# Patient Record
Sex: MALE | Race: WHITE | NOT HISPANIC OR LATINO | Employment: OTHER | URBAN - METROPOLITAN AREA
[De-identification: names, ages, dates, MRNs, and addresses within clinical notes are randomized per-mention and may not be internally consistent; named-entity substitution may affect disease eponyms.]

---

## 2017-06-02 DIAGNOSIS — M17.0 PRIMARY OSTEOARTHRITIS OF BOTH KNEES: ICD-10-CM

## 2017-06-02 RX ORDER — HYDROCODONE BITARTRATE AND ACETAMINOPHEN 5; 325 MG/1; MG/1
1 TABLET ORAL EVERY 6 HOURS PRN
Qty: 30 TABLET | Refills: 0 | Status: SHIPPED | OUTPATIENT
Start: 2017-06-02 | End: 2017-06-05 | Stop reason: SDUPTHER

## 2017-06-05 ENCOUNTER — TELEPHONE (OUTPATIENT)
Dept: INTERNAL MEDICINE | Facility: CLINIC | Age: 57
End: 2017-06-05

## 2017-06-05 ENCOUNTER — PATIENT MESSAGE (OUTPATIENT)
Dept: INTERNAL MEDICINE | Facility: CLINIC | Age: 57
End: 2017-06-05

## 2017-06-05 DIAGNOSIS — G89.29 CHRONIC LEFT SHOULDER PAIN: ICD-10-CM

## 2017-06-05 DIAGNOSIS — M17.0 PRIMARY OSTEOARTHRITIS OF BOTH KNEES: ICD-10-CM

## 2017-06-05 DIAGNOSIS — G89.29 CHRONIC MIDLINE LOW BACK PAIN WITHOUT SCIATICA: ICD-10-CM

## 2017-06-05 DIAGNOSIS — L71.9 ROSACEA: ICD-10-CM

## 2017-06-05 DIAGNOSIS — M25.512 CHRONIC LEFT SHOULDER PAIN: ICD-10-CM

## 2017-06-05 DIAGNOSIS — M54.50 CHRONIC MIDLINE LOW BACK PAIN WITHOUT SCIATICA: ICD-10-CM

## 2017-06-05 RX ORDER — HYDROCODONE BITARTRATE AND ACETAMINOPHEN 5; 325 MG/1; MG/1
1 TABLET ORAL EVERY 6 HOURS PRN
Qty: 30 TABLET | Refills: 0 | Status: SHIPPED | OUTPATIENT
Start: 2017-06-05 | End: 2017-06-26 | Stop reason: SDUPTHER

## 2017-06-05 NOTE — TELEPHONE ENCOUNTER
----- Message from Winter Fisher sent at 6/5/2017  8:37 AM CDT -----  Would like for all his med's to go to [facility] in [place]  at [address]. Can Hydrocodone script be sent to [facility] in [place].

## 2017-06-19 ENCOUNTER — OFFICE VISIT (OUTPATIENT)
Dept: DERMATOLOGY | Facility: CLINIC | Age: 57
End: 2017-06-19
Payer: OTHER GOVERNMENT

## 2017-06-19 VITALS — HEIGHT: 72 IN | WEIGHT: 259 LBS | BODY MASS INDEX: 35.08 KG/M2

## 2017-06-19 DIAGNOSIS — L40.9 PSORIASIS: Primary | ICD-10-CM

## 2017-06-19 PROCEDURE — 99999 PR PBB SHADOW E&M-EST. PATIENT-LVL II: CPT | Mod: PBBFAC,,, | Performed by: DERMATOLOGY

## 2017-06-19 PROCEDURE — 99212 OFFICE O/P EST SF 10 MIN: CPT | Mod: PBBFAC,PO | Performed by: DERMATOLOGY

## 2017-06-19 PROCEDURE — 99213 OFFICE O/P EST LOW 20 MIN: CPT | Mod: S$PBB,,, | Performed by: DERMATOLOGY

## 2017-06-19 NOTE — PROGRESS NOTES
"  Subjective:       Patient ID:  Baldo Grant is a 57 y.o. male who presents for   Chief Complaint   Patient presents with    Rash     R leg, behind L leg, R arm     Patient last seen 6/2016 with rosacea, improve with treatment  Returns with new complaint  No history of prior similar issue  No FH psoriasis  Using mometasone oint QOD  + job changes, family stressors    PEST questionnaire; 3+  Have you ever had a swollen joint or joints? yes  Has your doctor ever told you you had arthritis? no  Do your finger nails or toe nails have holes or pits? no  Have you had pain in your heel or angel's tendon? No (plantar fasciitis)  Have had a finger or toe that was completely swollen and painful for no apparent reason? no      Current Outpatient Prescriptions:     diazePAM (VALIUM) 5 MG tablet, Take 1 tablet (5 mg total) by mouth daily as needed., Disp: 30 tablet, Rfl: 1    diphenhydrAMINE (BENADRYL) 25 mg capsule, Take 25 mg by mouth every 6 (six) hours as needed for Itching., Disp: , Rfl:     fexofenadine-pseudoephedrine  mg (ALLEGRA-D)  mg per tablet, Take 1 tablet by mouth every 12 (twelve) hours., Disp: , Rfl:     hydrocodone-acetaminophen 5-325mg (NORCO) 5-325 mg per tablet, Take 1 tablet by mouth every 6 (six) hours as needed for Pain., Disp: 30 tablet, Rfl: 0 - "bad back and bad knees"    ibuprofen (ADVIL,MOTRIN) 400 MG tablet, Take 400 mg by mouth daily as needed for Other., Disp: , Rfl:     meloxicam (MOBIC) 15 MG tablet, Take 1 tablet (15 mg total) by mouth once daily., Disp: 90 tablet, Rfl: 3    mometasone (ELOCON) 0.1 % ointment, APPLY TO AFFECTED AREA ON LEGS TWICE A DAY, Disp: , Rfl: 2    triamcinolone acetonide 0.1% (KENALOG) 0.1 % cream, AAA arms bid, Disp: 60 g, Rfl: 3        Rash  - Initial  Affected locations: right lower leg, left lower leg and right arm  Duration: 6 months  Signs / symptoms: itching and redness  Severity: mild to moderate  Timing: constant  Aggravated by: " scratching  Treatments tried: Mometasone Ointment 0.1%, triamcinolone 0.1% cream.  Improvement on treatment: no relief        Review of Systems   Constitutional: Negative for fever, chills, weight loss, weight gain, fatigue, night sweats and malaise.   Skin: Positive for itching, rash, activity-related sunscreen use and wears hat.   Hematologic/Lymphatic: Does not bruise/bleed easily.        Objective:    Physical Exam   Constitutional: He appears well-developed and well-nourished. No distress.   Neurological: He is alert and oriented to person, place, and time. He is not disoriented.   Psychiatric: He has a normal mood and affect.   Skin:   Areas Examined (abnormalities noted in diagram):   Scalp / Hair Palpated and Inspected  Head / Face Inspection Performed  RUE Inspected  LUE Inspection Performed  RLE Inspected  LLE Inspection Performed  Nails and Digits Inspection Performed              Diagram Legend     Erythematous scaling macule/papule c/w actinic keratosis       Vascular papule c/w angioma      Pigmented verrucoid papule/plaque c/w seborrheic keratosis      Yellow umbilicated papule c/w sebaceous hyperplasia      Irregularly shaped tan macule c/w lentigo     1-2 mm smooth white papules consistent with Milia      Movable subcutaneous cyst with punctum c/w epidermal inclusion cyst      Subcutaneous movable cyst c/w pilar cyst      Firm pink to brown papule c/w dermatofibroma      Pedunculated fleshy papule(s) c/w skin tag(s)      Evenly pigmented macule c/w junctional nevus     Mildly variegated pigmented, slightly irregular-bordered macule c/w mildly atypical nevus      Flesh colored to evenly pigmented papule c/w intradermal nevus       Pink pearly papule/plaque c/w basal cell carcinoma      Erythematous hyperkeratotic cursted plaque c/w SCC      Surgical scar with no sign of skin cancer recurrence      Open and closed comedones      Inflammatory papules and pustules      Verrucoid papule consistent  consistent with wart     Erythematous eczematous patches and plaques     Dystrophic onycholytic nail with subungual debris c/w onychomycosis     Umbilicated papule    Erythematous-base heme-crusted tan verrucoid plaque consistent with inflamed seborrheic keratosis     Erythematous Silvery Scaling Plaque c/w Psoriasis     See annotation      Assessment / Plan:        Psoriasis, B LEs, approx 1-2% BSA, classic plaque on R shin, few guttate lesions on BUEs and BLEs  No genital palmo plantar or H&N involvement  Failed mometasone ointment   Discussed RF for CVD, psoriasis is additional independent RF  + hand back and knee arthralgia, attributes to trauma ([employer]), consider Rheum evaluation  Trial of topical combo medication      -     calcipotriene-betamethasone (ENSTILAR) 0.005-0.064 % Foam; Apply 1 application topically once daily.  Dispense: 60 g; Refill: 2             Return in about 3 months (around 9/19/2017).

## 2017-06-26 ENCOUNTER — PATIENT MESSAGE (OUTPATIENT)
Dept: INTERNAL MEDICINE | Facility: CLINIC | Age: 57
End: 2017-06-26

## 2017-06-26 DIAGNOSIS — M54.50 CHRONIC MIDLINE LOW BACK PAIN WITHOUT SCIATICA: ICD-10-CM

## 2017-06-26 DIAGNOSIS — M17.0 PRIMARY OSTEOARTHRITIS OF BOTH KNEES: ICD-10-CM

## 2017-06-26 DIAGNOSIS — G89.29 CHRONIC MIDLINE LOW BACK PAIN WITHOUT SCIATICA: ICD-10-CM

## 2017-06-26 RX ORDER — HYDROCODONE BITARTRATE AND ACETAMINOPHEN 5; 325 MG/1; MG/1
1 TABLET ORAL EVERY 6 HOURS PRN
Qty: 30 TABLET | Refills: 0 | Status: SHIPPED | OUTPATIENT
Start: 2017-06-26 | End: 2017-10-02 | Stop reason: SDUPTHER

## 2017-06-26 NOTE — TELEPHONE ENCOUNTER
----- Message from Winter Fisher sent at 6/26/2017  2:34 PM CDT -----  Pharmacy would not take Hydrocodone script electrically so can he get a hard script. Want to pick script up today if possible.

## 2017-07-04 ENCOUNTER — HOSPITAL ENCOUNTER (EMERGENCY)
Facility: HOSPITAL | Age: 57
Discharge: HOME | End: 2017-07-05
Attending: EMERGENCY MEDICINE
Payer: OTHER GOVERNMENT

## 2017-07-04 VITALS
BODY MASS INDEX: 33.86 KG/M2 | HEIGHT: 72 IN | OXYGEN SATURATION: 97 % | HEART RATE: 75 BPM | TEMPERATURE: 99 F | RESPIRATION RATE: 20 BRPM | DIASTOLIC BLOOD PRESSURE: 97 MMHG | SYSTOLIC BLOOD PRESSURE: 140 MMHG | WEIGHT: 250 LBS

## 2017-07-04 DIAGNOSIS — R07.9 CHEST PAIN: ICD-10-CM

## 2017-07-04 DIAGNOSIS — T78.40XA ALLERGIC REACTION, INITIAL ENCOUNTER: Primary | ICD-10-CM

## 2017-07-04 LAB — TROPONIN I SERPL DL<=0.01 NG/ML-MCNC: 0.01 NG/ML

## 2017-07-04 PROCEDURE — 63600175 PHARM REV CODE 636 W HCPCS: Performed by: EMERGENCY MEDICINE

## 2017-07-04 PROCEDURE — 96374 THER/PROPH/DIAG INJ IV PUSH: CPT

## 2017-07-04 PROCEDURE — 25000003 PHARM REV CODE 250: Performed by: EMERGENCY MEDICINE

## 2017-07-04 PROCEDURE — 84484 ASSAY OF TROPONIN QUANT: CPT

## 2017-07-04 PROCEDURE — 99284 EMERGENCY DEPT VISIT MOD MDM: CPT | Mod: 25

## 2017-07-04 PROCEDURE — 93005 ELECTROCARDIOGRAM TRACING: CPT

## 2017-07-04 RX ORDER — EPINEPHRINE 0.3 MG/.3ML
1 INJECTION SUBCUTANEOUS ONCE
Qty: 1 DEVICE | Refills: 1 | Status: SHIPPED | OUTPATIENT
Start: 2017-07-04 | End: 2019-12-10 | Stop reason: SDUPTHER

## 2017-07-04 RX ORDER — METHYLPREDNISOLONE 4 MG/1
TABLET ORAL
Qty: 1 PACKAGE | Refills: 0 | Status: SHIPPED | OUTPATIENT
Start: 2017-07-04 | End: 2017-07-25

## 2017-07-04 RX ORDER — METHYLPREDNISOLONE SOD SUCC 125 MG
125 VIAL (EA) INJECTION
Status: COMPLETED | OUTPATIENT
Start: 2017-07-04 | End: 2017-07-04

## 2017-07-04 RX ORDER — MAG HYDROX/ALUMINUM HYD/SIMETH 200-200-20
5 SUSPENSION, ORAL (FINAL DOSE FORM) ORAL
Status: COMPLETED | OUTPATIENT
Start: 2017-07-04 | End: 2017-07-04

## 2017-07-04 RX ORDER — FAMOTIDINE 20 MG/1
20 TABLET, FILM COATED ORAL
Status: COMPLETED | OUTPATIENT
Start: 2017-07-04 | End: 2017-07-04

## 2017-07-04 RX ADMIN — ALUMINUM HYDROXIDE, MAGNESIUM HYDROXIDE, AND SIMETHICONE 5 ML: 200; 200; 20 SUSPENSION ORAL at 08:07

## 2017-07-04 RX ADMIN — METHYLPREDNISOLONE SODIUM SUCCINATE 125 MG: 125 INJECTION, POWDER, FOR SOLUTION INTRAMUSCULAR; INTRAVENOUS at 07:07

## 2017-07-04 RX ADMIN — FAMOTIDINE 20 MG: 20 TABLET, FILM COATED ORAL at 08:07

## 2017-07-05 NOTE — ED PROVIDER NOTES
Encounter Date: 7/4/2017       History     Chief Complaint   Patient presents with    Allergic Reaction     States sitting in easy chair 30 minutes PTA and tongue started itching and started swelling with swelling to lower face.  Took benadryl capsule and liquid benadryl PTA.  Also reports non-radiating epigastric pain. States hx of GERD.      58 yo M presents to the ED with lip and tongue swelling that started 1 hour ago. Associated burning in his chest.  Patient reports he took ibuprofen, about 10 minutes prior to developing the symptoms.  He's been taking ibuprofen his entire life.  He denies taking Ace inhibitors, he denies any new food.  He denies nausea vomiting or diaphoresis.  He did have some accompanying chest burn, that he reports is very similar to his gastritis/GERD.  After he started developing symptoms, he took Benadryl, 25 mg, then again took another 25 mg, when the symptoms persisted he decided to come in.  He denies shortness of breath or difficulty swallowing.      The history is provided by the patient.     Review of patient's allergies indicates:  No Known Allergies  Past Medical History:   Diagnosis Date    Cancer     Chronic pain of right knee     SCC (squamous cell carcinoma) 2014    Forehead- Dr. Cabral     Skin cancer     Squamous cell carcinoma 2013    Right ear- Dr. Marianna long     Past Surgical History:   Procedure Laterality Date    APPENDECTOMY      COLONOSCOPY  1998    lipoma removal      skin cancer removal       Family History   Problem Relation Age of Onset    COPD Mother     Alcohol abuse Mother     Heart disease Father     Melanoma Father     No Known Problems Sister     No Known Problems Brother     Psoriasis Neg Hx     Lupus Neg Hx     Eczema Neg Hx      Social History   Substance Use Topics    Smoking status: Never Smoker    Smokeless tobacco: Never Used    Alcohol use Yes      Comment: social     Review of Systems   Eyes: Negative.    Respiratory:  Negative for shortness of breath.    Cardiovascular: Positive for chest pain. Negative for leg swelling.   Endocrine: Negative.    Genitourinary: Negative.    All other systems reviewed and are negative.      Physical Exam     Initial Vitals [07/04/17 1921]   BP Pulse Resp Temp SpO2   (!) 140/97 75 20 98.6 °F (37 °C) 97 %      MAP       111.33         Physical Exam    Nursing note and vitals reviewed.  Constitutional: He appears well-developed and well-nourished.   HENT:   Head: Normocephalic.   Mouth/Throat: Uvula is midline. No trismus in the jaw. No uvula swelling.   He has lower lip swelling, no swelling of the tongue notable. No hard palate swelling, no swelling under the tongue. No erythema or redness. Neck is soft and supple.    Eyes: Conjunctivae and EOM are normal. Pupils are equal, round, and reactive to light. Right eye exhibits no discharge. Left eye exhibits no discharge.   Neck: Neck supple.   Cardiovascular: Normal heart sounds.   Pulmonary/Chest: No respiratory distress. He has no wheezes. He has no rales.   Abdominal: Soft.   Neurological: He is alert and oriented to person, place, and time.   Skin: Skin is warm.   Psychiatric: He has a normal mood and affect.         ED Course   Procedures  Labs Reviewed   TROPONIN I             Medical Decision Making:   Initial Assessment:   56 yo M here with tongue and lip swelling/itching 1 hour prior to arrival, shortly after taking ibuprofen  Differential Diagnosis:   Allergic reaction    He has no signs of dyspnea, wheeze-bronchospasm, stridor, hypoxemia) or reduced BP or associated symptoms of end-organ dysfunction--eg, hypotonia, collapse, syncope or incontinence    Anaphylaxis is less likely, though possible if we include his crampy abdominal pain as a symptom, would send home with an epi-pen.                    ED Course     Clinical Impression:   The primary encounter diagnosis was Allergic reaction, initial encounter. A diagnosis of Chest pain was also  pertinent to this visit.                           Magda Olsen MD  07/06/17 0627

## 2017-07-05 NOTE — ED NOTES
Patient states his cheeks no longer have discomfort and his tongue only feels numb just on the sides. Bottom lip on the sides still look a bit swollen . WIll continue to monitor

## 2017-07-05 NOTE — DISCHARGE INSTRUCTIONS
PLEASE CALL DR MONAHAN TOMORROW  PLEASE FILL YOUR SCRIPTS- MEDROL DOSE PACK AND THE EPI-PEN  YOU NEED TO BE SEEN BY ALLERGIST FOR FORMAL TESTING TO WHAT YOU WERE ALLERGIC TO.     IN THE MEANTIME, AVOID NONSTEROIDALS (MOTRIN, MELOXICAM, IBUPROFEN ETC), AS THIS IS LIKELY THE CAUSE OF YOUR REACTION TODAY.

## 2017-07-25 ENCOUNTER — LAB VISIT (OUTPATIENT)
Dept: LAB | Facility: HOSPITAL | Age: 57
End: 2017-07-25
Payer: OTHER GOVERNMENT

## 2017-07-25 ENCOUNTER — OFFICE VISIT (OUTPATIENT)
Dept: ALLERGY | Facility: CLINIC | Age: 57
End: 2017-07-25
Payer: OTHER GOVERNMENT

## 2017-07-25 VITALS
SYSTOLIC BLOOD PRESSURE: 112 MMHG | BODY MASS INDEX: 35.41 KG/M2 | HEART RATE: 80 BPM | HEIGHT: 72 IN | DIASTOLIC BLOOD PRESSURE: 78 MMHG | WEIGHT: 261.44 LBS

## 2017-07-25 DIAGNOSIS — J31.0 OTHER CHRONIC RHINITIS: ICD-10-CM

## 2017-07-25 DIAGNOSIS — K21.9 GASTROESOPHAGEAL REFLUX DISEASE, ESOPHAGITIS PRESENCE NOT SPECIFIED: ICD-10-CM

## 2017-07-25 DIAGNOSIS — T78.3XXA ANGIOEDEMA, INITIAL ENCOUNTER: Primary | ICD-10-CM

## 2017-07-25 DIAGNOSIS — L40.9 PSORIASIS: ICD-10-CM

## 2017-07-25 DIAGNOSIS — M19.90 OSTEOARTHRITIS, UNSPECIFIED OSTEOARTHRITIS TYPE, UNSPECIFIED SITE: ICD-10-CM

## 2017-07-25 DIAGNOSIS — L71.9 ROSACEA: ICD-10-CM

## 2017-07-25 DIAGNOSIS — Z11.59 NEED FOR HEPATITIS C SCREENING TEST: ICD-10-CM

## 2017-07-25 PROCEDURE — 83520 IMMUNOASSAY QUANT NOS NONAB: CPT

## 2017-07-25 PROCEDURE — 86003 ALLG SPEC IGE CRUDE XTRC EA: CPT

## 2017-07-25 PROCEDURE — 99213 OFFICE O/P EST LOW 20 MIN: CPT | Mod: PBBFAC | Performed by: ALLERGY & IMMUNOLOGY

## 2017-07-25 PROCEDURE — 86003 ALLG SPEC IGE CRUDE XTRC EA: CPT | Mod: 59

## 2017-07-25 PROCEDURE — 99999 PR PBB SHADOW E&M-EST. PATIENT-LVL III: CPT | Mod: PBBFAC,,, | Performed by: ALLERGY & IMMUNOLOGY

## 2017-07-25 PROCEDURE — 86803 HEPATITIS C AB TEST: CPT

## 2017-07-25 PROCEDURE — 36415 COLL VENOUS BLD VENIPUNCTURE: CPT

## 2017-07-25 PROCEDURE — 82785 ASSAY OF IGE: CPT

## 2017-07-25 PROCEDURE — 99244 OFF/OP CNSLTJ NEW/EST MOD 40: CPT | Mod: S$PBB,,, | Performed by: ALLERGY & IMMUNOLOGY

## 2017-07-25 RX ORDER — MINERAL OIL
180 ENEMA (ML) RECTAL CONTINUOUS PRN
COMMUNITY
End: 2024-03-17

## 2017-07-25 NOTE — LETTER
July 25, 2017      Magda Olsen MD  [address] [place] [address]           [facility] - Allergy/ Immunology  [address]  [facility] [address]  Phone: [phone]  Fax: [phone]          Patient: Baldo Grant   MR Number: [id number]   YOB: 1960   Date of Visit: 7/25/2017       Dear Dr. Magda Olsen:    Thank you for referring Baldo Gratn to me for evaluation. Attached you will find relevant portions of my assessment and plan of care.    If you have questions, please do not hesitate to call me. I look forward to following Baldo Grant along with you.    Sincerely,    ARNIE Tidwell III, MD    Enclosure  CC:  No Recipients    If you would like to receive this communication electronically, please contact [email or web address] or [phone] to request more information on [employer] Link access.    For providers and/or their staff who would like to refer a patient to [facility], please contact us through our one-stop-shop provider referral line, [facility], at [phone].    If you feel you have received this communication in error or would no longer like to receive these types of communications, please e-mail [email or web address]

## 2017-07-25 NOTE — PROGRESS NOTES
Mario Grant is referred by Dr. Magda Olsen for a consult regarding angioedema and a possible drug reaction. He is here with his wife.    He has had chronic recurrent rhinitis for many years. He has sneezing, clear rhinorrhea, and nasal congestion. His symptoms are worse when he lies down at night. He takes Allegra with improvement. He does not take any topical nasal steroids.    He denies any cough, wheezing, or shortness of breath. He denies any history of asthma.    On 2017 his mother  at age 82. It was not unexpected.    On 2017 he woke up and ate breakfast consisting of coffee, ham, egg beaters, and toast.      Around 1 PM he had an [employer]oothie.    He went to [place] to visit his climate controlled storage unit.  He worked on a [employer] that afternoon and was around some dust.    Late that afternoon he took a Motrin while talking on the phone to a relative.  Within 10 minutes developed swelling of his lip, tongue, and face and difficulty talking. He took Benadryl and went to the emergency room at [place].      He was treated with epinephrine, oral prednisone, IM methylprednisolone, and famotidine. His symptoms resolved. He was discharged with an epinephrine prescription.    He was told to avoid Motrin and all nonsteroidal anti-inflammatory drugs.    He does have DJD of the back and chronic knee pain. He does have a significant amount of pain that is improved with ibuprofen.    He does take Tylenol but without adequate relief. He does get relief with codeine.    For ROS, FH, SH please see Allergy and Asthma Questionnaire dated today.    Some relevant pertinent positives:    Review of Systems:  He does have gastroesophageal reflux disease several times a week. He does not take any regular medications for this. He has rosacea. He has psoriasis and is followed by Dr. Monroe in [place].    Family History: His father is allergic to pollen, grass, and goldenrod.    Home environment: He  has lived in the same house for the past 2 years. There was no water damage. There is no evidence of mold. There is carpeting in the bedroom. There are no pets.    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 He is now retired. 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 He is a nonsmoker.    Physical Examination:  General: Well-developed, well-nourished, no acute distress.  Head: No sinus tenderness.  Eyes: Conjunctivae:  No bulbar or palpebral conjunctival injection.  Ears: EAC's clear.  TM's clear.  No pre-auricular nodes.  Nose: Nasal Mucosa:  Pink.  Septum: No apparent deviation.  Turbinates:  No significant edema.  Polyps/Mass:  None visible.  Teeth/Gums:  No bleeding noted.  Oropharynx: No exudates.  Neck: Supple without thyromegaly. No cervical lymphadenopathy.    Respiratory/Chest: Effort: Good.  Auscultation:  Clear bilaterally.  Cardiovascular:  No murmur, rubs, or gallop heard.   GI:  Non-tender.  No masses.  No organomegaly.  Extremities:  No swelling.  No cyanosis, clubbing, or edema.  Skin: Good turgor.  No urticaria or angioedema. Psoriatic lesions on right lower extremity.  Neuro/Psych: Oriented x 3.    Assessment:  1. Motrin allergy.  2. Chronic rhinitis, consider allergic.  3. Psoriasis.  4. Rosacea.  5. DJD.  6. GERD.    Recommendations:  1. Laboratory as ordered.  2. Avoid Motrin.  3. EpiPen use and technique was reviewed.  4. Consider challenge with other nonsteroidal anti-inflammatory agents.

## 2017-07-26 LAB
HCV AB SERPL QL IA: NEGATIVE
IGE SERPL-ACNC: <35 IU/ML

## 2017-07-27 LAB
A ALTERNATA IGE QN: <0.35 KU/L
A FUMIGATUS IGE QN: <0.35 KU/L
BERMUDA GRASS IGE QN: <0.35 KU/L
CAT DANDER IGE QN: <0.35 KU/L
CEDAR IGE QN: <0.35 KU/L
D FARINAE IGE QN: <0.35 KU/L
D PTERONYSS IGE QN: <0.35 KU/L
DEPRECATED A ALTERNATA IGE RAST QL: NORMAL
DEPRECATED A FUMIGATUS IGE RAST QL: NORMAL
DEPRECATED BERMUDA GRASS IGE RAST QL: NORMAL
DEPRECATED CAT DANDER IGE RAST QL: NORMAL
DEPRECATED CEDAR IGE RAST QL: NORMAL
DEPRECATED D FARINAE IGE RAST QL: NORMAL
DEPRECATED D PTERONYSS IGE RAST QL: NORMAL
DEPRECATED DOG DANDER IGE RAST QL: NORMAL
DEPRECATED ENGL PLANTAIN IGE RAST QL: NORMAL
DEPRECATED MARSH ELDER IGE RAST QL: NORMAL
DEPRECATED ROACH IGE RAST QL: NORMAL
DEPRECATED TIMOTHY IGE RAST QL: NORMAL
DEPRECATED WHITE OAK IGE RAST QL: NORMAL
DOG DANDER IGE QN: <0.35 KU/L
ENGL PLANTAIN IGE QN: <0.35 KU/L
MARSH ELDER IGE QN: <0.35 KU/L
RAGWEED, WESTERN IGE: <0.35 KU/L
RAGWEED, WESTERN, CLASS: NORMAL
ROACH IGE QN: <0.35 KU/L
TIMOTHY IGE QN: <0.35 KU/L
TRYPTASE LEVEL: 3.6 NG/ML
WHITE OAK IGE QN: <0.35 KU/L

## 2017-08-20 ENCOUNTER — PATIENT MESSAGE (OUTPATIENT)
Dept: INTERNAL MEDICINE | Facility: CLINIC | Age: 57
End: 2017-08-20

## 2017-08-22 ENCOUNTER — OFFICE VISIT (OUTPATIENT)
Dept: ALLERGY | Facility: CLINIC | Age: 57
End: 2017-08-22
Payer: OTHER GOVERNMENT

## 2017-08-22 VITALS
WEIGHT: 268.06 LBS | BODY MASS INDEX: 36.31 KG/M2 | DIASTOLIC BLOOD PRESSURE: 72 MMHG | SYSTOLIC BLOOD PRESSURE: 140 MMHG | HEIGHT: 72 IN | HEART RATE: 72 BPM

## 2017-08-22 DIAGNOSIS — Z88.6: Primary | ICD-10-CM

## 2017-08-22 PROCEDURE — 99999 PR PBB SHADOW E&M-EST. PATIENT-LVL III: CPT | Mod: PBBFAC,,, | Performed by: ALLERGY & IMMUNOLOGY

## 2017-08-22 PROCEDURE — 95076 INGEST CHALLENGE INI 120 MIN: CPT | Mod: PBBFAC | Performed by: ALLERGY & IMMUNOLOGY

## 2017-08-22 PROCEDURE — 95076 INGEST CHALLENGE INI 120 MIN: CPT | Mod: S$PBB,,, | Performed by: ALLERGY & IMMUNOLOGY

## 2017-08-22 PROCEDURE — 99214 OFFICE O/P EST MOD 30 MIN: CPT | Mod: 25,S$PBB,, | Performed by: ALLERGY & IMMUNOLOGY

## 2017-08-22 PROCEDURE — 3008F BODY MASS INDEX DOCD: CPT | Mod: ,,, | Performed by: ALLERGY & IMMUNOLOGY

## 2017-08-22 PROCEDURE — 99213 OFFICE O/P EST LOW 20 MIN: CPT | Mod: PBBFAC,25 | Performed by: ALLERGY & IMMUNOLOGY

## 2017-08-22 NOTE — PATIENT INSTRUCTIONS
You passed an aspirin challenge today. This means that you are unlikely to have hives/swelling with NSAIDs other than Ibprofen.     Please continue to strictly avoid ibprofen.     Please send me a note and photos via patient portal if you get hives/swelling later this week. Seek medical attention immediately if you develop concerning symptoms.    It is ok to take meloxicam (mobic) at home.     The allergy on-call pager number is [phone].     Follow up with Dr. Tidwell as previously scheduled.

## 2017-08-22 NOTE — PROGRESS NOTES
ALLERGY & IMMUNOLOGY CLINIC - FOLLOW UP     HISTORY OF PRESENT ILLNESS     Patient ID: Baldo Grant is a 57 y.o. male    CC: follow up NSAID allergy    HPI: 58 yo man with history of angioedema of the lips, tongue and face after taking ibprofen; he was last seen by Dr. Tidwell in July 2017. Differential diagnosis includes IgE-mediated reaction to ibprofen or pseudoallergy to all NSAIDs. He reports that he is feeling well. He is here anticipating a mobic challenge, but he rescheduled his appointment from a procedure clinic day to today, which is not a procedure day. He took one benadryl 48 hours ago.     I offered him an aspirin challenge to help try to distinguish between an IgE-mediated ibprofen allergy and a pseudoallergy to all NSAIDs. The risk of an IgE-mediated reaction to aspirin is exceptionally low. After reviewing risks, benefits, and alternatives, he agreed to an aspirin challenge and he provides [facility] brand henna-monchoer.     REVIEW OF SYSTEMS     CONST: no F/C/NS, no unintentional weight changes  NEURO: no H/A, no weakness  EYES:  no erythema  EARS: no hearing loss  NOSE: no congestion, no rhinorrhea, no discharge  PULM: no SOB, no wheezing, no cough  CV: no CP, no palpitations, no leg swelling  GI: no pain, no N/V/D  MSK: + joint pain  DERM: + rash right shin     MEDICAL HISTORY     MedHx: skin cancer  SurgHx: appendectomy, skin biopsies  SocHx: nonsmoker  FamHx: allergic rhinitis - dad  Allergies: NKDA  Medications: MAR reviewed     PHYSICAL EXAM     VS: BP (!) 140/72   Pulse 72   Ht 6' (1.829 m)   Wt 121.6 kg (268 lb 1.3 oz)   BMI 36.36 kg/m²   GENERAL: AAOx4, NAD, well-appearing, cooperative  EYES: PERRL, EOMI, no conjunctival injection, no discharge, no infraorbital shiners  EARS: external auditory canals normal B/L, TM normal B/L  ORAL: MMM, no ulcers, no thrush, no cobblestoning  NECK: supple, trachea midline, no thyromegaly, no LAD  LUNGS: CTAB, no w/r/c, no increased WOB  HEART: RRR, normal  S1/S2, no m/g/r  ABDOMEN: soft, non-tender, non-distended  EXTREMITIES:  no c/c/e  LYMPHATICS: no cervical/submandibular LAD  DERM: erythematous rash right shin  NEURO: normal gait, no facial asymmetry     LABORATORY STUDIES     Tryptase July 2017 normal     ALLERGEN TESTING     Immunocaps: Done July 2017, all negative     PROCEDURE     Informed consent was obtained verbally after risks, benefits and alternatives were reviewed. One alkaseltzer 325 mg tablet was dissolved in 60 mL of water. Six mL of the mixture was given and patient was observed for 20 minutes without incident. The remaining 56 mL were given and patient was observed for 60 minutes without incident. He was discharged home in stable condition. Total dose was 325mg of aspirin.      ASSESSMENT & PLAN     Baldo Grant is a 57 y.o. male with history of angioedema after ibprofen, now passed an henna-seltzer challenge.     Patient asked to call/send photos if he gets hives or angioedema in the next 48 hours, otherwise, we will assume that he does not have a pseudoallergy to all NSAIDs and is free to take mobic as prescribed. He should continue to avoid ibprofen, as we will assume he does have an IgE-mediated reaction to ibprofen.     Follow up with Dr. Tidwell as previously scheduled.     Sara Sarmiento MD  Allergy/Immunology Fellow

## 2017-10-02 DIAGNOSIS — M54.50 CHRONIC MIDLINE LOW BACK PAIN WITHOUT SCIATICA: ICD-10-CM

## 2017-10-02 DIAGNOSIS — G89.29 CHRONIC MIDLINE LOW BACK PAIN WITHOUT SCIATICA: ICD-10-CM

## 2017-10-02 DIAGNOSIS — M17.0 PRIMARY OSTEOARTHRITIS OF BOTH KNEES: ICD-10-CM

## 2017-10-02 RX ORDER — DIAZEPAM 5 MG/1
5 TABLET ORAL DAILY PRN
Qty: 30 TABLET | Refills: 1 | Status: SHIPPED | OUTPATIENT
Start: 2017-10-02 | End: 2018-03-26 | Stop reason: SDUPTHER

## 2017-10-02 RX ORDER — HYDROCODONE BITARTRATE AND ACETAMINOPHEN 5; 325 MG/1; MG/1
1 TABLET ORAL EVERY 6 HOURS PRN
Qty: 30 TABLET | Refills: 0 | Status: SHIPPED | OUTPATIENT
Start: 2017-10-02 | End: 2017-12-21 | Stop reason: SDUPTHER

## 2017-10-06 ENCOUNTER — TELEPHONE (OUTPATIENT)
Dept: DERMATOLOGY | Facility: CLINIC | Age: 57
End: 2017-10-06

## 2017-10-06 DIAGNOSIS — L40.9 PSORIASIS: ICD-10-CM

## 2017-10-06 NOTE — TELEPHONE ENCOUNTER
----- Message from Cherrie Clifford sent at 10/6/2017  3:31 PM CDT -----  Contact: patient  Patient called to with questions stated his wife couldn't  script (enstilar) at the office.would like to know if script can be mailed to his address? Please call back to advise at [phone]. Thanks,

## 2017-10-06 NOTE — TELEPHONE ENCOUNTER
----- Message from Cherrie Clifford sent at 10/6/2017  3:31 PM CDT -----  Contact: patient  Patient called to with questions stated his wife couldn't  script (enstilar) at the office.would like to know if script can be mailed to his address? Please call back to advise at [phone]. Thanks,

## 2017-10-06 NOTE — TELEPHONE ENCOUNTER
Left message on [facility] ambulatory [facility] pharmacy line for refill on enstilar as per request of patient.  Left number for pharmacy to call if any issues.

## 2017-10-10 ENCOUNTER — TELEPHONE (OUTPATIENT)
Dept: DERMATOLOGY | Facility: CLINIC | Age: 57
End: 2017-10-10

## 2017-10-10 NOTE — TELEPHONE ENCOUNTER
----- Message from Elizabeth Gallagher sent at 10/10/2017  9:25 AM CDT -----  Contact:   call //[phone]  Please call   //[phone]/ [facility] //[facility]// please call  For  details

## 2017-11-01 ENCOUNTER — TELEPHONE (OUTPATIENT)
Dept: INTERNAL MEDICINE | Facility: CLINIC | Age: 57
End: 2017-11-01

## 2017-11-01 NOTE — TELEPHONE ENCOUNTER
----- Message from Winter Fisher sent at 11/1/2017  2:11 PM CDT -----  Would like for you to give him a call concerning his knees. He is still having problems with knees hurting. Patient can be reach at [phone]

## 2017-11-02 ENCOUNTER — TELEPHONE (OUTPATIENT)
Dept: DERMATOLOGY | Facility: CLINIC | Age: 57
End: 2017-11-02

## 2017-11-02 NOTE — TELEPHONE ENCOUNTER
----- Message from Mercedez Call sent at 11/2/2017 12:16 PM CDT -----  Contact: rose   calcipotriene-betamethasone (ENSTILAR) 0.005-0.064 % Foam  Is that at the    Call back

## 2017-12-11 ENCOUNTER — OFFICE VISIT (OUTPATIENT)
Dept: SPORTS MEDICINE | Facility: CLINIC | Age: 57
End: 2017-12-11
Payer: OTHER GOVERNMENT

## 2017-12-11 ENCOUNTER — HOSPITAL ENCOUNTER (OUTPATIENT)
Dept: RADIOLOGY | Facility: HOSPITAL | Age: 57
Discharge: HOME | End: 2017-12-11
Attending: ORTHOPAEDIC SURGERY
Payer: OTHER GOVERNMENT

## 2017-12-11 VITALS
HEART RATE: 70 BPM | HEIGHT: 72 IN | DIASTOLIC BLOOD PRESSURE: 75 MMHG | BODY MASS INDEX: 36.3 KG/M2 | WEIGHT: 268 LBS | SYSTOLIC BLOOD PRESSURE: 130 MMHG

## 2017-12-11 DIAGNOSIS — M25.569 KNEE PAIN, UNSPECIFIED CHRONICITY, UNSPECIFIED LATERALITY: Primary | ICD-10-CM

## 2017-12-11 DIAGNOSIS — M17.12 PRIMARY OSTEOARTHRITIS OF LEFT KNEE: ICD-10-CM

## 2017-12-11 DIAGNOSIS — M25.569 KNEE PAIN, UNSPECIFIED CHRONICITY, UNSPECIFIED LATERALITY: ICD-10-CM

## 2017-12-11 PROCEDURE — 73564 X-RAY EXAM KNEE 4 OR MORE: CPT | Mod: 26,50,, | Performed by: RADIOLOGY

## 2017-12-11 PROCEDURE — 73564 X-RAY EXAM KNEE 4 OR MORE: CPT | Mod: TC,50,PO

## 2017-12-11 PROCEDURE — 99214 OFFICE O/P EST MOD 30 MIN: CPT | Mod: S$PBB,,, | Performed by: ORTHOPAEDIC SURGERY

## 2017-12-11 PROCEDURE — 99999 PR PBB SHADOW E&M-EST. PATIENT-LVL III: CPT | Mod: PBBFAC,,, | Performed by: ORTHOPAEDIC SURGERY

## 2017-12-11 PROCEDURE — 99213 OFFICE O/P EST LOW 20 MIN: CPT | Mod: PBBFAC,25,PO | Performed by: ORTHOPAEDIC SURGERY

## 2017-12-12 ENCOUNTER — PATIENT MESSAGE (OUTPATIENT)
Dept: SPORTS MEDICINE | Facility: CLINIC | Age: 57
End: 2017-12-12

## 2017-12-12 NOTE — PROGRESS NOTES
CC: LEFT knee pain    57 y.o. Male who presents as a new patient to me. I have previously seen his wife, Lilibeth, in clinic. Complaint today is left knee pain x 4mo with an atraumatic onset. Increased pain over the last 2 weeks with more physical activity due to the holidays. Denies swelling or effusion episodes. Pain localizes anteriorly and medially. Worse with impact activity, walking for long periods of time & stairs.  Better with rest. Occasional pain at night and stiffness. Denies injection or surgical history to the left knee. Here today to discuss diagnosis and treatment options.  His wife accompanies him today and states that her knee feels much better    Negative Mechanical symptoms  Negative Subjective instability.    Negative for smoking.   Negative for diabetes.    Pain Score:   5    REVIEW OF SYSTEMS:   Constitution: Negative. Negative for chills, fever and night sweats.    Hematologic/Lymphatic: Negative for bleeding problem. Does not bruise/bleed easily.   Skin: Negative for dry skin, itching and rash.   Musculoskeletal: Negative for falls. Positive for left knee pain and  muscle weakness.     PAST MEDICAL HISTORY:   Past Medical History:   Diagnosis Date    Cancer     Chronic pain of right knee     SCC (squamous cell carcinoma) 2014    Forehead- Dr. Cabral     Skin cancer     Squamous cell carcinoma 2013    Right ear- Dr. Marianna long       PAST SURGICAL HISTORY:   Past Surgical History:   Procedure Laterality Date    APPENDECTOMY      COLONOSCOPY  1998    lipoma removal      skin cancer removal         FAMILY HISTORY:   Family History   Problem Relation Age of Onset    COPD Mother     Alcohol abuse Mother     Heart disease Father     Melanoma Father     No Known Problems Sister     No Known Problems Brother     Psoriasis Neg Hx     Lupus Neg Hx     Eczema Neg Hx        SOCIAL HISTORY:   Social History     Social History    Marital status:      Spouse name: N/A     Number of children: N/A    Years of education: N/A     Occupational History    Not on file.     Social History Main Topics    Smoking status: Never Smoker    Smokeless tobacco: Never Used    Alcohol use Yes      Comment: social    Drug use: No    Sexual activity: Not on file     Other Topics Concern    Not on file     Social History Narrative    No narrative on file       MEDICATIONS:     Current Outpatient Prescriptions:     calcipotriene-betamethasone (ENSTILAR) 0.005-0.064 % Foam, Apply 1 application topically once daily., Disp: 60 g, Rfl: 2    diazePAM (VALIUM) 5 MG tablet, Take 1 tablet (5 mg total) by mouth daily as needed., Disp: 30 tablet, Rfl: 1    diphenhydrAMINE (BENADRYL) 25 mg capsule, Take 25 mg by mouth every 6 (six) hours as needed for Itching., Disp: , Rfl:     epinephrine (EPIPEN) 0.3 mg/0.3 mL AtIn, Inject 0.3 mLs (0.3 mg total) into the muscle once., Disp: 1 Device, Rfl: 1    fexofenadine (ALLEGRA) 180 MG tablet, Take 180 mg by mouth continuous prn., Disp: , Rfl:     hydrocodone-acetaminophen 5-325mg (NORCO) 5-325 mg per tablet, Take 1 tablet by mouth every 6 (six) hours as needed for Pain., Disp: 30 tablet, Rfl: 0    meloxicam (MOBIC) 15 MG tablet, Take 1 tablet (15 mg total) by mouth once daily., Disp: 90 tablet, Rfl: 3    mometasone (ELOCON) 0.1 % ointment, APPLY TO AFFECTED AREA ON LEGS TWICE A DAY, Disp: , Rfl: 2    triamcinolone acetonide 0.1% (KENALOG) 0.1 % cream, AAA arms bid, Disp: 60 g, Rfl: 3    ALLERGIES:   Review of patient's allergies indicates:  No Known Allergies     PHYSICAL EXAMINATION:  /75   Pulse 70   Ht 6' (1.829 m)   Wt 121.6 kg (268 lb)   BMI 36.35 kg/m²   General: Well-developed well-nourished 57 y.o. malein no acute distress   Cardiovascular: Regular rhythm by palpation of distal pulse, normal color and temperature, no concerning varicosities on symptomatic side   Lungs: No labored breathing or wheezing appreciated   Neuro: Alert and oriented  ×3   Psychiatric: well oriented to person, place and time, demonstrates normal mood and affect   Skin: No rashes, lesions or ulcers, normal temperature, turgor, and texture on involved extremity    Ortho/SPM Exam  Focal examination of the left knee demonstrates painful arc of motion.  Mild tenderness over the medial joint line and peripatellar facets.  Mildly positive patellar grind test.  Nonspecific discomfort with Shae's testing.  Ligamentously stable.  No swelling or effusion.  Good quadriceps bulk and tone.  Intact extensor mechanism.    IMAGING:    X-rays including standing, weight bearing AP and flexion bilateral knees, LEFT knee lateral and sunrise views ordered and images reviewed by me show:    Mild tricompartmental degenerative changes    ASSESSMENT:      ICD-10-CM ICD-9-CM   1. Knee pain, unspecified chronicity, unspecified laterality M25.569 719.46   2. Primary osteoarthritis of left knee M17.12 715.16         PLAN:       The patient has primarily arthritic complaints with no meniscal based symptoms at this time.  Treatment options were reviewed to include the usual nonoperative measures for knee arthritis.  The patient wishes to proceed with Visco supplementation injection which I think is reasonable.  We will go ahead and get him approved for Synvisc one and have him return next week for combined steroid-visco injection.  Also discussed low-impact cardio exercise, oral anti-inflammatory medication, and appropriate activity modifications.    Procedures

## 2017-12-13 ENCOUNTER — PATIENT MESSAGE (OUTPATIENT)
Dept: INTERNAL MEDICINE | Facility: CLINIC | Age: 57
End: 2017-12-13

## 2017-12-13 ENCOUNTER — OFFICE VISIT (OUTPATIENT)
Dept: INTERNAL MEDICINE | Facility: CLINIC | Age: 57
End: 2017-12-13
Payer: OTHER GOVERNMENT

## 2017-12-13 VITALS
RESPIRATION RATE: 16 BRPM | HEART RATE: 72 BPM | SYSTOLIC BLOOD PRESSURE: 114 MMHG | WEIGHT: 268.31 LBS | HEIGHT: 72 IN | TEMPERATURE: 97 F | DIASTOLIC BLOOD PRESSURE: 88 MMHG | BODY MASS INDEX: 36.34 KG/M2

## 2017-12-13 DIAGNOSIS — M17.0 PRIMARY OSTEOARTHRITIS OF BOTH KNEES: ICD-10-CM

## 2017-12-13 DIAGNOSIS — M25.512 ACUTE PAIN OF LEFT SHOULDER: Primary | ICD-10-CM

## 2017-12-13 DIAGNOSIS — Z23 NEED FOR PROPHYLACTIC VACCINATION AND INOCULATION AGAINST INFLUENZA: ICD-10-CM

## 2017-12-13 PROCEDURE — 90471 IMMUNIZATION ADMIN: CPT | Mod: PBBFAC,PN

## 2017-12-13 PROCEDURE — 99213 OFFICE O/P EST LOW 20 MIN: CPT | Mod: PBBFAC,PN,25 | Performed by: INTERNAL MEDICINE

## 2017-12-13 PROCEDURE — 99213 OFFICE O/P EST LOW 20 MIN: CPT | Mod: 25,S$PBB,, | Performed by: INTERNAL MEDICINE

## 2017-12-13 PROCEDURE — 20610 DRAIN/INJ JOINT/BURSA W/O US: CPT | Mod: PBBFAC,PN | Performed by: INTERNAL MEDICINE

## 2017-12-13 PROCEDURE — 99999 PR PBB SHADOW E&M-EST. PATIENT-LVL III: CPT | Mod: PBBFAC,,, | Performed by: INTERNAL MEDICINE

## 2017-12-13 PROCEDURE — 20610 DRAIN/INJ JOINT/BURSA W/O US: CPT | Mod: S$PBB,LT,, | Performed by: INTERNAL MEDICINE

## 2017-12-13 NOTE — PROGRESS NOTES
Subjective:       Patient ID: Baldo Grant is a 57 y.o. male.    Chief Complaint: Shoulder Pain (left)    HPI  Pt with severe L shoulder pain x 2 days s/p putting up Xmas decorations.  Pt has a known rotator cuff tear in that shoulder.  Review of Systems   Constitutional: Negative for activity change and unexpected weight change.   HENT: Negative for hearing loss, rhinorrhea and trouble swallowing.    Eyes: Negative for discharge and visual disturbance.   Respiratory: Negative for chest tightness and wheezing.    Cardiovascular: Negative for chest pain and palpitations.   Gastrointestinal: Negative for blood in stool, constipation, diarrhea and vomiting.   Endocrine: Negative for polydipsia and polyuria.   Genitourinary: Negative for difficulty urinating, hematuria and urgency.   Musculoskeletal: Positive for arthralgias. Negative for joint swelling and neck pain.   Neurological: Negative for headaches.   Psychiatric/Behavioral: Negative for confusion and dysphoric mood.   All other systems reviewed and are negative.      Objective:      Physical Exam   Constitutional: He appears well-developed. No distress.   obese   HENT:   Head: Normocephalic.   Eyes: EOM are normal. No scleral icterus.   Neck: Normal range of motion. No tracheal deviation present.   Cardiovascular: Normal rate, regular rhythm, normal heart sounds and intact distal pulses.    Pulmonary/Chest: Effort normal. No respiratory distress.   Abdominal: He exhibits no distension.   Musculoskeletal: He exhibits no edema.   Severe pain and limited ROM in all plains  L deltoid tender anteriorly   Neurological: He is alert.   Skin: Skin is warm and dry. No rash noted. He is not diaphoretic. No erythema.   Psychiatric: He has a normal mood and affect. His behavior is normal.   Vitals reviewed.      Assessment:       1. Acute pain of left shoulder    2. Need for prophylactic vaccination and inoculation against influenza    3. Primary osteoarthritis of both  knees        Plan:       Baldo was seen today for shoulder pain.    Diagnoses and all orders for this visit:    Acute pain of left shoulder   Injected 2 cc's Kenalog into L shoulder NDC# [id number]    Need for prophylactic vaccination and inoculation against influenza  -     Influenza - Quadrivalent (3 years & older) (PF)    Primary osteoarthritis of both knees   Cont rx    No Follow-up on file.

## 2017-12-18 NOTE — PROGRESS NOTES
Patient is here for follow up of his knee chondromalacia and arthritis. He is requesting Synvisc-One injection.  He understands potential risks and benefits of the procedure which include pseudoseptic reaction and pain. He has failed conservative management to this point for his knee pain including NSAIDS.    Exam findings remain unchanged from most recent visit. No erythema, warmth, or signs of infection. No contraindications for injection.    The patient tolerated the procedure well. We discussed post-injection care of the knee. Plan for continued conservative management as discussed before. All questions were answered.     Large Joint Aspiration/Injection  Date/Time: 12/19/2017 4:00 PM  Performed by: FREEMAN KAUFMAN  Authorized by: FREEMAN KAUFMAN     Consent Done?:  Yes (Verbal)  Indications:  Pain  Procedure site marked: Yes    Timeout: Prior to procedure the correct patient, procedure, and site was verified      Location:  Knee  Site:  R knee  Prep: Patient was prepped and draped in usual sterile fashion    Ultrasonic Guidance for needle placement: No  Needle size:  22 G  Approach:  Lateral  Medications:  48 mg hylan g-f 20 48 mg/6 mL; 40 mg triamcinolone acetonide 40 mg/mL  Patient tolerance:  Patient tolerated the procedure well with no immediate complications

## 2017-12-19 ENCOUNTER — OFFICE VISIT (OUTPATIENT)
Dept: SPORTS MEDICINE | Facility: CLINIC | Age: 57
End: 2017-12-19
Payer: OTHER GOVERNMENT

## 2017-12-19 DIAGNOSIS — M25.569 KNEE PAIN, UNSPECIFIED CHRONICITY, UNSPECIFIED LATERALITY: Primary | ICD-10-CM

## 2017-12-19 DIAGNOSIS — M17.12 PRIMARY OSTEOARTHRITIS OF LEFT KNEE: ICD-10-CM

## 2017-12-19 PROCEDURE — 99499 UNLISTED E&M SERVICE: CPT | Mod: S$PBB,,, | Performed by: ORTHOPAEDIC SURGERY

## 2017-12-19 PROCEDURE — 99211 OFF/OP EST MAY X REQ PHY/QHP: CPT | Mod: PBBFAC,PO,25 | Performed by: ORTHOPAEDIC SURGERY

## 2017-12-19 PROCEDURE — 99999 PR PBB SHADOW E&M-EST. PATIENT-LVL I: CPT | Mod: PBBFAC,,, | Performed by: ORTHOPAEDIC SURGERY

## 2017-12-19 PROCEDURE — 20610 DRAIN/INJ JOINT/BURSA W/O US: CPT | Mod: PBBFAC,PO | Performed by: ORTHOPAEDIC SURGERY

## 2017-12-19 RX ADMIN — Medication 48 MG: at 04:12

## 2017-12-19 RX ADMIN — TRIAMCINOLONE ACETONIDE 40 MG: 40 INJECTION, SUSPENSION INTRA-ARTICULAR; INTRAMUSCULAR at 04:12

## 2017-12-20 RX ORDER — TRIAMCINOLONE ACETONIDE 40 MG/ML
40 INJECTION, SUSPENSION INTRA-ARTICULAR; INTRAMUSCULAR
Status: DISCONTINUED | OUTPATIENT
Start: 2017-12-19 | End: 2017-12-21 | Stop reason: HOSPADM

## 2017-12-21 DIAGNOSIS — G89.29 CHRONIC MIDLINE LOW BACK PAIN WITHOUT SCIATICA: ICD-10-CM

## 2017-12-21 DIAGNOSIS — M17.0 PRIMARY OSTEOARTHRITIS OF BOTH KNEES: ICD-10-CM

## 2017-12-21 DIAGNOSIS — M54.50 CHRONIC MIDLINE LOW BACK PAIN WITHOUT SCIATICA: ICD-10-CM

## 2017-12-21 RX ORDER — HYDROCODONE BITARTRATE AND ACETAMINOPHEN 5; 325 MG/1; MG/1
1 TABLET ORAL EVERY 6 HOURS PRN
Qty: 30 TABLET | Refills: 0 | Status: SHIPPED | OUTPATIENT
Start: 2017-12-21 | End: 2018-03-26 | Stop reason: SDUPTHER

## 2018-01-02 ENCOUNTER — OFFICE VISIT (OUTPATIENT)
Dept: INTERNAL MEDICINE | Facility: CLINIC | Age: 58
End: 2018-01-02
Payer: OTHER GOVERNMENT

## 2018-01-02 VITALS
DIASTOLIC BLOOD PRESSURE: 84 MMHG | SYSTOLIC BLOOD PRESSURE: 120 MMHG | HEART RATE: 82 BPM | HEIGHT: 72 IN | WEIGHT: 256.94 LBS | RESPIRATION RATE: 16 BRPM | BODY MASS INDEX: 34.8 KG/M2

## 2018-01-02 DIAGNOSIS — J06.9 UPPER RESPIRATORY TRACT INFECTION, UNSPECIFIED TYPE: Primary | ICD-10-CM

## 2018-01-02 PROCEDURE — 99213 OFFICE O/P EST LOW 20 MIN: CPT | Mod: PBBFAC,PN | Performed by: INTERNAL MEDICINE

## 2018-01-02 PROCEDURE — 99999 PR PBB SHADOW E&M-EST. PATIENT-LVL III: CPT | Mod: PBBFAC,,, | Performed by: INTERNAL MEDICINE

## 2018-01-02 PROCEDURE — 99213 OFFICE O/P EST LOW 20 MIN: CPT | Mod: S$PBB,,, | Performed by: INTERNAL MEDICINE

## 2018-01-02 RX ORDER — METHYLPREDNISOLONE 4 MG/1
TABLET ORAL
Qty: 1 PACKAGE | Refills: 0 | Status: SHIPPED | OUTPATIENT
Start: 2018-01-02 | End: 2018-06-22

## 2018-01-02 RX ORDER — CODEINE PHOSPHATE AND GUAIFENESIN 10; 100 MG/5ML; MG/5ML
5 SOLUTION ORAL 3 TIMES DAILY PRN
Qty: 180 ML | Refills: 0 | Status: SHIPPED | OUTPATIENT
Start: 2018-01-02 | End: 2018-01-08

## 2018-01-08 ENCOUNTER — PATIENT MESSAGE (OUTPATIENT)
Dept: INTERNAL MEDICINE | Facility: CLINIC | Age: 58
End: 2018-01-08

## 2018-01-08 DIAGNOSIS — R05.9 COUGH: Primary | ICD-10-CM

## 2018-01-08 RX ORDER — PROMETHAZINE HYDROCHLORIDE AND CODEINE PHOSPHATE 6.25; 1 MG/5ML; MG/5ML
5 SOLUTION ORAL EVERY 4 HOURS PRN
Qty: 175 ML | Refills: 0 | Status: SHIPPED | OUTPATIENT
Start: 2018-01-08 | End: 2018-01-18

## 2018-02-07 NOTE — PROGRESS NOTES
CC: LEFT knee pain    57 y.o. Male who returns today as follow-up. Was previously seen for primarily arthritic complaints in the L knee. I gave him a combined steroid/synvisc on 12/19/17. Great relief, with a return of symptoms in early February. Had a very active weekend last weekend which included a lot of walking. Reports recent swelling episodes. New onset of some recurrent mechanical symptoms. Pain localizes anteriorly and medially, which he describes as a dull ache at rest with some sharp pain with changing direction while walking. Worse with impact activity, walking for long periods of time & stairs. Better with rest. Occasional pain at night and stiffness. Denies surgical history to the left knee. Here today to discuss additional treatment options.      Negative for smoking.   Negative for diabetes.    REVIEW OF SYSTEMS:   Constitution: Negative. Negative for chills, fever and night sweats.     Hematologic/Lymphatic: Negative for bleeding problem. Does not bruise/bleed easily.   Skin: Negative for dry skin, itching and rash.   Musculoskeletal: Negative for falls. Positive for left knee pain and  muscle weakness.     PAST MEDICAL HISTORY:   Past Medical History:   Diagnosis Date    Cancer     Chronic pain of right knee     SCC (squamous cell carcinoma) 2014    Forehead- Dr. Cabral     Skin cancer     Squamous cell carcinoma 2013    Right ear- Dr. Marianna long       PAST SURGICAL HISTORY:   Past Surgical History:   Procedure Laterality Date    APPENDECTOMY      COLONOSCOPY  1998    lipoma removal      skin cancer removal         FAMILY HISTORY:   Family History   Problem Relation Age of Onset    COPD Mother     Alcohol abuse Mother     Heart disease Father     Melanoma Father     No Known Problems Sister     No Known Problems Brother     Psoriasis Neg Hx     Lupus Neg Hx     Eczema Neg Hx        SOCIAL HISTORY:   Social History     Social History    Marital status:      Spouse  name: N/A    Number of children: N/A    Years of education: N/A     Occupational History    Not on file.     Social History Main Topics    Smoking status: Never Smoker    Smokeless tobacco: Never Used    Alcohol use Yes      Comment: social    Drug use: No    Sexual activity: Not on file     Other Topics Concern    Not on file     Social History Narrative    No narrative on file       MEDICATIONS:     Current Outpatient Prescriptions:     calcipotriene-betamethasone (ENSTILAR) 0.005-0.064 % Foam, Apply 1 application topically once daily., Disp: 60 g, Rfl: 2    diazePAM (VALIUM) 5 MG tablet, Take 1 tablet (5 mg total) by mouth daily as needed., Disp: 30 tablet, Rfl: 1    diphenhydrAMINE (BENADRYL) 25 mg capsule, Take 25 mg by mouth every 6 (six) hours as needed for Itching., Disp: , Rfl:     fexofenadine (ALLEGRA) 180 MG tablet, Take 180 mg by mouth continuous prn., Disp: , Rfl:     hydrocodone-acetaminophen 5-325mg (NORCO) 5-325 mg per tablet, Take 1 tablet by mouth every 6 (six) hours as needed for Pain., Disp: 30 tablet, Rfl: 0    meloxicam (MOBIC) 15 MG tablet, Take 1 tablet (15 mg total) by mouth once daily., Disp: 90 tablet, Rfl: 3    methylPREDNISolone (MEDROL DOSEPACK) 4 mg tablet, use as directed, Disp: 1 Package, Rfl: 0    epinephrine (EPIPEN) 0.3 mg/0.3 mL AtIn, Inject 0.3 mLs (0.3 mg total) into the muscle once., Disp: 1 Device, Rfl: 1    ALLERGIES:   Review of patient's allergies indicates:  No Known Allergies     PHYSICAL EXAMINATION:  /81   Pulse 71   Ht 6' (1.829 m)   Wt 116.1 kg (256 lb)   BMI 34.72 kg/m²   General: Well-developed well-nourished 57 y.o. malein no acute distress   Cardiovascular: Regular rhythm by palpation of distal pulse, normal color and temperature, no concerning varicosities on symptomatic side   Lungs: No labored breathing or wheezing appreciated   Neuro: Alert and oriented ×3   Psychiatric: well oriented to person, place and time, demonstrates normal  mood and affect   Skin: No rashes, lesions or ulcers, normal temperature, turgor, and texture on involved extremity    Ortho/SPM Exam  Focal examination of the left knee demonstrates painful arc of motion.  Exquisite tenderness over the medial joint line.  Significant pain with Shae's testing, medially.  Pain with forced flexion and extension medially.  A few degrees short of full extension with guarding, flexion to 120°.  Ligamentously stable.  Mildly positive patellar grind test.    IMAGING:    X-rays including standing, weight bearing AP and flexion bilateral knees, LEFT knee lateral and sunrise views ordered and images reviewed by me show:    Mild tricompartmental degenerative changes    ASSESSMENT:      ICD-10-CM ICD-9-CM   1. Primary osteoarthritis of left knee M17.12 715.16   2. Knee pain, unspecified chronicity, unspecified laterality M25.569 719.46       PLAN:     Exam and presentation consistent with symptomatic medial meniscus pathology.  The patient has failed conservative treatment.  Next up his MRI.  Return to clinic on Monday to discuss results and treatment options.    Procedures

## 2018-02-08 ENCOUNTER — OFFICE VISIT (OUTPATIENT)
Dept: SPORTS MEDICINE | Facility: CLINIC | Age: 58
End: 2018-02-08
Payer: OTHER GOVERNMENT

## 2018-02-08 VITALS
HEART RATE: 71 BPM | HEIGHT: 72 IN | SYSTOLIC BLOOD PRESSURE: 118 MMHG | WEIGHT: 256 LBS | DIASTOLIC BLOOD PRESSURE: 81 MMHG | BODY MASS INDEX: 34.67 KG/M2

## 2018-02-08 DIAGNOSIS — M17.12 PRIMARY OSTEOARTHRITIS OF LEFT KNEE: Primary | ICD-10-CM

## 2018-02-08 DIAGNOSIS — M25.569 KNEE PAIN, UNSPECIFIED CHRONICITY, UNSPECIFIED LATERALITY: ICD-10-CM

## 2018-02-08 PROCEDURE — 99213 OFFICE O/P EST LOW 20 MIN: CPT | Mod: S$PBB,,, | Performed by: ORTHOPAEDIC SURGERY

## 2018-02-08 PROCEDURE — 99999 PR PBB SHADOW E&M-EST. PATIENT-LVL III: CPT | Mod: PBBFAC,,, | Performed by: ORTHOPAEDIC SURGERY

## 2018-02-08 PROCEDURE — 3008F BODY MASS INDEX DOCD: CPT | Mod: ,,, | Performed by: ORTHOPAEDIC SURGERY

## 2018-02-08 PROCEDURE — 99213 OFFICE O/P EST LOW 20 MIN: CPT | Mod: PBBFAC,PO | Performed by: ORTHOPAEDIC SURGERY

## 2018-02-10 ENCOUNTER — HOSPITAL ENCOUNTER (OUTPATIENT)
Dept: RADIOLOGY | Facility: HOSPITAL | Age: 58
Discharge: HOME | End: 2018-02-10
Attending: ORTHOPAEDIC SURGERY
Payer: OTHER GOVERNMENT

## 2018-02-10 DIAGNOSIS — M25.569 KNEE PAIN, UNSPECIFIED CHRONICITY, UNSPECIFIED LATERALITY: ICD-10-CM

## 2018-02-10 DIAGNOSIS — M17.12 PRIMARY OSTEOARTHRITIS OF LEFT KNEE: ICD-10-CM

## 2018-02-10 PROCEDURE — 73721 MRI JNT OF LWR EXTRE W/O DYE: CPT | Mod: TC,LT

## 2018-02-10 PROCEDURE — 73721 MRI JNT OF LWR EXTRE W/O DYE: CPT | Mod: 26,LT,, | Performed by: RADIOLOGY

## 2018-02-12 ENCOUNTER — OFFICE VISIT (OUTPATIENT)
Dept: SPORTS MEDICINE | Facility: CLINIC | Age: 58
End: 2018-02-12
Payer: OTHER GOVERNMENT

## 2018-02-12 VITALS
DIASTOLIC BLOOD PRESSURE: 77 MMHG | HEIGHT: 72 IN | WEIGHT: 256 LBS | BODY MASS INDEX: 34.67 KG/M2 | SYSTOLIC BLOOD PRESSURE: 112 MMHG | HEART RATE: 68 BPM

## 2018-02-12 DIAGNOSIS — M17.12 PRIMARY OSTEOARTHRITIS OF LEFT KNEE: Primary | ICD-10-CM

## 2018-02-12 DIAGNOSIS — M22.42 PATELLA, CHONDROMALACIA, LEFT: ICD-10-CM

## 2018-02-12 DIAGNOSIS — M25.562 LEFT KNEE PAIN, UNSPECIFIED CHRONICITY: ICD-10-CM

## 2018-02-12 PROCEDURE — 99213 OFFICE O/P EST LOW 20 MIN: CPT | Mod: 25,S$PBB,, | Performed by: ORTHOPAEDIC SURGERY

## 2018-02-12 PROCEDURE — 99999 PR PBB SHADOW E&M-EST. PATIENT-LVL III: CPT | Mod: PBBFAC,,, | Performed by: ORTHOPAEDIC SURGERY

## 2018-02-12 PROCEDURE — 3008F BODY MASS INDEX DOCD: CPT | Mod: ,,, | Performed by: ORTHOPAEDIC SURGERY

## 2018-02-12 PROCEDURE — 99213 OFFICE O/P EST LOW 20 MIN: CPT | Mod: PBBFAC,PO,25 | Performed by: ORTHOPAEDIC SURGERY

## 2018-02-12 PROCEDURE — 20610 DRAIN/INJ JOINT/BURSA W/O US: CPT | Mod: PBBFAC,PO | Performed by: ORTHOPAEDIC SURGERY

## 2018-02-12 RX ADMIN — TRIAMCINOLONE ACETONIDE 40 MG: 40 INJECTION, SUSPENSION INTRA-ARTICULAR; INTRAMUSCULAR at 08:02

## 2018-02-12 NOTE — PROGRESS NOTES
CC: LEFT knee pain    57 y.o. Male who returns today as follow-up to review MRI.     More recent exam concerning for symptomatic medial meniscus tear. Treatment thus far has included activity modifications, anti-inflammatories and a combined steroid/synvisc on 12/19/17. This gave him great relief, with a return of symptoms in early February. Reports recent swelling episodes. Pain localizes anteriorly and medially, which he describes as a dull ache at rest with some sharp pain with changing direction while walking.     Of note, has been on hydrocodone for over 1 year, prescribed to him by his PCP.   Negative for smoking.   Negative for diabetes.    REVIEW OF SYSTEMS:   Constitution: Negative. Negative for chills, fever and night sweats.     Hematologic/Lymphatic: Negative for bleeding problem. Does not bruise/bleed easily.   Skin: Negative for dry skin, itching and rash.   Musculoskeletal: Negative for falls. Positive for left knee pain and  muscle weakness.     PAST MEDICAL HISTORY:   Past Medical History:   Diagnosis Date    Cancer     Chronic pain of right knee     SCC (squamous cell carcinoma) 2014    Forehead- Dr. Cabral     Skin cancer     Squamous cell carcinoma 2013    Right ear- Dr. Marianna long       PAST SURGICAL HISTORY:   Past Surgical History:   Procedure Laterality Date    APPENDECTOMY      COLONOSCOPY  1998    lipoma removal      skin cancer removal         FAMILY HISTORY:   Family History   Problem Relation Age of Onset    COPD Mother     Alcohol abuse Mother     Heart disease Father     Melanoma Father     No Known Problems Sister     No Known Problems Brother     Psoriasis Neg Hx     Lupus Neg Hx     Eczema Neg Hx        SOCIAL HISTORY:   Social History     Social History    Marital status:      Spouse name: N/A    Number of children: N/A    Years of education: N/A     Occupational History    Not on file.     Social History Main Topics    Smoking status: Never  Smoker    Smokeless tobacco: Never Used    Alcohol use Yes      Comment: social    Drug use: No    Sexual activity: Not on file     Other Topics Concern    Not on file     Social History Narrative    No narrative on file       MEDICATIONS:     Current Outpatient Prescriptions:     calcipotriene-betamethasone (ENSTILAR) 0.005-0.064 % Foam, Apply 1 application topically once daily., Disp: 60 g, Rfl: 2    diazePAM (VALIUM) 5 MG tablet, Take 1 tablet (5 mg total) by mouth daily as needed., Disp: 30 tablet, Rfl: 1    diphenhydrAMINE (BENADRYL) 25 mg capsule, Take 25 mg by mouth every 6 (six) hours as needed for Itching., Disp: , Rfl:     fexofenadine (ALLEGRA) 180 MG tablet, Take 180 mg by mouth continuous prn., Disp: , Rfl:     hydrocodone-acetaminophen 5-325mg (NORCO) 5-325 mg per tablet, Take 1 tablet by mouth every 6 (six) hours as needed for Pain., Disp: 30 tablet, Rfl: 0    meloxicam (MOBIC) 15 MG tablet, Take 1 tablet (15 mg total) by mouth once daily., Disp: 90 tablet, Rfl: 3    methylPREDNISolone (MEDROL DOSEPACK) 4 mg tablet, use as directed, Disp: 1 Package, Rfl: 0    epinephrine (EPIPEN) 0.3 mg/0.3 mL AtIn, Inject 0.3 mLs (0.3 mg total) into the muscle once., Disp: 1 Device, Rfl: 1    ALLERGIES:   Review of patient's allergies indicates:  No Known Allergies     PHYSICAL EXAMINATION:  /77   Pulse 68   Ht 6' (1.829 m)   Wt 116.1 kg (256 lb)   BMI 34.72 kg/m²   General: Well-developed well-nourished 57 y.o. malein no acute distress   Cardiovascular: Regular rhythm by palpation of distal pulse, normal color and temperature, no concerning varicosities on symptomatic side   Lungs: No labored breathing or wheezing appreciated   Neuro: Alert and oriented ×3   Psychiatric: well oriented to person, place and time, demonstrates normal mood and affect   Skin: No rashes, lesions or ulcers, normal temperature, turgor, and texture on involved extremity    Ortho/SPM Exam  Focal examination of the left  knee demonstrates painful arc of motion. Tenderness over the medial joint line.  Significant pain with Shae's testing, medially.  Pain with forced flexion and extension medially.  A few degrees short of full extension with guarding, flexion to 120°.  Ligamentously stable.  Mildly positive patellar grind test.    IMAGING:    X-rays including standing, weight bearing AP and flexion bilateral knees, LEFT knee lateral and sunrise views ordered and images reviewed by me show:    Mild tricompartmental degenerative changes    MRI was personally reviewed by me with the patient. Study shows:   Edema signal in the medial aspect of the lateral femoral condyle just above the intertrochanteric notch, may represent contusion, versus edema.  Bone infarct cannot be entirely excluded.   Full-thickness cartilage loss of the trochlea.   Fissuring of the patellofemoral cartilage.   Mild left MCL sprain.   Small joint effusion    ASSESSMENT:      ICD-10-CM ICD-9-CM   1. Primary osteoarthritis of left knee M17.12 715.16   2. Patella, chondromalacia, left M22.42 717.7   3. Left knee pain, unspecified chronicity M25.562 719.46       PLAN:     Findings were discussed with the patient.  Appears to primarily be an early arthritic process.  Mechanical symptoms may be symptomatic chondral flaps.  My recommendation is for continued nonoperative treatment in the form of a medial  brace, lateral heel wedge, low-impact cardio exercise, and injection therapy as needed. Patient has 4 weeks of travel upcoming. Repeat steroid injection given. DME items ordered. Future candidate to try Euflexxa series.  Mechanical symptoms become more prominent, could consider arthroscopy for limited goals intervention. 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    Large Joint Aspiration/Injection  Date/Time: 2/12/2018 8:37 PM  Performed by: FREEMAN KAUFMAN  Authorized by: FREEMAN KAUFMAN     Consent Done?:  Yes (Verbal)  Indications:  Pain  Procedure site  marked: Yes    Timeout: Prior to procedure the correct patient, procedure, and site was verified      Location:  Knee  Site:  L knee  Prep: Patient was prepped and draped in usual sterile fashion    Ultrasonic Guidance for needle placement: No  Needle size:  22 G  Approach:  Lateral  Medications:  40 mg triamcinolone acetonide 40 mg/mL  Patient tolerance:  Patient tolerated the procedure well with no immediate complications

## 2018-02-13 RX ORDER — TRIAMCINOLONE ACETONIDE 40 MG/ML
40 INJECTION, SUSPENSION INTRA-ARTICULAR; INTRAMUSCULAR
Status: DISCONTINUED | OUTPATIENT
Start: 2018-02-12 | End: 2018-02-13 | Stop reason: HOSPADM

## 2018-03-26 ENCOUNTER — PATIENT MESSAGE (OUTPATIENT)
Dept: INTERNAL MEDICINE | Facility: CLINIC | Age: 58
End: 2018-03-26

## 2018-03-26 DIAGNOSIS — G89.29 CHRONIC MIDLINE LOW BACK PAIN WITHOUT SCIATICA: ICD-10-CM

## 2018-03-26 DIAGNOSIS — M17.0 PRIMARY OSTEOARTHRITIS OF BOTH KNEES: ICD-10-CM

## 2018-03-26 DIAGNOSIS — M54.50 CHRONIC MIDLINE LOW BACK PAIN WITHOUT SCIATICA: ICD-10-CM

## 2018-03-26 RX ORDER — DIAZEPAM 5 MG/1
5 TABLET ORAL DAILY PRN
Qty: 30 TABLET | Refills: 1 | Status: SHIPPED | OUTPATIENT
Start: 2018-03-26 | End: 2018-03-26 | Stop reason: SDUPTHER

## 2018-03-26 RX ORDER — HYDROCODONE BITARTRATE AND ACETAMINOPHEN 5; 325 MG/1; MG/1
1 TABLET ORAL EVERY 6 HOURS PRN
Qty: 30 TABLET | Refills: 0 | Status: SHIPPED | OUTPATIENT
Start: 2018-03-26 | End: 2018-07-23 | Stop reason: SDUPTHER

## 2018-03-26 RX ORDER — DIAZEPAM 5 MG/1
5 TABLET ORAL DAILY PRN
Qty: 30 TABLET | Refills: 1 | Status: SHIPPED | OUTPATIENT
Start: 2018-03-26 | End: 2018-07-23 | Stop reason: SDUPTHER

## 2018-03-26 RX ORDER — HYDROCODONE BITARTRATE AND ACETAMINOPHEN 5; 325 MG/1; MG/1
1 TABLET ORAL EVERY 6 HOURS PRN
Qty: 30 TABLET | Refills: 0 | Status: SHIPPED | OUTPATIENT
Start: 2018-03-26 | End: 2018-03-26 | Stop reason: SDUPTHER

## 2018-03-26 NOTE — TELEPHONE ENCOUNTER
----- Message from Nydia Banks sent at 3/26/2018  8:55 AM CDT -----  Contact: Pt  Pt is calling for medication refill for diazePAM (VALIUM) 5 MG tablet and hydrocodone-acetaminophen 5-325mg (NORCO) 5-325 mg per tablet.    Rx can sent to [facility] at [phone].  Pt can be reached at [phone].    Thank you

## 2018-04-23 ENCOUNTER — TELEPHONE (OUTPATIENT)
Dept: DERMATOLOGY | Facility: CLINIC | Age: 58
End: 2018-04-23

## 2018-04-23 NOTE — TELEPHONE ENCOUNTER
Patient informed to contact medical records to sign a release to get medical records from [place] provider.

## 2018-04-23 NOTE — TELEPHONE ENCOUNTER
----- Message from Asia Peck sent at 4/23/2018 11:05 AM CDT -----  Contact: Patient  Patient is calling to get someone from the doctors office to request his records from his previous dermatologist.  Dr. Cabral, Phone#[phone]; Fax#[phone]  Call Back#[phone]  Thanks

## 2018-06-14 ENCOUNTER — TELEPHONE (OUTPATIENT)
Dept: DERMATOLOGY | Facility: CLINIC | Age: 58
End: 2018-06-14

## 2018-06-14 NOTE — TELEPHONE ENCOUNTER
Spoke with patient, requesting apt for skin rash near eye, not sure if it could be psoriasis. Patient is only available on Fridays due to work. Apt scheduled for June 29th, advised if something was to open the fridays before then we would reach out to him. Otherwise apt June 29

## 2018-06-14 NOTE — TELEPHONE ENCOUNTER
----- Message from Rosa Elena Sheehan sent at 6/14/2018  8:27 AM CDT -----  Contact: self  Patient requesting sooner appt because skin condition is moving toward his eye. Please call [phone]

## 2018-06-14 NOTE — TELEPHONE ENCOUNTER
----- Message from Garimaramesh Sam sent at 6/14/2018  4:18 PM CDT -----  Patient states that he is scheduled for 6-29 but is requesting to see the doctor tomorrow.  Please call patient at [phone].

## 2018-06-19 ENCOUNTER — PATIENT MESSAGE (OUTPATIENT)
Dept: INTERNAL MEDICINE | Facility: CLINIC | Age: 58
End: 2018-06-19

## 2018-06-22 ENCOUNTER — OFFICE VISIT (OUTPATIENT)
Dept: DERMATOLOGY | Facility: CLINIC | Age: 58
End: 2018-06-22
Payer: OTHER GOVERNMENT

## 2018-06-22 DIAGNOSIS — H01.133 EYELID DERMATITIS, ECZEMATOUS, RIGHT: ICD-10-CM

## 2018-06-22 DIAGNOSIS — H01.136 ECZEMATOUS DERMATITIS OF EYELID OF LEFT EYE: Primary | ICD-10-CM

## 2018-06-22 DIAGNOSIS — L82.1 SEBORRHEIC KERATOSES: ICD-10-CM

## 2018-06-22 DIAGNOSIS — L40.9 PSORIASIS: ICD-10-CM

## 2018-06-22 PROCEDURE — 99999 PR PBB SHADOW E&M-EST. PATIENT-LVL II: CPT | Mod: PBBFAC,,, | Performed by: DERMATOLOGY

## 2018-06-22 PROCEDURE — 99212 OFFICE O/P EST SF 10 MIN: CPT | Mod: PBBFAC,PO | Performed by: DERMATOLOGY

## 2018-06-22 PROCEDURE — 99213 OFFICE O/P EST LOW 20 MIN: CPT | Mod: S$PBB,,, | Performed by: DERMATOLOGY

## 2018-06-22 RX ORDER — DESONIDE 0.5 MG/G
CREAM TOPICAL
Qty: 60 G | Refills: 3 | Status: SHIPPED | OUTPATIENT
Start: 2018-06-22 | End: 2018-06-25 | Stop reason: SDUPTHER

## 2018-06-22 RX ORDER — DESONIDE 0.5 MG/G
CREAM TOPICAL
Qty: 60 G | Refills: 3 | Status: SHIPPED | OUTPATIENT
Start: 2018-06-22 | End: 2018-06-22 | Stop reason: SDUPTHER

## 2018-06-22 NOTE — PROGRESS NOTES
Subjective:       Patient ID:  Baldo Grant is a 58 y.o. male who presents for   Chief Complaint   Patient presents with    Dry Skin     R leg and near eyes, Forehead    Spot     R arm and L cheek    Medication Refill     refill enstilar     Patient last seen 6/2017  H/o rosacea, psoriasis, seb derm  Using enstilar, uses on average 2/3 times weekly, needs refill (travels between [place] and [place])  Has not been using keto shampoo, wasn't sure if should use  Has not been using face wash (sulfa), no recent flare    Previously under the care of Derm in [place]  H/o SCCs:   2014 Forehead- Dr. Cabral   2013 Right ear- Dr. Cabral [facility]        Dry Skin  - Initial  Affected locations: right lower leg, face, forehead and scalp  Duration: few months.  Signs / symptoms: dryness and scaling  Severity: mild  Timing: constant  Aggravated by: nothing  Relieving factors/Treatments tried: nothing    Spot  - Initial  Affected locations: left cheek and right arm  Duration: 6 months  Signs / symptoms: itching, growing and scaling  Severity: mild  Timing: constant  Aggravated by: nothing  Relieving factors/Treatments tried: nothing        Review of Systems   Constitutional: Negative for fever, chills, weight loss, weight gain, fatigue, night sweats and malaise.   Skin: Positive for dry skin, activity-related sunscreen use and wears hat.   Hematologic/Lymphatic: Does not bruise/bleed easily.        Objective:    Physical Exam   Skin:   Areas Examined (abnormalities noted in diagram):   Scalp / Hair Palpated and Inspected  Head / Face Inspection Performed  Neck Inspection Performed  Chest / Axilla Inspection Performed  Abdomen Inspection Performed  Genitals / Buttocks / Groin Inspection Performed  Back Inspection Performed  RUE Inspected  LUE Inspection Performed                   Diagram Legend     Erythematous scaling macule/papule c/w actinic keratosis       Vascular papule c/w angioma      Pigmented verrucoid papule/plaque c/w  seborrheic keratosis      Yellow umbilicated papule c/w sebaceous hyperplasia      Irregularly shaped tan macule c/w lentigo     1-2 mm smooth white papules consistent with Milia      Movable subcutaneous cyst with punctum c/w epidermal inclusion cyst      Subcutaneous movable cyst c/w pilar cyst      Firm pink to brown papule c/w dermatofibroma      Pedunculated fleshy papule(s) c/w skin tag(s)      Evenly pigmented macule c/w junctional nevus     Mildly variegated pigmented, slightly irregular-bordered macule c/w mildly atypical nevus      Flesh colored to evenly pigmented papule c/w intradermal nevus       Pink pearly papule/plaque c/w basal cell carcinoma      Erythematous hyperkeratotic cursted plaque c/w SCC      Surgical scar with no sign of skin cancer recurrence      Open and closed comedones      Inflammatory papules and pustules      Verrucoid papule consistent consistent with wart     Erythematous eczematous patches and plaques     Dystrophic onycholytic nail with subungual debris c/w onychomycosis     Umbilicated papule    Erythematous-base heme-crusted tan verrucoid plaque consistent with inflamed seborrheic keratosis     Erythematous Silvery Scaling Plaque c/w Psoriasis     See annotation      Assessment / Plan:        Eczematous dermatitis of eyelid of left eye  -     desonide (DESOWEN) 0.05 % cream; Thin film to AA eyelids bid PRN flare  Dispense: 60 g; Refill: 3    Eyelid dermatitis, eczematous, right  -     desonide (DESOWEN) 0.05 % cream; Thin film to AA eyelids bid PRN flare  Dispense: 60 g; Refill: 3  Unknown trigger, no new skin care product  Improve moisturizer use (cerave PM)    Psoriasis,chronic plaque, shin, forearms, mild scalp involvment  Controlled with enstilar, continue  -     calcipotriene-betamethasone (ENSTILAR) 0.005-0.064 % Foam; Apply 1 application topically once daily.  Dispense: 60 g; Refill: 2    Seborrheic keratoses  These are benign inherited growths without a malignant  potential. Reassurance given to patient. No treatment is necessary.     Patient instructed in importance in daily sun protection of at least spf 30. Sun avoidance and topical protection discussed.   Recommend Elta MD (physician office) COTZ sensitive (available online) for daily use on face and neck.  Patient encouraged to wear hat for all outdoor exposure.   Also discussed sun protective clothing.               Follow-up if symptoms worsen or fail to improve.

## 2018-06-25 ENCOUNTER — TELEPHONE (OUTPATIENT)
Dept: DERMATOLOGY | Facility: CLINIC | Age: 58
End: 2018-06-25

## 2018-06-25 ENCOUNTER — PATIENT MESSAGE (OUTPATIENT)
Dept: DERMATOLOGY | Facility: CLINIC | Age: 58
End: 2018-06-25

## 2018-06-25 DIAGNOSIS — H01.133 EYELID DERMATITIS, ECZEMATOUS, RIGHT: ICD-10-CM

## 2018-06-25 DIAGNOSIS — L40.9 PSORIASIS: ICD-10-CM

## 2018-06-25 DIAGNOSIS — H01.136 ECZEMATOUS DERMATITIS OF EYELID OF LEFT EYE: ICD-10-CM

## 2018-06-25 RX ORDER — DESONIDE 0.5 MG/G
CREAM TOPICAL
Qty: 60 G | Refills: 3 | Status: SHIPPED | OUTPATIENT
Start: 2018-06-25 | End: 2021-04-27

## 2018-06-25 RX ORDER — DESONIDE 0.5 MG/G
CREAM TOPICAL
Qty: 60 G | Refills: 3 | Status: SHIPPED | OUTPATIENT
Start: 2018-06-25 | End: 2018-06-25 | Stop reason: SDUPTHER

## 2018-06-25 NOTE — TELEPHONE ENCOUNTER
----- Message from Sharmin Boswell sent at 6/25/2018  3:25 PM CDT -----  Contact: self   Patient need to speak with a nurse regarding rx's being e prescribed, please call back at [phone] (home)

## 2018-06-25 NOTE — TELEPHONE ENCOUNTER
Pt was seen 6/22/2018  Left with printed scripts. Now requesting them to be escribed to [facility] in [place].  Please advise...    Eczematous dermatitis of eyelid of left eye  -     desonide (DESOWEN) 0.05 % cream; Thin film to AA eyelids bid PRN flare  Dispense: 60 g; Refill: 3     Eyelid dermatitis, eczematous, right  -     desonide (DESOWEN) 0.05 % cream; Thin film to AA eyelids bid PRN flare  Dispense: 60 g; Refill: 3  Unknown trigger, no new skin care product  Improve moisturizer use (cerave PM)     Psoriasis,chronic plaque, shin, forearms, mild scalp involvment  Controlled with enstilar, continue  -     calcipotriene-betamethasone (ENSTILAR) 0.005-0.064 % Foam; Apply 1 application topically once daily.  Dispense: 60 g; Refill: 2

## 2018-07-15 ENCOUNTER — HOSPITAL ENCOUNTER (EMERGENCY)
Facility: HOSPITAL | Age: 58
Discharge: HOME | End: 2018-07-15
Attending: EMERGENCY MEDICINE
Payer: OTHER GOVERNMENT

## 2018-07-15 VITALS
OXYGEN SATURATION: 97 % | DIASTOLIC BLOOD PRESSURE: 82 MMHG | TEMPERATURE: 99 F | HEART RATE: 65 BPM | SYSTOLIC BLOOD PRESSURE: 132 MMHG | RESPIRATION RATE: 18 BRPM

## 2018-07-15 DIAGNOSIS — M17.12 PRIMARY OSTEOARTHRITIS OF LEFT KNEE: Primary | ICD-10-CM

## 2018-07-15 PROCEDURE — 63600175 PHARM REV CODE 636 W HCPCS: Performed by: EMERGENCY MEDICINE

## 2018-07-15 PROCEDURE — 96372 THER/PROPH/DIAG INJ SC/IM: CPT

## 2018-07-15 PROCEDURE — 99283 EMERGENCY DEPT VISIT LOW MDM: CPT | Mod: 25

## 2018-07-15 RX ORDER — PREDNISONE 20 MG/1
40 TABLET ORAL DAILY
Qty: 10 TABLET | Refills: 0 | Status: SHIPPED | OUTPATIENT
Start: 2018-07-15 | End: 2018-07-20

## 2018-07-15 RX ORDER — DEXAMETHASONE SODIUM PHOSPHATE 4 MG/ML
8 INJECTION, SOLUTION INTRA-ARTICULAR; INTRALESIONAL; INTRAMUSCULAR; INTRAVENOUS; SOFT TISSUE
Status: COMPLETED | OUTPATIENT
Start: 2018-07-15 | End: 2018-07-15

## 2018-07-15 RX ADMIN — DEXAMETHASONE SODIUM PHOSPHATE 8 MG: 4 INJECTION, SOLUTION INTRAMUSCULAR; INTRAVENOUS at 06:07

## 2018-07-15 NOTE — ED PROVIDER NOTES
Encounter Date: 7/15/2018       History     Chief Complaint   Patient presents with    Knee Pain     chronic left knee pain and wants a steriod shot     The history is provided by the patient.   Leg Pain    The incident occurred at home. There was no injury mechanism. The incident occurred two days ago. The pain is present in the left knee. The quality of the pain is described as throbbing and aching. The pain is at a severity of 6/10. The pain has been constant since onset. Pertinent negatives include no numbness, no inability to bear weight, no loss of motion, no muscle weakness, no loss of sensation and no tingling. He reports no foreign bodies present. The symptoms are aggravated by bearing weight. He has tried nothing for the symptoms.     Review of patient's allergies indicates:   Allergen Reactions    Advil [ibuprofen]      Past Medical History:   Diagnosis Date    Cancer     Chronic pain of right knee     SCC (squamous cell carcinoma) 2014    Forehead- Dr. Cabral     Skin cancer     Squamous cell carcinoma 2013    Right ear- Dr. Marianna long     Past Surgical History:   Procedure Laterality Date    APPENDECTOMY      COLONOSCOPY  1998    lipoma removal      skin cancer removal       Family History   Problem Relation Age of Onset    COPD Mother     Alcohol abuse Mother     Heart disease Father     Melanoma Father     No Known Problems Sister     No Known Problems Brother     Psoriasis Neg Hx     Lupus Neg Hx     Eczema Neg Hx      Social History   Substance Use Topics    Smoking status: Never Smoker    Smokeless tobacco: Never Used    Alcohol use Yes      Comment: social     Review of Systems   Musculoskeletal: Positive for arthralgias.   Neurological: Negative for tingling and numbness.   All other systems reviewed and are negative.      Physical Exam     Initial Vitals [07/15/18 1807]   BP Pulse Resp Temp SpO2   132/82 65 18 98.7 °F (37.1 °C) 97 %      MAP       --          Physical Exam    Nursing note and vitals reviewed.  Constitutional: He appears well-developed and well-nourished.   HENT:   Head: Normocephalic and atraumatic.   Eyes: Conjunctivae and EOM are normal.   Neck: Normal range of motion. Neck supple.   Cardiovascular: Normal rate, regular rhythm and normal heart sounds.   Pulmonary/Chest: Breath sounds normal. He has no wheezes. He has no rhonchi. He has no rales.   Abdominal: Soft. There is no tenderness. There is no rebound and no guarding.   Musculoskeletal:        Left knee: He exhibits decreased range of motion. He exhibits no swelling, no effusion, no ecchymosis, no deformity, no laceration, no erythema, normal alignment, no LCL laxity and normal patellar mobility. Tenderness found.   Neurological: He is alert and oriented to person, place, and time.   Skin: Skin is warm and dry. Capillary refill takes less than 2 seconds.   Psychiatric: He has a normal mood and affect. His behavior is normal. Judgment and thought content normal.         ED Course   Procedures  Labs Reviewed - No data to display       Imaging Results    None                               Clinical Impression:   The encounter diagnosis was Primary osteoarthritis of left knee.      Disposition:   Disposition: Discharged  Condition: Stable                        Cindy Brooke MD  07/15/18 8508

## 2018-07-23 ENCOUNTER — PATIENT MESSAGE (OUTPATIENT)
Dept: INTERNAL MEDICINE | Facility: CLINIC | Age: 58
End: 2018-07-23

## 2018-07-23 DIAGNOSIS — G89.29 CHRONIC MIDLINE LOW BACK PAIN WITHOUT SCIATICA: ICD-10-CM

## 2018-07-23 DIAGNOSIS — M17.0 PRIMARY OSTEOARTHRITIS OF BOTH KNEES: ICD-10-CM

## 2018-07-23 DIAGNOSIS — M25.512 CHRONIC LEFT SHOULDER PAIN: ICD-10-CM

## 2018-07-23 DIAGNOSIS — G89.29 CHRONIC LEFT SHOULDER PAIN: ICD-10-CM

## 2018-07-23 DIAGNOSIS — M54.50 CHRONIC MIDLINE LOW BACK PAIN WITHOUT SCIATICA: ICD-10-CM

## 2018-07-23 RX ORDER — MELOXICAM 15 MG/1
15 TABLET ORAL DAILY
Qty: 90 TABLET | Refills: 3 | Status: SHIPPED | OUTPATIENT
Start: 2018-07-23 | End: 2019-06-24 | Stop reason: SDUPTHER

## 2018-07-23 RX ORDER — DIAZEPAM 5 MG/1
5 TABLET ORAL DAILY PRN
Qty: 30 TABLET | Refills: 1 | Status: SHIPPED | OUTPATIENT
Start: 2018-07-23 | End: 2018-10-29 | Stop reason: SDUPTHER

## 2018-07-23 RX ORDER — HYDROCODONE BITARTRATE AND ACETAMINOPHEN 5; 325 MG/1; MG/1
1 TABLET ORAL EVERY 6 HOURS PRN
Qty: 30 TABLET | Refills: 0 | Status: SHIPPED | OUTPATIENT
Start: 2018-07-23 | End: 2018-10-29 | Stop reason: SDUPTHER

## 2018-07-23 NOTE — TELEPHONE ENCOUNTER
----- Message from Denisse Ashley sent at 7/23/2018 11:55 AM CDT -----  Contact: self  Pt is requesting refills on diazePAM (VALIUM) 5 MG tablet, hydrocodone-acetaminophen 5-325mg (NORCO) 5-325 mg per tablet and meloxicam (MOBIC) 15 MG tablet. Pt is requesting that the refills be written out so that his wife can stop by the office to pick them up. Pt would like to have them filled at the  base in [place]. Per pt, please call when prescriptions are completed.    Pt can be reached at [phone].     Thank you

## 2018-08-30 ENCOUNTER — PATIENT MESSAGE (OUTPATIENT)
Dept: INTERNAL MEDICINE | Facility: CLINIC | Age: 58
End: 2018-08-30

## 2018-10-05 ENCOUNTER — OFFICE VISIT (OUTPATIENT)
Dept: INTERNAL MEDICINE | Facility: CLINIC | Age: 58
End: 2018-10-05
Payer: OTHER GOVERNMENT

## 2018-10-05 VITALS
WEIGHT: 279.44 LBS | DIASTOLIC BLOOD PRESSURE: 84 MMHG | BODY MASS INDEX: 37.85 KG/M2 | OXYGEN SATURATION: 92 % | HEART RATE: 89 BPM | SYSTOLIC BLOOD PRESSURE: 126 MMHG | TEMPERATURE: 97 F | HEIGHT: 72 IN

## 2018-10-05 DIAGNOSIS — M17.32 POST-TRAUMATIC OSTEOARTHRITIS OF LEFT KNEE: ICD-10-CM

## 2018-10-05 DIAGNOSIS — Z00.00 ROUTINE GENERAL MEDICAL EXAMINATION AT A HEALTH CARE FACILITY: Primary | ICD-10-CM

## 2018-10-05 DIAGNOSIS — E66.9 OBESITY (BMI 30-39.9): ICD-10-CM

## 2018-10-05 PROCEDURE — 90686 IIV4 VACC NO PRSV 0.5 ML IM: CPT | Mod: PBBFAC,PN

## 2018-10-05 PROCEDURE — 20610 DRAIN/INJ JOINT/BURSA W/O US: CPT | Mod: S$PBB,LT,, | Performed by: INTERNAL MEDICINE

## 2018-10-05 PROCEDURE — 20610 DRAIN/INJ JOINT/BURSA W/O US: CPT | Mod: PBBFAC,PN | Performed by: INTERNAL MEDICINE

## 2018-10-05 PROCEDURE — 99213 OFFICE O/P EST LOW 20 MIN: CPT | Mod: PBBFAC,PN | Performed by: INTERNAL MEDICINE

## 2018-10-05 PROCEDURE — 99999 PR PBB SHADOW E&M-EST. PATIENT-LVL III: CPT | Mod: PBBFAC,,, | Performed by: INTERNAL MEDICINE

## 2018-10-05 PROCEDURE — 99396 PREV VISIT EST AGE 40-64: CPT | Mod: S$PBB,25,, | Performed by: INTERNAL MEDICINE

## 2018-10-05 RX ORDER — LIDOCAINE HYDROCHLORIDE 20 MG/ML
JELLY TOPICAL
Qty: 30 ML | Refills: 3 | Status: SHIPPED | OUTPATIENT
Start: 2018-10-05

## 2018-10-05 NOTE — PROGRESS NOTES
Subjective:       Patient ID: Baldo Grant is a 58 y.o. male.    Chief Complaint: Knee Pain (left )    HPI  Wellness check.  Weight up 24 lbs since Jan.  Mobility limited by L knee pain.  No other real complaints.  Review of Systems   Constitutional: Negative for activity change and unexpected weight change.   HENT: Negative for hearing loss, rhinorrhea and trouble swallowing.    Eyes: Negative for discharge and visual disturbance.   Respiratory: Negative for chest tightness and wheezing.    Cardiovascular: Negative for chest pain and palpitations.   Gastrointestinal: Negative for blood in stool, constipation, diarrhea and vomiting.   Endocrine: Negative for polydipsia and polyuria.   Genitourinary: Negative for difficulty urinating, hematuria and urgency.   Musculoskeletal: Positive for neck pain. Negative for arthralgias and joint swelling.   Neurological: Negative for weakness and headaches.   Psychiatric/Behavioral: Negative for confusion and dysphoric mood.   All other systems reviewed and are negative.      Objective:      Physical Exam   Constitutional: He appears well-developed. No distress.   HENT:   Head: Normocephalic.   Eyes: EOM are normal. No scleral icterus.   Neck: Normal range of motion. No tracheal deviation present.   Cardiovascular: Normal rate, regular rhythm and intact distal pulses.   Pulmonary/Chest: Effort normal. No respiratory distress.   Abdominal: He exhibits no distension.   Musculoskeletal: He exhibits no edema.   L knee with small effusion, + Shae's sign   Neurological: He is alert.   Skin: Skin is warm and dry. No rash noted. He is not diaphoretic. No erythema.   Psychiatric: He has a normal mood and affect. His behavior is normal.   Vitals reviewed.      Assessment:       1. Routine general medical examination at a health care facility    2. Post-traumatic osteoarthritis of left knee    3. Obesity (BMI 30-39.9)        Plan:       Baldo was seen today for knee  pain.    Diagnoses and all orders for this visit:    Routine general medical examination at a health care facility  -     Fecal Immunochemical Test (iFOBT); Future  -     Influenza - Quadrivalent (3 years & older) (PF)    Post-traumatic osteoarthritis of left knee   Injected 2 cc's Kenalog int L knee NDC # 5986-0726-07    Obesity (BMI 30-39.9)   Urged weight loss    Follow-up if symptoms worsen or fail to improve.

## 2018-10-18 ENCOUNTER — TELEPHONE (OUTPATIENT)
Dept: FAMILY MEDICINE | Facility: CLINIC | Age: 58
End: 2018-10-18

## 2018-10-18 ENCOUNTER — LAB VISIT (OUTPATIENT)
Dept: LAB | Facility: HOSPITAL | Age: 58
End: 2018-10-18
Attending: INTERNAL MEDICINE
Payer: OTHER GOVERNMENT

## 2018-10-18 DIAGNOSIS — Z00.00 ROUTINE GENERAL MEDICAL EXAMINATION AT A HEALTH CARE FACILITY: ICD-10-CM

## 2018-10-18 LAB — HEMOCCULT STL QL IA: NEGATIVE

## 2018-10-18 PROCEDURE — 82274 ASSAY TEST FOR BLOOD FECAL: CPT

## 2018-10-29 ENCOUNTER — PATIENT MESSAGE (OUTPATIENT)
Dept: INTERNAL MEDICINE | Facility: CLINIC | Age: 58
End: 2018-10-29

## 2018-10-29 DIAGNOSIS — M17.0 PRIMARY OSTEOARTHRITIS OF BOTH KNEES: ICD-10-CM

## 2018-10-29 DIAGNOSIS — M54.50 CHRONIC MIDLINE LOW BACK PAIN WITHOUT SCIATICA: ICD-10-CM

## 2018-10-29 DIAGNOSIS — G89.29 CHRONIC MIDLINE LOW BACK PAIN WITHOUT SCIATICA: ICD-10-CM

## 2018-10-29 RX ORDER — HYDROCODONE BITARTRATE AND ACETAMINOPHEN 5; 325 MG/1; MG/1
1 TABLET ORAL EVERY 6 HOURS PRN
Qty: 30 TABLET | Refills: 0 | Status: SHIPPED | OUTPATIENT
Start: 2018-10-29 | End: 2019-01-17 | Stop reason: SDUPTHER

## 2018-10-29 RX ORDER — DIAZEPAM 5 MG/1
5 TABLET ORAL DAILY PRN
Qty: 30 TABLET | Refills: 1 | Status: SHIPPED | OUTPATIENT
Start: 2018-10-29 | End: 2019-01-17 | Stop reason: SDUPTHER

## 2018-11-30 ENCOUNTER — OFFICE VISIT (OUTPATIENT)
Dept: INTERNAL MEDICINE | Facility: CLINIC | Age: 58
End: 2018-11-30
Payer: OTHER GOVERNMENT

## 2018-11-30 VITALS
HEART RATE: 67 BPM | DIASTOLIC BLOOD PRESSURE: 84 MMHG | WEIGHT: 266.56 LBS | HEIGHT: 72 IN | TEMPERATURE: 99 F | SYSTOLIC BLOOD PRESSURE: 124 MMHG | BODY MASS INDEX: 36.1 KG/M2

## 2018-11-30 DIAGNOSIS — E66.01 MORBID OBESITY: ICD-10-CM

## 2018-11-30 DIAGNOSIS — J06.9 UPPER RESPIRATORY TRACT INFECTION, UNSPECIFIED TYPE: Primary | ICD-10-CM

## 2018-11-30 PROCEDURE — 99213 OFFICE O/P EST LOW 20 MIN: CPT | Mod: PBBFAC,PN | Performed by: INTERNAL MEDICINE

## 2018-11-30 PROCEDURE — 99213 OFFICE O/P EST LOW 20 MIN: CPT | Mod: S$PBB,,, | Performed by: INTERNAL MEDICINE

## 2018-11-30 PROCEDURE — 99999 PR PBB SHADOW E&M-EST. PATIENT-LVL III: CPT | Mod: PBBFAC,,, | Performed by: INTERNAL MEDICINE

## 2018-11-30 RX ORDER — METHYLPREDNISOLONE 4 MG/1
TABLET ORAL
Qty: 1 PACKAGE | Refills: 0 | Status: SHIPPED | OUTPATIENT
Start: 2018-11-30 | End: 2019-05-17

## 2018-11-30 RX ORDER — HYDROCODONE BITARTRATE AND HOMATROPINE METHYLBROMIDE ORAL SOLUTION 5; 1.5 MG/5ML; MG/5ML
5 LIQUID ORAL EVERY 4 HOURS PRN
Qty: 175 ML | Refills: 0 | Status: SHIPPED | OUTPATIENT
Start: 2018-11-30 | End: 2018-12-10

## 2018-11-30 NOTE — PROGRESS NOTES
Subjective:       Patient ID: Baldo Grant is a 58 y.o. male.    Chief Complaint: Cough (head cold); Nasal Congestion (head and chest); and Sore Throat    HPI  Pt with 1 day of coryza, hoarseness, prod cough w/o F/C, SOB.  Has lost 13 lbs/ 2 mo.  Review of Systems    Objective:      Physical Exam   Constitutional: He appears well-developed. No distress.   obese   HENT:   Head: Normocephalic.   Mouth/Throat: Oropharynx is clear and moist.   Eyes: EOM are normal. No scleral icterus.   Neck: Normal range of motion. No tracheal deviation present.   Cardiovascular: Normal rate, regular rhythm and intact distal pulses.   Pulmonary/Chest: Effort normal and breath sounds normal. No respiratory distress.   Abdominal: He exhibits no distension.   Musculoskeletal: Normal range of motion. He exhibits no edema.   Neurological: He is alert.   Skin: Skin is warm and dry. No rash noted. He is not diaphoretic. No erythema.   Psychiatric: He has a normal mood and affect. His behavior is normal.   Vitals reviewed.      Assessment:       1. Upper respiratory tract infection, unspecified type    2. Morbid obesity        Plan:       Baldo was seen today for cough, nasal congestion and sore throat.    Diagnoses and all orders for this visit:    Upper respiratory tract infection, unspecified type  -     methylPREDNISolone (MEDROL DOSEPACK) 4 mg tablet; use as directed  -     hydrocodone-homatropine 5-1.5 mg/5 ml (HYCODAN) 5-1.5 mg/5 mL Syrp; Take 5 mLs by mouth every 4 (four) hours as needed (cough).    Morbid obesity   Cont weight loss    No Follow-up on file.

## 2019-01-17 ENCOUNTER — PATIENT MESSAGE (OUTPATIENT)
Dept: INTERNAL MEDICINE | Facility: CLINIC | Age: 59
End: 2019-01-17

## 2019-01-17 DIAGNOSIS — M17.0 PRIMARY OSTEOARTHRITIS OF BOTH KNEES: ICD-10-CM

## 2019-01-17 DIAGNOSIS — M54.50 CHRONIC MIDLINE LOW BACK PAIN WITHOUT SCIATICA: ICD-10-CM

## 2019-01-17 DIAGNOSIS — G89.29 CHRONIC MIDLINE LOW BACK PAIN WITHOUT SCIATICA: ICD-10-CM

## 2019-01-17 RX ORDER — DIAZEPAM 5 MG/1
5 TABLET ORAL DAILY PRN
Qty: 30 TABLET | Refills: 1 | Status: SHIPPED | OUTPATIENT
Start: 2019-01-17 | End: 2019-04-10 | Stop reason: SDUPTHER

## 2019-01-17 RX ORDER — HYDROCODONE BITARTRATE AND ACETAMINOPHEN 5; 325 MG/1; MG/1
1 TABLET ORAL EVERY 6 HOURS PRN
Qty: 30 TABLET | Refills: 0 | Status: SHIPPED | OUTPATIENT
Start: 2019-01-17 | End: 2019-04-10 | Stop reason: SDUPTHER

## 2019-03-25 ENCOUNTER — NURSE TRIAGE (OUTPATIENT)
Dept: ADMINISTRATIVE | Facility: CLINIC | Age: 59
End: 2019-03-25

## 2019-03-25 ENCOUNTER — PATIENT MESSAGE (OUTPATIENT)
Dept: INTERNAL MEDICINE | Facility: CLINIC | Age: 59
End: 2019-03-25

## 2019-03-26 NOTE — TELEPHONE ENCOUNTER
Pt reported earlier today he assisted with getting a small dog, yuliet, off the side of the road -dog lives in a trailer with the owner but apparently had gotten out. The police were familiar with the owner and were going to try and make contact. He reported the dog bit him on the right ring finger and he has a couple of small punctures. He cleaned them with soap/water and applied antibiotic oint. He is not sure when he had last tetanus but before retiring from the  he has received numerous inoculations. He is currently out of state but his wife was concerned and wanted him to contact his dr.     Reason for Disposition   [1] Any break in skin (e.g., cut, puncture or scratch) AND[2] last tetanus shot > 5 years ago    Protocols used: ANIMAL BITE-A-AH

## 2019-04-09 ENCOUNTER — PATIENT MESSAGE (OUTPATIENT)
Dept: INTERNAL MEDICINE | Facility: CLINIC | Age: 59
End: 2019-04-09

## 2019-04-09 DIAGNOSIS — M54.50 CHRONIC MIDLINE LOW BACK PAIN WITHOUT SCIATICA: ICD-10-CM

## 2019-04-09 DIAGNOSIS — G89.29 CHRONIC MIDLINE LOW BACK PAIN WITHOUT SCIATICA: ICD-10-CM

## 2019-04-09 DIAGNOSIS — M17.0 PRIMARY OSTEOARTHRITIS OF BOTH KNEES: ICD-10-CM

## 2019-04-10 RX ORDER — DIAZEPAM 5 MG/1
5 TABLET ORAL DAILY PRN
Qty: 30 TABLET | Refills: 1 | Status: SHIPPED | OUTPATIENT
Start: 2019-04-10 | End: 2019-05-30 | Stop reason: SDUPTHER

## 2019-04-10 RX ORDER — HYDROCODONE BITARTRATE AND ACETAMINOPHEN 5; 325 MG/1; MG/1
1 TABLET ORAL EVERY 6 HOURS PRN
Qty: 30 TABLET | Refills: 0 | Status: SHIPPED | OUTPATIENT
Start: 2019-04-10 | End: 2019-05-17 | Stop reason: SDUPTHER

## 2019-05-14 ENCOUNTER — PATIENT MESSAGE (OUTPATIENT)
Dept: INTERNAL MEDICINE | Facility: CLINIC | Age: 59
End: 2019-05-14

## 2019-05-17 ENCOUNTER — TELEPHONE (OUTPATIENT)
Dept: INTERNAL MEDICINE | Facility: CLINIC | Age: 59
End: 2019-05-17

## 2019-05-17 ENCOUNTER — HOSPITAL ENCOUNTER (OUTPATIENT)
Dept: RADIOLOGY | Facility: HOSPITAL | Age: 59
Discharge: HOME | End: 2019-05-17
Attending: INTERNAL MEDICINE
Payer: OTHER GOVERNMENT

## 2019-05-17 ENCOUNTER — OFFICE VISIT (OUTPATIENT)
Dept: INTERNAL MEDICINE | Facility: CLINIC | Age: 59
End: 2019-05-17
Payer: OTHER GOVERNMENT

## 2019-05-17 VITALS
HEART RATE: 75 BPM | WEIGHT: 266.56 LBS | DIASTOLIC BLOOD PRESSURE: 80 MMHG | SYSTOLIC BLOOD PRESSURE: 122 MMHG | BODY MASS INDEX: 36.1 KG/M2 | OXYGEN SATURATION: 96 % | HEIGHT: 72 IN

## 2019-05-17 DIAGNOSIS — G89.29 CHRONIC MIDLINE LOW BACK PAIN WITHOUT SCIATICA: ICD-10-CM

## 2019-05-17 DIAGNOSIS — M54.50 LOW BACK PAIN, NON-SPECIFIC: ICD-10-CM

## 2019-05-17 DIAGNOSIS — E66.9 OBESITY (BMI 30-39.9): ICD-10-CM

## 2019-05-17 DIAGNOSIS — M54.31 SCIATICA OF RIGHT SIDE: Primary | ICD-10-CM

## 2019-05-17 DIAGNOSIS — M54.50 CHRONIC MIDLINE LOW BACK PAIN WITHOUT SCIATICA: ICD-10-CM

## 2019-05-17 DIAGNOSIS — M17.0 PRIMARY OSTEOARTHRITIS OF BOTH KNEES: ICD-10-CM

## 2019-05-17 PROCEDURE — 99999 PR PBB SHADOW E&M-EST. PATIENT-LVL III: CPT | Mod: PBBFAC,,, | Performed by: INTERNAL MEDICINE

## 2019-05-17 PROCEDURE — 99999 PR PBB SHADOW E&M-EST. PATIENT-LVL III: ICD-10-PCS | Mod: PBBFAC,,, | Performed by: INTERNAL MEDICINE

## 2019-05-17 PROCEDURE — 99214 OFFICE O/P EST MOD 30 MIN: CPT | Mod: S$PBB,25,, | Performed by: INTERNAL MEDICINE

## 2019-05-17 PROCEDURE — 72148 MRI LUMBAR SPINE W/O DYE: CPT | Mod: TC,PO

## 2019-05-17 PROCEDURE — 99214 PR OFFICE/OUTPT VISIT, EST, LEVL IV, 30-39 MIN: ICD-10-PCS | Mod: S$PBB,25,, | Performed by: INTERNAL MEDICINE

## 2019-05-17 PROCEDURE — 99213 OFFICE O/P EST LOW 20 MIN: CPT | Mod: PBBFAC,25,PN | Performed by: INTERNAL MEDICINE

## 2019-05-17 PROCEDURE — 96372 THER/PROPH/DIAG INJ SC/IM: CPT | Mod: PBBFAC,PN

## 2019-05-17 RX ORDER — GABAPENTIN 300 MG/1
300 CAPSULE ORAL 3 TIMES DAILY
Qty: 90 CAPSULE | Refills: 3 | Status: SHIPPED | OUTPATIENT
Start: 2019-05-17 | End: 2019-09-09 | Stop reason: SDUPTHER

## 2019-05-17 RX ORDER — PREDNISONE 20 MG/1
TABLET ORAL
Qty: 14 TABLET | Refills: 0 | Status: SHIPPED | OUTPATIENT
Start: 2019-05-17 | End: 2019-06-05 | Stop reason: SDUPTHER

## 2019-05-17 RX ORDER — HYDROCODONE BITARTRATE AND ACETAMINOPHEN 5; 325 MG/1; MG/1
1 TABLET ORAL EVERY 6 HOURS PRN
Qty: 30 TABLET | Refills: 0 | Status: SHIPPED | OUTPATIENT
Start: 2019-05-17 | End: 2019-05-30 | Stop reason: SDUPTHER

## 2019-05-17 RX ORDER — BETAMETHASONE SODIUM PHOSPHATE AND BETAMETHASONE ACETATE 3; 3 MG/ML; MG/ML
6 INJECTION, SUSPENSION INTRA-ARTICULAR; INTRALESIONAL; INTRAMUSCULAR; SOFT TISSUE
Status: COMPLETED | OUTPATIENT
Start: 2019-05-17 | End: 2019-05-17

## 2019-05-17 RX ADMIN — BETAMETHASONE ACETATE AND BETAMETHASONE SODIUM PHOSPHATE 6 MG: 3; 3 INJECTION, SUSPENSION INTRA-ARTICULAR; INTRALESIONAL; INTRAMUSCULAR; SOFT TISSUE at 12:05

## 2019-05-17 NOTE — PROGRESS NOTES
Subjective:       Patient ID: Baldo Grant is a 59 y.o. male.    Chief Complaint: Back Pain (lower) and Medication Refill (Epipen)    HPI  Pt with previous sciatica with sudden worsening of pain radiating down his RLE s/p a sudden movement.  Has some pain in his LLE.  No incontinence.  Review of Systems   Constitutional: Negative for fever.   Cardiovascular: Negative for chest pain.   Gastrointestinal: Negative for abdominal pain.   Genitourinary: Negative for dysuria and hematuria.   Musculoskeletal: Positive for back pain.   Neurological: Negative for weakness, numbness and headaches.   All other systems reviewed and are negative.      Objective:      Physical Exam   Constitutional: He appears well-developed. He appears distressed.   HENT:   Head: Normocephalic.   Eyes: EOM are normal. No scleral icterus.   Neck: Normal range of motion. No tracheal deviation present.   Cardiovascular: Normal rate, regular rhythm and intact distal pulses.   Pulmonary/Chest: Effort normal. No respiratory distress.   Abdominal: He exhibits no distension.   Musculoskeletal: Normal range of motion. He exhibits tenderness (R SI joint). He exhibits no edema.   Neurological: He is alert. He displays abnormal reflex (dropped L ankle jerk). He exhibits abnormal muscle tone (R foot dorsiflexion 4/5).   R SLR at 5 deg   Skin: Skin is warm and dry. No rash noted. He is not diaphoretic. No erythema.   Psychiatric: He has a normal mood and affect. His behavior is normal.   Vitals reviewed.      Assessment:       1. Sciatica of right side    2. Obesity (BMI 30-39.9)    3. Low back pain, non-specific    4. Primary osteoarthritis of both knees    5. Chronic midline low back pain without sciatica        Plan:       Baldo was seen today for back pain and medication refill.    Diagnoses and all orders for this visit:    Sciatica of right side  -     betamethasone acetate-betamethasone sodium phosphate injection 6 mg  -     predniSONE (DELTASONE)  20 MG tablet; 2 tabs po qd x 7 days  -     gabapentin (NEURONTIN) 300 MG capsule; Take 1 capsule (300 mg total) by mouth 3 (three) times daily.    Obesity (BMI 30-39.9)   Monitor    Low back pain, non-specific  -     Cancel: MRI Lumbar Spine Without Contrast; Future  -     MRI Lumbar Spine Without Contrast; Future    Primary osteoarthritis of both knees  -     HYDROcodone-acetaminophen (NORCO) 5-325 mg per tablet; Take 1 tablet by mouth every 6 (six) hours as needed for Pain.    Chronic midline low back pain without sciatica  -     HYDROcodone-acetaminophen (NORCO) 5-325 mg per tablet; Take 1 tablet by mouth every 6 (six) hours as needed for Pain.      Follow up if symptoms worsen or fail to improve.

## 2019-05-21 ENCOUNTER — PATIENT MESSAGE (OUTPATIENT)
Dept: INTERNAL MEDICINE | Facility: CLINIC | Age: 59
End: 2019-05-21

## 2019-05-24 ENCOUNTER — HOSPITAL ENCOUNTER (OUTPATIENT)
Dept: RADIOLOGY | Facility: HOSPITAL | Age: 59
Discharge: HOME | End: 2019-05-24
Attending: NEUROLOGICAL SURGERY
Payer: OTHER GOVERNMENT

## 2019-05-24 ENCOUNTER — OFFICE VISIT (OUTPATIENT)
Dept: NEUROSURGERY | Facility: CLINIC | Age: 59
End: 2019-05-24
Payer: OTHER GOVERNMENT

## 2019-05-24 VITALS
HEIGHT: 72 IN | HEART RATE: 62 BPM | BODY MASS INDEX: 36.43 KG/M2 | SYSTOLIC BLOOD PRESSURE: 116 MMHG | DIASTOLIC BLOOD PRESSURE: 79 MMHG | WEIGHT: 268.94 LBS

## 2019-05-24 DIAGNOSIS — M48.062 LUMBAR STENOSIS WITH NEUROGENIC CLAUDICATION: ICD-10-CM

## 2019-05-24 DIAGNOSIS — M54.41 ACUTE RIGHT-SIDED LOW BACK PAIN WITH RIGHT-SIDED SCIATICA: ICD-10-CM

## 2019-05-24 DIAGNOSIS — M51.36 DEGENERATIVE DISC DISEASE, LUMBAR: ICD-10-CM

## 2019-05-24 DIAGNOSIS — M54.16 LUMBAR RADICULOPATHY: Primary | ICD-10-CM

## 2019-05-24 DIAGNOSIS — M54.16 LUMBAR RADICULOPATHY: ICD-10-CM

## 2019-05-24 PROCEDURE — 99204 PR OFFICE/OUTPT VISIT, NEW, LEVL IV, 45-59 MIN: ICD-10-PCS | Mod: S$PBB,,, | Performed by: NEUROLOGICAL SURGERY

## 2019-05-24 PROCEDURE — 72120 XR LUMBAR SPINE FLEXION AND EXTENSION ONLY: ICD-10-PCS | Mod: 26,,, | Performed by: RADIOLOGY

## 2019-05-24 PROCEDURE — 72120 X-RAY BEND ONLY L-S SPINE: CPT | Mod: 26,,, | Performed by: RADIOLOGY

## 2019-05-24 PROCEDURE — 99999 PR PBB SHADOW E&M-EST. PATIENT-LVL IV: CPT | Mod: PBBFAC,,, | Performed by: NEUROLOGICAL SURGERY

## 2019-05-24 PROCEDURE — 72120 X-RAY BEND ONLY L-S SPINE: CPT | Mod: TC

## 2019-05-24 PROCEDURE — 72131 CT LUMBAR SPINE W/O DYE: CPT | Mod: TC

## 2019-05-24 PROCEDURE — 99204 OFFICE O/P NEW MOD 45 MIN: CPT | Mod: S$PBB,,, | Performed by: NEUROLOGICAL SURGERY

## 2019-05-24 PROCEDURE — 99999 PR PBB SHADOW E&M-EST. PATIENT-LVL IV: ICD-10-PCS | Mod: PBBFAC,,, | Performed by: NEUROLOGICAL SURGERY

## 2019-05-24 PROCEDURE — 99214 OFFICE O/P EST MOD 30 MIN: CPT | Mod: PBBFAC,25,PO | Performed by: NEUROLOGICAL SURGERY

## 2019-05-24 NOTE — LETTER
May 27, 2019      Efrain Vazquez MD  [address]  [address]  [place] [address]           [facility]  [id number] [place] [address] Dr Juan ZAVALA [id number]  Phone: [phone]  Fax: [phone]          Patient: Baldo Grant   MR Number: [id number]   YOB: 1960   Date of Visit: 5/24/2019       Dear Dr. Efrain Vazquez:    Thank you for referring Baldo Grant to me for evaluation. Attached you will find relevant portions of my assessment and plan of care.    If you have questions, please do not hesitate to call me. I look forward to following Baldo Grant along with you.    Sincerely,    Eugene Vallejo MD    Enclosure  CC:  No Recipients    If you would like to receive this communication electronically, please contact [email or web address] or [phone] to request more information on [employer] Link access.    For providers and/or their staff who would like to refer a patient to [facility], please contact us through our one-stop-shop provider referral line, [facility] , at [phone].    If you feel you have received this communication in error or would no longer like to receive these types of communications, please e-mail [email or web address]

## 2019-05-27 ENCOUNTER — TELEPHONE (OUTPATIENT)
Dept: PAIN MEDICINE | Facility: CLINIC | Age: 59
End: 2019-05-27

## 2019-05-27 DIAGNOSIS — M47.816 LUMBAR SPONDYLOSIS: ICD-10-CM

## 2019-05-27 DIAGNOSIS — M54.16 LUMBAR RADICULOPATHY: Primary | ICD-10-CM

## 2019-05-27 NOTE — TELEPHONE ENCOUNTER
Right L4-5, L5-S1 tf hubert per . Left message for spouse for pt to call to call back to schedule. All questions answered.//lp

## 2019-05-27 NOTE — PROGRESS NOTES
Subjective:      Patient ID: Baldo Grant is a 59 y.o. male.    Chief Complaint: Lumbar Spine Pain (L-Spine)    Patient is here today for evaluation as a new patient for lower back pain and radiculopathy with an MRI for my review.    Patient states he does not remember any inciting event however he has severe right lower extremity pain with weakness in the dorsiflexion of the right foot.  This is been going on for several weeks now worsening in severity.  He initially went to his PCP and was given pain medication, muscle relaxer, and a prednisone pack.  Nothing has been able to alleviate the symptoms.  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  He is able to ambulate unassisted.  Numbness and tingling is to the bottom of the foot in the lateral aspect of the foot.  He rates his pain 10/10 today.  He denies any bowel or bladder symptoms.  He tells me without the medication he would be unable to function secondary to the extreme pain    Review of Systems   Constitutional: Negative for activity change, appetite change and chills.   HENT: Negative for hearing loss, sore throat and tinnitus.    Eyes: Negative for pain, discharge and itching.   Cardiovascular: Negative for chest pain.   Gastrointestinal: Negative for abdominal pain.   Endocrine: Negative for cold intolerance and heat intolerance.   Genitourinary: Negative for difficulty urinating and dysuria.   Musculoskeletal: Positive for back pain and gait problem.   Allergic/Immunologic: Negative for environmental allergies.   Neurological: Positive for weakness. Negative for dizziness, tremors, light-headedness and headaches.   Hematological: Negative for adenopathy.   Psychiatric/Behavioral: Negative for agitation, behavioral problems and confusion.         Objective:       Nursing note and vitals reviewed  Gen:Oriented to person, place, and time.             Appears stated age   Skin: no rashes or lesions identified   Head:Normocephalic  and atraumatic.  Nose: Nose normal.    Eyes: EOM are normal. Pupils are equal, round, and reactive to light.   Neck: Neck supple. No masses or lesions palpated  Cardiovascular: Intact distal pulses.    Abdominal: Soft.   Neurological: Alert and oriented to person, place, and time.  No cranial nerve deficit.  Coordination normal. Normal muscle tone  Psychiatric: Normal mood and affect. Behavior is normal.      Back:        Spasms noted posteriorly around the facets of L 4- 5 Paraspinal muscle spasms    Pain with both extension and flexion Pain with flexion and extention    Limited secondary to pain Range of motion     Positive the 5° on the right Straight leg raise     Motor   Right Right Left Left  Level Group   5  5  L2 Hip flexor (Psoas)   5  5  L3 Leg extension (Quads)    4 5  L4 Dorsiflexion & foot inversion (Tibialis Anterior)    4 5  L5 Great toe extension ( EHL)   5  5  S1 Foot eversion (Gastroc, PL & PB)     Sensation  NL Decreased (R/L/BL) Level Sensation    X  L2 Anterio-medial thigh   X  L3 Medial thigh around knee    Diminished to light touch on the right L4 Medial foot    Diminished light touch on the right L5 Dorsum foot   X  S1 Lateral foot     Reflex  2+  Patellar tendon (L4)   2+  Achilles tendon (S1)         I  reviewed all pertinent imaging regarding this case.  MRI shows a far lateral disc on the left side at L5-S1.  At L4-5 there is a disc protrusion causing foraminal compromise and encroachment of the exiting nerve root on the right side at this level.  This would correspond with the patient's symptoms  Assessment:     1. Lumbar radiculopathy    2. Acute right-sided low back pain with right-sided sciatica    3. Lumbar stenosis with neurogenic claudication    4. Degenerative disc disease, lumbar      Plan:     Lumbar radiculopathy  -     CT Lumbar Spine Without Contrast; Future; Expected date: 05/24/2019  -     X-Ray Lumbar Spine Flexion And Extension Only; Future; Expected date: 05/24/2019  -      Ambulatory referral to Pain Clinic    Acute right-sided low back pain with right-sided sciatica  -     CT Lumbar Spine Without Contrast; Future; Expected date: 05/24/2019  -     X-Ray Lumbar Spine Flexion And Extension Only; Future; Expected date: 05/24/2019  -     Ambulatory referral to Pain Clinic    Lumbar stenosis with neurogenic claudication  -     CT Lumbar Spine Without Contrast; Future; Expected date: 05/24/2019  -     X-Ray Lumbar Spine Flexion And Extension Only; Future; Expected date: 05/24/2019  -     Ambulatory referral to Pain Clinic    Degenerative disc disease, lumbar  -     CT Lumbar Spine Without Contrast; Future; Expected date: 05/24/2019  -     X-Ray Lumbar Spine Flexion And Extension Only; Future; Expected date: 05/24/2019  -     Ambulatory referral to Pain Clinic      Would like to refer her to Pain Clinic for transforaminal injection on the right side at L4-5 as soon as possible due to the patient's severe symptoms.  Will obtain a CT scan of the lumbar spine to look for any bony defects.  Will obtain x-ray flexion-extension to make sure there is no instability   Will see him back in my clinic after all of these tests are complete to see if there is anything further we need to do for this gentleman    Thank you for the referral   Please call with any questions    Eugene Vallejo MD  Neurosurgery

## 2019-05-30 ENCOUNTER — HOSPITAL ENCOUNTER (OUTPATIENT)
Facility: HOSPITAL | Age: 59
Discharge: HOME | End: 2019-05-30
Attending: ANESTHESIOLOGY | Admitting: ANESTHESIOLOGY
Payer: OTHER GOVERNMENT

## 2019-05-30 ENCOUNTER — PATIENT MESSAGE (OUTPATIENT)
Dept: INTERNAL MEDICINE | Facility: CLINIC | Age: 59
End: 2019-05-30

## 2019-05-30 VITALS
HEIGHT: 72 IN | HEART RATE: 67 BPM | RESPIRATION RATE: 17 BRPM | OXYGEN SATURATION: 97 % | SYSTOLIC BLOOD PRESSURE: 147 MMHG | TEMPERATURE: 98 F | BODY MASS INDEX: 35.21 KG/M2 | WEIGHT: 260 LBS | DIASTOLIC BLOOD PRESSURE: 82 MMHG

## 2019-05-30 DIAGNOSIS — M54.50 CHRONIC MIDLINE LOW BACK PAIN WITHOUT SCIATICA: ICD-10-CM

## 2019-05-30 DIAGNOSIS — M47.816 LUMBAR SPONDYLOSIS: ICD-10-CM

## 2019-05-30 DIAGNOSIS — G89.29 CHRONIC MIDLINE LOW BACK PAIN WITHOUT SCIATICA: ICD-10-CM

## 2019-05-30 DIAGNOSIS — M17.0 PRIMARY OSTEOARTHRITIS OF BOTH KNEES: ICD-10-CM

## 2019-05-30 DIAGNOSIS — M54.16 LUMBAR RADICULOPATHY: Primary | ICD-10-CM

## 2019-05-30 PROCEDURE — 25000003 PHARM REV CODE 250

## 2019-05-30 PROCEDURE — 64483 NJX AA&/STRD TFRM EPI L/S 1: CPT | Mod: RT,,, | Performed by: ANESTHESIOLOGY

## 2019-05-30 PROCEDURE — 63600175 PHARM REV CODE 636 W HCPCS: Performed by: ANESTHESIOLOGY

## 2019-05-30 PROCEDURE — 25000003 PHARM REV CODE 250: Performed by: ANESTHESIOLOGY

## 2019-05-30 PROCEDURE — 64483 PR EPIDURAL INJ, ANES/STEROID, TRANSFORAMINAL, LUMB/SACR, SNGL LEVL: ICD-10-PCS | Mod: RT,,, | Performed by: ANESTHESIOLOGY

## 2019-05-30 PROCEDURE — 63600175 PHARM REV CODE 636 W HCPCS

## 2019-05-30 RX ORDER — DEXAMETHASONE SODIUM PHOSPHATE 10 MG/ML
INJECTION INTRAMUSCULAR; INTRAVENOUS
Status: DISCONTINUED | OUTPATIENT
Start: 2019-05-30 | End: 2019-05-30 | Stop reason: HOSPADM

## 2019-05-30 RX ORDER — DIAZEPAM 5 MG/1
5 TABLET ORAL DAILY PRN
Qty: 30 TABLET | Refills: 0 | Status: SHIPPED | OUTPATIENT
Start: 2019-05-30 | End: 2019-06-25 | Stop reason: SDUPTHER

## 2019-05-30 RX ORDER — HYDROCODONE BITARTRATE AND ACETAMINOPHEN 5; 325 MG/1; MG/1
1 TABLET ORAL EVERY 6 HOURS PRN
Qty: 30 TABLET | Refills: 0 | Status: SHIPPED | OUTPATIENT
Start: 2019-05-30 | End: 2019-06-24 | Stop reason: SDUPTHER

## 2019-05-30 RX ORDER — LIDOCAINE HYDROCHLORIDE 10 MG/ML
INJECTION, SOLUTION EPIDURAL; INFILTRATION; INTRACAUDAL; PERINEURAL
Status: DISCONTINUED | OUTPATIENT
Start: 2019-05-30 | End: 2019-05-30 | Stop reason: HOSPADM

## 2019-05-30 NOTE — OP NOTE
"Procedure: Lumbar Transforaminal Epidural Steroid Injection under Fluoroscopic Guidance (supraneural approach)    Level: L4/5     Side: Right    PROCEDURE DATE: 5/30/2019    Pre-operative Diagnosis: Lumbar Radiculopathy  Post-operative Diagnosis: Lumbar Radiculopathy    Provider: Nickolas Duenas MD  Assistant(s): None    Anesthesia: Local, No Sedation    >> 0 mg of VERSED    >> 0 mcg of FENTANYL     Indication: Low back pain with radiculopathy consistent with distribution of targeted nerve. Symptoms unresponsive to conservative treatments. Fluoroscopy was used to optimize visualization of needle placement and to maximize safety.     Procedure Description / Technique:  The patient was seen and identified in the preoperative area. Risks, benefits, complications, and alternatives were discussed with the patient. The patient agreed to proceed with the procedure and signed the consent. The site and side of the procedure was identified and marked. An IV was not placed for this procedure. The patient was taken to the procedural suite.    The patient was positioned in prone orientation on procedure table and a pillow was placed under the abdomen to reduce lumbar lordosis. A time out was performed prior to any intervention. The procedure, site, side, and allergies were stated and agreed to by all present. The lumbosacral area was widely prepped with ChloraPrep. The procedural site was draped in usual sterile fashion. Vital signs were closely monitored throughout this procedure. Conscious sedation was not used for this procedure.    The target area was visualized under fluoroscopy. The cephalocaudal angle of the fluoroscope was adjusted as to align the vertebral end plates. The fluoroscopic arm was rotated ipsilaterally to an angle of approximately 30 degrees until the "aria dog" outline came into view and the tip of the inferior superior articular process pointed towards the midline, 6:00 position of the above pedicle. A 22 " "gauge 5 inch spinal needle was directed towards the "chin" of the "aria dog" (adjacent to the pars interarticularis and inferior to the pedicle). The needle was advanced until OS was met at the inferior border of the pedicle / pars interface. The needle was adjusted so that it would pass inferior to the osseous border. The fluoroscope was then placed in the lateral position and the needle was slowly advanced until it rested in the posterior 1/3rd of the vertebral foramen. AP fluoroscopy was checked and the needle tip rested at the 6:00 position under the pedicle. No paresthesia was elicited during needle placement. With the needle tip in its final position, gentle aspiration was negative for blood and CSF. Omnipaque 240 (1 to 2 mL) was injected under live fluoroscopy. Microbore tubing was used for injection. There was no pain or paresthesia on injection. The contrast clearly delineated the targeted nerve root on AP fluoroscopy. No vascular uptake was seen. A solution containing 1 mL of 1% PF Lidocaine and 1 mL of Dexamethasone (10 mg/mL) was mixed and 2 mL was injected slowly at each level targeted. There was minimal resistance on injection. No pain or paresthesia was elicited on injection. The stylet was replaced and the needle was withdrawn intact. This procedure was performed for each of the above indicated levels.     Description of Findings: Not applicable    Prosthetic devices, grafts, tissues, or devices implanted: None    Specimen Removed: No    Estimated Blood Loss: minimal    COMPLICATIONS: None    DISPOSITION / PLANS: The patient was transferred to the recovery area in a stable condition for observation. The patient was reexamined prior to discharge. There was no evidence of acute neurologic injury following the procedure.  Patient was discharged from the recovery room after meeting discharge criteria. Home discharge instructions were given to the patient by the staff.  "

## 2019-05-30 NOTE — DISCHARGE SUMMARY
[facility]  Discharge Note       Description of Procedure: Right L4-5 Lumbar Transforaminal Epidural Steroid Injection under Fluoroscopic Guidance    Procedure Date: 5/30/2019    Admit Date: 5/30/2019  Discharge Date: 5/30/2019     Attending Physician: Yulissa Olmos   Discharge Provider: Yulissa Olmos    Preoperative Diagnosis: Lumbar Spondylosis, Lumbar Radiculopathy     Postoperative Diagnosis: as above, same as preoperative diagnosis    Discharged Condition: Stable    Hospital Course: Patient was admitted for an outpatient procedure. The procedure was tolerated well with no complications.    Final Diagnoses: Same as principal problem.    Disposition: Home, self-care.    Follow up/Patient Instructions:  Follow-up in clinic in 2-3 weeks.    Medications: No medications were prescribed today. The patient was advised to resume normal medication regimen without change.  Specific information was provided regarding restarting any anticoagulant/s.    Discharge Procedure Orders (must include Diet, Follow-up, Activity):  Light activity for the remainder of the day, resume normal activity tomorrow. Resume normal diet. Follow-up in clinic in 2-3 weeks.

## 2019-06-05 ENCOUNTER — PATIENT MESSAGE (OUTPATIENT)
Dept: PAIN MEDICINE | Facility: CLINIC | Age: 59
End: 2019-06-05

## 2019-06-05 DIAGNOSIS — M17.0 PRIMARY OSTEOARTHRITIS OF BOTH KNEES: ICD-10-CM

## 2019-06-05 DIAGNOSIS — M54.31 SCIATICA OF RIGHT SIDE: ICD-10-CM

## 2019-06-05 DIAGNOSIS — M54.50 CHRONIC MIDLINE LOW BACK PAIN WITHOUT SCIATICA: ICD-10-CM

## 2019-06-05 DIAGNOSIS — G89.29 CHRONIC MIDLINE LOW BACK PAIN WITHOUT SCIATICA: ICD-10-CM

## 2019-06-05 RX ORDER — HYDROCODONE BITARTRATE AND ACETAMINOPHEN 5; 325 MG/1; MG/1
1 TABLET ORAL EVERY 8 HOURS PRN
Qty: 15 TABLET | Refills: 0 | Status: SHIPPED | OUTPATIENT
Start: 2019-06-05 | End: 2019-07-05

## 2019-06-05 RX ORDER — PREDNISONE 20 MG/1
TABLET ORAL
Qty: 14 TABLET | Refills: 0 | Status: SHIPPED | OUTPATIENT
Start: 2019-06-05 | End: 2019-06-21

## 2019-06-05 RX ORDER — HYDROCODONE BITARTRATE AND ACETAMINOPHEN 5; 325 MG/1; MG/1
1 TABLET ORAL EVERY 6 HOURS PRN
Qty: 30 TABLET | Refills: 0 | OUTPATIENT
Start: 2019-06-05

## 2019-06-07 ENCOUNTER — PATIENT MESSAGE (OUTPATIENT)
Dept: PAIN MEDICINE | Facility: CLINIC | Age: 59
End: 2019-06-07

## 2019-06-07 ENCOUNTER — PATIENT MESSAGE (OUTPATIENT)
Dept: INTERNAL MEDICINE | Facility: CLINIC | Age: 59
End: 2019-06-07

## 2019-06-07 ENCOUNTER — PATIENT MESSAGE (OUTPATIENT)
Dept: NEUROSURGERY | Facility: CLINIC | Age: 59
End: 2019-06-07

## 2019-06-12 ENCOUNTER — TELEPHONE (OUTPATIENT)
Dept: NEUROSURGERY | Facility: CLINIC | Age: 59
End: 2019-06-12

## 2019-06-12 NOTE — TELEPHONE ENCOUNTER
Spoke with pt regarding a f/u appt with Dr. Vallejo for his lumbar spine. Pt was scheduled for the 19 of July ----- Message from Ailin Hardy sent at 6/12/2019  3:07 PM CDT -----  Contact: pt  .Type:  Patient Returning Call    Who Called: pt  Who Left Message for Patient: Sameera   Does the patient know what this is regarding?: appointment scheduling   Would the patient rather a call back or a response via [facility]? Call back   Best Call Back Number: [phone] (home)    Additional Information:

## 2019-06-12 NOTE — TELEPHONE ENCOUNTER
Phoned pt to see if he would like to f/u with Dr. Vallejo on his lubar spine. Pt did not aswer so I did leave a message and a call back number

## 2019-06-19 ENCOUNTER — TELEPHONE (OUTPATIENT)
Dept: DERMATOLOGY | Facility: CLINIC | Age: 59
End: 2019-06-19

## 2019-06-19 NOTE — TELEPHONE ENCOUNTER
----- Message from Javon Salcedo sent at 6/19/2019  1:36 PM CDT -----  Type:  Sooner Apoointment Request    Caller is requesting a sooner appointment.  Caller declined first available appointment listed below.  Caller will not accept being placed on the waitlist and is requesting a message be sent to doctor.    Name of Caller:  Patient  When is the first available appointment?  Patient is scheduled on 06/28 but is going to be out of town and would like to be seen on 06/21 if possible  Symptoms:    Best Call Back Number:  [phone]  Additional Information:

## 2019-06-21 ENCOUNTER — OFFICE VISIT (OUTPATIENT)
Dept: DERMATOLOGY | Facility: CLINIC | Age: 59
End: 2019-06-21
Payer: OTHER GOVERNMENT

## 2019-06-21 VITALS — BODY MASS INDEX: 35.21 KG/M2 | WEIGHT: 259.94 LBS | HEIGHT: 72 IN

## 2019-06-21 DIAGNOSIS — D48.5 NEOPLASM OF UNCERTAIN BEHAVIOR OF SKIN: Primary | ICD-10-CM

## 2019-06-21 DIAGNOSIS — L40.9 PSORIASIS: ICD-10-CM

## 2019-06-21 DIAGNOSIS — L82.1 SEBORRHEIC KERATOSES: ICD-10-CM

## 2019-06-21 DIAGNOSIS — Z85.828 HISTORY OF NONMELANOMA SKIN CANCER: ICD-10-CM

## 2019-06-21 DIAGNOSIS — L57.0 ACTINIC KERATOSES: ICD-10-CM

## 2019-06-21 DIAGNOSIS — L81.4 SOLAR LENTIGO: ICD-10-CM

## 2019-06-21 PROCEDURE — 88305 TISSUE SPECIMEN TO PATHOLOGY, DERMATOLOGY: ICD-10-PCS | Mod: 26,,, | Performed by: PATHOLOGY

## 2019-06-21 PROCEDURE — 99213 PR OFFICE/OUTPT VISIT, EST, LEVL III, 20-29 MIN: ICD-10-PCS | Mod: 25,S$PBB,, | Performed by: DERMATOLOGY

## 2019-06-21 PROCEDURE — 99999 PR PBB SHADOW E&M-EST. PATIENT-LVL III: CPT | Mod: PBBFAC,,, | Performed by: DERMATOLOGY

## 2019-06-21 PROCEDURE — 17000 DESTRUCT PREMALG LESION: CPT | Mod: 59,PBBFAC,PO | Performed by: DERMATOLOGY

## 2019-06-21 PROCEDURE — 11102 TANGNTL BX SKIN SINGLE LES: CPT | Mod: S$PBB,,, | Performed by: DERMATOLOGY

## 2019-06-21 PROCEDURE — 99213 OFFICE O/P EST LOW 20 MIN: CPT | Mod: PBBFAC,PO | Performed by: DERMATOLOGY

## 2019-06-21 PROCEDURE — 99999 PR PBB SHADOW E&M-EST. PATIENT-LVL III: ICD-10-PCS | Mod: PBBFAC,,, | Performed by: DERMATOLOGY

## 2019-06-21 PROCEDURE — 17000 DESTRUCT PREMALG LESION: CPT | Mod: 59,S$PBB,, | Performed by: DERMATOLOGY

## 2019-06-21 PROCEDURE — 11102 TANGNTL BX SKIN SINGLE LES: CPT | Mod: PBBFAC,PO,59 | Performed by: DERMATOLOGY

## 2019-06-21 PROCEDURE — 17000 PR DESTRUCTION(LASER SURGERY,CRYOSURGERY,CHEMOSURGERY),PREMALIGNANT LESIONS,FIRST LESION: ICD-10-PCS | Mod: 59,S$PBB,, | Performed by: DERMATOLOGY

## 2019-06-21 PROCEDURE — 11102 PR TANGENTIAL BIOPSY, SKIN, SINGLE LESION: ICD-10-PCS | Mod: S$PBB,,, | Performed by: DERMATOLOGY

## 2019-06-21 PROCEDURE — 88305 TISSUE EXAM BY PATHOLOGIST: CPT | Performed by: PATHOLOGY

## 2019-06-21 PROCEDURE — 99213 OFFICE O/P EST LOW 20 MIN: CPT | Mod: 25,S$PBB,, | Performed by: DERMATOLOGY

## 2019-06-21 NOTE — PROGRESS NOTES
Subjective:       Patient ID:  Baldo Grant is a 59 y.o. male who presents for   Chief Complaint   Patient presents with    Psoriasis     Flared L eyebrow, R shin, buttock     Patient last seen 6/2018    H/o rosacea, psoriasis, seb derm  Using enstilar, uses on average 2/3 times weekly, needs refill (travels between [place] and [place])  Has not been using keto shampoo  Has not been using face wash (sulfa), no recent flare    Previously under the care of Derm in [place]  H/o SCCs:   2014 Forehead- Dr. Cabral   2013 Right ear- Dr. Cabral [facility]    Psoriasis  - Follow-up  Diagnosis: psoriasis.  Symptom course: stable  Currently using: Enstilar foam PRN.  AFFECTED LOCATIONS F/U: L eyebrow, R shin, buttock.  Signs / symptoms: dryness and redness  Severity: mild        Review of Systems   Constitutional: Positive for fatigue. Negative for fever and chills.   Skin: Positive for activity-related sunscreen use and wears hat. Negative for itching and daily sunscreen use.   Hematologic/Lymphatic: Does not bruise/bleed easily.        Objective:    Physical Exam   Constitutional: He appears well-developed and well-nourished. No distress.   Neurological: He is alert and oriented to person, place, and time. He is not disoriented.   Psychiatric: He has a normal mood and affect.   Skin:   Areas Examined (abnormalities noted in diagram):   Scalp / Hair Palpated and Inspected  Head / Face Inspection Performed  Neck Inspection Performed  Chest / Axilla Inspection Performed  Abdomen Inspection Performed  Genitals / Buttocks / Groin Inspection Performed  Back Inspection Performed  RUE Inspected  LUE Inspection Performed  RLE Inspected  LLE Inspection Performed  Nails and Digits Inspection Performed                       Diagram Legend     Erythematous scaling macule/papule c/w actinic keratosis       Vascular papule c/w angioma      Pigmented verrucoid papule/plaque c/w seborrheic keratosis      Yellow umbilicated papule c/w sebaceous  hyperplasia      Irregularly shaped tan macule c/w lentigo     1-2 mm smooth white papules consistent with Milia      Movable subcutaneous cyst with punctum c/w epidermal inclusion cyst      Subcutaneous movable cyst c/w pilar cyst      Firm pink to brown papule c/w dermatofibroma      Pedunculated fleshy papule(s) c/w skin tag(s)      Evenly pigmented macule c/w junctional nevus     Mildly variegated pigmented, slightly irregular-bordered macule c/w mildly atypical nevus      Flesh colored to evenly pigmented papule c/w intradermal nevus       Pink pearly papule/plaque c/w basal cell carcinoma      Erythematous hyperkeratotic cursted plaque c/w SCC      Surgical scar with no sign of skin cancer recurrence      Open and closed comedones      Inflammatory papules and pustules      Verrucoid papule consistent consistent with wart     Erythematous eczematous patches and plaques     Dystrophic onycholytic nail with subungual debris c/w onychomycosis     Umbilicated papule    Erythematous-base heme-crusted tan verrucoid plaque consistent with inflamed seborrheic keratosis     Erythematous Silvery Scaling Plaque c/w Psoriasis     See annotation      Assessment / Plan:      Pathology Orders:     Normal Orders This Visit    Tissue Specimen To Pathology, Dermatology     Questions:    Directional Terms:  Other(comment)    Clinical Information:  Pearly telangiectatic papule, r/o BCC    Specific Site:  left mid back        Neoplasm of uncertain behavior of skin  -     Tissue Specimen To Pathology, Dermatology  Shave biopsy procedure note:    Shave biopsy performed after verbal consent including risk of infection, scar, recurrence, need for additional treatment of site. Area prepped with alcohol, anesthetized with approximately 1.0cc of 1% lidocaine with epinephrine. Lesional tissue shaved with razor blade. Hemostasis achieved with application of aluminum chloride followed by hyfrecation. No complications. Dressing applied.  Wound care explained.    Actinic keratosis, left forehead  Cryosurgery Procedure Note    Verbal consent from the patient is obtained and the patient is aware of the precancerous quality and need for treatment of these lesions. Liquid nitrogen cryosurgery is applied to the 1 actinic keratoses, as detailed in the physical exam, to produce a freeze injury. The patient is aware that blisters may form and is instructed on wound care with gentle cleansing and use of vaseline ointment to keep moist until healed. The patient is supplied a handout on cryosurgery and is instructed to call if lesions do not completely resolve.    History of nonmelanoma skin cancer, history of mildly DN  Area of previous NMSCs examined. Site well healed with no signs of recurrence.    Total body skin examination performed today including at least 12 points as noted in physical examination. Lesion suspicious for malignancy noted.    Seborrheic keratoses  These are benign inherited growths without a malignant potential. Reassurance given to patient. No treatment is necessary.     Solar lentigo  This is a benign hyperpigmented sun induced lesion. Daily sun protection will reduce the number of new lesions. Treatment of these benign lesions are considered cosmetic.    Psoriasis,chronic plaque, shin, forearms, mild scalp involvment  Currently mostly controlled with enstilar, continue    Patient instructed in importance in daily sun protection of at least spf 30. Mineral sunscreen ingredients preferred (Zinc +/- Titanium).   Recommend Elta MD for daily use on face and neck.  Patient encouraged to wear hat for all outdoor exposure.   Also discussed sun avoidance and use of protective clothing.         Follow up in about 1 year (around 6/21/2020), or if symptoms worsen or fail to improve.

## 2019-06-24 ENCOUNTER — PATIENT MESSAGE (OUTPATIENT)
Dept: INTERNAL MEDICINE | Facility: CLINIC | Age: 59
End: 2019-06-24

## 2019-06-24 DIAGNOSIS — M17.0 PRIMARY OSTEOARTHRITIS OF BOTH KNEES: ICD-10-CM

## 2019-06-24 DIAGNOSIS — M25.512 CHRONIC LEFT SHOULDER PAIN: ICD-10-CM

## 2019-06-24 DIAGNOSIS — M54.50 CHRONIC MIDLINE LOW BACK PAIN WITHOUT SCIATICA: ICD-10-CM

## 2019-06-24 DIAGNOSIS — G89.29 CHRONIC LEFT SHOULDER PAIN: ICD-10-CM

## 2019-06-24 DIAGNOSIS — G89.29 CHRONIC MIDLINE LOW BACK PAIN WITHOUT SCIATICA: ICD-10-CM

## 2019-06-24 RX ORDER — MELOXICAM 15 MG/1
15 TABLET ORAL DAILY
Qty: 90 TABLET | Refills: 0 | Status: SHIPPED | OUTPATIENT
Start: 2019-06-24 | End: 2019-09-22 | Stop reason: SDUPTHER

## 2019-06-24 RX ORDER — HYDROCODONE BITARTRATE AND ACETAMINOPHEN 5; 325 MG/1; MG/1
1 TABLET ORAL EVERY 6 HOURS PRN
Qty: 30 TABLET | Refills: 0 | Status: CANCELLED | OUTPATIENT
Start: 2019-06-24

## 2019-06-24 RX ORDER — HYDROCODONE BITARTRATE AND ACETAMINOPHEN 5; 325 MG/1; MG/1
1 TABLET ORAL EVERY 6 HOURS PRN
Qty: 30 TABLET | Refills: 0 | Status: SHIPPED | OUTPATIENT
Start: 2019-06-24 | End: 2019-07-29 | Stop reason: SDUPTHER

## 2019-06-24 RX ORDER — DIAZEPAM 5 MG/1
5 TABLET ORAL DAILY PRN
Qty: 30 TABLET | Refills: 0 | OUTPATIENT
Start: 2019-06-24

## 2019-06-25 DIAGNOSIS — G89.29 CHRONIC MIDLINE LOW BACK PAIN WITHOUT SCIATICA: ICD-10-CM

## 2019-06-25 DIAGNOSIS — M54.50 CHRONIC MIDLINE LOW BACK PAIN WITHOUT SCIATICA: ICD-10-CM

## 2019-06-25 RX ORDER — DIAZEPAM 5 MG/1
5 TABLET ORAL DAILY PRN
Qty: 30 TABLET | Refills: 0 | Status: SHIPPED | OUTPATIENT
Start: 2019-06-25 | End: 2019-08-24 | Stop reason: SDUPTHER

## 2019-07-10 ENCOUNTER — PATIENT MESSAGE (OUTPATIENT)
Dept: NEUROSURGERY | Facility: CLINIC | Age: 59
End: 2019-07-10

## 2019-07-10 ENCOUNTER — PATIENT MESSAGE (OUTPATIENT)
Dept: PAIN MEDICINE | Facility: CLINIC | Age: 59
End: 2019-07-10

## 2019-07-10 ENCOUNTER — PATIENT MESSAGE (OUTPATIENT)
Dept: INTERNAL MEDICINE | Facility: CLINIC | Age: 59
End: 2019-07-10

## 2019-07-10 NOTE — TELEPHONE ENCOUNTER
Contacted pt. Advised pt to keep appointment with  to discuss treatment plan. Pt was able to verbalize all understanding. All questions answered.//lp

## 2019-07-19 ENCOUNTER — OFFICE VISIT (OUTPATIENT)
Dept: NEUROSURGERY | Facility: CLINIC | Age: 59
End: 2019-07-19
Payer: OTHER GOVERNMENT

## 2019-07-19 VITALS
RESPIRATION RATE: 18 BRPM | BODY MASS INDEX: 37.58 KG/M2 | DIASTOLIC BLOOD PRESSURE: 78 MMHG | SYSTOLIC BLOOD PRESSURE: 117 MMHG | HEART RATE: 70 BPM | HEIGHT: 72 IN | WEIGHT: 277.44 LBS

## 2019-07-19 DIAGNOSIS — M51.36 DEGENERATIVE DISC DISEASE, LUMBAR: Primary | ICD-10-CM

## 2019-07-19 DIAGNOSIS — M48.062 LUMBAR STENOSIS WITH NEUROGENIC CLAUDICATION: ICD-10-CM

## 2019-07-19 PROCEDURE — 99214 OFFICE O/P EST MOD 30 MIN: CPT | Mod: PBBFAC,PO | Performed by: NEUROLOGICAL SURGERY

## 2019-07-19 PROCEDURE — 99214 OFFICE O/P EST MOD 30 MIN: CPT | Mod: S$PBB,,, | Performed by: NEUROLOGICAL SURGERY

## 2019-07-19 PROCEDURE — 99999 PR PBB SHADOW E&M-EST. PATIENT-LVL IV: CPT | Mod: PBBFAC,,, | Performed by: NEUROLOGICAL SURGERY

## 2019-07-19 PROCEDURE — 99999 PR PBB SHADOW E&M-EST. PATIENT-LVL IV: ICD-10-PCS | Mod: PBBFAC,,, | Performed by: NEUROLOGICAL SURGERY

## 2019-07-19 PROCEDURE — 99214 PR OFFICE/OUTPT VISIT, EST, LEVL IV, 30-39 MIN: ICD-10-PCS | Mod: S$PBB,,, | Performed by: NEUROLOGICAL SURGERY

## 2019-07-20 ENCOUNTER — PATIENT MESSAGE (OUTPATIENT)
Dept: PAIN MEDICINE | Facility: CLINIC | Age: 59
End: 2019-07-20

## 2019-07-23 ENCOUNTER — PATIENT MESSAGE (OUTPATIENT)
Dept: PAIN MEDICINE | Facility: CLINIC | Age: 59
End: 2019-07-23

## 2019-07-23 ENCOUNTER — PATIENT MESSAGE (OUTPATIENT)
Dept: DERMATOLOGY | Facility: CLINIC | Age: 59
End: 2019-07-23

## 2019-07-24 ENCOUNTER — TELEPHONE (OUTPATIENT)
Dept: PAIN MEDICINE | Facility: CLINIC | Age: 59
End: 2019-07-24

## 2019-07-24 DIAGNOSIS — M54.16 LUMBAR RADICULOPATHY: Primary | ICD-10-CM

## 2019-07-24 NOTE — TELEPHONE ENCOUNTER
----- Message from Ammy Medina LPN sent at 7/24/2019  9:14 AM CDT -----  Can you place orders?    ----- Message -----  From: Guerline Sage LPN  Sent: 7/23/2019   4:55 PM  To: Ammy Medina LPN    Yes, Right Lumbar 4-5  ----- Message -----  From: Ammy Medina LPN  Sent: 7/23/2019   4:23 PM  To: Guerline Sage LPN    Does  want an injection on this pt?

## 2019-07-25 DIAGNOSIS — M51.36 DEGENERATIVE DISC DISEASE, LUMBAR: Primary | ICD-10-CM

## 2019-07-25 NOTE — PROGRESS NOTES
Subjective:      Patient ID: Baldo Grant is a 59 y.o. male.    Chief Complaint: Lumbar Spine Pain (L-Spine) ( sciatica and discuss injections )    Patient is here today for evaluation as a following CT scan of the lumbar spine as well as a injection in the lumbar spine    He states that following his injection he sustained a relief for approximately 72 hr down the right lower extremity into the symptoms returned  He continues to complain of pain 10/10 down the right lower extremity associated numbness and tingling into this distribution  He continues to take opioid medication as well as muscle relaxers to relieve the symptoms.  Worse with activity and better with rest  Denies any bowel bladder symptoms  So far is tried pain management as well as physical therapy and medications without any relief of the symptoms  No other new complaints for me this afternoon      Review of Systems   Constitutional: Negative for activity change, appetite change and chills.   HENT: Negative for hearing loss, sore throat and tinnitus.    Eyes: Negative for pain, discharge and itching.   Cardiovascular: Negative for chest pain.   Gastrointestinal: Negative for abdominal pain.   Endocrine: Negative for cold intolerance and heat intolerance.   Genitourinary: Negative for difficulty urinating and dysuria.   Musculoskeletal: Positive for back pain and gait problem.   Allergic/Immunologic: Negative for environmental allergies.   Neurological: Positive for weakness. Negative for dizziness, tremors, light-headedness and headaches.   Hematological: Negative for adenopathy.   Psychiatric/Behavioral: Negative for agitation, behavioral problems and confusion.         Objective:       Nursing note and vitals reviewed  Gen:Oriented to person, place, and time.             Appears stated age   Skin: no rashes or lesions identified   Head:Normocephalic and atraumatic.  Nose: Nose normal.    Eyes: EOM are normal. Pupils are equal, round, and  reactive to light.   Neck: Neck supple. No masses or lesions palpated  Cardiovascular: Intact distal pulses.    Abdominal: Soft.   Neurological: Alert and oriented to person, place, and time.  No cranial nerve deficit.  Coordination normal. Normal muscle tone  Psychiatric: Normal mood and affect. Behavior is normal.      Back:        Spasms noted posteriorly around the facets of L 4- 5 Paraspinal muscle spasms    Pain with both extension and flexion Pain with flexion and extention    Limited secondary to pain Range of motion     Positive the 5° on the right Straight leg raise     Motor   Right Right Left Left  Level Group   5  5  L2 Hip flexor (Psoas)   5  5  L3 Leg extension (Quads)    4 5  L4 Dorsiflexion & foot inversion (Tibialis Anterior)    4 5  L5 Great toe extension ( EHL)   5  5  S1 Foot eversion (Gastroc, PL & PB)     Sensation  NL Decreased (R/L/BL) Level Sensation    X  L2 Anterio-medial thigh   X  L3 Medial thigh around knee    Diminished to light touch on the right L4 Medial foot    Diminished light touch on the right L5 Dorsum foot   X  S1 Lateral foot     Reflex  2+  Patellar tendon (L4)   2+  Achilles tendon (S1)       MRI lumbar spine  I  reviewed all pertinent imaging regarding this case.  MRI shows a far lateral disc on the left side at L5-S1.  At L4-5 there is a disc protrusion causing foraminal compromise and encroachment of the exiting nerve root on the right side at this level.  This would correspond with the patient's symptoms    CT scan lumbar spine  L4-5: Moderate bilateral facet arthropathy.  Moderate asymmetric right-sided intervertebral disc space height loss with subchondral cyst formation, and disc osteophyte complex resulting in mild-to-moderate right-sided neural foraminal stenosis.    L5-S1: Moderate bilateral facet arthropathy.  Moderate asymmetric left-sided intervertebral disc space height loss with subchondral cyst formation, and disc osteophyte complex resulting in  mild-to-moderate left-sided neural foraminal stenosis.    Assessment:     1. Degenerative disc disease, lumbar    2. Lumbar stenosis with neurogenic claudication      Plan:     Degenerative disc disease, lumbar    Lumbar stenosis with neurogenic claudication     Patient had minimal relief with injection.  Again explained him at this point I do not have any obvious reason for him to have such a severe pain scale to his anatomy which I have had to review.  He has pain 10/10 down the right lower extremity.  MRI does not clearly show any over compression of the right exiting nerve root at this time.  Will continue to monitor  EMG nerve conduction study of the lower extremities will be ordered  Follow up in several weeks as directed  Will prescribe medication for refill as needed      Thank you for the referral   Please call with any questions    Eugene Vallejo MD  Neurosurgery

## 2019-07-26 ENCOUNTER — OFFICE VISIT (OUTPATIENT)
Dept: DERMATOLOGY | Facility: CLINIC | Age: 59
End: 2019-07-26
Payer: OTHER GOVERNMENT

## 2019-07-26 VITALS — WEIGHT: 277.31 LBS | BODY MASS INDEX: 37.56 KG/M2 | HEIGHT: 72 IN

## 2019-07-26 DIAGNOSIS — C44.91 SUPERFICIAL BASAL CELL CARCINOMA: Primary | ICD-10-CM

## 2019-07-26 PROCEDURE — 99499 NO LOS: ICD-10-PCS | Mod: S$PBB,,, | Performed by: DERMATOLOGY

## 2019-07-26 PROCEDURE — 17262 DSTRJ MAL LES T/A/L 1.1-2.0: CPT | Mod: S$PBB,,, | Performed by: DERMATOLOGY

## 2019-07-26 PROCEDURE — 99999 PR PBB SHADOW E&M-EST. PATIENT-LVL III: ICD-10-PCS | Mod: PBBFAC,,, | Performed by: DERMATOLOGY

## 2019-07-26 PROCEDURE — 99999 PR PBB SHADOW E&M-EST. PATIENT-LVL III: CPT | Mod: PBBFAC,,, | Performed by: DERMATOLOGY

## 2019-07-26 PROCEDURE — 17262 DSTRJ MAL LES T/A/L 1.1-2.0: CPT | Mod: PBBFAC,PO | Performed by: DERMATOLOGY

## 2019-07-26 PROCEDURE — 99499 UNLISTED E&M SERVICE: CPT | Mod: S$PBB,,, | Performed by: DERMATOLOGY

## 2019-07-26 PROCEDURE — 99213 OFFICE O/P EST LOW 20 MIN: CPT | Mod: PBBFAC,PO | Performed by: DERMATOLOGY

## 2019-07-26 PROCEDURE — 17262 PR DESTR MALIG TRUNK,EXTREM 1.1-2 CM: ICD-10-PCS | Mod: S$PBB,,, | Performed by: DERMATOLOGY

## 2019-07-26 NOTE — PATIENT INSTRUCTIONS
ED&C Wound Care    Your doctor has performed an electrodesiccation and curettage today.  A bandage and vaseline ointment has been placed over the site.  This should remain in place for 24 hours.  It is recommended that you keep the area dry for the first 24 hours.  After 24 hours, you may remove the band aid and wash the area with warm soap and water and apply Vaseline jelly or aquaphore.  Many patients prefer to use Neosporin or Bacitracin ointment.  This is acceptable; however know that you can develop an allergy to this medication even if you have used it safely for years.  It is important to keep the area moist.  Letting it dry out and get air slows healing time, and will worsen the scar.        If you notice increasing redness, tenderness, pain, or yellow drainage at the biopsy or surgical site, please notify your doctor.  These are signs of an infection.    If your biopsy/surgical site is bleeding, apply firm pressure for 15 minutes straight.  Repeat for another 15 minutes, if it is still bleeding.   If the surgical site continues to bleed, then please contact your doctor.     [facility]   [facility]   [address] [place] [id number]   Dept: [phone]

## 2019-07-26 NOTE — PROGRESS NOTES
Subjective:       Patient ID:  Baldo Grant is a 59 y.o. male who presents for   Chief Complaint   Patient presents with    Basal Cell Carcinoma     ED & C, back     Pt here for definitive treatment sup BCC bx 06/21/2019  Feeling well today.   Denies pacemaker, defibrillator or blood thinners.   No additional cutaneous complaints.       FINAL PATHOLOGIC DIAGNOSIS  1. Skin, left mid back, shave biopsy:  - BASAL CELL CARCINOMA, SUPERFICIAL TYPE.  - THE TUMOR EXTENDS TO A LATERAL BIOPSY MARGIN.      Basal Cell Carcinoma         Review of Systems   Constitutional: Negative for fever and chills.        Objective:    Physical Exam   Constitutional: He appears well-developed and well-nourished. No distress.   Neurological: He is alert and oriented to person, place, and time. He is not disoriented.   Psychiatric: He has a normal mood and affect.   Skin:                 Diagram Legend     Erythematous scaling macule/papule c/w actinic keratosis       Vascular papule c/w angioma      Pigmented verrucoid papule/plaque c/w seborrheic keratosis      Yellow umbilicated papule c/w sebaceous hyperplasia      Irregularly shaped tan macule c/w lentigo     1-2 mm smooth white papules consistent with Milia      Movable subcutaneous cyst with punctum c/w epidermal inclusion cyst      Subcutaneous movable cyst c/w pilar cyst      Firm pink to brown papule c/w dermatofibroma      Pedunculated fleshy papule(s) c/w skin tag(s)      Evenly pigmented macule c/w junctional nevus     Mildly variegated pigmented, slightly irregular-bordered macule c/w mildly atypical nevus      Flesh colored to evenly pigmented papule c/w intradermal nevus       Pink pearly papule/plaque c/w basal cell carcinoma      Erythematous hyperkeratotic cursted plaque c/w SCC      Surgical scar with no sign of skin cancer recurrence      Open and closed comedones      Inflammatory papules and pustules      Verrucoid papule consistent consistent with wart      Erythematous eczematous patches and plaques     Dystrophic onycholytic nail with subungual debris c/w onychomycosis     Umbilicated papule    Erythematous-base heme-crusted tan verrucoid plaque consistent with inflamed seborrheic keratosis     Erythematous Silvery Scaling Plaque c/w Psoriasis     See annotation      Assessment / Plan:        Superficial basal cell carcinoma    Electrodessication and Curettage Procedure note:    Verbal consent obtained.Time out period with surgeon, assistant and patient in surgical suite was taken. Lesional tissue marked and prepped with alcohol. Lesion anesthetized with 1% lidocaine with epinephrine. Curettage and Desiccation x 3 cycles to base. Aluminum chloride for hemostasis. Lesion size after primary curettage: 1.3 cm    Area bandaged and wound care explained.           Follow up in about 6 months (around 1/26/2020).

## 2019-07-29 ENCOUNTER — PATIENT MESSAGE (OUTPATIENT)
Dept: CARDIOLOGY | Facility: HOSPITAL | Age: 59
End: 2019-07-29

## 2019-07-29 ENCOUNTER — PATIENT MESSAGE (OUTPATIENT)
Dept: INTERNAL MEDICINE | Facility: CLINIC | Age: 59
End: 2019-07-29

## 2019-07-29 DIAGNOSIS — M54.50 CHRONIC MIDLINE LOW BACK PAIN WITHOUT SCIATICA: ICD-10-CM

## 2019-07-29 DIAGNOSIS — G89.29 CHRONIC MIDLINE LOW BACK PAIN WITHOUT SCIATICA: ICD-10-CM

## 2019-07-29 DIAGNOSIS — M17.0 PRIMARY OSTEOARTHRITIS OF BOTH KNEES: ICD-10-CM

## 2019-07-30 RX ORDER — HYDROCODONE BITARTRATE AND ACETAMINOPHEN 5; 325 MG/1; MG/1
1 TABLET ORAL EVERY 6 HOURS PRN
Qty: 30 TABLET | Refills: 0 | Status: SHIPPED | OUTPATIENT
Start: 2019-07-30 | End: 2019-08-19 | Stop reason: SDUPTHER

## 2019-07-31 ENCOUNTER — HOSPITAL ENCOUNTER (OUTPATIENT)
Facility: HOSPITAL | Age: 59
Discharge: HOME | End: 2019-07-31
Attending: ANESTHESIOLOGY | Admitting: ANESTHESIOLOGY
Payer: OTHER GOVERNMENT

## 2019-07-31 ENCOUNTER — TELEPHONE (OUTPATIENT)
Dept: PAIN MEDICINE | Facility: CLINIC | Age: 59
End: 2019-07-31

## 2019-07-31 ENCOUNTER — PATIENT MESSAGE (OUTPATIENT)
Dept: CARDIOLOGY | Facility: HOSPITAL | Age: 59
End: 2019-07-31

## 2019-07-31 VITALS
SYSTOLIC BLOOD PRESSURE: 129 MMHG | RESPIRATION RATE: 17 BRPM | WEIGHT: 270 LBS | OXYGEN SATURATION: 96 % | BODY MASS INDEX: 36.57 KG/M2 | HEART RATE: 80 BPM | DIASTOLIC BLOOD PRESSURE: 77 MMHG | HEIGHT: 72 IN | TEMPERATURE: 98 F

## 2019-07-31 DIAGNOSIS — M47.816 LUMBAR SPONDYLOSIS: ICD-10-CM

## 2019-07-31 DIAGNOSIS — M54.16 LUMBAR RADICULOPATHY: Primary | ICD-10-CM

## 2019-07-31 PROCEDURE — 99152 PR MOD CONSCIOUS SEDATION, SAME PHYS, 5+ YRS, FIRST 15 MIN: ICD-10-PCS | Mod: ,,, | Performed by: ANESTHESIOLOGY

## 2019-07-31 PROCEDURE — 25500020 PHARM REV CODE 255: Performed by: ANESTHESIOLOGY

## 2019-07-31 PROCEDURE — 64483 NJX AA&/STRD TFRM EPI L/S 1: CPT | Mod: RT,,, | Performed by: ANESTHESIOLOGY

## 2019-07-31 PROCEDURE — 63600175 PHARM REV CODE 636 W HCPCS

## 2019-07-31 PROCEDURE — 25000003 PHARM REV CODE 250

## 2019-07-31 PROCEDURE — 63600175 PHARM REV CODE 636 W HCPCS: Performed by: ANESTHESIOLOGY

## 2019-07-31 PROCEDURE — 25000003 PHARM REV CODE 250: Performed by: ANESTHESIOLOGY

## 2019-07-31 PROCEDURE — 99152 MOD SED SAME PHYS/QHP 5/>YRS: CPT | Mod: ,,, | Performed by: ANESTHESIOLOGY

## 2019-07-31 PROCEDURE — 64483 PR EPIDURAL INJ, ANES/STEROID, TRANSFORAMINAL, LUMB/SACR, SNGL LEVL: ICD-10-PCS | Mod: RT,,, | Performed by: ANESTHESIOLOGY

## 2019-07-31 RX ORDER — MIDAZOLAM HYDROCHLORIDE 1 MG/ML
INJECTION, SOLUTION INTRAMUSCULAR; INTRAVENOUS
Status: DISCONTINUED | OUTPATIENT
Start: 2019-07-31 | End: 2019-07-31 | Stop reason: HOSPADM

## 2019-07-31 RX ORDER — DEXAMETHASONE SODIUM PHOSPHATE 10 MG/ML
INJECTION INTRAMUSCULAR; INTRAVENOUS
Status: DISCONTINUED | OUTPATIENT
Start: 2019-07-31 | End: 2019-07-31 | Stop reason: HOSPADM

## 2019-07-31 RX ORDER — LIDOCAINE HYDROCHLORIDE 20 MG/ML
INJECTION, SOLUTION EPIDURAL; INFILTRATION; INTRACAUDAL; PERINEURAL
Status: DISCONTINUED | OUTPATIENT
Start: 2019-07-31 | End: 2019-07-31 | Stop reason: HOSPADM

## 2019-07-31 RX ORDER — FENTANYL CITRATE 50 UG/ML
INJECTION, SOLUTION INTRAMUSCULAR; INTRAVENOUS
Status: DISCONTINUED | OUTPATIENT
Start: 2019-07-31 | End: 2019-07-31 | Stop reason: HOSPADM

## 2019-07-31 NOTE — TELEPHONE ENCOUNTER
Contacted pt. Confirmed that Dr. Duenas will use IV sedation for patients procedure today. All questions answered.//lp

## 2019-07-31 NOTE — TELEPHONE ENCOUNTER
----- Message from Connie Balderrama MA sent at 7/30/2019  4:54 PM CDT -----  Contact: Patient   Spoke with patient regarding procedure for tomorrow 07/31/2019.  Patient wanted to know if he could have sedation for his procedure for tomorrow 07/31/2019 since he almost came off the table the last time. Advised patient that I would send a message to Dr. Duenas/Ammy to see if that would be possible. Patient also instructed to be NPO after midnight. Patient verbalized understanding.     Sanaz    ----- Message -----  From: Mirella Tidwell  Sent: 7/30/2019   4:42 PM  To: Yulissa Olmos Staff    Type:  Patient Returning Call    Who Called: Baldo  Who Left Message for Patient: Ammy  Does the patient know what this is regarding?: His procedure  Would the patient rather a call back or a response via [facility]? Call back   Best Call Back Number: Please call him at [phone]  Additional Information: n/a

## 2019-07-31 NOTE — OP NOTE
"Procedure: Lumbar Transforaminal Epidural Steroid Injection under Fluoroscopic Guidance (supraneural approach)    Level: L4/5     Side: Right    PROCEDURE DATE: 7/31/2019    Pre-operative Diagnosis: Lumbar Radiculopathy  Post-operative Diagnosis: Lumbar Radiculopathy    Provider: Nickolas Duenas MD  Assistant(s): None    Anesthesia: Local, IV Sedation    >> 2 mg of VERSED    >> 100 mcg of FENTANYL     Indication: Low back pain with radiculopathy consistent with distribution of targeted nerve. Symptoms unresponsive to conservative treatments. Fluoroscopy was used to optimize visualization of needle placement and to maximize safety.     Procedure Description / Technique:  The patient was seen and identified in the preoperative area. Risks, benefits, complications, and alternatives were discussed with the patient. The patient agreed to proceed with the procedure and signed the consent. The site and side of the procedure was identified and marked. An IV was started. The patient was taken to the procedural suite.    The patient was positioned in prone orientation on procedure table and a pillow was placed under the abdomen to reduce lumbar lordosis. A time out was performed prior to any intervention. The procedure, site, side, and allergies were stated and agreed to by all present. The lumbosacral area was widely prepped with ChloraPrep. The procedural site was draped in usual sterile fashion. Vital signs were closely monitored throughout this procedure. Conscious sedation was used for this procedure to decrease patient anxiety.    The target area was visualized under fluoroscopy. The cephalocaudal angle of the fluoroscope was adjusted as to align the vertebral end plates. The fluoroscopic arm was rotated ipsilaterally to an angle of approximately 30 degrees until the "aria dog" outline came into view and the tip of the inferior superior articular process pointed towards the midline, 6:00 position of the above pedicle. A " "22 gauge 5 inch spinal needle was directed towards the "chin" of the "aria dog" (adjacent to the pars interarticularis and inferior to the pedicle). The needle was advanced until OS was met at the inferior border of the pedicle / pars interface. The needle was adjusted so that it would pass inferior to the osseous border. The fluoroscope was then placed in the lateral position and the needle was slowly advanced until it rested in the posterior 1/3rd of the vertebral foramen. AP fluoroscopy was checked and the needle tip rested at the 6:00 position under the pedicle. No paresthesia was elicited during needle placement. With the needle tip in its final position, gentle aspiration was negative for blood and CSF. Omnipaque 240 (1 to 2 mL) was injected under live fluoroscopy. Microbore tubing was used for injection. There was no pain or paresthesia on injection. The contrast clearly delineated the targeted nerve root on AP fluoroscopy. No vascular uptake was seen. A solution containing 1 mL of 1% PF Lidocaine and 1 mL of Dexamethasone (10 mg/mL) was mixed and 2 mL was injected slowly at each level targeted. There was minimal resistance on injection. No pain or paresthesia was elicited on injection. The stylet was replaced and the needle was withdrawn intact. This procedure was performed for each of the above indicated levels.     Description of Findings: Not applicable    Prosthetic devices, grafts, tissues, or devices implanted: None    Specimen Removed: No    Estimated Blood Loss: minimal    COMPLICATIONS: None    DISPOSITION / PLANS: The patient was transferred to the recovery area in a stable condition for observation. The patient was reexamined prior to discharge. There was no evidence of acute neurologic injury following the procedure.  Patient was discharged from the recovery room after meeting discharge criteria. Home discharge instructions were given to the patient by the staff.  "

## 2019-07-31 NOTE — DISCHARGE SUMMARY
[facility]  Discharge Note       Description of Procedure: Right L4-5 Lumbar Transforaminal Epidural Steroid Injection under Fluoroscopic Guidance    Procedure Date: 7/31/2019    Admit Date: 7/31/2019  Discharge Date: 7/31/2019     Attending Physician: Yulissa Olmos   Discharge Provider: Yulissa Olmos    Preoperative Diagnosis: Lumbar Spondylosis, Lumbar Radiculopathy     Postoperative Diagnosis: as above, same as preoperative diagnosis    Discharged Condition: Stable    Hospital Course: Patient was admitted for an outpatient procedure. The procedure was tolerated well with no complications.    Final Diagnoses: Same as principal problem.    Disposition: Home, self-care.    Follow up/Patient Instructions:  Follow-up in clinic in 2-3 weeks.    Medications: No medications were prescribed today. The patient was advised to resume normal medication regimen without change.  Specific information was provided regarding restarting any anticoagulant/s.    Discharge Procedure Orders (must include Diet, Follow-up, Activity):  Light activity for the remainder of the day, resume normal activity tomorrow. Resume normal diet. Follow-up in clinic in 2-3 weeks.

## 2019-08-02 ENCOUNTER — TELEPHONE (OUTPATIENT)
Dept: NEUROLOGY | Facility: CLINIC | Age: 59
End: 2019-08-02

## 2019-08-07 ENCOUNTER — PATIENT MESSAGE (OUTPATIENT)
Dept: PAIN MEDICINE | Facility: CLINIC | Age: 59
End: 2019-08-07

## 2019-08-18 ENCOUNTER — PATIENT MESSAGE (OUTPATIENT)
Dept: INTERNAL MEDICINE | Facility: CLINIC | Age: 59
End: 2019-08-18

## 2019-08-19 DIAGNOSIS — G89.29 CHRONIC MIDLINE LOW BACK PAIN WITHOUT SCIATICA: ICD-10-CM

## 2019-08-19 DIAGNOSIS — M54.50 CHRONIC MIDLINE LOW BACK PAIN WITHOUT SCIATICA: ICD-10-CM

## 2019-08-19 DIAGNOSIS — M17.0 PRIMARY OSTEOARTHRITIS OF BOTH KNEES: ICD-10-CM

## 2019-08-19 RX ORDER — HYDROCODONE BITARTRATE AND ACETAMINOPHEN 5; 325 MG/1; MG/1
1 TABLET ORAL EVERY 6 HOURS PRN
Qty: 30 TABLET | Refills: 0 | Status: SHIPPED | OUTPATIENT
Start: 2019-08-19 | End: 2019-09-09 | Stop reason: SDUPTHER

## 2019-08-24 DIAGNOSIS — G89.29 CHRONIC MIDLINE LOW BACK PAIN WITHOUT SCIATICA: ICD-10-CM

## 2019-08-24 DIAGNOSIS — M54.50 CHRONIC MIDLINE LOW BACK PAIN WITHOUT SCIATICA: ICD-10-CM

## 2019-08-26 RX ORDER — DIAZEPAM 5 MG/1
5 TABLET ORAL DAILY PRN
Qty: 30 TABLET | Refills: 1 | Status: SHIPPED | OUTPATIENT
Start: 2019-08-26 | End: 2019-10-15 | Stop reason: SDUPTHER

## 2019-09-09 ENCOUNTER — PATIENT MESSAGE (OUTPATIENT)
Dept: INTERNAL MEDICINE | Facility: CLINIC | Age: 59
End: 2019-09-09

## 2019-09-09 DIAGNOSIS — M17.0 PRIMARY OSTEOARTHRITIS OF BOTH KNEES: ICD-10-CM

## 2019-09-09 DIAGNOSIS — G89.29 CHRONIC MIDLINE LOW BACK PAIN WITHOUT SCIATICA: ICD-10-CM

## 2019-09-09 DIAGNOSIS — M54.50 CHRONIC MIDLINE LOW BACK PAIN WITHOUT SCIATICA: ICD-10-CM

## 2019-09-09 DIAGNOSIS — M54.31 SCIATICA OF RIGHT SIDE: ICD-10-CM

## 2019-09-09 RX ORDER — HYDROCODONE BITARTRATE AND ACETAMINOPHEN 5; 325 MG/1; MG/1
1 TABLET ORAL EVERY 6 HOURS PRN
Qty: 30 TABLET | Refills: 0 | Status: SHIPPED | OUTPATIENT
Start: 2019-09-09 | End: 2019-09-11

## 2019-09-09 RX ORDER — GABAPENTIN 300 MG/1
300 CAPSULE ORAL 3 TIMES DAILY
Qty: 90 CAPSULE | Refills: 3 | Status: SHIPPED | OUTPATIENT
Start: 2019-09-09 | End: 2019-12-02 | Stop reason: SDUPTHER

## 2019-09-09 RX ORDER — GABAPENTIN 300 MG/1
300 CAPSULE ORAL 3 TIMES DAILY
Qty: 90 CAPSULE | Refills: 3 | OUTPATIENT
Start: 2019-09-09 | End: 2020-09-08

## 2019-09-09 RX ORDER — HYDROCODONE BITARTRATE AND ACETAMINOPHEN 5; 325 MG/1; MG/1
1 TABLET ORAL EVERY 6 HOURS PRN
Qty: 30 TABLET | Refills: 0 | OUTPATIENT
Start: 2019-09-09

## 2019-09-11 ENCOUNTER — OFFICE VISIT (OUTPATIENT)
Dept: INTERNAL MEDICINE | Facility: CLINIC | Age: 59
End: 2019-09-11
Payer: OTHER GOVERNMENT

## 2019-09-11 VITALS
DIASTOLIC BLOOD PRESSURE: 82 MMHG | SYSTOLIC BLOOD PRESSURE: 126 MMHG | WEIGHT: 275 LBS | HEART RATE: 91 BPM | OXYGEN SATURATION: 97 % | BODY MASS INDEX: 37.25 KG/M2 | HEIGHT: 72 IN | TEMPERATURE: 97 F

## 2019-09-11 DIAGNOSIS — M54.31 SCIATICA OF RIGHT SIDE: Primary | ICD-10-CM

## 2019-09-11 DIAGNOSIS — Z23 NEED FOR PROPHYLACTIC VACCINATION AND INOCULATION AGAINST INFLUENZA: ICD-10-CM

## 2019-09-11 DIAGNOSIS — E66.9 OBESITY (BMI 30-39.9): ICD-10-CM

## 2019-09-11 PROCEDURE — 90471 IMMUNIZATION ADMIN: CPT | Mod: PBBFAC,PN

## 2019-09-11 PROCEDURE — 99214 OFFICE O/P EST MOD 30 MIN: CPT | Mod: S$PBB,25,, | Performed by: INTERNAL MEDICINE

## 2019-09-11 PROCEDURE — 99999 PR PBB SHADOW E&M-EST. PATIENT-LVL IV: CPT | Mod: PBBFAC,,, | Performed by: INTERNAL MEDICINE

## 2019-09-11 PROCEDURE — 99999 PR PBB SHADOW E&M-EST. PATIENT-LVL IV: ICD-10-PCS | Mod: PBBFAC,,, | Performed by: INTERNAL MEDICINE

## 2019-09-11 PROCEDURE — 99214 OFFICE O/P EST MOD 30 MIN: CPT | Mod: PBBFAC,PN | Performed by: INTERNAL MEDICINE

## 2019-09-11 PROCEDURE — 99214 PR OFFICE/OUTPT VISIT, EST, LEVL IV, 30-39 MIN: ICD-10-PCS | Mod: S$PBB,25,, | Performed by: INTERNAL MEDICINE

## 2019-09-11 RX ORDER — HYDROCODONE BITARTRATE AND ACETAMINOPHEN 10; 325 MG/1; MG/1
1 TABLET ORAL 3 TIMES DAILY PRN
Qty: 90 TABLET | Refills: 0 | Status: SHIPPED | OUTPATIENT
Start: 2019-09-11 | End: 2019-09-24 | Stop reason: SDUPTHER

## 2019-09-11 NOTE — PROGRESS NOTES
Subjective:       Patient ID: Baldo Grant is a 59 y.o. male.    Chief Complaint: Back Pain (here to discuss long term option of back pain with MD )    HPI  Pt with persistent R sciatica despite 2 epidural steroid injections.  Stumbles odd but no falls.  No incontinence.  Weight climbing.  Review of Systems    Objective:      Physical Exam   Constitutional: He appears well-developed. No distress.   obese   HENT:   Head: Normocephalic.   Eyes: EOM are normal. No scleral icterus.   Neck: Normal range of motion. No tracheal deviation present.   Cardiovascular: Normal rate, regular rhythm and intact distal pulses.   Pulmonary/Chest: Effort normal. No respiratory distress.   Abdominal: He exhibits no distension.   Musculoskeletal: Normal range of motion. He exhibits no edema.   Neurological: He is alert.   4/5 strength R quads and hamstrings  No DTRs patellar, Achilles' bilat   Skin: Skin is warm and dry. No rash noted. He is not diaphoretic. No erythema.   Psychiatric: He has a normal mood and affect. His behavior is normal.   Vitals reviewed.      Assessment:       1. Sciatica of right side    2. Obesity (BMI 30-39.9)    3. Need for prophylactic vaccination and inoculation against influenza        Plan:       Baldo was seen today for back pain.    Diagnoses and all orders for this visit:    Sciatica of right side  -     HYDROcodone-acetaminophen (NORCO)  mg per tablet; Take 1 tablet by mouth 3 (three) times daily as needed for Pain (pain).  -     Ambulatory referral to Neurosurgery  -     Ambulatory Referral to Physical/Occupational Therapy    Obesity (BMI 30-39.9)   Monitor    Need for prophylactic vaccination and inoculation against influenza  -     Influenza - Quadrivalent (3 years & older) (PF)      Follow up if symptoms worsen or fail to improve.

## 2019-09-12 ENCOUNTER — TELEPHONE (OUTPATIENT)
Dept: INTERNAL MEDICINE | Facility: CLINIC | Age: 59
End: 2019-09-12

## 2019-09-17 ENCOUNTER — PATIENT MESSAGE (OUTPATIENT)
Dept: INTERNAL MEDICINE | Facility: CLINIC | Age: 59
End: 2019-09-17

## 2019-09-17 DIAGNOSIS — M54.31 SCIATICA OF RIGHT SIDE: ICD-10-CM

## 2019-09-17 NOTE — TELEPHONE ENCOUNTER
Dr. Vazquez  Pharmacy will not fill opoid medication unless you put medically necessary for more than 7 days.  Please resend.

## 2019-09-18 ENCOUNTER — PATIENT MESSAGE (OUTPATIENT)
Dept: INTERNAL MEDICINE | Facility: CLINIC | Age: 59
End: 2019-09-18

## 2019-09-18 RX ORDER — HYDROCODONE BITARTRATE AND ACETAMINOPHEN 10; 325 MG/1; MG/1
1 TABLET ORAL 3 TIMES DAILY PRN
Qty: 90 TABLET | Refills: 0 | OUTPATIENT
Start: 2019-09-18 | End: 2019-10-18

## 2019-09-22 DIAGNOSIS — M25.512 CHRONIC LEFT SHOULDER PAIN: ICD-10-CM

## 2019-09-22 DIAGNOSIS — G89.29 CHRONIC LEFT SHOULDER PAIN: ICD-10-CM

## 2019-09-23 RX ORDER — MELOXICAM 15 MG/1
TABLET ORAL
Qty: 90 TABLET | Refills: 4 | Status: SHIPPED | OUTPATIENT
Start: 2019-09-23 | End: 2020-11-02 | Stop reason: SDUPTHER

## 2019-09-24 ENCOUNTER — PROCEDURE VISIT (OUTPATIENT)
Dept: NEUROLOGY | Facility: CLINIC | Age: 59
End: 2019-09-24
Payer: OTHER GOVERNMENT

## 2019-09-24 DIAGNOSIS — E66.9 OBESITY (BMI 30-39.9): ICD-10-CM

## 2019-09-24 DIAGNOSIS — M54.41 CHRONIC RIGHT-SIDED LOW BACK PAIN WITH RIGHT-SIDED SCIATICA: Primary | ICD-10-CM

## 2019-09-24 DIAGNOSIS — M54.31 SCIATICA OF RIGHT SIDE: ICD-10-CM

## 2019-09-24 DIAGNOSIS — E66.01 MORBID OBESITY: ICD-10-CM

## 2019-09-24 DIAGNOSIS — M75.22 BICEPS TENDINITIS ON LEFT: ICD-10-CM

## 2019-09-24 DIAGNOSIS — M51.36 DEGENERATIVE DISC DISEASE, LUMBAR: ICD-10-CM

## 2019-09-24 DIAGNOSIS — M17.32 POST-TRAUMATIC OSTEOARTHRITIS OF LEFT KNEE: ICD-10-CM

## 2019-09-24 DIAGNOSIS — G89.29 CHRONIC RIGHT-SIDED LOW BACK PAIN WITH RIGHT-SIDED SCIATICA: Primary | ICD-10-CM

## 2019-09-24 DIAGNOSIS — M75.122 COMPLETE TEAR OF LEFT ROTATOR CUFF: ICD-10-CM

## 2019-09-24 DIAGNOSIS — M54.16 LUMBAR RADICULOPATHY: ICD-10-CM

## 2019-09-24 DIAGNOSIS — M17.0 PRIMARY OSTEOARTHRITIS OF BOTH KNEES: ICD-10-CM

## 2019-09-24 DIAGNOSIS — M47.816 LUMBAR SPONDYLOSIS: ICD-10-CM

## 2019-09-24 PROCEDURE — 95886 MUSC TEST DONE W/N TEST COMP: CPT | Mod: 26,S$PBB,, | Performed by: PSYCHIATRY & NEUROLOGY

## 2019-09-24 PROCEDURE — 95886 MUSC TEST DONE W/N TEST COMP: CPT | Mod: PBBFAC | Performed by: PSYCHIATRY & NEUROLOGY

## 2019-09-24 PROCEDURE — 95910 NRV CNDJ TEST 7-8 STUDIES: CPT | Mod: PBBFAC | Performed by: PSYCHIATRY & NEUROLOGY

## 2019-09-24 PROCEDURE — 95910 NRV CNDJ TEST 7-8 STUDIES: CPT | Mod: 26,S$PBB,, | Performed by: PSYCHIATRY & NEUROLOGY

## 2019-09-24 PROCEDURE — 95910 PR NERVE CONDUCTION STUDY; 7-8 STUDIES: ICD-10-PCS | Mod: 26,S$PBB,, | Performed by: PSYCHIATRY & NEUROLOGY

## 2019-09-24 PROCEDURE — 95886 PR EMG COMPLETE, W/ NERVE CONDUCTION STUDIES, 5+ MUSCLES: ICD-10-PCS | Mod: 26,S$PBB,, | Performed by: PSYCHIATRY & NEUROLOGY

## 2019-09-24 RX ORDER — HYDROCODONE BITARTRATE AND ACETAMINOPHEN 10; 325 MG/1; MG/1
1 TABLET ORAL 3 TIMES DAILY PRN
Qty: 90 TABLET | Refills: 0 | Status: SHIPPED | OUTPATIENT
Start: 2019-09-24 | End: 2019-10-24

## 2019-09-24 NOTE — PROCEDURES
[facility]   Juan Hampton  Department of Neurology  [address] [place] [address] [place]  [address]  Phone [phone]     Fax  [phone]        Patient: Rudy Bland YOB: 1960  Patient ID: [id number] Age: 59 Years 5 Months  Sex: Male Ref. Provider: Eugene Vallejo MD  Notes: BLE/LBP with numbness and tingling radiating down right leg, gait problem. MRI L-Spine: L4-5 and L5-S1 DDD with disc bulging osteophyte with right L4-5 and L5-S1 foraminal stenosis.      SUMMARY      Nerve conduction studies were performed in the right lower and left lower extremities.  The right peroneal motor study recording the extensor digitorum brevis showed a normal amplitude, normal distal latency and normal conduction velocity.  No conduction block or focal slowing was present across the fibular neck. The right tibial motor study recording the abductor hallucis brevis showed a normal amplitude, normal distal latency and normal conduction velocity.     The left peroneal motor study recording the extensor digitorum brevis showed a normal amplitude, normal distal latency and normal conduction velocity.  No conduction block or focal slowing was present across the fibular neck. The left tibial motor study recording the abductor hallucis brevis showed a normal amplitude, normal distal latency and normal conduction velocity.     Right sural sensory response showed a normal amplitude and conduction velocity.  Right superficial peroneal sensory response could not be detected due to technical reasons. Left sural sensory response showed a normal amplitude and conduction velocity.  The right and left sural amplitudes were symmetric. Left superficial peroneal sensory response showed a normal amplitude and conduction velocity.     Needle EMG of the right and left lower extremities was done. Needed EMG exam of the lumbosacral paraspinal muscles was not performed due to recent intervention.     In the right lower  extremity, no active denervation was present in any muscle. Motor unit potentials were large and polyphasic with reduced recruitment in the tibialis anterior, flexor digitorum longus, extensor hallucis longus, tibialis posterior and tensor fascia latae muscles.     In the left lower extremity, no active denervation was present in any muscle. Motor unit potentials were normal in the muscles sampled.                        IMPRESSION     There is electrophysiologic evidence consistent with a mild, chronic, right L5 radiculopathy.       In addition, there is no electrophysiologic evidence of lumbosacral plexopathy, peroneal neuropathy or peripheral neuropathy in the right or left lower extremity.                        ---------------------------------               Maranda Jain M.D., F.A.A.N.      Diplomate, American Board of Psychiatry and Neurology  Diplomate, American Board of Clinical Neurophysiology   Fellow, American Academy of Neurology       Sensory NCS      Nerve / Sites Rec. Site Onset Peak NP Amp PP Amp Dist Xavier d Lat.2     ms ms µV µV cm m/s ms   R SURAL - Lat Mall      Calf Lat Mall 2.81 3.70 5.4 3.7 14 49.8 3.70      Ref.   4.60  4.0  40.0    L SURAL - Lat Mall      Calf Lat Mall 3.02 3.91 7.2 4.6 14 46.3 3.91      Ref.   4.60  4.0  40.0    R SUP PERONEAL      Lat Leg Ankle NR NR NR NR 10 NR NR      Ref.   4.60  4.0  40.0    L SUP PERONEAL      Lat Leg Ankle 3.33 3.96 4.2 3.4 10 30.0 3.96      Ref.   4.60  4.0  40.0        Motor NCS      Nerve / Sites Rec. Site Lat Amp Dist Xavier     ms mV cm m/s   R COMM PERONEAL - EDB      Ankle EDB 3.75 4.4 8       Ref.  6.00 2.5        FibHead EDB 11.93 3.7 38 46.5      Ref.     40.0      Knee EDB 13.28 3.7 10 73.8   L COMM PERONEAL - EDB      Ankle EDB 4.32 4.9 8       Ref.  6.00 2.5        FibHead EDB 12.14 3.9 37 47.4      Ref.     40.0      Knee EDB 13.91 3.4 10 56.5   R TIBIAL (KNEE) - AH      Ankle AH 3.28 10.4 8       Ref.  6.00 4.0        Knee AH 14.11 8.5 40  36.9      Ref.     40.0   L TIBIAL (KNEE) - AH      Ankle AH 4.01 11.7 8       Ref.  6.00 4.0        Knee AH 14.43 9.0 42 40.3      Ref.     40.0                       EMG Summary Table     Spontaneous MUAP Recruitment    IA Fib PSW Fasc Other Amp Dur. PPP Pattern   L. ADD COLLIN L2-3-4 N None None None - N N N N   R. ADD COLLIN L2-3-4 N None None None - N N N N   L. EXT BOOKER LONG L5, S1 N None None None - N N N N   R. EXT BOOKER LONG L5, S1 N None None None - 1+ 1+ 1+ Reduced   L. GASTROCN MED S1-2 N None None None - N N N N   R. GASTROCN MED S1-2 N None None None - N N N N   L. TIB ANTERIOR L4-5 N None None None - N N N N   R. TIB ANTERIOR L4-5 N None None None - 1+ 1+ 1+ Reduced   L. TIB POSTERIOR L5, S1 N None None None - N N N N   R. TIB POSTERIOR L5, S1 N None None None - 1+ 1+ 1+ Reduced   L. SOLEUS S1-2 N None None None - N N N N   R. SOLEUS S1-2 N None None None - N N N N   L. VAST MEDIALIS L4 N None None None - N N N N   R. VAST MEDIALIS L4 N None None None - N N N N   L. T FASCIA MITCH N None None None - N N N N   R. T FASCIA MITCH N None None None - 1+ 1+ 1+ Reduced

## 2019-09-25 ENCOUNTER — OFFICE VISIT (OUTPATIENT)
Dept: NEUROSURGERY | Facility: CLINIC | Age: 59
End: 2019-09-25
Payer: OTHER GOVERNMENT

## 2019-09-25 ENCOUNTER — TELEPHONE (OUTPATIENT)
Dept: NEUROSURGERY | Facility: CLINIC | Age: 59
End: 2019-09-25

## 2019-09-25 VITALS
BODY MASS INDEX: 38.13 KG/M2 | WEIGHT: 281.5 LBS | DIASTOLIC BLOOD PRESSURE: 82 MMHG | HEIGHT: 72 IN | SYSTOLIC BLOOD PRESSURE: 136 MMHG

## 2019-09-25 DIAGNOSIS — G89.29 CHRONIC BACK PAIN GREATER THAN 3 MONTHS DURATION: ICD-10-CM

## 2019-09-25 DIAGNOSIS — M47.817 FACET ARTHROPATHY, LUMBOSACRAL: ICD-10-CM

## 2019-09-25 DIAGNOSIS — M54.16 LUMBAR RADICULOPATHY: ICD-10-CM

## 2019-09-25 DIAGNOSIS — E66.01 MORBID OBESITY: ICD-10-CM

## 2019-09-25 DIAGNOSIS — M54.9 CHRONIC BACK PAIN GREATER THAN 3 MONTHS DURATION: ICD-10-CM

## 2019-09-25 DIAGNOSIS — M51.36 DDD (DEGENERATIVE DISC DISEASE), LUMBAR: ICD-10-CM

## 2019-09-25 DIAGNOSIS — M47.816 LUMBAR SPONDYLOSIS: Primary | ICD-10-CM

## 2019-09-25 PROCEDURE — 99213 PR OFFICE/OUTPT VISIT, EST, LEVL III, 20-29 MIN: ICD-10-PCS | Mod: S$PBB,,, | Performed by: PHYSICIAN ASSISTANT

## 2019-09-25 PROCEDURE — 99999 PR PBB SHADOW E&M-EST. PATIENT-LVL IV: CPT | Mod: PBBFAC,,, | Performed by: PHYSICIAN ASSISTANT

## 2019-09-25 PROCEDURE — 99213 OFFICE O/P EST LOW 20 MIN: CPT | Mod: S$PBB,,, | Performed by: PHYSICIAN ASSISTANT

## 2019-09-25 PROCEDURE — 99214 OFFICE O/P EST MOD 30 MIN: CPT | Mod: PBBFAC,PN | Performed by: PHYSICIAN ASSISTANT

## 2019-09-25 PROCEDURE — 99999 PR PBB SHADOW E&M-EST. PATIENT-LVL IV: ICD-10-PCS | Mod: PBBFAC,,, | Performed by: PHYSICIAN ASSISTANT

## 2019-09-25 NOTE — PROGRESS NOTES
History & Physical    SUBJECTIVE:     Chief Complaint:  Low back pain    History of Present Illness:  Baldo Grant is 59 y.o.  male with history of obesity (BMI 38.18), bilateral knee arthropathy/pain,  squamous cell carcinoma, basal cell carcinoma, and nonsmoker who presents for neurosurgical evaluation, referred by Dr. Vazquez.  He was previously seen by Dr. Vallejo ([facility]); patient would like to transfer care to [facility] since its closer to his home.      Patient reports of back injury 05/2008 while in [employer]; he was carrying a shoulder and stepped into hole.  At that time, he was diagnosed with 2 lumbar disc herniation and was treated conservatively with p.o. pain regimen.  He has been on chronic opioids since then.  Early May 2019, he bent over to  his laptop from his book sack and developed excruciating low back pain with intermittent pain radiating into right groin/testicles and down his right leg into his right big toe.  He has intermittent numbness is right anterior thigh.  Low back pain is constant aching but right leg pain is intermittent.  Back pain greater than leg pain.  pain that worsens with prolonged standing/walking > 5 min, bending, or with increased physical activity (like washing dishes). He is tries previous PT and chiropractic treatment with no significant relief.  He underwent 2 lumbar epidural injection earlier this year with temporary relief.  He is currently taking gabapentin 300 mg t.i.d., Valium, meloxicam, and hydrocodone 10 mg daily for his back pain.    Of note, patient has updated physical therapy scheduled for October 9th.        Previous pain procedure:  L4-5, L5-S1 transforaminal epidural injection 05/30/2019 with 50% relief for 4 weeks  Right L4-5 transforaminal epidural injection 07/31/2019 with 30% relief for 3 weeks    Low Back Pain Scale  R Low Back-Pain Score: 5  R Low Back-Pain Intensity: Pain killers give moderate relief from  pain  R Low Back-Pain Score: It is painful to look after myself and I am slow and careful  Low Back-Lifting: Pain prevents me from lifting heavy weights  but I can manage light weights if they are conveniently placed   Low Back-Walking: Pain prevents me walking more than .25 mile   Low Back-Sitting: I can sit only in my favorite chair as long as I like   Low Back-Standing: I cannot stand for longer than 10 minutes with increasing pain   Low Back-Sleeping: Because of pain my normal nights sleep is reduced by less than one quarter   Low Back-Social Life: Pain has no significant effect on my social life apart from limiting my more en   Low Back-Traveling: I have extra pain while traveling but it does not compel me to seek alternate forms of travel   Low Back-Changing Degree of Pain: My pain is gradually worsening           Review of patient's allergies indicates:   Allergen Reactions    Advil [ibuprofen]        Current Outpatient Medications   Medication Sig Dispense Refill    desonide (DESOWEN) 0.05 % cream Thin film to AA eyelids bid PRN flare 60 g 3    diazePAM (VALIUM) 5 MG tablet Take 1 tablet (5 mg total) by mouth daily as needed. 30 tablet 1    fexofenadine (ALLEGRA) 180 MG tablet Take 180 mg by mouth continuous prn.      gabapentin (NEURONTIN) 300 MG capsule Take 1 capsule (300 mg total) by mouth 3 (three) times daily. 90 capsule 3    HYDROcodone-acetaminophen (NORCO)  mg per tablet Take 1 tablet by mouth 3 (three) times daily as needed for Pain (pain). 90 tablet 0    meloxicam (MOBIC) 15 MG tablet TAKE 1 TABLET(15 MG) BY MOUTH EVERY DAY 90 tablet 4    calcipotriene-betamethasone (ENSTILAR) 0.005-0.064 % Foam Apply 1 application topically once daily. 60 g 2    diphenhydrAMINE (BENADRYL) 25 mg capsule Take 25 mg by mouth every 6 (six) hours as needed for Itching.      epinephrine (EPIPEN) 0.3 mg/0.3 mL AtIn Inject 0.3 mLs (0.3 mg total) into the muscle once. 1 Device 1    lidocaine HCL 2%  (XYLOCAINE) 2 % jelly Apply topically as needed. (Patient not taking: Reported on 9/25/2019) 30 mL 3     No current facility-administered medications for this visit.        Past Medical History:   Diagnosis Date    Basal cell carcinoma 06/21/2019    back    Cancer     Chronic pain of right knee     SCC (squamous cell carcinoma) 2014    Forehead- Dr. Cabral     Skin cancer     Squamous cell carcinoma 2013    Right ear- Dr. Marianna long     Past Surgical History:   Procedure Laterality Date    APPENDECTOMY      COLONOSCOPY  1998    lipoma removal      skin cancer removal       Family History     Problem Relation (Age of Onset)    Alcohol abuse Mother    COPD Mother    Heart disease Father    Melanoma Father    No Known Problems Sister, Brother        Social History     Socioeconomic History    Marital status:      Spouse name: Not on file    Number of children: Not on file    Years of education: Not on file    Highest education level: Not on file   Occupational History    Not on file   Social Needs    Financial resource strain: Not very hard    Food insecurity:     Worry: Never true     Inability: Never true    Transportation needs:     Medical: No     Non-medical: No   Tobacco Use    Smoking status: Never Smoker    Smokeless tobacco: Never Used   Substance and Sexual Activity    Alcohol use: Yes     Frequency: 2-4 times a month     Drinks per session: 1 or 2     Binge frequency: Never     Comment: social    Drug use: No    Sexual activity: Yes     Partners: Female   Lifestyle    Physical activity:     Days per week: 0 days     Minutes per session: 0 min    Stress: Only a little   Relationships    Social connections:     Talks on phone: More than three times a week     Gets together: Once a week     Attends Gnosticist service: Not on file     Active member of club or organization: Yes     Attends meetings of clubs or organizations: More than 4 times per year     Relationship  status:    Other Topics Concern    Not on file   Social History Narrative    Not on file       Review of Systems:  Review of Systems    Constitutional: no fever, chills or night sweats. No changes in weight   Eyes: no visual changes   ENT: no nasal congestion or sore throat   Respiratory: no cough or shortness of breath   Cardiovascular: no chest pain or palpitations   Gastrointestinal: no nausea or vomiting   Genitourinary: no hematuria or dysuria   Integument/Breast: no rash or pruritis   Hematologic/Lymphatic: no easy bruising or lymphadenopathy   Musculoskeletal: no arthralgias or myalgias.  Positive for chronic low back pain.  Positive for intermittent right leg pain/paresthesia.  Neurological: no seizures or tremors.   Behavioral/Psych: no auditory or visual hallucinations   Endocrine: no heat or cold intolerance       OBJECTIVE:     Vital Signs  Pain Score:   5  Height: 6' (182.9 cm)  Weight: 127.7 kg (281 lb 8.4 oz)  Body mass index is 38.18 kg/m².      Physical Exam:  Neurosurgery Physical Exam    General: well developed, well nourished, no distress.  Morbidly obese.  Neurologic: Awake, alert and oriented x3. Thought content appropriate.  GCS: Motor: 6/Verbal: 5/Eyes: 4 GCS Total: 15  Cranial nerves: II-XII grossly intact. PERRLA. EOMI without nystagmus. Face symmetric and sensation intact to light touch, tongue midline, shoulder shrug symmetric bilaterally.  Hearing equal bilaterally to finger rub. Palate and uvula rise and fall normally in midline.    Language: no aphasia  Speech: no dysarthria   Neck: supple, without obvious masses    Sensory: intact to light touch B/L UE and LE  Motor Strength: Moves all extremities spontaneously with good tone. Full strength upper and lower extremities. No abnormal movements seen.    Strength  Deltoids Triceps Biceps Wrist Extension Wrist Flexion Hand    Upper: R 5/5 5/5 5/5 5/5 5/5 5/5    L 5/5 5/5 5/5 5/5 5/5 5/5     Iliopsoas Quadriceps Knee  Flexion  Tibialis  anterior Gastro- cnemius EHL   Lower: R 5/5 5/5 5/5 5/5 5/5 5/5    L 5/5 5/5 5/5 5/5 5/5 5/5     Interossi muscle strength- intact    DTR's - 2 + and symmetric in UE and LE  Olguin's - Negative        Lhermitte's - Negative  Spurlings-   Negative         Ankle Clonus - Negative           Babinski  - Negative     SLR - Negative; tightness on left leg   Gait - severely antalgic gait   Cervical ROM - full; no pain with all    Lumbar ROM - severely limited; pain with all ROM  No focal numbness or weakness  Generalized low back tenderness to palpation  No difficulty transitioning from seated to standing position or vice versa.  ENT: normal hearing with finger rub  Heart: RRR, no cyanosis, pallor, or edema.   Lungs- normal respiratory effort  Abdomen-  soft, symmetric and nontender  Skin: grossly intact in all 4 extremities without obvious rashes or lesions  Extremities: warm with no cyanosis or edema, or clubbing  Pulses: palpable distal pulses    SI Joint tenderness - Negative  Greater Trochanter Joint tenderness - Negative  Storm's Test - Negative   Pain on Hip ROM - Negative      Posture-  No obvious kyphosis with standing posture.  With bending posture, no obvious scapula wing        Diagnostic Results:  All imaging personally reviewed    MRI lumbar spine without contrast  -Lumbar lordosis maintained.  Multilevel degenerative disc disease.  Severe facet arthrosis throughout.  -L4-5 degenerative disc disease and facet arthropathy present; right neural foraminal stenosis secondary to disc disease/herniation and focal annular tear.  -L5-S1 degenerative disc disease and facet arthropathy present with no significant canal or neural foraminal stenosis.    CT lumbar spine without contrast  No significant fracture or subluxation.      Bilateral lower extremity EMG 09/24/2019  IMPRESSION   There is electrophysiologic evidence consistent with a mild, chronic, right L5 radiculopathy.       ASSESSMENT/PLAN:     59  y.o.  male with history of obesity (BMI 38.18), bilateral knee arthropathy/pain,  squamous cell carcinoma, basal cell carcinoma, and nonsmoker who presents for evaluation of acute on chronic low back pain > intermittent L4-5 radiculopathy on right leg.  Imaging with severe facet arthrosis throughout and right L4-5 lateral recess stenosis due to disc herniation.  Patient has already underwent p.o. pain regimen, previous PT, chiropractic, and recent lumbar epidural in transforaminal injection with temporary relief.  He already has physical therapy scheduled for October 9, 2019.  I will refer patient to see Dr. Encarnacion in the next 4-6 weeks to discuss possible lumbar fusion versus MIS decompression.  In the interim, I will refer patient to pain management for L4-5, L5-S1 medial branch block given his severe back pain.  He can continue his p.o. pain regimen per PCP.      All imaging were reviewed with patient and wife. All questions were answered.  Patient verbalized understanding and agreed with the above plan of care.  Patient was encouraged to call clinic with any future concerns prior to follow up appt.     Please call with any questions.        Rad Balbuena PA-C  Neurosurgery      Note dictated with voice recognition software, please excuse any grammatical errors.

## 2019-09-25 NOTE — LETTER
September 25, 2019      Efrain Vazquez MD  [address]  [address]  [facility] [id number]           [facility]  [address], [facility] [address]  [facility] [address]  Phone: [phone]          Patient: Baldo Grant   MR Number: [id number]   YOB: 1960   Date of Visit: 9/25/2019       Dear Dr. Efrain Vazquez:    Thank you for referring Baldo Grant to me for evaluation. Attached you will find relevant portions of my assessment and plan of care.    If you have questions, please do not hesitate to call me. I look forward to following Baldo Grant along with you.    Sincerely,    Rad Balbuena PA-C    Enclosure  CC:  No Recipients    If you would like to receive this communication electronically, please contact [email or web address] or [phone] to request more information on [facility] access.    For providers and/or their staff who would like to refer a patient to [facility], please contact us through our one-stop-shop provider referral line, Anirudh Montanez, at [phone].    If you feel you have received this communication in error or would no longer like to receive these types of communications, please e-mail [email or web address]

## 2019-09-26 ENCOUNTER — TELEPHONE (OUTPATIENT)
Dept: PAIN MEDICINE | Facility: CLINIC | Age: 59
End: 2019-09-26

## 2019-09-26 NOTE — TELEPHONE ENCOUNTER
Called patient to schedule procedure. No answer, no voicemail.       ----- Message from Alexus Anglin Jr., MD sent at 9/26/2019  8:21 AM CDT -----  Please call and schedule patient for bilateral MBB at L4-5 and L5-S1.  Please schedule a follow up appointment with Jeffy within 1 week of the injection.    Thank you,    Aleuxs Anglin Jr, MD  Interventional Pain Medicine / Anesthesiology    ----- Message -----  From: Rad Balbuena PA-C  Sent: 9/25/2019   5:12 PM CDT  To: Alexus Anglin Jr., MD    He knows it's short term relief. This is interim treatment until he sees Dr. Encarnacion to discuss lumbar fusion vs MIS surgery. Thanks!        ----- Message -----  From: Alexus Anglin Jr., MD  Sent: 9/25/2019   4:47 PM CDT  To: Rad Balbuena PA-C    Got you covered.  Does he know that this is diagnostic and likely to provide short-term relief?  Should he follow up with us or you?    Alexus Anglin Jr, MD  Interventional Pain Medicine / Anesthesiology    ----- Message -----  From: Rad Balbuena PA-C  Sent: 9/25/2019   2:01 PM CDT  To: Alexus Anglin Jr., MD    Can you get this patient in for medial branch block to cover L4-5?  He has chronic back pain since 2008 with recent aggravation in May.  Most of his pain is in his low back (75%) and intermittently has pain down his right leg and L4-5 distribution.  He had multiple L4-5, L5-S1 epidural and transforaminal (may-June 2019) in [place] but lives in [place]. Transferring his care here.     He is on meloxicam but no other anticoagulation or blood thinner.    Thank you.  Rad

## 2019-10-09 ENCOUNTER — CLINICAL SUPPORT (OUTPATIENT)
Dept: REHABILITATION | Facility: HOSPITAL | Age: 59
End: 2019-10-09
Attending: INTERNAL MEDICINE
Payer: OTHER GOVERNMENT

## 2019-10-09 DIAGNOSIS — M54.41 ACUTE BILATERAL LOW BACK PAIN WITH RIGHT-SIDED SCIATICA: Primary | ICD-10-CM

## 2019-10-09 DIAGNOSIS — R26.89 DECREASED FUNCTIONAL MOBILITY: ICD-10-CM

## 2019-10-09 DIAGNOSIS — M53.86 DECREASED ROM OF LUMBAR SPINE: ICD-10-CM

## 2019-10-09 DIAGNOSIS — R53.1 DECREASED STRENGTH: ICD-10-CM

## 2019-10-09 PROCEDURE — 97014 ELECTRIC STIMULATION THERAPY: CPT | Mod: PN

## 2019-10-09 PROCEDURE — 97162 PT EVAL MOD COMPLEX 30 MIN: CPT | Mod: PN

## 2019-10-09 NOTE — PLAN OF CARE
[facility]  Physical Therapy Initial Evaluation    Name: Baldo Grant  Clinic Number: [id number]    Therapy Diagnosis:   Encounter Diagnoses   Name Primary?    Acute bilateral low back pain with right-sided sciatica Yes    Decreased functional mobility     Decreased ROM of lumbar spine     Decreased strength      Physician: Efrain Vazquez MD    Physician Orders: PT Eval and Treat   Medical Diagnosis from Referral: M54.31 (ICD-10-CM) - Sciatica of right side   Evaluation Date: 10/9/2019  Authorization Period Expiration: 12/31/2019  Plan of Care Expiration: 10/9/2019 to 12/6/2019  Visit # / Visits authorized: 1/50    Time In: 1116  Time Out: 1200  Total Billable Time: 44 minutes    Precautions: Standard    Subjective     Date of onset: 2008; exacerbation in May 2019    History of current condition - Bill reports: bilateral low back pain radiating into distal R posterolateral thigh and groin occurring in May when bending over to retrieve laptop from booksack in his car. Since onset pain has remained stable. Low back pain worse than radicular pain; with increased low back pain patient reports involuntary spasms in standing. Patient able to control these spasms if he focuses on upright standing. At times patient feels pain in his R big toe. Previous history of 2 lumbar disc herniations while carrying fellow [employer] across field and subsequently stepping into hole.      Medical History:   Past Medical History:   Diagnosis Date    Basal cell carcinoma 06/21/2019    back    Cancer     Chronic pain of right knee     SCC (squamous cell carcinoma) 2014    Forehead- Dr. Cabral     Skin cancer     Squamous cell carcinoma 2013    Right ear- Dr. Cabral [facility]     Surgical History:   Baldo Grant  has a past surgical history that includes Appendectomy; lipoma removal; skin cancer removal; and Colonoscopy (1998).    Medications:   Baldo has a current medication list which includes  the following prescription(s): calcipotriene-betamethasone, desonide, diazepam, diphenhydramine, epinephrine, fexofenadine, gabapentin, hydrocodone-acetaminophen, lidocaine hcl 2%, and meloxicam.    Allergies:   Review of patient's allergies indicates:   Allergen Reactions    Advil [ibuprofen]       Imaging: MRI lumbar spine 5/17/2019: L3-4:  Mild disc desiccation with mild generalized annular bulge.  Mild hypertrophic ligamentum flavum and facet arthritis. L4-5:  Mild disc desiccation with mild annular bulge.  Right foraminal annular fissure.  Mild hypertrophic ligamentum flavum and facet arthritis.  Mild to moderate left and moderate right foraminal stenosis. L5-S1:  Mild degenerative disc disease with disc desiccation and disc bulge/osteophyte.  Mild hypertrophic ligamentum flavum and facet arthritis.  Mild right with mild to moderate left foraminal stenosis.   X-Ray lumbar spine 5/24/2019: Multilevel marginal spurring and facet arthropathy.  Uniform loss of disc height the mid and lower lumbar region.  No fracture identified.  Anterior marginal spurring with equivocal bridging at the T11-12 level.  Prominent facet arthropathy in the lower L-spine.  No subluxation or evidence of instability noted on flexion and extension views.   CT lumbar spine 5/24/2019: L4-5: Moderate bilateral facet arthropathy.  Moderate asymmetric right-sided intervertebral disc space height loss with subchondral cyst formation, and disc osteophyte complex resulting in mild-to-moderate right-sided neural foraminal stenosis. L5-S1: Moderate bilateral facet arthropathy.  Moderate asymmetric left-sided intervertebral disc space height loss with subchondral cyst formation, and disc osteophyte complex resulting in mild-to-moderate left-sided neural foraminal stenosis.     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   Prior Level of Function: Independent  Current Level of Function: Independent; decreased functional mobility and physical activity.    Pain:  Current 5-6/10, worst 10/10  Location: B low back  Description: Sharp  Aggravating Factors: Standing/walking > 5 minutes, sitting>1 hour, bending, pulling, lifting.   Easing Factors: Sitting in recliner or with B LE supported, standing with UE support    Pts goals: Find some relief in low back pain; return to physical activity pain-free to lose weight    Objective     Posture: Decreased lumbar kyphosis, mild forward trunk lean, B shoulder shrug. Spasm increasing anterior to posterior trunk sway in static standing after R hip flexion PROM; patient able to stop occurrence with reports of focusing on relaxation to area with eyes closed.   Palpation: Tenderness to L3-5 spinous processes, B lumbar erector spinae and quadratus lumborum. Tenderness to lumbosacral junction.    Range of Motion/Strength:     Lumbar AROM: Pain/Dysfunction with Movement:   Flexion: Moderate loss Dg's sign on return to neutral; increased B low back pain   Extension: Major loss Increased B low back pain; no change with 3 reps performed   Right side bending: Moderate loss Increased R sided low back pain   Left side bending: Moderate loss Unchanged R sided low back pain after 3 reps performed after R side bending   Right rotation: Minimal loss    Left rotation: Minimal loss      Hip Right Left Pain/Dysfunction with Movement   AROM/PROM      Flexion  90/100  110/120 Increased R sided low back pain with R PROM   Internal rotation  50%/NT  50%/NT Limited secondary to pain from flexion   External rotation  WNL/NT  WNL/NT      Knee Right Left Pain/Dysfunction with Movement   AROM      flexion  WNL  WNL    extension  WNL  WNL      L/E MMT Right Left Pain/Dysfunction with Movement   Hip Flexion 4/5 4/5    Knee  Flexion 5/5 5/5    Knee Extension 5/5 5/5      Gait Analysis: no AD; deviations: antalgic; limp favoring L LE, varying in intensity    CMS Impairment/Limitation/Restriction for FOTO Lumbar Spine Survey    Therapist reviewed FOTO scores for Baldo Grant on 10/9/2019.   FOTO documents entered into [employer] - see Media section.    Limitation Score: 59%  Category: Mobility     TREATMENT     Treatment Time In: 1145  Treatment Time Out: 1200  Total Treatment time separate from Evaluation: 15 minutes    Mario received therapeutic exercises to develop strength for 5 minutes including:  Posterior pelvic tilt with lower abdominal and glute activation    Mario received the following supervised modalities after being cleared for contradictions: IFC Electrical Stimulation:  Baldo received IFC Electrical Stimulation for pain control applied to B lumbar spine for 10 minutes. Pt received stimulation at a frequency of 80/150 Hz (carrier frequency 4000 Hz; vector scan off) for 10 minutes. Baldo tolderated treatment well without any adverse effects.      Mario received hot pack for 10 minutes during IFC to decrease B low back pain.    Home Exercises and Patient Education Provided    Education provided:   - Possible mechanisms underlying pain and impairments; role of therapy to address.  - HEP of posterior pelvic tilt    Written Home Exercises Provided: yes.  Exercises were reviewed and Mario was able to demonstrate them prior to the end of the session.  Mario demonstrated good  understanding of the education provided.     See EMR under Patient Instructions for exercises provided 10/9/2019.    Assessment     Baldo is a 59 y.o. male referred to outpatient Physical Therapy with a medical diagnosis of sciatica of R side. Pt presents to initial evaluation with signs and symptoms consistent with possible lumbar disc dysfunction. Significant increase in B low back and R groin pain with objective testing in hook lying on mat, particularly  after passive R hip range of motion. Limiting further objective testing performed. Pain increase accompanied by spasms in low back in static standing and increased limp during gait. Slight improvement in all following IFC with heat application.     Pt prognosis is Good.  Pt will benefit from skilled outpatient Physical Therapy to address the deficits stated above and in the chart below, provide pt/family education, and to maximize pt's level of independence.     Plan of care discussed with patient: Yes  Pt's spiritual, cultural and educational needs considered and pt agreeable to plan of care and goals as stated below:     Anticipated Barriers for therapy: Chronicity of pain    Medical Necessity is demonstrated by the following  History  Co-morbidities and personal factors that may impact the plan of care Co-morbidities:   Allergies, Arthritis, Back pain, BMI over 30, History of Cancer, Prior Surgery    Personal Factors:   lifestyle-exercise seldom or never     high   Examination  Body Structures and Functions, activity limitations and participation restrictions that may impact the plan of care Body Regions:   back  lower extremities  trunk    Body Systems:    gross symmetry  ROM  strength  gait  transfers  transitions  motor control    Participation Restrictions:   Working    Activity limitations:   Learning and applying knowledge  no deficits    General Tasks and Commands  no deficits    Communication  no deficits    Mobility  lifting and carrying objects  walking    Self care  no deficits    Domestic Life  doing house work    Interactions/Relationships  family relationships    Life Areas  employment    Community and Social Life  community life  recreation and leisure         high   Clinical Presentation evolving clinical presentation with changing clinical characteristics moderate   Decision Making/ Complexity Score: moderate     Goals:  Short Term Goals (4 Weeks):  1. Pt will be compliant with HEP to supplement  PT in decreasing pain with functional mobility.  2. Pt will perform posterior pelvic tilt with back flush against wall and no pain to demonstrate improved core strength.  3. Pt will report absence of spasms in low back during static standing for >/= 3 days to promote QOL.  4. Pt will report pain </=5/10 with sitting >/=1 hour to promote ease of travel for work.   Long Term Goals (8 Weeks):   1. Pt will improve FOTO score to </= 43% limited to decrease perceived limitation with maintaining/changing body position.   2. Pt will perform posterior pelvic tilt + wall squat with back flush against wall and no pain to demonstrate improved core strength.  3. Pt will pull, lift, and carry booksack without pain to promote functional QOL.   4. Pt will report no pain with sitting >/=1 hour to promote ease of travel for work.   5. Pt will participate in lumbar and hip A/PROM testing without pain to promote functional mobility.   6. Pt will report absence of radicular R LE symptoms to promote QOL.     Plan     Plan of care Certification: 10/9/2019 to 12/6/2019.    Outpatient Physical Therapy 18 visits in 60 days to include the following interventions: Electrical Stimulation -, Gait Training, Manual Therapy, Moist Heat/ Ice, Neuromuscular Re-ed, Patient Education, Therapeutic Activites and Therapeutic Exercise.     Nadine Stockton, PT, DPT

## 2019-10-10 PROBLEM — M54.41 ACUTE BILATERAL LOW BACK PAIN WITH RIGHT-SIDED SCIATICA: Status: ACTIVE | Noted: 2019-10-10

## 2019-10-10 PROBLEM — R53.1 DECREASED STRENGTH: Status: ACTIVE | Noted: 2019-10-10

## 2019-10-10 PROBLEM — M53.86 DECREASED ROM OF LUMBAR SPINE: Status: ACTIVE | Noted: 2019-10-10

## 2019-10-10 PROBLEM — R26.89 DECREASED FUNCTIONAL MOBILITY: Status: ACTIVE | Noted: 2019-10-10

## 2019-10-15 DIAGNOSIS — G89.29 CHRONIC MIDLINE LOW BACK PAIN WITHOUT SCIATICA: ICD-10-CM

## 2019-10-15 DIAGNOSIS — M54.50 CHRONIC MIDLINE LOW BACK PAIN WITHOUT SCIATICA: ICD-10-CM

## 2019-10-15 RX ORDER — DIAZEPAM 5 MG/1
5 TABLET ORAL DAILY PRN
Qty: 30 TABLET | Refills: 1 | Status: SHIPPED | OUTPATIENT
Start: 2019-10-15 | End: 2019-12-02 | Stop reason: SDUPTHER

## 2019-10-16 ENCOUNTER — TELEPHONE (OUTPATIENT)
Dept: NEUROSURGERY | Facility: CLINIC | Age: 59
End: 2019-10-16

## 2019-10-16 ENCOUNTER — TELEPHONE (OUTPATIENT)
Dept: PAIN MEDICINE | Facility: CLINIC | Age: 59
End: 2019-10-16

## 2019-10-16 ENCOUNTER — OFFICE VISIT (OUTPATIENT)
Dept: NEUROSURGERY | Facility: CLINIC | Age: 59
End: 2019-10-16
Payer: OTHER GOVERNMENT

## 2019-10-16 VITALS
HEART RATE: 68 BPM | HEIGHT: 72 IN | WEIGHT: 281.06 LBS | SYSTOLIC BLOOD PRESSURE: 125 MMHG | BODY MASS INDEX: 38.07 KG/M2 | DIASTOLIC BLOOD PRESSURE: 81 MMHG

## 2019-10-16 DIAGNOSIS — M47.816 LUMBAR FACET ARTHROPATHY: Primary | ICD-10-CM

## 2019-10-16 DIAGNOSIS — M47.27 SPONDYLOSIS OF LUMBOSACRAL SPINE WITH RADICULOPATHY: ICD-10-CM

## 2019-10-16 PROCEDURE — 99213 PR OFFICE/OUTPT VISIT, EST, LEVL III, 20-29 MIN: ICD-10-PCS | Mod: S$PBB,,, | Performed by: NEUROLOGICAL SURGERY

## 2019-10-16 PROCEDURE — 99213 OFFICE O/P EST LOW 20 MIN: CPT | Mod: S$PBB,,, | Performed by: NEUROLOGICAL SURGERY

## 2019-10-16 PROCEDURE — 99999 PR PBB SHADOW E&M-EST. PATIENT-LVL IV: CPT | Mod: PBBFAC,,, | Performed by: NEUROLOGICAL SURGERY

## 2019-10-16 PROCEDURE — 99999 PR PBB SHADOW E&M-EST. PATIENT-LVL IV: ICD-10-PCS | Mod: PBBFAC,,, | Performed by: NEUROLOGICAL SURGERY

## 2019-10-16 PROCEDURE — 99214 OFFICE O/P EST MOD 30 MIN: CPT | Mod: PBBFAC,PN | Performed by: NEUROLOGICAL SURGERY

## 2019-10-16 NOTE — PROGRESS NOTES
NEUROSURGICAL PROGRESS NOTE    DATE OF SERVICE:  10/16/2019    ATTENDING PHYSICIAN:  Tam Encarnacion MD    SUBJECTIVE:    INTERIM HISTORY:    This is a very pleasant 59 y.o. male, who has been having chronic low back pain since 2008 when he was in the .  His back pain was controlled with occasional narcotic usage and physical therapy.  Starting in May 2019 his low back pain has flared up.  The pain is felt in the low back and radiates in the buttock bilaterally worse on the right side.  Pain is worse with prolonged sitting or when leaning forward and lifting.  He reports to severe episode of stabbing back pain after bending forward.  He has received transforaminal epidural steroid injections with partial pain relief.  He just started physical therapy.  He has to travel for work and needs to sit for prolonged when he is flying for traveling.             PAST MEDICAL HISTORY:  Active Ambulatory Problems     Diagnosis Date Noted    Primary osteoarthritis of both knees 09/23/2015    Chronic right-sided low back pain with right-sided sciatica 10/26/2015    Complete tear of left rotator cuff 03/07/2016    Biceps tendinitis on left 03/07/2016    Post-traumatic osteoarthritis of left knee 10/05/2018    Morbid obesity 11/30/2018    Lumbar spondylosis 05/30/2019    Lumbar radiculopathy 07/31/2019    Obesity (BMI 30-39.9) 09/11/2019    Acute bilateral low back pain with right-sided sciatica 10/10/2019    Decreased functional mobility 10/10/2019    Decreased ROM of lumbar spine 10/10/2019    Decreased strength 10/10/2019     Resolved Ambulatory Problems     Diagnosis Date Noted    Left shoulder pain 03/07/2016     Past Medical History:   Diagnosis Date    Basal cell carcinoma 06/21/2019    Cancer     Chronic pain of right knee     SCC (squamous cell carcinoma) 2014    Skin cancer     Squamous cell carcinoma 2013       PAST SURGICAL HISTORY:  Past Surgical History:   Procedure Laterality Date     APPENDECTOMY      COLONOSCOPY  1998    lipoma removal      skin cancer removal         SOCIAL HISTORY:   Social History     Socioeconomic History    Marital status:      Spouse name: Not on file    Number of children: Not on file    Years of education: Not on file    Highest education level: Not on file   Occupational History    Not on file   Social Needs    Financial resource strain: Not very hard    Food insecurity:     Worry: Never true     Inability: Never true    Transportation needs:     Medical: No     Non-medical: No   Tobacco Use    Smoking status: Never Smoker    Smokeless tobacco: Never Used   Substance and Sexual Activity    Alcohol use: Yes     Frequency: 2-4 times a month     Drinks per session: 1 or 2     Binge frequency: Never     Comment: social    Drug use: No    Sexual activity: Yes     Partners: Female   Lifestyle    Physical activity:     Days per week: 0 days     Minutes per session: 0 min    Stress: Only a little   Relationships    Social connections:     Talks on phone: More than three times a week     Gets together: Once a week     Attends Synagogue service: Not on file     Active member of club or organization: Yes     Attends meetings of clubs or organizations: More than 4 times per year     Relationship status:    Other Topics Concern    Not on file   Social History Narrative    Not on file       FAMILY HISTORY:  Family History   Problem Relation Age of Onset    COPD Mother     Alcohol abuse Mother     Heart disease Father     Melanoma Father     No Known Problems Sister     No Known Problems Brother     Psoriasis Neg Hx     Lupus Neg Hx     Eczema Neg Hx        CURRENTS MEDICATIONS:  Current Outpatient Medications on File Prior to Visit   Medication Sig Dispense Refill    gabapentin (NEURONTIN) 300 MG capsule Take 1 capsule (300 mg total) by mouth 3 (three) times daily. 90 capsule 3    HYDROcodone-acetaminophen (NORCO)  mg per tablet  Take 1 tablet by mouth 3 (three) times daily as needed for Pain (pain). 90 tablet 0    meloxicam (MOBIC) 15 MG tablet TAKE 1 TABLET(15 MG) BY MOUTH EVERY DAY 90 tablet 4    calcipotriene-betamethasone (ENSTILAR) 0.005-0.064 % Foam Apply 1 application topically once daily. (Patient not taking: Reported on 10/16/2019) 60 g 2    desonide (DESOWEN) 0.05 % cream Thin film to AA eyelids bid PRN flare (Patient not taking: Reported on 10/16/2019) 60 g 3    diazePAM (VALIUM) 5 MG tablet Take 1 tablet (5 mg total) by mouth daily as needed. (Patient not taking: Reported on 10/16/2019) 30 tablet 1    diphenhydrAMINE (BENADRYL) 25 mg capsule Take 25 mg by mouth every 6 (six) hours as needed for Itching.      epinephrine (EPIPEN) 0.3 mg/0.3 mL AtIn Inject 0.3 mLs (0.3 mg total) into the muscle once. 1 Device 1    fexofenadine (ALLEGRA) 180 MG tablet Take 180 mg by mouth continuous prn.      lidocaine HCL 2% (XYLOCAINE) 2 % jelly Apply topically as needed. (Patient not taking: Reported on 9/25/2019) 30 mL 3     No current facility-administered medications on file prior to visit.        ALLERGIES:  Review of patient's allergies indicates:   Allergen Reactions    Advil [ibuprofen]        REVIEW OF SYSTEMS:  Review of Systems   Constitutional: Negative for diaphoresis, fever and weight loss.   Respiratory: Negative for shortness of breath.    Cardiovascular: Negative for chest pain.   Gastrointestinal: Negative for blood in stool.   Genitourinary: Negative for hematuria.   Endo/Heme/Allergies: Does not bruise/bleed easily.   All other systems reviewed and are negative.        OBJECTIVE:    PHYSICAL EXAMINATION:   Vitals:    10/16/19 1404   BP: 125/81   Pulse: 68       Physical Exam:  Vitals reviewed.    Constitutional: He appears well-developed and well-nourished.     Eyes: Pupils are equal, round, and reactive to light. Conjunctivae and EOM are normal.     Cardiovascular: Normal distal pulses and no edema.     Abdominal:  Soft.     Skin: Skin displays no rash on trunk and no rash on extremities. Skin displays no lesions on trunk and no lesions on extremities.     Psych/Behavior: He is alert. He is oriented to person, place, and time. He has a normal mood and affect.     Musculoskeletal:        Neck: Range of motion is full.     Neurological:        DTRs: Tricep reflexes are 2+ on the right side and 2+ on the left side. Bicep reflexes are 2+ on the right side and 2+ on the left side. Brachioradialis reflexes are 2+ on the right side and 2+ on the left side. Patellar reflexes are 2+ on the right side and 2+ on the left side. Achilles reflexes are 2+ on the right side and 2+ on the left side.       Back Exam     Tenderness   The patient is experiencing tenderness in the lumbar.    Range of Motion   Extension: abnormal   Flexion: abnormal   Lateral bend right: abnormal   Lateral bend left: abnormal   Rotation right: abnormal   Rotation left: abnormal     Muscle Strength   Right Quadriceps:  5/5   Left Quadriceps:  5/5   Right Hamstrings:  5/5   Left Hamstrings:  5/5     Tests   Straight leg raise right: negative  Straight leg raise left: negative    Other   Toe walk: normal  Heel walk: normal              Neurologic Exam     Mental Status   Oriented to person, place, and time.   Speech: speech is normal   Level of consciousness: alert    Cranial Nerves   Cranial nerves II through XII intact.     CN III, IV, VI   Pupils are equal, round, and reactive to light.  Extraocular motions are normal.     Motor Exam   Muscle bulk: normal  Overall muscle tone: normal    Strength   Right deltoid: 5/5  Left deltoid: 5/5  Right biceps: 5/5  Left biceps: 5/5  Right triceps: 5/5  Left triceps: 5/5  Right wrist flexion: 5/5  Left wrist flexion: 5/5  Right wrist extension: 5/5  Left wrist extension: 5/5  Right interossei: 5/5  Left interossei: 5/5  Right iliopsoas: 5/5  Left iliopsoas: 5/5  Right quadriceps: 5/5  Left quadriceps: 5/5  Right hamstring:  5/5  Left hamstrin/5  Right anterior tibial: 5/5  Left anterior tibial: 5/5  Right posterior tibial: 5/5  Left posterior tibial: 5/5  Right peroneal: 5/5  Left peroneal: 5/5  Right gastroc: 5/5  Left gastroc: 5/5    Sensory Exam   Light touch normal.   Pinprick normal.     Gait, Coordination, and Reflexes     Gait  Gait: normal    Coordination   Finger to nose coordination: normal  Tandem walking coordination: normal    Reflexes   Right brachioradialis: 2+  Left brachioradialis: 2+  Right biceps: 2+  Left biceps: 2+  Right triceps: 2+  Left triceps: 2+  Right patellar: 2+  Left patellar: 2+  Right achilles: 2+  Left achilles: 2+  Right plantar: normal  Left plantar: normal  Right Olguin: absent  Left Olguin: absent  Right ankle clonus: absent  Left ankle clonus: absent        DIAGNOSTIC DATA:  I personally interpreted the following imaging:   Lumbar spine MRI 2019 showing mild spondylosis, mild degenerative disc disease at L4-5 with right mild foraminal stenosis facet arthropathy at L3-4, L4-5 and L5-S1.  Lumbar x-ray did not show any spondylolisthesis or dynamic instability.    ASSESMENT:  This is a 59 y.o. male with     Problem List Items Addressed This Visit     None      Visit Diagnoses     Lumbar facet arthropathy    -  Primary    Relevant Orders    Ambulatory consult to Pain Clinic    Spondylosis of lumbosacral spine with radiculopathy        Relevant Orders    Ambulatory consult to Pain Clinic            PLAN:  All treatment options explain.  Recommended to the patient to stay active with walking every day, swimming and continuing his physical therapy exercise. Weight loss.   Avoid prolonged sitting.  I referred him in pain management for bilateral L3-4, L4-5 and L5-S1 medial branch block followed by possible radiofrequency ablation  I do not recommend surgery at this time  All questions answered        Tam Encarnacion MD  Cell:[phone]

## 2019-10-16 NOTE — TELEPHONE ENCOUNTER
Left detailed message for patient to return our call to schedule a procedure appointment with  for a RAJI MBB L3-4,L4-5, L5-S1 per .

## 2019-10-17 ENCOUNTER — TELEPHONE (OUTPATIENT)
Dept: PAIN MEDICINE | Facility: CLINIC | Age: 59
End: 2019-10-17

## 2019-10-17 DIAGNOSIS — M54.16 LUMBAR RADICULOPATHY: Primary | ICD-10-CM

## 2019-10-17 NOTE — TELEPHONE ENCOUNTER
Returned call seems we're playing phone tag. Left detailed message with a procedure date as pt to leave message if the date and time works for him.    ----- Message from Martin Hayes sent at 10/16/2019  4:53 PM CDT -----  Contact: [phone]/self  Patient returning your call   Please call back to assist at [phone]

## 2019-11-01 DIAGNOSIS — Z12.11 COLON CANCER SCREENING: ICD-10-CM

## 2019-11-04 ENCOUNTER — TELEPHONE (OUTPATIENT)
Dept: PAIN MEDICINE | Facility: CLINIC | Age: 59
End: 2019-11-04

## 2019-11-04 NOTE — DISCHARGE INSTRUCTIONS
Home Care Instructions Pain Management:    1.  DIET:    You may resume your normal diet today.    2.  BATHING:    You may shower with luke warm water.    3.  DRESSING:    You may remove your bandage today.    4.  ACTIVITY LEVEL:      You may resume your normal activities 24 hours after your procedure.    5.  MEDICATIONS:    You may resume your normal medications today.    6.  SPECIAL INSTRUCTIONS:    No heat to the injection site for 24 hours including bath or shower, heating pad, moist heat or hot tubs.    Use an ice pack to the injection site for any pain or discomfort.  Apply ice packs for 20 minute intervals as needed.    If you have received any sedatives by mouth today, you can not drive for 12 hours.    If you have received sedation through an IV, you can not drive for 24 hours.    PLEASE CALL YOUR DOCTOR FOR THE FOLLOWIN.  Redness or swelling around the injection site.  2.  Fever of 101 degrees.  3.  Drainage (pus) from the injection site.  4.  For any continuous bleeding (some dried blood over the incision is normal.)    FOR EMERGENCIES:    If any unusual problems or difficulties occur during clinic hours, call [phone] or dial [phone].    Follow up with with your physician in 2-3 weeks.

## 2019-11-04 NOTE — TELEPHONE ENCOUNTER
Called pt to remind him of his procedure tomorrow 11/5/19. Left message for pt to check in for 10:15am due to cancellations on the schedule.

## 2019-11-05 ENCOUNTER — HOSPITAL ENCOUNTER (OUTPATIENT)
Facility: HOSPITAL | Age: 59
Discharge: HOME | End: 2019-11-05
Attending: PAIN MEDICINE | Admitting: PAIN MEDICINE
Payer: OTHER GOVERNMENT

## 2019-11-05 VITALS
HEIGHT: 72 IN | OXYGEN SATURATION: 99 % | BODY MASS INDEX: 37.93 KG/M2 | TEMPERATURE: 98 F | RESPIRATION RATE: 16 BRPM | WEIGHT: 280 LBS | DIASTOLIC BLOOD PRESSURE: 57 MMHG | SYSTOLIC BLOOD PRESSURE: 111 MMHG | HEART RATE: 67 BPM

## 2019-11-05 DIAGNOSIS — G89.29 CHRONIC PAIN: ICD-10-CM

## 2019-11-05 DIAGNOSIS — M47.816 LUMBAR SPONDYLOSIS: Primary | ICD-10-CM

## 2019-11-05 PROCEDURE — 99152 PR MOD CONSCIOUS SEDATION, SAME PHYS, 5+ YRS, FIRST 15 MIN: ICD-10-PCS | Mod: ,,, | Performed by: PAIN MEDICINE

## 2019-11-05 PROCEDURE — 64494 PR INJ DX/THER AGNT PARAVERT FACET JOINT,IMG GUIDE,LUMBAR/SAC, 2ND LEVEL: ICD-10-PCS | Mod: 50,,, | Performed by: PAIN MEDICINE

## 2019-11-05 PROCEDURE — 25500020 PHARM REV CODE 255: Performed by: PAIN MEDICINE

## 2019-11-05 PROCEDURE — 63600175 PHARM REV CODE 636 W HCPCS: Performed by: PAIN MEDICINE

## 2019-11-05 PROCEDURE — 64495 INJ PARAVERT F JNT L/S 3 LEV: CPT | Mod: 50 | Performed by: PAIN MEDICINE

## 2019-11-05 PROCEDURE — 64495 PR INJ DX/THER AGNT PARAVERT FACET JOINT,IMG GUIDE,LUMBAR/SAC, ADD LEVEL: ICD-10-PCS | Mod: 50,,, | Performed by: PAIN MEDICINE

## 2019-11-05 PROCEDURE — 99152 MOD SED SAME PHYS/QHP 5/>YRS: CPT | Mod: ,,, | Performed by: PAIN MEDICINE

## 2019-11-05 PROCEDURE — 99153 MOD SED SAME PHYS/QHP EA: CPT | Performed by: PAIN MEDICINE

## 2019-11-05 PROCEDURE — 64495 INJ PARAVERT F JNT L/S 3 LEV: CPT | Mod: 50,,, | Performed by: PAIN MEDICINE

## 2019-11-05 PROCEDURE — 64493 INJ PARAVERT F JNT L/S 1 LEV: CPT | Mod: 50 | Performed by: PAIN MEDICINE

## 2019-11-05 PROCEDURE — S0020 INJECTION, BUPIVICAINE HYDRO: HCPCS | Performed by: PAIN MEDICINE

## 2019-11-05 PROCEDURE — 64494 INJ PARAVERT F JNT L/S 2 LEV: CPT | Mod: 50,,, | Performed by: PAIN MEDICINE

## 2019-11-05 PROCEDURE — 64493 INJ PARAVERT F JNT L/S 1 LEV: CPT | Mod: 50,,, | Performed by: PAIN MEDICINE

## 2019-11-05 PROCEDURE — 99152 MOD SED SAME PHYS/QHP 5/>YRS: CPT | Performed by: PAIN MEDICINE

## 2019-11-05 PROCEDURE — 64493 PR INJ DX/THER AGNT PARAVERT FACET JOINT,IMG GUIDE,LUMBAR/SAC,1ST LVL: ICD-10-PCS | Mod: 50,,, | Performed by: PAIN MEDICINE

## 2019-11-05 PROCEDURE — 25000003 PHARM REV CODE 250: Performed by: PAIN MEDICINE

## 2019-11-05 PROCEDURE — 64494 INJ PARAVERT F JNT L/S 2 LEV: CPT | Mod: 50 | Performed by: PAIN MEDICINE

## 2019-11-05 RX ORDER — MIDAZOLAM HYDROCHLORIDE 1 MG/ML
INJECTION, SOLUTION INTRAMUSCULAR; INTRAVENOUS
Status: DISCONTINUED | OUTPATIENT
Start: 2019-11-05 | End: 2019-11-05 | Stop reason: HOSPADM

## 2019-11-05 RX ORDER — SODIUM CHLORIDE 9 MG/ML
500 INJECTION, SOLUTION INTRAVENOUS CONTINUOUS
Status: DISCONTINUED | OUTPATIENT
Start: 2019-11-05 | End: 2019-11-05 | Stop reason: HOSPADM

## 2019-11-05 RX ORDER — FENTANYL CITRATE 50 UG/ML
INJECTION, SOLUTION INTRAMUSCULAR; INTRAVENOUS
Status: DISCONTINUED | OUTPATIENT
Start: 2019-11-05 | End: 2019-11-05 | Stop reason: HOSPADM

## 2019-11-05 RX ORDER — SODIUM BICARBONATE 1 MEQ/ML
SYRINGE (ML) INTRAVENOUS
Status: DISCONTINUED | OUTPATIENT
Start: 2019-11-05 | End: 2019-11-05 | Stop reason: HOSPADM

## 2019-11-05 RX ORDER — LIDOCAINE HYDROCHLORIDE 10 MG/ML
1 INJECTION, SOLUTION EPIDURAL; INFILTRATION; INTRACAUDAL; PERINEURAL ONCE
Status: COMPLETED | OUTPATIENT
Start: 2019-11-05 | End: 2019-11-05

## 2019-11-05 RX ORDER — BUPIVACAINE HYDROCHLORIDE 5 MG/ML
INJECTION, SOLUTION EPIDURAL; INTRACAUDAL
Status: DISCONTINUED | OUTPATIENT
Start: 2019-11-05 | End: 2019-11-05 | Stop reason: HOSPADM

## 2019-11-05 RX ADMIN — SODIUM CHLORIDE 500 ML: 0.9 INJECTION, SOLUTION INTRAVENOUS at 11:11

## 2019-11-05 NOTE — H&P
[facility]  History & Physical - Short Stay  Pain Management           SUBJECTIVE:     Procedure: Procedure(s) (LRB):  Block-nerve-medial branch-lumbar--Bilateral L3-4,L4-5,L5-S1 (Bilateral)    Chief Complaint/Reason for Admission:  CLBP with Radiculopathy    No medications prior to admission.       Review of patient's allergies indicates:   Allergen Reactions    Advil [ibuprofen]        Past Medical History:   Diagnosis Date    Arthritis     Basal cell carcinoma 06/21/2019    back    Cancer     Chronic pain of right knee     SCC (squamous cell carcinoma) 2014    Forehead- Dr. Cabral     Skin cancer     Squamous cell carcinoma 2013    Right ear- Dr. Marianna long     Past Surgical History:   Procedure Laterality Date    APPENDECTOMY      COLONOSCOPY  1998    lipoma removal      skin cancer removal       Family History   Problem Relation Age of Onset    COPD Mother     Alcohol abuse Mother     Heart disease Father     Melanoma Father     No Known Problems Sister     No Known Problems Brother     Psoriasis Neg Hx     Lupus Neg Hx     Eczema Neg Hx      Social History     Tobacco Use    Smoking status: Never Smoker    Smokeless tobacco: Never Used   Substance Use Topics    Alcohol use: Yes     Frequency: 2-4 times a month     Drinks per session: 1 or 2     Binge frequency: Never     Comment: social    Drug use: No        Review of Systems:  Review of Systems   Constitutional: Negative for chills and fever.   HENT: Negative for nosebleeds.    Eyes: Negative for blurred vision and pain.   Respiratory: Negative for hemoptysis.    Cardiovascular: Negative for chest pain and palpitations.   Gastrointestinal: Negative for heartburn, nausea and vomiting.   Genitourinary: Negative for dysuria and hematuria.   Musculoskeletal: Positive for back pain. Negative for myalgias.   Skin: Negative for rash.   Neurological: Negative for seizures and loss of consciousness.    Endo/Heme/Allergies: Does not bruise/bleed easily.   Psychiatric/Behavioral: Negative for hallucinations.         OBJECTIVE:     Vital Signs (Most Recent):  Temp: 98 °F (36.7 °C) (11/05/19 1145)  Pulse: 67 (11/05/19 1145)  Resp: 16 (11/05/19 1145)  BP: (!) 111/57 (11/05/19 1145)  SpO2: 99 % (11/05/19 1145)    Physical Exam:  General appearance - alert, well appearing, and in no distress  Mental status - AOx3  Eyes - pupils equal and reactive, extraocular eye movements intact  Heart - normal rate, regular rhythm, normal S1, S2, no murmurs, rubs, clicks or gallops  Chest - clear to auscultation, no wheezes, rales or rhonchi, symmetric air entry  Abdomen - soft, nontender, nondistended, no masses or organomegaly  Neurological - alert, oriented, normal speech, no focal findings or movement disorder noted  Extremities - peripheral pulses normal, no pedal edema, no clubbing or cyanosis      ASSESSMENT/PLAN:     Active Hospital Problems    Diagnosis  POA    Chronic pain [G89.29]  Yes      Resolved Hospital Problems   No resolved problems to display.        The risks and benefits of this intervention, and alternative therapies were discussed with the patient.  The discussion of risks included infection, bleeding, need for additional procedures or surgery, nerve damage, paralysis, adverse medication reaction(s), stroke, and/or death.  Questions regarding the procedure, risks, expected outcome, and possible side effects were solicited and answered to the patient's satisfaction.  Bill wishes to proceed with the injection.  Verbal and written consent were verified.      Proceed with intervention as scheduled.      Alexus Anglin Jr, MD  Interventional Pain Medicine / Anesthesiology

## 2019-11-05 NOTE — PROGRESS NOTES
MD Arrival Time:1118  Sedation Start Time:1118  Sedation End Time:1138  MD Departure Time:1138

## 2019-11-05 NOTE — PLAN OF CARE
Discharge instruction given and explained, patient voiced understanding.  Pain diary given and explained.  Transported to car with spouse via w/c safety maintained.

## 2019-11-05 NOTE — OP NOTE
"Procedure Note    Pre-operative diagnosis: Lumbar Spondylosis  Post-operative diagnosis: Lumbar Spondylosis  Procedure Date: 11/05/2019  Procedure:  (1) Bilateral L3-4, L4-5, and L5-S1 Lumbar Medial Branch Block     (2) Fluoroscopy for needle guidance    (3) IV Moderate Sedation (Midazolam 2 mg, Fentanyl 50 mcg)    Procedure in detail:  Informed consent obtained after explaining the procedure and potential complications including local discomfort, infection, headache, temporary or permanent weakness and/or numbness of one or both legs, temporary or permanent paraplegia, heart attack and stroke. All questions were answered.      Patient was taken to the procedure room and placed in prone position.  Time out was performed.  Patient's Lumbar area was prepped and draped in a sterile manner.  AP and oblique fluoroscopy were used to identify and susan the junctions between the superior articular processes and transverse processes at the L3, L4 and L5 vertebral levels as well as the sacral ala on the Right side.  Skin and tissues overlying the targeted points were anesthetized with Lidocaine 1% (10 mL) + sodium bicarbonate (1 mL) using a 25G 1.25" needle.  Then, under fluoroscopic guidance, a 22G 3.5 inch spinal needle, was advanced through the anesthetized tissues toward the targeted points corresponding to the locations of the L2, 3, 4, and 5 medial branch nerves.     After heme-negative aspiration, up to 1 mL of contrast was divided among the levels and administered through each needle demonstrating local accumulation.  Next, 1 mL of 0.5% bupivacaine was injected at each of the above targeted points.     The procedure was repeated on the Left side with similar findings.  Needle placement and contrast spread were consistent with successful medial branch nerve block.    Needle was removed with flush and bandaged placed over site of needle insertion.      Estimated Blood Loss: None    Disposition: The patient tolerated the " procedure without complaint and was transported to the recovery room in stable condition.    Follow-up: Return for RFA if appropriate      Alexus Anglin Jr, MD  Interventional Pain Medicine / Anesthesiology

## 2019-11-05 NOTE — DISCHARGE SUMMARY
[facility]  Discharge Note  Short Stay     Admit Date: 11/5/2019    Discharge Date: 11/5/2019     Attending Physician: Alexus Anglin Jr, MD    Diagnoses:  Active Hospital Problems    Diagnosis  POA    *Lumbar spondylosis [M47.816]  Yes    Chronic pain [G89.29]  Yes      Resolved Hospital Problems   No resolved problems to display.     Discharged Condition: Good     Hospital Course: Patient was admitted for an outpatient interventional pain management procedure and tolerated the procedure well with no complications.     Final Diagnoses: Same as principal problem.     Disposition: Home or Self Care     Follow up/Patient Instructions:    Follow-up Information     Tam Encarnacion MD. Go in 1 week.    Specialty:  Neurosurgery  Why:  Post-procedural Follow Up As Scheduled  Contact information:  [address]  [address]  [facility] [id number] [phone]                   Reconciled Medications:     Medication List      CONTINUE taking these medications    calcipotriene-betamethasone 0.005-0.064 % Foam  Commonly known as:  ENSTILAR  Apply 1 application topically once daily.     desonide 0.05 % cream  Commonly known as:  DESOWEN  Thin film to AA eyelids bid PRN flare     diazePAM 5 MG tablet  Commonly known as:  VALIUM  Take 1 tablet (5 mg total) by mouth daily as needed.     diphenhydrAMINE 25 mg capsule  Commonly known as:  BENADRYL  Take 25 mg by mouth every 6 (six) hours as needed for Itching.     EPINEPHrine 0.3 mg/0.3 mL Atin  Commonly known as:  EPIPEN  Inject 0.3 mLs (0.3 mg total) into the muscle once.     fexofenadine 180 MG tablet  Commonly known as:  ALLEGRA  Take 180 mg by mouth continuous prn.     gabapentin 300 MG capsule  Commonly known as:  NEURONTIN  Take 1 capsule (300 mg total) by mouth 3 (three) times daily.     lidocaine HCL 2% 2 % jelly  Commonly known as:  XYLOCAINE  Apply topically as needed.     meloxicam 15 MG tablet  Commonly known as:  MOBIC  TAKE 1 TABLET(15 MG) BY MOUTH  EVERY DAY           Discharge Procedure Orders (must include Diet, Follow-up, Activity)   Call MD for:  temperature >100.4     Call MD for:  severe uncontrolled pain     Call MD for:  redness, tenderness, or signs of infection (pain, swelling, redness, odor or green/yellow discharge around incision site)     Call MD for:  difficulty breathing or increased cough     Call MD for:  severe persistent headache     Call MD for:  worsening rash     Remove dressing in 24 hours       Alexus Anglin Jr, MD  Interventional Pain Medicine / Anesthesiology

## 2019-11-06 ENCOUNTER — PATIENT MESSAGE (OUTPATIENT)
Dept: PAIN MEDICINE | Facility: CLINIC | Age: 59
End: 2019-11-06

## 2019-11-08 ENCOUNTER — PATIENT OUTREACH (OUTPATIENT)
Dept: ADMINISTRATIVE | Facility: OTHER | Age: 59
End: 2019-11-08

## 2019-11-21 ENCOUNTER — PATIENT MESSAGE (OUTPATIENT)
Dept: INTERNAL MEDICINE | Facility: CLINIC | Age: 59
End: 2019-11-21

## 2019-11-21 NOTE — TELEPHONE ENCOUNTER
The above pt is requesting a refill on his HYDROcodone-acetaminophen (NORCO)  mg per tablet, please advice    Thanks

## 2019-11-22 ENCOUNTER — TELEPHONE (OUTPATIENT)
Dept: INTERNAL MEDICINE | Facility: CLINIC | Age: 59
End: 2019-11-22

## 2019-11-22 DIAGNOSIS — M54.31 SCIATICA OF RIGHT SIDE: Primary | ICD-10-CM

## 2019-11-25 ENCOUNTER — PATIENT MESSAGE (OUTPATIENT)
Dept: INTERNAL MEDICINE | Facility: CLINIC | Age: 59
End: 2019-11-25

## 2019-11-25 RX ORDER — HYDROCODONE BITARTRATE AND ACETAMINOPHEN 5; 325 MG/1; MG/1
1 TABLET ORAL EVERY 6 HOURS PRN
Qty: 30 TABLET | Refills: 0 | Status: SHIPPED | OUTPATIENT
Start: 2019-11-25 | End: 2019-12-02 | Stop reason: SDUPTHER

## 2019-12-02 ENCOUNTER — PATIENT MESSAGE (OUTPATIENT)
Dept: INTERNAL MEDICINE | Facility: CLINIC | Age: 59
End: 2019-12-02

## 2019-12-02 DIAGNOSIS — M54.50 CHRONIC MIDLINE LOW BACK PAIN WITHOUT SCIATICA: ICD-10-CM

## 2019-12-02 DIAGNOSIS — G89.29 CHRONIC MIDLINE LOW BACK PAIN WITHOUT SCIATICA: ICD-10-CM

## 2019-12-02 DIAGNOSIS — M54.31 SCIATICA OF RIGHT SIDE: ICD-10-CM

## 2019-12-02 RX ORDER — DIAZEPAM 5 MG/1
5 TABLET ORAL DAILY PRN
Qty: 30 TABLET | Refills: 1 | Status: SHIPPED | OUTPATIENT
Start: 2019-12-02 | End: 2020-05-28

## 2019-12-02 RX ORDER — HYDROCODONE BITARTRATE AND ACETAMINOPHEN 5; 325 MG/1; MG/1
1 TABLET ORAL EVERY 6 HOURS PRN
Qty: 30 TABLET | Refills: 0 | Status: SHIPPED | OUTPATIENT
Start: 2019-12-02 | End: 2019-12-10 | Stop reason: SDUPTHER

## 2019-12-02 RX ORDER — GABAPENTIN 300 MG/1
300 CAPSULE ORAL 3 TIMES DAILY
Qty: 90 CAPSULE | Refills: 3 | Status: SHIPPED | OUTPATIENT
Start: 2019-12-02 | End: 2019-12-10

## 2019-12-03 ENCOUNTER — CLINICAL SUPPORT (OUTPATIENT)
Dept: REHABILITATION | Facility: HOSPITAL | Age: 59
End: 2019-12-03
Attending: INTERNAL MEDICINE
Payer: OTHER GOVERNMENT

## 2019-12-03 DIAGNOSIS — M54.41 ACUTE BILATERAL LOW BACK PAIN WITH RIGHT-SIDED SCIATICA: ICD-10-CM

## 2019-12-03 DIAGNOSIS — R26.89 DECREASED FUNCTIONAL MOBILITY: ICD-10-CM

## 2019-12-03 DIAGNOSIS — R53.1 DECREASED STRENGTH: ICD-10-CM

## 2019-12-03 DIAGNOSIS — M53.86 DECREASED ROM OF LUMBAR SPINE: ICD-10-CM

## 2019-12-03 PROCEDURE — 97110 THERAPEUTIC EXERCISES: CPT | Mod: PN

## 2019-12-03 NOTE — PLAN OF CARE
Outpatient Therapy Updated Plan of Care     Visit Date: 12/3/2019  Name: Baldo Grant  Clinic Number: [id number]    Therapy Diagnosis:   Encounter Diagnoses   Name Primary?    Acute bilateral low back pain with right-sided sciatica     Decreased functional mobility     Decreased ROM of lumbar spine     Decreased strength      Physician: Efrain Vazquez MD    Physician Orders: PT Eval and Treat   Medical Diagnosis from Referral: M54.31 (ICD-10-CM) - Sciatica of right side   Evaluation Date: 10/9/2019  Total Visits Received: 2  Current Certification Period: 10/9/2019 to 12/6/2019 change date next  Precautions:  Standard  Functional Level Prior to Evaluation:  Independent    Subjective     Update: No change in pain since initial evaluation. First treatment     Objective     WNL=within normal limits  WFL=within functional limits  NT=not tested    Update:     Palpation: Tenderness to B lumbar paraspinals, quadratus lumborum, PSIS. Tenderness to lumbosacral junction    Lumbar AROM: Pain/Dysfunction with Movement:   Flexion: WFL    Extension: WFL    Right side bending: NT    Left side bending: NT    Right rotation: NT    Left rotation: NT      LE AROM:  Hip: Grossly WFL. Increased discomfort to B low back with end range flexion and internal rotation  Knee: WFL    LE Myotomes  Hip flexion: 4+/5 R; 4/5 L with L groin  Hip extension: 3/5 B. Resistance not applied  Knee flexion: 4+/5 R, 5/5 L  Knee extension: 4+/5 R, 5/5 L  Ankle DF: 4+/5 B  Ankle PF: 4+/5 B  Ankle Eversion: 5/5 B    Assessment     Update: Patient presents with signs and symptoms consistent with lumbar disc dysfunction. No goals met at this time secondary work demands impacting participation and requiring prolonged sitting. Improved tolerance to objective testing today however lumbar AROM informally tested secondary to high rise in pain at initial evaluation affecting remainder of session. Patient responds well to prone lumbar extension exercises. Good  transverse abdominus activation in hooklying and sitting. Some pain with glute activation; possible involvement in current presentation, particularly the irritability of the glutes and freedom of the sciatic nerve.     Mario is progressing well towards his goals.   Pt prognosis is Good.   Pt will continue to benefit from skilled outpatient physical therapy to address the deficits listed in the problem list box on initial evaluation, provide pt/family education and to maximize pt's level of independence in the home and community environment.     Pt's spiritual, cultural and educational needs considered and pt agreeable to plan of care and goals.  Anticipated barriers to physical therapy: Chronicity of pain; work schedule    Short Term Goals (4 Weeks):  1. Pt will be compliant with HEP to supplement PT in decreasing pain with functional mobility. PROGRESSING, NOT MET 12/3/2019   2. Pt will perform posterior pelvic tilt with back flush against wall and no pain to demonstrate improved core strength. PROGRESSING, NOT MET 12/3/2019   3. Pt will report absence of spasms in low back during static standing for >/= 3 days to promote QOL. PROGRESSING, NOT MET 12/3/2019   4. Pt will report pain </=5/10 with sitting >/=1 hour to promote ease of travel for work. PROGRESSING, NOT MET 12/3/2019   Short Term Goals Status:   Continue goals 1-4    Long Term Goals (8 Weeks):   1. Pt will improve FOTO score to </= 43% limited to decrease perceived limitation with maintaining/changing body position. PROGRESSING, NOT MET 12/3/2019   2. Pt will perform posterior pelvic tilt + wall squat with back flush against wall and no pain to demonstrate improved core strength. PROGRESSING, NOT MET 12/3/2019   3. Pt will pull, lift, and carry booksack without pain to promote functional QOL. PROGRESSING, NOT MET 12/3/2019   4. Pt will report no pain with sitting >/=1 hour to promote ease of travel for work. PROGRESSING, NOT MET 12/3/2019   5. Pt will  participate in lumbar and hip A/PROM testing without pain to promote functional mobility. PROGRESSING, NOT MET 12/3/2019   6. Pt will report absence of radicular R LE symptoms to promote QOL. PROGRESSING, NOT MET 12/3/2019   Long Term Goal Status:   Continue goals 1-6  Reasons for Recertification of Therapy:  Plan of care expires at the end of this week; 2 month lapse in therapy participation.    Plan     Updated Certification Period: 12/3/2019 to 1/31/2020  Recommended Treatment Plan: 18 visits in 60 days: Electrical Stimulation -, Gait Training, Manual Therapy, Moist Heat/ Ice, Neuromuscular Re-ed, Patient Education, Therapeutic Activites and Therapeutic Exercise  Other Recommendations: modalities prn, ASTYM prn, kinesiotape prn, Functional Dry Needling prn     Nadine Hartley, PT  12/3/2019      I CERTIFY THE NEED FOR THESE SERVICES FURNISHED UNDER THIS PLAN OF TREATMENT AND WHILE UNDER MY CARE    Physician's comments:        Physician's Signature: ___________________________________________________

## 2019-12-03 NOTE — PROGRESS NOTES
"                            Physical Therapy Daily Treatment Note     Name: Baldo Grant  Clinic Number: [id number]    Therapy Diagnosis:   Encounter Diagnoses   Name Primary?    Acute bilateral low back pain with right-sided sciatica     Decreased functional mobility     Decreased ROM of lumbar spine     Decreased strength      Physician: Efrain Vazquez MD    Visit Date: 12/3/2019    Physician Orders: PT Eval and Treat   Medical Diagnosis from Referral: M54.31 (ICD-10-CM) - Sciatica of right side   Evaluation Date: 10/9/2019  Authorization Period Expiration: 12/31/2019  Plan of Care Expiration: 10/9/2019 to 12/6/2019 change date next  Visit # / Visits authorized: 2/50    Time In: 1103  Time Out: 1159  Total Billable Time: 56 minutes    Precautions: Standard    Subjective     Pt reports: lapse in physical therapy secondary to work commitments. Reports on the evaluation date he was having a bad day with regards to pain. Pain tends to fluctuate. Today patient is having a good day.   He was compliant with home exercise program.  Response to previous treatment: Not applicable; initial evaluation  Functional change: Not applicable; initial evaluation    Pain: 4/10  Location: B low back, near lumbosacral junction    Objective     Bill received therapeutic exercises to develop strength, endurance, flexibility and core stabilization for 57 minutes including:  Objective testing (See Updated POC in Treatment section).   Other: Initiated prone positioning over 1 pillow with reports of increased pain; worsened with additional pillow and improved with removal of both  Attempted to stabilize lumbar spine on mat with belt at L4; patient report increased pain     Prone on elbows: 5'. Additional 2' towards end of session  Prone press-ups on elbows: 2x10. Additional x10 towards end of session  Prone partial press-ups on hands: x10. Additional x10 towards end of sesion.   Prone glute sets: 5"x10. Mild increase in B low back " "pain  Prone hip extension: x10 B. Mild increase in B low back pain  Quadruped transverse abdominus (TA) activation: x10 with 10% effort. 100% effort practiced x3 reps for reference. Pain in wrist with performance; history of carpal tunnel reported    Seated TA: 3" 2x10  Supine posterior pelvic tilt (PPT) via TA + glute set: x10    Home Exercises Provided and Patient Education Provided     Education provided:   - HEP to be sent via e-mail; including: exercises performed above excluding hip extension and glute sets    Written Home Exercises Provided: yes.  Exercises were reviewed and Laila able to demonstrate them prior to the end of the session.  Mario demonstrated good  understanding of the education provided.     See EMR under Patient Instructions for exercises provided 12/5/2019.    Assessment     See Updated POC in Treatment section.     Plan     See Updated POC in Treatment section.     Nadine Hartley, PT, DPT    "

## 2019-12-05 ENCOUNTER — CLINICAL SUPPORT (OUTPATIENT)
Dept: REHABILITATION | Facility: HOSPITAL | Age: 59
End: 2019-12-05
Attending: INTERNAL MEDICINE
Payer: OTHER GOVERNMENT

## 2019-12-05 DIAGNOSIS — M53.86 DECREASED ROM OF LUMBAR SPINE: ICD-10-CM

## 2019-12-05 DIAGNOSIS — M54.41 ACUTE BILATERAL LOW BACK PAIN WITH RIGHT-SIDED SCIATICA: ICD-10-CM

## 2019-12-05 DIAGNOSIS — R53.1 DECREASED STRENGTH: ICD-10-CM

## 2019-12-05 DIAGNOSIS — R26.89 DECREASED FUNCTIONAL MOBILITY: ICD-10-CM

## 2019-12-05 PROCEDURE — 97110 THERAPEUTIC EXERCISES: CPT | Mod: PN

## 2019-12-05 NOTE — PROGRESS NOTES
"                            Physical Therapy Daily Treatment Note     Name: Baldo Grant  Clinic Number: [id number]    Therapy Diagnosis:   Encounter Diagnoses   Name Primary?    Acute bilateral low back pain with right-sided sciatica     Decreased functional mobility     Decreased ROM of lumbar spine     Decreased strength      Physician: Efrain Vazquez MD    Visit Date: 12/5/2019    Physician Orders: PT Eval and Treat   Medical Diagnosis from Referral: M54.31 (ICD-10-CM) - Sciatica of right side   Evaluation Date: 10/9/2019  Authorization Period Expiration: 12/31/2019  Plan of Care Expiration: 12/3/19 to 1/31/20  Visit # / Visits authorized: 3/50    Time In: 1400  Time Out: 1455  Total Billable Time: 35 minutes    Precautions: Standard    Subjective     Pt reports: His knee is bothering him today as he walked in with a limp. Has been more active around the house today   He was compliant with home exercise program.  Response to previous treatment: Not applicable; initial evaluation  Functional change: Not applicable; initial evaluation    Pain: 5/10  Location: B low back, near lumbosacral junction    Objective     Mario received therapeutic exercises to develop strength, endurance, flexibility and core stabilization for 35 minutes including:  Objective testing (See Updated POC in Treatment section).   Other: Initiated prone positioning over 1 pillow with reports of increased pain; worsened with additional pillow and improved with removal of both  Attempted to stabilize lumbar spine on mat with belt at L4; patient report increased pain     Prone on elbows: 5'. Additional 2' towards end of session  Prone press-ups on elbows: 2x10. Additional x10 towards end of session  Prone partial press-ups on hands: x10. Additional x10 towards end of sesion.     Seated TA: 3" 2x10  Supine posterior pelvic tilt (PPT) via TA + glute set: x10  Hamstring Stretch 3x30"  Piriformis Stretch 3x30"  3-way Prayer Stretch 3x30"  QL " "Stretch 3x30"       Home Exercises Provided and Patient Education Provided     Education provided:   - HEP to be sent via e-mail; including: exercises performed above excluding hip extension and glute sets    Written Home Exercises Provided: yes.  Exercises were reviewed and Laila able to demonstrate them prior to the end of the session.  Mario demonstrated good  understanding of the education provided.     See EMR under Patient Instructions for exercises provided 12/5/2019.    Assessment     Pt was able to tolerate all exercises. Pt progressed with stretches for hamstring, piriformis, QL, and 3-way prayers stretch. Pt given printed HEP.     Mario is progressing well towards his goals.   Pt prognosis is Good.     Pt will continue to benefit from skilled outpatient physical therapy to address the deficits listed in the problem list box on initial evaluation, provide pt/family education and to maximize pt's level of independence in the home and community environment.     Pt's spiritual, cultural and educational needs considered and pt agreeable to plan of care and goals.     Anticipated barriers to physical therapy: Chronicity of pain; work schedule       GOALS:  Short Term Goals (4 Weeks):  1. Pt will be compliant with HEP to supplement PT in decreasing pain with functional mobility. (progressing, not met)  2. Pt will perform posterior pelvic tilt with back flush against wall and no pain to demonstrate improved core strength. (progressing, not met)  3. Pt will report absence of spasms in low back during static standing for >/= 3 days to promote QOL. (progressing, not met)  4. Pt will report pain </=5/10 with sitting >/=1 hour to promote ease of travel for work. (progressing, not met)  Long Term Goals (8 Weeks):   1. Pt will improve FOTO score to </= 43% limited to decrease perceived limitation with maintaining/changing body position. (progressing, not met)  2. Pt will perform posterior pelvic tilt + wall squat with " back flush against wall and no pain to demonstrate improved core strength. (progressing, not met)  3. Pt will pull, lift, and carry booksack without pain to promote functional QOL. (progressing, not met)  4. Pt will report no pain with sitting >/=1 hour to promote ease of travel for work. (progressing, not met)  5. Pt will participate in lumbar and hip A/PROM testing without pain to promote functional mobility. (progressing, not met)  6. Pt will report absence of radicular R LE symptoms to promote QOL. (progressing, not met)    Plan     Pt will continue POC as tolerated. Pt will progress with supine bridges.     Joce Bhatt, PT, DPT

## 2019-12-10 ENCOUNTER — OFFICE VISIT (OUTPATIENT)
Dept: INTERNAL MEDICINE | Facility: CLINIC | Age: 59
End: 2019-12-10
Payer: OTHER GOVERNMENT

## 2019-12-10 VITALS
WEIGHT: 280.19 LBS | DIASTOLIC BLOOD PRESSURE: 80 MMHG | HEIGHT: 72 IN | HEART RATE: 67 BPM | BODY MASS INDEX: 37.95 KG/M2 | OXYGEN SATURATION: 96 % | SYSTOLIC BLOOD PRESSURE: 110 MMHG

## 2019-12-10 DIAGNOSIS — M54.31 SCIATICA OF RIGHT SIDE: Primary | ICD-10-CM

## 2019-12-10 DIAGNOSIS — M17.32 POST-TRAUMATIC OSTEOARTHRITIS OF LEFT KNEE: ICD-10-CM

## 2019-12-10 DIAGNOSIS — Z88.9 DRUG ALLERGY: ICD-10-CM

## 2019-12-10 DIAGNOSIS — E66.01 MORBID OBESITY: ICD-10-CM

## 2019-12-10 PROBLEM — M53.86 DECREASED ROM OF LUMBAR SPINE: Status: RESOLVED | Noted: 2019-10-10 | Resolved: 2019-12-10

## 2019-12-10 PROBLEM — M47.816 LUMBAR SPONDYLOSIS: Status: RESOLVED | Noted: 2019-05-30 | Resolved: 2019-12-10

## 2019-12-10 PROBLEM — E66.9 OBESITY (BMI 30-39.9): Status: RESOLVED | Noted: 2019-09-11 | Resolved: 2019-12-10

## 2019-12-10 PROCEDURE — 99999 PR PBB SHADOW E&M-EST. PATIENT-LVL III: CPT | Mod: PBBFAC,,, | Performed by: INTERNAL MEDICINE

## 2019-12-10 PROCEDURE — 20610 DRAIN/INJ JOINT/BURSA W/O US: CPT | Mod: PBBFAC,PN | Performed by: INTERNAL MEDICINE

## 2019-12-10 PROCEDURE — 99214 PR OFFICE/OUTPT VISIT, EST, LEVL IV, 30-39 MIN: ICD-10-PCS | Mod: S$PBB,25,, | Performed by: INTERNAL MEDICINE

## 2019-12-10 PROCEDURE — 99213 OFFICE O/P EST LOW 20 MIN: CPT | Mod: PBBFAC,PN,25 | Performed by: INTERNAL MEDICINE

## 2019-12-10 PROCEDURE — 99214 OFFICE O/P EST MOD 30 MIN: CPT | Mod: S$PBB,25,, | Performed by: INTERNAL MEDICINE

## 2019-12-10 PROCEDURE — 20610 PR DRAIN/INJECT LARGE JOINT/BURSA: ICD-10-PCS | Mod: S$PBB,LT,, | Performed by: INTERNAL MEDICINE

## 2019-12-10 PROCEDURE — 20610 DRAIN/INJ JOINT/BURSA W/O US: CPT | Mod: S$PBB,LT,, | Performed by: INTERNAL MEDICINE

## 2019-12-10 PROCEDURE — 99999 PR PBB SHADOW E&M-EST. PATIENT-LVL III: ICD-10-PCS | Mod: PBBFAC,,, | Performed by: INTERNAL MEDICINE

## 2019-12-10 RX ORDER — GABAPENTIN 600 MG/1
600 TABLET ORAL 3 TIMES DAILY
Qty: 270 TABLET | Refills: 3 | Status: SHIPPED | OUTPATIENT
Start: 2019-12-10 | End: 2020-12-27 | Stop reason: SDUPTHER

## 2019-12-10 RX ORDER — HYDROCODONE BITARTRATE AND ACETAMINOPHEN 5; 325 MG/1; MG/1
1 TABLET ORAL EVERY 6 HOURS PRN
Qty: 100 TABLET | Refills: 0 | Status: SHIPPED | OUTPATIENT
Start: 2019-12-10 | End: 2020-01-22 | Stop reason: SDUPTHER

## 2019-12-10 RX ORDER — EPINEPHRINE 0.3 MG/.3ML
1 INJECTION SUBCUTANEOUS ONCE
Qty: 1 DEVICE | Refills: 1 | Status: SHIPPED | OUTPATIENT
Start: 2019-12-10 | End: 2020-05-11 | Stop reason: SDUPTHER

## 2019-12-10 RX ORDER — GABAPENTIN 600 MG/1
600 TABLET ORAL 3 TIMES DAILY
Qty: 270 TABLET | Refills: 3 | Status: SHIPPED | OUTPATIENT
Start: 2019-12-10 | End: 2019-12-10 | Stop reason: SDUPTHER

## 2019-12-10 NOTE — PROGRESS NOTES
Subjective:       Patient ID: Baldo Grant is a 59 y.o. male.    Chief Complaint: Back Pain; Knee Pain; Umbilical Hernia (Hiatal); and Medication Refill (Epipen hard copy)    HPI  Pt with persistent sciatica, now L>R, in PT.  Not a surgical candidate at this time.  Also with worsening L knee pain.  C/O new ventral hernia.  No CP, SOB.  Review of Systems   All other systems reviewed and are negative.      Objective:      Physical Exam   Constitutional: He appears well-developed.   obese   HENT:   Head: Normocephalic.   Eyes: EOM are normal.   Neck: Normal range of motion.   Cardiovascular: Normal rate, regular rhythm, normal heart sounds and intact distal pulses.   Pulmonary/Chest: Effort normal and breath sounds normal.   Abdominal: Bowel sounds are normal.   Musculoskeletal: He exhibits no edema.   Crepitation knees L>R   Neurological: He is alert.   Skin: Skin is warm and dry.   Psychiatric: He has a normal mood and affect.   Vitals reviewed.      Assessment:       1. Sciatica of right side    2. Morbid obesity    3. Post-traumatic osteoarthritis of left knee        Plan:       Baldo was seen today for back pain, knee pain, umbilical hernia and medication refill.    Diagnoses and all orders for this visit:    Sciatica of right side  -     gabapentin (NEURONTIN) 600 MG tablet; Take 1 tablet (600 mg total) by mouth 3 (three) times daily.  -     POCT Urine Drug Screen Pain Management; Future  -     Pain Clinic Drug Screen    Morbid obesity   Monitor    Post-traumatic osteoarthritis of left knee   Injected 2 cc's Kenalog into L knee NDC# [address]-9    No follow-ups on file.

## 2019-12-11 ENCOUNTER — CLINICAL SUPPORT (OUTPATIENT)
Dept: REHABILITATION | Facility: HOSPITAL | Age: 59
End: 2019-12-11
Attending: INTERNAL MEDICINE
Payer: OTHER GOVERNMENT

## 2019-12-11 DIAGNOSIS — R53.1 DECREASED STRENGTH: ICD-10-CM

## 2019-12-11 DIAGNOSIS — M54.41 ACUTE BILATERAL LOW BACK PAIN WITH RIGHT-SIDED SCIATICA: ICD-10-CM

## 2019-12-11 DIAGNOSIS — R26.89 DECREASED FUNCTIONAL MOBILITY: ICD-10-CM

## 2019-12-11 PROCEDURE — 97110 THERAPEUTIC EXERCISES: CPT | Mod: PN

## 2019-12-11 NOTE — PROGRESS NOTES
"                            Physical Therapy Daily Treatment Note     Name: Baldo Grant  Clinic Number: [id number]    Therapy Diagnosis:   Encounter Diagnoses   Name Primary?    Acute bilateral low back pain with right-sided sciatica     Decreased functional mobility     Decreased strength      Physician: Efrain Vazquez MD    Visit Date: 12/11/2019    Physician Orders: PT Eval and Treat   Medical Diagnosis from Referral: M54.31 (ICD-10-CM) - Sciatica of right side   Evaluation Date: 10/9/2019  Authorization Period Expiration: 12/31/2019  Plan of Care Expiration: 12/3/19 to 1/31/2020  Visit # / Visits authorized: 4/50  FOTO: 4/10    Time In: 1105  Time Out: 1204  Total Billable Time: 59 minutes    Precautions: Standard    Subjective     Pt reports: he received an injection in his L knee yesterday for pain. Patient reports having sciatic symptoms from his buttock to distal anterior thigh yesterday; not present today. "Today is a good day."  He was compliant with home exercise program.  Response to previous treatment: fluctuating pain  Functional change: fluctuating spasms in standing    Pain: 2/10  Location: L buttock    Objective     Mario received therapeutic exercises to develop strength, endurance, flexibility and core stabilization for 59 minutes including:    Prone:  On elbows: 5'.   Press-ups on elbows: 2x10.   Partial press-ups on hands: x10.   Glute sets: 5" 2x10.   Hip extension: 2x10 B.   Transverse abdominus activation: 5"x20    Supine:  Sciatic nerve glides: x20 L  Posterior pelvic tilt (PPT) via TA + glute set: 3x10  Bridges: 2x10 double leg. 2x10 double leg with hip adduction isometric against ball.     Standing:  Neuro hamstring lengthening: Reaching for 12" step (two 6" steps stacked), alternating knee flexion and extension 2x5  Modified prayer stretch at edge of mat, 3-way: x5    Home Exercises Provided and Patient Education Provided     Education provided:   - Continue HEP.     Written Home " Exercises Provided: Not today.   Exercises were reviewed and Laila able to demonstrate them prior to the end of the session.  Mario demonstrated good  understanding of the education provided.     See EMR under Patient Instructions for exercises provided 12/5/2019.    Assessment     Fatigue with double leg bridges. Improved depth of hamstring stretch in standing following neuro hamstring lengthening exercise. No pain or radicular symptoms provoked during session.      Mario is progressing well towards his goals.   Pt prognosis is Good.   Pt will continue to benefit from skilled outpatient physical therapy to address the deficits listed in the problem list box on initial evaluation, provide pt/family education and to maximize pt's level of independence in the home and community environment.     Pt's spiritual, cultural and educational needs considered and pt agreeable to plan of care and goals.  Anticipated barriers to physical therapy: Chronicity of pain; work schedule      Mario is progressing well towards his goals.   Pt prognosis is Good.   Pt will continue to benefit from skilled outpatient physical therapy to address the deficits listed in the problem list box on initial evaluation, provide pt/family education and to maximize pt's level of independence in the home and community environment.      Pt's spiritual, cultural and educational needs considered and pt agreeable to plan of care and goals.  Anticipated barriers to physical therapy: Chronicity of pain; work schedule     Short Term Goals (4 Weeks):  1. Pt will be compliant with HEP to supplement PT in decreasing pain with functional mobility. PROGRESSING, NOT MET 12/11/2019    2. Pt will perform posterior pelvic tilt with back flush against wall and no pain to demonstrate improved core strength. PROGRESSING, NOT MET 12/11/2019    3. Pt will report absence of spasms in low back during static standing for >/= 3 days to promote QOL. PROGRESSING, NOT MET  12/11/2019    4. Pt will report pain </=5/10 with sitting >/=1 hour to promote ease of travel for work. PROGRESSING, NOT MET 12/11/2019       Long Term Goals (8 Weeks):   1. Pt will improve FOTO score to </= 43% limited to decrease perceived limitation with maintaining/changing body position. PROGRESSING, NOT MET 12/11/2019    2. Pt will perform posterior pelvic tilt + wall squat with back flush against wall and no pain to demonstrate improved core strength. PROGRESSING, NOT MET 12/11/2019    3. Pt will pull, lift, and carry booksack without pain to promote functional QOL. PROGRESSING, NOT MET 12/11/2019    4. Pt will report no pain with sitting >/=1 hour to promote ease of travel for work. PROGRESSING, NOT MET 12/11/2019    5. Pt will participate in lumbar and hip A/PROM testing without pain to promote functional mobility. PROGRESSING, NOT MET 12/11/2019    6. Pt will report absence of radicular R LE symptoms to promote QOL. PROGRESSING, NOT MET 12/11/2019      Plan     Monitor pain.     Nadine Hartley, PT, DPT

## 2019-12-23 ENCOUNTER — HOSPITAL ENCOUNTER (EMERGENCY)
Facility: HOSPITAL | Age: 59
Discharge: HOME | End: 2019-12-23
Attending: EMERGENCY MEDICINE
Payer: OTHER GOVERNMENT

## 2019-12-23 ENCOUNTER — CLINICAL SUPPORT (OUTPATIENT)
Dept: REHABILITATION | Facility: HOSPITAL | Age: 59
End: 2019-12-23
Attending: INTERNAL MEDICINE
Payer: OTHER GOVERNMENT

## 2019-12-23 ENCOUNTER — NURSE TRIAGE (OUTPATIENT)
Dept: ADMINISTRATIVE | Facility: CLINIC | Age: 59
End: 2019-12-23

## 2019-12-23 VITALS
BODY MASS INDEX: 35.89 KG/M2 | DIASTOLIC BLOOD PRESSURE: 88 MMHG | WEIGHT: 265 LBS | OXYGEN SATURATION: 98 % | HEIGHT: 72 IN | SYSTOLIC BLOOD PRESSURE: 157 MMHG | RESPIRATION RATE: 18 BRPM | HEART RATE: 70 BPM

## 2019-12-23 DIAGNOSIS — M54.41 ACUTE BILATERAL LOW BACK PAIN WITH RIGHT-SIDED SCIATICA: ICD-10-CM

## 2019-12-23 DIAGNOSIS — R53.1 DECREASED STRENGTH: ICD-10-CM

## 2019-12-23 DIAGNOSIS — R26.89 DECREASED FUNCTIONAL MOBILITY: ICD-10-CM

## 2019-12-23 DIAGNOSIS — M25.562 LEFT KNEE PAIN: ICD-10-CM

## 2019-12-23 DIAGNOSIS — S83.8X2A SPRAIN OF OTHER LIGAMENT OF LEFT KNEE, INITIAL ENCOUNTER: Primary | ICD-10-CM

## 2019-12-23 PROCEDURE — 97110 THERAPEUTIC EXERCISES: CPT | Mod: PN

## 2019-12-23 PROCEDURE — 25000003 PHARM REV CODE 250: Performed by: PHYSICIAN ASSISTANT

## 2019-12-23 PROCEDURE — 99284 PR EMERGENCY DEPT VISIT,LEVEL IV: ICD-10-PCS | Mod: ,,, | Performed by: PHYSICIAN ASSISTANT

## 2019-12-23 PROCEDURE — 99284 EMERGENCY DEPT VISIT MOD MDM: CPT | Mod: 25

## 2019-12-23 PROCEDURE — 99284 EMERGENCY DEPT VISIT MOD MDM: CPT | Mod: ,,, | Performed by: PHYSICIAN ASSISTANT

## 2019-12-23 RX ORDER — DICLOFENAC SODIUM 10 MG/G
2 GEL TOPICAL 4 TIMES DAILY
Qty: 100 G | Refills: 0 | Status: SHIPPED | OUTPATIENT
Start: 2019-12-23 | End: 2024-03-17

## 2019-12-23 RX ORDER — HYDROCODONE BITARTRATE AND ACETAMINOPHEN 5; 325 MG/1; MG/1
1 TABLET ORAL
Status: COMPLETED | OUTPATIENT
Start: 2019-12-23 | End: 2019-12-23

## 2019-12-23 RX ORDER — DICLOFENAC SODIUM 10 MG/G
2 GEL TOPICAL 4 TIMES DAILY
Qty: 100 G | Refills: 0 | Status: SHIPPED | OUTPATIENT
Start: 2019-12-23 | End: 2019-12-23 | Stop reason: SDUPTHER

## 2019-12-23 RX ADMIN — HYDROCODONE BITARTRATE AND ACETAMINOPHEN 1 TABLET: 5; 325 TABLET ORAL at 05:12

## 2019-12-23 NOTE — PROGRESS NOTES
"                            Physical Therapy Daily Treatment Note     Name: Baldo Grant  Clinic Number: [id number]    Therapy Diagnosis:   Encounter Diagnoses   Name Primary?    Acute bilateral low back pain with right-sided sciatica     Decreased functional mobility     Decreased strength      Physician: Efrain Vazquez MD    Visit Date: 12/23/2019    Physician Orders: PT Eval and Treat   Medical Diagnosis from Referral: M54.31 (ICD-10-CM) - Sciatica of right side   Evaluation Date: 10/9/2019  Authorization Period Expiration: 12/31/2019  Plan of Care Expiration: 12/3/19 to 1/31/2020  Visit # / Visits authorized: 5/50  FOTO: 5/10 done    Time In: 0805  Time Out: 0855  Total Billable Time: 50 minutes    Precautions: Standard    Subjective     Pt reports: "I'm feeling really good." Patient reports 3/10 low back pain at it's highest. Patient attributes improvements in pain and function to physical therapy and weight loss; patient reports loosing 15 lbs since December 1st. Patient still has difficulty reaching for objects on floor.   He was compliant with home exercise program.  Response to previous treatment: No radicular pain in B LE in the last week.   Functional change: Patient cooked and cleaned without much issue.     Pain: 2/10  Location: R low back and buttock    Objective     Bill received therapeutic exercises to develop strength, endurance, flexibility and core stabilization for 50 minutes including:    Prone:  Partial press-ups on hands: 2x10  Alternating hip extension 1" for multifidi activation: 2x10 B  Transverse abdominus (TA) activation: 5"x10  Prone to quadruped with TA activation: 2x10    Hooklying:  TA with alternating LE extension: x5 B, x 10 B  Double leg bridge c/ hip adduction isometric: 3" 2x15    Standing:  Neuro hamstring lengthening: Reaching for 12" step (two 6" steps stacked), alternating knee flexion and extension 2x5. Reaching for 6" step, alternating knee flexion and extension x5. " "  Modified prayer stretch at edge of mat: 2x10  TA: 5"x10  Hip hinge with TA: 2x10    Cybex leg press: 7.0 plates 3x10 double leg    Home Exercises Provided and Patient Education Provided     Education provided:   - Continue HEP.     Written Home Exercises Provided: Not today.   Exercises were reviewed and Laila able to demonstrate them prior to the end of the session.  Mario demonstrated good  understanding of the education provided.     See EMR under Patient Instructions for exercises provided 12/5/2019.    Assessment     Improving subjective pain and function. Fatigue with prone to quadruped positioning; less so but noted on hip hinge exercise. No pain or radicular symptoms provoked during session. 8% improvement in perceived limitation with mobility on FOTO survey (See Media for report).     Mario is progressing well towards his goals.   Pt prognosis is Good.   Pt will continue to benefit from skilled outpatient physical therapy to address the deficits listed in the problem list box on initial evaluation, provide pt/family education and to maximize pt's level of independence in the home and community environment.     Pt's spiritual, cultural and educational needs considered and pt agreeable to plan of care and goals.  Anticipated barriers to physical therapy: Chronicity of pain; work schedule      Mario is progressing well towards his goals.   Pt prognosis is Good.   Pt will continue to benefit from skilled outpatient physical therapy to address the deficits listed in the problem list box on initial evaluation, provide pt/family education and to maximize pt's level of independence in the home and community environment.      Pt's spiritual, cultural and educational needs considered and pt agreeable to plan of care and goals.  Anticipated barriers to physical therapy: Chronicity of pain; work schedule     Short Term Goals (4 Weeks):  1. Pt will be compliant with HEP to supplement PT in decreasing pain with " functional mobility. PROGRESSING, NOT MET 12/23/2019    2. Pt will perform posterior pelvic tilt with back flush against wall and no pain to demonstrate improved core strength. PROGRESSING, NOT MET 12/23/2019    3. Pt will report absence of spasms in low back during static standing for >/= 3 days to promote QOL. PROGRESSING, NOT MET 12/23/2019    4. Pt will report pain </=5/10 with sitting >/=1 hour to promote ease of travel for work. PROGRESSING, NOT MET 12/23/2019       Long Term Goals (8 Weeks):   1. Pt will improve FOTO score to </= 43% limited to decrease perceived limitation with maintaining/changing body position. PROGRESSING, NOT MET 12/23/2019    2. Pt will perform posterior pelvic tilt + wall squat with back flush against wall and no pain to demonstrate improved core strength. PROGRESSING, NOT MET 12/23/2019    3. Pt will pull, lift, and carry booksack without pain to promote functional QOL. PROGRESSING, NOT MET 12/23/2019    4. Pt will report no pain with sitting >/=1 hour to promote ease of travel for work. PROGRESSING, NOT MET 12/23/2019    5. Pt will participate in lumbar and hip A/PROM testing without pain to promote functional mobility. PROGRESSING, NOT MET 12/23/2019    6. Pt will report absence of radicular R LE symptoms to promote QOL. PROGRESSING, NOT MET 12/23/2019      Plan     Monitor pain. Progress core and glute strengthening; add cable pallof press and lifting exercises next.    Nadine Hartley, PT, DPT

## 2019-12-23 NOTE — ED NOTES
Patient identifiers for Baldo Grant 59 y.o. male checked and correct.  Chief Complaint   Patient presents with    Knee Pain     L knee pain and pop, went to PT today for back, had some pain in knee at PT     Past Medical History:   Diagnosis Date    Arthritis     Basal cell carcinoma 06/21/2019    back    Cancer     Chronic pain of right knee     SCC (squamous cell carcinoma) 2014    Forehead- Dr. Cabral     Skin cancer     Squamous cell carcinoma 2013    Right ear- Dr. Cabral [facility]     Allergies reported:   Review of patient's allergies indicates:   Allergen Reactions    Advil [ibuprofen]          LOC: Patient is awake, alert, and aware of environment with an appropriate affect. Patient is oriented x 4 and speaking appropriately.  APPEARANCE: Patient resting comfortably and in no acute distress. Patient is clean and well groomed, patient's clothing is properly fastened.  SKIN: The skin is warm and dry. Patient has normal skin turgor and moist mucus membranes.   MUSKULOSKELETAL: Patient is moving all extremities well, no obvious deformities noted. Pulses intact. Patient seen by physical therapy in [place]. Reports hearing an audible pop when moving his leg and now has pain to his left knee. Uses crutches to ambulate. No swelling or discoloration noted. States occurred around 1330.   RESPIRATORY: Airway is open and patent. Respirations are spontaneous and non-labored with normal effort and rate. Denies SOB.  CARDIAC: Patient has a normal rate and rhythm. No peripheral edema noted. Denies chest pain.  ABDOMEN: No distention noted. Soft and non-tender upon palpation. Denies nausea and vomiting.  NEUROLOGICAL: PERRL. Facial expression is symmetrical. Hand grasps are equal bilaterally. Normal sensation in all extremities when touched with finger. Patient denies numbness and tingling.

## 2019-12-23 NOTE — TELEPHONE ENCOUNTER
Pt calls reporting he stood up, heard pop in knee, and now is unable to bear weight on it, unable to walk. Advised to go to nearest ED. Pt verbalized understanding and states he's going now.    Reason for Disposition   Followed a knee injury   Can't stand (bear weight) or walk    Additional Information   Negative: Sounds like a life-threatening emergency to the triager   Negative: Major bleeding (actively dripping or spurting) that can't be stopped   Negative: Bullet, stabbed by knife or other serious penetrating wound   Negative: Looks like a dislocated joint (crooked or deformed)   Negative: Sounds like a life-threatening emergency to the triager   Negative: Wound looks infected    Protocols used: KNEE PAIN-A-OH, KNEE INJURY-A-OH

## 2019-12-23 NOTE — ED PROVIDER NOTES
"Encounter Date: 12/23/2019       History     Chief Complaint   Patient presents with    Knee Pain     L knee pain and pop, went to PT today for back, had some pain in knee at PT     5:23 PM    Patient is a 59-year-old male with a history of chronic back pain, knee arthritis, who sees pain management who presents the ED with left knee pain. Patient states he stood up from his recliner and turned then heard a "pop".  He has been unable to bear weight or ambulate since.  He does report osteoarthritis of bilateral knees.  After this injury, he took his narcotic pain medication that is prescribed to him by his pain management doctor that did not improve his pain.  Currently complaining of 7/10 pain to his lateral left knee.  He has intact range of motion.   He has no other complaints or concerns at this time.        Review of patient's allergies indicates:   Allergen Reactions    Advil [ibuprofen]      Past Medical History:   Diagnosis Date    Arthritis     Basal cell carcinoma 06/21/2019    back    Cancer     Chronic pain of right knee     SCC (squamous cell carcinoma) 2014    Forehead- Dr. Cabral     Skin cancer     Squamous cell carcinoma 2013    Right ear- Dr. Marianna long     Past Surgical History:   Procedure Laterality Date    APPENDECTOMY      COLONOSCOPY  1998    INJECTION OF ANESTHETIC AGENT AROUND MEDIAL BRANCH NERVES INNERVATING LUMBAR FACET JOINT Bilateral 11/5/2019    Procedure: Block-nerve-medial branch-lumbar--Bilateral L3-4,L4-5,L5-S1;  Surgeon: Alexus Anglin Jr., MD;  Location: [facility];  Service: Pain Management;  Laterality: Bilateral;    lipoma removal      skin cancer removal       Family History   Problem Relation Age of Onset    COPD Mother     Alcohol abuse Mother     Heart disease Father     Melanoma Father     No Known Problems Sister     No Known Problems Brother     Psoriasis Neg Hx     Lupus Neg Hx     Eczema Neg Hx      Social History     Tobacco Use    " Smoking status: Never Smoker    Smokeless tobacco: Never Used   Substance Use Topics    Alcohol use: Yes     Frequency: 2-4 times a month     Drinks per session: 1 or 2     Binge frequency: Never     Comment: social    Drug use: No     Review of Systems   Constitutional: Negative for fever.   HENT: Negative for sore throat.    Respiratory: Negative for shortness of breath.    Cardiovascular: Negative for chest pain.   Gastrointestinal: Negative for nausea.   Genitourinary: Negative for dysuria.   Musculoskeletal: Positive for arthralgias and gait problem. Negative for back pain.   Skin: Negative for rash.   Neurological: Negative for weakness.   Hematological: Does not bruise/bleed easily.       Physical Exam     Initial Vitals [12/23/19 1701]   BP Pulse Resp Temp SpO2   (!) 157/88 70 18 -- 98 %      MAP       --         Physical Exam    Vitals reviewed.  Constitutional: He appears well-developed and well-nourished. He is not diaphoretic. No distress.   Personal crutches at his side.   HENT:   Head: Normocephalic and atraumatic.   Nose: Nose normal.   Eyes: Conjunctivae and EOM are normal.   Neck: Normal range of motion.   Cardiovascular: Normal rate.   Pulmonary/Chest: No respiratory distress. He has no wheezes.   Abdominal: Soft.   Musculoskeletal: Normal range of motion.        Left knee: He exhibits normal range of motion (passive and active intact), no swelling, no effusion, no ecchymosis, no laceration, no erythema, normal alignment and normal patellar mobility. Tenderness found. Lateral joint line and LCL tenderness noted.        Legs:  Neurological: He is alert and oriented to person, place, and time. He has normal strength. No sensory deficit.   Skin: Skin is warm and dry. No erythema.   Psychiatric: He has a normal mood and affect.         ED Course   Procedures  Labs Reviewed - No data to display       Imaging Results          X-Ray Knee 3 View Left (Final result)  Result time 12/23/19 19:10:00     Final result by Spencer Soto DO (12/23/19 19:10:00)                 Impression:      No acute fracture or dislocation of the left knee.      Electronically signed by: Spencer Soto  Date:    12/23/2019  Time:    19:10             Narrative:    EXAMINATION:  XR KNEE 3 VIEW LEFT    CLINICAL HISTORY:  Left knee pain.    TECHNIQUE:  AP, lateral, and patellofemoral radiographs of the left knee.    COMPARISON:  Bilateral knee radiographs from 12/11/2017    FINDINGS:  Bone mineralization is normal.  There is no evidence of an acute fracture or dislocation of the left knee.  The visualized osseous structures are in anatomic alignment.  There are mild tricompartmental degenerative changes of the left knee.  There is a small left knee joint effusion.                                 Medical Decision Making:   History:   Old Medical Records: I decided to obtain old medical records.  Initial Assessment:   Patient is a 59-year-old male with a history of chronic back pain, knee arthritis, who sees pain management who presents the ED with left knee pain.   Differential Diagnosis:     Includes but is not limited to knee sprain, muscle strain, arthritis, hairline fracture, less likely dislocation given intact ROM.  Clinical Tests:   Radiological Study: Ordered and Reviewed  ED Management:    Will obtain x-ray, give pain medication and ice pack, and continue to monitor.    Left knee x-ray with no acute fractures or dislocations.      Patient reassessed.  Given history, physical exam, and x-ray, patient most likely has a knee sprain.  He still is able to actively range his knee which is reassuring.  He has his personal crutches. Avoid weight bearing. Advised RICE therapy.   I Ace wrapped his left knee.  He already is on Mobic.  Will prescribe diclofenac topical gel for additional relief.  He does have an allergy to ibuprofen and I advised that he uses a little amount to his L knee first to ensure there is not allergy.   Ambulatory  referral placed to his orthopedist who he has establish care with already.  All questions answered.  Patient comfortable with plan and stable for discharge.                                 Clinical Impression:       ICD-10-CM ICD-9-CM   1. Sprain of other ligament of left knee, initial encounter S83.8X2A 844.8   2. Left knee pain M25.562 719.46         Disposition:   Disposition: Discharged  Condition: Stable                     Shahnaz Carpio PA-C  12/23/19 1929

## 2019-12-24 ENCOUNTER — PES CALL (OUTPATIENT)
Dept: ADMINISTRATIVE | Facility: CLINIC | Age: 59
End: 2019-12-24

## 2019-12-24 NOTE — DISCHARGE INSTRUCTIONS
Imaging Results              X-Ray Knee 3 View Left (Final result)  Result time 12/23/19 19:10:00      Final result by Spencer Soto DO (12/23/19 19:10:00)                   Impression:      No acute fracture or dislocation of the left knee.      Electronically signed by: Spencer Soto  Date:    12/23/2019  Time:    19:10               Narrative:    EXAMINATION:  XR KNEE 3 VIEW LEFT    CLINICAL HISTORY:  Left knee pain.    TECHNIQUE:  AP, lateral, and patellofemoral radiographs of the left knee.    COMPARISON:  Bilateral knee radiographs from 12/11/2017    FINDINGS:  Bone mineralization is normal.  There is no evidence of an acute fracture or dislocation of the left knee.  The visualized osseous structures are in anatomic alignment.  There are mild tricompartmental degenerative changes of the left knee.  There is a small left knee joint effusion.                                    Future Appointments   Date Time Provider Department Center   12/27/2019 10:00 AM Nadine Hartley PT [facility] [place] [facility]   12/30/2019 11:00 AM Nadine Hartley PT [facility] [place] [facility]   1/2/2020 11:00 AM Joce Bhatt PT [facility] [place] [facility]   1/9/2020 11:00 AM Nadine Hartley PT [facility] [place] [facility]   1/14/2020 12:00 PM Nadine Hartley PT [facility] [place] [facility]   1/17/2020 11:00 AM Nadine Hartley, PT [facility] [place] [facility]       Our goal in the emergency department is to always give you outstanding care and exceptional service. You may receive a survey by mail or e-mail in the next week regarding your experience in our ED. We would greatly appreciate your completing and returning the survey. Your feedback provides us with a way to recognize our staff who give very good care and it helps us learn how to improve when your experience was below our aspiration of excellence.

## 2020-01-02 ENCOUNTER — PATIENT OUTREACH (OUTPATIENT)
Dept: ADMINISTRATIVE | Facility: OTHER | Age: 60
End: 2020-01-02

## 2020-01-03 ENCOUNTER — HOSPITAL ENCOUNTER (OUTPATIENT)
Dept: RADIOLOGY | Facility: HOSPITAL | Age: 60
Discharge: HOME | End: 2020-01-03
Attending: PHYSICIAN ASSISTANT
Payer: OTHER GOVERNMENT

## 2020-01-03 ENCOUNTER — OFFICE VISIT (OUTPATIENT)
Dept: SPORTS MEDICINE | Facility: CLINIC | Age: 60
End: 2020-01-03
Payer: OTHER GOVERNMENT

## 2020-01-03 VITALS
TEMPERATURE: 98 F | DIASTOLIC BLOOD PRESSURE: 84 MMHG | SYSTOLIC BLOOD PRESSURE: 126 MMHG | HEART RATE: 57 BPM | WEIGHT: 265 LBS | BODY MASS INDEX: 35.89 KG/M2 | HEIGHT: 72 IN

## 2020-01-03 DIAGNOSIS — M25.562 LEFT KNEE PAIN, UNSPECIFIED CHRONICITY: ICD-10-CM

## 2020-01-03 DIAGNOSIS — M94.262 CHONDROMALACIA OF LEFT KNEE: Primary | ICD-10-CM

## 2020-01-03 PROCEDURE — 99214 OFFICE O/P EST MOD 30 MIN: CPT | Mod: S$PBB,,, | Performed by: PHYSICIAN ASSISTANT

## 2020-01-03 PROCEDURE — 73564 XR KNEE ORTHO BILAT WITH FLEXION: ICD-10-PCS | Mod: 26,50,, | Performed by: RADIOLOGY

## 2020-01-03 PROCEDURE — 99999 PR PBB SHADOW E&M-EST. PATIENT-LVL IV: CPT | Mod: PBBFAC,,, | Performed by: PHYSICIAN ASSISTANT

## 2020-01-03 PROCEDURE — 99214 OFFICE O/P EST MOD 30 MIN: CPT | Mod: PBBFAC,25 | Performed by: PHYSICIAN ASSISTANT

## 2020-01-03 PROCEDURE — 73564 X-RAY EXAM KNEE 4 OR MORE: CPT | Mod: TC,50

## 2020-01-03 PROCEDURE — 99999 PR PBB SHADOW E&M-EST. PATIENT-LVL IV: ICD-10-PCS | Mod: PBBFAC,,, | Performed by: PHYSICIAN ASSISTANT

## 2020-01-03 PROCEDURE — 73564 X-RAY EXAM KNEE 4 OR MORE: CPT | Mod: 26,50,, | Performed by: RADIOLOGY

## 2020-01-03 PROCEDURE — 99214 PR OFFICE/OUTPT VISIT, EST, LEVL IV, 30-39 MIN: ICD-10-PCS | Mod: S$PBB,,, | Performed by: PHYSICIAN ASSISTANT

## 2020-01-03 NOTE — LETTER
January 6, 2020      Shahnaz Carpio PA-C  [address]  [facility] [address]           [facility]  [address]  [facility] [address]  Phone: [phone]          Patient: Baldo Grant   MR Number: [id number]   YOB: 1960   Date of Visit: 1/3/2020       Dear Shahnaz Carpio:    Thank you for referring Baldo Grant to me for evaluation. Attached you will find relevant portions of my assessment and plan of care.    If you have questions, please do not hesitate to call me. I look forward to following Baldo Grant along with you.    Sincerely,    Elif Nunez PA-C    Enclosure  CC:  No Recipients    If you would like to receive this communication electronically, please contact [email or web address] or [phone] to request more information on [facility] access.    For providers and/or their staff who would like to refer a patient to [facility], please contact us through our one-stop-shop provider referral line, [facility], at [phone].    If you feel you have received this communication in error or would no longer like to receive these types of communications, please e-mail [email or web address]

## 2020-01-06 NOTE — PROGRESS NOTES
Subjective:          Chief Complaint: Baldo Grant is a 59 y.o. male who had concerns including Pain of the Left Knee.    HPI  Patient presents to clinic with left knee pain x 1 week. He states that on 12/23/19, he stood up from a seated position, felt a pop, and felt immediate pain along the lateral joint line. He states that he had to ambulate with crutches for 2 days due to pain. He has been resting, icing, and elevating with a significant decrease in pain in his knee. Denies any instability or mechanical symptoms. He is here today to discuss treatment options. He received a steroid injection in his left knee by Dr. Vazquez on 12/10/19 with no relief of pain.     Review of Systems   Constitution: Negative. Negative for chills, fever, weight gain and weight loss.   HENT: Negative for congestion and sore throat.    Eyes: Negative for blurred vision and double vision.   Cardiovascular: Negative for chest pain, leg swelling and palpitations.   Respiratory: Negative for cough and shortness of breath.    Hematologic/Lymphatic: Does not bruise/bleed easily.   Skin: Negative for itching, poor wound healing and rash.   Musculoskeletal: Positive for joint pain, joint swelling and stiffness. Negative for back pain, muscle weakness and myalgias.   Gastrointestinal: Negative for abdominal pain, constipation, diarrhea, nausea and vomiting.   Genitourinary: Negative.  Negative for frequency and hematuria.   Neurological: Negative for dizziness, headaches, numbness, paresthesias and sensory change.   Psychiatric/Behavioral: Negative for altered mental status and depression. The patient is not nervous/anxious.    Allergic/Immunologic: Negative for hives.       Pain Related Questions  Over the past 3 days, what was your average pain during activity? (I.e. running, jogging, walking, climbing stairs, getting dressed, ect.): 5  Over the past 3 days, what was your highest pain level?: 5  Over the past 3 days, what was your lowest  pain level? : 3    Other  How many nights a week are you awakened by your affected body part?: 0  Was the patient's HEIGHT measured or patient reported?: Patient Reported  Was the patient's WEIGHT measured or patient reported?: Patient Reported      Objective:        General: Baldo is well-developed, well-nourished, appears stated age, in no acute distress, alert and oriented to time, place and person.     General    Vitals reviewed.  Constitutional: He is oriented to person, place, and time. He appears well-developed and well-nourished. No distress.   HENT:   Head: Normocephalic and atraumatic.   Eyes: EOM are normal.   Neck: Normal range of motion.   Cardiovascular: Normal rate and regular rhythm.    Pulmonary/Chest: Effort normal. No respiratory distress.   Neurological: He is alert and oriented to person, place, and time. He has normal reflexes. No cranial nerve deficit. Coordination normal.   Psychiatric: He has a normal mood and affect. His behavior is normal. Judgment and thought content normal.     General Musculoskeletal Exam   Gait: abnormal and antalgic       Right Knee Exam     Inspection   Erythema: absent  Scars: absent  Swelling: absent  Effusion: absent  Deformity: absent  Bruising: absent    Tenderness   The patient is experiencing no tenderness.     Range of Motion   Extension:  0 normal   Flexion: normal Right knee flexion: 125.     Tests   Meniscus   Shae:  Medial - negative Lateral - negative  Ligament Examination Lachman: normal (-1 to 2mm) PCL-Posterior Drawer: normal (0 to 2mm)     MCL - Valgus: normal (0 to 2mm)  LCL - Varus: normalPivot Shift: normal (Equal)Reverse Pivot Shift: normal (Equal)Dial Test at 30 degrees: normal (< 5 degrees)Dial Test at 90 degrees: normal (< 5 degrees)  Posterolateral Corner: unstable (>15 degrees difference)  Patella   Passive Patellar Tilt: neutral  Patellar Glide (quadrants): Lateral - 1   Medial - 2  Patellar Grind: negative    Other   Sensation:  normal    Left Knee Exam     Inspection   Erythema: absent  Scars: absent  Swelling: present  Effusion: absent  Deformity: absent  Bruising: absent    Tenderness   The patient tender to palpation of the lateral joint line.    Range of Motion   Extension:  0 normal   Flexion: normal Left knee flexion: 125.     Tests   Meniscus   Shae:  Medial - negative Lateral - positive  Stability Lachman: normal (-1 to 2mm) PCL-Posterior Drawer: normal (0 to 2mm)  MCL - Valgus: normal (0 to 2mm)  LCL - Varus: normal (0 to 2mm)Pivot Shift: normal (Equal)Reverse Pivot Shift: normal (Equal)Dial Test at 30 degrees: normal (< 5 degrees)Dial Test at 90 degrees: normal (< 5 degrees)  Posterolateral Corner: unstable (>15 degrees difference)  Patella   Passive Patellar Tilt: neutral  Patellar Glide (Quadrants): Lateral - 1 Medial - 2  Patellar Grind: negative    Other   Sensation: normal    Muscle Strength   Right Lower Extremity   Hip Abduction: 5/5   Quadriceps:  5/5   Hamstrin/5   Left Lower Extremity   Hip Abduction: 5/5   Quadriceps:  5/5   Hamstrin/5     Reflexes     Left Side  Quadriceps:  2+  Achilles:  2+    Right Side   Quadriceps:  2+  Achilles:  2+    Vascular Exam     Right Pulses  Dorsalis Pedis:      2+  Posterior Tibial:      2+        Left Pulses  Dorsalis Pedis:      2+  Posterior Tibial:      2+        RADIOGRAPHS:  Bilateral knees:  FINDINGS:  Mild DJD.  No fracture or dislocation.  No bone destruction identified.  No significant joint space narrowing.    Left knee MRI  18  Edema signal in the medial aspect of the lateral femoral condyle just above the intertrochanteric notch, may represent contusion, versus edema.  Bone infarct cannot be entirely excluded.    Full-thickness cartilage loss of the trochlea.    Fissuring of the patellofemoral cartilage.    Mild left MCL sprain.    Small joint effusion.        Assessment:       Encounter Diagnoses   Name Primary?    Left knee pain, unspecified chronicity      Chondromalacia of left knee Yes          Plan:       1. I made the decision to obtain old records of the patient including previous notes and imaging. New imaging was ordered today of the extremity or extremities evaluated. I independently reviewed and interpreted the radiographs  today as well as prior imaging. Reviewed radiographs with patient in detail.    2. [id number] - Anastacia, performed a custom orthotic / brace adjustment, fitting and training with the patient. The patient demonstrated understanding and proper care. This was performed for 15 minutes. 3D knee sleeve    3. NSAIDS prn pain    4. No steroid injection given today because he received an injection 3 weeks ago without relief    5. MRI of left knee to rule out lateral meniscus tear    6. RTC in 1 week with Dr. lÁvarez for MRI results review                      Patient questionnaires may have been collected.

## 2020-01-09 ENCOUNTER — HOSPITAL ENCOUNTER (OUTPATIENT)
Dept: RADIOLOGY | Facility: HOSPITAL | Age: 60
Discharge: HOME | End: 2020-01-09
Attending: PHYSICIAN ASSISTANT
Payer: OTHER GOVERNMENT

## 2020-01-09 DIAGNOSIS — M25.562 LEFT KNEE PAIN, UNSPECIFIED CHRONICITY: ICD-10-CM

## 2020-01-09 PROCEDURE — 73721 MRI JNT OF LWR EXTRE W/O DYE: CPT | Mod: TC,LT

## 2020-01-09 PROCEDURE — 73721 MRI KNEE WITHOUT CONTRAST LEFT: ICD-10-PCS | Mod: 26,LT,, | Performed by: RADIOLOGY

## 2020-01-09 PROCEDURE — 73721 MRI JNT OF LWR EXTRE W/O DYE: CPT | Mod: 26,LT,, | Performed by: RADIOLOGY

## 2020-01-10 ENCOUNTER — PATIENT OUTREACH (OUTPATIENT)
Dept: ADMINISTRATIVE | Facility: OTHER | Age: 60
End: 2020-01-10

## 2020-01-10 NOTE — PROGRESS NOTES
CC: Left knee pain    59 y.o. Male who returns today for follow up for his left knee. Was evaluated last week by a mid level provider for L Knee pain x 1 week. He states that on 12/23/19, he stood up from a seated position, felt a pop, and felt immediate pain along the lateral joint line. He states that he had to ambulate with crutches for 2 days due to pain. He has been resting, icing, and elevating with a significant decrease in pain in his knee. Denies any instability or mechanical symptoms. Typical pain localizes retropatellar. He received a steroid injection in his left knee by Dr. Efrain Vazquez on 12/10/19 with no relief of pain. here today to review the MRI and discuss treatment options.     Pain Score:   3    REVIEW OF SYSTEMS:   Constitution: Negative. Negative for chills, fever and night sweats.    Hematologic/Lymphatic: Negative for bleeding problem. Does not bruise/bleed easily.   Skin: Negative for dry skin, itching and rash.   Musculoskeletal: Negative for falls. Positive for left knee pain and muscle weakness.     All other review of symptoms were reviewed and found to be noncontributory.     PAST MEDICAL HISTORY:   Past Medical History:   Diagnosis Date    Arthritis     Basal cell carcinoma 06/21/2019    back    Cancer     Chronic pain of right knee     SCC (squamous cell carcinoma) 2014    Forehead- Dr. Cabral     Skin cancer     Squamous cell carcinoma 2013    Right ear- Dr. Marianna long       PAST SURGICAL HISTORY:   Past Surgical History:   Procedure Laterality Date    APPENDECTOMY      COLONOSCOPY  1998    INJECTION OF ANESTHETIC AGENT AROUND MEDIAL BRANCH NERVES INNERVATING LUMBAR FACET JOINT Bilateral 11/5/2019    Procedure: Block-nerve-medial branch-lumbar--Bilateral L3-4,L4-5,L5-S1;  Surgeon: Alexus Anglin Jr., MD;  Location: [facility];  Service: Pain Management;  Laterality: Bilateral;    lipoma removal      skin cancer removal         FAMILY HISTORY:   Family History    Problem Relation Age of Onset    COPD Mother     Alcohol abuse Mother     Heart disease Father     Melanoma Father     No Known Problems Sister     No Known Problems Brother     Psoriasis Neg Hx     Lupus Neg Hx     Eczema Neg Hx        SOCIAL HISTORY:   Social History     Socioeconomic History    Marital status:      Spouse name: Not on file    Number of children: Not on file    Years of education: Not on file    Highest education level: Not on file   Occupational History    Not on file   Social Needs    Financial resource strain: Not very hard    Food insecurity:     Worry: Never true     Inability: Never true    Transportation needs:     Medical: No     Non-medical: No   Tobacco Use    Smoking status: Never Smoker    Smokeless tobacco: Never Used   Substance and Sexual Activity    Alcohol use: Yes     Frequency: 2-4 times a month     Drinks per session: 1 or 2     Binge frequency: Never     Comment: social    Drug use: No    Sexual activity: Yes     Partners: Female   Lifestyle    Physical activity:     Days per week: 0 days     Minutes per session: 0 min    Stress: Only a little   Relationships    Social connections:     Talks on phone: More than three times a week     Gets together: Once a week     Attends Tenri[employer] service: Not on file     Active member of club or organization: Yes     Attends meetings of clubs or organizations: More than 4 times per year     Relationship status:    Other Topics Concern    Not on file   Social History Narrative    Not on file       MEDICATIONS:     Current Outpatient Medications:     calcipotriene-betamethasone (ENSTILAR) 0.005-0.064 % Foam, Apply 1 application topically once daily., Disp: 60 g, Rfl: 2    desonide (DESOWEN) 0.05 % cream, Thin film to AA eyelids bid PRN flare, Disp: 60 g, Rfl: 3    diazePAM (VALIUM) 5 MG tablet, Take 1 tablet (5 mg total) by mouth daily as needed., Disp: 30 tablet, Rfl: 1    diclofenac sodium  (VOLTAREN) 1 % Gel, Apply 2 g topically 4 (four) times daily., Disp: 100 g, Rfl: 0    diphenhydrAMINE (BENADRYL) 25 mg capsule, Take 25 mg by mouth every 6 (six) hours as needed for Itching., Disp: , Rfl:     fexofenadine (ALLEGRA) 180 MG tablet, Take 180 mg by mouth continuous prn., Disp: , Rfl:     gabapentin (NEURONTIN) 600 MG tablet, Take 1 tablet (600 mg total) by mouth 3 (three) times daily., Disp: 270 tablet, Rfl: 3    HYDROcodone-acetaminophen (NORCO) 5-325 mg per tablet, Take 1 tablet by mouth every 6 (six) hours as needed for Pain., Disp: 100 tablet, Rfl: 0    lidocaine HCL 2% (XYLOCAINE) 2 % jelly, Apply topically as needed., Disp: 30 mL, Rfl: 3    meloxicam (MOBIC) 15 MG tablet, TAKE 1 TABLET(15 MG) BY MOUTH EVERY DAY, Disp: 90 tablet, Rfl: 4    EPINEPHrine (EPIPEN) 0.3 mg/0.3 mL AtIn, Inject 0.3 mLs (0.3 mg total) into the muscle once. for 1 dose, Disp: 1 Device, Rfl: 1    ALLERGIES:   Review of patient's allergies indicates:   Allergen Reactions    Advil [ibuprofen]         PHYSICAL EXAMINATION:  /81   Pulse 72   Ht 6' (1.829 m)   Wt 120.2 kg (265 lb)   BMI 35.94 kg/m²   General: Well-developed well-nourished 59 y.o. malein no acute distress   Cardiovascular: Regular rhythm by palpation of distal pulse, normal color and temperature, no concerning varicosities on symptomatic side   Lungs: No labored breathing or wheezing appreciated   Neuro: Alert and oriented ×3   Psychiatric: well oriented to person, place and time, demonstrates normal mood and affect   Skin: No rashes, lesions or ulcers, normal temperature, turgor, and texture on involved extremity    Ortho/SPM Exam  Examination of the left knee demonstrates intact extensor mechanism. No effusion or prepatellar swelling.  +PF crepitus with ROM. Full extension. Flexion to 120 with pain anteriorly at end ranges. No pain with forced flexion or extension. Prominent tenderness along the medial joint line. Negative Shae's. Negative  Lachman. Stable to varus/valgus stress testing at 0 and 30 deg. Negative posterior drawer. Ligamentously stable.    IMAGING:  X-rays including standing, weight bearing AP and flexion bilateral knees, LEFT knee lateral and sunrise views ordered and images reviewed by me show:    Mild DJD.  No fracture or dislocation.  No bone destruction identified.  No significant joint space narrowing.    MRI reviewed by me and discussed with patient. Study shows:    1. Medial meniscus tear.   2. Patellofemoral and medial tibiofemoral cartilage loss.    ASSESSMENT:      ICD-10-CM ICD-9-CM   1. Complex tear of medial meniscus of left knee as current injury, initial encounter S83.232A 836.0   2. Chondromalacia of left knee M94.262 717.7   3. Primary osteoarthritis of left knee M17.12 715.16     PLAN:     -Findings and treatment options were discussed with the patient and his wife   -Primarily symptomatic from his osteoarthritis   -Plan for a Euflexxa series.  Return to clinic for the 1st injection.  Discussed weight loss.  -If pain becomes more mechanical in nature, he is a candidate for an arthroscopic debridement   -All questions answered    Procedures

## 2020-01-14 ENCOUNTER — OFFICE VISIT (OUTPATIENT)
Dept: SPORTS MEDICINE | Facility: CLINIC | Age: 60
End: 2020-01-14
Payer: OTHER GOVERNMENT

## 2020-01-14 VITALS
HEIGHT: 72 IN | HEART RATE: 72 BPM | WEIGHT: 265 LBS | BODY MASS INDEX: 35.89 KG/M2 | DIASTOLIC BLOOD PRESSURE: 81 MMHG | SYSTOLIC BLOOD PRESSURE: 119 MMHG

## 2020-01-14 DIAGNOSIS — M17.12 PRIMARY OSTEOARTHRITIS OF LEFT KNEE: ICD-10-CM

## 2020-01-14 DIAGNOSIS — S83.232A COMPLEX TEAR OF MEDIAL MENISCUS OF LEFT KNEE AS CURRENT INJURY, INITIAL ENCOUNTER: Primary | ICD-10-CM

## 2020-01-14 DIAGNOSIS — M94.262 CHONDROMALACIA OF LEFT KNEE: ICD-10-CM

## 2020-01-14 PROCEDURE — 99213 PR OFFICE/OUTPT VISIT, EST, LEVL III, 20-29 MIN: ICD-10-PCS | Mod: S$PBB,,, | Performed by: ORTHOPAEDIC SURGERY

## 2020-01-14 PROCEDURE — 99213 OFFICE O/P EST LOW 20 MIN: CPT | Mod: S$PBB,,, | Performed by: ORTHOPAEDIC SURGERY

## 2020-01-14 PROCEDURE — 99213 OFFICE O/P EST LOW 20 MIN: CPT | Mod: PBBFAC | Performed by: ORTHOPAEDIC SURGERY

## 2020-01-14 PROCEDURE — 99999 PR PBB SHADOW E&M-EST. PATIENT-LVL III: ICD-10-PCS | Mod: PBBFAC,,, | Performed by: ORTHOPAEDIC SURGERY

## 2020-01-14 PROCEDURE — 99999 PR PBB SHADOW E&M-EST. PATIENT-LVL III: CPT | Mod: PBBFAC,,, | Performed by: ORTHOPAEDIC SURGERY

## 2020-01-21 ENCOUNTER — DOCUMENTATION ONLY (OUTPATIENT)
Dept: REHABILITATION | Facility: HOSPITAL | Age: 60
End: 2020-01-21

## 2020-01-22 ENCOUNTER — PATIENT MESSAGE (OUTPATIENT)
Dept: INTERNAL MEDICINE | Facility: CLINIC | Age: 60
End: 2020-01-22

## 2020-01-22 DIAGNOSIS — M54.31 SCIATICA OF RIGHT SIDE: ICD-10-CM

## 2020-01-22 RX ORDER — HYDROCODONE BITARTRATE AND ACETAMINOPHEN 5; 325 MG/1; MG/1
1 TABLET ORAL EVERY 6 HOURS PRN
Qty: 100 TABLET | Refills: 0 | Status: SHIPPED | OUTPATIENT
Start: 2020-01-22 | End: 2020-02-22 | Stop reason: SDUPTHER

## 2020-01-24 ENCOUNTER — CLINICAL SUPPORT (OUTPATIENT)
Dept: REHABILITATION | Facility: HOSPITAL | Age: 60
End: 2020-01-24
Attending: INTERNAL MEDICINE
Payer: OTHER GOVERNMENT

## 2020-01-24 DIAGNOSIS — M54.41 ACUTE BILATERAL LOW BACK PAIN WITH RIGHT-SIDED SCIATICA: ICD-10-CM

## 2020-01-24 DIAGNOSIS — R26.89 DECREASED FUNCTIONAL MOBILITY: ICD-10-CM

## 2020-01-24 DIAGNOSIS — R53.1 DECREASED STRENGTH: ICD-10-CM

## 2020-01-24 PROCEDURE — 97110 THERAPEUTIC EXERCISES: CPT | Mod: PN

## 2020-01-24 NOTE — PLAN OF CARE
Outpatient Therapy Updated Plan of Care     Visit Date: 1/24/2020  Name: Baldo Grant  Clinic Number: [id number]    Therapy Diagnosis:   Encounter Diagnoses   Name Primary?    Acute bilateral low back pain with right-sided sciatica     Decreased functional mobility     Decreased strength      Physician: Efrain Vazquez MD    Physician Orders: PT Eval and Treat   Medical Diagnosis from Referral: M54.31 (ICD-10-CM) - Sciatica of right side   Evaluation Date: 10/9/2019  Total Visits Received: 6  Current Certification Period:  Until 1/31/2020    Precautions: Standard  Functional Level Prior to Evaluation:  Independent    Subjective     Update: Prior to exacerbation of low back pain patient reports 60-70% improvement in pain and function. Patient notes improvement in core control, improved thoracolumbar mobility, and pain resulting in less pain medication consumption and improved tolerance to traveling and daily activities. Since exacerbation patient reports 30% improvement.    Objective     Update:     WNL=within normal limits  WFL=within functional limits  NT=not tested     Update:      Palpation: Tenderness to B lumbar paraspinals, quadratus lumborum, PSIS, gluteus medius; L>R. Tenderness to lumbosacral junction     Lumbar AROM: Pain/Dysfunction with Movement:   Flexion: Minimal loss Dg's sign upon return to neutral   Extension: Major loss Pain to L low back and buttock   Right side bending: NT     Left side bending: NT     Right rotation: NT     Left rotation: NT        LE AROM:  Hip: Grossly WFL. Increased discomfort to B low back with end range flexion and internal rotation  Knee: WFL     LE Myotomes  Hip flexion: 3/5. Resistance not applied 2/2 (secondary) increased pain upon arrival  Hip extension: 3/5 B. Resistance not applied 2/2 increased pain upon arrival  Knee flexion: 3/5 B. Resistance not applied 2/2 increased pain upon arrival  Knee extension: 3/5 B. Resistance not applied 2/2 increased pain upon  arrival  Ankle DF: 4+/5 B  Ankle PF: 4+/5 B    CMS Impairment/Limitation/Restriction for FOTO Lumbar Spine Survey    Therapist reviewed FOTO scores for Baldo Grant on 1/24/2020.   FOTO documents entered into [employer] - see Media section.    Limitation Score: 52%  Category: Mobility     Assessment     Update: Subjective and objective progress affected by recent pain exacerbation; no short or long term goals met today as a result. Unable to position in prone even with pillows positioned to promote flexion. Improvement in intensity of pain and radicular symptoms by session's end.     Mario is progressing well towards his goals.   Pt prognosis is Good.   Pt will continue to benefit from skilled outpatient physical therapy to address the deficits listed in the problem list box on initial evaluation, provide pt/family education and to maximize pt's level of independence in the home and community environment.      Pt's spiritual, cultural and educational needs considered and pt agreeable to plan of care and goals.  Anticipated barriers to physical therapy: Chronicity of pain; work schedule     Short Term Goals (4 Weeks):  1. Pt will be compliant with HEP to supplement PT in decreasing pain with functional mobility. PROGRESSING, NOT MET 01/24/2020    2. Pt will perform posterior pelvic tilt with back flush against wall and no pain to demonstrate improved core strength. PROGRESSING, NOT MET 01/24/2020    3. Pt will report absence of spasms in low back during static standing for >/= 3 days to promote QOL. PROGRESSING, NOT MET 01/24/2020    4. Pt will report pain </=5/10 with sitting >/=1 hour to promote ease of travel for work. PROGRESSING, NOT MET 01/24/2020    Short Term Goals Status: Continue goals 1-4  Long Term Goals (8 Weeks):   1. Pt will improve FOTO score to </= 43% limited to decrease perceived limitation with maintaining/changing body position. PROGRESSING, NOT MET 01/24/2020    2. Pt will perform posterior pelvic  tilt + wall squat with back flush against wall and no pain to demonstrate improved core strength. PROGRESSING, NOT MET 01/24/2020    3. Pt will pull, lift, and carry booksack without pain to promote functional QOL. PROGRESSING, NOT MET 01/24/2020    4. Pt will report no pain with sitting >/=1 hour to promote ease of travel for work. PROGRESSING, NOT MET 01/24/2020    5. Pt will participate in lumbar and hip A/PROM testing without pain to promote functional mobility. PROGRESSING, NOT MET 01/24/2020    6. Pt will report absence of radicular R LE symptoms to promote QOL. PROGRESSING, NOT MET 01/24/2020    Long Term Goal Status: Continue goals 1-6  Reasons for Recertification of Therapy: Plan of care expires 1/31/2020    Plan     Updated Certification Period: 1/24/2020 to 3/20/2020  Recommended Treatment Plan: 10 times in 60 days: Cervical/Lumbar Traction, Electrical Stimulation -, Gait Training, Manual Therapy, Moist Heat/ Ice, Neuromuscular Re-ed, Patient Education, Therapeutic Activites and Therapeutic Exercise  Other Recommendations: modalities prn, ASTYM prn, kinesiotape prn, Functional Dry Needling prn     Nadine Hartley, PT  1/24/2020      I CERTIFY THE NEED FOR THESE SERVICES FURNISHED UNDER THIS PLAN OF TREATMENT AND WHILE UNDER MY CARE    Physician's comments:        Physician's Signature: ___________________________________________________

## 2020-01-24 NOTE — PROGRESS NOTES
"                            Physical Therapy Daily Treatment Note     Name: Baldo Grant  Clinic Number: [id number]    Therapy Diagnosis:   No diagnosis found.  Physician: No ref. provider found    Visit Date: 1/24/2020    Physician Orders: PT Eval and Treat   Medical Diagnosis from Referral: M54.31 (ICD-10-CM) - Sciatica of right side   Evaluation Date: 10/9/2019  Authorization Period Expiration: 12/31/2019  Plan of Care Expiration: 12/3/19 to 1/31/2020  Visit # / Visits authorized: 6/50  FOTO: 6/10 done    Time In: 0903  Time Out: 1000  Total Billable Time: 57 minutes    Precautions: Standard    Subjective     Pt reports: Pop in L knee upon performance of sit<> December; MRI performed revealing medial meniscus tear. Patient reports pivoting to the left 3 days ago with increased low back pain.  He was compliant with home exercise program.  Response to previous treatment: not applicable due to therapy hiatus and exacerbation of pain  Functional change: not applicable due to therapy hiatus and exacerbation of pain    Pain: 7/10 upon arrival; 4/10 at end of session  Location: L low back; radiating into buttock and lateral thigh    Objective     Bill received therapeutic exercises to develop strength, endurance, flexibility and core stabilization for 57 minutes including:  Objective measurements (See Updated POC in Treatment section)    Prone:  Prone on elbows: Pillow under hips, 2'. Increased pain  Prone: Pillow under hips, 2'. Less pain but still present    Hooklying:  Double knee to chest: x20 c/ blue Swiss peanut  Alternating single knee to chest: x10 B. Resting with B LE over blue Swiss peanut.  Transverse abdominus (TA) activation: 5"x20  Posterior pelvic tilt via TA and glute set: 2x10  Mini double leg bridge: x10    Standing:   Modified prayer stretch at edge of mat: 2x10    Home Exercises Provided and Patient Education Provided:    Education provided:   - Continue HEP.     Written Home Exercises " Provided: Not today.   Exercises were reviewed and Mariowas able to demonstrate them prior to the end of the session.  Mraio demonstrated good  understanding of the education provided.     See EMR under Patient Instructions for exercises provided 12/5/2019.    Assessment     See Updated POC in Treatment section.    Plan     See Updated POC in Treatment section.     Nadine Hartley, PT, DPT

## 2020-01-28 ENCOUNTER — PATIENT MESSAGE (OUTPATIENT)
Dept: NEUROSURGERY | Facility: CLINIC | Age: 60
End: 2020-01-28

## 2020-01-31 ENCOUNTER — TELEPHONE (OUTPATIENT)
Dept: PAIN MEDICINE | Facility: CLINIC | Age: 60
End: 2020-01-31

## 2020-01-31 NOTE — TELEPHONE ENCOUNTER
----- Message from Adeline Pichardo sent at 1/31/2020 10:12 AM CST -----  Contact: [phone]/axcw   Patient is requesting to speak with nurse to schedule a procedure on 02-05-20 in the afternoon if possible. Please advise.

## 2020-01-31 NOTE — TELEPHONE ENCOUNTER
----- Message from Amy Posadas sent at 1/31/2020  1:23 PM CST -----  Contact: pt  Please call pt @ [phone] regarding scheduling an injection, pt states last one 7/31/2019

## 2020-01-31 NOTE — TELEPHONE ENCOUNTER
Pt called requesting to get another Lumbar TFESI done. Pt states his last TFESI was 7/31/19 with Dr. Nickolas Duenas. He had right L4-5 done. Please advise.    He had Bilateral L3-4, L4-5, L4-S1 done with you on 11/5/19. Pt had canceled his follow up visit with Jeffy.

## 2020-01-31 NOTE — TELEPHONE ENCOUNTER
----- Message from Amy Posadas sent at 1/31/2020  1:23 PM CST -----  Contact: pt  Please call pt @ [phone] regarding scheduling an injection, pt states last one 7/31/2019

## 2020-02-03 ENCOUNTER — TELEPHONE (OUTPATIENT)
Dept: PAIN MEDICINE | Facility: CLINIC | Age: 60
End: 2020-02-03

## 2020-02-03 ENCOUNTER — PATIENT OUTREACH (OUTPATIENT)
Dept: ADMINISTRATIVE | Facility: OTHER | Age: 60
End: 2020-02-03

## 2020-02-03 ENCOUNTER — OFFICE VISIT (OUTPATIENT)
Dept: PAIN MEDICINE | Facility: CLINIC | Age: 60
End: 2020-02-03
Payer: OTHER GOVERNMENT

## 2020-02-03 VITALS — WEIGHT: 271 LBS | DIASTOLIC BLOOD PRESSURE: 66 MMHG | BODY MASS INDEX: 36.75 KG/M2 | SYSTOLIC BLOOD PRESSURE: 140 MMHG

## 2020-02-03 DIAGNOSIS — G89.4 CHRONIC PAIN SYNDROME: ICD-10-CM

## 2020-02-03 DIAGNOSIS — M47.816 LUMBAR SPONDYLOSIS: ICD-10-CM

## 2020-02-03 DIAGNOSIS — M48.061 SPINAL STENOSIS OF LUMBAR REGION, UNSPECIFIED WHETHER NEUROGENIC CLAUDICATION PRESENT: ICD-10-CM

## 2020-02-03 DIAGNOSIS — M51.36 DDD (DEGENERATIVE DISC DISEASE), LUMBAR: Primary | ICD-10-CM

## 2020-02-03 DIAGNOSIS — M54.16 LUMBAR RADICULOPATHY: Primary | ICD-10-CM

## 2020-02-03 PROCEDURE — 99214 OFFICE O/P EST MOD 30 MIN: CPT | Mod: S$PBB,,, | Performed by: NURSE PRACTITIONER

## 2020-02-03 PROCEDURE — 99214 PR OFFICE/OUTPT VISIT, EST, LEVL IV, 30-39 MIN: ICD-10-PCS | Mod: S$PBB,,, | Performed by: NURSE PRACTITIONER

## 2020-02-03 PROCEDURE — 99999 PR PBB SHADOW E&M-EST. PATIENT-LVL III: CPT | Mod: PBBFAC,,, | Performed by: NURSE PRACTITIONER

## 2020-02-03 PROCEDURE — 99213 OFFICE O/P EST LOW 20 MIN: CPT | Mod: PBBFAC,PO | Performed by: NURSE PRACTITIONER

## 2020-02-03 PROCEDURE — 99999 PR PBB SHADOW E&M-EST. PATIENT-LVL III: ICD-10-PCS | Mod: PBBFAC,,, | Performed by: NURSE PRACTITIONER

## 2020-02-03 NOTE — TELEPHONE ENCOUNTER
Pt scheduled for 2/11/20 at 1:30pm for Lumbar IL KATHY. Pt aware to check in at registration desk on the first floor of the hospital for 12:30 pm. Pt denied taking blood thinners and is not diabetic. Pt states he takes ASA every now and then. Pt aware not to take any for this procedure.

## 2020-02-03 NOTE — PROGRESS NOTES
[facility] New Patient Evaluation    Referred by: Dr. Encarnacion  Reason for referral: Back Pain    CC:   Chief Complaint   Patient presents with    Low-back Pain     Last 3 PDI Scores 2/3/2020   Pain Disability Index (PDI) 32       HPI:   Baldo Grant is a 59 y.o. male who complains of left and right lower back pain.  He reports the pain radiates down his left leg just below his knee.  Pain intensity is 5/10.  He reports taking Norco, Gabapentin and Meloxicam for some relief.   Patient s/p Bilateral L3-4, L4-5, and L5-S1 Lumbar Medial Branch Block on 11/5/2019 with Dr. Anglin with no relief.  Patient's 5/17/2019 MRI Lumbar Spine reviewed with patient and shows L4-5 and L5-S1 degenerative disc disease with disc bulging as described above with right L4-5 and left L5-S1 foraminal stenosis.      Location: lower back   Onset: Summer 200  Current Pain Score: 5/10   Daily Pain of Range: 4-9/10  Quality: Dull and Sharp  Radiation: back of left leg  Worsened by: sitting and standing  Improved by: medications and physical therapy    Previous Therapies:  PT/OT:   HEP:   Interventions: Bilateral L3-4, L4-5, and L5-S1 Lumbar Medial Branch Block on 11/5/2019  Surgery:  Medications:   - NSAIDS: meloxicam (MOBIC) 15 MG tablet daily  - MSK Relaxants:  diazePAM (VALIUM) 5 MG tablet daily as needed  - TCAs:   - SNRIs:   - Topicals: lidocaine HCL 2% (XYLOCAINE) 2 % jelly  - Anticonvulsants:  - Opioids: HYDROcodone-acetaminophen (NORCO) 5-325 mg per tablet    Current Pain Medications:  1. Meloxicam 15 mg   2. Gabapentin 600 mg TID        3. HYDROcodone-acetaminophen (NORCO) 5-325 mg per tablet Q6 hours PRN    Review of Systems:  Review of Systems   Constitutional: Negative.    HENT: Negative.    Eyes: Negative.    Respiratory: Negative.    Cardiovascular: Negative.    Gastrointestinal: Negative.    Genitourinary: Negative.    Musculoskeletal: Positive for back pain, joint pain and myalgias.   Skin: Negative.     Neurological: Negative.    Endo/Heme/Allergies: Negative.    Psychiatric/Behavioral: Negative.        History:    Current Outpatient Medications:     calcipotriene-betamethasone (ENSTILAR) 0.005-0.064 % Foam, Apply 1 application topically once daily., Disp: 60 g, Rfl: 2    desonide (DESOWEN) 0.05 % cream, Thin film to AA eyelids bid PRN flare, Disp: 60 g, Rfl: 3    diazePAM (VALIUM) 5 MG tablet, Take 1 tablet (5 mg total) by mouth daily as needed., Disp: 30 tablet, Rfl: 1    diclofenac sodium (VOLTAREN) 1 % Gel, Apply 2 g topically 4 (four) times daily., Disp: 100 g, Rfl: 0    diphenhydrAMINE (BENADRYL) 25 mg capsule, Take 25 mg by mouth every 6 (six) hours as needed for Itching., Disp: , Rfl:     fexofenadine (ALLEGRA) 180 MG tablet, Take 180 mg by mouth continuous prn., Disp: , Rfl:     gabapentin (NEURONTIN) 600 MG tablet, Take 1 tablet (600 mg total) by mouth 3 (three) times daily., Disp: 270 tablet, Rfl: 3    HYDROcodone-acetaminophen (NORCO) 5-325 mg per tablet, Take 1 tablet by mouth every 6 (six) hours as needed for Pain., Disp: 100 tablet, Rfl: 0    lidocaine HCL 2% (XYLOCAINE) 2 % jelly, Apply topically as needed., Disp: 30 mL, Rfl: 3    meloxicam (MOBIC) 15 MG tablet, TAKE 1 TABLET(15 MG) BY MOUTH EVERY DAY, Disp: 90 tablet, Rfl: 4    EPINEPHrine (EPIPEN) 0.3 mg/0.3 mL AtIn, Inject 0.3 mLs (0.3 mg total) into the muscle once. for 1 dose, Disp: 1 Device, Rfl: 1    Past Medical History:   Diagnosis Date    Arthritis     Basal cell carcinoma 06/21/2019    back    Cancer     Chronic pain of right knee     SCC (squamous cell carcinoma) 2014    Forehead- Dr. Cabral     Skin cancer     Squamous cell carcinoma 2013    Right ear- Dr. Marianna long       Past Surgical History:   Procedure Laterality Date    APPENDECTOMY      COLONOSCOPY  1998    INJECTION OF ANESTHETIC AGENT AROUND MEDIAL BRANCH NERVES INNERVATING LUMBAR FACET JOINT Bilateral 11/5/2019    Procedure: Block-nerve-medial  branch-lumbar--Bilateral L3-4,L4-5,L5-S1;  Surgeon: Alexus Anglin Jr., MD;  Location: [facility];  Service: Pain Management;  Laterality: Bilateral;    lipoma removal      skin cancer removal         Family History   Problem Relation Age of Onset    COPD Mother     Alcohol abuse Mother     Heart disease Father     Melanoma Father     No Known Problems Sister     No Known Problems Brother     Psoriasis Neg Hx     Lupus Neg Hx     Eczema Neg Hx        Social History     Socioeconomic History    Marital status:      Spouse name: Not on file    Number of children: Not on file    Years of education: Not on file    Highest education level: Not on file   Occupational History    Not on file   Social Needs    Financial resource strain: Not very hard    Food insecurity:     Worry: Never true     Inability: Never true    Transportation needs:     Medical: No     Non-medical: No   Tobacco Use    Smoking status: Never Smoker    Smokeless tobacco: Never Used   Substance and Sexual Activity    Alcohol use: Yes     Frequency: 2-4 times a month     Drinks per session: 1 or 2     Binge frequency: Never     Comment: social    Drug use: No    Sexual activity: Yes     Partners: Female   Lifestyle    Physical activity:     Days per week: 0 days     Minutes per session: 0 min    Stress: Only a little   Relationships    Social connections:     Talks on phone: More than three times a week     Gets together: Once a week     Attends Congregational service: Not on file     Active member of club or organization: Yes     Attends meetings of clubs or organizations: More than 4 times per year     Relationship status:    Other Topics Concern    Not on file   Social History Narrative    Not on file       Review of patient's allergies indicates:   Allergen Reactions    Advil [ibuprofen]        Physical Exam:  Vitals:    02/03/20 1551   Weight: 122.9 kg (271 lb)   PainSc:   5             Back (L-Spine &  T-Spine) / Neck (C-Spine) Exam     Tenderness Right paramedian tenderness of the Lower L-Spine. Left paramedian tenderness of the Lower L-Spine.     Back (L-Spine & T-Spine) Range of Motion   Lateral bend right: abnormal   Lateral bend left: abnormal   Rotation right: abnormal   Rotation left: abnormal     Spinal Sensation   Right Side Sensation  L-Spine Level: normal  Left Side Sensation  L-Spine Level: normal      Muscle Strength   Right Lower Extremity   Hip Abduction: 5/5   Hip Flexion: 5/5   Hip Extensors: 5/5  Quadriceps:  5/5   Hamstrin/5   Gastrocsoleus:  5/5/5  Left Lower Extremity   Hip Abduction: 4/5   Hip Flexion: 4/5   Hip Extensors: 4/5  Quadriceps:  4/5   Hamstrin/5   Gastrocsoleus:  5/5/5      Imaging:  EXAMINATION:  MRI LUMBAR SPINE WITHOUT CONTRAST    CLINICAL HISTORY:  Low back painLow back pain, rapidly progressive neuro deficit;    TECHNIQUE:  Standard multiplanar noncontrast MRI sequences of the lumbar spine.    COMPARISON:  None    FINDINGS:  The distal cord and conus reveal normal signal and morphology. The lumbar vertebra reveal normal alignment, shape and signal intensity.    T12-L1: Unremarkable    L1-2:  Unremarkable    L2-3:  Minimal annular bulge.    L3-4:  Mild disc desiccation with mild generalized annular bulge.  Mild hypertrophic ligamentum flavum and facet arthritis.    L4-5:  Mild disc desiccation with mild annular bulge.  Right foraminal annular fissure.  Mild hypertrophic ligamentum flavum and facet arthritis.  Mild to moderate left and moderate right foraminal stenosis.    L5-S1:  Mild degenerative disc disease with disc desiccation and disc bulge/osteophyte.  Mild hypertrophic ligamentum flavum and facet arthritis.  Mild right with mild to moderate left foraminal stenosis.      Impression       L4-5 and L5-S1 degenerative disc disease with disc bulging osteophyte as described above with right L4-5 and left L5-S1 foraminal stenosis.      Electronically signed by:  Baldo Gayle MD  Date: 05/17/2019         Labs:  BMP  Lab Results   Component Value Date     10/27/2015    K 4.1 10/27/2015     10/27/2015    CO2 23 10/27/2015    BUN 22 (H) 10/27/2015    CREATININE 1.1 10/27/2015    CALCIUM 9.2 10/27/2015    ANIONGAP 11 10/27/2015    ESTGFRAFRICA >60.0 10/27/2015    EGFRNONAA >60.0 10/27/2015     Lab Results   Component Value Date    ALT 40 10/27/2015    AST 19 10/27/2015    ALKPHOS 39 (L) 10/27/2015    BILITOT 0.6 10/27/2015       Assessment:  Problem List Items Addressed This Visit        Neuro    Chronic pain      Other Visit Diagnoses     DDD (degenerative disc disease), lumbar    -  Primary    Spinal stenosis of lumbar region, unspecified whether neurogenic claudication present        Lumbar spondylosis              2/3/2020 - Baldo Grant is a 59 y.o. male with who complains of left and right lower back pain.  He reports the pain radiates down his left leg just below his knee.  Pain intensity is 5/10. He reports his current medication regimen of Norco, Gabapentin and Meloxicam help relieve his pain.  He reports having  Bilateral L3-4, L4-5, and L5-S1 Lumbar Medial Branch Block on 11/5/2019 with no relief.Patient's 5/17/2019 MRI Lumbar Spine reviewed with patient and shows L4-5 and L5-S1 degenerative disc disease with disc bulging  as described above with right L4-5 and left L5-S1 foraminal stenosis.  Recommended scheduling for Interlaminar KATHY  L4-5 with moderate sedation. Patient will follow in clinic following injection.         : Reviewed and consistent with medication use as prescribed.    Treatment Plan:   Procedures: Scheduled Interlaminar KATHY @ L4-5   PT/OT/HEP: None at this time  Medications: No changes recommended at this time.  Labs: reviewed and medications are appropriately dosed for current hepatorenal function.  Imaging: No additional recommended at this time. Reviewed and discussed results of 5/17/2019 MRI Lumbar Spine.     Greater than  50 minutes spent in total in todays visit with the patient, 25 minutes direct face to face counseling, performing PE, and educating patient today. 25 minutes was used discussing the disease process, prognosis, treatment plan, risks and benefits.    Follow Up: RTC  Following Interlaminar L4-5    ALAN Hale  Interventional Pain Management        Disclaimer: This note was partly generated using dictation software which may occasionally result in transcription errors.

## 2020-02-03 NOTE — H&P (VIEW-ONLY)
[facility] New Patient Evaluation    Referred by: Dr. Encarnacion  Reason for referral: Back Pain    CC:   Chief Complaint   Patient presents with    Low-back Pain     Last 3 PDI Scores 2/3/2020   Pain Disability Index (PDI) 32       HPI:   Baldo Grant is a 59 y.o. male who complains of left and right lower back pain.  He reports the pain radiates down his left leg just below his knee.  Pain intensity is 5/10.  He reports taking Norco, Gabapentin and Meloxicam for some relief.   Patient s/p Bilateral L3-4, L4-5, and L5-S1 Lumbar Medial Branch Block on 11/5/2019 with Dr. Anglin with no relief.  Patient's 5/17/2019 MRI Lumbar Spine reviewed with patient and shows L4-5 and L5-S1 degenerative disc disease with disc bulging as described above with right L4-5 and left L5-S1 foraminal stenosis.      Location: lower back   Onset: Summer 200  Current Pain Score: 5/10   Daily Pain of Range: 4-9/10  Quality: Dull and Sharp  Radiation: back of left leg  Worsened by: sitting and standing  Improved by: medications and physical therapy    Previous Therapies:  PT/OT:   HEP:   Interventions: Bilateral L3-4, L4-5, and L5-S1 Lumbar Medial Branch Block on 11/5/2019  Surgery:  Medications:   - NSAIDS: meloxicam (MOBIC) 15 MG tablet daily  - MSK Relaxants:  diazePAM (VALIUM) 5 MG tablet daily as needed  - TCAs:   - SNRIs:   - Topicals: lidocaine HCL 2% (XYLOCAINE) 2 % jelly  - Anticonvulsants:  - Opioids: HYDROcodone-acetaminophen (NORCO) 5-325 mg per tablet    Current Pain Medications:  1. Meloxicam 15 mg   2. Gabapentin 600 mg TID        3. HYDROcodone-acetaminophen (NORCO) 5-325 mg per tablet Q6 hours PRN    Review of Systems:  Review of Systems   Constitutional: Negative.    HENT: Negative.    Eyes: Negative.    Respiratory: Negative.    Cardiovascular: Negative.    Gastrointestinal: Negative.    Genitourinary: Negative.    Musculoskeletal: Positive for back pain, joint pain and myalgias.   Skin: Negative.     Neurological: Negative.    Endo/Heme/Allergies: Negative.    Psychiatric/Behavioral: Negative.        History:    Current Outpatient Medications:     calcipotriene-betamethasone (ENSTILAR) 0.005-0.064 % Foam, Apply 1 application topically once daily., Disp: 60 g, Rfl: 2    desonide (DESOWEN) 0.05 % cream, Thin film to AA eyelids bid PRN flare, Disp: 60 g, Rfl: 3    diazePAM (VALIUM) 5 MG tablet, Take 1 tablet (5 mg total) by mouth daily as needed., Disp: 30 tablet, Rfl: 1    diclofenac sodium (VOLTAREN) 1 % Gel, Apply 2 g topically 4 (four) times daily., Disp: 100 g, Rfl: 0    diphenhydrAMINE (BENADRYL) 25 mg capsule, Take 25 mg by mouth every 6 (six) hours as needed for Itching., Disp: , Rfl:     fexofenadine (ALLEGRA) 180 MG tablet, Take 180 mg by mouth continuous prn., Disp: , Rfl:     gabapentin (NEURONTIN) 600 MG tablet, Take 1 tablet (600 mg total) by mouth 3 (three) times daily., Disp: 270 tablet, Rfl: 3    HYDROcodone-acetaminophen (NORCO) 5-325 mg per tablet, Take 1 tablet by mouth every 6 (six) hours as needed for Pain., Disp: 100 tablet, Rfl: 0    lidocaine HCL 2% (XYLOCAINE) 2 % jelly, Apply topically as needed., Disp: 30 mL, Rfl: 3    meloxicam (MOBIC) 15 MG tablet, TAKE 1 TABLET(15 MG) BY MOUTH EVERY DAY, Disp: 90 tablet, Rfl: 4    EPINEPHrine (EPIPEN) 0.3 mg/0.3 mL AtIn, Inject 0.3 mLs (0.3 mg total) into the muscle once. for 1 dose, Disp: 1 Device, Rfl: 1    Past Medical History:   Diagnosis Date    Arthritis     Basal cell carcinoma 06/21/2019    back    Cancer     Chronic pain of right knee     SCC (squamous cell carcinoma) 2014    Forehead- Dr. Cabral     Skin cancer     Squamous cell carcinoma 2013    Right ear- Dr. Marianna long       Past Surgical History:   Procedure Laterality Date    APPENDECTOMY      COLONOSCOPY  1998    INJECTION OF ANESTHETIC AGENT AROUND MEDIAL BRANCH NERVES INNERVATING LUMBAR FACET JOINT Bilateral 11/5/2019    Procedure: Block-nerve-medial  branch-lumbar--Bilateral L3-4,L4-5,L5-S1;  Surgeon: Alexus Anglin Jr., MD;  Location: [facility];  Service: Pain Management;  Laterality: Bilateral;    lipoma removal      skin cancer removal         Family History   Problem Relation Age of Onset    COPD Mother     Alcohol abuse Mother     Heart disease Father     Melanoma Father     No Known Problems Sister     No Known Problems Brother     Psoriasis Neg Hx     Lupus Neg Hx     Eczema Neg Hx        Social History     Socioeconomic History    Marital status:      Spouse name: Not on file    Number of children: Not on file    Years of education: Not on file    Highest education level: Not on file   Occupational History    Not on file   Social Needs    Financial resource strain: Not very hard    Food insecurity:     Worry: Never true     Inability: Never true    Transportation needs:     Medical: No     Non-medical: No   Tobacco Use    Smoking status: Never Smoker    Smokeless tobacco: Never Used   Substance and Sexual Activity    Alcohol use: Yes     Frequency: 2-4 times a month     Drinks per session: 1 or 2     Binge frequency: Never     Comment: social    Drug use: No    Sexual activity: Yes     Partners: Female   Lifestyle    Physical activity:     Days per week: 0 days     Minutes per session: 0 min    Stress: Only a little   Relationships    Social connections:     Talks on phone: More than three times a week     Gets together: Once a week     Attends [employer] service: Not on file     Active member of club or organization: Yes     Attends meetings of clubs or organizations: More than 4 times per year     Relationship status:    Other Topics Concern    Not on file   Social History Narrative    Not on file       Review of patient's allergies indicates:   Allergen Reactions    Advil [ibuprofen]        Physical Exam:  Vitals:    02/03/20 1551   Weight: 122.9 kg (271 lb)   PainSc:   5             Back (L-Spine &  T-Spine) / Neck (C-Spine) Exam     Tenderness Right paramedian tenderness of the Lower L-Spine. Left paramedian tenderness of the Lower L-Spine.     Back (L-Spine & T-Spine) Range of Motion   Lateral bend right: abnormal   Lateral bend left: abnormal   Rotation right: abnormal   Rotation left: abnormal     Spinal Sensation   Right Side Sensation  L-Spine Level: normal  Left Side Sensation  L-Spine Level: normal      Muscle Strength   Right Lower Extremity   Hip Abduction: 5/5   Hip Flexion: 5/5   Hip Extensors: 5/5  Quadriceps:  5/5   Hamstrin/5   Gastrocsoleus:  5/5/5  Left Lower Extremity   Hip Abduction: 4/5   Hip Flexion: 4/5   Hip Extensors: 4/5  Quadriceps:  4/5   Hamstrin/5   Gastrocsoleus:  5/5/5      Imaging:  EXAMINATION:  MRI LUMBAR SPINE WITHOUT CONTRAST    CLINICAL HISTORY:  Low back painLow back pain, rapidly progressive neuro deficit;    TECHNIQUE:  Standard multiplanar noncontrast MRI sequences of the lumbar spine.    COMPARISON:  None    FINDINGS:  The distal cord and conus reveal normal signal and morphology. The lumbar vertebra reveal normal alignment, shape and signal intensity.    T12-L1: Unremarkable    L1-2:  Unremarkable    L2-3:  Minimal annular bulge.    L3-4:  Mild disc desiccation with mild generalized annular bulge.  Mild hypertrophic ligamentum flavum and facet arthritis.    L4-5:  Mild disc desiccation with mild annular bulge.  Right foraminal annular fissure.  Mild hypertrophic ligamentum flavum and facet arthritis.  Mild to moderate left and moderate right foraminal stenosis.    L5-S1:  Mild degenerative disc disease with disc desiccation and disc bulge/osteophyte.  Mild hypertrophic ligamentum flavum and facet arthritis.  Mild right with mild to moderate left foraminal stenosis.      Impression       L4-5 and L5-S1 degenerative disc disease with disc bulging osteophyte as described above with right L4-5 and left L5-S1 foraminal stenosis.      Electronically signed by:  Baldo Gayle MD  Date: 05/17/2019         Labs:  BMP  Lab Results   Component Value Date     10/27/2015    K 4.1 10/27/2015     10/27/2015    CO2 23 10/27/2015    BUN 22 (H) 10/27/2015    CREATININE 1.1 10/27/2015    CALCIUM 9.2 10/27/2015    ANIONGAP 11 10/27/2015    ESTGFRAFRICA >60.0 10/27/2015    EGFRNONAA >60.0 10/27/2015     Lab Results   Component Value Date    ALT 40 10/27/2015    AST 19 10/27/2015    ALKPHOS 39 (L) 10/27/2015    BILITOT 0.6 10/27/2015       Assessment:  Problem List Items Addressed This Visit        Neuro    Chronic pain      Other Visit Diagnoses     DDD (degenerative disc disease), lumbar    -  Primary    Spinal stenosis of lumbar region, unspecified whether neurogenic claudication present        Lumbar spondylosis              2/3/2020 - Baldo Grant is a 59 y.o. male with who complains of left and right lower back pain.  He reports the pain radiates down his left leg just below his knee.  Pain intensity is 5/10. He reports his current medication regimen of Norco, Gabapentin and Meloxicam help relieve his pain.  He reports having  Bilateral L3-4, L4-5, and L5-S1 Lumbar Medial Branch Block on 11/5/2019 with no relief.Patient's 5/17/2019 MRI Lumbar Spine reviewed with patient and shows L4-5 and L5-S1 degenerative disc disease with disc bulging  as described above with right L4-5 and left L5-S1 foraminal stenosis.  Recommended scheduling for Interlaminar KATHY  L4-5 with moderate sedation. Patient will follow in clinic following injection.         : Reviewed and consistent with medication use as prescribed.    Treatment Plan:   Procedures: Scheduled Interlaminar KATHY @ L4-5   PT/OT/HEP: None at this time  Medications: No changes recommended at this time.  Labs: reviewed and medications are appropriately dosed for current hepatorenal function.  Imaging: No additional recommended at this time. Reviewed and discussed results of 5/17/2019 MRI Lumbar Spine.     Greater than  50 minutes spent in total in todays visit with the patient, 25 minutes direct face to face counseling, performing PE, and educating patient today. 25 minutes was used discussing the disease process, prognosis, treatment plan, risks and benefits.    Follow Up: RTC  Following Interlaminar L4-5    ALAN Hale  Interventional Pain Management        Disclaimer: This note was partly generated using dictation software which may occasionally result in transcription errors.

## 2020-02-11 ENCOUNTER — HOSPITAL ENCOUNTER (OUTPATIENT)
Facility: HOSPITAL | Age: 60
Discharge: HOME | End: 2020-02-11
Attending: PAIN MEDICINE | Admitting: PAIN MEDICINE
Payer: OTHER GOVERNMENT

## 2020-02-11 VITALS
HEIGHT: 72 IN | TEMPERATURE: 98 F | DIASTOLIC BLOOD PRESSURE: 75 MMHG | OXYGEN SATURATION: 96 % | WEIGHT: 262 LBS | BODY MASS INDEX: 35.49 KG/M2 | RESPIRATION RATE: 16 BRPM | HEART RATE: 72 BPM | SYSTOLIC BLOOD PRESSURE: 117 MMHG

## 2020-02-11 DIAGNOSIS — G89.29 CHRONIC PAIN: ICD-10-CM

## 2020-02-11 DIAGNOSIS — M54.16 LUMBAR RADICULOPATHY: Primary | ICD-10-CM

## 2020-02-11 PROCEDURE — 25500020 PHARM REV CODE 255: Performed by: PAIN MEDICINE

## 2020-02-11 PROCEDURE — 62323 NJX INTERLAMINAR LMBR/SAC: CPT | Performed by: PAIN MEDICINE

## 2020-02-11 PROCEDURE — 99152 MOD SED SAME PHYS/QHP 5/>YRS: CPT | Mod: ,,, | Performed by: PAIN MEDICINE

## 2020-02-11 PROCEDURE — 62323 NJX INTERLAMINAR LMBR/SAC: CPT | Mod: ,,, | Performed by: PAIN MEDICINE

## 2020-02-11 PROCEDURE — 25000003 PHARM REV CODE 250: Performed by: PAIN MEDICINE

## 2020-02-11 PROCEDURE — 62323 PR INJ LUMBAR/SACRAL, W/IMAGING GUIDANCE: ICD-10-PCS | Mod: ,,, | Performed by: PAIN MEDICINE

## 2020-02-11 PROCEDURE — 99152 PR MOD CONSCIOUS SEDATION, SAME PHYS, 5+ YRS, FIRST 15 MIN: ICD-10-PCS | Mod: ,,, | Performed by: PAIN MEDICINE

## 2020-02-11 PROCEDURE — 63600175 PHARM REV CODE 636 W HCPCS: Performed by: PAIN MEDICINE

## 2020-02-11 PROCEDURE — 99152 MOD SED SAME PHYS/QHP 5/>YRS: CPT | Performed by: PAIN MEDICINE

## 2020-02-11 RX ORDER — SODIUM BICARBONATE 1 MEQ/ML
SYRINGE (ML) INTRAVENOUS
Status: DISCONTINUED | OUTPATIENT
Start: 2020-02-11 | End: 2020-02-11 | Stop reason: HOSPADM

## 2020-02-11 RX ORDER — MIDAZOLAM HYDROCHLORIDE 1 MG/ML
INJECTION, SOLUTION INTRAMUSCULAR; INTRAVENOUS
Status: DISCONTINUED | OUTPATIENT
Start: 2020-02-11 | End: 2020-02-11 | Stop reason: HOSPADM

## 2020-02-11 RX ORDER — SODIUM CHLORIDE 9 MG/ML
500 INJECTION, SOLUTION INTRAVENOUS CONTINUOUS
Status: ACTIVE | OUTPATIENT
Start: 2020-02-11 | End: 2020-02-12

## 2020-02-11 RX ORDER — LIDOCAINE HYDROCHLORIDE 10 MG/ML
INJECTION INFILTRATION; PERINEURAL
Status: DISCONTINUED | OUTPATIENT
Start: 2020-02-11 | End: 2020-02-11 | Stop reason: HOSPADM

## 2020-02-11 RX ORDER — BUPIVACAINE HYDROCHLORIDE 2.5 MG/ML
INJECTION, SOLUTION EPIDURAL; INFILTRATION; INTRACAUDAL
Status: DISCONTINUED | OUTPATIENT
Start: 2020-02-11 | End: 2020-02-11 | Stop reason: HOSPADM

## 2020-02-11 RX ORDER — LIDOCAINE HYDROCHLORIDE 10 MG/ML
1 INJECTION, SOLUTION EPIDURAL; INFILTRATION; INTRACAUDAL; PERINEURAL ONCE
Status: ACTIVE | OUTPATIENT
Start: 2020-02-11

## 2020-02-11 RX ORDER — FENTANYL CITRATE 50 UG/ML
INJECTION, SOLUTION INTRAMUSCULAR; INTRAVENOUS
Status: DISCONTINUED | OUTPATIENT
Start: 2020-02-11 | End: 2020-02-11 | Stop reason: HOSPADM

## 2020-02-11 RX ORDER — METHYLPREDNISOLONE ACETATE 40 MG/ML
INJECTION, SUSPENSION INTRA-ARTICULAR; INTRALESIONAL; INTRAMUSCULAR; SOFT TISSUE
Status: DISCONTINUED | OUTPATIENT
Start: 2020-02-11 | End: 2020-02-11 | Stop reason: HOSPADM

## 2020-02-11 RX ADMIN — SODIUM CHLORIDE 500 ML: 0.9 INJECTION, SOLUTION INTRAVENOUS at 10:02

## 2020-02-11 NOTE — OP NOTE
"Procedure Note    Pre-operative Diagnosis: Lumbar Radiculopathy  Post-operative Diagnosis: Lumbar Radiculopathy  Procedure Date: 02/11/2020  Procedure:  (1) Lumbar Epidural Steroid Injection at L4-5    (2) Intraoperative fluoroscopy    (3) IV Moderate Sedation (Midazolam 2 mg, Fentanyl 50 mcg)          Anesthesia: Local    Indications: To alleviate pain and suffering, and reduce functional impairment.    Procedure in Detail:   The patients history and physical exam were reviewed. The risks, benefits and alternatives to the procedure were discussed, and all questions were answered to the patients satisfaction. The patient agreed to proceed, and written informed consent was verified.    The patient was brought into the procedure room and placed in the prone position on the fluoroscopy table. The area of the lumbar spine was prepped with Chloraprep and draped in a sterile manner. The L4-5 interspace was identified and marked under AP fluoroscopy. The skin and subcutaneous tissues overlying the targeted interspace were anesthetized with 3-5 mL of 1% lidocaine using a 25G, 1.5" needle. A 17G, 3.5" Tuohy epidural needle was directed toward the interspace under fluoroscopic guidance until the ligamentum flavum was engaged. From this point, a loss of resistance technique with a glass syringe and saline was used to identify entrance of the needle into the epidural space. Once loss of resistance was observed, up to 1 mL of contrast solution was injected. An appropriate epidurogram was noted.    A 5 mL mixture consisting of PF saline (2 mL), MPF Bupivacaine 0.25% (2 mL), and Depomedrol 40 mg (1 mL) was injected slowly and without resistance.  The needle was removed and a bandage applied to puncture site.    Blood Loss: nil    Disposition: The patient tolerated the procedure well, and there were no apparent complications. Vital signs remained stable throughout the procedure. The patient was taken to the recovery area where " written discharge instructions for the procedure were given.     Follow-up: RTC as scheduled      Alexus Anglin Jr, MD  Interventional Pain Medicine / Anesthesiology

## 2020-02-11 NOTE — PROGRESS NOTES
MD Arrival Time:1057  Sedation Start Time:1057  Sedation End Time:1101  MD Departure Time:1101

## 2020-02-11 NOTE — DISCHARGE SUMMARY
[facility]  Discharge Note  Short Stay     Admit Date: 2/11/2020    Discharge Date: 2/11/2020     Attending Physician: Alexus Anglin Jr, MD    Diagnoses:  Active Hospital Problems    Diagnosis  POA    Chronic pain [G89.29]  Yes    Lumbar radiculopathy [M54.16]  Yes      Resolved Hospital Problems   No resolved problems to display.     Discharged Condition: Good     Hospital Course: Patient was admitted for an outpatient interventional pain management procedure and tolerated the procedure well with no complications.     Final Diagnoses: Same as principal problem.     Disposition: Home or Self Care     Follow up/Patient Instructions:    Follow-up Information     Alexus Anglin Jr, MD. Go in 2 weeks.    Specialty:  Pain Medicine  Why:  Post-procedural Follow Up As Scheduled, Call to make an appointment if you do not have one  Contact information:  [address]  [address]  [place] [address]  [phone]                   Reconciled Medications:     Medication List      CONTINUE taking these medications    calcipotriene-betamethasone 0.005-0.064 % Foam  Commonly known as:  Enstilar  Apply 1 application topically once daily.     desonide 0.05 % cream  Commonly known as:  DESOWEN  Thin film to AA eyelids bid PRN flare     diazePAM 5 MG tablet  Commonly known as:  VALIUM  Take 1 tablet (5 mg total) by mouth daily as needed.     diclofenac sodium 1 % Gel  Commonly known as:  Voltaren  Apply 2 g topically 4 (four) times daily.     diphenhydrAMINE 25 mg capsule  Commonly known as:  BENADRYL  Take 25 mg by mouth every 6 (six) hours as needed for Itching.     EPINEPHrine 0.3 mg/0.3 mL Atin  Commonly known as:  EPIPEN  Inject 0.3 mLs (0.3 mg total) into the muscle once. for 1 dose     fexofenadine 180 MG tablet  Commonly known as:  ALLEGRA  Take 180 mg by mouth continuous prn.     gabapentin 600 MG tablet  Commonly known as:  NEURONTIN  Take 1 tablet (600 mg total) by mouth 3 (three) times daily.      HYDROcodone-acetaminophen 5-325 mg per tablet  Commonly known as:  NORCO  Take 1 tablet by mouth every 6 (six) hours as needed for Pain.     lidocaine HCL 2% 2 % jelly  Commonly known as:  XYLOCAINE  Apply topically as needed.     meloxicam 15 MG tablet  Commonly known as:  MOBIC  TAKE 1 TABLET(15 MG) BY MOUTH EVERY DAY           Discharge Procedure Orders (must include Diet, Follow-up, Activity)   Call MD for:  temperature >100.4     Call MD for:  severe uncontrolled pain     Call MD for:  redness, tenderness, or signs of infection (pain, swelling, redness, odor or green/yellow discharge around incision site)     Call MD for:  difficulty breathing or increased cough     Call MD for:  severe persistent headache     Call MD for:  worsening rash     Remove dressing in 24 hours       Alexus Anglin Jr, MD  Interventional Pain Medicine / Anesthesiology

## 2020-02-11 NOTE — PLAN OF CARE
Discharge instructions reviewed with patient. Questions answered. Verbalized understanding, no further questions at this time. IV d/c'd with tip intact. Band-aid applied. Procedure site is clean, dry and intact. Vital signs are stable. No acute distress noted. Staff at bedside to help pt change. Wheeled to PA area by staff. Home with family in private vehicle.

## 2020-02-11 NOTE — DISCHARGE INSTRUCTIONS
Home Care Instructions Pain Management:    1.  DIET:    You may resume your normal diet today.    2.  BATHING:    You may shower with luke warm water.    3.  DRESSING:    You may remove your bandage today.    4.  ACTIVITY LEVEL:      You may resume your normal activities 24 hours after your procedure.    5.  MEDICATIONS:    You may resume your normal medications today.    6.  SPECIAL INSTRUCTIONS:    No heat to the injection site for 24 hours including bath or shower, heating pad, moist heat or hot tubs.    Use an ice pack to the injection site for any pain or discomfort.  Apply ice packs for 20 minute intervals as needed.    If you have received any sedatives by mouth today, you can not drive for 12 hours.    If you have received sedation through an IV, you can not drive for 24 hours.    PLEASE CALL YOUR DOCTOR FOR THE FOLLOWIN.  Redness or swelling around the injection site.  2.  Fever of 101 degrees.  3.  Drainage (pus) from the injection site.  4.  For any continuous bleeding (some dried blood over the incision is normal.)    FOR EMERGENCIES:    If any unusual problems or difficulties occur during clinic hours, call [phone] or dial [phone].    Follow up with with your physician in 2-3 weeks.

## 2020-02-22 ENCOUNTER — PATIENT MESSAGE (OUTPATIENT)
Dept: INTERNAL MEDICINE | Facility: CLINIC | Age: 60
End: 2020-02-22

## 2020-02-22 ENCOUNTER — PATIENT MESSAGE (OUTPATIENT)
Dept: PAIN MEDICINE | Facility: CLINIC | Age: 60
End: 2020-02-22

## 2020-02-22 DIAGNOSIS — M54.31 SCIATICA OF RIGHT SIDE: ICD-10-CM

## 2020-02-24 RX ORDER — HYDROCODONE BITARTRATE AND ACETAMINOPHEN 5; 325 MG/1; MG/1
1 TABLET ORAL EVERY 6 HOURS PRN
Qty: 100 TABLET | Refills: 0 | Status: SHIPPED | OUTPATIENT
Start: 2020-02-24 | End: 2020-04-02 | Stop reason: SDUPTHER

## 2020-02-28 ENCOUNTER — PATIENT OUTREACH (OUTPATIENT)
Dept: ADMINISTRATIVE | Facility: OTHER | Age: 60
End: 2020-02-28

## 2020-03-02 ENCOUNTER — OFFICE VISIT (OUTPATIENT)
Dept: PAIN MEDICINE | Facility: CLINIC | Age: 60
End: 2020-03-02
Payer: OTHER GOVERNMENT

## 2020-03-02 ENCOUNTER — OFFICE VISIT (OUTPATIENT)
Dept: SPORTS MEDICINE | Facility: CLINIC | Age: 60
End: 2020-03-02
Payer: OTHER GOVERNMENT

## 2020-03-02 VITALS
SYSTOLIC BLOOD PRESSURE: 130 MMHG | WEIGHT: 262 LBS | HEART RATE: 72 BPM | DIASTOLIC BLOOD PRESSURE: 82 MMHG | BODY MASS INDEX: 35.49 KG/M2 | HEIGHT: 72 IN

## 2020-03-02 VITALS — WEIGHT: 261.94 LBS | BODY MASS INDEX: 35.52 KG/M2

## 2020-03-02 DIAGNOSIS — M47.816 LUMBAR SPONDYLOSIS: ICD-10-CM

## 2020-03-02 DIAGNOSIS — M51.36 DDD (DEGENERATIVE DISC DISEASE), LUMBAR: ICD-10-CM

## 2020-03-02 DIAGNOSIS — M17.12 PRIMARY OSTEOARTHRITIS OF LEFT KNEE: Primary | ICD-10-CM

## 2020-03-02 DIAGNOSIS — M54.16 LUMBAR RADICULOPATHY: Primary | ICD-10-CM

## 2020-03-02 DIAGNOSIS — G89.4 CHRONIC PAIN SYNDROME: ICD-10-CM

## 2020-03-02 DIAGNOSIS — M48.061 NEUROFORAMINAL STENOSIS OF LUMBAR SPINE: ICD-10-CM

## 2020-03-02 PROCEDURE — 99999 PR PBB SHADOW E&M-EST. PATIENT-LVL III: CPT | Mod: PBBFAC,,, | Performed by: NURSE PRACTITIONER

## 2020-03-02 PROCEDURE — 20610 DRAIN/INJ JOINT/BURSA W/O US: CPT | Mod: S$PBB,LT,, | Performed by: PHYSICIAN ASSISTANT

## 2020-03-02 PROCEDURE — 99999 PR PBB SHADOW E&M-EST. PATIENT-LVL III: CPT | Mod: PBBFAC,,, | Performed by: PHYSICIAN ASSISTANT

## 2020-03-02 PROCEDURE — 99999 PR PBB SHADOW E&M-EST. PATIENT-LVL III: ICD-10-PCS | Mod: PBBFAC,,, | Performed by: PHYSICIAN ASSISTANT

## 2020-03-02 PROCEDURE — 99213 PR OFFICE/OUTPT VISIT, EST, LEVL III, 20-29 MIN: ICD-10-PCS | Mod: S$PBB,,, | Performed by: NURSE PRACTITIONER

## 2020-03-02 PROCEDURE — 99499 NO LOS: ICD-10-PCS | Mod: S$PBB,,, | Performed by: PHYSICIAN ASSISTANT

## 2020-03-02 PROCEDURE — 99499 UNLISTED E&M SERVICE: CPT | Mod: S$PBB,,, | Performed by: PHYSICIAN ASSISTANT

## 2020-03-02 PROCEDURE — 99213 OFFICE O/P EST LOW 20 MIN: CPT | Mod: PBBFAC | Performed by: PHYSICIAN ASSISTANT

## 2020-03-02 PROCEDURE — 20610 PR DRAIN/INJECT LARGE JOINT/BURSA: ICD-10-PCS | Mod: S$PBB,LT,, | Performed by: PHYSICIAN ASSISTANT

## 2020-03-02 PROCEDURE — 99999 PR PBB SHADOW E&M-EST. PATIENT-LVL III: ICD-10-PCS | Mod: PBBFAC,,, | Performed by: NURSE PRACTITIONER

## 2020-03-02 PROCEDURE — 99213 OFFICE O/P EST LOW 20 MIN: CPT | Mod: S$PBB,,, | Performed by: NURSE PRACTITIONER

## 2020-03-02 PROCEDURE — 20610 DRAIN/INJ JOINT/BURSA W/O US: CPT | Mod: PBBFAC | Performed by: PHYSICIAN ASSISTANT

## 2020-03-02 PROCEDURE — 99213 OFFICE O/P EST LOW 20 MIN: CPT | Mod: PBBFAC,27,PO | Performed by: NURSE PRACTITIONER

## 2020-03-02 RX ADMIN — Medication 20 MG: at 11:03

## 2020-03-02 NOTE — LETTER
March 2, 2020      John Rueda MD  [address]  [place] [address]           [facility]  [address]  [facility] [address]  Phone: [phone]          Patient: Baldo Grant   MR Number: [id number]   YOB: 1960   Date of Visit: 3/2/2020       Dear Dr. John Rueda:    Thank you for referring Baldo Grant to me for evaluation. Attached you will find relevant portions of my assessment and plan of care.    If you have questions, please do not hesitate to call me. I look forward to following Baldo Grant along with you.    Sincerely,    Tuan Dwyer PA-C    Enclosure  CC:  No Recipients    If you would like to receive this communication electronically, please contact [email or web address] or [phone] to request more information on [employer] Link access.    For providers and/or their staff who would like to refer a patient to [facility], please contact us through our one-stop-shop provider referral line, [facility] , at [phone].    If you feel you have received this communication in error or would no longer like to receive these types of communications, please e-mail [email or web address]

## 2020-03-02 NOTE — PROGRESS NOTES
Baldo Grant is here for follow up of left knee arthritis. Pt is requesting Euflexxa series injections #1 of 3.  [facility] reviewed per encounter record. Has failed other conservative modalities including NSAIDS, activity modification, weight loss.    The prior shot was tolerated well.    PHYSICAL EXAMINATION:     General: The patient is alert and oriented x 3. Mood is pleasant.   Observation of ears, eyes and nose reveals no gross abnormalities. No   labored breathing observed.     No signs of infection or adverse reaction to knee.    Injection Procedure  A time out was performed, including verification of patient ID, procedure, site and side, availability of information and equipment, review of safety issues, and agreement with consent, the procedure site was marked.    After time out was performed, the patient was prepped aseptically with chloraprep. A diagnostic and therapeutic injection of 2cc Euflexxa was given under sterile technique using a 22g x 1.5 needle from the Superolateral  aspect of the left Knee Joint in the supine position.      Baldo Grant had no adverse reactions to the medication. Pain decreased. He was instructed to apply ice to the joint for 20 minutes and avoid strenuous activities for 24-36 hours following the injection. He was warned of possible blood sugar and/or blood pressure changes during that time. Following that time, he can resume regular activities.    He was reminded to call the clinic immediately for any adverse side effects as explained in clinic today.    RTC to see Andre Dwyer PA-C for Euflexxa series injections #2 of 3.    All of the patient's questions were answered and the patient will contact us if they have any questions or concerns in the interim.

## 2020-03-02 NOTE — PROGRESS NOTES
[facility] New Patient Evaluation    Referred by: Dr. Encarnacion  Reason for referral: Back Pain    CC:   Chief Complaint   Patient presents with    Back Pain     Last 3 PDI Scores 3/2/2020 2/3/2020   Pain Disability Index (PDI) 30 32     03/02/2020 - Mr. Grant returns to clinic for follow up visit reporting worse back and bilateral leg pain left greater than right.  Patient is s/p L4-5 Lumbar Epidural Steroid  on 2/11/20 with 0% relief.  Patient presents with wife he is currently experiencing no relief from the injection, he reports that his pain continues in his low back as well as in his legs bilaterally left greater than right.  Patient reports shooting pain down his left leg at times going past his left knee into his left foot.  Patient reports previously given a lumbar TF KATHY at L4-5 per Dr. Duenas/TAMIKO Hampton reports receiving 3 months of relief and he is requesting to be scheduled for that particular injection today.  Pain intensity is currently 5/10.      HPI:    Baldo Grant is a 59 y.o. male who complains of left and right lower back pain.  He reports the pain radiates down his left leg just below his knee.  Pain intensity is 5/10.  He reports taking Norco, Gabapentin and Meloxicam for some relief.   Patient s/p Bilateral L3-4, L4-5, and L5-S1 Lumbar Medial Branch Block on 11/5/2019 with Dr. Anglin with no relief.  Patient's 5/17/2019 MRI Lumbar Spine reviewed with patient and shows L4-5 and L5-S1 degenerative disc disease with disc bulging as described above with right L4-5 and left L5-S1 foraminal stenosis.      Location: lower back   Onset: Summer 200  Current Pain Score: 5/10   Daily Pain of Range: 4-9/10  Quality: Dull and Sharp  Radiation: back of left leg  Worsened by: sitting and standing  Improved by: medications and physical therapy    Previous Therapies:  PT/OT:   HEP:   Interventions: Bilateral L3-4, L4-5, and L5-S1 Lumbar Medial Branch Block on  11/5/2019  Surgery:  Medications:   - NSAIDS: meloxicam (MOBIC) 15 MG tablet daily  - MSK Relaxants:  diazePAM (VALIUM) 5 MG tablet daily as needed  - TCAs:   - SNRIs:   - Topicals: lidocaine HCL 2% (XYLOCAINE) 2 % jelly  - Anticonvulsants:  - Opioids: HYDROcodone-acetaminophen (NORCO) 5-325 mg per tablet    Current Pain Medications:  1. Meloxicam 15 mg   2. Gabapentin 600 mg TID        3. HYDROcodone-acetaminophen (NORCO) 5-325 mg per tablet Q6 hours PRN    Review of Systems:  Review of Systems   Constitutional: Negative.    HENT: Negative.    Eyes: Negative.    Respiratory: Negative.    Cardiovascular: Negative.    Gastrointestinal: Negative.    Genitourinary: Negative.    Musculoskeletal: Positive for back pain, joint pain and myalgias.   Skin: Negative.    Neurological: Negative.    Endo/Heme/Allergies: Negative.    Psychiatric/Behavioral: Negative.        History:    Current Outpatient Medications:     calcipotriene-betamethasone (ENSTILAR) 0.005-0.064 % Foam, Apply 1 application topically once daily., Disp: 60 g, Rfl: 2    desonide (DESOWEN) 0.05 % cream, Thin film to AA eyelids bid PRN flare, Disp: 60 g, Rfl: 3    diazePAM (VALIUM) 5 MG tablet, Take 1 tablet (5 mg total) by mouth daily as needed., Disp: 30 tablet, Rfl: 1    diclofenac sodium (VOLTAREN) 1 % Gel, Apply 2 g topically 4 (four) times daily., Disp: 100 g, Rfl: 0    diphenhydrAMINE (BENADRYL) 25 mg capsule, Take 25 mg by mouth every 6 (six) hours as needed for Itching., Disp: , Rfl:     fexofenadine (ALLEGRA) 180 MG tablet, Take 180 mg by mouth continuous prn., Disp: , Rfl:     gabapentin (NEURONTIN) 600 MG tablet, Take 1 tablet (600 mg total) by mouth 3 (three) times daily., Disp: 270 tablet, Rfl: 3    HYDROcodone-acetaminophen (NORCO) 5-325 mg per tablet, Take 1 tablet by mouth every 6 (six) hours as needed for Pain., Disp: 100 tablet, Rfl: 0    lidocaine HCL 2% (XYLOCAINE) 2 % jelly, Apply topically as needed., Disp: 30 mL, Rfl: 3     meloxicam (MOBIC) 15 MG tablet, TAKE 1 TABLET(15 MG) BY MOUTH EVERY DAY, Disp: 90 tablet, Rfl: 4    EPINEPHrine (EPIPEN) 0.3 mg/0.3 mL AtIn, Inject 0.3 mLs (0.3 mg total) into the muscle once. for 1 dose, Disp: 1 Device, Rfl: 1  No current facility-administered medications for this visit.     Facility-Administered Medications Ordered in Other Visits:     lidocaine (PF) 10 mg/ml (1%) injection 10 mg, 1 mL, Intradermal, Once, Alexus Anglin Jr., MD    Past Medical History:   Diagnosis Date    Arthritis     Basal cell carcinoma 06/21/2019    back    Cancer     Chronic pain of right knee     SCC (squamous cell carcinoma) 2014    Forehead- Dr. Cabral     Skin cancer     Squamous cell carcinoma 2013    Right ear- Dr. Marianna long       Past Surgical History:   Procedure Laterality Date    APPENDECTOMY      COLONOSCOPY  1998    EPIDURAL STEROID INJECTION INTO LUMBAR SPINE N/A 2/11/2020    Procedure: Injection-steroid-epidural-lumbar--InterLaminar L4-5;  Surgeon: Alexus Anglin Jr., MD;  Location: [facility];  Service: Pain Management;  Laterality: N/A;    INJECTION OF ANESTHETIC AGENT AROUND MEDIAL BRANCH NERVES INNERVATING LUMBAR FACET JOINT Bilateral 11/5/2019    Procedure: Block-nerve-medial branch-lumbar--Bilateral L3-4,L4-5,L5-S1;  Surgeon: Alexus Anglin Jr., MD;  Location: [facility];  Service: Pain Management;  Laterality: Bilateral;    lipoma removal      skin cancer removal         Family History   Problem Relation Age of Onset    COPD Mother     Alcohol abuse Mother     Heart disease Father     Melanoma Father     No Known Problems Sister     No Known Problems Brother     Psoriasis Neg Hx     Lupus Neg Hx     Eczema Neg Hx        Social History     Socioeconomic History    Marital status:      Spouse name: Not on file    Number of children: Not on file    Years of education: Not on file    Highest education level: Not on file   Occupational History    Not  on file   Social Needs    Financial resource strain: Not very hard    Food insecurity:     Worry: Never true     Inability: Never true    Transportation needs:     Medical: No     Non-medical: No   Tobacco Use    Smoking status: Never Smoker    Smokeless tobacco: Never Used   Substance and Sexual Activity    Alcohol use: Yes     Frequency: 2-4 times a month     Drinks per session: 1 or 2     Binge frequency: Never     Comment: social    Drug use: No    Sexual activity: Yes     Partners: Female   Lifestyle    Physical activity:     Days per week: 0 days     Minutes per session: 0 min    Stress: Only a little   Relationships    Social connections:     Talks on phone: More than three times a week     Gets together: Once a week     Attends [employer]ars[employer] service: Not on file     Active member of club or organization: Yes     Attends meetings of clubs or organizations: More than 4 times per year     Relationship status:    Other Topics Concern    Not on file   Social History Narrative    Not on file       Review of patient's allergies indicates:   Allergen Reactions    Advil [ibuprofen]        Physical Exam:  Vitals:    20 1413   Weight: 118.8 kg (261 lb 14.5 oz)   PainSc:   5             Back (L-Spine & T-Spine) / Neck (C-Spine) Exam     Tenderness Right paramedian tenderness of the Lower L-Spine. Left paramedian tenderness of the Lower L-Spine.     Back (L-Spine & T-Spine) Range of Motion   Lateral bend right: abnormal   Lateral bend left: abnormal   Rotation right: abnormal   Rotation left: abnormal     Spinal Sensation   Right Side Sensation  L-Spine Level: normal  Left Side Sensation  L-Spine Level: normal      Muscle Strength   Right Lower Extremity   Hip Abduction: 5/5   Hip Flexion: 5/5   Hip Extensors: 5/5  Quadriceps:  5/5   Hamstrin/5   Gastrocsoleus:  5/5/5  Left Lower Extremity   Hip Abduction: 4/5   Hip Flexion: 4/5   Hip Extensors: 4/5  Quadriceps:  4/5   Hamstrin/5    Gastrocsoleus:  5/5/5      Imaging:  EXAMINATION:  MRI LUMBAR SPINE WITHOUT CONTRAST    CLINICAL HISTORY:  Low back painLow back pain, rapidly progressive neuro deficit;    TECHNIQUE:  Standard multiplanar noncontrast MRI sequences of the lumbar spine.    COMPARISON:  None    FINDINGS:  The distal cord and conus reveal normal signal and morphology. The lumbar vertebra reveal normal alignment, shape and signal intensity.    T12-L1: Unremarkable    L1-2:  Unremarkable    L2-3:  Minimal annular bulge.    L3-4:  Mild disc desiccation with mild generalized annular bulge.  Mild hypertrophic ligamentum flavum and facet arthritis.    L4-5:  Mild disc desiccation with mild annular bulge.  Right foraminal annular fissure.  Mild hypertrophic ligamentum flavum and facet arthritis.  Mild to moderate left and moderate right foraminal stenosis.    L5-S1:  Mild degenerative disc disease with disc desiccation and disc bulge/osteophyte.  Mild hypertrophic ligamentum flavum and facet arthritis.  Mild right with mild to moderate left foraminal stenosis.      Impression       L4-5 and L5-S1 degenerative disc disease with disc bulging osteophyte as described above with right L4-5 and left L5-S1 foraminal stenosis.      Electronically signed by: Baldo Gayle MD  Date: 05/17/2019         Labs:  BMP  Lab Results   Component Value Date     10/27/2015    K 4.1 10/27/2015     10/27/2015    CO2 23 10/27/2015    BUN 22 (H) 10/27/2015    CREATININE 1.1 10/27/2015    CALCIUM 9.2 10/27/2015    ANIONGAP 11 10/27/2015    ESTGFRAFRICA >60.0 10/27/2015    EGFRNONAA >60.0 10/27/2015     Lab Results   Component Value Date    ALT 40 10/27/2015    AST 19 10/27/2015    ALKPHOS 39 (L) 10/27/2015    BILITOT 0.6 10/27/2015       Assessment:  Problem List Items Addressed This Visit        Neuro    Lumbar radiculopathy - Primary    Chronic pain      Other Visit Diagnoses     Neuroforaminal stenosis of lumbar spine        Lumbar spondylosis         DDD (degenerative disc disease), lumbar              2/3/2020 - Baldo Grant is a 59 y.o. male with who complains of left and right lower back pain.  He reports the pain radiates down his left leg just below his knee.  Pain intensity is 5/10. He reports his current medication regimen of Norco, Gabapentin and Meloxicam help relieve his pain.  He reports having  Bilateral L3-4, L4-5, and L5-S1 Lumbar Medial Branch Block on 11/5/2019 with no relief.Patient's 5/17/2019 MRI Lumbar Spine reviewed with patient and shows L4-5 and L5-S1 degenerative disc disease with disc bulging  as described above with right L4-5 and left L5-S1 foraminal stenosis.  Recommended scheduling for Interlaminar KATHY  L4-5 with moderate sedation. Patient will follow in clinic following injection.     03/02/[id number]-old male presents with his wife he is status post lumbar interlaminar KATHY at L4-5 with 0% relief.  Patient reports continued low back and bilateral leg pain left greater than right he is reporting shooting pain down his left leg into his foot at times.  Patient reports the pain is disrupting his quality of life and prohibiting him from performing his ADLs.  Patient was given a left TF KATHY by Dr. Duenas/PM [facility] and received significant relief for 2-3 months. Lumbar MRI shows pathology a the L4-5 and L5-S1 patient has degenerative disc disease with disc bulging osteophyte with right L4-5 and left L5-S1 foraminal stenosis.  Based on results of previous Left  L4-5 transforaminal I discussed with patient and wife that I will now recommend  a bilateral L4-5.   Patient and wife agreed and understood.      : Reviewed and consistent with medication use as prescribed.    Treatment Plan:   Procedure:  Bilateral TF KATHY at L4-5  PT/OT/HEP: None at this time  Medications: No changes recommended at this time.  Labs: reviewed and medications are appropriately dosed for current hepatorenal function.  Imaging: No additional  recommended at this time. Reviewed and discussed results of 5/17/2019 MRI Lumbar Spine.       Follow Up: RTC  Following injection    ALAN Hale  Interventional Pain Management        Disclaimer: This note was partly generated using dictation software which may occasionally result in transcription errors.

## 2020-03-03 ENCOUNTER — TELEPHONE (OUTPATIENT)
Dept: PAIN MEDICINE | Facility: CLINIC | Age: 60
End: 2020-03-03

## 2020-03-03 DIAGNOSIS — M54.16 LUMBAR RADICULOPATHY: Primary | ICD-10-CM

## 2020-03-03 NOTE — TELEPHONE ENCOUNTER
Pt scheduled for 317/20 at 1015 am for Bilateral TFESI. Pt aware to check in at registration desk on the first floor of the hospital for 0915 am. Pt denied taking blood thinners and is not diabetic. Reviewed medication list.

## 2020-03-08 ENCOUNTER — PATIENT OUTREACH (OUTPATIENT)
Dept: ADMINISTRATIVE | Facility: OTHER | Age: 60
End: 2020-03-08

## 2020-03-08 NOTE — PROGRESS NOTES
Care Everywhere: n/a  Immunization: updated  Health Maintenance: updated  Media Review: reviewed for possible outside colon cancer report  Legacy Review: n/a  Order placed: n/a  Upcoming appts:n/a

## 2020-03-09 ENCOUNTER — OFFICE VISIT (OUTPATIENT)
Dept: SPORTS MEDICINE | Facility: CLINIC | Age: 60
End: 2020-03-09
Payer: OTHER GOVERNMENT

## 2020-03-09 VITALS
HEIGHT: 72 IN | SYSTOLIC BLOOD PRESSURE: 115 MMHG | DIASTOLIC BLOOD PRESSURE: 78 MMHG | BODY MASS INDEX: 35.35 KG/M2 | WEIGHT: 261 LBS | HEART RATE: 66 BPM

## 2020-03-09 DIAGNOSIS — M17.12 PRIMARY OSTEOARTHRITIS OF LEFT KNEE: Primary | ICD-10-CM

## 2020-03-09 PROCEDURE — 99213 OFFICE O/P EST LOW 20 MIN: CPT | Mod: PBBFAC,25 | Performed by: PHYSICIAN ASSISTANT

## 2020-03-09 PROCEDURE — 20610 DRAIN/INJ JOINT/BURSA W/O US: CPT | Mod: S$PBB,LT,, | Performed by: PHYSICIAN ASSISTANT

## 2020-03-09 PROCEDURE — 99499 UNLISTED E&M SERVICE: CPT | Mod: S$PBB,,, | Performed by: PHYSICIAN ASSISTANT

## 2020-03-09 PROCEDURE — 99999 PR PBB SHADOW E&M-EST. PATIENT-LVL III: ICD-10-PCS | Mod: PBBFAC,,, | Performed by: PHYSICIAN ASSISTANT

## 2020-03-09 PROCEDURE — 99499 NO LOS: ICD-10-PCS | Mod: S$PBB,,, | Performed by: PHYSICIAN ASSISTANT

## 2020-03-09 PROCEDURE — 20610 DRAIN/INJ JOINT/BURSA W/O US: CPT | Mod: PBBFAC | Performed by: PHYSICIAN ASSISTANT

## 2020-03-09 PROCEDURE — 99999 PR PBB SHADOW E&M-EST. PATIENT-LVL III: CPT | Mod: PBBFAC,,, | Performed by: PHYSICIAN ASSISTANT

## 2020-03-09 PROCEDURE — 20610 PR DRAIN/INJECT LARGE JOINT/BURSA: ICD-10-PCS | Mod: S$PBB,LT,, | Performed by: PHYSICIAN ASSISTANT

## 2020-03-09 RX ADMIN — Medication 20 MG: at 11:03

## 2020-03-09 NOTE — LETTER
March 9, 2020      John Rueda MD  [address]  [facility] [address]           [facility]  [address]  [facility] [address]  Phone: [phone]          Patient: Baldo Grant   MR Number: [id number]   YOB: 1960   Date of Visit: 3/9/2020       Dear Dr. John Rueda:    Thank you for referring Baldo Grant to me for evaluation. Attached you will find relevant portions of my assessment and plan of care.    If you have questions, please do not hesitate to call me. I look forward to following Baldo Gratn along with you.    Sincerely,    Tuan Dwyer PA-C    Enclosure  CC:  No Recipients    If you would like to receive this communication electronically, please contact [email or web address] or [phone] to request more information on [employer] Link access.    For providers and/or their staff who would like to refer a patient to [facility], please contact us through our one-stop-shop provider referral line, [facility] , at [phone].    If you feel you have received this communication in error or would no longer like to receive these types of communications, please e-mail [email or web address]

## 2020-03-09 NOTE — PROGRESS NOTES
Baldo Grant is here for follow up of left knee arthritis. Pt is requesting Euflexxa series injections #2 of 3.  [facility] reviewed per encounter record. Has failed other conservative modalities including NSAIDS, activity modification, weight loss.    The prior shot was tolerated well.    PHYSICAL EXAMINATION:     General: The patient is alert and oriented x 3. Mood is pleasant.   Observation of ears, eyes and nose reveals no gross abnormalities. No   labored breathing observed.     No signs of infection or adverse reaction to knee.    Injection Procedure  A time out was performed, including verification of patient ID, procedure, site and side, availability of information and equipment, review of safety issues, and agreement with consent, the procedure site was marked.    After time out was performed, the patient was prepped aseptically with chloraprep. A diagnostic and therapeutic injection of 2cc Euflexxa was given under sterile technique using a 22g x 1.5 needle from the Superolateral  aspect of the left Knee Joint in the supine position.      Baldo Grant had no adverse reactions to the medication. Pain decreased. He was instructed to apply ice to the joint for 20 minutes and avoid strenuous activities for 24-36 hours following the injection. He was warned of possible blood sugar and/or blood pressure changes during that time. Following that time, he can resume regular activities.    He was reminded to call the clinic immediately for any adverse side effects as explained in clinic today.    RTC to see Andre Dwyer PA-C for Euflexxa series injections #3 of 3.    All of the patient's questions were answered and the patient will contact us if they have any questions or concerns in the interim.

## 2020-03-13 ENCOUNTER — PATIENT MESSAGE (OUTPATIENT)
Dept: INTERNAL MEDICINE | Facility: CLINIC | Age: 60
End: 2020-03-13

## 2020-03-13 DIAGNOSIS — Z12.11 COLON CANCER SCREENING: Primary | ICD-10-CM

## 2020-03-14 ENCOUNTER — PATIENT MESSAGE (OUTPATIENT)
Dept: SPORTS MEDICINE | Facility: CLINIC | Age: 60
End: 2020-03-14

## 2020-03-15 ENCOUNTER — PATIENT OUTREACH (OUTPATIENT)
Dept: ADMINISTRATIVE | Facility: OTHER | Age: 60
End: 2020-03-15

## 2020-03-16 ENCOUNTER — OFFICE VISIT (OUTPATIENT)
Dept: SPORTS MEDICINE | Facility: CLINIC | Age: 60
End: 2020-03-16
Payer: OTHER GOVERNMENT

## 2020-03-16 ENCOUNTER — PATIENT MESSAGE (OUTPATIENT)
Dept: INTERNAL MEDICINE | Facility: CLINIC | Age: 60
End: 2020-03-16

## 2020-03-16 ENCOUNTER — TELEPHONE (OUTPATIENT)
Dept: SPORTS MEDICINE | Facility: CLINIC | Age: 60
End: 2020-03-16

## 2020-03-16 VITALS
SYSTOLIC BLOOD PRESSURE: 127 MMHG | HEIGHT: 72 IN | BODY MASS INDEX: 35.35 KG/M2 | DIASTOLIC BLOOD PRESSURE: 78 MMHG | HEART RATE: 75 BPM | WEIGHT: 261 LBS

## 2020-03-16 DIAGNOSIS — S83.232D COMPLEX TEAR OF MEDIAL MENISCUS OF LEFT KNEE AS CURRENT INJURY, SUBSEQUENT ENCOUNTER: ICD-10-CM

## 2020-03-16 DIAGNOSIS — M17.12 PRIMARY OSTEOARTHRITIS OF LEFT KNEE: Primary | ICD-10-CM

## 2020-03-16 DIAGNOSIS — M23.204 OLD TEAR OF MEDIAL MENISCUS OF LEFT KNEE, UNSPECIFIED TEAR TYPE: Primary | ICD-10-CM

## 2020-03-16 DIAGNOSIS — M94.262 CHONDROMALACIA OF LEFT KNEE: ICD-10-CM

## 2020-03-16 PROCEDURE — 20610 DRAIN/INJ JOINT/BURSA W/O US: CPT | Mod: PBBFAC | Performed by: PHYSICIAN ASSISTANT

## 2020-03-16 PROCEDURE — 20610 DRAIN/INJ JOINT/BURSA W/O US: CPT | Mod: S$PBB,LT,, | Performed by: PHYSICIAN ASSISTANT

## 2020-03-16 PROCEDURE — 20610 PR DRAIN/INJECT LARGE JOINT/BURSA: ICD-10-PCS | Mod: S$PBB,LT,, | Performed by: PHYSICIAN ASSISTANT

## 2020-03-16 PROCEDURE — 99499 NO LOS: ICD-10-PCS | Mod: S$PBB,,, | Performed by: PHYSICIAN ASSISTANT

## 2020-03-16 PROCEDURE — 99999 PR PBB SHADOW E&M-EST. PATIENT-LVL IV: ICD-10-PCS | Mod: PBBFAC,,, | Performed by: PHYSICIAN ASSISTANT

## 2020-03-16 PROCEDURE — 99499 UNLISTED E&M SERVICE: CPT | Mod: S$PBB,,, | Performed by: PHYSICIAN ASSISTANT

## 2020-03-16 PROCEDURE — 99999 PR PBB SHADOW E&M-EST. PATIENT-LVL IV: CPT | Mod: PBBFAC,,, | Performed by: PHYSICIAN ASSISTANT

## 2020-03-16 PROCEDURE — 99214 OFFICE O/P EST MOD 30 MIN: CPT | Mod: PBBFAC | Performed by: PHYSICIAN ASSISTANT

## 2020-03-16 RX ORDER — ASPIRIN 81 MG/1
TABLET ORAL
Qty: 28 TABLET | Refills: 0 | Status: SHIPPED | OUTPATIENT
Start: 2020-03-16 | End: 2022-09-19

## 2020-03-16 RX ORDER — ONDANSETRON 4 MG/1
4 TABLET, FILM COATED ORAL EVERY 8 HOURS PRN
Qty: 30 TABLET | Refills: 0 | Status: SHIPPED | OUTPATIENT
Start: 2020-03-16 | End: 2022-03-24 | Stop reason: SDUPTHER

## 2020-03-16 RX ORDER — MUPIROCIN 20 MG/G
OINTMENT TOPICAL
Status: CANCELLED | OUTPATIENT
Start: 2020-03-16

## 2020-03-16 RX ORDER — SODIUM CHLORIDE 0.9 % (FLUSH) 0.9 %
10 SYRINGE (ML) INJECTION
Status: CANCELLED | OUTPATIENT
Start: 2020-03-16

## 2020-03-16 RX ORDER — OXYCODONE HYDROCHLORIDE 5 MG/1
TABLET ORAL
Qty: 28 TABLET | Refills: 0 | Status: SHIPPED | OUTPATIENT
Start: 2020-03-16 | End: 2020-03-27 | Stop reason: SDUPTHER

## 2020-03-16 NOTE — H&P (VIEW-ONLY)
Baldo Grant  is here for a completion of his perioperative paperwork. he  Is scheduled to undergo left knee arthroscopic meniscectomy and chondroplasty as indicated on 3/18/2020.      He  does need clearance for this procedure, note sent to Efrain Vazquez MD      Risks, indications and benefits of the surgical procedure were discussed with the patient. All questions with regard to surgery, rehab, expected return to functional activities, activities of daily living and recreational endeavors were answered to his satisfaction.    Patient was informed and understands the risks of surgery are greater for patients with a current condition or hx of heart disease, obesity, clotting disorders, recurrent infections, steroid use, current or past smoking, and factors such as sedentary lifestyle and noncompliance with medications, therapy or f/u. The degree of the increased risk is hard to estimate w/ any degree of precision.    Once no other questions were asked, a brief history and physical exam was then performed.    PAST MEDICAL HISTORY:   Past Medical History:   Diagnosis Date    Arthritis     Basal cell carcinoma 06/21/2019    back    Cancer     Chronic pain of right knee     SCC (squamous cell carcinoma) 2014    Forehead- Dr. Cabral     Skin cancer     Squamous cell carcinoma 2013    Right ear- Dr. Marianna long     PAST SURGICAL HISTORY:   Past Surgical History:   Procedure Laterality Date    APPENDECTOMY      COLONOSCOPY  1998    EPIDURAL STEROID INJECTION INTO LUMBAR SPINE N/A 2/11/2020    Procedure: Injection-steroid-epidural-lumbar--InterLaminar L4-5;  Surgeon: Alexus Anglin Jr., MD;  Location: [facility] PAIN T;  Service: Pain Management;  Laterality: N/A;    INJECTION OF ANESTHETIC AGENT AROUND MEDIAL BRANCH NERVES INNERVATING LUMBAR FACET JOINT Bilateral 11/5/2019    Procedure: Block-nerve-medial branch-lumbar--Bilateral L3-4,L4-5,L5-S1;  Surgeon: Alexus Anglin Jr., MD;  Location: [facility] PAIN  MGT;  Service: Pain Management;  Laterality: Bilateral;    lipoma removal      skin cancer removal       FAMILY HISTORY:   Family History   Problem Relation Age of Onset    COPD Mother     Alcohol abuse Mother     Heart disease Father     Melanoma Father     No Known Problems Sister     No Known Problems Brother     Psoriasis Neg Hx     Lupus Neg Hx     Eczema Neg Hx      SOCIAL HISTORY:   Social History     Socioeconomic History    Marital status:      Spouse name: Not on file    Number of children: Not on file    Years of education: Not on file    Highest education level: Not on file   Occupational History    Not on file   Social Needs    Financial resource strain: Not very hard    Food insecurity:     Worry: Never true     Inability: Never true    Transportation needs:     Medical: No     Non-medical: No   Tobacco Use    Smoking status: Never Smoker    Smokeless tobacco: Never Used   Substance and Sexual Activity    Alcohol use: Yes     Frequency: 2-4 times a month     Drinks per session: 1 or 2     Binge frequency: Never     Comment: social    Drug use: No    Sexual activity: Yes     Partners: Female   Lifestyle    Physical activity:     Days per week: 0 days     Minutes per session: 0 min    Stress: Only a little   Relationships    Social connections:     Talks on phone: More than three times a week     Gets together: Once a week     Attends Protestant service: Not on file     Active member of club or organization: Yes     Attends meetings of clubs or organizations: More than 4 times per year     Relationship status:    Other Topics Concern    Not on file   Social History Narrative    Not on file       MEDICATIONS:   Current Outpatient Medications:     calcipotriene-betamethasone (ENSTILAR) 0.005-0.064 % Foam, Apply 1 application topically once daily., Disp: 60 g, Rfl: 2    desonide (DESOWEN) 0.05 % cream, Thin film to AA eyelids bid PRN flare, Disp: 60 g, Rfl: 3     diazePAM (VALIUM) 5 MG tablet, Take 1 tablet (5 mg total) by mouth daily as needed., Disp: 30 tablet, Rfl: 1    diclofenac sodium (VOLTAREN) 1 % Gel, Apply 2 g topically 4 (four) times daily., Disp: 100 g, Rfl: 0    diphenhydrAMINE (BENADRYL) 25 mg capsule, Take 25 mg by mouth every 6 (six) hours as needed for Itching., Disp: , Rfl:     fexofenadine (ALLEGRA) 180 MG tablet, Take 180 mg by mouth continuous prn., Disp: , Rfl:     gabapentin (NEURONTIN) 600 MG tablet, Take 1 tablet (600 mg total) by mouth 3 (three) times daily., Disp: 270 tablet, Rfl: 3    HYDROcodone-acetaminophen (NORCO) 5-325 mg per tablet, Take 1 tablet by mouth every 6 (six) hours as needed for Pain., Disp: 100 tablet, Rfl: 0    lidocaine HCL 2% (XYLOCAINE) 2 % jelly, Apply topically as needed., Disp: 30 mL, Rfl: 3    meloxicam (MOBIC) 15 MG tablet, TAKE 1 TABLET(15 MG) BY MOUTH EVERY DAY, Disp: 90 tablet, Rfl: 4    EPINEPHrine (EPIPEN) 0.3 mg/0.3 mL AtIn, Inject 0.3 mLs (0.3 mg total) into the muscle once. for 1 dose, Disp: 1 Device, Rfl: 1  No current facility-administered medications for this visit.     Facility-Administered Medications Ordered in Other Visits:     lidocaine (PF) 10 mg/ml (1%) injection 10 mg, 1 mL, Intradermal, Once, Alexus Anglin Jr., MD  ALLERGIES:   Review of patient's allergies indicates:   Allergen Reactions    Advil [ibuprofen]        Review of Systems   Constitution: Negative. Negative for chills, fever and night sweats.   HENT: Negative for congestion and headaches.    Eyes: Negative for blurred vision, left vision loss and right vision loss.   Cardiovascular: Negative for chest pain and syncope.   Respiratory: Negative for cough and shortness of breath.    Endocrine: Negative for polydipsia, polyphagia and polyuria.   Hematologic/Lymphatic: Negative for bleeding problem. Does not bruise/bleed easily.   Skin: Negative for dry skin, itching and rash.   Musculoskeletal: Negative for falls and muscle  weakness.   Gastrointestinal: Negative for abdominal pain and bowel incontinence.   Genitourinary: Negative for bladder incontinence and nocturia.   Neurological: Negative for disturbances in coordination, loss of balance and seizures.   Psychiatric/Behavioral: Negative for depression. The patient does not have insomnia.    Allergic/Immunologic: Negative for hives and persistent infections.     PHYSICAL EXAM:  GEN: A&Ox3, WD WN NAD  HEENT: WNL  CHEST: CTAB, no W/R/R  HEART: RRR, no M/R/G   ABD: Soft, NT ND, BS x4 QUADS  MS: Refer to previous note for detailed MS exam  NEURO: CN II-XII intact       The surgical consent was then reviewed with the patient, who agreed with all the contents of the consent form and it was signed.     PHYSICAL THERAPY:  He was also instructed regarding physical therapy and will begin on POD#1-3. He is doing physical therapy at [facility].    POST OP CARE: Instructions were reviewed including care of the wound and dressing after surgery and when he can shower.     PAIN MANAGEMENT: Baldo Grant was instructed regarding the Polar ice unit that will be in place after surgery and his postoperative pain medications.     MEDICATION:  Roxicodone 5 mg 1-2 q 4 hours PRN for pain  Zofran 4 mg q 8 hours PRN for nausea and vomiting.  Aspirin 81mg BID x 2 weeks for DVT prophylaxis starting on the evening after surgery.      Post op meds to be delivered bedside prior to discharge. Deliver to family if patient is in surgery at 5pm.     Patient was instructed to purchase and take Colace to counter possible GI side effects of taking opiates.     DVT prophylaxis was discussed with the patient today including risk factors for developing DVTs and history of DVTs. The patient was asked if any specific recommendations were given from the doctor/s that did pre-operative surgical clearance.      If the patient was previously taking 81mg baby aspirin, they were told to not take  additional baby aspirin, using the above stated aspirin and to restart the 81mg aspirin daily after completion of the aspirin dose.      Patient was also told to buy over the counter Prilosec medication and take it once daily for GI protection as long as they are taking NSAIDs or Aspirin.     The patient was told that narcotic pain medications may make them drowsy and instructions were given to not sign legal documents, drive or operate heavy machinery, cars, or equipment while under the influence of narcotic medications.     Dr. Álvarez was present in clinic and directly involved in the additional informed consent process with signing of paperwork and pre-op evaluation. The patient had the opportunity to ask Dr. Álvarez any additional questions and all questions were answered.    As there were no other questions to be asked, he was given my business card along with Dr. Álvarez's business card if he has any questions or concerns prior to surgery or in the postop period.

## 2020-03-16 NOTE — PROGRESS NOTES
Care Everywhere: n/a  Immunization: updated  Health Maintenance: updated  Media Review: reviewed for possible outside colon cancer report  Legacy Review: n/a  Order placed: n/a  Upcoming appts:n/a  Colonoscopy case request 3.13.2020

## 2020-03-16 NOTE — LETTER
March 16, 2020      John Rueda MD  [address]  [facility] [address]           [facility]  [address]  [facility] [address]  Phone: [phone]          Patient: Baldo Grant   MR Number: [id number]   YOB: 1960   Date of Visit: 3/16/2020       Dear Dr. John Rueda:    Thank you for referring Baldo Grant to me for evaluation. Attached you will find relevant portions of my assessment and plan of care.    If you have questions, please do not hesitate to call me. I look forward to following Baldo Grant along with you.    Sincerely,    Tuan Dwyer PA-C    Enclosure  CC:  No Recipients    If you would like to receive this communication electronically, please contact [email or web address] or [phone] to request more information on [facility] access.    For providers and/or their staff who would like to refer a patient to [facility], please contact us through our one-stop-shop provider referral line, [facility] , at [phone].    If you feel you have received this communication in error or would no longer like to receive these types of communications, please e-mail [email or web address]

## 2020-03-16 NOTE — PROGRESS NOTES
Baldo Grant is here for follow up of left knee arthritis. Pt is requesting Euflexxa series injections #3 of 3.  [facility] encounter record. Has failed other conservative modalities including NSAIDS, activity modification, weight loss.    The prior shot was tolerated well.    PHYSICAL EXAMINATION:     General: The patient is alert and oriented x 3. Mood is pleasant.   Observation of ears, eyes and nose reveals no gross abnormalities. No   labored breathing observed.     No signs of infection or adverse reaction to knee.    Injection Procedure  A time out was performed, including verification of patient ID, procedure, site and side, availability of information and equipment, review of safety issues, and agreement with consent, the procedure site was marked.    After time out was performed, the patient was prepped aseptically with chloraprep. A diagnostic and therapeutic injection of 2cc Euflexxa was given under sterile technique using a 22g x 1.5 needle from the Superolateral  aspect of the left Knee Joint in the supine position.      Baldo Grant had no adverse reactions to the medication. Pain decreased. He was instructed to apply ice to the joint for 20 minutes and avoid strenuous activities for 24-36 hours following the injection. He was warned of possible blood sugar and/or blood pressure changes during that time. Following that time, he can resume regular activities.    He was reminded to call the clinic immediately for any adverse side effects as explained in clinic today.    RTC to see SHAISTA Álvarez MD in 3 months for follow-up.    All of the patient's questions were answered and the patient will contact us if they have any questions or concerns in the interim.

## 2020-03-16 NOTE — TELEPHONE ENCOUNTER
I saw the patient again with our mid-level provider.  He has had significant recurrent left knee pain with repeated fusions.  Previous workup to include recent repeat MRI has demonstrated advanced degenerative changes involving both the medial and patellofsemoral compartment.  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  The patient reports prominent mechanical symptoms with a bothersome effusion.  At this point non operative treatment has included multiple injection types, self-directed therapy, oral medication, rest.  At this time he has pain that is quite significant on a daily basis which sometimes requires use of crutches.  He comes in today with a crutch.  Based upon repeat x-rays at do not think he is a candidate at this time for a knee replacement.  We have discussed knee arthroscopy.  He understands that this is a limited goals option.  The indication in this case is the prominent mechanical symptoms with effusions.  He understands that there is an inherent risk for continued or recurrent pain postop.  He may need knee replacement surgery in the future.  Weight loss would be beneficial.  He verbalized understanding and wishes to proceed with the knee arthroscopy.    Plan for left knee arthroscopic partial medial meniscectomy, chondroplasty, possible peripatellar release as indicated.    He will need preoperative medical clearance.  He would like to get this done as soon as possible given his degree of pain.    Informed Consent:    The details of the surgical procedure were explained, including the location of probable incisions and a description of possible hardware and/or grafts to be used. Alternatives to both operative and non-operative options with associated risks and benefits were discussed. The patient understands the likely convalescence after surgery and, in particular, the expected postop rehab and recovery course. The outlined risks and potential complications of the  proposed procedure include but are not limited to: infection, poor wound healing, scarring, deformity, stiffness, swelling, continued or recurrent pain, instability, hardware or prosthetic failure if implanted, symptomatic hardware requiring removal, dislocation, weakness, neurovascular injury, numbness, chronic regional pain disorder, tissue nonhealing/irreparability/retear, subsequent contralateral limb injury or pathology, chondral injury, arthritis, fracture, blood clot formation, inability to return to previous level of activity, anesthetic or regional block complication up to death, need for additional procedure as indicated intraoperatively, and potential need for further surgery.    The patient was also informed and understands that the risks of surgery are greater for patients with a current condition or history of heart disease, obesity, clotting disorders, recurrent infections, steroid use, current or past smoking, and factors such as sedentary lifestyle and noncompliance with medications, therapy or follow-up. The degree of the increased risk is hard to estimate with any degree of precision. If applicable, smoking cessation was discussed.     All questions were answered. The patient has verbalized understanding of these issues and wishes to proceed with the surgery as discussed.

## 2020-03-16 NOTE — H&P
Baldo Grant  is here for a completion of his perioperative paperwork. he  Is scheduled to undergo left knee arthroscopic meniscectomy and chondroplasty as indicated on 3/18/2020.      He  does need clearance for this procedure, note sent to Efrain Vazquez MD      Risks, indications and benefits of the surgical procedure were discussed with the patient. All questions with regard to surgery, rehab, expected return to functional activities, activities of daily living and recreational endeavors were answered to his satisfaction.    Patient was informed and understands the risks of surgery are greater for patients with a current condition or hx of heart disease, obesity, clotting disorders, recurrent infections, steroid use, current or past smoking, and factors such as sedentary lifestyle and noncompliance with medications, therapy or f/u. The degree of the increased risk is hard to estimate w/ any degree of precision.    Once no other questions were asked, a brief history and physical exam was then performed.    PAST MEDICAL HISTORY:   Past Medical History:   Diagnosis Date    Arthritis     Basal cell carcinoma 06/21/2019    back    Cancer     Chronic pain of right knee     SCC (squamous cell carcinoma) 2014    Forehead- Dr. Cabral     Skin cancer     Squamous cell carcinoma 2013    Right ear- Dr. Marianna long     PAST SURGICAL HISTORY:   Past Surgical History:   Procedure Laterality Date    APPENDECTOMY      COLONOSCOPY  1998    EPIDURAL STEROID INJECTION INTO LUMBAR SPINE N/A 2/11/2020    Procedure: Injection-steroid-epidural-lumbar--InterLaminar L4-5;  Surgeon: Alexus Anglin Jr., MD;  Location: [facility] PAIN T;  Service: Pain Management;  Laterality: N/A;    INJECTION OF ANESTHETIC AGENT AROUND MEDIAL BRANCH NERVES INNERVATING LUMBAR FACET JOINT Bilateral 11/5/2019    Procedure: Block-nerve-medial branch-lumbar--Bilateral L3-4,L4-5,L5-S1;  Surgeon: Alexus Anglin Jr., MD;  Location: [facility] PAIN  MGT;  Service: Pain Management;  Laterality: Bilateral;    lipoma removal      skin cancer removal       FAMILY HISTORY:   Family History   Problem Relation Age of Onset    COPD Mother     Alcohol abuse Mother     Heart disease Father     Melanoma Father     No Known Problems Sister     No Known Problems Brother     Psoriasis Neg Hx     Lupus Neg Hx     Eczema Neg Hx      SOCIAL HISTORY:   Social History     Socioeconomic History    Marital status:      Spouse name: Not on file    Number of children: Not on file    Years of education: Not on file    Highest education level: Not on file   Occupational History    Not on file   Social Needs    Financial resource strain: Not very hard    Food insecurity:     Worry: Never true     Inability: Never true    Transportation needs:     Medical: No     Non-medical: No   Tobacco Use    Smoking status: Never Smoker    Smokeless tobacco: Never Used   Substance and Sexual Activity    Alcohol use: Yes     Frequency: 2-4 times a month     Drinks per session: 1 or 2     Binge frequency: Never     Comment: social    Drug use: No    Sexual activity: Yes     Partners: Female   Lifestyle    Physical activity:     Days per week: 0 days     Minutes per session: 0 min    Stress: Only a little   Relationships    Social connections:     Talks on phone: More than three times a week     Gets together: Once a week     Attends Tenriism service: Not on file     Active member of club or organization: Yes     Attends meetings of clubs or organizations: More than 4 times per year     Relationship status:    Other Topics Concern    Not on file   Social History Narrative    Not on file       MEDICATIONS:   Current Outpatient Medications:     calcipotriene-betamethasone (ENSTILAR) 0.005-0.064 % Foam, Apply 1 application topically once daily., Disp: 60 g, Rfl: 2    desonide (DESOWEN) 0.05 % cream, Thin film to AA eyelids bid PRN flare, Disp: 60 g, Rfl: 3     diazePAM (VALIUM) 5 MG tablet, Take 1 tablet (5 mg total) by mouth daily as needed., Disp: 30 tablet, Rfl: 1    diclofenac sodium (VOLTAREN) 1 % Gel, Apply 2 g topically 4 (four) times daily., Disp: 100 g, Rfl: 0    diphenhydrAMINE (BENADRYL) 25 mg capsule, Take 25 mg by mouth every 6 (six) hours as needed for Itching., Disp: , Rfl:     fexofenadine (ALLEGRA) 180 MG tablet, Take 180 mg by mouth continuous prn., Disp: , Rfl:     gabapentin (NEURONTIN) 600 MG tablet, Take 1 tablet (600 mg total) by mouth 3 (three) times daily., Disp: 270 tablet, Rfl: 3    HYDROcodone-acetaminophen (NORCO) 5-325 mg per tablet, Take 1 tablet by mouth every 6 (six) hours as needed for Pain., Disp: 100 tablet, Rfl: 0    lidocaine HCL 2% (XYLOCAINE) 2 % jelly, Apply topically as needed., Disp: 30 mL, Rfl: 3    meloxicam (MOBIC) 15 MG tablet, TAKE 1 TABLET(15 MG) BY MOUTH EVERY DAY, Disp: 90 tablet, Rfl: 4    EPINEPHrine (EPIPEN) 0.3 mg/0.3 mL AtIn, Inject 0.3 mLs (0.3 mg total) into the muscle once. for 1 dose, Disp: 1 Device, Rfl: 1  No current facility-administered medications for this visit.     Facility-Administered Medications Ordered in Other Visits:     lidocaine (PF) 10 mg/ml (1%) injection 10 mg, 1 mL, Intradermal, Once, Alexus Anglin Jr., MD  ALLERGIES:   Review of patient's allergies indicates:   Allergen Reactions    Advil [ibuprofen]        Review of Systems   Constitution: Negative. Negative for chills, fever and night sweats.   HENT: Negative for congestion and headaches.    Eyes: Negative for blurred vision, left vision loss and right vision loss.   Cardiovascular: Negative for chest pain and syncope.   Respiratory: Negative for cough and shortness of breath.    Endocrine: Negative for polydipsia, polyphagia and polyuria.   Hematologic/Lymphatic: Negative for bleeding problem. Does not bruise/bleed easily.   Skin: Negative for dry skin, itching and rash.   Musculoskeletal: Negative for falls and muscle  weakness.   Gastrointestinal: Negative for abdominal pain and bowel incontinence.   Genitourinary: Negative for bladder incontinence and nocturia.   Neurological: Negative for disturbances in coordination, loss of balance and seizures.   Psychiatric/Behavioral: Negative for depression. The patient does not have insomnia.    Allergic/Immunologic: Negative for hives and persistent infections.     PHYSICAL EXAM:  GEN: A&Ox3, WD WN NAD  HEENT: WNL  CHEST: CTAB, no W/R/R  HEART: RRR, no M/R/G   ABD: Soft, NT ND, BS x4 QUADS  MS: Refer to previous note for detailed MS exam  NEURO: CN II-XII intact       The surgical consent was then reviewed with the patient, who agreed with all the contents of the consent form and it was signed.     PHYSICAL THERAPY:  He was also instructed regarding physical therapy and will begin on POD#1-3. He is doing physical therapy at [facility].    POST OP CARE: Instructions were reviewed including care of the wound and dressing after surgery and when he can shower.     PAIN MANAGEMENT: Baldo Grant was instructed regarding the Polar ice unit that will be in place after surgery and his postoperative pain medications.     MEDICATION:  Roxicodone 5 mg 1-2 q 4 hours PRN for pain  Zofran 4 mg q 8 hours PRN for nausea and vomiting.  Aspirin 81mg BID x 2 weeks for DVT prophylaxis starting on the evening after surgery.      Post op meds to be delivered bedside prior to discharge. Deliver to family if patient is in surgery at 5pm.     Patient was instructed to purchase and take Colace to counter possible GI side effects of taking opiates.     DVT prophylaxis was discussed with the patient today including risk factors for developing DVTs and history of DVTs. The patient was asked if any specific recommendations were given from the doctor/s that did pre-operative surgical clearance.      If the patient was previously taking 81mg baby aspirin, they were told to not take  additional baby aspirin, using the above stated aspirin and to restart the 81mg aspirin daily after completion of the aspirin dose.      Patient was also told to buy over the counter Prilosec medication and take it once daily for GI protection as long as they are taking NSAIDs or Aspirin.     The patient was told that narcotic pain medications may make them drowsy and instructions were given to not sign legal documents, drive or operate heavy machinery, cars, or equipment while under the influence of narcotic medications.     Dr. Álvarez was present in clinic and directly involved in the additional informed consent process with signing of paperwork and pre-op evaluation. The patient had the opportunity to ask Dr. Álvarez any additional questions and all questions were answered.    As there were no other questions to be asked, he was given my business card along with Dr. Álvarez's business card if he has any questions or concerns prior to surgery or in the postop period.

## 2020-03-17 ENCOUNTER — OFFICE VISIT (OUTPATIENT)
Dept: INTERNAL MEDICINE | Facility: CLINIC | Age: 60
End: 2020-03-17
Payer: OTHER GOVERNMENT

## 2020-03-17 ENCOUNTER — ANESTHESIA EVENT (OUTPATIENT)
Dept: SURGERY | Facility: HOSPITAL | Age: 60
End: 2020-03-17
Payer: OTHER GOVERNMENT

## 2020-03-17 ENCOUNTER — TELEPHONE (OUTPATIENT)
Dept: INTERNAL MEDICINE | Facility: CLINIC | Age: 60
End: 2020-03-17

## 2020-03-17 VITALS
HEIGHT: 72 IN | HEART RATE: 72 BPM | OXYGEN SATURATION: 96 % | DIASTOLIC BLOOD PRESSURE: 86 MMHG | TEMPERATURE: 98 F | WEIGHT: 272.25 LBS | BODY MASS INDEX: 36.87 KG/M2 | SYSTOLIC BLOOD PRESSURE: 120 MMHG

## 2020-03-17 DIAGNOSIS — E66.9 OBESITY (BMI 30-39.9): ICD-10-CM

## 2020-03-17 DIAGNOSIS — Z00.00 ROUTINE GENERAL MEDICAL EXAMINATION AT A HEALTH CARE FACILITY: Primary | ICD-10-CM

## 2020-03-17 DIAGNOSIS — Z01.818 PREOP EXAMINATION: ICD-10-CM

## 2020-03-17 DIAGNOSIS — M17.32 POST-TRAUMATIC OSTEOARTHRITIS OF LEFT KNEE: ICD-10-CM

## 2020-03-17 PROBLEM — E66.01 MORBID OBESITY: Status: RESOLVED | Noted: 2018-11-30 | Resolved: 2020-03-17

## 2020-03-17 PROCEDURE — 99396 PREV VISIT EST AGE 40-64: CPT | Mod: S$PBB,,, | Performed by: INTERNAL MEDICINE

## 2020-03-17 PROCEDURE — 93010 EKG 12-LEAD: ICD-10-PCS | Mod: S$PBB,,, | Performed by: INTERNAL MEDICINE

## 2020-03-17 PROCEDURE — 99999 PR PBB SHADOW E&M-EST. PATIENT-LVL IV: ICD-10-PCS | Mod: PBBFAC,,, | Performed by: INTERNAL MEDICINE

## 2020-03-17 PROCEDURE — 99214 OFFICE O/P EST MOD 30 MIN: CPT | Mod: PBBFAC,PN | Performed by: INTERNAL MEDICINE

## 2020-03-17 PROCEDURE — 99999 PR PBB SHADOW E&M-EST. PATIENT-LVL IV: CPT | Mod: PBBFAC,,, | Performed by: INTERNAL MEDICINE

## 2020-03-17 PROCEDURE — 93005 ELECTROCARDIOGRAM TRACING: CPT | Mod: PBBFAC,PN | Performed by: INTERNAL MEDICINE

## 2020-03-17 PROCEDURE — 99396 PR PREVENTIVE VISIT,EST,40-64: ICD-10-PCS | Mod: S$PBB,,, | Performed by: INTERNAL MEDICINE

## 2020-03-17 PROCEDURE — 93010 ELECTROCARDIOGRAM REPORT: CPT | Mod: S$PBB,,, | Performed by: INTERNAL MEDICINE

## 2020-03-17 NOTE — PROGRESS NOTES
Subjective:       Patient ID: Baldo Grant is a 59 y.o. male.    Chief Complaint: Pre-op Exam    HPI  Wellness check/preop.  sched for L knee TKR.  Still having bilat sciatica as well.  No CP, SOB.  No dysuria, no F/C, no bleeding.  Review of Systems   All other systems reviewed and are negative.      Objective:      Physical Exam   Constitutional: He appears well-developed.   obese   HENT:   Head: Normocephalic.   Eyes: EOM are normal.   Neck: Normal range of motion.   Cardiovascular: Normal rate, regular rhythm, normal heart sounds and intact distal pulses.   Pulmonary/Chest: Effort normal and breath sounds normal.   Abdominal: Soft. Bowel sounds are normal. He exhibits no distension. There is no tenderness.   Musculoskeletal: He exhibits no edema.   Neurological: He is alert.   Skin: Skin is warm and dry.   Psychiatric: He has a normal mood and affect.   Vitals reviewed.      Assessment:       1. Routine general medical examination at a health care facility    2. Preop examination    3. Obesity (BMI 30-39.9)    4. Post-traumatic osteoarthritis of left knee        Plan:       Baldo was seen today for pre-op exam.    Diagnoses and all orders for this visit:    Routine general medical examination at a health care facility  -     CBC auto differential; Future  -     Comprehensive metabolic panel; Future  -     Hemoglobin A1c; Future  -     Lipid panel; Future  -     Protime-INR; Future  -     Prostate Specific Antigen, Diagnostic; Future    Preop examination  -     EKG 12-lead   CLEARED FOR GENERAL ANAESTHESIA    Obesity (BMI 30-39.9)   Monitor    Post-traumatic osteoarthritis of left knee     For TKR  Follow up in about 6 months (around 9/17/2020).

## 2020-03-17 NOTE — ANESTHESIA PAT ROS NOTE
03/17/2020  Baldo Grant is a 59 y.o., male.      Pre-op Assessment      I have reviewed the Medications.     Review of Systems  Anesthesia Hx:  No problems with previous Anesthesia    Social:  Non-Smoker, Social Alcohol Use    Hematology/Oncology:  Hematology Normal      Oncology Comments: Basal cell carcinoma- back  Squamous cell carcinoma-forehead, R ear    EENT/Dental:   chronic allergic rhinitis   Cardiovascular:   Exercise tolerance: good  Denies Angina. Getting new EKG today 3/17/2020   Pulmonary:   Denies Shortness of breath.  Denies Recent URI.    Renal/:  Renal/ Normal     Hepatic/GI:  Hepatic/GI Normal    Musculoskeletal:   Arthritis     Neurological:   Neuromuscular Disease, Chronic lumbar pain-- gets injections   Endocrine:  Endocrine Normal    Dermatological:  Skin Normal    Psych:  Psychiatric Normal              Anesthesia Assessment: Preoperative EQUATION    Planned Procedure: Procedure(s) (LRB):  ARTHROSCOPY, KNEE, WITH MENISCECTOMY (Left)  ARTHROSCOPY, KNEE, WITH CHONDROPLASTY (Left)  Requested Anesthesia Type:General  Surgeon: FREEMAN Álvarez MD  Service: Orthopedics  Known or anticipated Date of Surgery:3/18/2020    Surgeon notes: reviewed    Electronic QUestionnaire Assessment completed via nurse interview with patient.        Triage considerations:     The patient has no apparent active cardiac condition (No unstable coronary Syndrome such as severe unstable angina or recent [<1 month] myocardial infarction, decompensated CHF, severe valvular   disease or significant arrhythmia)    Previous anesthesia records:Not available    Last PCP note: within 1 month , within [facility] -going today 3/17/2020  Subspecialty notes: Pain Management, Sports Medicine       Tests already available:  having EKG today - 3/17/2020             Instructions given. (See in Nurse's note)    Optimization:       Medical Opinion Indicated yes           Patient  has previously scheduled Medical Appointment:    Navigation: Tests Scheduled.  EKG                         Results will be tracked by Preop Clinic.

## 2020-03-17 NOTE — PROGRESS NOTES
Spoke with the patient. Informed him that Pain Management cases have been cancelled for today and that the office will call him to reschedule his procedure when decisions are made. He voiced his understanding.

## 2020-03-17 NOTE — TELEPHONE ENCOUNTER
"----- Message from FREEMAN Álvarez MD sent at 3/17/2020  7:55 AM CDT -----  He meets criteria for outpatient surgery at [place] with current COVID-19 rules of engagement. IAnesthesia typically requires the "clearance" from PCP. If you think the labs and EKG look good, let me know and we will proceed. Thanks so much. Sorry for the late notice. I just saw him yd and given circumstances, he wanted it done ASAP.      ----- Message -----  From: Efrain Vazquez MD  Sent: 3/16/2020   3:33 PM CDT  To: MD Og Sandoval:    I see no reason you can't operate on him if his bloodwork and EKG look OK to you, provided you can get an elective surgery past the COVID-19 commisars.  He's at low to moderate risk for general anaesthesia given his age and obesity.    Efrain'  ----- Message -----  From: FREEMAN Álvarez MD  Sent: 3/16/2020   3:21 PM CDT  To: Efrain Vazquez MD    Hejosue Vazquez,    I saw Bill in clinic today.  He is miserable with his left knee. He would like to have it scoped.  He has significant underlying degenerative changes but at this point a running out options.  Not ready for a knee replacement.  Given the current circumstances with COVID19, better to do him sooner rather than later.  I do have him on the schedule for Wednesday pending medical clearance. He understands we may need to reschedule.  Just wanted you wear.  Thanks so much.    Og Álvarez      "

## 2020-03-18 ENCOUNTER — ANESTHESIA (OUTPATIENT)
Dept: SURGERY | Facility: HOSPITAL | Age: 60
End: 2020-03-18
Payer: OTHER GOVERNMENT

## 2020-03-18 ENCOUNTER — HOSPITAL ENCOUNTER (OUTPATIENT)
Facility: HOSPITAL | Age: 60
Discharge: HOME | End: 2020-03-18
Attending: ORTHOPAEDIC SURGERY | Admitting: ORTHOPAEDIC SURGERY
Payer: OTHER GOVERNMENT

## 2020-03-18 DIAGNOSIS — S83.232D COMPLEX TEAR OF MEDIAL MENISCUS OF LEFT KNEE AS CURRENT INJURY, SUBSEQUENT ENCOUNTER: ICD-10-CM

## 2020-03-18 DIAGNOSIS — M94.262 CHONDROMALACIA OF LEFT KNEE: ICD-10-CM

## 2020-03-18 DIAGNOSIS — M17.12 PRIMARY OSTEOARTHRITIS OF LEFT KNEE: ICD-10-CM

## 2020-03-18 PROCEDURE — 71000016 HC POSTOP RECOV ADDL HR: Performed by: ORTHOPAEDIC SURGERY

## 2020-03-18 PROCEDURE — 29873 ARTHRS KNEE SURG W/LAT RLS: CPT | Mod: 51,LT,, | Performed by: ORTHOPAEDIC SURGERY

## 2020-03-18 PROCEDURE — 64447 NJX AA&/STRD FEMORAL NRV IMG: CPT | Mod: 59,LT,, | Performed by: ANESTHESIOLOGY

## 2020-03-18 PROCEDURE — 63600175 PHARM REV CODE 636 W HCPCS: Performed by: NURSE ANESTHETIST, CERTIFIED REGISTERED

## 2020-03-18 PROCEDURE — 25000003 PHARM REV CODE 250: Performed by: ORTHOPAEDIC SURGERY

## 2020-03-18 PROCEDURE — S0028 INJECTION, FAMOTIDINE, 20 MG: HCPCS | Performed by: NURSE ANESTHETIST, CERTIFIED REGISTERED

## 2020-03-18 PROCEDURE — D9220A PRA ANESTHESIA: Mod: CRNA,,, | Performed by: NURSE ANESTHETIST, CERTIFIED REGISTERED

## 2020-03-18 PROCEDURE — D9220A PRA ANESTHESIA: Mod: ANES,,, | Performed by: ANESTHESIOLOGY

## 2020-03-18 PROCEDURE — 71000033 HC RECOVERY, INTIAL HOUR: Performed by: ORTHOPAEDIC SURGERY

## 2020-03-18 PROCEDURE — 29881 PR KNEE SCOPE SINGLE MENISECECTOMY: ICD-10-PCS | Mod: LT,,, | Performed by: ORTHOPAEDIC SURGERY

## 2020-03-18 PROCEDURE — 99900035 HC TECH TIME PER 15 MIN (STAT)

## 2020-03-18 PROCEDURE — 25000003 PHARM REV CODE 250: Performed by: ANESTHESIOLOGY

## 2020-03-18 PROCEDURE — 27201423 OPTIME MED/SURG SUP & DEVICES STERILE SUPPLY: Performed by: ORTHOPAEDIC SURGERY

## 2020-03-18 PROCEDURE — 76942 PR U/S GUIDANCE FOR NEEDLE GUIDANCE: ICD-10-PCS | Mod: 26,,, | Performed by: ANESTHESIOLOGY

## 2020-03-18 PROCEDURE — D9220A PRA ANESTHESIA: ICD-10-PCS | Mod: ANES,,, | Performed by: ANESTHESIOLOGY

## 2020-03-18 PROCEDURE — 01400 ANES OPN/ARTHRS KNEE JT NOS: CPT | Performed by: ORTHOPAEDIC SURGERY

## 2020-03-18 PROCEDURE — 63600175 PHARM REV CODE 636 W HCPCS: Performed by: ORTHOPAEDIC SURGERY

## 2020-03-18 PROCEDURE — 64447 PR NERVE BLOCK INJ, ANES/STEROID, FEMORAL, INCL IMAG GUIDANCE: ICD-10-PCS | Mod: 59,LT,, | Performed by: ANESTHESIOLOGY

## 2020-03-18 PROCEDURE — 71000015 HC POSTOP RECOV 1ST HR: Performed by: ORTHOPAEDIC SURGERY

## 2020-03-18 PROCEDURE — 76942 ECHO GUIDE FOR BIOPSY: CPT | Mod: 26,,, | Performed by: ANESTHESIOLOGY

## 2020-03-18 PROCEDURE — 25000003 PHARM REV CODE 250: Performed by: PHYSICIAN ASSISTANT

## 2020-03-18 PROCEDURE — 94761 N-INVAS EAR/PLS OXIMETRY MLT: CPT

## 2020-03-18 PROCEDURE — 63600175 PHARM REV CODE 636 W HCPCS: Performed by: ANESTHESIOLOGY

## 2020-03-18 PROCEDURE — D9220A PRA ANESTHESIA: ICD-10-PCS | Mod: CRNA,,, | Performed by: NURSE ANESTHETIST, CERTIFIED REGISTERED

## 2020-03-18 PROCEDURE — 71000039 HC RECOVERY, EACH ADD'L HOUR: Performed by: ORTHOPAEDIC SURGERY

## 2020-03-18 PROCEDURE — 25000003 PHARM REV CODE 250: Performed by: NURSE ANESTHETIST, CERTIFIED REGISTERED

## 2020-03-18 PROCEDURE — 27200750 HC INSULATED NEEDLE/ STIMUPLEX: Performed by: ANESTHESIOLOGY

## 2020-03-18 PROCEDURE — 36000711: Performed by: ORTHOPAEDIC SURGERY

## 2020-03-18 PROCEDURE — 29881 ARTHRS KNE SRG MNISECTMY M/L: CPT | Mod: LT,,, | Performed by: ORTHOPAEDIC SURGERY

## 2020-03-18 PROCEDURE — 64447 NJX AA&/STRD FEMORAL NRV IMG: CPT | Performed by: ANESTHESIOLOGY

## 2020-03-18 PROCEDURE — 36000710: Performed by: ORTHOPAEDIC SURGERY

## 2020-03-18 PROCEDURE — 37000009 HC ANESTHESIA EA ADD 15 MINS: Performed by: ORTHOPAEDIC SURGERY

## 2020-03-18 PROCEDURE — 29873 PR KNEE SCOPE, W/LATERAL RELEASE: ICD-10-PCS | Mod: 51,LT,, | Performed by: ORTHOPAEDIC SURGERY

## 2020-03-18 PROCEDURE — 37000008 HC ANESTHESIA 1ST 15 MINUTES: Performed by: ORTHOPAEDIC SURGERY

## 2020-03-18 RX ORDER — FENTANYL CITRATE 50 UG/ML
100 INJECTION, SOLUTION INTRAMUSCULAR; INTRAVENOUS EVERY 5 MIN PRN
Status: DISCONTINUED | OUTPATIENT
Start: 2020-03-18 | End: 2020-03-18 | Stop reason: HOSPADM

## 2020-03-18 RX ORDER — DEXMEDETOMIDINE HYDROCHLORIDE 100 UG/ML
INJECTION, SOLUTION INTRAVENOUS
Status: DISCONTINUED | OUTPATIENT
Start: 2020-03-18 | End: 2020-03-18

## 2020-03-18 RX ORDER — EPINEPHRINE 1 MG/ML
INJECTION, SOLUTION INTRACARDIAC; INTRAMUSCULAR; INTRAVENOUS; SUBCUTANEOUS
Status: DISCONTINUED | OUTPATIENT
Start: 2020-03-18 | End: 2020-03-18 | Stop reason: HOSPADM

## 2020-03-18 RX ORDER — HYDROMORPHONE HYDROCHLORIDE 1 MG/ML
1 INJECTION, SOLUTION INTRAMUSCULAR; INTRAVENOUS; SUBCUTANEOUS ONCE
Status: COMPLETED | OUTPATIENT
Start: 2020-03-18 | End: 2020-03-18

## 2020-03-18 RX ORDER — ONDANSETRON 4 MG/1
8 TABLET, ORALLY DISINTEGRATING ORAL EVERY 8 HOURS PRN
Status: DISCONTINUED | OUTPATIENT
Start: 2020-03-18 | End: 2020-03-18 | Stop reason: HOSPADM

## 2020-03-18 RX ORDER — LIDOCAINE HYDROCHLORIDE 20 MG/ML
INJECTION INTRAVENOUS
Status: DISCONTINUED | OUTPATIENT
Start: 2020-03-18 | End: 2020-03-18

## 2020-03-18 RX ORDER — MULTIVITAMIN
1 TABLET ORAL DAILY
COMMUNITY
End: 2024-03-17

## 2020-03-18 RX ORDER — LIDOCAINE HYDROCHLORIDE 40 MG/ML
SOLUTION TOPICAL
Status: DISCONTINUED | OUTPATIENT
Start: 2020-03-18 | End: 2020-03-18

## 2020-03-18 RX ORDER — OXYCODONE HYDROCHLORIDE 5 MG/1
5 TABLET ORAL
Status: DISCONTINUED | OUTPATIENT
Start: 2020-03-18 | End: 2020-03-18 | Stop reason: HOSPADM

## 2020-03-18 RX ORDER — TRAMADOL HYDROCHLORIDE 50 MG/1
50 TABLET ORAL EVERY 4 HOURS PRN
Status: DISCONTINUED | OUTPATIENT
Start: 2020-03-18 | End: 2020-03-18 | Stop reason: HOSPADM

## 2020-03-18 RX ORDER — MIDAZOLAM HYDROCHLORIDE 1 MG/ML
0.5 INJECTION INTRAMUSCULAR; INTRAVENOUS
Status: DISCONTINUED | OUTPATIENT
Start: 2020-03-18 | End: 2020-03-18 | Stop reason: HOSPADM

## 2020-03-18 RX ORDER — CELECOXIB 200 MG/1
400 CAPSULE ORAL ONCE
Status: DISCONTINUED | OUTPATIENT
Start: 2020-03-18 | End: 2020-03-18 | Stop reason: HOSPADM

## 2020-03-18 RX ORDER — FENTANYL CITRATE 50 UG/ML
25 INJECTION, SOLUTION INTRAMUSCULAR; INTRAVENOUS EVERY 5 MIN PRN
Status: COMPLETED | OUTPATIENT
Start: 2020-03-18 | End: 2020-03-18

## 2020-03-18 RX ORDER — OXYCODONE HYDROCHLORIDE 5 MG/1
10 TABLET ORAL ONCE
Status: COMPLETED | OUTPATIENT
Start: 2020-03-18 | End: 2020-03-18

## 2020-03-18 RX ORDER — SODIUM CHLORIDE 9 MG/ML
INJECTION, SOLUTION INTRAVENOUS CONTINUOUS
Status: DISCONTINUED | OUTPATIENT
Start: 2020-03-18 | End: 2020-03-18 | Stop reason: HOSPADM

## 2020-03-18 RX ORDER — ONDANSETRON 2 MG/ML
INJECTION INTRAMUSCULAR; INTRAVENOUS
Status: DISCONTINUED | OUTPATIENT
Start: 2020-03-18 | End: 2020-03-18

## 2020-03-18 RX ORDER — CEFAZOLIN SODIUM 1 G/3ML
3 INJECTION, POWDER, FOR SOLUTION INTRAMUSCULAR; INTRAVENOUS
Status: COMPLETED | OUTPATIENT
Start: 2020-03-18 | End: 2020-03-18

## 2020-03-18 RX ORDER — MIDAZOLAM HYDROCHLORIDE 1 MG/ML
INJECTION, SOLUTION INTRAMUSCULAR; INTRAVENOUS
Status: DISCONTINUED | OUTPATIENT
Start: 2020-03-18 | End: 2020-03-18

## 2020-03-18 RX ORDER — PROPOFOL 10 MG/ML
VIAL (ML) INTRAVENOUS
Status: DISCONTINUED | OUTPATIENT
Start: 2020-03-18 | End: 2020-03-18

## 2020-03-18 RX ORDER — OXYCODONE HYDROCHLORIDE 5 MG/1
5 TABLET ORAL EVERY 4 HOURS PRN
Status: DISCONTINUED | OUTPATIENT
Start: 2020-03-18 | End: 2020-03-18 | Stop reason: HOSPADM

## 2020-03-18 RX ORDER — SODIUM CHLORIDE 0.9 % (FLUSH) 0.9 %
10 SYRINGE (ML) INJECTION
Status: DISCONTINUED | OUTPATIENT
Start: 2020-03-18 | End: 2020-03-18 | Stop reason: HOSPADM

## 2020-03-18 RX ORDER — ROCURONIUM BROMIDE 10 MG/ML
INJECTION, SOLUTION INTRAVENOUS
Status: DISCONTINUED | OUTPATIENT
Start: 2020-03-18 | End: 2020-03-18

## 2020-03-18 RX ORDER — ACETAMINOPHEN 500 MG
1000 TABLET ORAL
Status: COMPLETED | OUTPATIENT
Start: 2020-03-18 | End: 2020-03-18

## 2020-03-18 RX ORDER — ACETAMINOPHEN 500 MG
1000 TABLET ORAL EVERY 8 HOURS
Status: DISCONTINUED | OUTPATIENT
Start: 2020-03-18 | End: 2020-03-18 | Stop reason: HOSPADM

## 2020-03-18 RX ORDER — ONDANSETRON 2 MG/ML
4 INJECTION INTRAMUSCULAR; INTRAVENOUS DAILY PRN
Status: DISCONTINUED | OUTPATIENT
Start: 2020-03-18 | End: 2020-03-18 | Stop reason: HOSPADM

## 2020-03-18 RX ORDER — PHENYLEPHRINE HYDROCHLORIDE 10 MG/ML
INJECTION INTRAVENOUS
Status: DISCONTINUED | OUTPATIENT
Start: 2020-03-18 | End: 2020-03-18

## 2020-03-18 RX ORDER — KETAMINE HYDROCHLORIDE 100 MG/ML
INJECTION, SOLUTION INTRAMUSCULAR; INTRAVENOUS
Status: DISCONTINUED | OUTPATIENT
Start: 2020-03-18 | End: 2020-03-18

## 2020-03-18 RX ORDER — DEXAMETHASONE SODIUM PHOSPHATE 4 MG/ML
INJECTION, SOLUTION INTRA-ARTICULAR; INTRALESIONAL; INTRAMUSCULAR; INTRAVENOUS; SOFT TISSUE
Status: DISCONTINUED | OUTPATIENT
Start: 2020-03-18 | End: 2020-03-18

## 2020-03-18 RX ORDER — FENTANYL CITRATE 50 UG/ML
INJECTION, SOLUTION INTRAMUSCULAR; INTRAVENOUS
Status: DISCONTINUED | OUTPATIENT
Start: 2020-03-18 | End: 2020-03-18

## 2020-03-18 RX ORDER — METHOCARBAMOL 500 MG/1
1000 TABLET, FILM COATED ORAL ONCE
Status: COMPLETED | OUTPATIENT
Start: 2020-03-18 | End: 2020-03-18

## 2020-03-18 RX ORDER — OXYCODONE HYDROCHLORIDE 5 MG/1
10 TABLET ORAL EVERY 4 HOURS PRN
Status: DISCONTINUED | OUTPATIENT
Start: 2020-03-18 | End: 2020-03-18 | Stop reason: HOSPADM

## 2020-03-18 RX ORDER — MUPIROCIN 20 MG/G
OINTMENT TOPICAL
Status: DISCONTINUED | OUTPATIENT
Start: 2020-03-18 | End: 2020-03-18 | Stop reason: HOSPADM

## 2020-03-18 RX ORDER — METOCLOPRAMIDE HYDROCHLORIDE 5 MG/ML
5 INJECTION INTRAMUSCULAR; INTRAVENOUS EVERY 6 HOURS PRN
Status: DISCONTINUED | OUTPATIENT
Start: 2020-03-18 | End: 2020-03-18 | Stop reason: HOSPADM

## 2020-03-18 RX ORDER — FAMOTIDINE 10 MG/ML
INJECTION INTRAVENOUS
Status: DISCONTINUED | OUTPATIENT
Start: 2020-03-18 | End: 2020-03-18

## 2020-03-18 RX ORDER — GABAPENTIN 300 MG/1
600 CAPSULE ORAL ONCE
Status: COMPLETED | OUTPATIENT
Start: 2020-03-18 | End: 2020-03-18

## 2020-03-18 RX ORDER — SUCCINYLCHOLINE CHLORIDE 20 MG/ML
INJECTION INTRAMUSCULAR; INTRAVENOUS
Status: DISCONTINUED | OUTPATIENT
Start: 2020-03-18 | End: 2020-03-18

## 2020-03-18 RX ADMIN — FENTANYL CITRATE 25 MCG: 50 INJECTION INTRAMUSCULAR; INTRAVENOUS at 09:03

## 2020-03-18 RX ADMIN — PROPOFOL 200 MG: 10 INJECTION, EMULSION INTRAVENOUS at 07:03

## 2020-03-18 RX ADMIN — PHENYLEPHRINE HYDROCHLORIDE 100 MCG: 10 INJECTION INTRAVENOUS at 08:03

## 2020-03-18 RX ADMIN — ROCURONIUM BROMIDE 10 MG: 10 INJECTION, SOLUTION INTRAVENOUS at 07:03

## 2020-03-18 RX ADMIN — CEFAZOLIN 3 G: 330 INJECTION, POWDER, FOR SOLUTION INTRAMUSCULAR; INTRAVENOUS at 07:03

## 2020-03-18 RX ADMIN — SODIUM CHLORIDE, SODIUM GLUCONATE, SODIUM ACETATE, POTASSIUM CHLORIDE, MAGNESIUM CHLORIDE, SODIUM PHOSPHATE, DIBASIC, AND POTASSIUM PHOSPHATE: .53; .5; .37; .037; .03; .012; .00082 INJECTION, SOLUTION INTRAVENOUS at 07:03

## 2020-03-18 RX ADMIN — METHOCARBAMOL TABLETS 1000 MG: 500 TABLET, COATED ORAL at 09:03

## 2020-03-18 RX ADMIN — ACETAMINOPHEN 1000 MG: 500 TABLET ORAL at 06:03

## 2020-03-18 RX ADMIN — DEXAMETHASONE SODIUM PHOSPHATE 8 MG: 4 INJECTION, SOLUTION INTRAMUSCULAR; INTRAVENOUS at 07:03

## 2020-03-18 RX ADMIN — ONDANSETRON 4 MG: 2 INJECTION INTRAMUSCULAR; INTRAVENOUS at 07:03

## 2020-03-18 RX ADMIN — OXYCODONE HYDROCHLORIDE 10 MG: 5 TABLET ORAL at 10:03

## 2020-03-18 RX ADMIN — FENTANYL CITRATE 100 MCG: 50 INJECTION, SOLUTION INTRAMUSCULAR; INTRAVENOUS at 07:03

## 2020-03-18 RX ADMIN — MUPIROCIN: 20 OINTMENT TOPICAL at 06:03

## 2020-03-18 RX ADMIN — GABAPENTIN 600 MG: 300 CAPSULE ORAL at 10:03

## 2020-03-18 RX ADMIN — FENTANYL CITRATE 25 MCG: 50 INJECTION INTRAMUSCULAR; INTRAVENOUS at 10:03

## 2020-03-18 RX ADMIN — DEXMEDETOMIDINE HYDROCHLORIDE 50 MCG: 100 INJECTION, SOLUTION, CONCENTRATE INTRAVENOUS at 07:03

## 2020-03-18 RX ADMIN — LIDOCAINE HYDROCHLORIDE 50 MG: 20 INJECTION, SOLUTION INTRAVENOUS at 07:03

## 2020-03-18 RX ADMIN — MIDAZOLAM 2 MG: 1 INJECTION INTRAMUSCULAR; INTRAVENOUS at 07:03

## 2020-03-18 RX ADMIN — SUCCINYLCHOLINE CHLORIDE 160 MG: 20 INJECTION, SOLUTION INTRAMUSCULAR; INTRAVENOUS at 07:03

## 2020-03-18 RX ADMIN — FAMOTIDINE 20 MG: 10 INJECTION, SOLUTION INTRAVENOUS at 07:03

## 2020-03-18 RX ADMIN — LIDOCAINE HYDROCHLORIDE 4 ML: 40 SOLUTION TOPICAL at 07:03

## 2020-03-18 RX ADMIN — HYDROMORPHONE HYDROCHLORIDE 1 MG: 1 INJECTION, SOLUTION INTRAMUSCULAR; INTRAVENOUS; SUBCUTANEOUS at 11:03

## 2020-03-18 RX ADMIN — KETAMINE HYDROCHLORIDE 30 MG: 100 INJECTION, SOLUTION, CONCENTRATE INTRAMUSCULAR; INTRAVENOUS at 07:03

## 2020-03-18 RX ADMIN — FENTANYL CITRATE 50 MCG: 50 INJECTION, SOLUTION INTRAMUSCULAR; INTRAVENOUS at 07:03

## 2020-03-18 RX ADMIN — OXYCODONE HYDROCHLORIDE 10 MG: 5 TABLET ORAL at 09:03

## 2020-03-18 RX ADMIN — SODIUM CHLORIDE: 0.9 INJECTION, SOLUTION INTRAVENOUS at 06:03

## 2020-03-18 NOTE — ANESTHESIA POSTPROCEDURE EVALUATION
Anesthesia Post Evaluation    Patient: Baldo Grant    Procedure(s) Performed: Procedure(s) (LRB):  ARTHROSCOPY, KNEE, WITH MENISCECTOMY (Left)  ARTHROSCOPY, KNEE, WITH CHONDROPLASTY (Left)    Final Anesthesia Type: general    Patient location during evaluation: PACU  Patient participation: Yes- Able to Participate  Level of consciousness: awake and alert and oriented  Post-procedure vital signs: reviewed and stable  Pain management: adequate  Airway patency: patent    PONV status at discharge: No PONV  Anesthetic complications: no      Cardiovascular status: hemodynamically stable  Respiratory status: nasal cannula  Hydration status: euvolemic  Follow-up not needed.          Vitals Value Taken Time   /78 3/18/2020 12:59 PM   Temp 36.5 °C (97.7 °F) 3/18/2020  8:34 AM   Pulse 74 3/18/2020 12:42 PM   Resp 25 3/18/2020 12:41 PM   SpO2 95 % 3/18/2020 12:42 PM   Vitals shown include unvalidated device data.      Event Time     Out of Recovery 10:00:00          Pain/Marcelino Score: Pain Rating Prior to Med Admin: 7 (3/18/2020 11:15 AM)  Marcelino Score: 9 (3/18/2020  9:15 AM)

## 2020-03-18 NOTE — ANESTHESIA PROCEDURE NOTES
Intubation  Performed by: Samantha Marr CRNA  Authorized by: Christina Nichols MD     Intubation:     Induction:  Intravenous    Intubated:  Postinduction    Mask Ventilation:  N/a    Attempts:  1    Attempted By:  CRNA    Method of Intubation:  Video laryngoscopy    Laryngeal View Grade: Grade I - full view of chords      Difficult Airway Encountered?: No      Complications:  None    Airway Device:  Oral endotracheal tube    Airway Device Size:  7.5    Style/Cuff Inflation:  Cuffed    Inflation Amount (mL):  8    Tube secured:  23    Secured at:  The lips    Placement Verified By:  Capnometry    Complicating Factors:  None    Findings Post-Intubation:  BS equal bilateral

## 2020-03-18 NOTE — PLAN OF CARE
Patient states they are ready to be discharged. Instructions and prescribed medications given to patient.  Both verbalize understanding. Patient tolerating po liquids with no difficulty. Patient states pain is at a tolerable level for them. Anesthesia consent and surgical consent in chart upon patient's discharge from [facility].

## 2020-03-18 NOTE — TRANSFER OF CARE
Anesthesia Transfer of Care Note    Patient: Baldo Grant    Procedure(s) Performed: Procedure(s) (LRB):  ARTHROSCOPY, KNEE, WITH MENISCECTOMY (Left)  ARTHROSCOPY, KNEE, WITH CHONDROPLASTY (Left)    Patient location: PACU    Anesthesia Type: general    Transport from OR: Transported from OR on 6-10 L/min O2 by face mask with adequate spontaneous ventilation    Post pain: adequate analgesia    Post assessment: no apparent anesthetic complications and tolerated procedure well    Post vital signs: stable    Level of consciousness: awake    Nausea/Vomiting: no nausea/vomiting    Complications: none    Transfer of care protocol was followed      Last vitals:   Visit Vitals  /79 (BP Location: Right arm, Patient Position: Lying)   Pulse 61   Temp 36.7 °C (98 °F) (Oral)   Resp 18   Ht 6' (1.829 m)   Wt 120.2 kg (265 lb)   SpO2 98%   BMI 35.94 kg/m²

## 2020-03-18 NOTE — PROGRESS NOTES
Spoke with Dr. Nichols regarding pt's order for Celebrex 400mg. Pt has an allergy to ibuprofen. Per  ,do not give pt ordered dose.

## 2020-03-18 NOTE — OP NOTE
[facility]   OPERATIVE REPORT   ORTHOPAEDIC SURGERY   PROVIDER: DR. ARNIE KAUFMAN    PATIENT INFORMATION   Baldo Grant 59 y.o. male 1960   MRN: [id number]   LOCATION: [facility]     DATE OF PROCEDURE: 3/18/2020     PREOPERATIVE DIAGNOSES:   Left  1. knee medial meniscus tear   2. knee chondromalacia     POSTOPERATIVE DIAGNOSES:   Left  1. knee medial meniscus tear, degenerative-type horizontal cleavage tear  2. knee chondromalacia, diffuse grade 2-4 in the medial and patellofemoral compartments  3. knee synovitis  4. knee peripatellar adhesions  5. knee numerous chondral loose bodies     PROCEDURE PERFORMED:   Left  1. knee arthroscopic partial medial meniscectomy (CPT 14149)  2. knee arthroscopic loose body removal (CPT )  3. knee arthroscopic lateral release/peripatellar release package (CPT 26003)  4. knee arthroscopic chondroplasty    Surgeon(s) and Role:     * FREEMAN Kaufman MD - Primary     * Juan Manuel Bueno MD - Fellow     * SEFERINO Ruiz    ANESTHESIA: General with local anesthetic injection    ESTIMATED BLOOD LOSS: 10 cc    IMPLANTS: * No implants in log *     SPECIMENS:   Specimen (12h ago, onward)    None        COMPLICATIONS: None.     INTRAOPERATIVE COUNTS: Correct.     PROPHYLACTIC IV ANTIBIOTICS: Given per [facility] Protocol.    INDICATIONS FOR PROCEDURE: Mario is a patient I have seen numerous times for his left knee.  We have treated his underlying chondromalacia and meniscus pathology conservatively with multiple injections, self-directed therapy, oral medication, activity modifications.  This week he presented to my office with significant pain, effusion, and difficulty weight-bearing.  Repeat MRI has demonstrated degenerative medial meniscus pathology with chondromalacia.  The recurrent effusions have been an issue for him as well.  He has inquired into possible arthroscopy.  Given the extent and duration of these complaints, the patient has been indicated for  arthroscopic intervention.  Details of such were reviewed preoperatively to include the expected postoperative rehabilitation and recovery course.  Outlined risks and potential complications of surgery include but are not limited to: infection, stiffness, progression of arthritis, tissue re-tearing, neurovascular injury, blood clot formation, potential need further surgery.  The patient understands that arthroscopy in this case is a limited goals option.  He will need a knee replacement most likely in the future.  The patient has elected to proceed.    PROCEDURE IN DETAIL:  The patient was identified in preop holding. Permit was obtained for the above procedure. IV antibiotic was initiated for prophylaxis and the patient was brought to the operating room.       After Anesthesia was administered, a Time Out was verbalized with all OR staff agreeing to the patient and procedure.      The left knee and leg were prepped and draped in the usual sterile fashion.      Diagnostic arthroscopy was undertaken after establishing routine anteromedial and anterolateral scope portals.      Significant Findings:  1. Patellofemoral compartment - diffuse grade 2-4 wear of the central trochlea and grade 2-3 wear of the central eminence to lateral facet of the patella; very tight patellofemoral compartment with tight lateral pericapsular tissue and retinaculum with positive patellar tilt  2. Notch - Normal ACL and PCL  3. Medial Compartment - Meniscus, degenerative type horizontal cleavage tear of the posterior horn to posterior body with free edge fraying. Cartilage, diffuse grade 2-4 wear of the central to medial aspect of the medial femoral condyle with multiple unstable flaps of articular cartilage, grade 2 wear of the medial tibial plateau.   4. Lateral compartment - Meniscus, minimal free edge fraying.  Cartilage, no significant chondromalacia.    5. Loose bodies - numerous chondral loose bodies present throughout the knee  6.  Other - moderate diffuse synovitis over the anterior compartment and suprapatellar recess with significant peripatellar adhesions    Detailed description:     1. Meniscectomy: Partial medial meniscectomy was carried out from the posterior horn to postop body with a combination of biters and demetrio.  The lower leaf of the horizontal cleavage tear was resected.  Trimming was completed to a stable, contoured edge.     2. Releases: Peripatellar releases were performed with combination of cautery and thermal ablation, releasing thickened anterolateral pericapsular tissue and performing a partial synovectomy.  A partial lateral release was performed working from the superior pole to the inferior pole of the patella, maintaining hemostasis throughout.  This effectively decompressed the patellofemoral compartment.    3. Chondroplasty and loose bodies:  The shaver was used to debride away any delaminating articular cartilage.  Debridement was carried out over the medial femoral condyle, medial tibial plateau, trochlea, and patella.  The shaver also was used to remove all chondral loose bodies from the knee.    Photos were taken. The portals were closed in standard fashion using 3-0 Nylon suture.    The dressing was placed after local was administered subcutaneously and the patient was awakened and transferred to the recovery room in satisfactory condition.  There were no apparent complications.     POSTOPERATIVE PLAN: Patient may weight bear as tolerates on crutches. Full range of motion to tolerance. Will start physical therapy right away. ASA 81 mg BID x 2 weeks for DVT prophylaxis.

## 2020-03-18 NOTE — BRIEF OP NOTE
[facility]  Brief Operative Note    Surgery Date: 3/18/2020     Surgeon(s) and Role:     * FREEMAN Álvarez MD - Primary     * Juan Manuel Bueno MD - Fellow    Assisting Surgeon: None    Pre-op Diagnosis:  Old tear of medial meniscus of left knee, unspecified tear type [M23.204]  Chondromalacia of left knee [M94.262]    Post-op Diagnosis:  Post-Op Diagnosis Codes:     * Old tear of medial meniscus of left knee, unspecified tear type [M23.204]     * Chondromalacia of left knee [M94.262]    Procedure(s) (LRB):  ARTHROSCOPY, KNEE, WITH MENISCECTOMY (Left)  ARTHROSCOPY, KNEE, WITH CHONDROPLASTY (Left)    Anesthesia: General    Description of the findings of the procedure(s):   1.  Left knee, arthroscopic medial meniscectomy, partial  2.  Left knee, arthroscopic chondroplasty, patellofemoral      Estimated Blood Loss: * No values recorded between 3/18/2020  7:35 AM and 3/18/2020  8:23 AM *         Specimens:   Specimen (12h ago, onward)    None            Discharge Note    OUTCOME: Patient tolerated treatment/procedure well without complication and is now ready for discharge.    DISPOSITION: Home or Self Care    FINAL DIAGNOSIS:  Primary osteoarthritis of left knee    FOLLOWUP: In clinic    DISCHARGE INSTRUCTIONS:    Discharge Procedure Orders   Diet general     Call MD for:  temperature >100.4     Call MD for:  persistent nausea and vomiting     Call MD for:  severe uncontrolled pain     Call MD for:  difficulty breathing, headache or visual disturbances     Call MD for:  redness, tenderness, or signs of infection (pain, swelling, redness, odor or green/yellow discharge around incision site)     Call MD for:  hives     Call MD for:  persistent dizziness or light-headedness     Call MD for:  extreme fatigue

## 2020-03-18 NOTE — ANESTHESIA PREPROCEDURE EVALUATION
03/18/2020  Baldo Grant is a 59 y.o., male.    Procedure Summary     Case: [id number] Date/Time: 03/18/20 0700   Procedures:       ARTHROSCOPY, KNEE, WITH MENISCECTOMY (Left ) - CLONIDINE/EPI/KETOROLAC/ROPIVACAINE INJECTION 30CC      ARTHROSCOPY, KNEE, WITH CHONDROPLASTY (Left )   Anesthesia type: General   Diagnosis:       Old tear of medial meniscus of left knee, unspecified tear type [M23.204]      Chondromalacia of left knee [M94.262]     Patient Active Problem List   Diagnosis    Primary osteoarthritis of both knees    Chronic right-sided low back pain with right-sided sciatica    Complete tear of left rotator cuff    Biceps tendinitis on left    Post-traumatic osteoarthritis of left knee    Lumbar radiculopathy    Obesity (BMI 30-39.9)    Acute bilateral low back pain with right-sided sciatica    Decreased functional mobility    Decreased strength    Chronic pain    Primary osteoarthritis of left knee     Past Surgical History:   Procedure Laterality Date    APPENDECTOMY      COLONOSCOPY  1998    EPIDURAL STEROID INJECTION INTO LUMBAR SPINE N/A 2/11/2020    Procedure: Injection-steroid-epidural-lumbar--InterLaminar L4-5;  Surgeon: Alexus Anglin Jr., MD;  Location: [facility];  Service: Pain Management;  Laterality: N/A;    INJECTION OF ANESTHETIC AGENT AROUND MEDIAL BRANCH NERVES INNERVATING LUMBAR FACET JOINT Bilateral 11/5/2019    Procedure: Block-nerve-medial branch-lumbar--Bilateral L3-4,L4-5,L5-S1;  Surgeon: Alexus Anglin Jr., MD;  Location: [facility];  Service: Pain Management;  Laterality: Bilateral;    lipoma removal      skin cancer removal       Current Discharge Medication List      CONTINUE these medications which have NOT CHANGED    Details   calcipotriene-betamethasone (ENSTILAR) 0.005-0.064 % Foam Apply 1 application topically once daily.  Qty: 60 g,  Refills: 2    Associated Diagnoses: Psoriasis      diazePAM (VALIUM) 5 MG tablet Take 1 tablet (5 mg total) by mouth daily as needed.  Qty: 30 tablet, Refills: 1    Associated Diagnoses: Chronic midline low back pain without sciatica      diphenhydrAMINE (BENADRYL) 25 mg capsule Take 25 mg by mouth every 6 (six) hours as needed for Itching.      fexofenadine (ALLEGRA) 180 MG tablet Take 180 mg by mouth continuous prn.      gabapentin (NEURONTIN) 600 MG tablet Take 1 tablet (600 mg total) by mouth 3 (three) times daily.  Qty: 270 tablet, Refills: 3    Associated Diagnoses: Sciatica of right side      HYDROcodone-acetaminophen (NORCO) 5-325 mg per tablet Take 1 tablet by mouth every 6 (six) hours as needed for Pain.  Qty: 100 tablet, Refills: 0    Comments: Medically necessary for 7 days  Associated Diagnoses: Sciatica of right side      lidocaine HCL 2% (XYLOCAINE) 2 % jelly Apply topically as needed.  Qty: 30 mL, Refills: 3    Associated Diagnoses: Post-traumatic osteoarthritis of left knee      aspirin (ECOTRIN) 81 MG EC tablet Take 1 tablet twice a day with food starting after surgery (breakfast and dinner).  Qty: 28 tablet, Refills: 0    Comments: Post op meds to be delivered bedside prior to discharge. Deliver to family if patient is in surgery at 5pmJaden AGUILLON  Associated Diagnoses: Primary osteoarthritis of left knee; Complex tear of medial meniscus of left knee as current injury, subsequent encounter; Chondromalacia of left knee      desonide (DESOWEN) 0.05 % cream Thin film to AA eyelids bid PRN flare  Qty: 60 g, Refills: 3    Associated Diagnoses: Eyelid dermatitis, eczematous, right; Eczematous dermatitis of eyelid of left eye      diclofenac sodium (VOLTAREN) 1 % Gel Apply 2 g topically 4 (four) times daily.  Qty: 100 g, Refills: 0      EPINEPHrine (EPIPEN) 0.3 mg/0.3 mL AtIn Inject 0.3 mLs (0.3 mg total) into the muscle once. for 1 dose  Qty: 1 Device, Refills: 1    Associated Diagnoses: Drug allergy       meloxicam (MOBIC) 15 MG tablet TAKE 1 TABLET(15 MG) BY MOUTH EVERY DAY  Qty: 90 tablet, Refills: 4    Associated Diagnoses: Chronic left shoulder pain      ondansetron (ZOFRAN) 4 MG tablet Take 1 tablet (4 mg total) by mouth every 8 (eight) hours as needed for Nausea.  Qty: 30 tablet, Refills: 0    Comments: Post op meds to be delivered bedside prior to discharge. Deliver to family if patient is in surgery at 5pm. [place]  Associated Diagnoses: Primary osteoarthritis of left knee; Complex tear of medial meniscus of left knee as current injury, subsequent encounter; Chondromalacia of left knee      oxyCODONE (ROXICODONE) 5 MG immediate release tablet Take 1-2 tablets as needed for pain every 6 hours.  Qty: 28 tablet, Refills: 0    Comments: Post op meds to be delivered bedside prior to discharge. Deliver to family if patient is in surgery at 5pm. [place]  Associated Diagnoses: Primary osteoarthritis of left knee; Complex tear of medial meniscus of left knee as current injury, subsequent encounter; Chondromalacia of left knee             Anesthesia Evaluation    I have reviewed the Patient Summary Reports.    I have reviewed the Nursing Notes.   I have reviewed the Medications.     Review of Systems  Anesthesia Hx:  No problems with previous Anesthesia  Denies Family Hx of Anesthesia complications.   Denies Personal Hx of Anesthesia complications.   Social:  Non-Smoker, Social Alcohol Use    Hematology/Oncology:  Hematology Normal      Oncology Comments: Basal cell carcinoma- back  Squamous cell carcinoma-forehead, R ear    EENT/Dental:   chronic allergic rhinitis   Cardiovascular:   Exercise tolerance: good  Denies Angina. Getting new EKG today 3/17/2020   Pulmonary:   Denies Shortness of breath.  Denies Recent URI.    Renal/:  Renal/ Normal     Hepatic/GI:  Hepatic/GI Normal    Musculoskeletal:   Arthritis     Neurological:   Neuromuscular Disease, Chronic lumbar pain-- gets injections   Endocrine:  Endocrine  Normal    Dermatological:  Skin Normal    Psych:  Psychiatric Normal           Physical Exam  General:  Well nourished, Obesity    Airway/Jaw/Neck:  Airway Findings: Mouth Opening: Normal Tongue: Normal  General Airway Assessment: Adult  Mallampati: II  TM Distance: Normal, at least 6 cm            Mental Status:  Mental Status Findings:  Cooperative, Alert and Oriented                                Anesthesia Plan  Type of Anesthesia, risks & benefits discussed:  Anesthesia Type:  general, regional, MAC  Patient's Preference:   Intra-op Monitoring Plan: standard ASA monitors  Intra-op Monitoring Plan Comments:   Post Op Pain Control Plan: per primary service following discharge from PACU, multimodal analgesia and peripheral nerve block  Post Op Pain Control Plan Comments:   Induction:   IV  Beta Blocker:  Patient is not currently on a Beta-Blocker (No further documentation required).       Informed Consent: Patient understands risks and agrees with Anesthesia plan.  Questions answered. Anesthesia consent signed with patient.  ASA Score: 2     Day of Surgery Review of History & Physical:            Ready For Surgery From Anesthesia Perspective.

## 2020-03-18 NOTE — DISCHARGE INSTRUCTIONS
[employer] CARE CUBE COLD THERAPY SYSTEM    The Polar Care Cube Cold Therapy System is simple and reliable. It is easy to use, compact design makes it great for home use. With the addition of ice and water, you will enjoy 6-8 hours of effortless cold therapy. Proper use requires an insulation barrier between the pad and the patient's skin.  Instructions on how to use the Polar Care Cube Cold Therapy System Below:

## 2020-03-18 NOTE — PLAN OF CARE
Pre-op complete, all questions answered, pt is stable and ready for next phase of care. Pt has crutches.

## 2020-03-18 NOTE — INTERVAL H&P NOTE
The patient has been examined and the H&P has been reviewed:    I concur with the findings and changes have been noted since the H&P was written:      Anesthesia/Surgery risks, benefits and alternative options discussed and understood by patient/family.          Active Hospital Problems    Diagnosis  POA    Primary osteoarthritis of left knee [M17.12]  Yes      Resolved Hospital Problems   No resolved problems to display.

## 2020-03-18 NOTE — ANESTHESIA PROCEDURE NOTES
Adductor canal single shot    Patient location during procedure: pre-op   Block not for primary anesthetic.  Reason for block: at surgeon's request and post-op pain management   Post-op Pain Location: left knee pain  Start time: 3/18/2020 9:33 AM  Timeout: 3/18/2020 9:32 AM   End time: 3/18/2020 9:37 AM    Staffing  Authorizing Provider: Christina Nichols MD  Performing Provider: Christina Nichols MD    Preanesthetic Checklist  Completed: patient identified, site marked, surgical consent, pre-op evaluation, timeout performed, IV checked, risks and benefits discussed and monitors and equipment checked  Peripheral Block  Patient position: supine  Prep: ChloraPrep  Patient monitoring: heart rate, cardiac monitor, continuous pulse ox, continuous capnometry and frequent blood pressure checks  Block type: adductor canal  Laterality: left  Injection technique: single shot  Needle  Needle type: Stimuplex   Needle gauge: 21 G  Needle length: 4 in  Needle localization: anatomical landmarks and ultrasound guidance   -ultrasound image captured on disc.  Assessment  Injection assessment: negative aspiration, negative parasthesia and local visualized surrounding nerve  Paresthesia pain: none  Heart rate change: no  Slow fractionated injection: yes  Additional Notes  VSS.  [facility] RN monitoring vitals throughout procedure.  Patient tolerated procedure well.     0.25% Ropivicaine 15m: with 1: 400 k epi, 1 mg PF Decadron and 50 ug  PF Clonidine  Pt tolerated well

## 2020-03-19 VITALS
HEART RATE: 76 BPM | WEIGHT: 265 LBS | DIASTOLIC BLOOD PRESSURE: 78 MMHG | RESPIRATION RATE: 20 BRPM | TEMPERATURE: 98 F | BODY MASS INDEX: 35.89 KG/M2 | HEIGHT: 72 IN | SYSTOLIC BLOOD PRESSURE: 143 MMHG | OXYGEN SATURATION: 95 %

## 2020-03-23 ENCOUNTER — DOCUMENTATION ONLY (OUTPATIENT)
Dept: REHABILITATION | Facility: HOSPITAL | Age: 60
End: 2020-03-23

## 2020-03-23 DIAGNOSIS — R53.1 DECREASED STRENGTH: ICD-10-CM

## 2020-03-23 DIAGNOSIS — M54.41 ACUTE BILATERAL LOW BACK PAIN WITH RIGHT-SIDED SCIATICA: ICD-10-CM

## 2020-03-23 DIAGNOSIS — R26.89 DECREASED FUNCTIONAL MOBILITY: ICD-10-CM

## 2020-03-23 NOTE — PROGRESS NOTES
Pt was evaluated on 10/9/2019 and was seen 6 times for PT. Pt has not attended PT since 2020. Pt was given HEP update on 2020. Plan of care  3/20/2020. Recent epidural steroid injection and knee arthroscopy. Post-op evaluation next week. Pt to be d/c'd at this time.

## 2020-03-25 ENCOUNTER — PATIENT MESSAGE (OUTPATIENT)
Dept: ADMINISTRATIVE | Facility: OTHER | Age: 60
End: 2020-03-25

## 2020-03-26 ENCOUNTER — PATIENT MESSAGE (OUTPATIENT)
Dept: SPORTS MEDICINE | Facility: CLINIC | Age: 60
End: 2020-03-26

## 2020-03-26 DIAGNOSIS — M23.204 OLD TEAR OF MEDIAL MENISCUS OF LEFT KNEE, UNSPECIFIED TEAR TYPE: Primary | ICD-10-CM

## 2020-03-27 ENCOUNTER — PATIENT MESSAGE (OUTPATIENT)
Dept: PAIN MEDICINE | Facility: CLINIC | Age: 60
End: 2020-03-27

## 2020-03-27 RX ORDER — OXYCODONE HYDROCHLORIDE 5 MG/1
5 TABLET ORAL EVERY 8 HOURS PRN
Qty: 21 TABLET | Refills: 0 | Status: SHIPPED | OUTPATIENT
Start: 2020-03-27 | End: 2020-04-03

## 2020-03-27 NOTE — TELEPHONE ENCOUNTER
Patient with continued post op pain. Refill oxycodone 5mg PO q8h sent to pharmacy. [place]  reviewed.    Tai Young PA-C

## 2020-03-30 ENCOUNTER — TELEPHONE (OUTPATIENT)
Dept: SPORTS MEDICINE | Facility: CLINIC | Age: 60
End: 2020-03-30

## 2020-03-30 NOTE — TELEPHONE ENCOUNTER
LVM to let patient know his post-op visit has been rescheduled. Left date and time and instructed him to call back if this does not work for him.

## 2020-04-01 ENCOUNTER — PATIENT MESSAGE (OUTPATIENT)
Dept: INTERNAL MEDICINE | Facility: CLINIC | Age: 60
End: 2020-04-01

## 2020-04-02 DIAGNOSIS — M54.31 SCIATICA OF RIGHT SIDE: ICD-10-CM

## 2020-04-02 RX ORDER — HYDROCODONE BITARTRATE AND ACETAMINOPHEN 5; 325 MG/1; MG/1
1 TABLET ORAL EVERY 6 HOURS PRN
Qty: 100 TABLET | Refills: 0 | Status: SHIPPED | OUTPATIENT
Start: 2020-04-02 | End: 2020-05-04 | Stop reason: SDUPTHER

## 2020-04-03 ENCOUNTER — OFFICE VISIT (OUTPATIENT)
Dept: SPORTS MEDICINE | Facility: CLINIC | Age: 60
End: 2020-04-03
Payer: OTHER GOVERNMENT

## 2020-04-03 VITALS
SYSTOLIC BLOOD PRESSURE: 127 MMHG | BODY MASS INDEX: 35.89 KG/M2 | HEART RATE: 75 BPM | WEIGHT: 265 LBS | DIASTOLIC BLOOD PRESSURE: 82 MMHG | HEIGHT: 72 IN

## 2020-04-03 DIAGNOSIS — Z09 SURGERY FOLLOW-UP EXAMINATION: ICD-10-CM

## 2020-04-03 DIAGNOSIS — Z98.890 S/P ARTHROSCOPY OF KNEE: Primary | ICD-10-CM

## 2020-04-03 PROCEDURE — 99999 PR PBB SHADOW E&M-EST. PATIENT-LVL IV: ICD-10-PCS | Mod: PBBFAC,,, | Performed by: PHYSICIAN ASSISTANT

## 2020-04-03 PROCEDURE — 99024 PR POST-OP FOLLOW-UP VISIT: ICD-10-PCS | Mod: ,,, | Performed by: PHYSICIAN ASSISTANT

## 2020-04-03 PROCEDURE — 99999 PR PBB SHADOW E&M-EST. PATIENT-LVL IV: CPT | Mod: PBBFAC,,, | Performed by: PHYSICIAN ASSISTANT

## 2020-04-03 PROCEDURE — 99024 POSTOP FOLLOW-UP VISIT: CPT | Mod: ,,, | Performed by: PHYSICIAN ASSISTANT

## 2020-04-03 PROCEDURE — 99214 OFFICE O/P EST MOD 30 MIN: CPT | Mod: PBBFAC | Performed by: PHYSICIAN ASSISTANT

## 2020-04-03 NOTE — PROGRESS NOTES
S:Baldo Grant presents for post-operative evaluation.     DATE OF PROCEDURE: 3/18/2020      PROCEDURE PERFORMED:   Left  1. knee arthroscopic partial medial meniscectomy (CPT 45560)  2. knee arthroscopic loose body removal (CPT )  3. knee arthroscopic lateral release/peripatellar release package (CPT 35077)  4. knee arthroscopic chondroplasty     Surgeon(s) and Role:     * FREEMAN Álvarez MD - Primary     * Juan Manuel Bueno MD - Fellow     * SEFERINO Ruiz    Baldo Grant reports to be doing well 2wk s/p the above mentioned procedure. Denies fevers, chills, night sweats, chest pain, difficulty breathing, calf pain or tenderness. Going to PT 2xWeek at the [facility] location. Seeing good progress daily. Pain levels are improving. Has d/c'd pain medication.     O: The incisions are healing well.  No signs of infection.  Sutures were removed. No significant pain or unusual tenderness.    A/P: Arthroscopic pictures were reviewed with the patient. Plan to follow the rehab plan as previously outlined. RTC in 4 weeks.     POSTOPERATIVE PLAN: Patient may weight bear as tolerates on crutches. Full range of motion to tolerance. Will start physical therapy right away. ASA 81 mg BID x 2 weeks for DVT prophylaxis.    All of the patient's questions were answered and the patient will contact us if they have any questions or concerns in the interim.

## 2020-04-07 PROBLEM — Z74.09 IMPAIRED FUNCTIONAL MOBILITY, BALANCE, GAIT, AND ENDURANCE: Status: ACTIVE | Noted: 2020-04-07

## 2020-04-08 ENCOUNTER — PATIENT MESSAGE (OUTPATIENT)
Dept: SPORTS MEDICINE | Facility: CLINIC | Age: 60
End: 2020-04-08

## 2020-04-15 ENCOUNTER — PATIENT MESSAGE (OUTPATIENT)
Dept: INTERNAL MEDICINE | Facility: CLINIC | Age: 60
End: 2020-04-15

## 2020-04-15 ENCOUNTER — PATIENT MESSAGE (OUTPATIENT)
Dept: PAIN MEDICINE | Facility: CLINIC | Age: 60
End: 2020-04-15

## 2020-04-17 ENCOUNTER — PATIENT MESSAGE (OUTPATIENT)
Dept: ADMINISTRATIVE | Facility: OTHER | Age: 60
End: 2020-04-17

## 2020-04-21 ENCOUNTER — TELEPHONE (OUTPATIENT)
Dept: SPORTS MEDICINE | Facility: CLINIC | Age: 60
End: 2020-04-21

## 2020-04-21 NOTE — TELEPHONE ENCOUNTER
Spoke with the patient about rescheduling appt to next Thursday, 4/30, as a telemedicine visit. Patient verbalized understanding.

## 2020-04-24 ENCOUNTER — PATIENT MESSAGE (OUTPATIENT)
Dept: INTERNAL MEDICINE | Facility: CLINIC | Age: 60
End: 2020-04-24

## 2020-04-24 ENCOUNTER — TELEPHONE (OUTPATIENT)
Dept: PAIN MEDICINE | Facility: CLINIC | Age: 60
End: 2020-04-24

## 2020-04-24 NOTE — TELEPHONE ENCOUNTER
Spoke with pt regarding physical therapy. Pt stated he would like to go to PT for back pain. I informed pt Dr. Anglin is out of the office until Monday but I will send him a message. Pt verbalized understanding.

## 2020-04-24 NOTE — TELEPHONE ENCOUNTER
----- Message from Martin Hayes sent at 4/24/2020  1:01 PM CDT -----  Contact: [phone]/imqr  Patient requesting physical therapy orders be put in the system  Please advise

## 2020-04-25 DIAGNOSIS — M54.31 SCIATICA OF RIGHT SIDE: ICD-10-CM

## 2020-04-25 DIAGNOSIS — G89.29 CHRONIC MIDLINE LOW BACK PAIN WITHOUT SCIATICA: ICD-10-CM

## 2020-04-25 DIAGNOSIS — M54.50 CHRONIC MIDLINE LOW BACK PAIN WITHOUT SCIATICA: ICD-10-CM

## 2020-04-27 ENCOUNTER — TELEPHONE (OUTPATIENT)
Dept: PAIN MEDICINE | Facility: CLINIC | Age: 60
End: 2020-04-27

## 2020-04-27 ENCOUNTER — PATIENT MESSAGE (OUTPATIENT)
Dept: INTERNAL MEDICINE | Facility: CLINIC | Age: 60
End: 2020-04-27

## 2020-04-27 DIAGNOSIS — M54.31 SCIATICA OF RIGHT SIDE: Primary | ICD-10-CM

## 2020-04-27 DIAGNOSIS — M51.36 DDD (DEGENERATIVE DISC DISEASE), LUMBAR: Primary | ICD-10-CM

## 2020-04-27 DIAGNOSIS — G89.4 CHRONIC PAIN SYNDROME: ICD-10-CM

## 2020-04-27 DIAGNOSIS — M48.061 SPINAL STENOSIS OF LUMBAR REGION, UNSPECIFIED WHETHER NEUROGENIC CLAUDICATION PRESENT: ICD-10-CM

## 2020-04-27 DIAGNOSIS — M54.16 LUMBAR RADICULOPATHY: ICD-10-CM

## 2020-04-27 RX ORDER — TIZANIDINE 4 MG/1
4 TABLET ORAL EVERY 6 HOURS PRN
Qty: 60 TABLET | Refills: 3 | Status: SHIPPED | OUTPATIENT
Start: 2020-04-27 | End: 2020-05-07

## 2020-04-27 RX ORDER — DIAZEPAM 5 MG/1
5 TABLET ORAL DAILY PRN
Qty: 30 TABLET | Refills: 1 | OUTPATIENT
Start: 2020-04-27

## 2020-04-27 RX ORDER — HYDROCODONE BITARTRATE AND ACETAMINOPHEN 5; 325 MG/1; MG/1
1 TABLET ORAL EVERY 6 HOURS PRN
Qty: 100 TABLET | Refills: 0 | OUTPATIENT
Start: 2020-04-27

## 2020-04-27 NOTE — TELEPHONE ENCOUNTER
----- Message from Belia Mott MA sent at 4/24/2020  1:15 PM CDT -----  Pt is requesting an order placed External PT for back pain. Please advise.

## 2020-04-27 NOTE — TELEPHONE ENCOUNTER
Pt rescheduled procedure for 5/6/20 with arrival time of 11:00am. Pt aware he will need to be tested for covid19 48 hours prior to testing. Pt aware we will contact him to schedule testing. Pt aware of pre-procedure intructions and states he is not taking ASA. Reviewed medication list and ASA is listed. Pt denied taking it. Pt scheduled f/u with Jeffy for 5/13/20.

## 2020-04-28 NOTE — TELEPHONE ENCOUNTER
----- Message from Belia Mott MA sent at 4/28/2020  1:46 PM CDT -----  Pt is now requesting an Internal PT order. Please advise.

## 2020-04-29 DIAGNOSIS — Z01.818 PREOP TESTING: Primary | ICD-10-CM

## 2020-04-30 ENCOUNTER — OFFICE VISIT (OUTPATIENT)
Dept: SPORTS MEDICINE | Facility: CLINIC | Age: 60
End: 2020-04-30
Payer: OTHER GOVERNMENT

## 2020-04-30 ENCOUNTER — TELEPHONE (OUTPATIENT)
Dept: PAIN MEDICINE | Facility: CLINIC | Age: 60
End: 2020-04-30

## 2020-04-30 ENCOUNTER — PATIENT OUTREACH (OUTPATIENT)
Dept: ADMINISTRATIVE | Facility: OTHER | Age: 60
End: 2020-04-30

## 2020-04-30 DIAGNOSIS — Z98.890 S/P ARTHROSCOPY OF KNEE: ICD-10-CM

## 2020-04-30 DIAGNOSIS — Z47.89 SURGICAL AFTERCARE, MUSCULOSKELETAL SYSTEM: Primary | ICD-10-CM

## 2020-04-30 PROCEDURE — 99024 PR POST-OP FOLLOW-UP VISIT: ICD-10-PCS | Mod: 95,,, | Performed by: ORTHOPAEDIC SURGERY

## 2020-04-30 PROCEDURE — 99024 POSTOP FOLLOW-UP VISIT: CPT | Mod: 95,,, | Performed by: ORTHOPAEDIC SURGERY

## 2020-04-30 RX ORDER — TRAMADOL HYDROCHLORIDE 50 MG/1
50 TABLET ORAL EVERY 12 HOURS PRN
Qty: 20 TABLET | Refills: 0 | Status: SHIPPED | OUTPATIENT
Start: 2020-04-30 | End: 2020-04-30 | Stop reason: CLARIF

## 2020-04-30 NOTE — TELEPHONE ENCOUNTER
----- Message from Edwina Rangel sent at 4/30/2020 11:51 AM CDT -----  Contact: patient  Type:  Patient Returning Call    Who Called: Patient  Who Left Message for Patient: Unknown  Does the patient know what this is regarding?: Unknown  Would the patient rather a call back or a response via [facility]? Call back  Best Call Back Number: [phone]  Additional Information: n/a

## 2020-04-30 NOTE — PROGRESS NOTES
Telemedicine Note    Patient was seen today via tele-health by agreement and consent of the patient in light of the current COVID-19 pandemic.  I used the following tele-health technology - Epic video conference call - during the visit.  This patient encounter is appropriate and reasonable under the circumstances given the patient's particular presentation at this time.  The patient has been advised of the potential risks and limitations of this mode of treatment (including but not limited to the absence of in person examination) and has agreed to be treated in a remote fashion in spite of them.  Any and all the patient's/family's questions on this issue have been answered and I have made no promises or guarantees to the patient.  Patient has also been advised to contact this office for worsening conditions or problems.    The patient location is: Home  The chief complaint leading to consultation is: 6 weeks left knee arthroscopic partial medial meniscectomy with chondroplasty, loose body removal, peripatellar releases  Visit type: Virtual visit with synchronous audio and video  Total time spent with patient: 10 weeks  Each patient to whom he or she provides medical services by telemedicine is:  (1) informed of the relationship between the physician and patient and the respective role of any other health care provider with respect to management of the patient; and (2) notified that he or she may decline to receive medical services by telemedicine and may withdraw from such care at any time.    Bill states that overall he is doing better.  Significantly less pain compared to preop.  He does have some intermittent pain and swelling in the knee.  Currently working in physical therapy with improvements.  He also has some back pain which was present preoperatively.  Denies radicular symptoms.  Takes Mobic as needed.  He did have several questions regarding a postoperative block which was performed.  He has had some  soreness over the medial thigh and posterior medial knee which he thinks is block related.  He denies any numbness or tingling.    Exam of the left knee demonstrates well-healed incisions.  No signs of infection.  He does have some swelling but no discernible significant effusion.  The quadriceps activates well.  Near full active extension with perhaps some degree of a lag, flexion to 125° roughly.    A/P:  6 weeks out from surgery.  He does have some underlying significant degenerative arthritis mostly in the medial and patellofemoral compartments.  The significance of this was discussed.  The patient would benefit from low-impact cardio exercise, aqua therapy, and weight loss.  He is a candidate for repeat viscosupplementation in the future.  Could consider referral to pain management also for genicular blocks.  Otherwise plan is to continue appropriate therapy for quadriceps strengthening and knee functional exercises.  Continue Mobic as needed.  I will see him back in 6 weeks.    No nerve specific symptoms at this time.  I think his residual pain and soreness is related more to his underlying degenerative arthritis.  He will contact me should he develop any neurogenic complaints

## 2020-05-01 PROBLEM — Z74.09 IMPAIRED PHYSICAL MOBILITY: Status: ACTIVE | Noted: 2020-05-01

## 2020-05-04 ENCOUNTER — PATIENT MESSAGE (OUTPATIENT)
Dept: INTERNAL MEDICINE | Facility: CLINIC | Age: 60
End: 2020-05-04

## 2020-05-04 ENCOUNTER — LAB VISIT (OUTPATIENT)
Dept: LAB | Facility: HOSPITAL | Age: 60
End: 2020-05-04
Attending: INTERNAL MEDICINE
Payer: OTHER GOVERNMENT

## 2020-05-04 DIAGNOSIS — M54.31 SCIATICA OF RIGHT SIDE: ICD-10-CM

## 2020-05-04 DIAGNOSIS — Z01.818 PREOP TESTING: ICD-10-CM

## 2020-05-04 PROCEDURE — U0002 COVID-19 LAB TEST NON-CDC: HCPCS

## 2020-05-04 RX ORDER — HYDROCODONE BITARTRATE AND ACETAMINOPHEN 5; 325 MG/1; MG/1
1 TABLET ORAL EVERY 6 HOURS PRN
Qty: 100 TABLET | Refills: 0 | Status: SHIPPED | OUTPATIENT
Start: 2020-05-04 | End: 2020-06-14 | Stop reason: SDUPTHER

## 2020-05-05 LAB — SARS-COV-2 RNA RESP QL NAA+PROBE: NOT DETECTED

## 2020-05-06 ENCOUNTER — HOSPITAL ENCOUNTER (OUTPATIENT)
Facility: HOSPITAL | Age: 60
Discharge: HOME | End: 2020-05-06
Attending: PAIN MEDICINE | Admitting: PAIN MEDICINE
Payer: OTHER GOVERNMENT

## 2020-05-06 VITALS
OXYGEN SATURATION: 96 % | DIASTOLIC BLOOD PRESSURE: 82 MMHG | WEIGHT: 260 LBS | BODY MASS INDEX: 35.21 KG/M2 | RESPIRATION RATE: 16 BRPM | HEIGHT: 72 IN | TEMPERATURE: 98 F | SYSTOLIC BLOOD PRESSURE: 128 MMHG | HEART RATE: 76 BPM

## 2020-05-06 DIAGNOSIS — G89.29 CHRONIC PAIN: ICD-10-CM

## 2020-05-06 DIAGNOSIS — M54.16 LUMBAR RADICULOPATHY: Primary | ICD-10-CM

## 2020-05-06 PROCEDURE — 25000003 PHARM REV CODE 250: Performed by: PAIN MEDICINE

## 2020-05-06 PROCEDURE — 99152 MOD SED SAME PHYS/QHP 5/>YRS: CPT | Performed by: PAIN MEDICINE

## 2020-05-06 PROCEDURE — 64483 NJX AA&/STRD TFRM EPI L/S 1: CPT | Mod: 50,,, | Performed by: PAIN MEDICINE

## 2020-05-06 PROCEDURE — 64484 PRA INJECT ANES/STEROID FORAMEN LUMBAR/SACRAL W IMG GUIDE ,EA ADD LEVEL: ICD-10-PCS | Mod: 50,,, | Performed by: PAIN MEDICINE

## 2020-05-06 PROCEDURE — 25500020 PHARM REV CODE 255: Performed by: PAIN MEDICINE

## 2020-05-06 PROCEDURE — 64483 PR EPIDURAL INJ, ANES/STEROID, TRANSFORAMINAL, LUMB/SACR, SNGL LEVL: ICD-10-PCS | Mod: 50,,, | Performed by: PAIN MEDICINE

## 2020-05-06 PROCEDURE — 63600175 PHARM REV CODE 636 W HCPCS: Performed by: PAIN MEDICINE

## 2020-05-06 PROCEDURE — 64483 NJX AA&/STRD TFRM EPI L/S 1: CPT | Mod: 50 | Performed by: PAIN MEDICINE

## 2020-05-06 PROCEDURE — 64484 NJX AA&/STRD TFRM EPI L/S EA: CPT | Mod: 50,,, | Performed by: PAIN MEDICINE

## 2020-05-06 RX ORDER — BUPIVACAINE HYDROCHLORIDE 2.5 MG/ML
INJECTION, SOLUTION EPIDURAL; INFILTRATION; INTRACAUDAL
Status: DISCONTINUED | OUTPATIENT
Start: 2020-05-06 | End: 2020-05-06 | Stop reason: HOSPADM

## 2020-05-06 RX ORDER — DEXAMETHASONE SODIUM PHOSPHATE 10 MG/ML
INJECTION INTRAMUSCULAR; INTRAVENOUS
Status: DISCONTINUED | OUTPATIENT
Start: 2020-05-06 | End: 2020-05-06 | Stop reason: HOSPADM

## 2020-05-06 RX ORDER — SODIUM CHLORIDE 9 MG/ML
500 INJECTION, SOLUTION INTRAVENOUS CONTINUOUS
Status: ACTIVE | OUTPATIENT
Start: 2020-05-06

## 2020-05-06 RX ORDER — FENTANYL CITRATE 50 UG/ML
INJECTION, SOLUTION INTRAMUSCULAR; INTRAVENOUS
Status: DISCONTINUED | OUTPATIENT
Start: 2020-05-06 | End: 2020-05-06 | Stop reason: HOSPADM

## 2020-05-06 RX ORDER — LIDOCAINE HYDROCHLORIDE 10 MG/ML
INJECTION INFILTRATION; PERINEURAL
Status: DISCONTINUED | OUTPATIENT
Start: 2020-05-06 | End: 2020-05-06 | Stop reason: HOSPADM

## 2020-05-06 RX ORDER — MIDAZOLAM HYDROCHLORIDE 1 MG/ML
INJECTION, SOLUTION INTRAMUSCULAR; INTRAVENOUS
Status: DISCONTINUED | OUTPATIENT
Start: 2020-05-06 | End: 2020-05-06 | Stop reason: HOSPADM

## 2020-05-06 RX ORDER — INDOMETHACIN 25 MG/1
CAPSULE ORAL
Status: DISCONTINUED | OUTPATIENT
Start: 2020-05-06 | End: 2020-05-06 | Stop reason: HOSPADM

## 2020-05-06 NOTE — DISCHARGE INSTRUCTIONS
Home Care Instructions Pain Management:    1.  DIET:    You may resume your normal diet today.    2.  BATHING:    You may shower with luke warm water.    3.  DRESSING:    You may remove your bandage today.    4.  ACTIVITY LEVEL:      You may resume your normal activities 24 hours after your procedure.    5.  MEDICATIONS:    You may resume your normal medications today.    6.  SPECIAL INSTRUCTIONS:    No heat to the injection site for 24 hours including bath or shower, heating pad, moist heat or hot tubs.    Use an ice pack to the injection site for any pain or discomfort.  Apply ice packs for 20 minute intervals as needed.    If you have received any sedatives by mouth today, you can not drive for 12 hours.    If you have received sedation through an IV, you can not drive for 24 hours.    PLEASE CALL YOUR DOCTOR FOR THE FOLLOWIN.  Redness or swelling around the injection site.  2.  Fever of 101 degrees.  3.  Drainage (pus) from the injection site.  4.  For any continuous bleeding (some dried blood over the incision is normal.)    FOR EMERGENCIES:    If any unusual problems or difficulties occur during clinic hours, call [phone] or dial [phone].    Follow up with with your physician in 2-3 weeks.

## 2020-05-06 NOTE — OP NOTE
"Procedure Note    Pre-operative Diagnosis: Lumbar Radiculopathy  Post-operative Diagnosis: Lumbar Radiculopathy  Procedure Date: 05/06/2020  Procedure:  (1) Lumbar Transforaminal Epidural Steroid Injection, Two Levels, Bilateral L4-5    (2) Intraoperative fluoroscopy    (3) IV Moderate Sedation (Midazolam 2 mg, Fentanyl 50 mcg)        Indications: To alleviate pain and suffering, and reduce functional impairment associated with Lumbar radiculopathy.      The patients history and physical exam were reviewed. The risks, benefits and alternatives to the procedure were discussed, and all questions were answered to the patients satisfaction. The patient agreed to proceed, and written informed consent was verified.    Procedure in Detail: Using a fenestrated drape and Chloro-prep, the skin was prepared and draped in sterile fashion. The right L4-5 neural foramen was identified by fluoroscopy in right lateral oblique angle. A portion of a mixture of Lidocaine 1% 9 mL + Sodium Bicarbonate 1 mL was used to anesthetize the skin at the skin entry point and subcutaneous tissue with 25G 1.5" in needle. Then a 22G 5" spinal needle was advanced under intermittent fluoroscopy toward each target point until Kambin's triangle was entered anterolateral to the superior articular process. Second needle was placed at the same level on the left side using an identical technique.    Fluoroscopy was then inspected in lateral views and the needles were adjusted appropriately. There was no paresthesia with needle placement. Aspiration was negative for blood and CSF. Omnipaque contrast was injected live in an AP fluoroscopic view at each level demonstrating appropriate needle position with contrast spread into the nerve root sheath and medially into the epidural space without intravascular or intrathecal spread. Next, a mixture 1.5 mL PF Bupivacaine 0.25% and 15 mg dexamethasone (total 3 mL) was divided evenly between the two sides and " injected slowly and incrementally. The patient tolerated the procedure without complaint and was transported to the recovery room in stable condition.     Disposition: The patient tolerated the procedure well, and there were no apparent complications. Vital signs remained stable throughout the procedure. The patient was taken to the recovery area where written discharge instructions for the procedure were given.     Follow-up: RTC as scheduled      Alexus Anglin Jr, MD  Interventional Pain Medicine / Anesthesiology

## 2020-05-06 NOTE — DISCHARGE SUMMARY
[facility]  Discharge Note  Short Stay     Admit Date: 5/6/2020    Discharge Date: 5/6/2020     Attending Physician: Alexus Anglin Jr, MD    Diagnoses:  Active Hospital Problems    Diagnosis  POA    Chronic pain [G89.29]  Yes      Resolved Hospital Problems   No resolved problems to display.     Discharged Condition: Good     Hospital Course: Patient was admitted for an outpatient interventional pain management procedure and tolerated the procedure well with no complications.     Final Diagnoses: Same as principal problem.     Disposition: Home or Self Care     Follow up/Patient Instructions:    Follow-up Information     Alexus Anglin Jr, MD. Go in 2 weeks.    Specialty:  Pain Medicine  Why:  Post-procedural Follow Up As Scheduled  Contact information:  [address]  [address]  [place] [id number] [phone]                   Reconciled Medications:     Medication List      CONTINUE taking these medications    aspirin 81 MG EC tablet  Commonly known as:  ECOTRIN  Take 1 tablet twice a day with food starting after surgery (breakfast and dinner).     calcipotriene-betamethasone 0.005-0.064 % Foam  Commonly known as:  ENSTILAR  Apply 1 application topically once daily.     desonide 0.05 % cream  Commonly known as:  DESOWEN  Thin film to AA eyelids bid PRN flare     diazePAM 5 MG tablet  Commonly known as:  VALIUM  Take 1 tablet (5 mg total) by mouth daily as needed.     diclofenac sodium 1 % Gel  Commonly known as:  VOLTAREN  Apply 2 g topically 4 (four) times daily.     diphenhydrAMINE 25 mg capsule  Commonly known as:  BENADRYL  Take 25 mg by mouth every 6 (six) hours as needed for Itching.     EPINEPHrine 0.3 mg/0.3 mL Atin  Commonly known as:  EPIPEN  Inject 0.3 mLs (0.3 mg total) into the muscle once. for 1 dose     fexofenadine 180 MG tablet  Commonly known as:  ALLEGRA  Take 180 mg by mouth continuous prn.     gabapentin 600 MG tablet  Commonly known as:  NEURONTIN  Take 1 tablet (600  mg total) by mouth 3 (three) times daily.     HYDROcodone-acetaminophen 5-325 mg per tablet  Commonly known as:  NORCO  Take 1 tablet by mouth every 6 (six) hours as needed for Pain.     lidocaine HCL 2% 2 % jelly  Commonly known as:  XYLOCAINE  Apply topically as needed.     meloxicam 15 MG tablet  Commonly known as:  MOBIC  TAKE 1 TABLET(15 MG) BY MOUTH EVERY DAY     ondansetron 4 MG tablet  Commonly known as:  ZOFRAN  Take 1 tablet (4 mg total) by mouth every 8 (eight) hours as needed for Nausea.     ONE DAILY MULTIVITAMIN per tablet  Generic drug:  multivitamin  Take 1 tablet by mouth once daily.     tiZANidine 4 MG tablet  Commonly known as:  ZANAFLEX  Take 1 tablet (4 mg total) by mouth every 6 (six) hours as needed.           Discharge Procedure Orders (must include Diet, Follow-up, Activity)   Call MD for:  temperature >100.4     Call MD for:  severe uncontrolled pain     Call MD for:  redness, tenderness, or signs of infection (pain, swelling, redness, odor or green/yellow discharge around incision site)     Call MD for:  difficulty breathing or increased cough     Call MD for:  severe persistent headache     Call MD for:  worsening rash     Remove dressing in 24 hours       Alexus Anglin Jr, MD  Interventional Pain Medicine / Anesthesiology

## 2020-05-06 NOTE — H&P
[facility]  History & Physical - Short Stay  Pain Management           SUBJECTIVE:     Procedure: Procedure(s) (LRB):  Injection,steroid,epidural,transforaminal approach--Bilateral L4-5 (Bilateral)    Chief Complaint/Reason for Admission:  Lumbar radiculopathy [M54.16]    PTA Medications   Medication Sig    aspirin (ECOTRIN) 81 MG EC tablet Take 1 tablet twice a day with food starting after surgery (breakfast and dinner).    calcipotriene-betamethasone (ENSTILAR) 0.005-0.064 % Foam Apply 1 application topically once daily.    desonide (DESOWEN) 0.05 % cream Thin film to AA eyelids bid PRN flare    diazePAM (VALIUM) 5 MG tablet Take 1 tablet (5 mg total) by mouth daily as needed.    diclofenac sodium (VOLTAREN) 1 % Gel Apply 2 g topically 4 (four) times daily.    fexofenadine (ALLEGRA) 180 MG tablet Take 180 mg by mouth continuous prn.    gabapentin (NEURONTIN) 600 MG tablet Take 1 tablet (600 mg total) by mouth 3 (three) times daily.    HYDROcodone-acetaminophen (NORCO) 5-325 mg per tablet Take 1 tablet by mouth every 6 (six) hours as needed for Pain.    lidocaine HCL 2% (XYLOCAINE) 2 % jelly Apply topically as needed.    meloxicam (MOBIC) 15 MG tablet TAKE 1 TABLET(15 MG) BY MOUTH EVERY DAY    multivitamin (ONE DAILY MULTIVITAMIN) per tablet Take 1 tablet by mouth once daily.    ondansetron (ZOFRAN) 4 MG tablet Take 1 tablet (4 mg total) by mouth every 8 (eight) hours as needed for Nausea.    tiZANidine (ZANAFLEX) 4 MG tablet Take 1 tablet (4 mg total) by mouth every 6 (six) hours as needed.    diphenhydrAMINE (BENADRYL) 25 mg capsule Take 25 mg by mouth every 6 (six) hours as needed for Itching.    EPINEPHrine (EPIPEN) 0.3 mg/0.3 mL AtIn Inject 0.3 mLs (0.3 mg total) into the muscle once. for 1 dose       Review of patient's allergies indicates:   Allergen Reactions    Advil [ibuprofen] Anaphylaxis       Past Medical History:   Diagnosis Date    Arthritis     Basal cell  carcinoma 06/21/2019    back    Cancer     Chronic pain of right knee     SCC (squamous cell carcinoma) 2014    Forehead- Dr. Cabral     Skin cancer     Squamous cell carcinoma 2013    Right ear- Dr. Marianna long     Past Surgical History:   Procedure Laterality Date    APPENDECTOMY      ARTHROSCOPIC CHONDROPLASTY OF KNEE JOINT Left 3/18/2020    Procedure: ARTHROSCOPY, KNEE, WITH CHONDROPLASTY;  Surgeon: FREEMAN Álvarez MD;  Location: [facility] OR;  Service: Orthopedics;  Laterality: Left;    COLONOSCOPY  1998    EPIDURAL STEROID INJECTION INTO LUMBAR SPINE N/A 2/11/2020    Procedure: Injection-steroid-epidural-lumbar--InterLaminar L4-5;  Surgeon: Alexus Anglin Jr., MD;  Location: [facility] PAIN [facility]GT;  Service: Pain Management;  Laterality: N/A;    INJECTION OF ANESTHETIC AGENT AROUND MEDIAL BRANCH NERVES INNERVATING LUMBAR FACET JOINT Bilateral 11/5/2019    Procedure: Block-nerve-medial branch-lumbar--Bilateral L3-4,L4-5,L5-S1;  Surgeon: Alexus Anglin Jr., MD;  Location: [facility] PAIN [facility]GT;  Service: Pain Management;  Laterality: Bilateral;    KNEE ARTHROSCOPY W/ MENISCECTOMY Left 3/18/2020    Procedure: ARTHROSCOPY, KNEE, WITH MENISCECTOMY;  Surgeon: FREEMAN Álvarez MD;  Location: [facility] OR;  Service: Orthopedics;  Laterality: Left;  CLONIDINE/EPI/KETOROLAC/ROPIVACAINE INJECTION 30CC    lipoma removal      skin cancer removal       Family History   Problem Relation Age of Onset    COPD Mother     Alcohol abuse Mother     Heart disease Father     Melanoma Father     No Known Problems Sister     No Known Problems Brother     Psoriasis Neg Hx     Lupus Neg Hx     Eczema Neg Hx      Social History     Tobacco Use    Smoking status: Never Smoker    Smokeless tobacco: Never Used   Substance Use Topics    Alcohol use: Yes     Frequency: 2-4 times a month     Drinks per session: 1 or 2     Binge frequency: Never     Comment: social    Drug use: No        Review of Systems:  Review of Systems    Constitutional: Negative for chills and fever.   HENT: Negative for nosebleeds.    Eyes: Negative for blurred vision.   Respiratory: Negative for cough, hemoptysis, sputum production, shortness of breath and wheezing.    Cardiovascular: Negative for chest pain and palpitations.   Gastrointestinal: Negative for heartburn and vomiting.   Genitourinary: Negative for hematuria.   Musculoskeletal: Positive for back pain. Negative for myalgias.   Skin: Negative for rash.   Neurological: Negative for seizures and loss of consciousness.   Endo/Heme/Allergies: Does not bruise/bleed easily.   Psychiatric/Behavioral: Negative for hallucinations.         OBJECTIVE:     Vital Signs (Most Recent):  Temp: 98 °F (36.7 °C) (05/06/20 1204)  Pulse: 83 (05/06/20 1204)  Resp: 16 (05/06/20 1204)  BP: 137/86 (05/06/20 1204)  SpO2: 99 % (05/06/20 1204)    Physical Exam:  General appearance - alert, well appearing, and in no distress  Mental status - AOx3  Eyes - pupils equal and reactive, extraocular eye movements intact  Heart - normal rate, regular rhythm, normal S1, S2, no murmurs, rubs, clicks or gallops  Chest - clear to auscultation, no wheezes, rales or rhonchi, symmetric air entry  Abdomen - soft, nontender, nondistended, no masses or organomegaly  Neurological - alert, oriented, normal speech, no focal findings or movement disorder noted  Extremities - peripheral pulses normal, no pedal edema, no clubbing or cyanosis      ASSESSMENT/PLAN:     Active Hospital Problems    Diagnosis  POA    Chronic pain [G89.29]  Yes      Resolved Hospital Problems   No resolved problems to display.        The risks and benefits of this intervention, and alternative therapies were discussed with the patient.  The discussion of risks included infection, bleeding, need for additional procedures or surgery, nerve damage, paralysis, adverse medication reaction(s), stroke, and/or death.  Questions regarding the procedure, risks, expected outcome, and  possible side effects were solicited and answered to the patient's satisfaction.  Bill wishes to proceed with the injection.  Verbal and written consent were verified.      Proceed with intervention as scheduled.      Alexus Anglin Jr, MD  Interventional Pain Medicine / Anesthesiology

## 2020-05-08 ENCOUNTER — PATIENT OUTREACH (OUTPATIENT)
Dept: ADMINISTRATIVE | Facility: OTHER | Age: 60
End: 2020-05-08

## 2020-05-11 ENCOUNTER — HOSPITAL ENCOUNTER (EMERGENCY)
Facility: HOSPITAL | Age: 60
Discharge: HOME | End: 2020-05-11
Attending: EMERGENCY MEDICINE
Payer: OTHER GOVERNMENT

## 2020-05-11 VITALS
BODY MASS INDEX: 35.26 KG/M2 | WEIGHT: 260 LBS | OXYGEN SATURATION: 96 % | SYSTOLIC BLOOD PRESSURE: 132 MMHG | HEART RATE: 66 BPM | DIASTOLIC BLOOD PRESSURE: 76 MMHG | RESPIRATION RATE: 19 BRPM | TEMPERATURE: 98 F

## 2020-05-11 DIAGNOSIS — T78.40XA ALLERGIC REACTION: Primary | ICD-10-CM

## 2020-05-11 DIAGNOSIS — Z88.9 DRUG ALLERGY: ICD-10-CM

## 2020-05-11 PROCEDURE — 99284 EMERGENCY DEPT VISIT MOD MDM: CPT | Mod: 25

## 2020-05-11 PROCEDURE — 93005 ELECTROCARDIOGRAM TRACING: CPT

## 2020-05-11 PROCEDURE — 96375 TX/PRO/DX INJ NEW DRUG ADDON: CPT

## 2020-05-11 PROCEDURE — 25000003 PHARM REV CODE 250: Performed by: PHYSICIAN ASSISTANT

## 2020-05-11 PROCEDURE — S0028 INJECTION, FAMOTIDINE, 20 MG: HCPCS | Performed by: PHYSICIAN ASSISTANT

## 2020-05-11 PROCEDURE — 63600175 PHARM REV CODE 636 W HCPCS: Performed by: PHYSICIAN ASSISTANT

## 2020-05-11 PROCEDURE — 96374 THER/PROPH/DIAG INJ IV PUSH: CPT

## 2020-05-11 RX ORDER — ONDANSETRON 2 MG/ML
4 INJECTION INTRAMUSCULAR; INTRAVENOUS
Status: COMPLETED | OUTPATIENT
Start: 2020-05-11 | End: 2020-05-11

## 2020-05-11 RX ORDER — METHYLPREDNISOLONE SOD SUCC 125 MG
125 VIAL (EA) INJECTION
Status: COMPLETED | OUTPATIENT
Start: 2020-05-11 | End: 2020-05-11

## 2020-05-11 RX ORDER — FAMOTIDINE 10 MG/ML
20 INJECTION INTRAVENOUS
Status: COMPLETED | OUTPATIENT
Start: 2020-05-11 | End: 2020-05-11

## 2020-05-11 RX ORDER — DIPHENHYDRAMINE HYDROCHLORIDE 50 MG/ML
25 INJECTION INTRAMUSCULAR; INTRAVENOUS
Status: DISCONTINUED | OUTPATIENT
Start: 2020-05-11 | End: 2020-05-11

## 2020-05-11 RX ORDER — EPINEPHRINE 0.3 MG/.3ML
1 INJECTION SUBCUTANEOUS ONCE
Qty: 1 DEVICE | Refills: 0 | Status: SHIPPED | OUTPATIENT
Start: 2020-05-11 | End: 2020-05-11

## 2020-05-11 RX ADMIN — ONDANSETRON 4 MG: 2 INJECTION INTRAMUSCULAR; INTRAVENOUS at 02:05

## 2020-05-11 RX ADMIN — METHYLPREDNISOLONE SODIUM SUCCINATE 125 MG: 125 INJECTION, POWDER, FOR SOLUTION INTRAMUSCULAR; INTRAVENOUS at 02:05

## 2020-05-11 RX ADMIN — FAMOTIDINE 20 MG: 10 INJECTION, SOLUTION INTRAVENOUS at 02:05

## 2020-05-11 NOTE — PROVIDER PROGRESS NOTES - EMERGENCY DEPT.
Emergency Department TeleTRIAGE Encounter Note      CHIEF COMPLAINT    Chief Complaint   Patient presents with    Allergic Reaction     pt staqtes he is allergic to advil, pt ate tums after lunch and then within 5 min began with tounge swelling, gave self epi pen        VITAL SIGNS   Initial Vitals [05/11/20 1356]   BP Pulse Resp Temp SpO2   (!) 147/82 77 19 97.5 °F (36.4 °C) 96 %      MAP       --            ALLERGIES    Review of patient's allergies indicates:   Allergen Reactions    Advil [ibuprofen] Anaphylaxis    Tums [calcium carbonate] Anaphylaxis       PROVIDER TRIAGE NOTE  Pt reports hx of anaphylaxis.  States at 135pm he was taking a tums and drinking diet coke when he began to have difficulty swallowing.  Reports having itchy, burning sensation to face and throat.  Tongue began to swell and he had shortness of breath.  Reports same type of reaction last time this happened.  He took epi pen.  On exam, he is tachypneic with flushing to face.  Will order steroids and antihistamine at this time.      ORDERS  Labs Reviewed - No data to display    ED Orders (720h ago, onward)    Start Ordered     Status Ordering Provider    05/11/20 1415 05/11/20 1406  diphenhydrAMINE injection 25 mg  ED 1 Time      Ordered [facility]O    05/11/20 1415 05/11/20 1406  famotidine (PF) injection 20 mg  ED 1 Time      Ordered [facility]O    05/11/20 1415 05/11/20 1406  methylPREDNISolone sodium succinate injection 125 mg  ED 1 Time      Ordered [facility]O    Unscheduled 05/11/20 1406  Saline lock IV  Once      Ordered [facility]O    Unscheduled 05/11/20 1406  Cardiac Monitoring - Adult  Continuous     Comments:  Notify Physician If:    Ordered PARK PINO            Virtual Visit Note: The provider triage portion of this emergency department evaluation and documentation was performed via [employer], a HIPAA-compliant telemedicine  application, in concert with a tele-presenter in the room. A face to face patient evaluation with one of my colleagues will occur once the patient is placed in an emergency department room.      DISCLAIMER: This note was prepared with [employer] voice recognition transcription software. Garbled syntax, mangled pronouns, and other bizarre constructions may be attributed to that software system.

## 2020-05-11 NOTE — ED NOTES
APPEARANCE: Alert, oriented and in no acute distress.  CARDIAC: Normal rate and rhythm, no murmur heard.   PERIPHERAL VASCULAR: peripheral pulses present. Normal cap refill. No edema. Warm to touch.    RESPIRATORY:Normal rate and effort, breath sounds clear bilaterally throughout chest. Respirations are equal and unlabored no obvious signs of distress.  GASTRO: soft, bowel sounds normal, no tenderness, no abdominal distention.  MUSC: Full ROM. No bony tenderness or soft tissue tenderness. No obvious deformity.  SKIN: Skin is warm and dry, normal skin turgor, mucous membranes moist.  NEURO: 5/5 strength major flexors/extensors bilaterally. Sensory intact to light touch bilaterally. [place] coma scale: eyes open spontaneously-4, oriented & converses-5, obeys commands-6. No neurological abnormalities.   MENTAL STATUS: awake, alert and aware of environment.  EYE: PERRL, both eyes: pupils brisk and reactive to light. Normal size.  ENT: EARS: no obvious drainage. NOSE: no active bleeding.   Pt complains of eating lunch and feeling nauseated. He states he took 2 tums and began having an allergic reaction. He reports he took 3 benadryl and an epi pen before arrival.

## 2020-05-11 NOTE — ED PROVIDER NOTES
Encounter Date: 5/11/2020       History     Chief Complaint   Patient presents with    Allergic Reaction     pt staqtes he is allergic to advil, pt ate tums after lunch and then within 5 min began with tounge swelling, gave self epi pen      Baldo Grant, a 60 y.o. male  has a past medical history of Arthritis, Basal cell carcinoma (06/21/2019), Cancer, Chronic pain of right knee, SCC (squamous cell carcinoma) (2014), Skin cancer, and Squamous cell carcinoma (2013).     He presents to the ED evaluation of allergic reaction.  Patient states that he ate a salad for lunch, had some heart burn after and took TUMS to treat.  Near after taking the TUMS, started to have sensation of swelling to lips and tongue with itching as well.  Has a history of allergic reaction with NSAIDs which he has been prescribed an EPI pen.  He self administered this medication, took 2 doses of benadryl and headed to the ED.  States he has no trouble breathing or handling secretions.  Epi pen was administered at 130 this afternoon.       The history is provided by the patient.     Review of patient's allergies indicates:   Allergen Reactions    Advil [ibuprofen] Anaphylaxis    Tums [calcium carbonate] Anaphylaxis     Past Medical History:   Diagnosis Date    Arthritis     Basal cell carcinoma 06/21/2019    back    Cancer     Chronic pain of right knee     SCC (squamous cell carcinoma) 2014    Forehead- Dr. Cabral     Skin cancer     Squamous cell carcinoma 2013    Right ear- Dr. Marianna long     Past Surgical History:   Procedure Laterality Date    APPENDECTOMY      ARTHROSCOPIC CHONDROPLASTY OF KNEE JOINT Left 3/18/2020    Procedure: ARTHROSCOPY, KNEE, WITH CHONDROPLASTY;  Surgeon: FREEMAN Álvarez MD;  Location: [facility];  Service: Orthopedics;  Laterality: Left;    COLONOSCOPY  1998    EPIDURAL STEROID INJECTION INTO LUMBAR SPINE N/A 2/11/2020    Procedure: Injection-steroid-epidural-lumbar--InterLaminar L4-5;   Surgeon: Alexus Anglin Jr., MD;  Location: [facility] PAIN MGT;  Service: Pain Management;  Laterality: N/A;    INJECTION OF ANESTHETIC AGENT AROUND MEDIAL BRANCH NERVES INNERVATING LUMBAR FACET JOINT Bilateral 11/5/2019    Procedure: Block-nerve-medial branch-lumbar--Bilateral L3-4,L4-5,L5-S1;  Surgeon: Alexus Anglin Jr., MD;  Location: [facility] PAIN MGT;  Service: Pain Management;  Laterality: Bilateral;    KNEE ARTHROSCOPY W/ MENISCECTOMY Left 3/18/2020    Procedure: ARTHROSCOPY, KNEE, WITH MENISCECTOMY;  Surgeon: FREEMAN Álvarez MD;  Location: [facility] OR;  Service: Orthopedics;  Laterality: Left;  CLONIDINE/EPI/KETOROLAC/ROPIVACAINE INJECTION 30CC    lipoma removal      skin cancer removal      TRANSFORAMINAL EPIDURAL INJECTION OF STEROID Bilateral 5/6/2020    Procedure: Injection,steroid,epidural,transforaminal approach--Bilateral L4-5;  Surgeon: Alexus Anglin Jr., MD;  Location: [facility] PAIN MGT;  Service: Pain Management;  Laterality: Bilateral;     Family History   Problem Relation Age of Onset    COPD Mother     Alcohol abuse Mother     Heart disease Father     Melanoma Father     No Known Problems Sister     No Known Problems Brother     Psoriasis Neg Hx     Lupus Neg Hx     Eczema Neg Hx      Social History     Tobacco Use    Smoking status: Never Smoker    Smokeless tobacco: Never Used   Substance Use Topics    Alcohol use: Yes     Frequency: 2-4 times a month     Drinks per session: 1 or 2     Binge frequency: Never     Comment: social    Drug use: No     Review of Systems   Constitutional: Negative for fever.   HENT: Positive for facial swelling. Negative for drooling, sore throat, trouble swallowing and voice change.    Respiratory: Negative for shortness of breath and wheezing.        Physical Exam     Initial Vitals [05/11/20 1356]   BP Pulse Resp Temp SpO2   (!) 147/82 77 19 97.5 °F (36.4 °C) 96 %      MAP       --         Physical Exam    Nursing note and vitals  reviewed.  Constitutional: Vital signs are normal. He appears well-developed and well-nourished.   HENT:   Head: Normocephalic and atraumatic.   Right Ear: External ear normal.   Left Ear: External ear normal.   Nose: Nose normal.   Mouth/Throat: Oropharynx is clear and moist.   Swelling to lower lip noted.     Eyes: EOM are normal.   Neck: Normal range of motion. Neck supple.   Cardiovascular: Normal rate and regular rhythm.   Pulmonary/Chest: Breath sounds normal. No respiratory distress. He has no wheezes. He has no rhonchi. He has no rales.   Abdominal: There is no tenderness.   Musculoskeletal: Normal range of motion. He exhibits no edema or tenderness.   Lymphadenopathy:     He has no cervical adenopathy.   Neurological: He is alert and oriented to person, place, and time. No cranial nerve deficit.   Skin: Skin is warm and dry. No rash and no abscess noted. No erythema.   Psychiatric: He has a normal mood and affect. Thought content normal.         ED Course   Procedures  Labs Reviewed - No data to display       Imaging Results    None          Medical Decision Making:   Initial Assessment:   Allergic reaction  Differential Diagnosis:   Angioedema, allergic reaction, anaphylaxis   ED Management:  Patient presents to ED for evaluation of allergic reaction after taking Tums.  Epi pen was administered at home PTA around 130 as well as benadryl.  IV solumedrol, pepcid and zofran administered in ED with improvement of symptoms.  Patient has been monitored for 3.5 hours, states that he feels improvement of his swelling.  Has not presented with stridor or difficulty handling secretions during that time.  His epi pen prescription was refilled.  Given instruction to return with any new or worsening symptoms.  Patient verbalized understanding and agreement of plan.                                   Clinical Impression:       ICD-10-CM ICD-9-CM   1. Allergic reaction T78.40XA 995.3   2. Drug allergy Z88.9 V14.9                                 Kandi Man PA-C  05/11/20 2161

## 2020-05-13 ENCOUNTER — OFFICE VISIT (OUTPATIENT)
Dept: PAIN MEDICINE | Facility: CLINIC | Age: 60
End: 2020-05-13
Payer: OTHER GOVERNMENT

## 2020-05-13 DIAGNOSIS — M48.061 SPINAL STENOSIS OF LUMBAR REGION, UNSPECIFIED WHETHER NEUROGENIC CLAUDICATION PRESENT: ICD-10-CM

## 2020-05-13 DIAGNOSIS — M54.16 LUMBAR RADICULOPATHY: Primary | ICD-10-CM

## 2020-05-13 DIAGNOSIS — M48.061 NEUROFORAMINAL STENOSIS OF LUMBAR SPINE: ICD-10-CM

## 2020-05-13 DIAGNOSIS — G89.4 CHRONIC PAIN SYNDROME: ICD-10-CM

## 2020-05-13 DIAGNOSIS — M47.816 LUMBAR SPONDYLOSIS: ICD-10-CM

## 2020-05-13 DIAGNOSIS — M17.0 PRIMARY OSTEOARTHRITIS OF BOTH KNEES: ICD-10-CM

## 2020-05-13 DIAGNOSIS — M51.36 DDD (DEGENERATIVE DISC DISEASE), LUMBAR: ICD-10-CM

## 2020-05-13 PROCEDURE — 99213 OFFICE O/P EST LOW 20 MIN: CPT | Mod: 95,,, | Performed by: NURSE PRACTITIONER

## 2020-05-13 PROCEDURE — 99213 PR OFFICE/OUTPT VISIT, EST, LEVL III, 20-29 MIN: ICD-10-PCS | Mod: 95,,, | Performed by: NURSE PRACTITIONER

## 2020-05-13 NOTE — PROGRESS NOTES
Chronic Pain-Tele-Medicine-Established Note (Follow up visit)      The patient location is: Home   The chief complaint leading to consultation is: low back and bilteral leg pain  Visit type: Virtual visit with synchronous audio and video  Total time spent with patient: 15 minutes  Each patient to whom he or she provides medical services by telemedicine is:  (1) informed of the relationship between the physician and patient and the respective role of any other health care provider with respect to management of the patient; and (2) notified that he or she may decline to receive medical services by telemedicine and may withdraw from such care at any time.    Notes:     SUBJECTIVE:    Baldo Grant presents tele-medicine appointment for a follow-up appointment for low back and bilateral leg pain. Pt is s/p Bilateral L4-5 TFESI with reported 60% relief with continued improvement. He reports taking less of his pain medication due to relief of injection. Since the last visit, Baldo Grant states the pain has been improving. Current pain intensity is 4/10.         Referred by: Dr. Encarnacion  Reason for referral: Back Pain    CC:   Low back and bilateral leg pain    Last 3 PDI Scores 3/2/2020 2/3/2020   Pain Disability Index (PDI) 30 32       05/13/20201518-00-ylxf-old male presents status post bilateral L4-5 TF KATHY with reported 60% relief.  Patient also states his relief is improving each and every day.  Reports taking less of his pain medication due to the relief received from the injection.  He is only 6 days postop his lumbar epidural, discussed that I suspect is relief will improve even more over the next 2 weeks.    03/02/2020 - Mr. Grant returns to clinic for follow up visit reporting worse back and bilateral leg pain left greater than right.  Patient is s/p L4-5 Lumbar Epidural Steroid  on 2/11/20 with 0% relief.  Patient presents with wife he is currently experiencing no relief from the injection, he reports that  his pain continues in his low back as well as in his legs bilaterally left greater than right.  Patient reports shooting pain down his left leg at times going past his left knee into his left foot.  Patient reports previously given a lumbar TF KATHY at L4-5 per Dr. Duenas/PM Juan Hampton reports receiving 3 months of relief and he is requesting to be scheduled for that particular injection today.  Pain intensity is currently 5/10.      HPI:    Baldo Grant is a 60 y.o. male who complains of left and right lower back pain.  He reports the pain radiates down his left leg just below his knee.  Pain intensity is 5/10.  He reports taking Norco, Gabapentin and Meloxicam for some relief.   Patient s/p Bilateral L3-4, L4-5, and L5-S1 Lumbar Medial Branch Block on 11/5/2019 with Dr. Anglin with no relief.  Patient's 5/17/2019 MRI Lumbar Spine reviewed with patient and shows L4-5 and L5-S1 degenerative disc disease with disc bulging as described above with right L4-5 and left L5-S1 foraminal stenosis.      Location: lower back   Onset: Summer 200  Current Pain Score: 5/10   Daily Pain of Range: 4-9/10  Quality: Dull and Sharp  Radiation: back of left leg  Worsened by: sitting and standing  Improved by: medications and physical therapy    Previous Therapies:  PT/OT:   HEP:   Interventions: Bilateral L3-4, L4-5, and L5-S1 Lumbar Medial Branch Block on 11/5/2019  Surgery:  Medications:   - NSAIDS: meloxicam (MOBIC) 15 MG tablet daily  - MSK Relaxants:  diazePAM (VALIUM) 5 MG tablet daily as needed  - TCAs:   - SNRIs:   - Topicals: lidocaine HCL 2% (XYLOCAINE) 2 % jelly  - Anticonvulsants:  - Opioids: HYDROcodone-acetaminophen (NORCO) 5-325 mg per tablet    Current Pain Medications:  1. Meloxicam 15 mg   2. Gabapentin 600 mg TID        3. HYDROcodone-acetaminophen (NORCO) 5-325 mg per tablet Q6 hours PRN    Review of Systems:  Review of Systems   Constitutional: Negative.    HENT: Negative.    Eyes: Negative.    Respiratory:  Negative.    Cardiovascular: Negative.    Gastrointestinal: Negative.    Genitourinary: Negative.    Musculoskeletal: Positive for back pain, joint pain and myalgias.   Skin: Negative.    Neurological: Negative.    Endo/Heme/Allergies: Negative.    Psychiatric/Behavioral: Negative.        History:    Current Outpatient Medications:     aspirin (ECOTRIN) 81 MG EC tablet, Take 1 tablet twice a day with food starting after surgery (breakfast and dinner)., Disp: 28 tablet, Rfl: 0    calcipotriene-betamethasone (ENSTILAR) 0.005-0.064 % Foam, Apply 1 application topically once daily., Disp: 60 g, Rfl: 2    desonide (DESOWEN) 0.05 % cream, Thin film to AA eyelids bid PRN flare, Disp: 60 g, Rfl: 3    diazePAM (VALIUM) 5 MG tablet, Take 1 tablet (5 mg total) by mouth daily as needed., Disp: 30 tablet, Rfl: 1    diclofenac sodium (VOLTAREN) 1 % Gel, Apply 2 g topically 4 (four) times daily., Disp: 100 g, Rfl: 0    diphenhydrAMINE (BENADRYL) 25 mg capsule, Take 25 mg by mouth every 6 (six) hours as needed for Itching., Disp: , Rfl:     EPINEPHrine (EPIPEN) 0.3 mg/0.3 mL AtIn, Inject 0.3 mLs (0.3 mg total) into the muscle once. for 1 dose, Disp: 1 Device, Rfl: 0    fexofenadine (ALLEGRA) 180 MG tablet, Take 180 mg by mouth continuous prn., Disp: , Rfl:     gabapentin (NEURONTIN) 600 MG tablet, Take 1 tablet (600 mg total) by mouth 3 (three) times daily., Disp: 270 tablet, Rfl: 3    HYDROcodone-acetaminophen (NORCO) 5-325 mg per tablet, Take 1 tablet by mouth every 6 (six) hours as needed for Pain., Disp: 100 tablet, Rfl: 0    lidocaine HCL 2% (XYLOCAINE) 2 % jelly, Apply topically as needed., Disp: 30 mL, Rfl: 3    meloxicam (MOBIC) 15 MG tablet, TAKE 1 TABLET(15 MG) BY MOUTH EVERY DAY, Disp: 90 tablet, Rfl: 4    multivitamin (ONE DAILY MULTIVITAMIN) per tablet, Take 1 tablet by mouth once daily., Disp: , Rfl:     ondansetron (ZOFRAN) 4 MG tablet, Take 1 tablet (4 mg total) by mouth every 8 (eight) hours as needed  for Nausea., Disp: 30 tablet, Rfl: 0  No current facility-administered medications for this visit.     Facility-Administered Medications Ordered in Other Visits:     0.9%  NaCl infusion, 500 mL, Intravenous, Continuous, Alexus Anglin Jr., MD    lidocaine (PF) 10 mg/ml (1%) injection 10 mg, 1 mL, Intradermal, Once, Alexus Anglin Jr., MD    Past Medical History:   Diagnosis Date    Arthritis     Basal cell carcinoma 06/21/2019    back    Cancer     Chronic pain of right knee     SCC (squamous cell carcinoma) 2014    Forehead- Dr. Cabral     Skin cancer     Squamous cell carcinoma 2013    Right ear- Dr. Marianna long       Past Surgical History:   Procedure Laterality Date    APPENDECTOMY      ARTHROSCOPIC CHONDROPLASTY OF KNEE JOINT Left 3/18/2020    Procedure: ARTHROSCOPY, KNEE, WITH CHONDROPLASTY;  Surgeon: FREEMAN Álvarez MD;  Location: [facility] OR;  Service: Orthopedics;  Laterality: Left;    COLONOSCOPY  1998    EPIDURAL STEROID INJECTION INTO LUMBAR SPINE N/A 2/11/2020    Procedure: Injection-steroid-epidural-lumbar--InterLaminar L4-5;  Surgeon: Alexus Anglin Jr., MD;  Location: [facility] PAIN MGT;  Service: Pain Management;  Laterality: N/A;    INJECTION OF ANESTHETIC AGENT AROUND MEDIAL BRANCH NERVES INNERVATING LUMBAR FACET JOINT Bilateral 11/5/2019    Procedure: Block-nerve-medial branch-lumbar--Bilateral L3-4,L4-5,L5-S1;  Surgeon: Alexus Anglin Jr., MD;  Location: [facility] PAIN [facility]GT;  Service: Pain Management;  Laterality: Bilateral;    KNEE ARTHROSCOPY W/ MENISCECTOMY Left 3/18/2020    Procedure: ARTHROSCOPY, KNEE, WITH MENISCECTOMY;  Surgeon: FREEMAN Álvarez MD;  Location: [facility] OR;  Service: Orthopedics;  Laterality: Left;  CLONIDINE/EPI/KETOROLAC/ROPIVACAINE INJECTION 30CC    lipoma removal      skin cancer removal      TRANSFORAMINAL EPIDURAL INJECTION OF STEROID Bilateral 5/6/2020    Procedure: Injection,steroid,epidural,transforaminal approach--Bilateral L4-5;  Surgeon:  Alexus Anglin Jr., MD;  Location: [facility] PAIN T;  Service: Pain Management;  Laterality: Bilateral;       Family History   Problem Relation Age of Onset    COPD Mother     Alcohol abuse Mother     Heart disease Father     Melanoma Father     No Known Problems Sister     No Known Problems Brother     Psoriasis Neg Hx     Lupus Neg Hx     Eczema Neg Hx        Social History     Socioeconomic History    Marital status:      Spouse name: Not on file    Number of children: Not on file    Years of education: Not on file    Highest education level: Not on file   Occupational History    Not on file   Social Needs    Financial resource strain: Not very hard    Food insecurity:     Worry: Never true     Inability: Never true    Transportation needs:     Medical: No     Non-medical: No   Tobacco Use    Smoking status: Never Smoker    Smokeless tobacco: Never Used   Substance and Sexual Activity    Alcohol use: Yes     Frequency: 2-4 times a month     Drinks per session: 1 or 2     Binge frequency: Never     Comment: social    Drug use: No    Sexual activity: Yes     Partners: Female   Lifestyle    Physical activity:     Days per week: 0 days     Minutes per session: 0 min    Stress: Only a little   Relationships    Social connections:     Talks on phone: More than three times a week     Gets together: Once a week     Attends Muslim service: Not on file     Active member of club or organization: Yes     Attends meetings of clubs or organizations: More than 4 times per year     Relationship status:    Other Topics Concern    Not on file   Social History Narrative    Not on file       Review of patient's allergies indicates:   Allergen Reactions    Advil [ibuprofen] Anaphylaxis    Tums [calcium carbonate] Anaphylaxis       Physical Exam:  There were no vitals filed for this visit.  General    Nursing note and vitals reviewed.  Constitutional: He is oriented to person, place, and  time. He appears well-developed. No distress.   HENT:   Head: Normocephalic and atraumatic.   Nose: Nose normal.   Eyes: Conjunctivae and EOM are normal. Right eye exhibits no discharge. Left eye exhibits no discharge. No scleral icterus.   Neck: No JVD present. No tracheal deviation present.   Cardiovascular: Normal rate, regular rhythm and intact distal pulses.    Pulmonary/Chest: Effort normal and breath sounds normal. No respiratory distress. He exhibits no tenderness.   Abdominal: Soft. Bowel sounds are normal. He exhibits no distension. There is no tenderness. There is no rebound.   Neurological: He is alert and oriented to person, place, and time. He exhibits normal muscle tone. Coordination normal.   Psychiatric: His behavior is normal. Thought content normal.         Back (L-Spine & T-Spine) / Neck (C-Spine) Exam     Tenderness Right paramedian tenderness of the Lower L-Spine. Left paramedian tenderness of the Lower L-Spine.     Back (L-Spine & T-Spine) Range of Motion   Lateral bend right: normal   Lateral bend left: normal   Rotation right: normal   Rotation left: normal     Spinal Sensation   Right Side Sensation  L-Spine Level: normal  Left Side Sensation  L-Spine Level: normal      Muscle Strength   Right Lower Extremity   Hip Abduction: 5/5   Hip Flexion: 5/5   Hip Extensors: 5/5  Quadriceps:  5/5   Hamstrin/5   Gastrocsoleus:  5/5/5  Left Lower Extremity   Hip Abduction: 4/5   Hip Flexion: 4/5   Hip Extensors: 4/5  Quadriceps:  4/5   Hamstrin/5   Gastrocsoleus:  5/5/5      Imaging:  EXAMINATION:  MRI LUMBAR SPINE WITHOUT CONTRAST    CLINICAL HISTORY:  Low back painLow back pain, rapidly progressive neuro deficit;    TECHNIQUE:  Standard multiplanar noncontrast MRI sequences of the lumbar spine.    COMPARISON:  None    FINDINGS:  The distal cord and conus reveal normal signal and morphology. The lumbar vertebra reveal normal alignment, shape and signal intensity.    T12-L1:  Unremarkable    L1-2:  Unremarkable    L2-3:  Minimal annular bulge.    L3-4:  Mild disc desiccation with mild generalized annular bulge.  Mild hypertrophic ligamentum flavum and facet arthritis.    L4-5:  Mild disc desiccation with mild annular bulge.  Right foraminal annular fissure.  Mild hypertrophic ligamentum flavum and facet arthritis.  Mild to moderate left and moderate right foraminal stenosis.    L5-S1:  Mild degenerative disc disease with disc desiccation and disc bulge/osteophyte.  Mild hypertrophic ligamentum flavum and facet arthritis.  Mild right with mild to moderate left foraminal stenosis.      Impression       L4-5 and L5-S1 degenerative disc disease with disc bulging osteophyte as described above with right L4-5 and left L5-S1 foraminal stenosis.      Electronically signed by: Baldo Gayle MD  Date: 05/17/2019         Labs:  BMP  Lab Results   Component Value Date     10/27/2015    K 4.1 10/27/2015     10/27/2015    CO2 23 10/27/2015    BUN 22 (H) 10/27/2015    CREATININE 1.1 10/27/2015    CALCIUM 9.2 10/27/2015    ANIONGAP 11 10/27/2015    ESTGFRAFRICA >60.0 10/27/2015    EGFRNONAA >60.0 10/27/2015     Lab Results   Component Value Date    ALT 40 10/27/2015    AST 19 10/27/2015    ALKPHOS 39 (L) 10/27/2015    BILITOT 0.6 10/27/2015       Assessment:  Problem List Items Addressed This Visit        Neuro    Lumbar radiculopathy - Primary    Chronic pain       Orthopedic    Primary osteoarthritis of both knees      Other Visit Diagnoses     DDD (degenerative disc disease), lumbar        Spinal stenosis of lumbar region, unspecified whether neurogenic claudication present        Neuroforaminal stenosis of lumbar spine        Lumbar spondylosis              2/3/2020 - Baldo Grant is a 60 y.o. male with who complains of left and right lower back pain.  He reports the pain radiates down his left leg just below his knee.  Pain intensity is 5/10. He reports his current medication  regimen of Norco, Gabapentin and Meloxicam help relieve his pain.  He reports having  Bilateral L3-4, L4-5, and L5-S1 Lumbar Medial Branch Block on 11/5/2019 with no relief.Patient's 5/17/2019 MRI Lumbar Spine reviewed with patient and shows L4-5 and L5-S1 degenerative disc disease with disc bulging  as described above with right L4-5 and left L5-S1 foraminal stenosis.  Recommended scheduling for Interlaminar KATHY  L4-5 with moderate sedation. Patient will follow in clinic following injection.     03/02/[id number]-wjda-old male presents with his wife he is status post lumbar interlaminar KATHY at L4-5 with 0% relief.  Patient reports continued low back and bilateral leg pain left greater than right he is reporting shooting pain down his left leg into his foot at times.  Patient reports the pain is disrupting his quality of life and prohibiting him from performing his ADLs.  Patient was given a left TF KATHY by Dr. Duenas/PM [facility] and received significant relief for 2-3 months. Lumbar MRI shows pathology a the L4-5 and L5-S1 patient has degenerative disc disease with disc bulging osteophyte with right L4-5 and left L5-S1 foraminal stenosis.  Based on results of previous Left  L4-5 transforaminal I discussed with patient and wife that I will now recommend  a bilateral L4-5.   Patient and wife agreed and understood.      05/13/2020- 52 a old male presents status post bilateral L4-5 TF KATHY, is currently reporting 60% relief he states his relief is improving each and every day.  Recommended patient follow up with me via my [facility] in 2 weeks to report the effectiveness of his procedure discussed that his procedure will more than likely improve the next 2 weeks considering he is only 6 days following his injection.    : Reviewed and consistent with medication use as prescribed.    Treatment Plan:   Procedure:  None at this time.   PT/OT/HEP: I encouraged the patient to maintain a home exercise regimen that  includes daily, moderate cardiovascular exercise lasting at least 30 minutes.  This may include yoga, nadia chi, walking, swimming, aqua aerobics, or other exercises that maintain a heart rate of 50-70% of the calculated maximum heart rate.  I also encouraged light, daily stretching focused on the target area.  Medications: No changes recommended at this time.  Labs: reviewed and medications are appropriately dosed for current hepatorenal function.  Imaging: No additional recommended at this time. Reviewed and discussed results of 5/17/2019 MRI Lumbar Spine.       Follow Up: via my [facility] reporting effectiveness of his procedure.     Jeffy Ramos, HARPREET-C  Interventional Pain Management        Disclaimer: This note was partly generated using dictation software which may occasionally result in transcription errors.

## 2020-05-18 ENCOUNTER — PATIENT MESSAGE (OUTPATIENT)
Dept: INTERNAL MEDICINE | Facility: CLINIC | Age: 60
End: 2020-05-18

## 2020-05-18 DIAGNOSIS — T78.3XXA ANGIOEDEMA, INITIAL ENCOUNTER: Primary | ICD-10-CM

## 2020-05-19 ENCOUNTER — PATIENT MESSAGE (OUTPATIENT)
Dept: INTERNAL MEDICINE | Facility: CLINIC | Age: 60
End: 2020-05-19

## 2020-05-20 ENCOUNTER — NURSE TRIAGE (OUTPATIENT)
Dept: ADMINISTRATIVE | Facility: CLINIC | Age: 60
End: 2020-05-20

## 2020-05-20 NOTE — TELEPHONE ENCOUNTER
Patient denies any fevers, cough or SOB. Patient denies any other symptoms and feels well.    Reason for Disposition   [1] Follow-up call to recent contact AND [2] information only call, no triage required    Protocols used: INFORMATION ONLY CALL-A-

## 2020-05-27 ENCOUNTER — PATIENT OUTREACH (OUTPATIENT)
Dept: ADMINISTRATIVE | Facility: OTHER | Age: 60
End: 2020-05-27

## 2020-05-28 ENCOUNTER — LAB VISIT (OUTPATIENT)
Dept: LAB | Facility: HOSPITAL | Age: 60
End: 2020-05-28
Attending: ALLERGY & IMMUNOLOGY
Payer: OTHER GOVERNMENT

## 2020-05-28 ENCOUNTER — OFFICE VISIT (OUTPATIENT)
Dept: ALLERGY | Facility: CLINIC | Age: 60
End: 2020-05-28
Payer: OTHER GOVERNMENT

## 2020-05-28 VITALS — WEIGHT: 273.56 LBS | TEMPERATURE: 97 F | BODY MASS INDEX: 37.05 KG/M2 | HEIGHT: 72 IN

## 2020-05-28 DIAGNOSIS — T78.2XXA ANAPHYLAXIS, INITIAL ENCOUNTER: ICD-10-CM

## 2020-05-28 DIAGNOSIS — T78.3XXA ANGIOEDEMA, INITIAL ENCOUNTER: ICD-10-CM

## 2020-05-28 DIAGNOSIS — T78.2XXA ANAPHYLAXIS, INITIAL ENCOUNTER: Primary | ICD-10-CM

## 2020-05-28 DIAGNOSIS — J31.0 CHRONIC RHINITIS: ICD-10-CM

## 2020-05-28 PROCEDURE — 99999 PR PBB SHADOW E&M-EST. PATIENT-LVL III: ICD-10-PCS | Mod: PBBFAC,,, | Performed by: ALLERGY & IMMUNOLOGY

## 2020-05-28 PROCEDURE — 86003 ALLG SPEC IGE CRUDE XTRC EA: CPT

## 2020-05-28 PROCEDURE — 86003 ALLG SPEC IGE CRUDE XTRC EA: CPT | Mod: 59

## 2020-05-28 PROCEDURE — 99999 PR PBB SHADOW E&M-EST. PATIENT-LVL III: CPT | Mod: PBBFAC,,, | Performed by: ALLERGY & IMMUNOLOGY

## 2020-05-28 PROCEDURE — 99244 OFF/OP CNSLTJ NEW/EST MOD 40: CPT | Mod: S$PBB,,, | Performed by: ALLERGY & IMMUNOLOGY

## 2020-05-28 PROCEDURE — 36415 COLL VENOUS BLD VENIPUNCTURE: CPT | Mod: PO

## 2020-05-28 PROCEDURE — 99213 OFFICE O/P EST LOW 20 MIN: CPT | Mod: PBBFAC,PO | Performed by: ALLERGY & IMMUNOLOGY

## 2020-05-28 PROCEDURE — 99244 PR OFFICE CONSULTATION,LEVEL IV: ICD-10-PCS | Mod: S$PBB,,, | Performed by: ALLERGY & IMMUNOLOGY

## 2020-05-28 RX ORDER — EPINEPHRINE 0.3 MG/.3ML
INJECTION SUBCUTANEOUS
COMMUNITY
Start: 2020-05-12

## 2020-05-28 RX ORDER — TRAMADOL HYDROCHLORIDE 50 MG/1
TABLET ORAL
COMMUNITY
Start: 2020-04-30 | End: 2022-02-21

## 2020-05-28 NOTE — LETTER
May 28, 2020      Efrain Vazquez MD  [address]  [address]  [place] [address]           [facility]  [address]  [place] [address]  Phone: [phone]          Patient: Baldo Grant   MR Number: [id number]   YOB: 1960   Date of Visit: 5/28/2020       Dear Dr. Efrain Vazquez:    Thank you for referring Baldo Grant to me for evaluation. Attached you will find relevant portions of my assessment and plan of care.    If you have questions, please do not hesitate to call me. I look forward to following Baldo Grant along with you.    Sincerely,    Zabrina Dutta MD    Enclosure  CC:  No Recipients    If you would like to receive this communication electronically, please contact [email or web address] or [phone] to request more information on [employer] access.    For providers and/or their staff who would like to refer a patient to [facility], please contact us through our one-stop-shop provider referral line, Anirudh Montanez, at [phone].    If you feel you have received this communication in error or would no longer like to receive these types of communications, please e-mail [email or web address]

## 2020-05-28 NOTE — PROGRESS NOTES
Subjective:       Patient ID: Baldo Grant is a 60 y.o. male.    Chief Complaint:  Allergic Reaction (possible reaction to Tums )      61 yo man presents for consult from Dr Efrain aVzquez for allergic reaction. He states he has never really had bad allergies. Gets some runny nose and congestion from grass and pollen but not bad. Then 3 years ago he had reaction to Advil. He had sore back, took 2 Advil and in 5-10 minutes he had face swelling, body swelling and SOB. Went to ER and treated. He did see Dr Sarmiento and had challenge with ASA and was fine so told to avoid ibuprofen but could take mobic and tylenol and ASA. He did carry an Epipen since. Then couple weeks ago he ate a [employer] chicken salad. Had greens, chicken, dressing, sunflower seeds. He also ate pizza. 35-40 minutes later had some heartburn dn took 2 Tums,. In 5 minutes he had face swelling, ears, es, lips, tongue and SOB. No hives. Used epi and adele to ER. Was told was reaction to Tums but he knows that is jut calcium carbonate so not sure why reacted. He wonders if it was what he ate. He has no asthma or eczema. He does have migraines pretty severe.       Environmental History: see history section for home environment  Review of Systems   Constitutional: Negative for activity change, appetite change, chills, fatigue, fever and unexpected weight change.   HENT: Positive for facial swelling and trouble swallowing. Negative for congestion, ear discharge, ear pain, hearing loss, mouth sores, nosebleeds, postnasal drip, rhinorrhea, sinus pressure, sneezing, sore throat, tinnitus and voice change.    Eyes: Negative for discharge, redness, itching and visual disturbance.   Respiratory: Positive for shortness of breath. Negative for cough, chest tightness and wheezing.    Cardiovascular: Negative for chest pain, palpitations and leg swelling.   Gastrointestinal: Negative for abdominal distention, abdominal pain, constipation, diarrhea, nausea and vomiting.    Genitourinary: Negative for difficulty urinating.   Musculoskeletal: Positive for arthralgias, joint swelling and myalgias. Negative for back pain.   Skin: Positive for color change. Negative for pallor and rash.   Neurological: Positive for headaches. Negative for dizziness, tremors, speech difficulty, weakness and light-headedness.   Hematological: Negative for adenopathy. Does not bruise/bleed easily.   Psychiatric/Behavioral: Negative for agitation, confusion, decreased concentration and sleep disturbance. The patient is not nervous/anxious.         Objective:      Physical Exam   Constitutional: He is oriented to person, place, and time. He appears well-developed and well-nourished. No distress.   HENT:   Head: Normocephalic and atraumatic.   Right Ear: Hearing, tympanic membrane, external ear and ear canal normal.   Left Ear: Hearing, tympanic membrane, external ear and ear canal normal.   Nose: No mucosal edema (pink turbinates), rhinorrhea, sinus tenderness or septal deviation. No epistaxis.   Mouth/Throat: Oropharynx is clear and moist and mucous membranes are normal. No uvula swelling.   Eyes: Conjunctivae are normal. Right eye exhibits no discharge. Left eye exhibits no discharge.   Neck: Normal range of motion. No thyromegaly present.   Cardiovascular: Normal rate, regular rhythm and normal heart sounds.   No murmur heard.  Pulmonary/Chest: Effort normal and breath sounds normal. No respiratory distress. He has no wheezes.   Abdominal: Soft. He exhibits no distension. There is no tenderness.   Musculoskeletal: Normal range of motion. He exhibits no edema.   Lymphadenopathy:     He has no cervical adenopathy.   Neurological: He is alert and oriented to person, place, and time. Coordination normal.   Skin: Skin is warm and dry. No rash noted. No erythema.   Psychiatric: He has a normal mood and affect. His behavior is normal. Judgment and thought content normal.   Nursing note and vitals  reviewed.      Laboratory:   none performed   Assessment:       1. Anaphylaxis, initial encounter    2. Angioedema, initial encounter    3. Chronic rhinitis         Plan:       1. Advised pt he had anaphylactic reaction, may have been food trigger vs additive in Tums v idiopathic, will send labs to further eval  2. Discussed ibuprofen reaction in past, advised that this is to ibuprofen and he can tolerate other NSAID's  3. Phone review

## 2020-06-02 LAB
ALLERGEN NAME: NORMAL
ALLERGEN RESULT: NORMAL

## 2020-06-04 LAB
ALLERGEN CHAETOMIUM GLOBOSUM IGE: <0.1 KU/L
BAHIA GRASS IGE QN: <0.1 KU/L
BALD CYPRESS IGE QN: <0.1 KU/L
BERMUDA GRASS IGE QN: 0.18 KU/L
BLACK PEPPER IGE QN: 0.19 KU/L
BRAZIL NUT IGE QN: <0.1 KU/L
C HERBARUM IGE QN: <0.1 KU/L
C LUNATA IGE QN: <0.1 KU/L
CASHEW NUT IGE QN: <0.1 KU/L
CAT DANDER IGE QN: <0.1 KU/L
CHAETOMIUM GLOB. CLASS: NORMAL
CHILI PEPPER IGE QN: 0.22 KU/L
COCONUT IGE QN: <0.1 KU/L
COTTONWOOD IGE QN: <0.1 KU/L
D FARINAE IGE QN: <0.1 KU/L
D PTERONYSS IGE QN: <0.1 KU/L
DEPRECATED BAHIA GRASS IGE RAST QL: NORMAL
DEPRECATED BALD CYPRESS IGE RAST QL: NORMAL
DEPRECATED BERMUDA GRASS IGE RAST QL: ABNORMAL
DEPRECATED BLACK PEPPER IGE RAST QL: ABNORMAL
DEPRECATED BRAZIL NUT IGE RAST QL: NORMAL
DEPRECATED C HERBARUM IGE RAST QL: NORMAL
DEPRECATED C LUNATA IGE RAST QL: NORMAL
DEPRECATED CASHEW NUT IGE RAST QL: NORMAL
DEPRECATED CAT DANDER IGE RAST QL: NORMAL
DEPRECATED CHILI PEPPER IGE RAST QL: ABNORMAL
DEPRECATED COCONUT IGE RAST QL: NORMAL
DEPRECATED COTTONWOOD IGE RAST QL: NORMAL
DEPRECATED D FARINAE IGE RAST QL: NORMAL
DEPRECATED D PTERONYSS IGE RAST QL: NORMAL
DEPRECATED DOG DANDER IGE RAST QL: NORMAL
DEPRECATED ENGL PLANTAIN IGE RAST QL: ABNORMAL
DEPRECATED GREEN PEPPER IGE RAST QL: ABNORMAL
DEPRECATED HAZELNUT IGE RAST QL: ABNORMAL
DEPRECATED JOHNSON GRASS IGE RAST QL: NORMAL
DEPRECATED LONDON PLANE IGE RAST QL: ABNORMAL
DEPRECATED MACADAMIA IGE RAST QL: ABNORMAL
DEPRECATED P NOTATUM IGE RAST QL: NORMAL
DEPRECATED PEANUT IGE RAST QL: ABNORMAL
DEPRECATED PECAN/HICK NUT IGE RAST QL: NORMAL
DEPRECATED PECAN/HICK TREE IGE RAST QL: ABNORMAL
DEPRECATED PISTACHIO IGE RAST QL: NORMAL
DEPRECATED ROACH IGE RAST QL: NORMAL
DEPRECATED S ROSTRATA IGE RAST QL: NORMAL
DEPRECATED SESAME SEED IGE RAST QL: ABNORMAL
DEPRECATED SILVER BIRCH IGE RAST QL: ABNORMAL
DEPRECATED SOYBEAN IGE RAST QL: ABNORMAL
DEPRECATED SUNFLOWER SEED IGE RAST QL: ABNORMAL
DEPRECATED THYME IGE RAST QL: 0
DEPRECATED TIMOTHY IGE RAST QL: NORMAL
DEPRECATED TOMATO IGE RAST QL: ABNORMAL
DEPRECATED WALNUT IGE RAST QL: ABNORMAL
DEPRECATED WHITE OAK IGE RAST QL: ABNORMAL
DEPRECATED WILLOW IGE RAST QL: NORMAL
DOG DANDER IGE QN: <0.1 KU/L
ENGL PLANTAIN IGE QN: 0.13 KU/L
GREEN PEPPER IGE QN: 0.11 KU/L
HAZELNUT IGE QN: 0.12 KU/L
JOHNSON GRASS IGE QN: <0.1 KU/L
LONDON PLANE IGE QN: 0.43 KU/L
MACADAMIA IGE QN: 0.31 KU/L
P NOTATUM IGE QN: <0.1 KU/L
PEANUT IGE QN: 1.49 KU/L
PECAN/HICK NUT IGE QN: <0.1 KU/L
PECAN/HICK TREE IGE QN: 0.29 KU/L
PISTACHIO IGE QN: <0.1 KU/L
ROACH IGE QN: <0.1 KU/L
S ROSTRATA IGE QN: <0.1 KU/L
SESAME SEED IGE QN: 0.56 KU/L
SILVER BIRCH IGE QN: 0.23 KU/L
SOYBEAN IGE QN: 0.12 KU/L
SUNFLOWER SEED IGE QN: 1.65 KU/L
THYME IGE QN: <0.1 KU/L
TIMOTHY IGE QN: <0.1 KU/L
TOMATO IGE QN: 2.09 KU/L
WALNUT IGE QN: 0.68 KU/L
WHITE OAK IGE QN: 0.13 KU/L
WILLOW IGE QN: <0.1 KU/L

## 2020-06-05 LAB
A ALTERNATA IGE QN: <0.1 KU/L
A FUMIGATUS IGE QN: <0.1 KU/L
ALLERGEN WALNUT TREE IGE: 0.4 KU/L
ALLERGEN WHITE PINE TREE IGE: <0.1 KU/L
ALMOND IGE QN: 0.26 KU/L
CELERY IGE QN: 0.26 KU/L
COMMON RAGWEED IGE QN: 0.2 KU/L
DEPRECATED A ALTERNATA IGE RAST QL: NORMAL
DEPRECATED A FUMIGATUS IGE RAST QL: NORMAL
DEPRECATED ALMOND IGE RAST QL: ABNORMAL
DEPRECATED CELERY IGE RAST QL: ABNORMAL
DEPRECATED COMMON RAGWEED IGE RAST QL: ABNORMAL
DEPRECATED ELDER IGE RAST QL: ABNORMAL
DEPRECATED MUGWORT IGE RAST QL: ABNORMAL
DEPRECATED PARSLEY IGE RAST QL: ABNORMAL
DEPRECATED SALTWORT IGE RAST QL: NORMAL
ELDER IGE QN: 0.46 KU/L
MUGWORT IGE QN: 0.51 KU/L
PARSLEY IGE QN: 0.9 KU/L
SALTWORT IGE QN: <0.1 KU/L
WALNUT TREE CLASS: ABNORMAL
WHITE PINE CLASS: NORMAL

## 2020-06-11 ENCOUNTER — OFFICE VISIT (OUTPATIENT)
Dept: SPORTS MEDICINE | Facility: CLINIC | Age: 60
End: 2020-06-11
Payer: OTHER GOVERNMENT

## 2020-06-11 VITALS
SYSTOLIC BLOOD PRESSURE: 127 MMHG | HEIGHT: 72 IN | BODY MASS INDEX: 36.98 KG/M2 | WEIGHT: 273 LBS | DIASTOLIC BLOOD PRESSURE: 81 MMHG | HEART RATE: 62 BPM

## 2020-06-11 DIAGNOSIS — Z47.89 SURGICAL AFTERCARE, MUSCULOSKELETAL SYSTEM: Primary | ICD-10-CM

## 2020-06-11 PROCEDURE — 99999 PR PBB SHADOW E&M-EST. PATIENT-LVL III: ICD-10-PCS | Mod: PBBFAC,,, | Performed by: ORTHOPAEDIC SURGERY

## 2020-06-11 PROCEDURE — 99213 OFFICE O/P EST LOW 20 MIN: CPT | Mod: PBBFAC | Performed by: ORTHOPAEDIC SURGERY

## 2020-06-11 PROCEDURE — 99024 POSTOP FOLLOW-UP VISIT: CPT | Mod: ,,, | Performed by: ORTHOPAEDIC SURGERY

## 2020-06-11 PROCEDURE — 99999 PR PBB SHADOW E&M-EST. PATIENT-LVL III: CPT | Mod: PBBFAC,,, | Performed by: ORTHOPAEDIC SURGERY

## 2020-06-11 PROCEDURE — 99024 PR POST-OP FOLLOW-UP VISIT: ICD-10-PCS | Mod: ,,, | Performed by: ORTHOPAEDIC SURGERY

## 2020-06-11 NOTE — PROGRESS NOTES
S:Baldo Grant presents for post-operative evaluation.     DATE OF PROCEDURE: 3/18/2020   PROCEDURE PERFORMED:   Left  1. knee arthroscopic partial medial meniscectomy  2. knee arthroscopic loose body removal   3. knee arthroscopic lateral release/peripatellar release package   4. knee arthroscopic chondroplasty    Bill states that overall he is doing much better. 12 weeks out from surgery.  Significantly less pain compared to preop.  He has completed physical therapy for his knee as of last week. He also has some back pain which was present preoperatively, still addressing this issue with his physical therapist at our [place] location.  Denies radicular symptoms.  Takes Mobic as needed.  He denies any numbness or tingling. Overall pleased.    Exam of the left knee demonstrates well-healed incisions.  No signs of infection. The quadriceps activates well.  Near full active extension with perhaps some degree of a lag, flexion to 125°.    A/P:  12 weeks out from surgery.  He does have some underlying significant degenerative arthritis mostly in the medial and patellofemoral compartments.  The significance of this has been previously discussed.  The patient would benefit from low-impact cardio exercise, aqua therapy, and weight loss.  He is a candidate for repeat viscosupplementation in the future.  Could consider referral to pain management also for genicular blocks.  RTC PRN.

## 2020-06-18 PROBLEM — Z74.09 IMPAIRED PHYSICAL MOBILITY: Status: RESOLVED | Noted: 2020-05-01 | Resolved: 2020-06-18

## 2020-06-18 PROBLEM — M17.12 PRIMARY OSTEOARTHRITIS OF LEFT KNEE: Status: RESOLVED | Noted: 2020-03-18 | Resolved: 2020-06-18

## 2020-06-18 PROBLEM — Z74.09 IMPAIRED FUNCTIONAL MOBILITY, BALANCE, GAIT, AND ENDURANCE: Status: RESOLVED | Noted: 2020-04-07 | Resolved: 2020-06-18

## 2020-07-20 ENCOUNTER — OFFICE VISIT (OUTPATIENT)
Dept: INTERNAL MEDICINE | Facility: CLINIC | Age: 60
End: 2020-07-20
Payer: OTHER GOVERNMENT

## 2020-07-20 VITALS
OXYGEN SATURATION: 96 % | HEIGHT: 72 IN | SYSTOLIC BLOOD PRESSURE: 124 MMHG | DIASTOLIC BLOOD PRESSURE: 86 MMHG | BODY MASS INDEX: 35.34 KG/M2 | WEIGHT: 260.94 LBS | HEART RATE: 64 BPM

## 2020-07-20 DIAGNOSIS — E66.9 OBESITY (BMI 30-39.9): ICD-10-CM

## 2020-07-20 DIAGNOSIS — S50.12XA CONTUSION OF LEFT FOREARM, INITIAL ENCOUNTER: Primary | ICD-10-CM

## 2020-07-20 DIAGNOSIS — M54.31 SCIATICA OF RIGHT SIDE: ICD-10-CM

## 2020-07-20 PROCEDURE — 99215 OFFICE O/P EST HI 40 MIN: CPT | Mod: PBBFAC,PN | Performed by: INTERNAL MEDICINE

## 2020-07-20 PROCEDURE — 99213 PR OFFICE/OUTPT VISIT, EST, LEVL III, 20-29 MIN: ICD-10-PCS | Mod: S$PBB,,, | Performed by: INTERNAL MEDICINE

## 2020-07-20 PROCEDURE — 99999 PR PBB SHADOW E&M-EST. PATIENT-LVL V: ICD-10-PCS | Mod: PBBFAC,,, | Performed by: INTERNAL MEDICINE

## 2020-07-20 PROCEDURE — 99213 OFFICE O/P EST LOW 20 MIN: CPT | Mod: S$PBB,,, | Performed by: INTERNAL MEDICINE

## 2020-07-20 PROCEDURE — 99999 PR PBB SHADOW E&M-EST. PATIENT-LVL V: CPT | Mod: PBBFAC,,, | Performed by: INTERNAL MEDICINE

## 2020-07-20 RX ORDER — HYDROCODONE BITARTRATE AND ACETAMINOPHEN 5; 325 MG/1; MG/1
1 TABLET ORAL EVERY 6 HOURS PRN
Qty: 100 TABLET | Refills: 0 | Status: SHIPPED | OUTPATIENT
Start: 2020-07-20 | End: 2020-08-24 | Stop reason: SDUPTHER

## 2020-07-20 NOTE — PROGRESS NOTES
Subjective:       Patient ID: Baldo Grant is a 60 y.o. male.    Chief Complaint: Fall, Medication Refill (Norco), and Arm Pain (left)    HPI  Pt fell and hit L forearm 2 days ago w/o sequelae.  Able to carry objects w/o problems. Weight down 14 lbs/ 4 mo.  Still using a cane.  Review of Systems   Constitutional: Negative for activity change and unexpected weight change.   HENT: Negative for hearing loss, rhinorrhea and trouble swallowing.    Eyes: Negative for discharge and visual disturbance.   Respiratory: Negative for chest tightness and wheezing.    Cardiovascular: Negative for chest pain and palpitations.   Gastrointestinal: Negative for blood in stool, constipation, diarrhea and vomiting.   Endocrine: Negative for polydipsia and polyuria.   Genitourinary: Negative for difficulty urinating, hematuria and urgency.   Musculoskeletal: Positive for arthralgias. Negative for joint swelling and neck pain.   Neurological: Negative for weakness and headaches.   Psychiatric/Behavioral: Negative for confusion and dysphoric mood.   All other systems reviewed and are negative.      Objective:      Physical Exam  Vitals signs reviewed.   Constitutional:       Appearance: He is well-developed. He is obese.   HENT:      Head: Normocephalic.   Neck:      Musculoskeletal: Normal range of motion.   Cardiovascular:      Rate and Rhythm: Normal rate and regular rhythm.      Heart sounds: Normal heart sounds.   Pulmonary:      Effort: Pulmonary effort is normal.      Breath sounds: Normal breath sounds.   Abdominal:      General: Bowel sounds are normal.   Musculoskeletal:      Right lower leg: No edema.      Left lower leg: No edema.      Comments: 3 cm hematoma over L forearm   Skin:     General: Skin is warm and dry.   Neurological:      Mental Status: He is alert.         Assessment:       1. Contusion of left forearm, initial encounter    2. Obesity (BMI 30-39.9)    3. Sciatica of right side        Plan:       Baldo was  seen today for fall, medication refill and arm pain.    Diagnoses and all orders for this visit:    Contusion of left forearm, initial encounter   Reassurance    Obesity (BMI 30-39.9)   Cont weight loss    Sciatica of right side  -     HYDROcodone-acetaminophen (NORCO) 5-325 mg per tablet; Take 1 tablet by mouth every 6 (six) hours as needed for Pain.      Follow up if symptoms worsen or fail to improve.

## 2020-07-21 ENCOUNTER — PATIENT OUTREACH (OUTPATIENT)
Dept: ADMINISTRATIVE | Facility: HOSPITAL | Age: 60
End: 2020-07-21

## 2020-07-28 ENCOUNTER — TELEPHONE (OUTPATIENT)
Dept: PAIN MEDICINE | Facility: CLINIC | Age: 60
End: 2020-07-28

## 2020-07-28 DIAGNOSIS — M54.16 LUMBAR RADICULOPATHY: Primary | ICD-10-CM

## 2020-07-28 NOTE — TELEPHONE ENCOUNTER
Pt scheduled for 8/11/20 at 0845 am for Bilateral TFESI. Pt aware to check in at registration desk on the first floor of the hospital for 0745 am. Pt denied taking blood thinners and is not diabetic.

## 2020-08-10 NOTE — DISCHARGE INSTRUCTIONS
Home Care Instructions Pain Management:    1.  DIET:    You may resume your normal diet today.    2.  BATHING:    You may shower with luke warm water.    3.  DRESSING:    You may remove your bandage today.    4.  ACTIVITY LEVEL:      You may resume your normal activities 24 hours after your procedure.    5.  MEDICATIONS:    You may resume your normal medications today.    6.  SPECIAL INSTRUCTIONS:    No heat to the injection site for 24 hours including bath or shower, heating pad, moist heat or hot tubs.    Use an ice pack to the injection site for any pain or discomfort.  Apply ice packs for 20 minute intervals as needed.    If you have received any sedatives by mouth today, you can not drive for 12 hours.    If you have received sedation through an IV, you can not drive for 24 hours.    PLEASE CALL YOUR DOCTOR FOR THE FOLLOWIN.  Redness or swelling around the injection site.  2.  Fever of 101 degrees.  3.  Drainage (pus) from the injection site.  4.  For any continuous bleeding (some dried blood over the incision is normal.)    FOR EMERGENCIES:    If any unusual problems or difficulties occur during clinic hours, call [phone] or dial [phone].    Follow up with with your physician in 2-3 weeks.

## 2020-08-11 ENCOUNTER — HOSPITAL ENCOUNTER (OUTPATIENT)
Facility: HOSPITAL | Age: 60
Discharge: HOME | End: 2020-08-11
Attending: PAIN MEDICINE | Admitting: PAIN MEDICINE
Payer: OTHER GOVERNMENT

## 2020-08-11 VITALS
DIASTOLIC BLOOD PRESSURE: 76 MMHG | HEART RATE: 63 BPM | RESPIRATION RATE: 15 BRPM | SYSTOLIC BLOOD PRESSURE: 117 MMHG | BODY MASS INDEX: 34.4 KG/M2 | HEIGHT: 72 IN | TEMPERATURE: 98 F | WEIGHT: 254 LBS | OXYGEN SATURATION: 97 %

## 2020-08-11 DIAGNOSIS — M54.16 LUMBAR RADICULOPATHY: Primary | ICD-10-CM

## 2020-08-11 DIAGNOSIS — G89.29 CHRONIC PAIN: ICD-10-CM

## 2020-08-11 PROCEDURE — 99152 MOD SED SAME PHYS/QHP 5/>YRS: CPT | Performed by: PAIN MEDICINE

## 2020-08-11 PROCEDURE — 64483 NJX AA&/STRD TFRM EPI L/S 1: CPT | Mod: 50,,, | Performed by: PAIN MEDICINE

## 2020-08-11 PROCEDURE — 63600175 PHARM REV CODE 636 W HCPCS: Performed by: PAIN MEDICINE

## 2020-08-11 PROCEDURE — 64483 PR EPIDURAL INJ, ANES/STEROID, TRANSFORAMINAL, LUMB/SACR, SNGL LEVL: ICD-10-PCS | Mod: 50,,, | Performed by: PAIN MEDICINE

## 2020-08-11 PROCEDURE — 64483 NJX AA&/STRD TFRM EPI L/S 1: CPT | Mod: 50 | Performed by: PAIN MEDICINE

## 2020-08-11 PROCEDURE — 25500020 PHARM REV CODE 255: Performed by: PAIN MEDICINE

## 2020-08-11 PROCEDURE — 25000003 PHARM REV CODE 250: Performed by: PAIN MEDICINE

## 2020-08-11 RX ORDER — SODIUM CHLORIDE 9 MG/ML
500 INJECTION, SOLUTION INTRAVENOUS CONTINUOUS
Status: ACTIVE | OUTPATIENT
Start: 2020-08-11 | End: 2020-08-12

## 2020-08-11 RX ORDER — FENTANYL CITRATE 50 UG/ML
INJECTION, SOLUTION INTRAMUSCULAR; INTRAVENOUS
Status: DISCONTINUED | OUTPATIENT
Start: 2020-08-11 | End: 2020-08-11 | Stop reason: HOSPADM

## 2020-08-11 RX ORDER — MIDAZOLAM HYDROCHLORIDE 1 MG/ML
INJECTION, SOLUTION INTRAMUSCULAR; INTRAVENOUS
Status: DISCONTINUED | OUTPATIENT
Start: 2020-08-11 | End: 2020-08-11 | Stop reason: HOSPADM

## 2020-08-11 RX ORDER — SODIUM BICARBONATE 1 MEQ/ML
SYRINGE (ML) INTRAVENOUS
Status: DISCONTINUED | OUTPATIENT
Start: 2020-08-11 | End: 2020-08-11 | Stop reason: HOSPADM

## 2020-08-11 RX ORDER — BUPIVACAINE HYDROCHLORIDE 2.5 MG/ML
INJECTION, SOLUTION EPIDURAL; INFILTRATION; INTRACAUDAL
Status: DISCONTINUED | OUTPATIENT
Start: 2020-08-11 | End: 2020-08-11 | Stop reason: HOSPADM

## 2020-08-11 RX ORDER — LIDOCAINE HYDROCHLORIDE 10 MG/ML
1 INJECTION, SOLUTION EPIDURAL; INFILTRATION; INTRACAUDAL; PERINEURAL ONCE
Status: ACTIVE | OUTPATIENT
Start: 2020-08-11

## 2020-08-11 RX ORDER — DEXAMETHASONE SODIUM PHOSPHATE 10 MG/ML
INJECTION INTRAMUSCULAR; INTRAVENOUS
Status: DISCONTINUED | OUTPATIENT
Start: 2020-08-11 | End: 2020-08-11 | Stop reason: HOSPADM

## 2020-08-11 RX ORDER — LIDOCAINE HYDROCHLORIDE 10 MG/ML
INJECTION INFILTRATION; PERINEURAL
Status: DISCONTINUED | OUTPATIENT
Start: 2020-08-11 | End: 2020-08-11 | Stop reason: HOSPADM

## 2020-08-11 RX ADMIN — SODIUM CHLORIDE 500 ML: 0.9 INJECTION, SOLUTION INTRAVENOUS at 08:08

## 2020-08-11 NOTE — PLAN OF CARE
Pt recovered, reviewed discharge instructions, PIV removed, tip intact, called spouse and on way to hospital for ride home, pt stated he understood teaching, VS stable afebrile, tolerating oral intake, care complete

## 2020-08-11 NOTE — H&P
[facility]  History & Physical - Short Stay  Pain Management           SUBJECTIVE:     Procedure: Procedure(s) (LRB):  Injection,steroid,epidural,transforaminal approach--Bilateral L4-5 (Bilateral)    Chief Complaint/Reason for Admission:  Lumbar radiculopathy [M54.16]    PTA Medications   Medication Sig    aspirin (ECOTRIN) 81 MG EC tablet Take 1 tablet twice a day with food starting after surgery (breakfast and dinner).    fexofenadine (ALLEGRA) 180 MG tablet Take 180 mg by mouth continuous prn.    gabapentin (NEURONTIN) 600 MG tablet Take 1 tablet (600 mg total) by mouth 3 (three) times daily.    HYDROcodone-acetaminophen (NORCO) 5-325 mg per tablet Take 1 tablet by mouth every 6 (six) hours as needed for Pain.    meloxicam (MOBIC) 15 MG tablet TAKE 1 TABLET(15 MG) BY MOUTH EVERY DAY    multivitamin (ONE DAILY MULTIVITAMIN) per tablet Take 1 tablet by mouth once daily.    calcipotriene-betamethasone (ENSTILAR) 0.005-0.064 % Foam Apply 1 application topically once daily.    desonide (DESOWEN) 0.05 % cream Thin film to AA eyelids bid PRN flare    diclofenac sodium (VOLTAREN) 1 % Gel Apply 2 g topically 4 (four) times daily.    diphenhydrAMINE (BENADRYL) 25 mg capsule Take 25 mg by mouth every 6 (six) hours as needed for Itching.    EPINEPHrine (EPIPEN) 0.3 mg/0.3 mL AtIn     lidocaine HCL 2% (XYLOCAINE) 2 % jelly Apply topically as needed.    ondansetron (ZOFRAN) 4 MG tablet Take 1 tablet (4 mg total) by mouth every 8 (eight) hours as needed for Nausea.    traMADoL (ULTRAM) 50 mg tablet        Review of patient's allergies indicates:   Allergen Reactions    Advil [ibuprofen] Anaphylaxis    Tums [calcium carbonate] Anaphylaxis       Past Medical History:   Diagnosis Date    Arthritis     Basal cell carcinoma 06/21/2019    back    Cancer     Chronic pain of right knee     SCC (squamous cell carcinoma) 2014    Forehead- Dr. Cabral     Skin cancer     Squamous cell carcinoma  2013    Right ear- Dr. Marianna long     Past Surgical History:   Procedure Laterality Date    APPENDECTOMY      ARTHROSCOPIC CHONDROPLASTY OF KNEE JOINT Left 3/18/2020    Procedure: ARTHROSCOPY, KNEE, WITH CHONDROPLASTY;  Surgeon: FREEMAN Álvarez MD;  Location: [facility] OR;  Service: Orthopedics;  Laterality: Left;    COLONOSCOPY  1998    EPIDURAL STEROID INJECTION INTO LUMBAR SPINE N/A 2/11/2020    Procedure: Injection-steroid-epidural-lumbar--InterLaminar L4-5;  Surgeon: Alexus Anglin Jr., MD;  Location: [facility] PAIN MGT;  Service: Pain Management;  Laterality: N/A;    INJECTION OF ANESTHETIC AGENT AROUND MEDIAL BRANCH NERVES INNERVATING LUMBAR FACET JOINT Bilateral 11/5/2019    Procedure: Block-nerve-medial branch-lumbar--Bilateral L3-4,L4-5,L5-S1;  Surgeon: Alexus Anglin Jr., MD;  Location: [facility] PAIN [facility]GT;  Service: Pain Management;  Laterality: Bilateral;    KNEE ARTHROSCOPY W/ MENISCECTOMY Left 3/18/2020    Procedure: ARTHROSCOPY, KNEE, WITH MENISCECTOMY;  Surgeon: FREEMAN Álvarez MD;  Location: [facility] OR;  Service: Orthopedics;  Laterality: Left;  CLONIDINE/EPI/KETOROLAC/ROPIVACAINE INJECTION 30CC    lipoma removal      skin cancer removal      TRANSFORAMINAL EPIDURAL INJECTION OF STEROID Bilateral 5/6/2020    Procedure: Injection,steroid,epidural,transforaminal approach--Bilateral L4-5;  Surgeon: Alexus Anglin Jr., MD;  Location: [facility]IN [facility]GT;  Service: Pain Management;  Laterality: Bilateral;     Family History   Problem Relation Age of Onset    COPD Mother     Alcohol abuse Mother     Heart disease Father     Melanoma Father     No Known Problems Sister     No Known Problems Brother     Psoriasis Neg Hx     Lupus Neg Hx     Eczema Neg Hx      Social History     Tobacco Use    Smoking status: Never Smoker    Smokeless tobacco: Never Used   Substance Use Topics    Alcohol use: Yes     Frequency: Monthly or less     Drinks per session: 1 or 2     Binge frequency: Never      Comment: social    Drug use: No        Review of Systems:  Review of Systems   Constitutional: Negative for chills and fever.   HENT: Negative for nosebleeds.    Eyes: Negative for blurred vision.   Respiratory: Negative for hemoptysis.    Cardiovascular: Negative for chest pain and palpitations.   Gastrointestinal: Negative for heartburn and vomiting.   Genitourinary: Negative for hematuria.   Musculoskeletal: Positive for back pain. Negative for myalgias.   Skin: Negative for rash.   Neurological: Positive for tingling. Negative for seizures and loss of consciousness.   Endo/Heme/Allergies: Does not bruise/bleed easily.   Psychiatric/Behavioral: Negative for hallucinations.         OBJECTIVE:     Vital Signs (Most Recent):  Temp: 98 °F (36.7 °C) (08/11/20 0808)  Pulse: 61 (08/11/20 0808)  Resp: 18 (08/11/20 0808)  BP: 124/70 (08/11/20 0808)  SpO2: 98 % (08/11/20 0808)    Physical Exam:  General appearance - alert, well appearing, and in no distress  Mental status - AOx3  Eyes - pupils equal and reactive, extraocular eye movements intact  Heart - normal rate, regular rhythm, normal S1, S2, no murmurs, rubs, clicks or gallops  Chest - clear to auscultation, no wheezes, rales or rhonchi, symmetric air entry  Abdomen - soft, nontender, nondistended, no masses or organomegaly  Neurological - alert, oriented, normal speech, no focal findings or movement disorder noted  Extremities - peripheral pulses normal, no pedal edema, no clubbing or cyanosis      ASSESSMENT/PLAN:     Active Hospital Problems    Diagnosis  POA    Chronic pain [G89.29]  Yes      Resolved Hospital Problems   No resolved problems to display.        The risks and benefits of this intervention, and alternative therapies were discussed with the patient.  The discussion of risks included infection, bleeding, need for additional procedures or surgery, nerve damage, paralysis, adverse medication reaction(s), stroke, and/or death.  Questions regarding the  procedure, risks, expected outcome, and possible side effects were solicited and answered to the patient's satisfaction.  Bill wishes to proceed with the injection.  Verbal and written consent were verified.      Proceed with intervention as scheduled.      Alexus Anglin Jr, MD  Interventional Pain Medicine / Anesthesiology

## 2020-08-11 NOTE — OP NOTE
"Procedure Note    Pre-operative Diagnosis: Lumbar Radiculopathy  Post-operative Diagnosis: Lumbar Radiculopathy  Procedure Date: 08/11/2020  Procedure:  (1) Lumbar Transforaminal Epidural Steroid Injection, Two Levels, Bilateral L4-5    (2) Intraoperative fluoroscopy    (3) IV Moderate Sedation (Midazolam 2 mg, Fentanyl 50 mcg)          Indications: To alleviate pain and suffering, and reduce functional impairment associated with Lumbar radiculopathy.      The patients history and physical exam were reviewed. The risks, benefits and alternatives to the procedure were discussed, and all questions were answered to the patients satisfaction. The patient agreed to proceed, and written informed consent was verified.    Procedure in Detail: Using a fenestrated drape and Chloro-prep, the skin was prepared and draped in sterile fashion. The right L4-5 neural foramen was identified by fluoroscopy in right lateral oblique angle. A portion of a mixture of Lidocaine 1% 9 mL + Sodium Bicarbonate 1 mL was used to anesthetize the skin at the skin entry point and subcutaneous tissue with 25G 1.5" in needle. Then a 22G 5" spinal needle was advanced under intermittent fluoroscopy toward each target point until Kambin's triangle was entered anterolateral to the superior articular process. Second needle was placed at the same level on the left side using an identical technique.    Fluoroscopy was then inspected in lateral views and the needles were adjusted appropriately. There was no paresthesia with needle placement. Aspiration was negative for blood and CSF. Omnipaque contrast was injected live in an AP fluoroscopic view at each level demonstrating appropriate needle position with contrast spread into the nerve root sheath and medially into the epidural space without intravascular or intrathecal spread. Next, a mixture 1.5 mL PF Bupivacaine 0.25% and 15 mg dexamethasone (total 3 mL) was divided evenly between the two sides and " injected slowly and incrementally. The patient tolerated the procedure without complaint and was transported to the recovery room in stable condition.     Disposition: The patient tolerated the procedure well, and there were no apparent complications. Vital signs remained stable throughout the procedure. The patient was taken to the recovery area where written discharge instructions for the procedure were given.     Follow-up: RTC as scheduled      Alexus Anglin Jr, MD  Interventional Pain Medicine / Anesthesiology

## 2020-08-11 NOTE — PROGRESS NOTES
MD Arrival Time:0823  Sedation Start Time:0923  Sedation End Time:0934  MD Departure Time:0934

## 2020-08-11 NOTE — DISCHARGE SUMMARY
[facility]  Discharge Note  Short Stay     Admit Date: 8/11/2020    Discharge Date: 8/11/2020     Attending Physician: Alexus Anglin Jr, MD    Diagnoses:  Active Hospital Problems    Diagnosis  POA    Chronic pain [G89.29]  Yes      Resolved Hospital Problems   No resolved problems to display.     Discharged Condition: Good     Hospital Course: Patient was admitted for an outpatient interventional pain management procedure and tolerated the procedure well with no complications.     Final Diagnoses: Same as principal problem.     Disposition: Home or Self Care     Follow up/Patient Instructions:    Follow-up Information     Alexus Anglin Jr, MD. Go in 2 weeks.    Specialty: Pain Medicine  Why: Post-procedural Follow Up As Scheduled  Contact information:  [address]  [address]  [place] [id number] [phone]                   Reconciled Medications:     Medication List      CONTINUE taking these medications    aspirin 81 MG EC tablet  Commonly known as: ECOTRIN  Take 1 tablet twice a day with food starting after surgery (breakfast and dinner).     calcipotriene-betamethasone 0.005-0.064 % Foam  Commonly known as: ENSTILAR  Apply 1 application topically once daily.     desonide 0.05 % cream  Commonly known as: DESOWEN  Thin film to AA eyelids bid PRN flare     diclofenac sodium 1 % Gel  Commonly known as: VOLTAREN  Apply 2 g topically 4 (four) times daily.     diphenhydrAMINE 25 mg capsule  Commonly known as: BENADRYL  Take 25 mg by mouth every 6 (six) hours as needed for Itching.     EPINEPHrine 0.3 mg/0.3 mL Atin  Commonly known as: EPIPEN     fexofenadine 180 MG tablet  Commonly known as: ALLEGRA  Take 180 mg by mouth continuous prn.     gabapentin 600 MG tablet  Commonly known as: NEURONTIN  Take 1 tablet (600 mg total) by mouth 3 (three) times daily.     HYDROcodone-acetaminophen 5-325 mg per tablet  Commonly known as: NORCO  Take 1 tablet by mouth every 6 (six) hours as needed for  Pain.     lidocaine HCL 2% 2 % jelly  Commonly known as: XYLOCAINE  Apply topically as needed.     meloxicam 15 MG tablet  Commonly known as: MOBIC  TAKE 1 TABLET(15 MG) BY MOUTH EVERY DAY     ondansetron 4 MG tablet  Commonly known as: ZOFRAN  Take 1 tablet (4 mg total) by mouth every 8 (eight) hours as needed for Nausea.     ONE DAILY MULTIVITAMIN per tablet  Generic drug: multivitamin  Take 1 tablet by mouth once daily.     traMADoL 50 mg tablet  Commonly known as: ULTRAM           Discharge Procedure Orders (must include Diet, Follow-up, Activity)   Call MD for:  temperature >100.4     Call MD for:  severe uncontrolled pain     Call MD for:  redness, tenderness, or signs of infection (pain, swelling, redness, odor or green/yellow discharge around incision site)     Call MD for:  difficulty breathing or increased cough     Call MD for:  severe persistent headache     Call MD for:  worsening rash     Remove dressing in 24 hours       Alexus Anglin Jr, MD  Interventional Pain Medicine / Anesthesiology

## 2020-08-11 NOTE — INTERVAL H&P NOTE
No significant changes were noted from the H&P or last clinic note.    The risks and benefits of this intervention, and alternative therapies were discussed with the patient.  The discussion of risks included infection, bleeding, need for additional procedures or surgery, nerve damage, paralysis, adverse medication reaction(s), stroke, and/or death.  Questions regarding the procedure, risks, expected outcome, and possible side effects were solicited and answered to the patient's satisfaction.  Mario Grant wishes to proceed with the injection or procedure.  Written consent was obtained.    Alexus Anglin Jr, MD  Interventional Pain Medicine / Anesthesiology

## 2020-08-30 DIAGNOSIS — L40.9 PSORIASIS: ICD-10-CM

## 2020-08-31 ENCOUNTER — PATIENT OUTREACH (OUTPATIENT)
Dept: ADMINISTRATIVE | Facility: OTHER | Age: 60
End: 2020-08-31

## 2020-08-31 RX ORDER — CALCIPOTRIENE AND BETAMETHASONE DIPROPIONATE 50; .5 UG/G; MG/G
1 AEROSOL, FOAM TOPICAL DAILY
Qty: 60 G | Refills: 2 | Status: SHIPPED | OUTPATIENT
Start: 2020-08-31 | End: 2024-01-08 | Stop reason: SDUPTHER

## 2020-08-31 NOTE — TELEPHONE ENCOUNTER
Last OV 7-    Requests refill on calcipotriene-betamethasone (ENSTILAR) 0.005-0.064 % Foam     Please advise

## 2020-08-31 NOTE — PROGRESS NOTES
DATE OF PROCEDURE: 3/18/2020   PROCEDURE PERFORMED:   Left  1. knee arthroscopic partial medial meniscectomy  2. knee arthroscopic loose body removal   3. knee arthroscopic lateral release/peripatellar release package   4. knee arthroscopic chondroplasty     Bill reports to be having continued left knee pain 6 months s/p the above mentioned procedure.  Complaint is significant, recurrent left knee pain.  Medially based but also sometimes anterior/retropatellar.  Pain present with simple day-to-day activities.  Disrupts sleep at night.  Prior conservative treatment has included multiple injection therapy, oral medication, physical therapy, and the above-stated procedure.  BMI 33.  This is a quality of life issue for the patient.      REVIEW OF SYSTEMS:   Constitution: Negative. Negative for chills, fever and night sweats.    Hematologic/Lymphatic: Negative for bleeding problem. Does not bruise/bleed easily.   Skin: Negative for dry skin, itching and rash.   Musculoskeletal: Negative for falls. Positive for left knee pain and muscle weakness.     All other review of symptoms were reviewed and found to be noncontributory.     PAST MEDICAL HISTORY:   Past Medical History:   Diagnosis Date    Arthritis     Basal cell carcinoma 06/21/2019    back    Cancer     Chronic pain of right knee     SCC (squamous cell carcinoma) 2014    Forehead- Dr. Cabral     Skin cancer     Squamous cell carcinoma 2013    Right ear- Dr. Marianna long       PAST SURGICAL HISTORY:   Past Surgical History:   Procedure Laterality Date    APPENDECTOMY      ARTHROSCOPIC CHONDROPLASTY OF KNEE JOINT Left 3/18/2020    Procedure: ARTHROSCOPY, KNEE, WITH CHONDROPLASTY;  Surgeon: FREEMAN Álvarez MD;  Location: [facility];  Service: Orthopedics;  Laterality: Left;    COLONOSCOPY  1998    EPIDURAL STEROID INJECTION INTO LUMBAR SPINE N/A 2/11/2020    Procedure: Injection-steroid-epidural-lumbar--InterLaminar L4-5;  Surgeon: Alexus Anglin  MD Red;  Location: [facility];  Service: Pain Management;  Laterality: N/A;    INJECTION OF ANESTHETIC AGENT AROUND MEDIAL BRANCH NERVES INNERVATING LUMBAR FACET JOINT Bilateral 11/5/2019    Procedure: Block-nerve-medial branch-lumbar--Bilateral L3-4,L4-5,L5-S1;  Surgeon: Alexus Anglin Jr., MD;  Location: [facility];  Service: Pain Management;  Laterality: Bilateral;    KNEE ARTHROSCOPY W/ MENISCECTOMY Left 3/18/2020    Procedure: ARTHROSCOPY, KNEE, WITH MENISCECTOMY;  Surgeon: FREEMAN Álvarez MD;  [facility];  Service: Orthopedics;  Laterality: Left;  CLONIDINE/EPI/KETOROLAC/ROPIVACAINE INJECTION 30CC    lipoma removal      skin cancer removal      TRANSFORAMINAL EPIDURAL INJECTION OF STEROID Bilateral 5/6/2020    Procedure: Injection,steroid,epidural,transforaminal approach--Bilateral L4-5;  Surgeon: Alexus Anglin Jr., MD;  Location: [facility];  Service: Pain Management;  Laterality: Bilateral;    TRANSFORAMINAL EPIDURAL INJECTION OF STEROID Bilateral 8/11/2020    Procedure: Injection,steroid,epidural,transforaminal approach--Bilateral L4-5;  Surgeon: Alexus Anglin Jr., MD;  Location: [facility];  Service: Pain Management;  Laterality: Bilateral;       FAMILY HISTORY:   Family History   Problem Relation Age of Onset    COPD Mother     Alcohol abuse Mother     Heart disease Father     Melanoma Father     No Known Problems Sister     No Known Problems Brother     Psoriasis Neg Hx     Lupus Neg Hx     Eczema Neg Hx        SOCIAL HISTORY:   Social History     Socioeconomic History    Marital status:      Spouse name: Not on file    Number of children: Not on file    Years of education: Not on file    Highest education level: Not on file   Occupational History    Not on file   Social Needs    Financial resource strain: Not very hard    Food insecurity     Worry: Never true     Inability: Never true    Transportation needs     Medical: No     Non-medical:  No   Tobacco Use    Smoking status: Never Smoker    Smokeless tobacco: Never Used   Substance and Sexual Activity    Alcohol use: Yes     Frequency: Monthly or less     Drinks per session: 1 or 2     Binge frequency: Never     Comment: social    Drug use: No    Sexual activity: Yes     Partners: Female   Lifestyle    Physical activity     Days per week: 2 days     Minutes per session: 40 min    Stress: Not at all   Relationships    Social connections     Talks on phone: More than three times a week     Gets together: Twice a week     Attends Voodoo service: Not on file     Active member of club or organization: Yes     Attends meetings of clubs or organizations: 1 to 4 times per year     Relationship status:    Other Topics Concern    Not on file   Social History Narrative    Not on file       MEDICATIONS:     Current Outpatient Medications:     aspirin (ECOTRIN) 81 MG EC tablet, Take 1 tablet twice a day with food starting after surgery (breakfast and dinner)., Disp: 28 tablet, Rfl: 0    calcipotriene-betamethasone (ENSTILAR) 0.005-0.064 % Foam, Apply 1 application topically once daily., Disp: 60 g, Rfl: 2    desonide (DESOWEN) 0.05 % cream, Thin film to AA eyelids bid PRN flare, Disp: 60 g, Rfl: 3    diclofenac sodium (VOLTAREN) 1 % Gel, Apply 2 g topically 4 (four) times daily., Disp: 100 g, Rfl: 0    diphenhydrAMINE (BENADRYL) 25 mg capsule, Take 25 mg by mouth every 6 (six) hours as needed for Itching., Disp: , Rfl:     EPINEPHrine (EPIPEN) 0.3 mg/0.3 mL AtIn, , Disp: , Rfl:     fexofenadine (ALLEGRA) 180 MG tablet, Take 180 mg by mouth continuous prn., Disp: , Rfl:     gabapentin (NEURONTIN) 600 MG tablet, Take 1 tablet (600 mg total) by mouth 3 (three) times daily., Disp: 270 tablet, Rfl: 3    HYDROcodone-acetaminophen (NORCO) 5-325 mg per tablet, Take 1 tablet by mouth every 6 (six) hours as needed for Pain., Disp: 100 tablet, Rfl: 0    lidocaine HCL 2% (XYLOCAINE) 2 % jelly,  Apply topically as needed., Disp: 30 mL, Rfl: 3    meloxicam (MOBIC) 15 MG tablet, TAKE 1 TABLET(15 MG) BY MOUTH EVERY DAY, Disp: 90 tablet, Rfl: 4    multivitamin (ONE DAILY MULTIVITAMIN) per tablet, Take 1 tablet by mouth once daily., Disp: , Rfl:     ondansetron (ZOFRAN) 4 MG tablet, Take 1 tablet (4 mg total) by mouth every 8 (eight) hours as needed for Nausea., Disp: 30 tablet, Rfl: 0    traMADoL (ULTRAM) 50 mg tablet, , Disp: , Rfl:   No current facility-administered medications for this visit.     Facility-Administered Medications Ordered in Other Visits:     0.9%  NaCl infusion, 500 mL, Intravenous, Continuous, Alexus Anglin Jr., MD    lidocaine (PF) 10 mg/ml (1%) injection 10 mg, 1 mL, Intradermal, Once, Alexus Anglin Jr., MD    lidocaine (PF) 10 mg/ml (1%) injection 10 mg, 1 mL, Intradermal, Once, Alexus Anglin Jr., MD    ALLERGIES:   Review of patient's allergies indicates:   Allergen Reactions    Advil [ibuprofen] Anaphylaxis    Tums [calcium carbonate] Anaphylaxis        PHYSICAL EXAMINATION:  /73   Pulse 64   Ht 6' (1.829 m)   Wt 112.9 kg (248 lb 12.8 oz)   BMI 33.74 kg/m²   General: Well-developed well-nourished 60 y.o. malein no acute distress   Cardiovascular: Regular rhythm by palpation of distal pulse, normal color and temperature, no concerning varicosities on symptomatic side   Lungs: No labored breathing or wheezing appreciated   Neuro: Alert and oriented ×3   Psychiatric: well oriented to person, place and time, demonstrates normal mood and affect   Skin: No rashes, lesions or ulcers, normal temperature, turgor, and texture on involved extremity    Ortho/SPM Exam  Exam of the left knee demonstrates well-healed incisions.  No signs of infection. The quadriceps activates well.  Near full active extension with perhaps some degree of a lag, flexion to 125°. Prominent tenderness over the medial joint line.  Pain medially with varus load.  Diffuse knee pain with range  of motion.  Ligamentously stable      IMAGING:  Repeat x-rays of the left knee do show some progressive joint space narrowing medially and moderate degenerative changes, tricompartmental.       ASSESSMENT:      ICD-10-CM ICD-9-CM   1. Primary osteoarthritis of left knee  M17.12 715.16       PLAN:     I had a long discussion with the patient.  Not quite ready for a knee replacement from my perspective.  Rather I have recommended consideration for a genicular block and possible cooled radiofrequency ablation which I think would be of benefit.  We have exhausted all non operative treatment options.  At some point the patient will need a knee replacement.  Plan is to try this before proceeding to see if we can buy some time.  All questions answered.  We will place the referral.    Procedures

## 2020-09-01 ENCOUNTER — OFFICE VISIT (OUTPATIENT)
Dept: SPORTS MEDICINE | Facility: CLINIC | Age: 60
End: 2020-09-01
Payer: OTHER GOVERNMENT

## 2020-09-01 ENCOUNTER — PATIENT OUTREACH (OUTPATIENT)
Dept: ADMINISTRATIVE | Facility: HOSPITAL | Age: 60
End: 2020-09-01

## 2020-09-01 ENCOUNTER — HOSPITAL ENCOUNTER (OUTPATIENT)
Dept: RADIOLOGY | Facility: HOSPITAL | Age: 60
Discharge: HOME | End: 2020-09-01
Attending: ORTHOPAEDIC SURGERY
Payer: OTHER GOVERNMENT

## 2020-09-01 VITALS
HEART RATE: 64 BPM | DIASTOLIC BLOOD PRESSURE: 73 MMHG | BODY MASS INDEX: 33.7 KG/M2 | HEIGHT: 72 IN | WEIGHT: 248.81 LBS | SYSTOLIC BLOOD PRESSURE: 118 MMHG

## 2020-09-01 DIAGNOSIS — M17.12 PRIMARY OSTEOARTHRITIS OF LEFT KNEE: Primary | ICD-10-CM

## 2020-09-01 DIAGNOSIS — M17.12 PRIMARY OSTEOARTHRITIS OF LEFT KNEE: ICD-10-CM

## 2020-09-01 PROCEDURE — 73562 X-RAY EXAM OF KNEE 3: CPT | Mod: 26,LT,, | Performed by: RADIOLOGY

## 2020-09-01 PROCEDURE — 99999 PR PBB SHADOW E&M-EST. PATIENT-LVL V: ICD-10-PCS | Mod: PBBFAC,,, | Performed by: ORTHOPAEDIC SURGERY

## 2020-09-01 PROCEDURE — 99999 PR PBB SHADOW E&M-EST. PATIENT-LVL V: CPT | Mod: PBBFAC,,, | Performed by: ORTHOPAEDIC SURGERY

## 2020-09-01 PROCEDURE — 99215 OFFICE O/P EST HI 40 MIN: CPT | Mod: PBBFAC,25 | Performed by: ORTHOPAEDIC SURGERY

## 2020-09-01 PROCEDURE — 99213 OFFICE O/P EST LOW 20 MIN: CPT | Mod: S$PBB,,, | Performed by: ORTHOPAEDIC SURGERY

## 2020-09-01 PROCEDURE — 99213 PR OFFICE/OUTPT VISIT, EST, LEVL III, 20-29 MIN: ICD-10-PCS | Mod: S$PBB,,, | Performed by: ORTHOPAEDIC SURGERY

## 2020-09-01 PROCEDURE — 73560 X-RAY EXAM OF KNEE 1 OR 2: CPT | Mod: TC,RT

## 2020-09-01 PROCEDURE — 73560 X-RAY EXAM OF KNEE 1 OR 2: CPT | Mod: TC,50

## 2020-09-01 PROCEDURE — 73560 XR KNEE 1 OR 2 VIEW BILATERAL: ICD-10-PCS | Mod: 26,50,, | Performed by: RADIOLOGY

## 2020-09-01 PROCEDURE — 73560 XR KNEE ORTHO LEFT: ICD-10-PCS | Mod: 26,RT,, | Performed by: RADIOLOGY

## 2020-09-01 PROCEDURE — 73562 X-RAY EXAM OF KNEE 3: CPT | Mod: TC,LT

## 2020-09-01 PROCEDURE — 73560 X-RAY EXAM OF KNEE 1 OR 2: CPT | Mod: 26,RT,, | Performed by: RADIOLOGY

## 2020-09-01 PROCEDURE — 73562 XR KNEE ORTHO LEFT: ICD-10-PCS | Mod: 26,LT,, | Performed by: RADIOLOGY

## 2020-09-01 PROCEDURE — 73560 X-RAY EXAM OF KNEE 1 OR 2: CPT | Mod: 26,50,, | Performed by: RADIOLOGY

## 2020-09-01 NOTE — PROGRESS NOTES
Care Everywhere:   Immunization: updated  Health Maintenance: updated  Media Review: review for outside colon cancer report  Legacy Review:   Order placed:   Upcoming appts:  Colonoscopy case request place on 3/13/2020

## 2020-09-02 ENCOUNTER — TELEPHONE (OUTPATIENT)
Dept: SPORTS MEDICINE | Facility: CLINIC | Age: 60
End: 2020-09-02

## 2020-09-02 DIAGNOSIS — M17.12 PRIMARY OSTEOARTHRITIS OF LEFT KNEE: Primary | ICD-10-CM

## 2020-09-03 ENCOUNTER — TELEPHONE (OUTPATIENT)
Dept: PAIN MEDICINE | Facility: CLINIC | Age: 60
End: 2020-09-03

## 2020-09-03 DIAGNOSIS — M17.12 PRIMARY OSTEOARTHRITIS OF LEFT KNEE: Primary | ICD-10-CM

## 2020-09-03 NOTE — TELEPHONE ENCOUNTER
Patient returned call to schedule procedure. Date, time, and instructions given. Patient verbalized understanding.

## 2020-09-10 ENCOUNTER — TELEPHONE (OUTPATIENT)
Dept: PAIN MEDICINE | Facility: CLINIC | Age: 60
End: 2020-09-10

## 2020-09-10 NOTE — TELEPHONE ENCOUNTER
----- Message from Michael Stephenson MA sent at 9/9/2020  3:52 PM CDT -----  Regarding: FW: Peer to peer    ----- Message -----  From: Desiree Leonard  Sent: 9/9/2020   3:31 PM CDT  To: Gurvinder Rucker Staff  Subject: Peer to peer                                     Who called: Gina michel [facility]     What is the request in detail: She states there will be a peer to peer regarding this patient relatively soon. She just wanted to alert the staff. Does not require a call back   Please advise.    Can the clinic reply by [facility]? No    Best call back number: [phone]    Additional Information: N/A

## 2020-09-16 ENCOUNTER — OFFICE VISIT (OUTPATIENT)
Dept: PAIN MEDICINE | Facility: CLINIC | Age: 60
End: 2020-09-16
Payer: OTHER GOVERNMENT

## 2020-09-16 ENCOUNTER — TELEPHONE (OUTPATIENT)
Dept: SPORTS MEDICINE | Facility: CLINIC | Age: 60
End: 2020-09-16

## 2020-09-16 ENCOUNTER — PATIENT MESSAGE (OUTPATIENT)
Dept: SPORTS MEDICINE | Facility: CLINIC | Age: 60
End: 2020-09-16

## 2020-09-16 VITALS
DIASTOLIC BLOOD PRESSURE: 82 MMHG | BODY MASS INDEX: 33.76 KG/M2 | HEART RATE: 73 BPM | SYSTOLIC BLOOD PRESSURE: 116 MMHG | WEIGHT: 248.88 LBS

## 2020-09-16 DIAGNOSIS — M48.061 NEUROFORAMINAL STENOSIS OF LUMBAR SPINE: ICD-10-CM

## 2020-09-16 DIAGNOSIS — M48.061 SPINAL STENOSIS OF LUMBAR REGION, UNSPECIFIED WHETHER NEUROGENIC CLAUDICATION PRESENT: ICD-10-CM

## 2020-09-16 DIAGNOSIS — G89.4 CHRONIC PAIN SYNDROME: ICD-10-CM

## 2020-09-16 DIAGNOSIS — M51.36 DDD (DEGENERATIVE DISC DISEASE), LUMBAR: ICD-10-CM

## 2020-09-16 DIAGNOSIS — M54.16 LUMBAR RADICULOPATHY: Primary | ICD-10-CM

## 2020-09-16 DIAGNOSIS — M47.816 LUMBAR SPONDYLOSIS: ICD-10-CM

## 2020-09-16 PROCEDURE — 99213 OFFICE O/P EST LOW 20 MIN: CPT | Mod: S$PBB,,, | Performed by: NURSE PRACTITIONER

## 2020-09-16 PROCEDURE — 99999 PR PBB SHADOW E&M-EST. PATIENT-LVL III: ICD-10-PCS | Mod: PBBFAC,,, | Performed by: NURSE PRACTITIONER

## 2020-09-16 PROCEDURE — 99999 PR PBB SHADOW E&M-EST. PATIENT-LVL III: CPT | Mod: PBBFAC,,, | Performed by: NURSE PRACTITIONER

## 2020-09-16 PROCEDURE — 99213 OFFICE O/P EST LOW 20 MIN: CPT | Mod: PBBFAC,PO | Performed by: NURSE PRACTITIONER

## 2020-09-16 PROCEDURE — 99213 PR OFFICE/OUTPT VISIT, EST, LEVL III, 20-29 MIN: ICD-10-PCS | Mod: S$PBB,,, | Performed by: NURSE PRACTITIONER

## 2020-09-16 NOTE — PROGRESS NOTES
Chronic Pain-Established Note (Follow up visit)    Notes:     SUBJECTIVE:    Baldo Grant presents tele-medicine appointment for a follow-up appointment for low back and bilateral leg pain. Pt is s/p Bilateral L4-5 TFESI with reported 60% relief with continued improvement. He reports taking less of his pain medication due to relief of injection. Since the last visit, Baldo Grant states the pain has been improving. Current pain intensity is 4/10.         Referred by: Dr. Encarnacoin  Reason for referral: Back Pain    CC:   Low back and left left pain patient also has left knee pain.    Last 3 PDI Scores 9/16/2020 3/2/2020 2/3/2020   Pain Disability Index (PDI) 42 30 32       09/16/2020 - Mr. Grant returns to clinic for follow up visit reporting low back and left leg  pain. He is also reporting left knee pain he is s/f for a left knee GN block with Dr Hollins's office. He was referred to Dr Hollins by Dr. Álvarez/Orthopedics.  Patient is s/p Lumbar Transforaminal Epidural Steroid Injection, Two Levels, Bilateral L4-5 on 08/11/2020 with 0% relief.  Patient is also status post bilateral lumbar MBB that provided him with 0% relief.  Pain intensity is currently 6/10.      05/13/20203622-18-libk-old male presents status post bilateral L4-5 TF KATHY with reported 60% relief.  Patient also states his relief is improving each and every day.  Reports taking less of his pain medication due to the relief received from the injection.  He is only 6 days postop his lumbar epidural, discussed that I suspect is relief will improve even more over the next 2 weeks.    03/02/2020 - Mr. Grant returns to clinic for follow up visit reporting worse back and bilateral leg pain left greater than right.  Patient is s/p L4-5 Lumbar Epidural Steroid  on 2/11/20 with 0% relief.  Patient presents with wife he is currently experiencing no relief from the injection, he reports that his pain continues in his low back as well as in his legs bilaterally left  greater than right.  Patient reports shooting pain down his left leg at times going past his left knee into his left foot.  Patient reports previously given a lumbar TF KATHY at L4-5 per Dr. Duenas/PM Juan Hampton reports receiving 3 months of relief and he is requesting to be scheduled for that particular injection today.  Pain intensity is currently 5/10.      HPI:    Baldo Grant is a 60 y.o. male who complains of left and right lower back pain.  He reports the pain radiates down his left leg just below his knee.  Pain intensity is 5/10.  He reports taking Norco, Gabapentin and Meloxicam for some relief.   Patient s/p Bilateral L3-4, L4-5, and L5-S1 Lumbar Medial Branch Block on 11/5/2019 with Dr. Anglin with no relief.  Patient's 5/17/2019 MRI Lumbar Spine reviewed with patient and shows L4-5 and L5-S1 degenerative disc disease with disc bulging as described above with right L4-5 and left L5-S1 foraminal stenosis.      Location: lower back   Onset: Summer 200  Current Pain Score: 5/10   Daily Pain of Range: 4-9/10  Quality: Dull and Sharp  Radiation: back of left leg  Worsened by: sitting and standing  Improved by: medications and physical therapy    Previous Therapies:  PT/OT:   HEP:   Interventions: Bilateral L3-4, L4-5, and L5-S1 Lumbar Medial Branch Block on 11/5/2019  Surgery:  Medications:   - NSAIDS: meloxicam (MOBIC) 15 MG tablet daily  - MSK Relaxants:  diazePAM (VALIUM) 5 MG tablet daily as needed  - TCAs:   - SNRIs:   - Topicals: lidocaine HCL 2% (XYLOCAINE) 2 % jelly  - Anticonvulsants:  - Opioids: HYDROcodone-acetaminophen (NORCO) 5-325 mg per tablet    Current Pain Medications:  1. Meloxicam 15 mg   2. Gabapentin 600 mg TID        3. HYDROcodone-acetaminophen (NORCO) 5-325 mg per tablet Q6 hours PRN    Review of Systems:  Review of Systems   Constitutional: Negative.    HENT: Negative.    Eyes: Negative.    Respiratory: Negative.    Cardiovascular: Negative.    Gastrointestinal: Negative.     Genitourinary: Negative.    Musculoskeletal: Positive for back pain, joint pain and myalgias.   Skin: Negative.    Neurological: Negative.    Endo/Heme/Allergies: Negative.    Psychiatric/Behavioral: Negative.        History:    Current Outpatient Medications:     aspirin (ECOTRIN) 81 MG EC tablet, Take 1 tablet twice a day with food starting after surgery (breakfast and dinner)., Disp: 28 tablet, Rfl: 0    calcipotriene-betamethasone (ENSTILAR) 0.005-0.064 % Foam, Apply 1 application topically once daily., Disp: 60 g, Rfl: 2    desonide (DESOWEN) 0.05 % cream, Thin film to AA eyelids bid PRN flare, Disp: 60 g, Rfl: 3    diclofenac sodium (VOLTAREN) 1 % Gel, Apply 2 g topically 4 (four) times daily., Disp: 100 g, Rfl: 0    diphenhydrAMINE (BENADRYL) 25 mg capsule, Take 25 mg by mouth every 6 (six) hours as needed for Itching., Disp: , Rfl:     EPINEPHrine (EPIPEN) 0.3 mg/0.3 mL AtIn, , Disp: , Rfl:     fexofenadine (ALLEGRA) 180 MG tablet, Take 180 mg by mouth continuous prn., Disp: , Rfl:     gabapentin (NEURONTIN) 600 MG tablet, Take 1 tablet (600 mg total) by mouth 3 (three) times daily., Disp: 270 tablet, Rfl: 3    HYDROcodone-acetaminophen (NORCO) 5-325 mg per tablet, Take 1 tablet by mouth every 6 (six) hours as needed for Pain., Disp: 100 tablet, Rfl: 0    lidocaine HCL 2% (XYLOCAINE) 2 % jelly, Apply topically as needed., Disp: 30 mL, Rfl: 3    meloxicam (MOBIC) 15 MG tablet, TAKE 1 TABLET(15 MG) BY MOUTH EVERY DAY, Disp: 90 tablet, Rfl: 4    multivitamin (ONE DAILY MULTIVITAMIN) per tablet, Take 1 tablet by mouth once daily., Disp: , Rfl:     ondansetron (ZOFRAN) 4 MG tablet, Take 1 tablet (4 mg total) by mouth every 8 (eight) hours as needed for Nausea., Disp: 30 tablet, Rfl: 0    traMADoL (ULTRAM) 50 mg tablet, , Disp: , Rfl:   No current facility-administered medications for this visit.     Facility-Administered Medications Ordered in Other Visits:     0.9%  NaCl infusion, 500 mL,  Intravenous, Continuous, Alexus Anglin Jr., MD    lidocaine (PF) 10 mg/ml (1%) injection 10 mg, 1 mL, Intradermal, Once, Alexus Anglin Jr., MD    lidocaine (PF) 10 mg/ml (1%) injection 10 mg, 1 mL, Intradermal, Once, Alexus Anglin Jr., MD    Past Medical History:   Diagnosis Date    Arthritis     Basal cell carcinoma 06/21/2019    back    Cancer     Chronic pain of right knee     SCC (squamous cell carcinoma) 2014    Forehead- Dr. Cabral     Skin cancer     Squamous cell carcinoma 2013    Right ear- Dr. Marianna long       Past Surgical History:   Procedure Laterality Date    APPENDECTOMY      ARTHROSCOPIC CHONDROPLASTY OF KNEE JOINT Left 3/18/2020    Procedure: ARTHROSCOPY, KNEE, WITH CHONDROPLASTY;  Surgeon: FREEMAN Álvarez MD;  Location: [facility] OR;  Service: Orthopedics;  Laterality: Left;    COLONOSCOPY  1998    EPIDURAL STEROID INJECTION INTO LUMBAR SPINE N/A 2/11/2020    Procedure: Injection-steroid-epidural-lumbar--InterLaminar L4-5;  Surgeon: Alexus Anglin Jr., MD;  Location: [facility] PAIN MGT;  Service: Pain Management;  Laterality: N/A;    INJECTION OF ANESTHETIC AGENT AROUND MEDIAL BRANCH NERVES INNERVATING LUMBAR FACET JOINT Bilateral 11/5/2019    Procedure: Block-nerve-medial branch-lumbar--Bilateral L3-4,L4-5,L5-S1;  Surgeon: Alexus Anglin Jr., MD;  Location: [facility] PAIN [facility]GT;  Service: Pain Management;  Laterality: Bilateral;    KNEE ARTHROSCOPY W/ MENISCECTOMY Left 3/18/2020    Procedure: ARTHROSCOPY, KNEE, WITH MENISCECTOMY;  Surgeon: FREEMAN Álvarez MD;  Location: [facility] OR;  Service: Orthopedics;  Laterality: Left;  CLONIDINE/EPI/KETOROLAC/ROPIVACAINE INJECTION 30CC    lipoma removal      skin cancer removal      TRANSFORAMINAL EPIDURAL INJECTION OF STEROID Bilateral 5/6/2020    Procedure: Injection,steroid,epidural,transforaminal approach--Bilateral L4-5;  Surgeon: Alexus Anglin Jr., MD;  Location: [facility] PAIN [facility]GT;  Service: Pain Management;  Laterality:  Bilateral;    TRANSFORAMINAL EPIDURAL INJECTION OF STEROID Bilateral 8/11/2020    Procedure: Injection,steroid,epidural,transforaminal approach--Bilateral L4-5;  Surgeon: Alexus Anglin Jr., MD;  Location: [facility] PAIN T;  Service: Pain Management;  Laterality: Bilateral;       Family History   Problem Relation Age of Onset    COPD Mother     Alcohol abuse Mother     Heart disease Father     Melanoma Father     No Known Problems Sister     No Known Problems Brother     Psoriasis Neg Hx     Lupus Neg Hx     Eczema Neg Hx        Social History     Socioeconomic History    Marital status:      Spouse name: Not on file    Number of children: Not on file    Years of education: Not on file    Highest education level: Not on file   Occupational History    Not on file   Social Needs    Financial resource strain: Not very hard    Food insecurity     Worry: Never true     Inability: Never true    Transportation needs     Medical: No     Non-medical: No   Tobacco Use    Smoking status: Never Smoker    Smokeless tobacco: Never Used   Substance and Sexual Activity    Alcohol use: Yes     Frequency: Monthly or less     Drinks per session: 1 or 2     Binge frequency: Never     Comment: social    Drug use: No    Sexual activity: Yes     Partners: Female   Lifestyle    Physical activity     Days per week: 2 days     Minutes per session: 40 min    Stress: Not at all   Relationships    Social connections     Talks on phone: More than three times a week     Gets together: Twice a week     Attends Jewish service: Not on file     Active member of club or organization: Yes     Attends meetings of clubs or organizations: 1 to 4 times per year     Relationship status:    Other Topics Concern    Not on file   Social History Narrative    Not on file       Review of patient's allergies indicates:   Allergen Reactions    Advil [ibuprofen] Anaphylaxis    Tums [calcium carbonate] Anaphylaxis        Physical Exam:  Vitals:    20 1532   PainSc:   6     General    Nursing note and vitals reviewed.  Constitutional: He is oriented to person, place, and time. He appears well-developed. No distress.   HENT:   Head: Normocephalic and atraumatic.   Nose: Nose normal.   Eyes: Conjunctivae and EOM are normal. Right eye exhibits no discharge. Left eye exhibits no discharge. No scleral icterus.   Neck: No JVD present. No tracheal deviation present.   Cardiovascular: Normal rate, regular rhythm and intact distal pulses.    Pulmonary/Chest: Effort normal and breath sounds normal. No respiratory distress. He exhibits no tenderness.   Abdominal: Soft. Bowel sounds are normal. He exhibits no distension. There is no abdominal tenderness. There is no rebound.   Neurological: He is alert and oriented to person, place, and time. He exhibits normal muscle tone. Coordination normal.   Psychiatric: His behavior is normal. Thought content normal.         Back (L-Spine & T-Spine) / Neck (C-Spine) Exam     Tenderness Right paramedian tenderness of the Lower L-Spine. Left paramedian tenderness of the Lower L-Spine.     Back (L-Spine & T-Spine) Range of Motion   Lateral bend right: normal   Lateral bend left: normal   Rotation right: normal   Rotation left: normal     Spinal Sensation   Right Side Sensation  L-Spine Level: normal  Left Side Sensation  L-Spine Level: normal      Muscle Strength   Right Lower Extremity   Hip Abduction: 5/5   Hip Flexion: 5/5   Hip Extensors: 5/5  Quadriceps:  5/5   Hamstrin/5   Gastrocsoleus:  5/5   Left Lower Extremity   Hip Abduction: 4/5   Hip Flexion: 4/5   Hip Extensors: 4/5  Quadriceps:  4/5   Hamstrin/5   Gastrocsoleus:  5/5       Imaging:  EXAMINATION:  MRI LUMBAR SPINE WITHOUT CONTRAST    CLINICAL HISTORY:  Low back painLow back pain, rapidly progressive neuro deficit;    TECHNIQUE:  Standard multiplanar noncontrast MRI sequences of the lumbar  spine.    COMPARISON:  None    FINDINGS:  The distal cord and conus reveal normal signal and morphology. The lumbar vertebra reveal normal alignment, shape and signal intensity.    T12-L1: Unremarkable    L1-2:  Unremarkable    L2-3:  Minimal annular bulge.    L3-4:  Mild disc desiccation with mild generalized annular bulge.  Mild hypertrophic ligamentum flavum and facet arthritis.    L4-5:  Mild disc desiccation with mild annular bulge.  Right foraminal annular fissure.  Mild hypertrophic ligamentum flavum and facet arthritis.  Mild to moderate left and moderate right foraminal stenosis.    L5-S1:  Mild degenerative disc disease with disc desiccation and disc bulge/osteophyte.  Mild hypertrophic ligamentum flavum and facet arthritis.  Mild right with mild to moderate left foraminal stenosis.      Impression       L4-5 and L5-S1 degenerative disc disease with disc bulging osteophyte as described above with right L4-5 and left L5-S1 foraminal stenosis.      Electronically signed by: Baldo Gayle MD  Date: 05/17/2019         Labs:  BMP  Lab Results   Component Value Date     10/27/2015    K 4.1 10/27/2015     10/27/2015    CO2 23 10/27/2015    BUN 22 (H) 10/27/2015    CREATININE 1.1 10/27/2015    CALCIUM 9.2 10/27/2015    ANIONGAP 11 10/27/2015    ESTGFRAFRICA >60.0 10/27/2015    EGFRNONAA >60.0 10/27/2015     Lab Results   Component Value Date    ALT 40 10/27/2015    AST 19 10/27/2015    ALKPHOS 39 (L) 10/27/2015    BILITOT 0.6 10/27/2015       Assessment:  Problem List Items Addressed This Visit        Neuro    Lumbar radiculopathy - Primary    Chronic pain      Other Visit Diagnoses     Neuroforaminal stenosis of lumbar spine        Lumbar spondylosis        DDD (degenerative disc disease), lumbar        Spinal stenosis of lumbar region, unspecified whether neurogenic claudication present              2/3/2020 - Baldo Grant is a 60 y.o. male with who complains of left and right lower back  pain.  He reports the pain radiates down his left leg just below his knee.  Pain intensity is 5/10. He reports his current medication regimen of Norco, Gabapentin and Meloxicam help relieve his pain.  He reports having  Bilateral L3-4, L4-5, and L5-S1 Lumbar Medial Branch Block on 11/5/2019 with no relief.Patient's 5/17/2019 MRI Lumbar Spine reviewed with patient and shows L4-5 and L5-S1 degenerative disc disease with disc bulging  as described above with right L4-5 and left L5-S1 foraminal stenosis.  Recommended scheduling for Interlaminar KATHY  L4-5 with moderate sedation. Patient will follow in clinic following injection.     03/02/[id number]-xsax-old male presents with his wife he is status post lumbar interlaminar KATHY at L4-5 with 0% relief.  Patient reports continued low back and bilateral leg pain left greater than right he is reporting shooting pain down his left leg into his foot at times.  Patient reports the pain is disrupting his quality of life and prohibiting him from performing his ADLs.  Patient was given a left TF KATHY by Dr. Duenas/PM [facility] and received significant relief for 2-3 months. Lumbar MRI shows pathology a the L4-5 and L5-S1 patient has degenerative disc disease with disc bulging osteophyte with right L4-5 and left L5-S1 foraminal stenosis.  Based on results of previous Left  L4-5 transforaminal I discussed with patient and wife that I will now recommend  a bilateral L4-5.   Patient and wife agreed and understood.      05/13/2020- 52 a old male presents status post bilateral L4-5 TF KATHY, is currently reporting 60% relief he states his relief is improving each and every day.  Recommended patient follow up with me via my [facility] in 2 weeks to report the effectiveness of his procedure discussed that his procedure will more than likely improve the next 2 weeks considering he is only 6 days following his injection.    09/16/2020 - 60-year-old male presents low back and left knee pain,  patient is established our office he has had multiple lumbar KATHY as well as a lumbar MBB that provided him with minimal to no relief according to his records he is scheduled for a bilateral genicular block with  tomorrow he was referred to his office from orthopedics/Dr Álvarez.  Patient reports continued low back and left leg pain radiating down to his foot, this pain is secondary to L 4/ 5, and 5 /S1 degenerative disc disease with disc bulging osteophyte with the right L4-5 and left L5-S1 foraminal stenosis that has not gotten better from lumbar KATHY injections.  I recommended patient follow up with Neurosurgery further evaluate    : Reviewed and consistent with medication use as prescribed.    Treatment Plan:   Procedure:  None at this time.   PT/OT/HEP: I encouraged the patient to maintain a home exercise regimen that includes daily, moderate cardiovascular exercise lasting at least 30 minutes.  This may include yoga, nadia chi, walking, swimming, aqua aerobics, or other exercises that maintain a heart rate of 50-70% of the calculated maximum heart rate.  I also encouraged light, daily stretching focused on the target area.  Medications: No changes recommended at this time.  Labs: reviewed and medications are appropriately dosed for current hepatorenal function.  Imaging: No additional recommended at this time. Reviewed and discussed results of 5/17/2019 MRI Lumbar Spine.   Patient agreed understood.  Referred-patient to follow up with Neurosurgery/Dr Encarnacion'  Follow Up:  Marsha Ramos, NP-C  Interventional Pain Management        Disclaimer: This note was partly generated using dictation software which may occasionally result in transcription errors.

## 2020-09-16 NOTE — TELEPHONE ENCOUNTER
Spoke with patient and explained that we are trying to set up a peer to peer to get his scheduled genicular nerve block approved. I said I would call him back when the peer to peer has been completed.    See Raza Ms, OTC,   Clinical Assistant to Dr. Álvarez

## 2020-09-18 ENCOUNTER — OFFICE VISIT (OUTPATIENT)
Dept: INTERNAL MEDICINE | Facility: CLINIC | Age: 60
End: 2020-09-18
Payer: OTHER GOVERNMENT

## 2020-09-18 VITALS
HEIGHT: 72 IN | HEART RATE: 69 BPM | SYSTOLIC BLOOD PRESSURE: 116 MMHG | DIASTOLIC BLOOD PRESSURE: 78 MMHG | TEMPERATURE: 98 F | WEIGHT: 253 LBS | BODY MASS INDEX: 34.27 KG/M2 | OXYGEN SATURATION: 98 %

## 2020-09-18 DIAGNOSIS — N52.9 IMPOTENCE: Primary | ICD-10-CM

## 2020-09-18 DIAGNOSIS — E66.01 MORBID OBESITY: ICD-10-CM

## 2020-09-18 PROCEDURE — 99999 PR PBB SHADOW E&M-EST. PATIENT-LVL V: ICD-10-PCS | Mod: PBBFAC,,, | Performed by: INTERNAL MEDICINE

## 2020-09-18 PROCEDURE — 99999 PR PBB SHADOW E&M-EST. PATIENT-LVL V: CPT | Mod: PBBFAC,,, | Performed by: INTERNAL MEDICINE

## 2020-09-18 PROCEDURE — 99215 OFFICE O/P EST HI 40 MIN: CPT | Mod: PBBFAC,PN | Performed by: INTERNAL MEDICINE

## 2020-09-18 PROCEDURE — 99213 PR OFFICE/OUTPT VISIT, EST, LEVL III, 20-29 MIN: ICD-10-PCS | Mod: S$PBB,,, | Performed by: INTERNAL MEDICINE

## 2020-09-18 PROCEDURE — 99213 OFFICE O/P EST LOW 20 MIN: CPT | Mod: S$PBB,,, | Performed by: INTERNAL MEDICINE

## 2020-09-18 NOTE — PROGRESS NOTES
Subjective:       Patient ID: Baldo Grant is a 60 y.o. male.    Chief Complaint: Follow-up    HPI  Checkup   Weight down 8 lbs/ 2 mo.  Undergoing neurosurgical w/o for his back. Concerned re impotence, decreased libido.  Review of Systems   Constitutional: Negative for activity change.   HENT: Negative for hearing loss and trouble swallowing.    Eyes: Negative for discharge.   Respiratory: Negative for chest tightness and wheezing.    Cardiovascular: Negative for chest pain and palpitations.   Gastrointestinal: Negative for constipation, diarrhea and vomiting.   Genitourinary: Negative for difficulty urinating and hematuria.   Neurological: Negative for headaches.   Psychiatric/Behavioral: Negative for dysphoric mood.   All other systems reviewed and are negative.      Objective:      Physical Exam  Vitals signs reviewed.   Constitutional:       General: He is not in acute distress.     Appearance: He is well-developed. He is obese.   HENT:      Head: Normocephalic.      Mouth/Throat:      Mouth: Mucous membranes are moist.   Eyes:      General: No scleral icterus.     Extraocular Movements: Extraocular movements intact.      Conjunctiva/sclera: Conjunctivae normal.   Neck:      Musculoskeletal: Normal range of motion.   Cardiovascular:      Rate and Rhythm: Normal rate and regular rhythm.      Pulses: Normal pulses.      Heart sounds: Normal heart sounds.   Pulmonary:      Effort: Pulmonary effort is normal.      Breath sounds: Normal breath sounds.   Abdominal:      General: There is no distension.   Musculoskeletal: Normal range of motion.      Right lower leg: No edema.      Left lower leg: No edema.   Skin:     General: Skin is warm and dry.   Neurological:      General: No focal deficit present.      Mental Status: He is alert.   Psychiatric:         Mood and Affect: Mood normal.         Assessment:       1. Impotence    2. Morbid obesity        Plan:       Baldo was seen today for  follow-up.    Diagnoses and all orders for this visit:    Impotence  -     CBC auto differential; Future  -     Comprehensive metabolic panel; Future  -     Follicle stimulating hormone; Future  -     Luteinizing hormone; Future  -     Prostate Specific Antigen, Diagnostic; Future  -     Testosterone Panel; Future  -     TSH; Future    Morbid obesity   Cont weight loss      No follow-ups on file.

## 2020-09-22 ENCOUNTER — TELEPHONE (OUTPATIENT)
Dept: NEUROSURGERY | Facility: CLINIC | Age: 60
End: 2020-09-22

## 2020-09-22 DIAGNOSIS — R52 PAIN: Primary | ICD-10-CM

## 2020-09-23 ENCOUNTER — OFFICE VISIT (OUTPATIENT)
Dept: NEUROSURGERY | Facility: CLINIC | Age: 60
End: 2020-09-23
Payer: OTHER GOVERNMENT

## 2020-09-23 ENCOUNTER — HOSPITAL ENCOUNTER (OUTPATIENT)
Dept: RADIOLOGY | Facility: HOSPITAL | Age: 60
Discharge: HOME | End: 2020-09-23
Attending: NEUROLOGICAL SURGERY
Payer: OTHER GOVERNMENT

## 2020-09-23 VITALS
HEIGHT: 72 IN | HEART RATE: 71 BPM | WEIGHT: 253.06 LBS | SYSTOLIC BLOOD PRESSURE: 123 MMHG | BODY MASS INDEX: 34.28 KG/M2 | DIASTOLIC BLOOD PRESSURE: 74 MMHG

## 2020-09-23 DIAGNOSIS — G89.29 CHRONIC BILATERAL LOW BACK PAIN WITH LEFT-SIDED SCIATICA: Primary | ICD-10-CM

## 2020-09-23 DIAGNOSIS — M47.816 SPONDYLOSIS OF LUMBAR REGION WITHOUT MYELOPATHY OR RADICULOPATHY: ICD-10-CM

## 2020-09-23 DIAGNOSIS — M54.16 LUMBAR RADICULOPATHY: ICD-10-CM

## 2020-09-23 DIAGNOSIS — R52 PAIN: ICD-10-CM

## 2020-09-23 DIAGNOSIS — M51.36 DDD (DEGENERATIVE DISC DISEASE), LUMBAR: ICD-10-CM

## 2020-09-23 DIAGNOSIS — M54.42 CHRONIC BILATERAL LOW BACK PAIN WITH LEFT-SIDED SCIATICA: Primary | ICD-10-CM

## 2020-09-23 PROCEDURE — 99214 OFFICE O/P EST MOD 30 MIN: CPT | Mod: PBBFAC,25,PN | Performed by: PHYSICIAN ASSISTANT

## 2020-09-23 PROCEDURE — 99999 PR PBB SHADOW E&M-EST. PATIENT-LVL IV: ICD-10-PCS | Mod: PBBFAC,,, | Performed by: PHYSICIAN ASSISTANT

## 2020-09-23 PROCEDURE — 99214 OFFICE O/P EST MOD 30 MIN: CPT | Mod: S$PBB,,, | Performed by: PHYSICIAN ASSISTANT

## 2020-09-23 PROCEDURE — 99999 PR PBB SHADOW E&M-EST. PATIENT-LVL IV: CPT | Mod: PBBFAC,,, | Performed by: PHYSICIAN ASSISTANT

## 2020-09-23 PROCEDURE — 72110 X-RAY EXAM L-2 SPINE 4/>VWS: CPT | Mod: 26,,, | Performed by: RADIOLOGY

## 2020-09-23 PROCEDURE — 72110 XR LUMBAR SPINE 5 VIEW WITH FLEX AND EXT: ICD-10-PCS | Mod: 26,,, | Performed by: RADIOLOGY

## 2020-09-23 PROCEDURE — 99214 PR OFFICE/OUTPT VISIT, EST, LEVL IV, 30-39 MIN: ICD-10-PCS | Mod: S$PBB,,, | Performed by: PHYSICIAN ASSISTANT

## 2020-09-23 PROCEDURE — 72110 X-RAY EXAM L-2 SPINE 4/>VWS: CPT | Mod: TC,PN

## 2020-09-23 RX ORDER — DIAZEPAM 5 MG/1
TABLET ORAL
COMMUNITY
Start: 2019-12-02 | End: 2022-02-21

## 2020-09-23 NOTE — PROGRESS NOTES
Subjective:     Patient ID:  Baldo Grant is a 60 y.o. male.    Shashank    Chief Complaint: Low back pain and left leg pain    HPI    Baldo Grant is a 60 y.o. male who presents for follow up.  Patient saw Rad and Dr. Encarnacion in 2019 and surgery was not recommended at that time.  He was referred to pain management and has gotten less and less relief from the ESIs and did not get any relief from the MBB.  He was referred back to [facility] for surgical evaluation again.    He has constant severe back pain that is worse with standing still and better with sitting and walking.  He has pain in the anterior and medial leg to the knee that sometimes radiation to the top of the left foot.  He has pain down the back of the left leg to the knee and sometimes to the calf.  No right leg pain.    The back pain is worse than the left leg pain.    PT has not helped.  No previous spine surgery.  He takes norco, mobic and neurontin.    Patient denies any recent accidents or trauma, no saddle anesthesias, and no bowel or bladder incontinence.      Review of Systems:  Constitution: Negative for chills, fever, night sweats and weight loss.   Musculoskeletal: Negative for falls.   Gastrointestinal: Negative for bowel incontinence, nausea and vomiting.   Genitourinary: Negative for bladder incontinence.   Neurological: Negative for disturbances in coordination and loss of balance.      Objective:      Vitals:    09/23/20 0932   BP: 123/74   Pulse: 71   Weight: 114.8 kg (253 lb 1.4 oz)   Height: 6' (1.829 m)   PainSc:   7   PainLoc: Back         Physical Exam:    General:  Baldo Grant is well-developed, well-nourished, appears stated age, in no acute distress, alert and oriented to person, place, and time.    Pulmonary/Chest:  Respiratory effort normal  Abdominal: Exhibits no distension  Psychiatric:  Normal mood and affect.  Behavior is normal.  Judgement and thought content normal    Musculoskeletal:    Patient arises from a sitting  to standing position without difficulty.  Patient walks to the door without evidence of limp, pain, or abnormality of gait. Patient is able to walk on heels and toes without difficulty.    Lumbar ROM:   Pain in lumbar flexion and extension.    Lumbar Spine Inspection:  Normal with no surgical scars with no visible rashes.    Lumbar Spine Palpation:  No tenderness to low back palpation.    SI Joint Palpation:  Mild tenderness to bilateral SI Joint palpation.    Straight Leg Raise:  Negative right and left SLR.    Neurological:  Alert and oriented to person, place, and time    Muscle strength against resistance:     Right Left   Hip flexion  5 / 5 5 / 5   Hip extension 5 / 5 5 / 5   Hip abduction 5 / 5 5 / 5   Hip adduction  5 / 5 5 / 5   Knee extension  5 / 5 4 / 5   Knee flexion 5 / 5 4 / 5   Dorsiflexion  5 / 5 5 / 5   EHL  5 / 5 5 / 5   Plantar flexion  5 / 5 5 / 5   Inversion of the feet 5 / 5 5 / 5   Eversion of the feet  5 / 5 5 / 5     Reflexes:     Right Left   Patellar 2+ 2+   Achilles 2+ 2+     Clonus:  Negative bilaterally    On gross examination of the bilateral upper extremities, patient has full painfree ROM with no signs of clubbing, cyanosis, edema, or weakness.     XRAY/MRI Interpretation:     Lumbar spine xrays were personally reviewed today.  No fractures.  No movement on flexion and extension.  Mild DDD at L5-S1.    Lumbar spine MRI was personally reviewed today from 2019.  DDD at L3-4, L4-5, and L5-S1.  Disc bulging to the left at L5-S1 with left NFS.      Assessment:          1. Chronic bilateral low back pain with left-sided sciatica    2. Spondylosis of lumbar region without myelopathy or radiculopathy    3. DDD (degenerative disc disease), lumbar    4. Lumbar radiculopathy            Plan:          Orders Placed This Encounter    MRI Lumbar Spine Without Contrast       Lumbar spondylosis/DDD at L3-4, L4-5, and L5-S1.  Disc bulging to the left at L5-S1 with left NFS.    -Patient has failed  conservative treatment including PT and KATHY  -Will get updated MRI lumbar spine and refer to Dr. Encarnacion for surgical evaluation    Follow-Up:  Follow up in about 1 month (around 10/23/2020). If there are any questions prior to this, the patient was instructed to contact the office.       Rupinder Cho [facility], PA-C  Neurosurgery  [facility]

## 2020-10-05 ENCOUNTER — PATIENT MESSAGE (OUTPATIENT)
Dept: INTERNAL MEDICINE | Facility: CLINIC | Age: 60
End: 2020-10-05

## 2020-10-06 ENCOUNTER — PATIENT MESSAGE (OUTPATIENT)
Dept: RESEARCH | Facility: OTHER | Age: 60
End: 2020-10-06

## 2020-10-14 ENCOUNTER — HOSPITAL ENCOUNTER (OUTPATIENT)
Dept: RADIOLOGY | Facility: HOSPITAL | Age: 60
Discharge: HOME | End: 2020-10-14
Attending: PHYSICIAN ASSISTANT
Payer: OTHER GOVERNMENT

## 2020-10-14 PROCEDURE — 72148 MRI LUMBAR SPINE W/O DYE: CPT | Mod: 26,,, | Performed by: RADIOLOGY

## 2020-10-14 PROCEDURE — 72148 MRI LUMBAR SPINE WITHOUT CONTRAST: ICD-10-PCS | Mod: 26,,, | Performed by: RADIOLOGY

## 2020-10-14 PROCEDURE — 72148 MRI LUMBAR SPINE W/O DYE: CPT | Mod: TC

## 2020-10-20 ENCOUNTER — PATIENT OUTREACH (OUTPATIENT)
Dept: ADMINISTRATIVE | Facility: OTHER | Age: 60
End: 2020-10-20

## 2020-10-20 NOTE — PROGRESS NOTES
Updates were requested from care everywhere.  Chart was reviewed for overdue Proactive [facility] Encounters ([facility]) topics (CRS, Breast Cancer Screening, Eye exam)  Health Maintenance has been updated.  LINKS not responding.

## 2020-10-21 ENCOUNTER — HOSPITAL ENCOUNTER (OUTPATIENT)
Dept: RADIOLOGY | Facility: HOSPITAL | Age: 60
Discharge: HOME | End: 2020-10-21
Attending: NEUROLOGICAL SURGERY
Payer: OTHER GOVERNMENT

## 2020-10-21 ENCOUNTER — TELEPHONE (OUTPATIENT)
Dept: NEUROSURGERY | Facility: CLINIC | Age: 60
End: 2020-10-21

## 2020-10-21 ENCOUNTER — OFFICE VISIT (OUTPATIENT)
Dept: NEUROSURGERY | Facility: CLINIC | Age: 60
End: 2020-10-21
Payer: OTHER GOVERNMENT

## 2020-10-21 DIAGNOSIS — M53.3 SACROILIAC JOINT DYSFUNCTION OF LEFT SIDE: ICD-10-CM

## 2020-10-21 DIAGNOSIS — M53.3 SACROILIAC JOINT DYSFUNCTION OF LEFT SIDE: Primary | ICD-10-CM

## 2020-10-21 DIAGNOSIS — Z41.9 SURGERY, ELECTIVE: ICD-10-CM

## 2020-10-21 PROCEDURE — 73521 X-RAY EXAM HIPS BI 2 VIEWS: CPT | Mod: TC,PN

## 2020-10-21 PROCEDURE — 99999 PR PBB SHADOW E&M-EST. PATIENT-LVL III: CPT | Mod: PBBFAC,,, | Performed by: NEUROLOGICAL SURGERY

## 2020-10-21 PROCEDURE — 73521 X-RAY EXAM HIPS BI 2 VIEWS: CPT | Mod: 26,,, | Performed by: RADIOLOGY

## 2020-10-21 PROCEDURE — 99999 PR PBB SHADOW E&M-EST. PATIENT-LVL III: ICD-10-PCS | Mod: PBBFAC,,, | Performed by: NEUROLOGICAL SURGERY

## 2020-10-21 PROCEDURE — 99213 OFFICE O/P EST LOW 20 MIN: CPT | Mod: S$PBB,,, | Performed by: NEUROLOGICAL SURGERY

## 2020-10-21 PROCEDURE — 99213 OFFICE O/P EST LOW 20 MIN: CPT | Mod: PBBFAC,PN | Performed by: NEUROLOGICAL SURGERY

## 2020-10-21 PROCEDURE — 73521 XR HIPS BILATERAL 2 VIEW INCL AP PELVIS: ICD-10-PCS | Mod: 26,,, | Performed by: RADIOLOGY

## 2020-10-21 PROCEDURE — 99213 PR OFFICE/OUTPT VISIT, EST, LEVL III, 20-29 MIN: ICD-10-PCS | Mod: S$PBB,,, | Performed by: NEUROLOGICAL SURGERY

## 2020-10-21 NOTE — PROGRESS NOTES
NEUROSURGICAL PROGRESS NOTE    DATE OF SERVICE:  10/21/2020    ATTENDING PHYSICIAN:  Tam Encarnacion MD    SUBJECTIVE:    INTERIM HISTORY:    This is a very pleasant 60 y.o. 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  This will also reports the 2nd injury while getting out of the car.  His pain is centered over the left SI joint area.  The pain is triggered by physical activities such as walking, standing up, climbing stairs.  He has not done physical therapy for his SI joint dysfunction.  The last time I saw him we ordered lumbar medial branch block that did not help with his pain. He then received multiple TFESI and KATHY without significant pain relief.  No motor weakness, numbness or sphincter dysfunction symptoms.  The pain radiates towards the groin anterior leg.  He also has chronic knee pain due to osteoarthritis.  Pain is interfering with walking any as a limp.    Low Back Pain Scale  R Low Back-Pain Score: 7  R Low Back-Pain Intensity: Pain killers give moderate relief from pain  Personal Care : It is painful to look after myself and I am slow and careful.  Lifting: Pain prevents me from lifting heavy weights, but I can manage light weights if they are conveniently positioned.  Walking: Pain prevents me walking more than 1/2 mile.  Sitting: Pain prevents me from sitting more than one hour.   Low Back-Standing: I cannot stand for longer than 10 minutes with increasing pain   Low Back-Sleeping: Because of pain my normal nights sleep is reduced by less than one quarter  Social Life: Pain has restricted my social life, and I do not go out as often.   Low Back-Traveling: I have extra pain while traveling which compels me to seek alternate forms of travel   Low Back-Changing Degree of Pain: My pain is gradually worsening         PAST MEDICAL HISTORY:  Active Ambulatory Problems     Diagnosis Date Noted    Primary osteoarthritis of both knees 09/23/2015    Chronic  right-sided low back pain with right-sided sciatica 10/26/2015    Complete tear of left rotator cuff 03/07/2016    Biceps tendinitis on left 03/07/2016    Post-traumatic osteoarthritis of left knee 10/05/2018    Lumbar radiculopathy 07/31/2019    Obesity (BMI 30-39.9) 09/11/2019    Chronic pain 11/05/2019     Resolved Ambulatory Problems     Diagnosis Date Noted    Left shoulder pain 03/07/2016    Morbid obesity 11/30/2018    Lumbar spondylosis 05/30/2019    Acute bilateral low back pain with right-sided sciatica 10/10/2019    Decreased functional mobility 10/10/2019    Decreased ROM of lumbar spine 10/10/2019    Decreased strength 10/10/2019    Primary osteoarthritis of left knee 03/18/2020    Impaired functional mobility, balance, gait, and endurance 04/07/2020    Impaired physical mobility 05/01/2020     Past Medical History:   Diagnosis Date    Arthritis     Basal cell carcinoma 06/21/2019    Cancer     Chronic pain of right knee     SCC (squamous cell carcinoma) 2014    Skin cancer     Squamous cell carcinoma 2013       PAST SURGICAL HISTORY:  Past Surgical History:   Procedure Laterality Date    APPENDECTOMY      ARTHROSCOPIC CHONDROPLASTY OF KNEE JOINT Left 3/18/2020    Procedure: ARTHROSCOPY, KNEE, WITH CHONDROPLASTY;  Surgeon: FREEMAN Álvarez MD;  Location: [facility] OR;  Service: Orthopedics;  Laterality: Left;    COLONOSCOPY  1998    EPIDURAL STEROID INJECTION INTO LUMBAR SPINE N/A 2/11/2020    Procedure: Injection-steroid-epidural-lumbar--InterLaminar L4-5;  Surgeon: Alexus Anglin Jr., MD;  Location: [facility] PAIN MGT;  Service: Pain Management;  Laterality: N/A;    INJECTION OF ANESTHETIC AGENT AROUND MEDIAL BRANCH NERVES INNERVATING LUMBAR FACET JOINT Bilateral 11/5/2019    Procedure: Block-nerve-medial branch-lumbar--Bilateral L3-4,L4-5,L5-S1;  Surgeon: Alexus Anglin Jr., MD;  Location: [facility] PAIN [facility]GT;  Service: Pain Management;  Laterality: Bilateral;    KNEE ARTHROSCOPY  W/ MENISCECTOMY Left 3/18/2020    Procedure: ARTHROSCOPY, KNEE, WITH MENISCECTOMY;  Surgeon: FREEMAN Álvarez MD;  Location: [facility];  Service: Orthopedics;  Laterality: Left;  CLONIDINE/EPI/KETOROLAC/ROPIVACAINE INJECTION 30CC    lipoma removal      skin cancer removal      TRANSFORAMINAL EPIDURAL INJECTION OF STEROID Bilateral 5/6/2020    Procedure: Injection,steroid,epidural,transforaminal approach--Bilateral L4-5;  Surgeon: Alexus Anglin Jr., MD;  [place] [facility] [place] [facility]  Service: Pain Management;  Laterality: Bilateral;    TRANSFORAMINAL EPIDURAL INJECTION OF STEROID Bilateral 8/11/2020    Procedure: Injection,steroid,epidural,transforaminal approach--Bilateral L4-5;  Surgeon: Alexus Anglin Jr., MD;  [place] [facility] [place] [place];  Service: Pain Management;  Laterality: Bilateral;       SOCIAL HISTORY:   Social History     Socioeconomic History    Marital status:      Spouse name: Not on file    Number of children: Not on file    Years of education: Not on file    Highest education level: Not on file   Occupational History    Not on file   Social Needs    Financial resource strain: Not very hard    Food insecurity     Worry: Never true     Inability: Never true    Transportation needs     Medical: No     Non-medical: No   Tobacco Use    Smoking status: Never Smoker    Smokeless tobacco: Never Used   Substance and Sexual Activity    Alcohol use: Yes     Frequency: Monthly or less     Drinks per session: 1 or 2     Binge frequency: Never     Comment: social    Drug use: No    Sexual activity: Yes     Partners: Female   Lifestyle    Physical activity     Days per week: 2 days     Minutes per session: 40 min    Stress: Not at all   Relationships    Social connections     Talks on phone: More than three times a week     Gets together: Twice a week     Attends [employer]orationist service: Not on file     Active member of club or organization: Yes     Attends meetings of clubs or organizations: 1  to 4 times per year     Relationship status:    Other Topics Concern    Not on file   Social History Narrative    Not on file       FAMILY HISTORY:  Family History   Problem Relation Age of Onset    COPD Mother     Alcohol abuse Mother     Heart disease Father     Melanoma Father     No Known Problems Sister     No Known Problems Brother     Psoriasis Neg Hx     Lupus Neg Hx     Eczema Neg Hx        CURRENTS MEDICATIONS:  Current Outpatient Medications on File Prior to Visit   Medication Sig Dispense Refill    aspirin (ECOTRIN) 81 MG EC tablet Take 1 tablet twice a day with food starting after surgery (breakfast and dinner). 28 tablet 0    calcipotriene-betamethasone (ENSTILAR) 0.005-0.064 % Foam Apply 1 application topically once daily. 60 g 2    desonide (DESOWEN) 0.05 % cream Thin film to AA eyelids bid PRN flare 60 g 3    diazePAM (VALIUM) 5 MG tablet       diclofenac sodium (VOLTAREN) 1 % Gel Apply 2 g topically 4 (four) times daily. 100 g 0    diphenhydrAMINE (BENADRYL) 25 mg capsule Take 25 mg by mouth every 6 (six) hours as needed for Itching.      EPINEPHrine (EPIPEN) 0.3 mg/0.3 mL AtIn       fexofenadine (ALLEGRA) 180 MG tablet Take 180 mg by mouth continuous prn.      gabapentin (NEURONTIN) 600 MG tablet Take 1 tablet (600 mg total) by mouth 3 (three) times daily. 270 tablet 3    HYDROcodone-acetaminophen (NORCO) 5-325 mg per tablet Take 1 tablet by mouth every 6 (six) hours as needed for Pain. 100 tablet 0    lidocaine HCL 2% (XYLOCAINE) 2 % jelly Apply topically as needed. 30 mL 3    meloxicam (MOBIC) 15 MG tablet TAKE 1 TABLET(15 MG) BY MOUTH EVERY DAY 90 tablet 4    multivitamin (ONE DAILY MULTIVITAMIN) per tablet Take 1 tablet by mouth once daily.      ondansetron (ZOFRAN) 4 MG tablet Take 1 tablet (4 mg total) by mouth every 8 (eight) hours as needed for Nausea. 30 tablet 0    traMADoL (ULTRAM) 50 mg tablet        Current Facility-Administered Medications on File  Prior to Visit   Medication Dose Route Frequency Provider Last Rate Last Dose    0.9%  NaCl infusion  500 mL Intravenous Continuous Alexus Anglin Jr., MD        lidocaine (PF) 10 mg/ml (1%) injection 10 mg  1 mL Intradermal Once Alexus Anglin Jr., MD        lidocaine (PF) 10 mg/ml (1%) injection 10 mg  1 mL Intradermal Once Alexus Anglin Jr., MD           ALLERGIES:  Review of patient's allergies indicates:   Allergen Reactions    Advil [ibuprofen] Anaphylaxis    Tums [calcium carbonate] Anaphylaxis       REVIEW OF SYSTEMS:  Review of Systems   Constitutional: Negative for diaphoresis, fever and weight loss.   Respiratory: Negative for shortness of breath.    Cardiovascular: Negative for chest pain.   Gastrointestinal: Negative for blood in stool.   Genitourinary: Negative for hematuria.   Endo/Heme/Allergies: Does not bruise/bleed easily.   All other systems reviewed and are negative.        OBJECTIVE:    PHYSICAL EXAMINATION:   There were no vitals filed for this visit.    Physical Exam:  Vitals reviewed.    Constitutional: He appears well-developed and well-nourished.     Eyes: Pupils are equal, round, and reactive to light. Conjunctivae and EOM are normal.     Cardiovascular: Normal distal pulses and no edema.     Abdominal: Soft.     Skin: Skin displays no rash on trunk and no rash on extremities. Skin displays no lesions on trunk and no lesions on extremities.     Psych/Behavior: He is alert. He is oriented to person, place, and time. He has a normal mood and affect.     Musculoskeletal:        Neck: Range of motion is full.     Neurological:        DTRs: Tricep reflexes are 2+ on the right side and 2+ on the left side. Bicep reflexes are 2+ on the right side and 2+ on the left side. Brachioradialis reflexes are 2+ on the right side and 2+ on the left side. Patellar reflexes are 2+ on the right side and 2+ on the left side. Achilles reflexes are 2+ on the right side and 2+ on the left side.        Back Exam     Tenderness   The patient is experiencing tenderness in the sacroiliac (Left side).    Range of Motion   Extension: abnormal   Flexion: abnormal   Lateral bend right: normal   Lateral bend left: normal   Rotation right: normal   Rotation left: normal     Muscle Strength   Right Quadriceps:  5/5   Left Quadriceps:  5/5   Right Hamstrings:  5/5   Left Hamstrings:  5/5     Tests   Straight leg raise right: negative  Straight leg raise left: negative    Other   Toe walk: normal  Heel walk: normal            SI joint:   Palpation at the left SI joint is painful  ALICE test is positive on the left side  Gaenslen test is positive on the left side  Thigh thrust test is positive on the left side    Neurologic Exam     Mental Status   Oriented to person, place, and time.   Speech: speech is normal   Level of consciousness: alert    Cranial Nerves   Cranial nerves II through XII intact.     CN III, IV, VI   Pupils are equal, round, and reactive to light.  Extraocular motions are normal.     Motor Exam   Muscle bulk: normal  Overall muscle tone: normal    Strength   Right deltoid: 5/5  Left deltoid: 5/5  Right biceps: 5/5  Left biceps: 5/5  Right triceps: 5/5  Left triceps: 5/5  Right wrist flexion: 5/5  Left wrist flexion: 5/5  Right wrist extension: 5/5  Left wrist extension: 5/5  Right interossei: 5/5  Left interossei: 5/5  Right iliopsoas: 5/5  Left iliopsoas: 5/5  Right quadriceps: 5/5  Left quadriceps: 5/5  Right hamstrin/5  Left hamstrin/5  Right anterior tibial: 5/5  Left anterior tibial: 5/5  Right posterior tibial: 5/5  Left posterior tibial: 5/5  Right peroneal: 5/5  Left peroneal: 5/5  Right gastroc: 5/5  Left gastroc: 5/5    Sensory Exam   Light touch normal.   Pinprick normal.     Gait, Coordination, and Reflexes     Gait  Gait: normal    Coordination   Finger to nose coordination: normal  Tandem walking coordination: normal    Reflexes   Right brachioradialis: 2+  Left brachioradialis:  2+  Right biceps: 2+  Left biceps: 2+  Right triceps: 2+  Left triceps: 2+  Right patellar: 2+  Left patellar: 2+  Right achilles: 2+  Left achilles: 2+  Right plantar: normal  Left plantar: normal  Right Olguin: absent  Left Olguin: absent  Right ankle clonus: absent  Left ankle clonus: absent        DIAGNOSTIC DATA:  I personally interpreted the following imaging:   Lumbar spine MRI 2020 shows diffuse spondylosis, mild, no significant foraminal, lateral recess stenosis, facet arthropathy at L5-S1  CT lumbar spine May 2019 showed signs of sacroiliitis with sclerotic changes in the anterior SI joints bilaterally worse on the left side    ASSESMENT:  This is a 60 y.o. male with     Problem List Items Addressed This Visit     None      Visit Diagnoses     Sacroiliac joint dysfunction of left side    -  Primary    Relevant Orders    CT Pelvis Without Contrast    X-Ray Hip 3 or 4 views Bilateral    Ambulatory referral/consult to Physical/Occupational Therapy            PLAN:  SI joint therapy 3 times a week for 6 weeks, hip strengthening and flexibility exercises.  I explained the natural history of the disease and all treatment options. I recommended a left SI joint block and steroid injection to diagnose and treat his left SI joint dysfunction.     We have discussed the risks of surgery including death, coma, bleeding, infection, failure of surgery, CSF leak, nerve root injury, spinal cord injury, ureter injury, weakness, paralysis, peripheral neuropathy, malplaced hardware, migration of hardware, non-union, need for reoperation. Patient understands the risks and would like to proceed with surgery.  All questions answered        Tam Encarnacion MD  Cell:[phone]

## 2020-10-22 ENCOUNTER — TELEPHONE (OUTPATIENT)
Dept: NEUROSURGERY | Facility: CLINIC | Age: 60
End: 2020-10-22

## 2020-10-22 NOTE — TELEPHONE ENCOUNTER
----- Message from Tam Encarnacion MD sent at 10/22/2020  7:15 AM CDT -----  Please let him know that I have reviewed his hip x-ray, he does not have significant hip osteoarthritis.  We can proceed with the SI joint block and steroid injection.

## 2020-10-30 ENCOUNTER — HOSPITAL ENCOUNTER (OUTPATIENT)
Dept: RADIOLOGY | Facility: HOSPITAL | Age: 60
Discharge: HOME | End: 2020-10-30
Attending: NEUROLOGICAL SURGERY
Payer: OTHER GOVERNMENT

## 2020-10-30 DIAGNOSIS — M53.3 SACROILIAC JOINT DYSFUNCTION OF LEFT SIDE: ICD-10-CM

## 2020-10-30 PROCEDURE — 72192 CT PELVIS W/O DYE: CPT | Mod: 26,,, | Performed by: RADIOLOGY

## 2020-10-30 PROCEDURE — 72192 CT PELVIS W/O DYE: CPT | Mod: TC

## 2020-10-30 PROCEDURE — 72192 CT PELVIS WITHOUT CONTRAST: ICD-10-PCS | Mod: 26,,, | Performed by: RADIOLOGY

## 2020-11-02 ENCOUNTER — OFFICE VISIT (OUTPATIENT)
Dept: INTERNAL MEDICINE | Facility: CLINIC | Age: 60
End: 2020-11-02
Payer: OTHER GOVERNMENT

## 2020-11-02 VITALS
SYSTOLIC BLOOD PRESSURE: 120 MMHG | HEIGHT: 72 IN | BODY MASS INDEX: 34.67 KG/M2 | HEART RATE: 78 BPM | OXYGEN SATURATION: 98 % | DIASTOLIC BLOOD PRESSURE: 68 MMHG | WEIGHT: 255.94 LBS

## 2020-11-02 DIAGNOSIS — R73.9 HYPERGLYCEMIA: ICD-10-CM

## 2020-11-02 DIAGNOSIS — E66.01 MORBID OBESITY: Primary | ICD-10-CM

## 2020-11-02 DIAGNOSIS — L24.9 IRRITANT CONTACT DERMATITIS, UNSPECIFIED TRIGGER: ICD-10-CM

## 2020-11-02 DIAGNOSIS — M54.31 SCIATICA OF RIGHT SIDE: ICD-10-CM

## 2020-11-02 DIAGNOSIS — Z23 NEED FOR PROPHYLACTIC VACCINATION AND INOCULATION AGAINST INFLUENZA: ICD-10-CM

## 2020-11-02 DIAGNOSIS — M25.512 CHRONIC LEFT SHOULDER PAIN: ICD-10-CM

## 2020-11-02 DIAGNOSIS — G89.29 CHRONIC LEFT SHOULDER PAIN: ICD-10-CM

## 2020-11-02 PROCEDURE — 99214 OFFICE O/P EST MOD 30 MIN: CPT | Mod: PBBFAC,PN,25 | Performed by: INTERNAL MEDICINE

## 2020-11-02 PROCEDURE — 90686 IIV4 VACC NO PRSV 0.5 ML IM: CPT | Mod: PBBFAC,PN

## 2020-11-02 PROCEDURE — 99999 PR PBB SHADOW E&M-EST. PATIENT-LVL IV: CPT | Mod: PBBFAC,,, | Performed by: INTERNAL MEDICINE

## 2020-11-02 PROCEDURE — 99999 PR PBB SHADOW E&M-EST. PATIENT-LVL IV: ICD-10-PCS | Mod: PBBFAC,,, | Performed by: INTERNAL MEDICINE

## 2020-11-02 PROCEDURE — 99214 OFFICE O/P EST MOD 30 MIN: CPT | Mod: S$PBB,,, | Performed by: INTERNAL MEDICINE

## 2020-11-02 PROCEDURE — 99214 PR OFFICE/OUTPT VISIT, EST, LEVL IV, 30-39 MIN: ICD-10-PCS | Mod: S$PBB,,, | Performed by: INTERNAL MEDICINE

## 2020-11-02 RX ORDER — MOMETASONE FUROATE 1 MG/G
CREAM TOPICAL DAILY
Qty: 1 TUBE | Refills: 3 | Status: SHIPPED | OUTPATIENT
Start: 2020-11-02 | End: 2022-09-19

## 2020-11-02 RX ORDER — TIZANIDINE 4 MG/1
4 TABLET ORAL EVERY 6 HOURS PRN
Qty: 60 TABLET | Refills: 3 | Status: SHIPPED | OUTPATIENT
Start: 2020-11-02 | End: 2020-11-12

## 2020-11-02 RX ORDER — MELOXICAM 15 MG/1
15 TABLET ORAL DAILY
Qty: 90 TABLET | Refills: 4 | Status: SHIPPED | OUTPATIENT
Start: 2020-11-02 | End: 2021-02-10 | Stop reason: SDUPTHER

## 2020-11-02 RX ORDER — PREDNISONE 20 MG/1
TABLET ORAL
Qty: 14 TABLET | Refills: 0 | Status: SHIPPED | OUTPATIENT
Start: 2020-11-02 | End: 2021-04-27

## 2020-11-02 NOTE — PROGRESS NOTES
Subjective:       Patient ID: Baldo Grant is a 60 y.o. male.    Chief Complaint: Rash (its been 5 days and spreading both forearms)    HPI  Checkup.  Chart reviewed.  Weight up 3 lbs/ 6 weeks.  Pt c/o rash on forearms perhaps due to crepe myrtles.  Back better s/p steroid injection,  for SI joint injection.  Review of Systems   Constitutional: Negative for fatigue and fever.   HENT: Negative for congestion, rhinorrhea and sore throat.    Eyes: Negative for pain.   Respiratory: Negative for cough and shortness of breath.    Gastrointestinal: Negative for diarrhea and vomiting.   Skin: Positive for rash.   All other systems reviewed and are negative.      Objective:      Physical Exam  Vitals signs reviewed.   Constitutional:       General: He is not in acute distress.     Appearance: He is well-developed. He is obese.   HENT:      Head: Normocephalic.      Mouth/Throat:      Mouth: Mucous membranes are moist.   Eyes:      General: No scleral icterus.     Extraocular Movements: Extraocular movements intact.      Conjunctiva/sclera: Conjunctivae normal.   Neck:      Musculoskeletal: Normal range of motion.   Cardiovascular:      Rate and Rhythm: Normal rate and regular rhythm.      Pulses: Normal pulses.      Heart sounds: Normal heart sounds.   Pulmonary:      Effort: Pulmonary effort is normal.      Breath sounds: Normal breath sounds.   Abdominal:      General: There is no distension.   Musculoskeletal: Normal range of motion.      Right lower leg: No edema.      Left lower leg: No edema.   Skin:     General: Skin is warm and dry.      Findings: Rash (contact dermatitis volar aspect of forearms) present.   Neurological:      General: No focal deficit present.      Mental Status: He is alert.   Psychiatric:         Mood and Affect: Mood normal.         Assessment:       1. Morbid obesity    2. Sciatica of right side    3. Hyperglycemia    4. Irritant contact dermatitis, unspecified trigger    5. Need  for prophylactic vaccination and inoculation against influenza    6. Chronic left shoulder pain        Plan:       Baldo was seen today for rash.    Diagnoses and all orders for this visit:    Morbid obesity   Urged weight loss    Sciatica of right side  -     tiZANidine (ZANAFLEX) 4 MG tablet; Take 1 tablet (4 mg total) by mouth every 6 (six) hours as needed.    Hyperglycemia  -     Hemoglobin A1C; Future    Irritant contact dermatitis, unspecified trigger  -     predniSONE (DELTASONE) 20 MG tablet; 2 tabs po qd x 7 days  -     mometasone 0.1% (ELOCON) 0.1 % cream; Apply topically once daily. for 10 days    Need for prophylactic vaccination and inoculation against influenza  -     Influenza - Quadrivalent (PF)    Chronic left shoulder pain  -     meloxicam (MOBIC) 15 MG tablet; Take 1 tablet (15 mg total) by mouth once daily.      No follow-ups on file.

## 2020-11-03 ENCOUNTER — ANESTHESIA EVENT (OUTPATIENT)
Dept: SURGERY | Facility: HOSPITAL | Age: 60
End: 2020-11-03
Payer: OTHER GOVERNMENT

## 2020-11-09 ENCOUNTER — LAB VISIT (OUTPATIENT)
Dept: FAMILY MEDICINE | Facility: CLINIC | Age: 60
End: 2020-11-09
Payer: OTHER GOVERNMENT

## 2020-11-09 DIAGNOSIS — Z41.9 SURGERY, ELECTIVE: ICD-10-CM

## 2020-11-09 PROCEDURE — U0003 INFECTIOUS AGENT DETECTION BY NUCLEIC ACID (DNA OR RNA); SEVERE ACUTE RESPIRATORY SYNDROME CORONAVIRUS 2 (SARS-COV-2) (CORONAVIRUS DISEASE [COVID-19]), AMPLIFIED PROBE TECHNIQUE, MAKING USE OF HIGH THROUGHPUT TECHNOLOGIES AS DESCRIBED BY CMS-2020-01-R: HCPCS

## 2020-11-10 ENCOUNTER — TELEPHONE (OUTPATIENT)
Dept: INTERNAL MEDICINE | Facility: CLINIC | Age: 60
End: 2020-11-10

## 2020-11-10 LAB — SARS-COV-2 RNA RESP QL NAA+PROBE: NOT DETECTED

## 2020-11-10 NOTE — TELEPHONE ENCOUNTER
----- Message from Osei Michelle sent at 11/10/2020  8:59 AM CST -----  Name of Who is Calling: JOSEY DEL RIO [[id number]]    What is the request in detail: JOSEY DEL RIO [[id number]] is calling in regards to paperwork for clearance to work ...Please contact to further discuss and advise      Can the clinic reply by [facility]: no     What Number to Call Back [facility] not [facility]:  [phone] (home)

## 2020-11-10 NOTE — TELEPHONE ENCOUNTER
Spoke with patient. Pt calling in requesting statement of good health, or work physical stating that he is able to work. Pt states that he has received a job offer and this letter is required.

## 2020-11-12 ENCOUNTER — ANESTHESIA (OUTPATIENT)
Dept: SURGERY | Facility: HOSPITAL | Age: 60
End: 2020-11-12
Payer: OTHER GOVERNMENT

## 2020-11-12 ENCOUNTER — HOSPITAL ENCOUNTER (OUTPATIENT)
Facility: HOSPITAL | Age: 60
Discharge: HOME | End: 2020-11-12
Attending: NEUROLOGICAL SURGERY | Admitting: NEUROLOGICAL SURGERY
Payer: OTHER GOVERNMENT

## 2020-11-12 VITALS
WEIGHT: 250 LBS | BODY MASS INDEX: 33.86 KG/M2 | SYSTOLIC BLOOD PRESSURE: 155 MMHG | DIASTOLIC BLOOD PRESSURE: 98 MMHG | HEART RATE: 69 BPM | HEIGHT: 72 IN | OXYGEN SATURATION: 96 % | TEMPERATURE: 98 F | RESPIRATION RATE: 17 BRPM

## 2020-11-12 DIAGNOSIS — M53.3 SACROILIAC JOINT DYSFUNCTION OF LEFT SIDE: ICD-10-CM

## 2020-11-12 DIAGNOSIS — M53.3 SI (SACROILIAC) JOINT DYSFUNCTION: Primary | ICD-10-CM

## 2020-11-12 PROCEDURE — 27096 INJECT SACROILIAC JOINT: CPT | Mod: 22,LT,, | Performed by: NEUROLOGICAL SURGERY

## 2020-11-12 PROCEDURE — 37000009 HC ANESTHESIA EA ADD 15 MINS: Performed by: NEUROLOGICAL SURGERY

## 2020-11-12 PROCEDURE — 36000705 HC OR TIME LEV I EA ADD 15 MIN: Performed by: NEUROLOGICAL SURGERY

## 2020-11-12 PROCEDURE — 71000015 HC POSTOP RECOV 1ST HR: Performed by: NEUROLOGICAL SURGERY

## 2020-11-12 PROCEDURE — 71000016 HC POSTOP RECOV ADDL HR: Performed by: NEUROLOGICAL SURGERY

## 2020-11-12 PROCEDURE — 25500020 PHARM REV CODE 255: Performed by: NEUROLOGICAL SURGERY

## 2020-11-12 PROCEDURE — 27096 PR INJECTION,SACROILIAC JOINT: ICD-10-PCS | Mod: 22,LT,, | Performed by: NEUROLOGICAL SURGERY

## 2020-11-12 PROCEDURE — 63600175 PHARM REV CODE 636 W HCPCS: Performed by: NEUROLOGICAL SURGERY

## 2020-11-12 PROCEDURE — 01992 ANES DX/THER NRV BLK&INJ PRN: CPT | Performed by: NEUROLOGICAL SURGERY

## 2020-11-12 PROCEDURE — 63600175 PHARM REV CODE 636 W HCPCS: Performed by: NURSE ANESTHETIST, CERTIFIED REGISTERED

## 2020-11-12 PROCEDURE — 25000003 PHARM REV CODE 250: Performed by: NEUROLOGICAL SURGERY

## 2020-11-12 PROCEDURE — 25000003 PHARM REV CODE 250: Performed by: NURSE ANESTHETIST, CERTIFIED REGISTERED

## 2020-11-12 PROCEDURE — 36000704 HC OR TIME LEV I 1ST 15 MIN: Performed by: NEUROLOGICAL SURGERY

## 2020-11-12 PROCEDURE — 37000008 HC ANESTHESIA 1ST 15 MINUTES: Performed by: NEUROLOGICAL SURGERY

## 2020-11-12 RX ORDER — ONDANSETRON 2 MG/ML
4 INJECTION INTRAMUSCULAR; INTRAVENOUS ONCE AS NEEDED
Status: DISCONTINUED | OUTPATIENT
Start: 2020-11-12 | End: 2020-11-12 | Stop reason: HOSPADM

## 2020-11-12 RX ORDER — PROPOFOL 10 MG/ML
INJECTION, EMULSION INTRAVENOUS
Status: DISCONTINUED | OUTPATIENT
Start: 2020-11-12 | End: 2020-11-12

## 2020-11-12 RX ORDER — METHYLPREDNISOLONE ACETATE 40 MG/ML
INJECTION, SUSPENSION INTRA-ARTICULAR; INTRALESIONAL; INTRAMUSCULAR; SOFT TISSUE
Status: DISCONTINUED | OUTPATIENT
Start: 2020-11-12 | End: 2020-11-12 | Stop reason: HOSPADM

## 2020-11-12 RX ORDER — PROPOFOL 10 MG/ML
INJECTION, EMULSION INTRAVENOUS CONTINUOUS PRN
Status: DISCONTINUED | OUTPATIENT
Start: 2020-11-12 | End: 2020-11-12

## 2020-11-12 RX ORDER — LIDOCAINE HYDROCHLORIDE 10 MG/ML
INJECTION INFILTRATION; PERINEURAL
Status: DISCONTINUED | OUTPATIENT
Start: 2020-11-12 | End: 2020-11-12 | Stop reason: HOSPADM

## 2020-11-12 RX ORDER — MUPIROCIN 20 MG/G
OINTMENT TOPICAL 2 TIMES DAILY
Status: DISCONTINUED | OUTPATIENT
Start: 2020-11-12 | End: 2020-11-12 | Stop reason: HOSPADM

## 2020-11-12 RX ORDER — SODIUM CHLORIDE, SODIUM LACTATE, POTASSIUM CHLORIDE, CALCIUM CHLORIDE 600; 310; 30; 20 MG/100ML; MG/100ML; MG/100ML; MG/100ML
INJECTION, SOLUTION INTRAVENOUS CONTINUOUS PRN
Status: DISCONTINUED | OUTPATIENT
Start: 2020-11-12 | End: 2020-11-12

## 2020-11-12 RX ORDER — MIDAZOLAM HYDROCHLORIDE 1 MG/ML
INJECTION INTRAMUSCULAR; INTRAVENOUS
Status: DISCONTINUED | OUTPATIENT
Start: 2020-11-12 | End: 2020-11-12

## 2020-11-12 RX ORDER — BUPIVACAINE HYDROCHLORIDE 2.5 MG/ML
INJECTION, SOLUTION INFILTRATION; PERINEURAL
Status: DISCONTINUED | OUTPATIENT
Start: 2020-11-12 | End: 2020-11-12 | Stop reason: HOSPADM

## 2020-11-12 RX ORDER — LIDOCAINE HCL/PF 100 MG/5ML
SYRINGE (ML) INTRAVENOUS
Status: DISCONTINUED | OUTPATIENT
Start: 2020-11-12 | End: 2020-11-12

## 2020-11-12 RX ORDER — HYDROMORPHONE HYDROCHLORIDE 2 MG/ML
0.5 INJECTION, SOLUTION INTRAMUSCULAR; INTRAVENOUS; SUBCUTANEOUS EVERY 5 MIN PRN
Status: DISCONTINUED | OUTPATIENT
Start: 2020-11-12 | End: 2020-11-12 | Stop reason: HOSPADM

## 2020-11-12 RX ADMIN — MIDAZOLAM HYDROCHLORIDE 2 MG: 1 INJECTION, SOLUTION INTRAMUSCULAR; INTRAVENOUS at 09:11

## 2020-11-12 RX ADMIN — SODIUM CHLORIDE, SODIUM LACTATE, POTASSIUM CHLORIDE, AND CALCIUM CHLORIDE: .6; .31; .03; .02 INJECTION, SOLUTION INTRAVENOUS at 09:11

## 2020-11-12 RX ADMIN — PROPOFOL 30 MG: 10 INJECTION, EMULSION INTRAVENOUS at 09:11

## 2020-11-12 RX ADMIN — LIDOCAINE HYDROCHLORIDE 100 MG: 20 INJECTION, SOLUTION INTRAVENOUS at 09:11

## 2020-11-12 RX ADMIN — PROPOFOL 150 MCG/KG/MIN: 10 INJECTION, EMULSION INTRAVENOUS at 09:11

## 2020-11-12 NOTE — H&P
NEUROSURGICAL PROGRESS NOTE   DATE OF SERVICE:   11/12/20  ATTENDING PHYSICIAN:   Tam Encarnacion MD   SUBJECTIVE:   INTERIM HISTORY:   This is a very pleasant 60 y.o. 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 This will also reports the 2nd injury while getting out of the car. His pain is centered over the left SI joint area. The pain is triggered by physical activities such as walking, standing up, climbing stairs. He has not done physical therapy for his SI joint dysfunction. The last time I saw him we ordered lumbar medial branch block that did not help with his pain. He then received multiple TFESI and KATHY without significant pain relief. No motor weakness, numbness or sphincter dysfunction symptoms. The pain radiates towards the groin anterior leg. He also has chronic knee pain due to osteoarthritis. Pain is interfering with walking any as a limp.   Low Back Pain Scale   R Low Back-Pain Score: 7   R Low Back-Pain Intensity: Pain killers give moderate relief from pain   Personal Care : It is painful to look after myself and I am slow and careful.   Lifting: Pain prevents me from lifting heavy weights, but I can manage light weights if they are conveniently positioned.   Walking: Pain prevents me walking more than 1/2 mile.   Sitting: Pain prevents me from sitting more than one hour.   Low Back-Standing: I cannot stand for longer than 10 minutes with increasing pain   Low Back-Sleeping: Because of pain my normal nights sleep is reduced by less than one quarter   Social Life: Pain has restricted my social life, and I do not go out as often.   Low Back-Traveling: I have extra pain while traveling which compels me to seek alternate forms of travel   Low Back-Changing Degree of Pain: My pain is gradually worsening     PAST MEDICAL HISTORY:        Active Ambulatory Problems    Diagnosis Date Noted    Primary osteoarthritis of both knees 09/23/2015    Chronic right-sided  low back pain with right-sided sciatica 10/26/2015    Complete tear of left rotator cuff 03/07/2016    Biceps tendinitis on left 03/07/2016    Post-traumatic osteoarthritis of left knee 10/05/2018    Lumbar radiculopathy 07/31/2019    Obesity (BMI 30-39.9) 09/11/2019    Chronic pain 11/05/2019          Resolved Ambulatory Problems    Diagnosis Date Noted    Left shoulder pain 03/07/2016    Morbid obesity 11/30/2018    Lumbar spondylosis 05/30/2019    Acute bilateral low back pain with right-sided sciatica 10/10/2019    Decreased functional mobility 10/10/2019    Decreased ROM of lumbar spine 10/10/2019    Decreased strength 10/10/2019    Primary osteoarthritis of left knee 03/18/2020    Impaired functional mobility, balance, gait, and endurance 04/07/2020    Impaired physical mobility 05/01/2020          Past Medical History:   Diagnosis Date    Arthritis     Basal cell carcinoma 06/21/2019    Cancer     Chronic pain of right knee     SCC (squamous cell carcinoma) 2014    Skin cancer     Squamous cell carcinoma 2013     PAST SURGICAL HISTORY:         Past Surgical History:   Procedure Laterality Date    APPENDECTOMY      ARTHROSCOPIC CHONDROPLASTY OF KNEE JOINT Left 3/18/2020    Procedure: ARTHROSCOPY, KNEE, WITH CHONDROPLASTY; Surgeon: FREEMAN Álvarez MD; Location: [facility] OR; Service: Orthopedics; Laterality: Left;    COLONOSCOPY  1998    EPIDURAL STEROID INJECTION INTO LUMBAR SPINE N/A 2/11/2020    Procedure: Injection-steroid-epidural-lumbar--InterLaminar L4-5; Surgeon: Alexus Anglin Jr., MD; Location: [facility] PAIN MGT; Service: Pain Management; Laterality: N/A;    INJECTION OF ANESTHETIC AGENT AROUND MEDIAL BRANCH NERVES INNERVATING LUMBAR FACET JOINT Bilateral 11/5/2019    Procedure: Block-nerve-medial branch-lumbar--Bilateral L3-4,L4-5,L5-S1; Surgeon: Alexus Anglin Jr., MD; Location: [facility] PAIN MGT; Service: Pain Management; Laterality: Bilateral;    KNEE ARTHROSCOPY W/  MENISCECTOMY Left 3/18/2020    Procedure: ARTHROSCOPY, KNEE, WITH MENISCECTOMY; Surgeon: FREEMAN Álvarez MD; Location: [facility] OR; Service: Orthopedics; Laterality: Left; CLONIDINE/EPI/KETOROLAC/ROPIVACAINE INJECTION 30CC    lipoma removal      skin cancer removal      TRANSFORAMINAL EPIDURAL INJECTION OF STEROID Bilateral 5/6/2020    Procedure: Injection,steroid,epidural,transforaminal approach--Bilateral L4-5; Surgeon: Alexus Anglin Jr., MD; Location: [facility] PAIN MGT; Service: Pain Management; Laterality: Bilateral;    TRANSFORAMINAL EPIDURAL INJECTION OF STEROID Bilateral 8/11/2020    Procedure: Injection,steroid,epidural,transforaminal approach--Bilateral L4-5; Surgeon: Aleuxs Anglin Jr., MD; Location: [facility] PAIN MGT; Service: Pain Management; Laterality: Bilateral;     SOCIAL HISTORY:   Social History                                                                                                                                                                                                                                                                                 FAMILY HISTORY:         Family History   Problem Relation Age of Onset    COPD Mother     Alcohol abuse Mother     Heart disease Father     Melanoma Father     No Known Problems Sister     No Known Problems Brother     Psoriasis Neg Hx     Lupus Neg Hx     Eczema Neg Hx      CURRENTS MEDICATIONS:          Current Outpatient Medications on File Prior to Visit   Medication Sig Dispense Refill    aspirin (ECOTRIN) 81 MG EC tablet Take 1 tablet twice a day with food starting after surgery (breakfast and dinner). 28 tablet 0    calcipotriene-betamethasone (ENSTILAR) 0.005-0.064 % Foam Apply 1 application topically once daily. 60 g 2    desonide (DESOWEN) 0.05 % cream Thin film to AA eyelids bid PRN flare 60 g 3    diazePAM (VALIUM) 5 MG tablet       diclofenac sodium (VOLTAREN) 1 % Gel Apply 2 g topically 4 (four) times daily. 100  g 0    diphenhydrAMINE (BENADRYL) 25 mg capsule Take 25 mg by mouth every 6 (six) hours as needed for Itching.      EPINEPHrine (EPIPEN) 0.3 mg/0.3 mL AtIn       fexofenadine (ALLEGRA) 180 MG tablet Take 180 mg by mouth continuous prn.      gabapentin (NEURONTIN) 600 MG tablet Take 1 tablet (600 mg total) by mouth 3 (three) times daily. 270 tablet 3    HYDROcodone-acetaminophen (NORCO) 5-325 mg per tablet Take 1 tablet by mouth every 6 (six) hours as needed for Pain. 100 tablet 0    lidocaine HCL 2% (XYLOCAINE) 2 % jelly Apply topically as needed. 30 mL 3    meloxicam (MOBIC) 15 MG tablet TAKE 1 TABLET(15 MG) BY MOUTH EVERY DAY 90 tablet 4    multivitamin (ONE DAILY MULTIVITAMIN) per tablet Take 1 tablet by mouth once daily.      ondansetron (ZOFRAN) 4 MG tablet Take 1 tablet (4 mg total) by mouth every 8 (eight) hours as needed for Nausea. 30 tablet 0    traMADoL (ULTRAM) 50 mg tablet                  Current Facility-Administered Medications on File Prior to Visit   Medication Dose Route Frequency Provider Last Rate Last Dose    0.9% NaCl infusion 500 mL Intravenous Continuous Alexus Anglin Jr., MD      lidocaine (PF) 10 mg/ml (1%) injection 10 mg 1 mL Intradermal Once Alexus Anglin Jr., MD      lidocaine (PF) 10 mg/ml (1%) injection 10 mg 1 mL Intradermal Once Alexus Anglin Jr., MD       ALLERGIES:        Review of patient's allergies indicates:   Allergen Reactions    Advil [ibuprofen] Anaphylaxis    Tums [calcium carbonate] Anaphylaxis     REVIEW OF SYSTEMS:   Review of Systems   Constitutional: Negative for diaphoresis, fever and weight loss.   Respiratory: Negative for shortness of breath.   Cardiovascular: Negative for chest pain.   Gastrointestinal: Negative for blood in stool.   Genitourinary: Negative for hematuria.   Endo/Heme/Allergies: Does not bruise/bleed easily.   All other systems reviewed and are negative.     OBJECTIVE:   PHYSICAL EXAMINATION:   There were no vitals  filed for this visit.   Physical Exam:   Vitals reviewed.   Constitutional: He appears well-developed and well-nourished.   Eyes: Pupils are equal, round, and reactive to light. Conjunctivae and EOM are normal.   Cardiovascular: Normal distal pulses and no edema.   Abdominal: Soft.   Skin: Skin displays no rash on trunk and no rash on extremities. Skin displays no lesions on trunk and no lesions on extremities.     Psych/Behavior: He is alert. He is oriented to person, place, and time. He has a normal mood and affect.     Musculoskeletal:   Neck: Range of motion is full.     Neurological:   DTRs: Tricep reflexes are 2+ on the right side and 2+ on the left side. Bicep reflexes are 2+ on the right side and 2+ on the left side. Brachioradialis reflexes are 2+ on the right side and 2+ on the left side. Patellar reflexes are 2+ on the right side and 2+ on the left side. Achilles reflexes are 2+ on the right side and 2+ on the left side.     Back Exam   Tenderness   The patient is experiencing tenderness in the sacroiliac (Left side).   Range of Motion   Extension: abnormal   Flexion: abnormal   Lateral bend right: normal   Lateral bend left: normal   Rotation right: normal   Rotation left: normal   Muscle Strength   Right Quadriceps: 5/5   Left Quadriceps: 5/5   Right Hamstrings: 5/5   Left Hamstrings: 5/5   Tests   Straight leg raise right: negative   Straight leg raise left: negative   Other   Toe walk: normal   Heel walk: normal     SI joint:   Palpation at the left SI joint is painful   ALICE test is positive on the left side   Gaenslen test is positive on the left side   Thigh thrust test is positive on the left side   Neurologic Exam   Mental Status   Oriented to person, place, and time.   Speech: speech is normal   Level of consciousness: alert   Cranial Nerves   Cranial nerves II through XII intact.   CN III, IV, VI   Pupils are equal, round, and reactive to light.  Extraocular motions are normal.   Motor Exam    Muscle bulk: normal  Overall muscle tone: normal   Strength   Right deltoid: 5/5   Left deltoid: 5/5   Right biceps: 5/5   Left biceps: 5/5   Right triceps: 5/5   Left triceps: 5/5   Right wrist flexion: 5/5   Left wrist flexion: 5/5   Right wrist extension: 5/5   Left wrist extension: 5/5   Right interossei: 5/5   Left interossei: 5/5   Right iliopsoas: 5/5   Left iliopsoas: 5/5   Right quadriceps: 5/5   Left quadriceps: 5/5   Right hamstrin/5   Left hamstrin/5   Right anterior tibial: 5/5   Left anterior tibial: 5/5   Right posterior tibial: 5/5   Left posterior tibial: 5/5   Right peroneal: 5/5   Left peroneal: 5/5   Right gastroc: 5/5   Left gastroc: 5/5   Sensory Exam   Light touch normal.   Pinprick normal.   Gait, Coordination, and Reflexes   Gait   Gait: normal   Coordination   Finger to nose coordination: normal  Tandem walking coordination: normal   Reflexes   Right brachioradialis: 2+  Left brachioradialis: 2+  Right biceps: 2+  Left biceps: 2+  Right triceps: 2+  Left triceps: 2+  Right patellar: 2+  Left patellar: 2+  Right achilles: 2+  Left achilles: 2+  Right plantar: normal  Left plantar: normal  Right Olguin: absent  Left Olguin: absent  Right ankle clonus: absent  Left ankle clonus: absent     DIAGNOSTIC DATA:   I personally interpreted the following imaging:   Lumbar spine MRI  shows diffuse spondylosis, mild, no significant foraminal, lateral recess stenosis, facet arthropathy at L5-S1   CT lumbar spine May 2019 showed signs of sacroiliitis with sclerotic changes in the anterior SI joints bilaterally worse on the left side   ASSESMENT:   This is a 60 y.o. male with       Problem List Items Addressed This Visit       None                  Visit Diagnoses       Sacroiliac joint dysfunction of left side - Primary    Relevant Orders    CT Pelvis Without Contrast    X-Ray Hip 3 or 4 views Bilateral    Ambulatory referral/consult to Physical/Occupational Therapy          PLAN:   SI  joint therapy 3 times a week for 6 weeks, hip strengthening and flexibility exercises.   I explained the natural history of the disease and all treatment options. I recommended a left SI joint block and steroid injection to diagnose and treat his left SI joint dysfunction.   We have discussed the risks of surgery including death, coma, bleeding, infection, failure of surgery, CSF leak, nerve root injury, spinal cord injury, ureter injury, weakness, paralysis, peripheral neuropathy, malplaced hardware, migration of hardware, non-union, need for reoperation. Patient understands the risks and would like to proceed with surgery.   All questions answered     Tam Encarnacion MD   Cell:[phone]

## 2020-11-12 NOTE — OP NOTE
DATE OF PROCEDURE: 11/12/20     PREOPERATIVE DIAGNOSES:  1.  Left  sacroiliac joint dysfunction and refractory pain     POSTOPERATIVE DIAGNOSES:   1.  Left sacroiliac joint dysfunction and refractory pain     NAME OF OPERATION:  1.  Left  sacroiliac joint block  2. Fluoroscopy     PRIMARY SURGEON: Tam Encarnacion M.D.     ASSISTANT SURGEON:  none     INDICATION FOR PROCEDURE: This is a very pleasant 60 y.o. 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 This will also reports the 2nd injury while getting out of the car. His pain is centered over the left SI joint area. The pain is triggered by physical activities such as walking, standing up, climbing stairs. He has not done physical therapy for his SI joint dysfunction. The last time I saw him we ordered lumbar medial branch block that did not help with his pain. He then received multiple TFESI and KATHY without significant pain relief. No motor weakness, numbness or sphincter dysfunction symptoms. The pain radiates towards the groin anterior leg. He also has chronic knee pain due to osteoarthritis. Pain is interfering with walking any as a limp.     COMPLEXITY:    This was deemed to be a more complex procedure requiring more time and skills due to the difficult anatomy of the patient.  The SI joint on the left side was only accessible to a small portion between S1 and S2 and 30 min more was needed to complete the injection.     DESCRIPTION OF THE PROCEDURE     The patient was placed on MAC anesthesia and was prone on the Slider table.  All pressure points were carefully padded. The patient was prepped and draped   in the typical sterile fashion.   Using the lateral and outlet views, and after local anesthesia with 1% lidocaine, the 22 spinal shannon needle was inserted inside the  left sacroiliac joint and 0.5cc of Omnipaque was injected to confirm the needle tip placement.  We then injected  4 cc of 0.25% marcaine mixed with 40  mg of depomedrol in the  left SI joint. The wound was dressed with a sterile dressing. The blood loss was less than 1 cc. The final count was completed and nothing was missing. There was no complication.

## 2020-11-12 NOTE — DISCHARGE INSTRUCTIONS
May remove dressing and shower after 24 hours.  No tub baths.  Reapply bandaid as needed.          ANESTHESIA  -For the first 24 hours after surgery:  Do not drive, use heavy equipment, make important decisions, or drink alcohol  -It is normal to feel sleepy for several hours.  Rest until you are more awake.  -Have someone stay with you, if needed.  They can watch for problems and help keep you safe.  -Some possible post anesthesia side effects include: nausea and vomiting, sore throat and hoarseness, sleepiness, and dizziness.    PAIN  -If you have pain after surgery, pain medicine will help you feel better.  Take it as directed, before pain becomes severe.  Most pain relievers taken by mouth need at least 20-30 minutes to start working.  -Do not drive or drink alcohol while taking pain medicine.  -Pain medication can upset your stomach.  Taking them with a little food may help.  -Other ways to help control pain: elevation, ice, and relaxation  -Call your surgeon if still having unmanageable pain an hour after taking pain medicine.  -Pain medicine can cause constipation.  Taking an over-the counter stool softener while on prescription pain medicine and drinking plenty of fluids can prevent this side effect.  -Call your surgeon if you have severe side effects like: breathing problems, trouble waking up, dizziness, confusion, or severe constipation.    NAUSEA  -Some people have nausea after surgery.  This is often because of anesthesia, pain, pain medicine, or the stress of surgery.  -Do not push yourself to eat.  Start off with clear liquids and soup.  Slowly move to solid foods.  Don't eat fatty, rich, spicy foods at first.  Eat smaller amounts.  -If you develop persistent nausea and vomiting please notify your surgeon immediately.    BLEEDING  -Different types of surgery require different types of care and dressing changes.  It is important to follow all instructions and advice from your surgeon.  Change dressing  as directed.  Call your surgeon for any concerns regarding postop bleeding.    SIGNS OF INFECTION  -Signs of infection include: fever, swelling, drainage, and redness  -Notify your surgeon if you have a fever of 100.4 F (38.0 C) or higher.  -Notify your surgeon if you notice redness, swelling, increased pain, pus, or a foul smell at the incision site.

## 2020-11-12 NOTE — ANESTHESIA POSTPROCEDURE EVALUATION
Anesthesia Post Evaluation    Patient: Baldo Grant    Procedure(s) Performed: Procedure(s) (LRB):  INJECTION, STEROID Left SI Joint Block and Steroid Injection (Left)    Final Anesthesia Type: MAC    Patient location during evaluation: PACU  Patient participation: Yes- Able to Participate  Level of consciousness: awake and alert and oriented  Post-procedure vital signs: reviewed and stable  Pain management: adequate  Airway patency: patent    PONV status at discharge: No PONV  Anesthetic complications: no      Cardiovascular status: blood pressure returned to baseline and hemodynamically stable  Respiratory status: unassisted  Hydration status: euvolemic  Follow-up not needed.          Vitals Value Taken Time   /98 11/12/20 1115   Temp 36.4 °C (97.6 °F) 11/12/20 1115   Pulse 69 11/12/20 1115   Resp 17 11/12/20 1115   SpO2 96 % 11/12/20 1115         No case tracking events are documented in the log.      Pain/Marcelino Score: Marcelino Score: 10 (11/12/2020 11:19 AM)

## 2020-11-12 NOTE — TRANSFER OF CARE
Anesthesia Transfer of Care Note    Patient: Baldo Grant    Procedure(s) Performed: Procedure(s) (LRB):  INJECTION, STEROID Left SI Joint Block and Steroid Injection (Left)    Patient location: OPS    Anesthesia Type: MAC    Transport from OR: Transported from OR on room air with adequate spontaneous ventilation    Post pain: adequate analgesia    Post assessment: no apparent anesthetic complications and tolerated procedure well    Post vital signs: stable    Level of consciousness: awake, alert and oriented    Nausea/Vomiting: no nausea/vomiting    Complications: none    Transfer of care protocol was followed      Last vitals:   Visit Vitals  /78 (BP Location: Right arm, Patient Position: Lying)   Pulse (!) 58   Temp 36.8 °C (98.2 °F) (Skin)   Resp 17   Ht 6' (1.829 m)   Wt 113.4 kg (250 lb)   SpO2 98%   BMI 33.91 kg/m²

## 2020-11-12 NOTE — ANESTHESIA POSTPROCEDURE EVALUATION
Anesthesia Post Evaluation    Patient: Baldo Grant    Procedure(s) Performed: Procedure(s) (LRB):  INJECTION, STEROID Left SI Joint Block and Steroid Injection (Left)    Final Anesthesia Type: MAC    Patient location during evaluation: OPS  Patient participation: Yes- Able to Participate  Level of consciousness: awake and alert and oriented  Post-procedure vital signs: reviewed and stable  Pain management: adequate  Airway patency: patent    PONV status at discharge: No PONV  Anesthetic complications: no      Cardiovascular status: blood pressure returned to baseline and hemodynamically stable  Respiratory status: unassisted, room air and spontaneous ventilation  Hydration status: euvolemic  Follow-up not needed.          Vitals Value Taken Time   /70 11/12/20 1027   Temp 97.8 11/12/20 1027   Pulse 72 11/12/20 1027   Resp 18 11/12/20 1027   SpO2 100 11/12/20 1027         No case tracking events are documented in the log.      Pain/Marcelino Score: No data recorded

## 2020-11-12 NOTE — ANESTHESIA PREPROCEDURE EVALUATION
11/12/2020  Baldo Grant is a 60 y.o., male scheduled for left SI joint injection.    Anesthesia Evaluation    I have reviewed the Patient Summary Reports.    I have reviewed the Nursing Notes.    I have reviewed the Medications.     Review of Systems  Anesthesia Hx:  No problems with previous Anesthesia  History of prior surgery of interest to airway management or planning: Denies Family Hx of Anesthesia complications.   Denies Personal Hx of Anesthesia complications.   Social:  Non-Smoker, Social Alcohol Use    Hematology/Oncology:  Hematology Normal      Oncology Comments: Basal cell carcinoma- back  Squamous cell carcinoma-forehead, R ear    EENT/Dental:   chronic allergic rhinitis   Cardiovascular:   Exercise tolerance: good  Denies Angina. Getting new EKG today 3/17/2020   Pulmonary:   Denies Shortness of breath.  Denies Recent URI.    Renal/:  Renal/ Normal     Hepatic/GI:  Hepatic/GI Normal    Musculoskeletal:   Arthritis     Neurological:   Neuromuscular Disease, Chronic lumbar pain-- gets injections   Endocrine:  Endocrine Normal    Dermatological:  Skin Normal    Psych:  Psychiatric Normal           Physical Exam  General:  Well nourished, Obesity    Airway/Jaw/Neck:  Airway Findings: Mouth Opening: Normal Tongue: Normal  General Airway Assessment: Adult  Mallampati: II  TM Distance: Normal, at least 6 cm            Mental Status:  Mental Status Findings:  Cooperative, Alert and Oriented         Anesthesia Plan  Type of Anesthesia, risks & benefits discussed:  Anesthesia Type:  MAC, general  Patient's Preference:   Intra-op Monitoring Plan: standard ASA monitors  Intra-op Monitoring Plan Comments:   Post Op Pain Control Plan:   Post Op Pain Control Plan Comments:   Induction:   IV  Beta Blocker:  Patient is not currently on a Beta-Blocker (No further documentation required).       Informed  Consent: Patient understands risks and agrees with Anesthesia plan.  Questions answered. Anesthesia consent signed with patient.  ASA Score: 2     Day of Surgery Review of History & Physical: I have interviewed and examined the patient. I have reviewed the patient's H&P dated:  There are no significant changes.          Ready For Surgery From Anesthesia Perspective.

## 2020-11-13 ENCOUNTER — PATIENT MESSAGE (OUTPATIENT)
Dept: NEUROSURGERY | Facility: CLINIC | Age: 60
End: 2020-11-13

## 2020-11-16 NOTE — DISCHARGE SUMMARY
[facility]  Neurosurgery  Discharge Summary      Patient Name: Baldo Grant  MRN: [id number]  Admission Date: 11/12/2020  Hospital Length of Stay: 0 days  Discharge Date and Time: 11/12/2020 11:29 AM  Attending Physician: No att. providers found   Discharging Provider: Tam Encarnacion MD  Primary Care Provider: Efrain Vazquez MD    HPI:   This is a very pleasant 60 y.o. 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 This will also reports the 2nd injury while getting out of the car. His pain is centered over the left SI joint area. The pain is triggered by physical activities such as walking, standing up, climbing stairs. He has not done physical therapy for his SI joint dysfunction. The last time I saw him we ordered lumbar medial branch block that did not help with his pain. He then received multiple TFESI and KATHY without significant pain relief. No motor weakness, numbness or sphincter dysfunction symptoms. The pain radiates towards the groin anterior leg. He also has chronic knee pain due to osteoarthritis. Pain is interfering with walking any as a limp.     Procedure(s) (LRB):  INJECTION, STEROID Left SI Joint Block and Steroid Injection (Left)     Hospital Course:  Procedure was uncomplicated and postoperative course uneventful    Consults:  none    Significant Diagnostic Studies:  None    Pending Diagnostic Studies:     None        Final Active Diagnoses:    Diagnosis Date Noted POA    PRINCIPAL PROBLEM:  SI (sacroiliac) joint dysfunction [M53.3] 11/12/2020 Yes      Problems Resolved During this Admission:      Discharged Condition: good    Disposition: Home or Self Care    Follow Up:    Patient Instructions:      Diet general     Medications:  Reconciled Home Medications:      Medication List      CONTINUE taking these medications    aspirin 81 MG EC tablet  Commonly known as: ECOTRIN  Take 1 tablet twice a day with food starting after surgery  (breakfast and dinner).     desonide 0.05 % cream  Commonly known as: DESOWEN  Thin film to AA eyelids bid PRN flare     diazePAM 5 MG tablet  Commonly known as: VALIUM     diclofenac sodium 1 % Gel  Commonly known as: VOLTAREN  Apply 2 g topically 4 (four) times daily.     diphenhydrAMINE 25 mg capsule  Commonly known as: BENADRYL  Take 25 mg by mouth every 6 (six) hours as needed for Itching.     ENSTILAR 0.005-0.064 % Foam  Generic drug: calcipotriene-betamethasone  Apply 1 application topically once daily.     EPINEPHrine 0.3 mg/0.3 mL Atin  Commonly known as: EPIPEN     fexofenadine 180 MG tablet  Commonly known as: ALLEGRA  Take 180 mg by mouth continuous prn.     gabapentin 600 MG tablet  Commonly known as: NEURONTIN  Take 1 tablet (600 mg total) by mouth 3 (three) times daily.     HYDROcodone-acetaminophen 5-325 mg per tablet  Commonly known as: NORCO  Take 1 tablet by mouth every 6 (six) hours as needed for Pain.     lidocaine HCL 2% 2 % jelly  Commonly known as: XYLOCAINE  Apply topically as needed.     meloxicam 15 MG tablet  Commonly known as: MOBIC  Take 1 tablet (15 mg total) by mouth once daily.     mometasone 0.1% 0.1 % cream  Commonly known as: ELOCON  Apply topically once daily. for 10 days     ondansetron 4 MG tablet  Commonly known as: ZOFRAN  Take 1 tablet (4 mg total) by mouth every 8 (eight) hours as needed for Nausea.     ONE DAILY MULTIVITAMIN per tablet  Generic drug: multivitamin  Take 1 tablet by mouth once daily.     predniSONE 20 MG tablet  Commonly known as: DELTASONE  2 tabs po qd x 7 days     traMADoL 50 mg tablet  Commonly known as: ULTRAM        ASK your doctor about these medications    tiZANidine 4 MG tablet  Commonly known as: ZANAFLEX  Take 1 tablet (4 mg total) by mouth every 6 (six) hours as needed.  Ask about: Should I take this medication?            Tam Encarnacion MD  Neurosurgery [facility]

## 2020-11-19 NOTE — TELEPHONE ENCOUNTER
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 Pt is followed by Dr. Nickolas Duenas for Interventional Pain Management. Pt to see Neurosurgery (Dr. Vallejo) on July 19.   No

## 2020-11-23 ENCOUNTER — TELEPHONE (OUTPATIENT)
Dept: NEUROSURGERY | Facility: CLINIC | Age: 60
End: 2020-11-23

## 2020-11-23 NOTE — TELEPHONE ENCOUNTER
----- Message from Refugio Duggan sent at 11/23/2020  3:03 PM CST -----  Regarding: call back   called in to speak with someone at the office regarding rescheduling his appointment. He would like a call back from the office and can be reached at    [phone]

## 2020-12-07 ENCOUNTER — PATIENT OUTREACH (OUTPATIENT)
Dept: ADMINISTRATIVE | Facility: OTHER | Age: 60
End: 2020-12-07

## 2020-12-07 NOTE — PROGRESS NOTES
Updates were requested from care everywhere.  Chart was reviewed for overdue Proactive [facility] Encounters ([facility]) topics (CRS, Breast Cancer Screening, Eye exam)  Health Maintenance has been updated.  LINKS not responding.  Pt has open case request for colonoscopy. FIT kit not ordered.

## 2020-12-09 ENCOUNTER — OFFICE VISIT (OUTPATIENT)
Dept: NEUROSURGERY | Facility: CLINIC | Age: 60
End: 2020-12-09
Payer: OTHER GOVERNMENT

## 2020-12-09 DIAGNOSIS — M53.3 SACROILIAC JOINT DYSFUNCTION OF LEFT SIDE: Primary | ICD-10-CM

## 2020-12-09 PROCEDURE — 99213 PR OFFICE/OUTPT VISIT, EST, LEVL III, 20-29 MIN: ICD-10-PCS | Mod: 95,,, | Performed by: NEUROLOGICAL SURGERY

## 2020-12-09 PROCEDURE — 99213 OFFICE O/P EST LOW 20 MIN: CPT | Mod: 95,,, | Performed by: NEUROLOGICAL SURGERY

## 2020-12-09 NOTE — PROGRESS NOTES
"This interview was done as a virtual visit with audio and video    NEUROSURGICAL PROGRESS NOTE    DATE OF SERVICE:  12/09/2020    ATTENDING PHYSICIAN:  Tam Encarnacion MD    SUBJECTIVE:    INTERIM HISTORY:        This is a very pleasant 60 y.o. male, who is status post left sacroiliac joint block and steroid injection under fluoroscopy 2 weeks ago for left SI joint dysfunction.  Patient reports some myofascial pain after the injection.  However his groin and left leg pain did improve after the injection significantly.  The pain recurred 2 days ago.  He is doing some stretching in the morning and physical therapy exercises.  No new onset of motor weakness or numbness.  Patient reports that he will start traveling soon.  Compared to the prior injection he reports that the L4-5 transforaminal epidural injection gave him the best pain relief immediately.      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 : (P) [employer]om for weight loss and some walking.  Please describe any specific goals/questions/concerns you have regarding this visit. : (P) All above. Pain control.     Since your last visit, what "Other" treatments have you had?: (P) SI joint injection.     Stretching: (P) 3     Range of Motion (Flexibility): (P) 3                       Neck Pain : (P) 0  Right Arm Pain : (P) 0  Left Arm Pain : (P) 3     Right Leg Pain: (P) 2  Left Leg Pain: (P) 4    Neck Disability Index                                     Oswestry  Pain Intensity: (P) The pain is moderate at the moment.  Personal Care : (P) It is painful to look after myself and I am slow and careful.  Lifting: (P) Pain prevents me from lifting heavy weights off the floor, but I can manage if they are conveniently positioned. (i.e. on a table)  Walking: (P) Pain prevents me walking more than 1/4 mile.  Sitting: (P) I can sit only in my favorite chair as long as I like.  Standing: (P) Pain " prevents me from standing more than ten minutes.  Sleeping: (P) I can sleep well only by using tablets.  Employment/Homemaking : (P) I can perform most of my homemaking/job duties, but pain prevents me from performing more physically stressful activities. (e.g. lifting, vacuuming).  Social Life: (P) Pain has no significant effect on my social life apart from limiting my energetic interests. (e.g. dancing, etc.).  Traveling : (P) Pain is bad, but I manage journeys over two hours.  OWESTRY DISABILITY INDEX SCORE: (P) 42    EQ-5D-3L  Mobility: (P) I have some problems walking about.  Self-Care: (P) I have some problems washing or dressing myself.     Pain/Discomfort: (P) I have moderate pain or discomfort.  Anxiety/Depression: (P) I am moderately anxious or depressed.         Low Back Pain Scale  Personal Care : (P) It is painful to look after myself and I am slow and careful.  Lifting: (P) Pain prevents me from lifting heavy weights off the floor, but I can manage if they are conveniently positioned. (i.e. on a table)  Walking: (P) Pain prevents me walking more than 1/4 mile.  Sitting: (P) I can sit only in my favorite chair as long as I like.  Social Life: (P) Pain has no significant effect on my social life apart from limiting my energetic interests. (e.g. dancing, etc.).         PAST MEDICAL HISTORY:  Active Ambulatory Problems     Diagnosis Date Noted    Primary osteoarthritis of both knees 09/23/2015    Chronic right-sided low back pain with right-sided sciatica 10/26/2015    Complete tear of left rotator cuff 03/07/2016    Biceps tendinitis on left 03/07/2016    Post-traumatic osteoarthritis of left knee 10/05/2018    Lumbar radiculopathy 07/31/2019    Obesity (BMI 30-39.9) 09/11/2019    Chronic pain 11/05/2019    Sacroiliac joint dysfunction of left side 10/21/2020    SI (sacroiliac) joint dysfunction 11/12/2020     Resolved Ambulatory Problems     Diagnosis Date Noted    Left shoulder pain  03/07/2016    Morbid obesity 11/30/2018    Lumbar spondylosis 05/30/2019    Acute bilateral low back pain with right-sided sciatica 10/10/2019    Decreased functional mobility 10/10/2019    Decreased ROM of lumbar spine 10/10/2019    Decreased strength 10/10/2019    Primary osteoarthritis of left knee 03/18/2020    Impaired functional mobility, balance, gait, and endurance 04/07/2020    Impaired physical mobility 05/01/2020     Past Medical History:   Diagnosis Date    Arthritis     Basal cell carcinoma 06/21/2019    Cancer     Chronic pain of right knee     SCC (squamous cell carcinoma) 2014    Skin cancer     Squamous cell carcinoma 2013       PAST SURGICAL HISTORY:  Past Surgical History:   Procedure Laterality Date    APPENDECTOMY      ARTHROSCOPIC CHONDROPLASTY OF KNEE JOINT Left 3/18/2020    Procedure: ARTHROSCOPY, KNEE, WITH CHONDROPLASTY;  Surgeon: FREEMAN Álvarez MD;  Location: [facility] OR;  Service: Orthopedics;  Laterality: Left;    COLONOSCOPY  1998    EPIDURAL STEROID INJECTION INTO LUMBAR SPINE N/A 2/11/2020    Procedure: Injection-steroid-epidural-lumbar--InterLaminar L4-5;  Surgeon: Alexus Anglin Jr., MD;  Location: [facility] PAIN T;  Service: Pain Management;  Laterality: N/A;    INJECTION OF ANESTHETIC AGENT AROUND MEDIAL BRANCH NERVES INNERVATING LUMBAR FACET JOINT Bilateral 11/5/2019    Procedure: Block-nerve-medial branch-lumbar--Bilateral L3-4,L4-5,L5-S1;  Surgeon: Alexus Anglin Jr., MD;  Location: [facility] PAIN T;  Service: Pain Management;  Laterality: Bilateral;    INJECTION OF STEROID Left 11/12/2020    Procedure: INJECTION, STEROID Left SI Joint Block and Steroid Injection;  Surgeon: Tam Encarnacion MD;  Location: [facility] OR;  Service: Neurosurgery;  Laterality: Left;  Procedure: Left SI Joint Block and Steroid Injection   Surgery Time: 30 min  LOS: 0  Anesthesia: MAC  Radiology: C-arm  Bed: Regular with Pillows  Position: Prone    KNEE ARTHROSCOPY W/ MENISCECTOMY  Left 3/18/2020    Procedure: ARTHROSCOPY, KNEE, WITH MENISCECTOMY;  Surgeon: FREEMAN Álvarez MD;  Location: [facility] OR;  Service: Orthopedics;  Laterality: Left;  CLONIDINE/EPI/KETOROLAC/ROPIVACAINE INJECTION 30CC    lipoma removal      skin cancer removal      TRANSFORAMINAL EPIDURAL INJECTION OF STEROID Bilateral 5/6/2020    Procedure: Injection,steroid,epidural,transforaminal approach--Bilateral L4-5;  Surgeon: Alexus Anglin Jr., MD;  Location: [facility];  Service: Pain Management;  Laterality: Bilateral;    TRANSFORAMINAL EPIDURAL INJECTION OF STEROID Bilateral 8/11/2020    Procedure: Injection,steroid,epidural,transforaminal approach--Bilateral L4-5;  Surgeon: Alexus Anglin Jr., MD;  Location: [facility];  Service: Pain Management;  Laterality: Bilateral;       SOCIAL HISTORY:   Social History     Socioeconomic History    Marital status:      Spouse name: Not on file    Number of children: Not on file    Years of education: Not on file    Highest education level: Not on file   Occupational History    Not on file   Social Needs    Financial resource strain: Not very hard    Food insecurity     Worry: Never true     Inability: Never true    Transportation needs     Medical: No     Non-medical: No   Tobacco Use    Smoking status: Never Smoker    Smokeless tobacco: Never Used   Substance and Sexual Activity    Alcohol use: Yes     Frequency: Monthly or less     Drinks per session: 1 or 2     Binge frequency: Never     Comment: social    Drug use: No    Sexual activity: Yes     Partners: Female   Lifestyle    Physical activity     Days per week: 2 days     Minutes per session: 40 min    Stress: Not at all   Relationships    Social connections     Talks on phone: More than three times a week     Gets together: Twice a week     Attends Anabaptist service: Not on file     Active member of club or organization: Yes     Attends meetings of clubs or organizations: 1 to 4 times per  year     Relationship status:    Other Topics Concern    Not on file   Social History Narrative    Not on file       FAMILY HISTORY:  Family History   Problem Relation Age of Onset    COPD Mother     Alcohol abuse Mother     Heart disease Father     Melanoma Father     No Known Problems Sister     No Known Problems Brother     Psoriasis Neg Hx     Lupus Neg Hx     Eczema Neg Hx        CURRENTS MEDICATIONS:  Current Outpatient Medications on File Prior to Visit   Medication Sig Dispense Refill    aspirin (ECOTRIN) 81 MG EC tablet Take 1 tablet twice a day with food starting after surgery (breakfast and dinner). 28 tablet 0    calcipotriene-betamethasone (ENSTILAR) 0.005-0.064 % Foam Apply 1 application topically once daily. 60 g 2    desonide (DESOWEN) 0.05 % cream Thin film to AA eyelids bid PRN flare 60 g 3    diazePAM (VALIUM) 5 MG tablet       diclofenac sodium (VOLTAREN) 1 % Gel Apply 2 g topically 4 (four) times daily. 100 g 0    diphenhydrAMINE (BENADRYL) 25 mg capsule Take 25 mg by mouth every 6 (six) hours as needed for Itching.      EPINEPHrine (EPIPEN) 0.3 mg/0.3 mL AtIn       fexofenadine (ALLEGRA) 180 MG tablet Take 180 mg by mouth continuous prn.      gabapentin (NEURONTIN) 600 MG tablet Take 1 tablet (600 mg total) by mouth 3 (three) times daily. 270 tablet 3    HYDROcodone-acetaminophen (NORCO) 5-325 mg per tablet Take 1 tablet by mouth every 6 (six) hours as needed for Pain. 100 tablet 0    lidocaine HCL 2% (XYLOCAINE) 2 % jelly Apply topically as needed. 30 mL 3    meloxicam (MOBIC) 15 MG tablet Take 1 tablet (15 mg total) by mouth once daily. 90 tablet 4    mometasone 0.1% (ELOCON) 0.1 % cream Apply topically once daily. for 10 days 1 Tube 3    multivitamin (ONE DAILY MULTIVITAMIN) per tablet Take 1 tablet by mouth once daily.      ondansetron (ZOFRAN) 4 MG tablet Take 1 tablet (4 mg total) by mouth every 8 (eight) hours as needed for Nausea. 30 tablet 0     predniSONE (DELTASONE) 20 MG tablet 2 tabs po qd x 7 days 14 tablet 0    traMADoL (ULTRAM) 50 mg tablet        Current Facility-Administered Medications on File Prior to Visit   Medication Dose Route Frequency Provider Last Rate Last Dose    0.9%  NaCl infusion  500 mL Intravenous Continuous Alexus Anglin Jr., MD        lidocaine (PF) 10 mg/ml (1%) injection 10 mg  1 mL Intradermal Once Alexus Anglin Jr., MD        lidocaine (PF) 10 mg/ml (1%) injection 10 mg  1 mL Intradermal Once Alexus Anglin Jr., MD           ALLERGIES:  Review of patient's allergies indicates:   Allergen Reactions    Advil [ibuprofen] Anaphylaxis    Tums [calcium carbonate] Anaphylaxis       REVIEW OF SYSTEMS:  ROS      OBJECTIVE:    PHYSICAL EXAMINATION:   There were no vitals filed for this visit.    Neurosurgery Physical Exam    Ortho Exam        Neurologic Exam      DIAGNOSTIC DATA:  I personally interpreted the following imaging:   Lumbar spine MRI 2020 reviewed, lumbar spine mild spondylosis without stenosis  CT pelvis 2020 review left more than right SI joint arthritis with splaying of the left SI joint    ASSESMENT:  This is a 60 y.o. male with     Problem List Items Addressed This Visit        Neuro    Sacroiliac joint dysfunction of left side - Primary            PLAN:  More than 50% of the time was spent on discussing conservative management treatments (medication, physical therapy exercises) and possible interventions (spinal injections and surgical procedures). Care coordination was discussed.     Will follow-up on my [facility] portal in a few months to reschedule a left side SI joint injection as needed    All questions answered  Continue home physical therapy exercises        Tam Encarnacion MD  Cell:[phone]

## 2020-12-27 DIAGNOSIS — M54.31 SCIATICA OF RIGHT SIDE: ICD-10-CM

## 2020-12-28 DIAGNOSIS — M54.31 SCIATICA OF RIGHT SIDE: ICD-10-CM

## 2020-12-28 RX ORDER — GABAPENTIN 600 MG/1
600 TABLET ORAL 3 TIMES DAILY
Qty: 270 TABLET | Refills: 4 | Status: SHIPPED | OUTPATIENT
Start: 2020-12-28 | End: 2020-12-28 | Stop reason: SDUPTHER

## 2020-12-29 RX ORDER — GABAPENTIN 600 MG/1
600 TABLET ORAL 3 TIMES DAILY
Qty: 270 TABLET | Refills: 4 | Status: SHIPPED | OUTPATIENT
Start: 2020-12-29 | End: 2021-04-27 | Stop reason: SDUPTHER

## 2021-01-05 ENCOUNTER — PATIENT MESSAGE (OUTPATIENT)
Dept: INTERNAL MEDICINE | Facility: CLINIC | Age: 61
End: 2021-01-05

## 2021-01-06 ENCOUNTER — PATIENT MESSAGE (OUTPATIENT)
Dept: SPORTS MEDICINE | Facility: CLINIC | Age: 61
End: 2021-01-06

## 2021-01-13 ENCOUNTER — PATIENT MESSAGE (OUTPATIENT)
Dept: PAIN MEDICINE | Facility: CLINIC | Age: 61
End: 2021-01-13

## 2021-01-13 ENCOUNTER — PATIENT MESSAGE (OUTPATIENT)
Dept: NEUROSURGERY | Facility: CLINIC | Age: 61
End: 2021-01-13

## 2021-01-13 ENCOUNTER — PATIENT MESSAGE (OUTPATIENT)
Dept: ALLERGY | Facility: CLINIC | Age: 61
End: 2021-01-13

## 2021-01-14 ENCOUNTER — PATIENT OUTREACH (OUTPATIENT)
Dept: ADMINISTRATIVE | Facility: OTHER | Age: 61
End: 2021-01-14

## 2021-01-14 DIAGNOSIS — Z12.11 ENCOUNTER FOR FIT (FECAL IMMUNOCHEMICAL TEST) SCREENING: Primary | ICD-10-CM

## 2021-01-15 ENCOUNTER — OFFICE VISIT (OUTPATIENT)
Dept: SPORTS MEDICINE | Facility: CLINIC | Age: 61
End: 2021-01-15
Payer: OTHER GOVERNMENT

## 2021-01-15 VITALS
HEIGHT: 72 IN | SYSTOLIC BLOOD PRESSURE: 125 MMHG | BODY MASS INDEX: 36.34 KG/M2 | HEART RATE: 82 BPM | DIASTOLIC BLOOD PRESSURE: 82 MMHG | WEIGHT: 268.31 LBS

## 2021-01-15 DIAGNOSIS — M17.12 PRIMARY OSTEOARTHRITIS OF LEFT KNEE: Primary | ICD-10-CM

## 2021-01-15 PROCEDURE — 99214 OFFICE O/P EST MOD 30 MIN: CPT | Mod: PBBFAC | Performed by: PHYSICIAN ASSISTANT

## 2021-01-15 PROCEDURE — 99213 PR OFFICE/OUTPT VISIT, EST, LEVL III, 20-29 MIN: ICD-10-PCS | Mod: 25,S$PBB,, | Performed by: PHYSICIAN ASSISTANT

## 2021-01-15 PROCEDURE — 20610 PR DRAIN/INJECT LARGE JOINT/BURSA: ICD-10-PCS | Mod: S$PBB,LT,, | Performed by: PHYSICIAN ASSISTANT

## 2021-01-15 PROCEDURE — 20610 DRAIN/INJ JOINT/BURSA W/O US: CPT | Mod: S$PBB,LT,, | Performed by: PHYSICIAN ASSISTANT

## 2021-01-15 PROCEDURE — 20610 DRAIN/INJ JOINT/BURSA W/O US: CPT | Mod: PBBFAC | Performed by: PHYSICIAN ASSISTANT

## 2021-01-15 PROCEDURE — 99213 OFFICE O/P EST LOW 20 MIN: CPT | Mod: 25,S$PBB,, | Performed by: PHYSICIAN ASSISTANT

## 2021-01-15 PROCEDURE — 99999 PR PBB SHADOW E&M-EST. PATIENT-LVL IV: CPT | Mod: PBBFAC,,, | Performed by: PHYSICIAN ASSISTANT

## 2021-01-15 PROCEDURE — 99999 PR PBB SHADOW E&M-EST. PATIENT-LVL IV: ICD-10-PCS | Mod: PBBFAC,,, | Performed by: PHYSICIAN ASSISTANT

## 2021-01-15 RX ORDER — TRIAMCINOLONE ACETONIDE 40 MG/ML
40 INJECTION, SUSPENSION INTRA-ARTICULAR; INTRAMUSCULAR
Status: COMPLETED | OUTPATIENT
Start: 2021-01-15 | End: 2021-01-15

## 2021-01-15 RX ADMIN — TRIAMCINOLONE ACETONIDE 40 MG: 40 INJECTION, SUSPENSION INTRA-ARTICULAR; INTRAMUSCULAR at 10:01

## 2021-01-29 ENCOUNTER — PATIENT MESSAGE (OUTPATIENT)
Dept: ADMINISTRATIVE | Facility: HOSPITAL | Age: 61
End: 2021-01-29

## 2021-02-15 ENCOUNTER — PATIENT MESSAGE (OUTPATIENT)
Dept: INTERNAL MEDICINE | Facility: CLINIC | Age: 61
End: 2021-02-15

## 2021-02-15 DIAGNOSIS — M54.31 SCIATICA OF RIGHT SIDE: ICD-10-CM

## 2021-02-15 DIAGNOSIS — G89.29 CHRONIC LEFT SHOULDER PAIN: ICD-10-CM

## 2021-02-15 DIAGNOSIS — M25.512 CHRONIC LEFT SHOULDER PAIN: ICD-10-CM

## 2021-02-17 RX ORDER — MELOXICAM 15 MG/1
15 TABLET ORAL DAILY
Qty: 90 TABLET | Refills: 4 | OUTPATIENT
Start: 2021-02-17

## 2021-02-17 RX ORDER — HYDROCODONE BITARTRATE AND ACETAMINOPHEN 5; 325 MG/1; MG/1
1 TABLET ORAL EVERY 6 HOURS PRN
Qty: 100 TABLET | Refills: 0 | OUTPATIENT
Start: 2021-02-17

## 2021-03-18 DIAGNOSIS — M54.31 SCIATICA OF RIGHT SIDE: ICD-10-CM

## 2021-03-18 RX ORDER — HYDROCODONE BITARTRATE AND ACETAMINOPHEN 5; 325 MG/1; MG/1
1 TABLET ORAL EVERY 6 HOURS PRN
Qty: 100 TABLET | Refills: 0 | Status: SHIPPED | OUTPATIENT
Start: 2021-03-18 | End: 2021-04-27 | Stop reason: SDUPTHER

## 2021-04-05 ENCOUNTER — PATIENT MESSAGE (OUTPATIENT)
Dept: ADMINISTRATIVE | Facility: HOSPITAL | Age: 61
End: 2021-04-05

## 2021-04-26 DIAGNOSIS — M54.31 SCIATICA OF RIGHT SIDE: ICD-10-CM

## 2021-04-27 ENCOUNTER — OFFICE VISIT (OUTPATIENT)
Dept: INTERNAL MEDICINE | Facility: CLINIC | Age: 61
End: 2021-04-27
Payer: OTHER GOVERNMENT

## 2021-04-27 DIAGNOSIS — M54.31 SCIATICA OF RIGHT SIDE: ICD-10-CM

## 2021-04-27 DIAGNOSIS — M54.16 LUMBAR RADICULOPATHY: Primary | ICD-10-CM

## 2021-04-27 PROCEDURE — 99213 OFFICE O/P EST LOW 20 MIN: CPT | Mod: 95,,, | Performed by: INTERNAL MEDICINE

## 2021-04-27 PROCEDURE — 99213 PR OFFICE/OUTPT VISIT, EST, LEVL III, 20-29 MIN: ICD-10-PCS | Mod: 95,,, | Performed by: INTERNAL MEDICINE

## 2021-04-27 RX ORDER — GABAPENTIN 600 MG/1
600 TABLET ORAL 3 TIMES DAILY
Qty: 270 TABLET | Refills: 4 | OUTPATIENT
Start: 2021-04-27 | End: 2022-07-21

## 2021-04-27 RX ORDER — HYDROCODONE BITARTRATE AND ACETAMINOPHEN 5; 325 MG/1; MG/1
1 TABLET ORAL EVERY 6 HOURS PRN
Qty: 100 TABLET | Refills: 0 | OUTPATIENT
Start: 2021-04-27

## 2021-04-27 RX ORDER — HYDROCODONE BITARTRATE AND ACETAMINOPHEN 5; 325 MG/1; MG/1
1 TABLET ORAL EVERY 6 HOURS PRN
Qty: 100 TABLET | Refills: 0 | Status: SHIPPED | OUTPATIENT
Start: 2021-04-27 | End: 2021-06-09 | Stop reason: SDUPTHER

## 2021-04-27 RX ORDER — GABAPENTIN 600 MG/1
600 TABLET ORAL 3 TIMES DAILY
Qty: 270 TABLET | Refills: 4 | Status: SHIPPED | OUTPATIENT
Start: 2021-04-27 | End: 2022-09-11 | Stop reason: SDUPTHER

## 2021-05-04 ENCOUNTER — PATIENT MESSAGE (OUTPATIENT)
Dept: RESEARCH | Facility: HOSPITAL | Age: 61
End: 2021-05-04

## 2021-05-10 ENCOUNTER — PATIENT MESSAGE (OUTPATIENT)
Dept: RESEARCH | Facility: HOSPITAL | Age: 61
End: 2021-05-10

## 2021-05-22 DIAGNOSIS — G89.29 CHRONIC LEFT SHOULDER PAIN: ICD-10-CM

## 2021-05-22 DIAGNOSIS — M25.512 CHRONIC LEFT SHOULDER PAIN: ICD-10-CM

## 2021-05-23 ENCOUNTER — PATIENT MESSAGE (OUTPATIENT)
Dept: INTERNAL MEDICINE | Facility: CLINIC | Age: 61
End: 2021-05-23

## 2021-05-24 RX ORDER — MELOXICAM 15 MG/1
15 TABLET ORAL DAILY
Qty: 90 TABLET | Refills: 0 | Status: SHIPPED | OUTPATIENT
Start: 2021-05-24 | End: 2021-08-19 | Stop reason: SDUPTHER

## 2021-06-02 ENCOUNTER — PATIENT MESSAGE (OUTPATIENT)
Dept: FAMILY MEDICINE | Facility: CLINIC | Age: 61
End: 2021-06-02

## 2021-06-02 DIAGNOSIS — M54.31 SCIATICA OF RIGHT SIDE: ICD-10-CM

## 2021-06-02 RX ORDER — HYDROCODONE BITARTRATE AND ACETAMINOPHEN 5; 325 MG/1; MG/1
1 TABLET ORAL EVERY 6 HOURS PRN
Qty: 100 TABLET | Refills: 0 | OUTPATIENT
Start: 2021-06-02

## 2021-06-04 ENCOUNTER — OFFICE VISIT (OUTPATIENT)
Dept: FAMILY MEDICINE | Facility: CLINIC | Age: 61
End: 2021-06-04
Payer: OTHER GOVERNMENT

## 2021-06-04 VITALS
HEIGHT: 72 IN | BODY MASS INDEX: 37.83 KG/M2 | TEMPERATURE: 98 F | OXYGEN SATURATION: 97 % | WEIGHT: 279.31 LBS | HEART RATE: 76 BPM | DIASTOLIC BLOOD PRESSURE: 74 MMHG | SYSTOLIC BLOOD PRESSURE: 116 MMHG

## 2021-06-04 DIAGNOSIS — E66.9 OBESITY (BMI 30-39.9): ICD-10-CM

## 2021-06-04 DIAGNOSIS — R73.03 PREDIABETES: Primary | ICD-10-CM

## 2021-06-04 DIAGNOSIS — G89.4 CHRONIC PAIN SYNDROME: ICD-10-CM

## 2021-06-04 DIAGNOSIS — M54.16 LUMBAR RADICULOPATHY: ICD-10-CM

## 2021-06-04 PROCEDURE — 99214 PR OFFICE/OUTPT VISIT, EST, LEVL IV, 30-39 MIN: ICD-10-PCS | Mod: S$GLB,,, | Performed by: FAMILY MEDICINE

## 2021-06-04 PROCEDURE — 99214 OFFICE O/P EST MOD 30 MIN: CPT | Mod: S$GLB,,, | Performed by: FAMILY MEDICINE

## 2021-06-04 RX ORDER — TIZANIDINE 4 MG/1
TABLET ORAL
COMMUNITY
Start: 2020-11-02 | End: 2024-03-17

## 2021-06-06 ENCOUNTER — PATIENT MESSAGE (OUTPATIENT)
Dept: PAIN MEDICINE | Facility: CLINIC | Age: 61
End: 2021-06-06

## 2021-06-08 ENCOUNTER — PATIENT MESSAGE (OUTPATIENT)
Dept: PAIN MEDICINE | Facility: CLINIC | Age: 61
End: 2021-06-08

## 2021-06-09 ENCOUNTER — PATIENT OUTREACH (OUTPATIENT)
Dept: ADMINISTRATIVE | Facility: OTHER | Age: 61
End: 2021-06-09

## 2021-06-09 ENCOUNTER — PATIENT MESSAGE (OUTPATIENT)
Dept: FAMILY MEDICINE | Facility: CLINIC | Age: 61
End: 2021-06-09

## 2021-06-09 ENCOUNTER — TELEPHONE (OUTPATIENT)
Dept: PAIN MEDICINE | Facility: CLINIC | Age: 61
End: 2021-06-09

## 2021-06-09 DIAGNOSIS — M54.31 SCIATICA OF RIGHT SIDE: ICD-10-CM

## 2021-06-09 RX ORDER — HYDROCODONE BITARTRATE AND ACETAMINOPHEN 5; 325 MG/1; MG/1
1 TABLET ORAL EVERY 6 HOURS PRN
Qty: 100 TABLET | Refills: 0 | Status: SHIPPED | OUTPATIENT
Start: 2021-06-09 | End: 2021-07-23 | Stop reason: SDUPTHER

## 2021-06-10 ENCOUNTER — TELEPHONE (OUTPATIENT)
Dept: PAIN MEDICINE | Facility: CLINIC | Age: 61
End: 2021-06-10

## 2021-06-16 ENCOUNTER — OFFICE VISIT (OUTPATIENT)
Dept: PAIN MEDICINE | Facility: CLINIC | Age: 61
End: 2021-06-16
Payer: OTHER GOVERNMENT

## 2021-06-16 VITALS — DIASTOLIC BLOOD PRESSURE: 90 MMHG | HEART RATE: 69 BPM | SYSTOLIC BLOOD PRESSURE: 133 MMHG

## 2021-06-16 DIAGNOSIS — M51.26 LUMBAR DISCOGENIC PAIN SYNDROME: ICD-10-CM

## 2021-06-16 DIAGNOSIS — M51.36 DDD (DEGENERATIVE DISC DISEASE), LUMBAR: Primary | ICD-10-CM

## 2021-06-16 DIAGNOSIS — M54.16 LUMBAR RADICULOPATHY: ICD-10-CM

## 2021-06-16 DIAGNOSIS — G89.4 CHRONIC PAIN SYNDROME: ICD-10-CM

## 2021-06-16 PROBLEM — M51.369 DDD (DEGENERATIVE DISC DISEASE), LUMBAR: Status: ACTIVE | Noted: 2021-06-16

## 2021-06-16 PROBLEM — M51.360 LUMBAR DISCOGENIC PAIN SYNDROME: Status: ACTIVE | Noted: 2021-06-16

## 2021-06-16 PROCEDURE — 99214 PR OFFICE/OUTPT VISIT, EST, LEVL IV, 30-39 MIN: ICD-10-PCS | Mod: S$PBB,,, | Performed by: PAIN MEDICINE

## 2021-06-16 PROCEDURE — 99214 OFFICE O/P EST MOD 30 MIN: CPT | Mod: PBBFAC,PO | Performed by: PAIN MEDICINE

## 2021-06-16 PROCEDURE — 99999 PR PBB SHADOW E&M-EST. PATIENT-LVL IV: CPT | Mod: PBBFAC,,, | Performed by: PAIN MEDICINE

## 2021-06-16 PROCEDURE — 99214 OFFICE O/P EST MOD 30 MIN: CPT | Mod: S$PBB,,, | Performed by: PAIN MEDICINE

## 2021-06-16 PROCEDURE — 99999 PR PBB SHADOW E&M-EST. PATIENT-LVL IV: ICD-10-PCS | Mod: PBBFAC,,, | Performed by: PAIN MEDICINE

## 2021-06-23 ENCOUNTER — PATIENT MESSAGE (OUTPATIENT)
Dept: PAIN MEDICINE | Facility: CLINIC | Age: 61
End: 2021-06-23

## 2021-06-25 ENCOUNTER — TELEPHONE (OUTPATIENT)
Dept: PAIN MEDICINE | Facility: CLINIC | Age: 61
End: 2021-06-25

## 2021-06-30 ENCOUNTER — TELEPHONE (OUTPATIENT)
Dept: PAIN MEDICINE | Facility: CLINIC | Age: 61
End: 2021-06-30

## 2021-07-07 ENCOUNTER — PATIENT MESSAGE (OUTPATIENT)
Dept: ADMINISTRATIVE | Facility: HOSPITAL | Age: 61
End: 2021-07-07

## 2021-07-21 ENCOUNTER — PATIENT OUTREACH (OUTPATIENT)
Dept: ADMINISTRATIVE | Facility: OTHER | Age: 61
End: 2021-07-21

## 2021-07-21 ENCOUNTER — PATIENT MESSAGE (OUTPATIENT)
Dept: FAMILY MEDICINE | Facility: CLINIC | Age: 61
End: 2021-07-21

## 2021-07-21 DIAGNOSIS — M54.31 SCIATICA OF RIGHT SIDE: ICD-10-CM

## 2021-07-21 RX ORDER — HYDROCODONE BITARTRATE AND ACETAMINOPHEN 5; 325 MG/1; MG/1
1 TABLET ORAL EVERY 6 HOURS PRN
Qty: 100 TABLET | Refills: 0 | OUTPATIENT
Start: 2021-07-21

## 2021-07-23 ENCOUNTER — OFFICE VISIT (OUTPATIENT)
Dept: PAIN MEDICINE | Facility: CLINIC | Age: 61
End: 2021-07-23
Payer: OTHER GOVERNMENT

## 2021-07-23 VITALS
HEART RATE: 72 BPM | SYSTOLIC BLOOD PRESSURE: 133 MMHG | DIASTOLIC BLOOD PRESSURE: 90 MMHG | BODY MASS INDEX: 37.88 KG/M2 | WEIGHT: 279.31 LBS

## 2021-07-23 DIAGNOSIS — M17.12 PRIMARY OSTEOARTHRITIS OF LEFT KNEE: ICD-10-CM

## 2021-07-23 DIAGNOSIS — M51.36 DDD (DEGENERATIVE DISC DISEASE), LUMBAR: ICD-10-CM

## 2021-07-23 DIAGNOSIS — M54.16 LUMBAR RADICULOPATHY: ICD-10-CM

## 2021-07-23 DIAGNOSIS — M17.0 PRIMARY OSTEOARTHRITIS OF BOTH KNEES: ICD-10-CM

## 2021-07-23 DIAGNOSIS — M48.061 NEUROFORAMINAL STENOSIS OF LUMBAR SPINE: ICD-10-CM

## 2021-07-23 DIAGNOSIS — M51.26 LUMBAR DISCOGENIC PAIN SYNDROME: Primary | ICD-10-CM

## 2021-07-23 DIAGNOSIS — M47.816 LUMBAR SPONDYLOSIS: ICD-10-CM

## 2021-07-23 DIAGNOSIS — G89.4 CHRONIC PAIN SYNDROME: ICD-10-CM

## 2021-07-23 DIAGNOSIS — M54.31 SCIATICA OF RIGHT SIDE: ICD-10-CM

## 2021-07-23 DIAGNOSIS — M48.061 SPINAL STENOSIS OF LUMBAR REGION, UNSPECIFIED WHETHER NEUROGENIC CLAUDICATION PRESENT: ICD-10-CM

## 2021-07-23 PROCEDURE — 99999 PR PBB SHADOW E&M-EST. PATIENT-LVL IV: CPT | Mod: PBBFAC,,, | Performed by: NURSE PRACTITIONER

## 2021-07-23 PROCEDURE — 99999 PR PBB SHADOW E&M-EST. PATIENT-LVL IV: ICD-10-PCS | Mod: PBBFAC,,, | Performed by: NURSE PRACTITIONER

## 2021-07-23 PROCEDURE — 99214 OFFICE O/P EST MOD 30 MIN: CPT | Mod: PBBFAC,PO | Performed by: NURSE PRACTITIONER

## 2021-07-23 PROCEDURE — 99213 OFFICE O/P EST LOW 20 MIN: CPT | Mod: S$PBB,,, | Performed by: NURSE PRACTITIONER

## 2021-07-23 PROCEDURE — 99213 PR OFFICE/OUTPT VISIT, EST, LEVL III, 20-29 MIN: ICD-10-PCS | Mod: S$PBB,,, | Performed by: NURSE PRACTITIONER

## 2021-07-25 ENCOUNTER — PATIENT MESSAGE (OUTPATIENT)
Dept: PAIN MEDICINE | Facility: CLINIC | Age: 61
End: 2021-07-25

## 2021-07-26 RX ORDER — HYDROCODONE BITARTRATE AND ACETAMINOPHEN 5; 325 MG/1; MG/1
1 TABLET ORAL 3 TIMES DAILY
Qty: 90 TABLET | Refills: 0 | Status: SHIPPED | OUTPATIENT
Start: 2021-07-26 | End: 2021-08-25

## 2021-07-27 ENCOUNTER — PATIENT MESSAGE (OUTPATIENT)
Dept: PAIN MEDICINE | Facility: CLINIC | Age: 61
End: 2021-07-27

## 2021-08-02 ENCOUNTER — HOSPITAL ENCOUNTER (OUTPATIENT)
Dept: RADIOLOGY | Facility: HOSPITAL | Age: 61
Discharge: HOME | End: 2021-08-02
Attending: PHYSICIAN ASSISTANT
Payer: OTHER GOVERNMENT

## 2021-08-02 ENCOUNTER — PATIENT MESSAGE (OUTPATIENT)
Dept: PAIN MEDICINE | Facility: CLINIC | Age: 61
End: 2021-08-02

## 2021-08-02 ENCOUNTER — OFFICE VISIT (OUTPATIENT)
Dept: SPORTS MEDICINE | Facility: CLINIC | Age: 61
End: 2021-08-02
Payer: OTHER GOVERNMENT

## 2021-08-02 VITALS
WEIGHT: 283.31 LBS | SYSTOLIC BLOOD PRESSURE: 115 MMHG | BODY MASS INDEX: 38.37 KG/M2 | TEMPERATURE: 98 F | DIASTOLIC BLOOD PRESSURE: 79 MMHG | HEIGHT: 72 IN | HEART RATE: 64 BPM

## 2021-08-02 DIAGNOSIS — M25.511 RIGHT SHOULDER PAIN, UNSPECIFIED CHRONICITY: ICD-10-CM

## 2021-08-02 DIAGNOSIS — M25.521 RIGHT ELBOW PAIN: ICD-10-CM

## 2021-08-02 DIAGNOSIS — M25.511 ACUTE PAIN OF RIGHT SHOULDER: Primary | ICD-10-CM

## 2021-08-02 DIAGNOSIS — M25.811 SHOULDER IMPINGEMENT, RIGHT: ICD-10-CM

## 2021-08-02 DIAGNOSIS — M77.01 MEDIAL EPICONDYLITIS, RIGHT: ICD-10-CM

## 2021-08-02 DIAGNOSIS — M77.11 RIGHT LATERAL EPICONDYLITIS: ICD-10-CM

## 2021-08-02 PROCEDURE — 20610 DRAIN/INJ JOINT/BURSA W/O US: CPT | Mod: PBBFAC | Performed by: PHYSICIAN ASSISTANT

## 2021-08-02 PROCEDURE — 73080 XR ELBOW COMPLETE 3 VIEW RIGHT: ICD-10-PCS | Mod: 26,RT,, | Performed by: INTERNAL MEDICINE

## 2021-08-02 PROCEDURE — 73030 X-RAY EXAM OF SHOULDER: CPT | Mod: 26,RT,, | Performed by: INTERNAL MEDICINE

## 2021-08-02 PROCEDURE — 20610 PR DRAIN/INJECT LARGE JOINT/BURSA: ICD-10-PCS | Mod: S$PBB,RT,, | Performed by: PHYSICIAN ASSISTANT

## 2021-08-02 PROCEDURE — 99999 PR PBB SHADOW E&M-EST. PATIENT-LVL IV: CPT | Mod: PBBFAC,,, | Performed by: PHYSICIAN ASSISTANT

## 2021-08-02 PROCEDURE — 73080 X-RAY EXAM OF ELBOW: CPT | Mod: 26,RT,, | Performed by: INTERNAL MEDICINE

## 2021-08-02 PROCEDURE — 73030 X-RAY EXAM OF SHOULDER: CPT | Mod: TC,RT

## 2021-08-02 PROCEDURE — 99215 OFFICE O/P EST HI 40 MIN: CPT | Mod: 25,S$PBB,, | Performed by: PHYSICIAN ASSISTANT

## 2021-08-02 PROCEDURE — 99999 PR PBB SHADOW E&M-EST. PATIENT-LVL IV: ICD-10-PCS | Mod: PBBFAC,,, | Performed by: PHYSICIAN ASSISTANT

## 2021-08-02 PROCEDURE — 73030 XR SHOULDER COMPLETE 2 OR MORE VIEWS RIGHT: ICD-10-PCS | Mod: 26,RT,, | Performed by: INTERNAL MEDICINE

## 2021-08-02 PROCEDURE — 20610 DRAIN/INJ JOINT/BURSA W/O US: CPT | Mod: S$PBB,RT,, | Performed by: PHYSICIAN ASSISTANT

## 2021-08-02 PROCEDURE — 73080 X-RAY EXAM OF ELBOW: CPT | Mod: TC,RT

## 2021-08-02 PROCEDURE — 99214 OFFICE O/P EST MOD 30 MIN: CPT | Mod: PBBFAC | Performed by: PHYSICIAN ASSISTANT

## 2021-08-02 PROCEDURE — 99215 PR OFFICE/OUTPT VISIT, EST, LEVL V, 40-54 MIN: ICD-10-PCS | Mod: 25,S$PBB,, | Performed by: PHYSICIAN ASSISTANT

## 2021-08-02 RX ORDER — TRIAMCINOLONE ACETONIDE 40 MG/ML
40 INJECTION, SUSPENSION INTRA-ARTICULAR; INTRAMUSCULAR
Status: COMPLETED | OUTPATIENT
Start: 2021-08-02 | End: 2021-08-02

## 2021-08-02 RX ORDER — BUPIVACAINE HYDROCHLORIDE 2.5 MG/ML
3 INJECTION, SOLUTION INFILTRATION; PERINEURAL
Status: COMPLETED | OUTPATIENT
Start: 2021-08-02 | End: 2021-08-02

## 2021-08-02 RX ADMIN — TRIAMCINOLONE ACETONIDE 40 MG: 40 INJECTION, SUSPENSION INTRA-ARTICULAR; INTRAMUSCULAR at 04:08

## 2021-08-02 RX ADMIN — BUPIVACAINE HYDROCHLORIDE 7.5 MG: 2.5 INJECTION, SOLUTION INFILTRATION; PERINEURAL at 04:08

## 2021-08-04 ENCOUNTER — PATIENT MESSAGE (OUTPATIENT)
Dept: SPORTS MEDICINE | Facility: CLINIC | Age: 61
End: 2021-08-04

## 2021-08-19 ENCOUNTER — PATIENT MESSAGE (OUTPATIENT)
Dept: PAIN MEDICINE | Facility: CLINIC | Age: 61
End: 2021-08-19

## 2021-08-20 ENCOUNTER — OFFICE VISIT (OUTPATIENT)
Dept: DERMATOLOGY | Facility: CLINIC | Age: 61
End: 2021-08-20
Payer: OTHER GOVERNMENT

## 2021-08-20 VITALS — WEIGHT: 283.31 LBS | BODY MASS INDEX: 38.37 KG/M2 | HEIGHT: 72 IN

## 2021-08-20 DIAGNOSIS — L40.8 INVERSE PSORIASIS: Primary | ICD-10-CM

## 2021-08-20 DIAGNOSIS — L90.5 SCAR: ICD-10-CM

## 2021-08-20 DIAGNOSIS — L40.0 PLAQUE PSORIASIS: ICD-10-CM

## 2021-08-20 DIAGNOSIS — L82.1 SEBORRHEIC KERATOSES: ICD-10-CM

## 2021-08-20 DIAGNOSIS — Z85.828 HISTORY OF NONMELANOMA SKIN CANCER: ICD-10-CM

## 2021-08-20 DIAGNOSIS — D22.9 MULTIPLE BENIGN NEVI: ICD-10-CM

## 2021-08-20 PROCEDURE — 99214 OFFICE O/P EST MOD 30 MIN: CPT | Mod: S$PBB,,, | Performed by: DERMATOLOGY

## 2021-08-20 PROCEDURE — 99999 PR PBB SHADOW E&M-EST. PATIENT-LVL III: CPT | Mod: PBBFAC,,, | Performed by: DERMATOLOGY

## 2021-08-20 PROCEDURE — 99213 OFFICE O/P EST LOW 20 MIN: CPT | Mod: PBBFAC,PO | Performed by: DERMATOLOGY

## 2021-08-20 PROCEDURE — 99214 PR OFFICE/OUTPT VISIT, EST, LEVL IV, 30-39 MIN: ICD-10-PCS | Mod: S$PBB,,, | Performed by: DERMATOLOGY

## 2021-08-20 PROCEDURE — 99999 PR PBB SHADOW E&M-EST. PATIENT-LVL III: ICD-10-PCS | Mod: PBBFAC,,, | Performed by: DERMATOLOGY

## 2021-08-20 RX ORDER — TRIAMCINOLONE ACETONIDE 0.25 MG/G
CREAM TOPICAL
Qty: 30 G | Refills: 3 | Status: SHIPPED | OUTPATIENT
Start: 2021-08-20 | End: 2022-09-19

## 2021-08-20 RX ORDER — KETOCONAZOLE 20 MG/G
CREAM TOPICAL
Qty: 60 G | Refills: 3 | Status: SHIPPED | OUTPATIENT
Start: 2021-08-20 | End: 2022-09-19

## 2021-08-26 DIAGNOSIS — M54.31 SCIATICA OF RIGHT SIDE: ICD-10-CM

## 2021-08-27 ENCOUNTER — LAB VISIT (OUTPATIENT)
Dept: LAB | Facility: HOSPITAL | Age: 61
End: 2021-08-27
Attending: NURSE PRACTITIONER
Payer: OTHER GOVERNMENT

## 2021-08-27 ENCOUNTER — OFFICE VISIT (OUTPATIENT)
Dept: PAIN MEDICINE | Facility: CLINIC | Age: 61
End: 2021-08-27
Payer: OTHER GOVERNMENT

## 2021-08-27 ENCOUNTER — PATIENT MESSAGE (OUTPATIENT)
Dept: PAIN MEDICINE | Facility: CLINIC | Age: 61
End: 2021-08-27

## 2021-08-27 VITALS — SYSTOLIC BLOOD PRESSURE: 131 MMHG | DIASTOLIC BLOOD PRESSURE: 83 MMHG | HEART RATE: 71 BPM

## 2021-08-27 DIAGNOSIS — F11.90 CHRONIC, CONTINUOUS USE OF OPIOIDS: ICD-10-CM

## 2021-08-27 DIAGNOSIS — M54.16 LUMBAR RADICULOPATHY: ICD-10-CM

## 2021-08-27 DIAGNOSIS — M48.061 SPINAL STENOSIS OF LUMBAR REGION, UNSPECIFIED WHETHER NEUROGENIC CLAUDICATION PRESENT: ICD-10-CM

## 2021-08-27 DIAGNOSIS — M47.816 LUMBAR SPONDYLOSIS: ICD-10-CM

## 2021-08-27 DIAGNOSIS — M48.061 NEUROFORAMINAL STENOSIS OF LUMBAR SPINE: ICD-10-CM

## 2021-08-27 DIAGNOSIS — M17.12 PRIMARY OSTEOARTHRITIS OF LEFT KNEE: ICD-10-CM

## 2021-08-27 DIAGNOSIS — M17.0 PRIMARY OSTEOARTHRITIS OF BOTH KNEES: ICD-10-CM

## 2021-08-27 DIAGNOSIS — M51.36 DDD (DEGENERATIVE DISC DISEASE), LUMBAR: ICD-10-CM

## 2021-08-27 DIAGNOSIS — G89.4 CHRONIC PAIN SYNDROME: ICD-10-CM

## 2021-08-27 DIAGNOSIS — M51.26 LUMBAR DISCOGENIC PAIN SYNDROME: Primary | ICD-10-CM

## 2021-08-27 PROCEDURE — 99999 PR PBB SHADOW E&M-EST. PATIENT-LVL IV: ICD-10-PCS | Mod: PBBFAC,,, | Performed by: NURSE PRACTITIONER

## 2021-08-27 PROCEDURE — 99213 PR OFFICE/OUTPT VISIT, EST, LEVL III, 20-29 MIN: ICD-10-PCS | Mod: S$PBB,,, | Performed by: NURSE PRACTITIONER

## 2021-08-27 PROCEDURE — 99999 PR PBB SHADOW E&M-EST. PATIENT-LVL IV: CPT | Mod: PBBFAC,,, | Performed by: NURSE PRACTITIONER

## 2021-08-27 PROCEDURE — 80307 DRUG TEST PRSMV CHEM ANLYZR: CPT | Performed by: NURSE PRACTITIONER

## 2021-08-27 PROCEDURE — 99213 OFFICE O/P EST LOW 20 MIN: CPT | Mod: S$PBB,,, | Performed by: NURSE PRACTITIONER

## 2021-08-27 PROCEDURE — 99214 OFFICE O/P EST MOD 30 MIN: CPT | Mod: PBBFAC,PO | Performed by: NURSE PRACTITIONER

## 2021-08-27 RX ORDER — HYDROCODONE BITARTRATE AND ACETAMINOPHEN 5; 325 MG/1; MG/1
1 TABLET ORAL 3 TIMES DAILY
Qty: 90 TABLET | Refills: 0 | OUTPATIENT
Start: 2021-08-27 | End: 2021-09-26

## 2021-08-27 RX ORDER — HYDROCODONE BITARTRATE AND ACETAMINOPHEN 5; 325 MG/1; MG/1
1 TABLET ORAL 3 TIMES DAILY PRN
Qty: 90 TABLET | Refills: 0 | Status: SHIPPED | OUTPATIENT
Start: 2021-08-27 | End: 2021-09-27 | Stop reason: SDUPTHER

## 2021-09-06 ENCOUNTER — PATIENT MESSAGE (OUTPATIENT)
Dept: FAMILY MEDICINE | Facility: CLINIC | Age: 61
End: 2021-09-06

## 2021-09-12 LAB
6MAM UR QL: NOT DETECTED
7AMINOCLONAZEPAM UR QL: NOT DETECTED
A-OH ALPRAZ UR QL: NOT DETECTED
ALPHA-OH-MIDAZOLAM: NOT DETECTED
ALPRAZ UR QL: NOT DETECTED
AMPHET UR QL SCN: NOT DETECTED
ANNOTATION COMMENT IMP: NORMAL
ANNOTATION COMMENT IMP: NORMAL
BARBITURATES UR QL: NOT DETECTED
BUPRENORPHINE UR QL: NOT DETECTED
BZE UR QL: NOT DETECTED
CARBOXYTHC UR QL: NOT DETECTED
CARISOPRODOL UR QL: NOT DETECTED
CLONAZEPAM UR QL: NOT DETECTED
CODEINE UR QL: NOT DETECTED
CREAT UR-MCNC: 174.8 MG/DL (ref 20–400)
DIAZEPAM UR QL: NOT DETECTED
ETHYL GLUCURONIDE UR QL: NOT DETECTED
FENTANYL UR QL: NOT DETECTED
GABAPENTIN: PRESENT
HYDROCODONE UR QL: PRESENT
HYDROMORPHONE UR QL: PRESENT
LORAZEPAM UR QL: NOT DETECTED
MDA UR QL: NOT DETECTED
MDEA UR QL: NOT DETECTED
MDMA UR QL: NOT DETECTED
ME-PHENIDATE UR QL: NOT DETECTED
METHADONE UR QL: NOT DETECTED
METHAMPHET UR QL: NOT DETECTED
MIDAZOLAM UR QL SCN: NOT DETECTED
MORPHINE UR QL: NOT DETECTED
NALOXONE: NOT DETECTED
NORBUPRENORPHINE UR QL CFM: NOT DETECTED
NORDIAZEPAM UR QL: NOT DETECTED
NORFENTANYL UR QL: NOT DETECTED
NORHYDROCODONE UR QL CFM: PRESENT
NORMEPERIDINE UR QL CFM: NOT DETECTED
NOROXYCODONE UR QL CFM: NOT DETECTED
NOROXYMORPHONE UR QL SCN: NOT DETECTED
OXAZEPAM UR QL: NOT DETECTED
OXYCODONE UR QL: NOT DETECTED
OXYMORPHONE UR QL: NOT DETECTED
PATHOLOGY STUDY: NORMAL
PCP UR QL: NOT DETECTED
PHENTERMINE UR QL: NOT DETECTED
PREGABALIN: NOT DETECTED
SERVICE CMNT-IMP: NORMAL
TAPENTADOL UR QL SCN: NOT DETECTED
TAPENTADOL UR QL SCN: NOT DETECTED
TEMAZEPAM UR QL: NOT DETECTED
TRAMADOL UR QL: NOT DETECTED
ZOLPIDEM METABOLITE: NOT DETECTED
ZOLPIDEM UR QL: NOT DETECTED

## 2021-09-23 ENCOUNTER — PATIENT OUTREACH (OUTPATIENT)
Dept: ADMINISTRATIVE | Facility: OTHER | Age: 61
End: 2021-09-23

## 2021-09-27 ENCOUNTER — OFFICE VISIT (OUTPATIENT)
Dept: PAIN MEDICINE | Facility: CLINIC | Age: 61
End: 2021-09-27
Payer: OTHER GOVERNMENT

## 2021-09-27 VITALS
BODY MASS INDEX: 38.42 KG/M2 | DIASTOLIC BLOOD PRESSURE: 77 MMHG | WEIGHT: 283.31 LBS | SYSTOLIC BLOOD PRESSURE: 136 MMHG | HEART RATE: 80 BPM

## 2021-09-27 DIAGNOSIS — M17.0 PRIMARY OSTEOARTHRITIS OF BOTH KNEES: ICD-10-CM

## 2021-09-27 DIAGNOSIS — M51.36 DDD (DEGENERATIVE DISC DISEASE), LUMBAR: ICD-10-CM

## 2021-09-27 DIAGNOSIS — F11.90 CHRONIC, CONTINUOUS USE OF OPIOIDS: ICD-10-CM

## 2021-09-27 DIAGNOSIS — G89.4 CHRONIC PAIN SYNDROME: ICD-10-CM

## 2021-09-27 DIAGNOSIS — M54.16 LUMBAR RADICULOPATHY: Primary | ICD-10-CM

## 2021-09-27 DIAGNOSIS — G57.92 ILIOINGUINAL NEURALGIA OF LEFT SIDE: ICD-10-CM

## 2021-09-27 DIAGNOSIS — G57.12 MERALGIA PARAESTHETICA, LEFT: ICD-10-CM

## 2021-09-27 DIAGNOSIS — M48.061 NEUROFORAMINAL STENOSIS OF LUMBAR SPINE: ICD-10-CM

## 2021-09-27 DIAGNOSIS — M47.816 LUMBAR SPONDYLOSIS: ICD-10-CM

## 2021-09-27 DIAGNOSIS — M17.12 PRIMARY OSTEOARTHRITIS OF LEFT KNEE: ICD-10-CM

## 2021-09-27 PROCEDURE — 99214 OFFICE O/P EST MOD 30 MIN: CPT | Mod: PBBFAC,PO | Performed by: NURSE PRACTITIONER

## 2021-09-27 PROCEDURE — 99999 PR PBB SHADOW E&M-EST. PATIENT-LVL IV: CPT | Mod: PBBFAC,,, | Performed by: NURSE PRACTITIONER

## 2021-09-27 PROCEDURE — 99214 OFFICE O/P EST MOD 30 MIN: CPT | Mod: S$PBB,,, | Performed by: NURSE PRACTITIONER

## 2021-09-27 PROCEDURE — 99214 PR OFFICE/OUTPT VISIT, EST, LEVL IV, 30-39 MIN: ICD-10-PCS | Mod: S$PBB,,, | Performed by: NURSE PRACTITIONER

## 2021-09-27 PROCEDURE — 99999 PR PBB SHADOW E&M-EST. PATIENT-LVL IV: ICD-10-PCS | Mod: PBBFAC,,, | Performed by: NURSE PRACTITIONER

## 2021-09-27 RX ORDER — HYDROCODONE BITARTRATE AND ACETAMINOPHEN 5; 325 MG/1; MG/1
1 TABLET ORAL 3 TIMES DAILY PRN
Qty: 90 TABLET | Refills: 0 | Status: SHIPPED | OUTPATIENT
Start: 2021-09-27 | End: 2021-10-27

## 2021-10-04 ENCOUNTER — PATIENT MESSAGE (OUTPATIENT)
Dept: FAMILY MEDICINE | Facility: CLINIC | Age: 61
End: 2021-10-04

## 2021-10-25 ENCOUNTER — OFFICE VISIT (OUTPATIENT)
Dept: PAIN MEDICINE | Facility: CLINIC | Age: 61
End: 2021-10-25
Payer: OTHER GOVERNMENT

## 2021-10-25 ENCOUNTER — PATIENT OUTREACH (OUTPATIENT)
Dept: ADMINISTRATIVE | Facility: OTHER | Age: 61
End: 2021-10-25
Payer: OTHER GOVERNMENT

## 2021-10-25 VITALS — SYSTOLIC BLOOD PRESSURE: 128 MMHG | DIASTOLIC BLOOD PRESSURE: 80 MMHG | HEART RATE: 80 BPM

## 2021-10-25 DIAGNOSIS — M51.36 DDD (DEGENERATIVE DISC DISEASE), LUMBAR: ICD-10-CM

## 2021-10-25 DIAGNOSIS — M54.16 LUMBAR RADICULOPATHY: ICD-10-CM

## 2021-10-25 DIAGNOSIS — M51.26 LUMBAR DISCOGENIC PAIN SYNDROME: Primary | ICD-10-CM

## 2021-10-25 DIAGNOSIS — M48.061 NEUROFORAMINAL STENOSIS OF LUMBAR SPINE: ICD-10-CM

## 2021-10-25 DIAGNOSIS — M54.89 VERTEBROGENIC PAIN: ICD-10-CM

## 2021-10-25 PROCEDURE — 99214 OFFICE O/P EST MOD 30 MIN: CPT | Mod: S$PBB,,, | Performed by: PAIN MEDICINE

## 2021-10-25 PROCEDURE — 99213 OFFICE O/P EST LOW 20 MIN: CPT | Mod: PBBFAC,PO | Performed by: PAIN MEDICINE

## 2021-10-25 PROCEDURE — 99999 PR PBB SHADOW E&M-EST. PATIENT-LVL III: CPT | Mod: PBBFAC,,, | Performed by: PAIN MEDICINE

## 2021-10-25 PROCEDURE — 99214 PR OFFICE/OUTPT VISIT, EST, LEVL IV, 30-39 MIN: ICD-10-PCS | Mod: S$PBB,,, | Performed by: PAIN MEDICINE

## 2021-10-25 PROCEDURE — 99999 PR PBB SHADOW E&M-EST. PATIENT-LVL III: ICD-10-PCS | Mod: PBBFAC,,, | Performed by: PAIN MEDICINE

## 2021-10-25 RX ORDER — HYDROCODONE BITARTRATE AND ACETAMINOPHEN 7.5; 325 MG/1; MG/1
1 TABLET ORAL 3 TIMES DAILY PRN
Qty: 90 TABLET | Refills: 0 | Status: SHIPPED | OUTPATIENT
Start: 2021-10-25 | End: 2021-11-24

## 2021-10-25 RX ORDER — HYDROCODONE BITARTRATE AND ACETAMINOPHEN 7.5; 325 MG/1; MG/1
1 TABLET ORAL 3 TIMES DAILY PRN
Qty: 90 TABLET | Refills: 0 | Status: SHIPPED | OUTPATIENT
Start: 2021-11-25 | End: 2021-12-25

## 2021-10-27 ENCOUNTER — TELEPHONE (OUTPATIENT)
Dept: NEUROSURGERY | Facility: CLINIC | Age: 61
End: 2021-10-27
Payer: OTHER GOVERNMENT

## 2021-10-27 DIAGNOSIS — M54.16 LUMBAR RADICULOPATHY, CHRONIC: ICD-10-CM

## 2021-11-10 ENCOUNTER — TELEPHONE (OUTPATIENT)
Dept: NEUROSURGERY | Facility: CLINIC | Age: 61
End: 2021-11-10
Payer: OTHER GOVERNMENT

## 2021-11-15 ENCOUNTER — TELEPHONE (OUTPATIENT)
Dept: NEUROSURGERY | Facility: CLINIC | Age: 61
End: 2021-11-15
Payer: OTHER GOVERNMENT

## 2021-11-16 ENCOUNTER — TELEPHONE (OUTPATIENT)
Dept: NEUROSURGERY | Facility: CLINIC | Age: 61
End: 2021-11-16
Payer: OTHER GOVERNMENT

## 2021-11-17 ENCOUNTER — TELEPHONE (OUTPATIENT)
Dept: NEUROSURGERY | Facility: CLINIC | Age: 61
End: 2021-11-17
Payer: OTHER GOVERNMENT

## 2021-11-17 DIAGNOSIS — R52 PAIN: Primary | ICD-10-CM

## 2021-11-20 ENCOUNTER — NURSE TRIAGE (OUTPATIENT)
Dept: ADMINISTRATIVE | Facility: CLINIC | Age: 61
End: 2021-11-20
Payer: OTHER GOVERNMENT

## 2021-12-01 ENCOUNTER — HOSPITAL ENCOUNTER (OUTPATIENT)
Dept: RADIOLOGY | Facility: HOSPITAL | Age: 61
Discharge: HOME | End: 2021-12-01
Attending: PAIN MEDICINE
Payer: OTHER GOVERNMENT

## 2021-12-01 ENCOUNTER — PATIENT MESSAGE (OUTPATIENT)
Dept: PAIN MEDICINE | Facility: CLINIC | Age: 61
End: 2021-12-01
Payer: OTHER GOVERNMENT

## 2021-12-01 DIAGNOSIS — M54.16 LUMBAR RADICULOPATHY, CHRONIC: ICD-10-CM

## 2021-12-01 PROCEDURE — 72148 MRI LUMBAR SPINE WITHOUT CONTRAST: ICD-10-PCS | Mod: 26,,, | Performed by: RADIOLOGY

## 2021-12-01 PROCEDURE — 72148 MRI LUMBAR SPINE W/O DYE: CPT | Mod: TC

## 2021-12-01 PROCEDURE — 72148 MRI LUMBAR SPINE W/O DYE: CPT | Mod: 26,,, | Performed by: RADIOLOGY

## 2021-12-09 ENCOUNTER — PATIENT MESSAGE (OUTPATIENT)
Dept: NEUROSURGERY | Facility: CLINIC | Age: 61
End: 2021-12-09
Payer: OTHER GOVERNMENT

## 2021-12-09 ENCOUNTER — TELEPHONE (OUTPATIENT)
Dept: NEUROSURGERY | Facility: CLINIC | Age: 61
End: 2021-12-09
Payer: OTHER GOVERNMENT

## 2021-12-14 ENCOUNTER — PATIENT OUTREACH (OUTPATIENT)
Dept: ADMINISTRATIVE | Facility: OTHER | Age: 61
End: 2021-12-14
Payer: OTHER GOVERNMENT

## 2021-12-15 ENCOUNTER — HOSPITAL ENCOUNTER (OUTPATIENT)
Dept: RADIOLOGY | Facility: HOSPITAL | Age: 61
Discharge: HOME | End: 2021-12-15
Attending: NEUROLOGICAL SURGERY
Payer: OTHER GOVERNMENT

## 2021-12-15 ENCOUNTER — OFFICE VISIT (OUTPATIENT)
Dept: NEUROSURGERY | Facility: CLINIC | Age: 61
End: 2021-12-15
Payer: OTHER GOVERNMENT

## 2021-12-15 ENCOUNTER — TELEPHONE (OUTPATIENT)
Dept: NEUROSURGERY | Facility: CLINIC | Age: 61
End: 2021-12-15
Payer: OTHER GOVERNMENT

## 2021-12-15 DIAGNOSIS — M79.18 MYOFASCIAL PAIN DYSFUNCTION SYNDROME: ICD-10-CM

## 2021-12-15 DIAGNOSIS — M51.36 DDD (DEGENERATIVE DISC DISEASE), LUMBAR: Primary | ICD-10-CM

## 2021-12-15 DIAGNOSIS — M51.36 DDD (DEGENERATIVE DISC DISEASE), LUMBAR: ICD-10-CM

## 2021-12-15 PROCEDURE — 72082 XR SCOLIOSIS COMPLETE: ICD-10-PCS | Mod: 26,,, | Performed by: RADIOLOGY

## 2021-12-15 PROCEDURE — 99999 PR PBB SHADOW E&M-EST. PATIENT-LVL III: CPT | Mod: PBBFAC,,, | Performed by: NEUROLOGICAL SURGERY

## 2021-12-15 PROCEDURE — 99999 PR PBB SHADOW E&M-EST. PATIENT-LVL III: ICD-10-PCS | Mod: PBBFAC,,, | Performed by: NEUROLOGICAL SURGERY

## 2021-12-15 PROCEDURE — 99213 OFFICE O/P EST LOW 20 MIN: CPT | Mod: S$PBB,,, | Performed by: NEUROLOGICAL SURGERY

## 2021-12-15 PROCEDURE — 99213 OFFICE O/P EST LOW 20 MIN: CPT | Mod: PBBFAC,PN | Performed by: NEUROLOGICAL SURGERY

## 2021-12-15 PROCEDURE — 72082 X-RAY EXAM ENTIRE SPI 2/3 VW: CPT | Mod: TC,PN

## 2021-12-15 PROCEDURE — 99213 PR OFFICE/OUTPT VISIT, EST, LEVL III, 20-29 MIN: ICD-10-PCS | Mod: S$PBB,,, | Performed by: NEUROLOGICAL SURGERY

## 2021-12-15 PROCEDURE — 72082 X-RAY EXAM ENTIRE SPI 2/3 VW: CPT | Mod: 26,,, | Performed by: RADIOLOGY

## 2021-12-16 ENCOUNTER — TELEPHONE (OUTPATIENT)
Dept: NEUROSURGERY | Facility: CLINIC | Age: 61
End: 2021-12-16
Payer: OTHER GOVERNMENT

## 2021-12-22 ENCOUNTER — OFFICE VISIT (OUTPATIENT)
Dept: FAMILY MEDICINE | Facility: CLINIC | Age: 61
End: 2021-12-22
Payer: OTHER GOVERNMENT

## 2021-12-22 VITALS
SYSTOLIC BLOOD PRESSURE: 139 MMHG | WEIGHT: 282.88 LBS | TEMPERATURE: 98 F | HEIGHT: 72 IN | HEART RATE: 85 BPM | DIASTOLIC BLOOD PRESSURE: 89 MMHG | BODY MASS INDEX: 38.31 KG/M2 | OXYGEN SATURATION: 95 %

## 2021-12-22 DIAGNOSIS — R50.9 FEVER, UNSPECIFIED FEVER CAUSE: Primary | ICD-10-CM

## 2021-12-22 DIAGNOSIS — J06.9 UPPER RESPIRATORY TRACT INFECTION, UNSPECIFIED TYPE: ICD-10-CM

## 2021-12-22 LAB
CTP QC/QA: YES
CTP QC/QA: YES
POC MOLECULAR INFLUENZA A AGN: NEGATIVE
POC MOLECULAR INFLUENZA B AGN: NEGATIVE
SARS-COV-2 RDRP RESP QL NAA+PROBE: NEGATIVE

## 2021-12-22 PROCEDURE — 99214 OFFICE O/P EST MOD 30 MIN: CPT | Mod: 25,S$GLB,, | Performed by: FAMILY MEDICINE

## 2021-12-22 PROCEDURE — U0002: ICD-10-PCS | Mod: QW,S$GLB,, | Performed by: FAMILY MEDICINE

## 2021-12-22 PROCEDURE — 96372 PR INJECTION,THERAP/PROPH/DIAG2ST, IM OR SUBCUT: ICD-10-PCS | Mod: S$GLB,,, | Performed by: FAMILY MEDICINE

## 2021-12-22 PROCEDURE — U0002 COVID-19 LAB TEST NON-CDC: HCPCS | Mod: QW,S$GLB,, | Performed by: FAMILY MEDICINE

## 2021-12-22 PROCEDURE — 87502 INFLUENZA DNA AMP PROBE: CPT | Mod: QW,,, | Performed by: FAMILY MEDICINE

## 2021-12-22 PROCEDURE — 99214 PR OFFICE/OUTPT VISIT, EST, LEVL IV, 30-39 MIN: ICD-10-PCS | Mod: 25,S$GLB,, | Performed by: FAMILY MEDICINE

## 2021-12-22 PROCEDURE — 96372 THER/PROPH/DIAG INJ SC/IM: CPT | Mod: S$GLB,,, | Performed by: FAMILY MEDICINE

## 2021-12-22 PROCEDURE — 87502 POCT INFLUENZA A/B MOLECULAR: ICD-10-PCS | Mod: QW,,, | Performed by: FAMILY MEDICINE

## 2021-12-22 RX ORDER — BETAMETHASONE SODIUM PHOSPHATE AND BETAMETHASONE ACETATE 3; 3 MG/ML; MG/ML
6 INJECTION, SUSPENSION INTRA-ARTICULAR; INTRALESIONAL; INTRAMUSCULAR; SOFT TISSUE ONCE
Status: COMPLETED | OUTPATIENT
Start: 2021-12-22 | End: 2021-12-22

## 2021-12-22 RX ADMIN — BETAMETHASONE SODIUM PHOSPHATE AND BETAMETHASONE ACETATE 6 MG: 3; 3 INJECTION, SUSPENSION INTRA-ARTICULAR; INTRALESIONAL; INTRAMUSCULAR; SOFT TISSUE at 03:12

## 2022-01-11 ENCOUNTER — TELEPHONE (OUTPATIENT)
Dept: PAIN MEDICINE | Facility: CLINIC | Age: 62
End: 2022-01-11
Payer: OTHER GOVERNMENT

## 2022-01-11 PROBLEM — R68.89 IMPAIRED TOLERANCE OF ACTIVITY: Status: ACTIVE | Noted: 2022-01-11

## 2022-01-11 PROBLEM — M25.60 DECREASED RANGE OF MOTION WITH DECREASED STRENGTH: Status: ACTIVE | Noted: 2022-01-11

## 2022-01-11 PROBLEM — R53.1 DECREASED RANGE OF MOTION WITH DECREASED STRENGTH: Status: ACTIVE | Noted: 2022-01-11

## 2022-01-11 NOTE — TELEPHONE ENCOUNTER
Pt stated he would like to schedule an appt for a med refill. Pt is scheduled to see Jeffy tomorrow for 9 am. Pt voiced understanding and confirmed appt date and time.

## 2022-01-11 NOTE — TELEPHONE ENCOUNTER
----- Message from Kerline Bell sent at 1/10/2022 10:35 AM CST -----  Contact: [phone]/ self  Who Called: Pt   Regarding: questions about medications   Would the patient rather a call back or a response via [facility]? Call back  Best Call Back Number: [phone]  Additional Information:

## 2022-01-12 ENCOUNTER — OFFICE VISIT (OUTPATIENT)
Dept: PAIN MEDICINE | Facility: CLINIC | Age: 62
End: 2022-01-12
Payer: OTHER GOVERNMENT

## 2022-01-12 VITALS — HEART RATE: 78 BPM | SYSTOLIC BLOOD PRESSURE: 121 MMHG | DIASTOLIC BLOOD PRESSURE: 85 MMHG

## 2022-01-12 DIAGNOSIS — M54.89 VERTEBROGENIC PAIN: ICD-10-CM

## 2022-01-12 DIAGNOSIS — G57.12 MERALGIA PARAESTHETICA, LEFT: ICD-10-CM

## 2022-01-12 DIAGNOSIS — M51.36 DDD (DEGENERATIVE DISC DISEASE), LUMBAR: ICD-10-CM

## 2022-01-12 DIAGNOSIS — M17.0 PRIMARY OSTEOARTHRITIS OF BOTH KNEES: ICD-10-CM

## 2022-01-12 DIAGNOSIS — M48.061 NEUROFORAMINAL STENOSIS OF LUMBAR SPINE: ICD-10-CM

## 2022-01-12 DIAGNOSIS — M47.816 LUMBAR SPONDYLOSIS: ICD-10-CM

## 2022-01-12 DIAGNOSIS — M54.16 LUMBAR RADICULOPATHY: Primary | ICD-10-CM

## 2022-01-12 DIAGNOSIS — M17.12 PRIMARY OSTEOARTHRITIS OF LEFT KNEE: ICD-10-CM

## 2022-01-12 DIAGNOSIS — M51.26 LUMBAR DISCOGENIC PAIN SYNDROME: ICD-10-CM

## 2022-01-12 DIAGNOSIS — G89.4 CHRONIC PAIN SYNDROME: ICD-10-CM

## 2022-01-12 DIAGNOSIS — F11.90 CHRONIC, CONTINUOUS USE OF OPIOIDS: ICD-10-CM

## 2022-01-12 PROCEDURE — 99999 PR PBB SHADOW E&M-EST. PATIENT-LVL III: CPT | Mod: PBBFAC,,, | Performed by: NURSE PRACTITIONER

## 2022-01-12 PROCEDURE — 99999 PR PBB SHADOW E&M-EST. PATIENT-LVL III: ICD-10-PCS | Mod: PBBFAC,,, | Performed by: NURSE PRACTITIONER

## 2022-01-12 PROCEDURE — 99213 OFFICE O/P EST LOW 20 MIN: CPT | Mod: PBBFAC,PO | Performed by: NURSE PRACTITIONER

## 2022-01-12 PROCEDURE — 99213 PR OFFICE/OUTPT VISIT, EST, LEVL III, 20-29 MIN: ICD-10-PCS | Mod: S$PBB,,, | Performed by: NURSE PRACTITIONER

## 2022-01-12 PROCEDURE — 99213 OFFICE O/P EST LOW 20 MIN: CPT | Mod: S$PBB,,, | Performed by: NURSE PRACTITIONER

## 2022-01-12 RX ORDER — HYDROCODONE BITARTRATE AND ACETAMINOPHEN 5; 325 MG/1; MG/1
1 TABLET ORAL EVERY 8 HOURS PRN
Qty: 90 TABLET | Refills: 0 | Status: SHIPPED | OUTPATIENT
Start: 2022-02-11 | End: 2022-02-21 | Stop reason: SDUPTHER

## 2022-01-12 RX ORDER — HYDROCODONE BITARTRATE AND ACETAMINOPHEN 5; 325 MG/1; MG/1
1 TABLET ORAL EVERY 8 HOURS PRN
Qty: 90 TABLET | Refills: 0 | Status: SHIPPED | OUTPATIENT
Start: 2022-01-12 | End: 2022-02-11

## 2022-01-12 NOTE — PROGRESS NOTES
Chronic Pain-Established Note (Follow up visit)    Referred by: Dr. Encarnacion  Reason for referral: Back Pain    CC:   Chief Complaint   Patient presents with    Medication Refill       Last 3 PDI Scores 1/12/2022 10/25/2021 9/27/2021   Pain Disability Index (PDI) 45 [phone] 47     Interval Updates:  01/12/2022 - Mr. Grant is following up for Medication Refill.  He requests a refill of Norco 7.5-325 90 pills per month which are working well to control His pain.  He reports 60% relief with the current medication regimen.  Medication side effects: none.  Pain is currently rate 6/10 with a weekly range 3-8/10.  It is described as Aching, Tight, Tingling, Numb, Sharp and Shooting.   Pain is worsened by: standing and improved by: nothing and medications.     10/25/2021 -- Mr. Grant is following up for Medication Refill.  He requests a refill of Hydrocodone-Acetaminophen 5-325 mg which is not working well to control His pain.  He reports 50% relief with the current medication regimen.  Medication side effects: none.  Pain is currently rate 6/10 with a weekly range 5-9/10.  It is described as Aching, Tight, Tingling, Numb, Sharp and Shooting.   Pain is worsened by: standing and improved by: nothing and medications.     09/27/21 - Mr. Grant is following up for Medication Refill.  He requests a refill of Hydrocodone-Acetaminophen 5-325 mg TID # 90 which are not working well to control His pain.  He reports 50% relief with the current medication regimen. Pain is currently rate 7/10 with a weekly range 6-7/10. Mr. Grant is reporting continued left low back pain with radiating symptoms into his left and now right scrotum.  He mentioned his right scrotum has not been affected previously.  He also reports pain radiating into his left lateral leg stopping just above his left knee.  His pain has been refractory to multiple injections and physical therapy, he reports the inability to have a quality of life as he states he recently  "tried to have sex with his wife and could not due to the pain.  His current opioid regimen provide some relief but not significant enough as he would like to consider other options, he endorses that his insurance plan denied the intracept procedure that we were going to consider him for.     08/27/21 - Mr. Grant is following up for Medication Refill.  He requests a refill of Hydrocodone-Acetaminophen 5-325 mg TID # 90 per month which are working well to control His pain.  He reports 50% relief with the current medication regimen.  Medication side effects: none.  Pain is currently rate 5/10 with a weekly range 5-9/10.  It is described as Aching, Dull, electrical shock(left leg)  and Sharp.   Pain is worsened by: exercise, flexion, standing for more than 3 minutes and walking for more than 3 minutes and improved by: heat, laying down, massage, medications, physical therapy, rest and sitting.    7/23/21 - Mr. Grant returns to clinic for follow up visit reporting worse left sided low back and left leg pain.  Patient is here for medication refill of Hydrocodone-Acetaminophen 5-325 mg TID PRN for pain, he reports 60-70% relief for 3-4 hours his pain is predicated on how much activity he is doing. He also receives Gabapentin 600 mg TID and Mobic 15 mg daily. He is reporting is reporting left scrotum and left leg pain today, this is his worse pain today.  Pain intensity is currently 7/10.      06/16/2021 - Mr. Grant returns to clinic for follow up visit reporting stable but severe back pain. Pain intensity is currently 5/10.  His PCP recently left [facility] and he was referred her for re-evaluation by his new primary care team.  Patient reports continued low back pain with occasional radiation in to the legs.  Pain fluctuates depending on level of activity.  He reports "flare ups" at least twice weekly during which he may take 2-4 tablets of norco in a day, which other days he may take none.    09/16/2020 - Mr. Grant " returns to clinic for follow up visit reporting low back and left leg  pain. He is also reporting left knee pain he is s/f for a left knee GN block with Dr Hollins's office. He was referred to Dr Hollins by Dr. Álvarez/Orthopedics.  Patient is s/p Lumbar Transforaminal Epidural Steroid Injection, Two Levels, Bilateral L4-5 on 08/11/2020 with 0% relief.  Patient is also status post bilateral lumbar MBB that provided him with 0% relief.  Pain intensity is currently 6/10.      05/13/[id number]-nnen-old male presents status post bilateral L4-5 TF KATHY with reported 60% relief.  Patient also states his relief is improving each and every day.  Reports taking less of his pain medication due to the relief received from the injection.  He is only 6 days postop his lumbar epidural, discussed that I suspect is relief will improve even more over the next 2 weeks.    03/02/2020 - Mr. Grant returns to clinic for follow up visit reporting worse back and bilateral leg pain left greater than right.  Patient is s/p L4-5 Lumbar Epidural Steroid  on 2/11/20 with 0% relief.  Patient presents with wife he is currently experiencing no relief from the injection, he reports that his pain continues in his low back as well as in his legs bilaterally left greater than right.  Patient reports shooting pain down his left leg at times going past his left knee into his left foot.  Patient reports previously given a lumbar TF KATHY at L4-5 per Dr. Duenas/TAMIKO Hampton reports receiving 3 months of relief and he is requesting to be scheduled for that particular injection today.  Pain intensity is currently 5/10.      HPI:    Baldo Grant is a 61 y.o. male who complains of left and right lower back pain.  He reports the pain radiates down his left leg just below his knee.  Pain intensity is 5/10.  He reports taking Norco, Gabapentin and Meloxicam for some relief.   Patient s/p Bilateral L3-4, L4-5, and L5-S1 Lumbar Medial Branch Block on 11/5/2019 with   L[place]ette with no relief.  Patient's 5/17/2019 MRI Lumbar Spine reviewed with patient and shows L4-5 and L5-S1 degenerative disc disease with disc bulging as described above with right L4-5 and left L5-S1 foraminal stenosis.      Location: lower back   Onset: Summer 200  Current Pain Score: 5/10   Daily Pain of Range: 4-9/10  Quality: Dull and Sharp  Radiation: back of left leg  Worsened by: sitting and standing  Improved by: medications and physical therapy    Previous Therapies:  PT/OT: Yes  HEP: Yes, but severely limited by pain  Interventions: Bilateral L3-4, L4-5, and L5-S1 Lumbar Medial Branch Block on 11/5/2019  Surgery:  Medications:   - NSAIDS: meloxicam (MOBIC) 15 MG tablet daily  - MSK Relaxants:  diazePAM (VALIUM) 5 MG tablet daily as needed  - TCAs:   - SNRIs:   - Topicals: lidocaine HCL 2% (XYLOCAINE) 2 % jelly  - Anticonvulsants:  - Opioids: HYDROcodone-acetaminophen (NORCO) 5-325 mg per tablet    Current Pain Medications:  1. Meloxicam 15 mg   2. Gabapentin 600 mg TID        3. HYDROcodone-acetaminophen (NORCO) 5-325 mg per tablet Q6 hours PRN    Review of Systems:  Review of Systems   Constitutional: Negative.    HENT: Negative.    Eyes: Negative.    Respiratory: Negative.    Cardiovascular: Negative.    Gastrointestinal: Negative.    Genitourinary: Negative.    Musculoskeletal: Positive for back pain, joint pain and myalgias.   Skin: Negative.    Neurological: Negative.    Endo/Heme/Allergies: Negative.    Psychiatric/Behavioral: Negative.        History:    Current Outpatient Medications:     aspirin (ECOTRIN) 81 MG EC tablet, Take 1 tablet twice a day with food starting after surgery (breakfast and dinner)., Disp: 28 tablet, Rfl: 0    calcipotriene-betamethasone (ENSTILAR) 0.005-0.064 % Foam, Apply 1 application topically once daily., Disp: 60 g, Rfl: 2    diazePAM (VALIUM) 5 MG tablet, , Disp: , Rfl:     diclofenac sodium (VOLTAREN) 1 % Gel, Apply 2 g topically 4 (four) times daily., Disp:  100 g, Rfl: 0    diphenhydrAMINE (BENADRYL) 25 mg capsule, Take 25 mg by mouth every 6 (six) hours as needed for Itching., Disp: , Rfl:     EPINEPHrine (EPIPEN) 0.3 mg/0.3 mL AtIn, , Disp: , Rfl:     fexofenadine (ALLEGRA) 180 MG tablet, Take 180 mg by mouth continuous prn., Disp: , Rfl:     gabapentin (NEURONTIN) 600 MG tablet, Take 1 tablet (600 mg total) by mouth 3 (three) times daily., Disp: 270 tablet, Rfl: 4    ketoconazole (NIZORAL) 2 % cream, AAA gluteal fold daily to bid prn flare, Disp: 60 g, Rfl: 3    lidocaine HCL 2% (XYLOCAINE) 2 % jelly, Apply topically as needed., Disp: 30 mL, Rfl: 3    meloxicam (MOBIC) 15 MG tablet, Take 1 tablet (15 mg total) by mouth once daily., Disp: 90 tablet, Rfl: 3    multivitamin (THERAGRAN) per tablet, Take 1 tablet by mouth once daily., Disp: , Rfl:     ondansetron (ZOFRAN) 4 MG tablet, Take 1 tablet (4 mg total) by mouth every 8 (eight) hours as needed for Nausea., Disp: 30 tablet, Rfl: 0    tiZANidine (ZANAFLEX) 4 MG tablet, , Disp: , Rfl:     traMADoL (ULTRAM) 50 mg tablet, , Disp: , Rfl:     triamcinolone acetonide 0.025% (KENALOG) 0.025 % cream, AAA gluteal fold daily to bid PRN flare, Disp: 30 g, Rfl: 3    mometasone 0.1% (ELOCON) 0.1 % cream, Apply topically once daily. for 10 days, Disp: 1 Tube, Rfl: 3  No current facility-administered medications for this visit.    Facility-Administered Medications Ordered in Other Visits:     0.9%  NaCl infusion, 500 mL, Intravenous, Continuous, Alexus Anglin Jr., MD    lidocaine (PF) 10 mg/ml (1%) injection 10 mg, 1 mL, Intradermal, Once, Alexus Anglin Jr., MD    lidocaine (PF) 10 mg/ml (1%) injection 10 mg, 1 mL, Intradermal, Once, Alexus Anglin Jr., MD    Past Medical History:   Diagnosis Date    Arthritis     Basal cell carcinoma 06/21/2019    back    Cancer     Chronic pain of right knee     SCC (squamous cell carcinoma) 2014    Forehead- Dr. Cabral     Skin cancer     Squamous cell  carcinoma 2013    Right ear- Dr. Marianna long       Past Surgical History:   Procedure Laterality Date    APPENDECTOMY      ARTHROSCOPIC CHONDROPLASTY OF KNEE JOINT Left 3/18/2020    Procedure: ARTHROSCOPY, KNEE, WITH CHONDROPLASTY;  Surgeon: FREEMAN Álvarez MD;  Location: [facility] OR;  Service: Orthopedics;  Laterality: Left;    COLONOSCOPY  1998    EPIDURAL STEROID INJECTION INTO LUMBAR SPINE N/A 2/11/2020    Procedure: Injection-steroid-epidural-lumbar--InterLaminar L4-5;  Surgeon: Alexus Anglin Jr., MD;  Location: [facility] PAIN MGT;  Service: Pain Management;  Laterality: N/A;    INJECTION OF ANESTHETIC AGENT AROUND MEDIAL BRANCH NERVES INNERVATING LUMBAR FACET JOINT Bilateral 11/5/2019    Procedure: Block-nerve-medial branch-lumbar--Bilateral L3-4,L4-5,L5-S1;  Surgeon: Alexus Anglin Jr., MD;  Location: [facility] PAIN MGT;  Service: Pain Management;  Laterality: Bilateral;    INJECTION OF STEROID Left 11/12/2020    Procedure: INJECTION, STEROID Left SI Joint Block and Steroid Injection;  Surgeon: Tam Encarnacion MD;  Location: [facility] OR;  Service: Neurosurgery;  Laterality: Left;  Procedure: Left SI Joint Block and Steroid Injection   Surgery Time: 30 min  LOS: 0  Anesthesia: MAC  Radiology: C-arm  Bed: Regular with Pillows  Position: Prone    KNEE ARTHROSCOPY W/ MENISCECTOMY Left 3/18/2020    Procedure: ARTHROSCOPY, KNEE, WITH MENISCECTOMY;  Surgeon: FREEMAN Álvarez MD;  Location: [facility] OR;  Service: Orthopedics;  Laterality: Left;  CLONIDINE/EPI/KETOROLAC/ROPIVACAINE INJECTION 30CC    lipoma removal      skin cancer removal      TRANSFORAMINAL EPIDURAL INJECTION OF STEROID Bilateral 5/6/2020    Procedure: Injection,steroid,epidural,transforaminal approach--Bilateral L4-5;  Surgeon: Alexus Anglin Jr., MD;  Location: [facility];  Service: Pain Management;  Laterality: Bilateral;    TRANSFORAMINAL EPIDURAL INJECTION OF STEROID Bilateral 8/11/2020    Procedure:  Injection,steroid,epidural,transforaminal approach--Bilateral L4-5;  Surgeon: Alexus Anglin Jr., MD;  Location: [facility];  Service: Pain Management;  Laterality: Bilateral;       Family History   Problem Relation Age of Onset    COPD Mother     Alcohol abuse Mother     Heart disease Father     Melanoma Father     No Known Problems Sister     No Known Problems Brother     Psoriasis Neg Hx     Lupus Neg Hx     Eczema Neg Hx        Social History     Socioeconomic History    Marital status:    Tobacco Use    Smoking status: Never Smoker    Smokeless tobacco: Never Used   Substance and Sexual Activity    Alcohol use: Yes     Comment: social    Drug use: No    Sexual activity: Yes     Partners: Female       Review of patient's allergies indicates:   Allergen Reactions    Advil [ibuprofen] Anaphylaxis    Tums [calcium carbonate] Anaphylaxis       Physical Exam:  Vitals:    01/12/22 0915   BP: 121/85   Pulse: 78   PainSc:   6     General    Nursing note and vitals reviewed.  Constitutional: He is oriented to person, place, and time. He appears well-developed. No distress.   HENT:   Head: Normocephalic and atraumatic.   Nose: Nose normal.   Eyes: Conjunctivae and EOM are normal. Right eye exhibits no discharge. Left eye exhibits no discharge. No scleral icterus.   Neck: No JVD present. No tracheal deviation present.   Cardiovascular: Normal rate, regular rhythm and intact distal pulses.    Pulmonary/Chest: Effort normal and breath sounds normal. No respiratory distress. He exhibits no tenderness.   Abdominal: Soft. Bowel sounds are normal. He exhibits no distension. There is no abdominal tenderness. There is no rebound.   Neurological: He is alert and oriented to person, place, and time. He exhibits normal muscle tone. Coordination normal.   Psychiatric: His behavior is normal. Thought content normal.         Back (L-Spine & T-Spine) / Neck (C-Spine) Exam     Tenderness Right paramedian  tenderness of the Lower L-Spine. Left paramedian tenderness of the Lower L-Spine.     Back (L-Spine & T-Spine) Range of Motion   Lateral bend right: normal   Lateral bend left: normal   Rotation right: normal   Rotation left: normal     Spinal Sensation   Right Side Sensation  L-Spine Level: normal  Left Side Sensation  L-Spine Level: normal      Muscle Strength   Right Lower Extremity   Hip Abduction: 5/5   Hip Flexion: 5/5   Hip Extensors: 5/5  Quadriceps:  5/5   Hamstrin/5   Gastrocsoleus:  5/5   Left Lower Extremity   Hip Abduction: 4/5   Hip Flexion: 4/5   Hip Extensors: 4/5  Quadriceps:  4/5   Hamstrin/5   Gastrocsoleus:  5/5       Imaging:  MRI Lumbar Spine Without Contrast 10/15/2020   Degenerative findings:  T12-L1: No spinal canal stenosis or neural foraminal narrowing.  L1-L2: No spinal canal stenosis or neural foraminal narrowing.  L2-L3: Circumferential disc bulge and mild facet arthropathy noted.  No spinal canal stenosis or neural foraminal narrowing.  L3-L4: Circumferential disc bulge and mild facet arthropathy result in mild left neural foraminal narrowing.  L4-L5: Circumferential disc bulge and mild facet arthropathy result in moderate right, mild left neural foraminal narrowing.  L5-S1: Circumferential disc bulge with annular fissure and moderate facet arthropathy result in mild bilateral neural foraminal narrowing.  Paraspinal muscles & soft tissues: Unremarkable.     Impression:  1. Degenerative changes of the lumbar spine resulting in mild-to-moderate bilateral neural foraminal narrowing at L3-S1.      EXAMINATION:  MRI LUMBAR SPINE WITHOUT CONTRAST    CLINICAL HISTORY:  Low back painLow back pain, rapidly progressive neuro deficit;    TECHNIQUE:  Standard multiplanar noncontrast MRI sequences of the lumbar spine.    COMPARISON:  None    FINDINGS:  The distal cord and conus reveal normal signal and morphology. The lumbar vertebra reveal normal alignment, shape and signal  intensity.    T12-L1: Unremarkable    L1-2:  Unremarkable    L2-3:  Minimal annular bulge.    L3-4:  Mild disc desiccation with mild generalized annular bulge.  Mild hypertrophic ligamentum flavum and facet arthritis.    L4-5:  Mild disc desiccation with mild annular bulge.  Right foraminal annular fissure.  Mild hypertrophic ligamentum flavum and facet arthritis.  Mild to moderate left and moderate right foraminal stenosis.    L5-S1:  Mild degenerative disc disease with disc desiccation and disc bulge/osteophyte.  Mild hypertrophic ligamentum flavum and facet arthritis.  Mild right with mild to moderate left foraminal stenosis.      Impression       L4-5 and L5-S1 degenerative disc disease with disc bulging osteophyte as described above with right L4-5 and left L5-S1 foraminal stenosis.      Electronically signed by: Baldo Gayle MD  Date: 05/17/2019         Labs:  BMP  Lab Results   Component Value Date     09/22/2020    K 4.4 09/22/2020     09/22/2020    CO2 28 09/22/2020    BUN 17 09/22/2020    CREATININE 1.00 09/22/2020    CALCIUM 9.4 09/22/2020    ANIONGAP 10 09/22/2020    ESTGFRAFRICA >60.0 09/22/2020    EGFRNONAA >60.0 09/22/2020     Lab Results   Component Value Date    ALT 55 (H) 09/22/2020    AST 33 09/22/2020    ALKPHOS 37 (L) 09/22/2020    BILITOT 0.4 09/22/2020       Assessment:  Problem List Items Addressed This Visit        Neuro    Lumbar radiculopathy - Primary    Chronic pain    Lumbar discogenic pain syndrome    DDD (degenerative disc disease), lumbar       Orthopedic    Primary osteoarthritis of both knees      Other Visit Diagnoses     Neuroforaminal stenosis of lumbar spine        Vertebrogenic pain        Chronic, continuous use of opioids        Primary osteoarthritis of left knee        Lumbar spondylosis        Meralgia paraesthetica, left              2/3/2020 - Baldo MEZA Rudy is a 61 y.o. male with who complains of left and right lower back pain.  He reports the pain  radiates down his left leg just below his knee.  Pain intensity is 5/10. He reports his current medication regimen of Norco, Gabapentin and Meloxicam help relieve his pain.  He reports having  Bilateral L3-4, L4-5, and L5-S1 Lumbar Medial Branch Block on 11/5/2019 with no relief.Patient's 5/17/2019 MRI Lumbar Spine reviewed with patient and shows L4-5 and L5-S1 degenerative disc disease with disc bulging  as described above with right L4-5 and left L5-S1 foraminal stenosis.  Recommended scheduling for Interlaminar KATHY  L4-5 with moderate sedation. Patient will follow in clinic following injection.     03/02/[id number]-uomr-old male presents with his wife he is status post lumbar interlaminar KAHTY at L4-5 with 0% relief.  Patient reports continued low back and bilateral leg pain left greater than right he is reporting shooting pain down his left leg into his foot at times.  Patient reports the pain is disrupting his quality of life and prohibiting him from performing his ADLs.  Patient was given a left TF KATHY by Dr. Duenas/[facility] and received significant relief for 2-3 months. Lumbar MRI shows pathology a the L4-5 and L5-S1 patient has degenerative disc disease with disc bulging osteophyte with right L4-5 and left L5-S1 foraminal stenosis.  Based on results of previous Left  L4-5 transforaminal I discussed with patient and wife that I will now recommend  a bilateral L4-5.   Patient and wife agreed and understood.      05/13/2020- 52 a old male presents status post bilateral L4-5 TF KATHY, is currently reporting 60% relief he states his relief is improving each and every day.  Recommended patient follow up with me via my [facility] in 2 weeks to report the effectiveness of his procedure discussed that his procedure will more than likely improve the next 2 weeks considering he is only 6 days following his injection.    09/16/2020 - 60-year-old male presents low back and left knee pain, patient is established our  office he has had multiple lumbar KATHY as well as a lumbar MBB that provided him with minimal to no relief according to his records he is scheduled for a bilateral genicular block with  tomorrow he was referred to his office from orthopedics/Dr Álvarez.  Patient reports continued low back and left leg pain radiating down to his foot, this pain is secondary to L 4/ 5, and 5 /S1 degenerative disc disease with disc bulging osteophyte with the right L4-5 and left L5-S1 foraminal stenosis that has not gotten better from lumbar KATHY injections.  I recommended patient follow up with Neurosurgery further evaluate    6/16/21 - 62 yo M with primarily axial discogenic chronic low back pain presented today for re-evaluation the request of primary care team for assessment of his chronic opioid therapy.  Patient is on chronic low-dose opioid therapy which is supporting his daily function.  He has no bulging the spine related to degenerative disc disease.  Since he was last evaluated, our department deployed a new interventional procedures may help to target discogenic pain.  While not specifically noted in the most recent MRI from September 2020, there is endplate edema (Modic changes close this ease in the inferior endplate at L4, and both the superior and inferior endplate of L5.  This could be a significant cause of his pain and can be targeted through the use of the Intracept procedure which ablates the basivertebral nerve.  Given the level of functionality he is maintaining with the current regimen, we will continue with these medications while obtaining authorization for this procedure which may take anywhere between 1 and 6 months.    7/23/2021- 61-year-old male with a history of low back and left leg pain, patient is here for medication refill we will take over his opioid from his PCP.  He is currently taking Norco 5-325 t.i.d. that provides him with roughly 80% and improved functionality that allows in reform his  ADLs.  Last clinic visit with Dr. Anglin he Mr Grant discussed considering him for a intracept procedure as his most recent lumbar MRI shows endplate edema/Modic changes to the inferior endplate of L4 and the superior and inferior endplate of L5.  Until authorization has been done we will  continue to provide him with his stable low dose opioid regimen,  he and I discussed that once authorization has been completed our office will give him a call and schedule him for the intracept  Procedure.    8/27/2021- Baldo Grant is a 61 y.o. male who  has a past medical history of Arthritis, Basal cell carcinoma (06/21/2019), Cancer, Chronic pain of right knee, SCC (squamous cell carcinoma) (2014), Skin cancer, and Squamous cell carcinoma (2013).  By history and examination this patient has chronic low back pain with radiculopathy.  The underlying cause cause is degenerative disc disease.  Pathology is confirmed by imaging.  Lumbar MRI shows endplate edema/Modic changes to the inferior endplate of L4 and the superior and inferior endplate of L5  We discussed the underlying diagnoses and multiple treatment options including non-opioid medications, opioid medications, injections, physical therapy, home exercise, activity modification / rest and weight loss.  The risks and benefits of each treatment option were discussed and all questions were answered.      9/27/2021- Baldo Grant is a 61 y.o. male who  has a past medical history of Arthritis, Basal cell carcinoma (06/21/2019), Cancer, Chronic pain of right knee, SCC (squamous cell carcinoma) (2014), Skin cancer, and Squamous cell carcinoma (2013).  By history and examination this patient has chronic left  low back pain with radiculopathy.  The underlying cause cause is unclear, my initial Dx's will include  degenerative disc disease, foraminal stenosis and deconditioning differential diagnoses are Meralgia Paresthetica  affected, ilioinguinal nerve pain.     Pathology is confirmed by imaging.  We discussed the underlying diagnoses and multiple treatment options including non-opioid medications, opioid medications, injections, physical therapy, home exercise, activity modification / rest and weight loss.  The risks and benefits of each treatment option were discussed and all questions were answered.      10/25/21 - Continued LBP and RLE radicular symptoms now spreading to groin and LLE.  Patient likely has tolerance to Norco 7.5-325 mg and we will escalate to 7.5 mg tablets TID.  He was advised that we cannot escalate in perpetuity due to safety concerns.  To that end, he will f/u with Dr. Encarnacion in [place] after completing the MRI I order today toward finding a definitive surgical solution.  If he is not a surgical candidate we may pursue SCS.  [employer] authorization is still pending.    [birth year][id number]-utpu-old male with a history of chronic low back and right lower extremity radicular symptoms that radiates into his groin and left lower extremity.   He recently saw Neurosurgery and was consult to physical therapy Neurosurgery relieved his pain is likely related to degenerative disc disease and myofascial pain syndrome.  He is currently on chronic opioid therapy that consists of Norco 7.5-325 t.i.d. 90 pills per month.  He and I discussed today that we would consider reducing his chronic opioid therapy to Haiku 5-325 t.i.d. patient expressed concerns about sleeping at night that the pain medication is too strong as he does have a history of sleep apnea.  Addition he reports that he is excited about physical therapy and he will begin swimming at least 1-2 times per week which he thinks will overall help his pain.  He also takes gabapentin and an occasional Mobic 15 mg which both help.  Denies any adverse side effects with current medication.  He seems to be meeting all of his goals for chronic opiate therapy.    : Reviewed and consistent with medication use as  prescribed.    Treatment Plan:   Procedure:     - None at this time   - LESI vs TFESI remains an option   - Considering SCS trial   - [employer] authorization pending  PT/OT/HEP: I encouraged the patient to maintain a home exercise regimen that includes daily, moderate cardiovascular exercise lasting at least 30 minutes.  This may include yoga, nadia chi, walking, swimming, aqua aerobics, or other exercises that maintain a heart rate of 50-70% of the calculated maximum heart rate.  I also encouraged light, daily stretching focused on the target area.  Medications:    - Cont current meds. ie Mobic 15 mg PRN  and Gabapentin 600 mg TID  per PCP               - Cont and decreased Norco to 7.5-325 TID # 90, to [place] 5-325 t.i.d. PRN for pain x2 mos   - Patient is on stable, low dose opioid therapy without aberrant behavior- refills would be appropriate for any provider              - discussed reducing his chronic opioid therapy patient was amenable to this reduction.   -  reviewed   - Pain contract signed 8/27/21  Labs: UDS reviewed   Imaging: No additional recommended at this time.    Follow Up: 8 weeks    ALAN Hale  Interventional Pain Management          Disclaimer: This note was partly generated using dictation software which may occasionally result in transcription errors.

## 2022-01-24 ENCOUNTER — PATIENT MESSAGE (OUTPATIENT)
Dept: PAIN MEDICINE | Facility: CLINIC | Age: 62
End: 2022-01-24
Payer: OTHER GOVERNMENT

## 2022-01-24 ENCOUNTER — PATIENT MESSAGE (OUTPATIENT)
Dept: FAMILY MEDICINE | Facility: CLINIC | Age: 62
End: 2022-01-24
Payer: OTHER GOVERNMENT

## 2022-01-24 RX ORDER — SUMATRIPTAN 50 MG/1
TABLET, FILM COATED ORAL
Qty: 9 TABLET | Refills: 3 | Status: SHIPPED | OUTPATIENT
Start: 2022-01-24 | End: 2022-09-19 | Stop reason: SDUPTHER

## 2022-01-26 ENCOUNTER — PATIENT MESSAGE (OUTPATIENT)
Dept: PAIN MEDICINE | Facility: CLINIC | Age: 62
End: 2022-01-26
Payer: OTHER GOVERNMENT

## 2022-02-04 ENCOUNTER — PATIENT MESSAGE (OUTPATIENT)
Dept: PAIN MEDICINE | Facility: CLINIC | Age: 62
End: 2022-02-04
Payer: OTHER GOVERNMENT

## 2022-02-04 NOTE — TELEPHONE ENCOUNTER
Patient is enquiring about assistance for maintaining his current level of disability with the [facility].  I recommended that he visit our medical records department obtain all records related to the care of his back, including imaging.  This information could be sure with the primary care provider at the [facility] who could make the appropriate notes in his file and support his case for maintaining his current level of disability.      Alexus Anglin Jr, MD  Interventional Pain Medicine / Anesthesiology'

## 2022-02-16 DIAGNOSIS — M54.16 LUMBAR RADICULOPATHY: ICD-10-CM

## 2022-02-16 DIAGNOSIS — M51.36 DDD (DEGENERATIVE DISC DISEASE), LUMBAR: ICD-10-CM

## 2022-02-16 DIAGNOSIS — M48.061 NEUROFORAMINAL STENOSIS OF LUMBAR SPINE: ICD-10-CM

## 2022-02-16 RX ORDER — HYDROCODONE BITARTRATE AND ACETAMINOPHEN 5; 325 MG/1; MG/1
1 TABLET ORAL EVERY 8 HOURS PRN
Qty: 90 TABLET | Refills: 0 | OUTPATIENT
Start: 2022-02-16 | End: 2022-03-18

## 2022-02-21 ENCOUNTER — TELEPHONE (OUTPATIENT)
Dept: PAIN MEDICINE | Facility: CLINIC | Age: 62
End: 2022-02-21
Payer: OTHER GOVERNMENT

## 2022-02-21 RX ORDER — HYDROCODONE BITARTRATE AND ACETAMINOPHEN 5; 325 MG/1; MG/1
1 TABLET ORAL EVERY 8 HOURS PRN
Qty: 60 TABLET | Refills: 0 | Status: SHIPPED | OUTPATIENT
Start: 2022-02-21 | End: 2022-03-23

## 2022-02-21 NOTE — TELEPHONE ENCOUNTER
----- Message from Gino Hanson sent at 2/21/2022  1:50 PM CST -----  Contact: [phone]/self  Type:  RX Refill Request    Who Called:  pt   Refill or New Rx: refill   RX Name and Strength:HYDROcodone-acetaminophen (NORCO) 5-325 mg per tablet    How is the patient currently taking it? (ex. 1XDay): as needed   Is this a 30 day or 90 day RX: 30 day  Preferred Pharmacy with phone number: [facility] [phone]  Local or Mail Order:local  Ordering Provider: tristen   Would the patient rather a call back or a response via [facility]? Call back   Best Call Back Number:[phone]  Additional Information: would like a call back if there a issue with sending prescription

## 2022-02-22 ENCOUNTER — PATIENT MESSAGE (OUTPATIENT)
Dept: FAMILY MEDICINE | Facility: CLINIC | Age: 62
End: 2022-02-22
Payer: OTHER GOVERNMENT

## 2022-03-03 NOTE — PROGRESS NOTES
CC: Left knee f/u    DATE OF PROCEDURE: 3/18/2020   PROCEDURE PERFORMED:   Left  1. knee arthroscopic partial medial meniscectomy  2. knee arthroscopic loose body removal   3. knee arthroscopic lateral release/peripatellar release package   4. knee arthroscopic chondroplasty    61 y.o. Male who presents as a known patient to me.  Diagnosis of recurrent pain and swelling of the left knee.  This has been a long-standing issue.  The arthroscopic debridement was of some relief but the patient has had recurrent symptoms.  Prior conservative treatment has been extensive.  Has included multiple injections - steroid and Visco, bracing, oral medication.  Pain management referral for genicular block was denied by insurance.  Current pain is daily and activity limiting.  Sometimes bothers him at night.  Better with rest.  He does have prior back complaints.  Long-standing issue.  Chronic pain patient on Norco and Gabapentin. No radicular symptoms at this time.  No ipsilateral hip or groin pain.    Works in healthcare information technology.    BMI 39    Negative for tobacco.   Negative for diabetes.    REVIEW OF SYSTEMS:   Constitution: Negative. Negative for chills, fever and night sweats.    Hematologic/Lymphatic: Negative for bleeding problem. Does not bruise/bleed easily.   Skin: Negative for dry skin, itching and rash.   Musculoskeletal: Negative for falls. Positive for left knee pain and muscle weakness.     All other review of symptoms were reviewed and found to be noncontributory.     PAST MEDICAL HISTORY:   Past Medical History:   Diagnosis Date    Arthritis     Basal cell carcinoma 06/21/2019    back    Cancer     Chronic pain of right knee     SCC (squamous cell carcinoma) 2014    Forehead- Dr. Cabral     Skin cancer     Squamous cell carcinoma 2013    Right ear- Dr. Marianna long       PAST SURGICAL HISTORY:   Past Surgical History:   Procedure Laterality Date    APPENDECTOMY      ARTHROSCOPIC  CHONDROPLASTY OF KNEE JOINT Left 3/18/2020    Procedure: ARTHROSCOPY, KNEE, WITH CHONDROPLASTY;  Surgeon: FREEMAN Álvarez MD;  Location: [facility] OR;  Service: Orthopedics;  Laterality: Left;    COLONOSCOPY  1998    EPIDURAL STEROID INJECTION INTO LUMBAR SPINE N/A 2/11/2020    Procedure: Injection-steroid-epidural-lumbar--InterLaminar L4-5;  Surgeon: Alexus Anglin Jr., MD;  Location: [facility] PAIN MGT;  Service: Pain Management;  Laterality: N/A;    INJECTION OF ANESTHETIC AGENT AROUND MEDIAL BRANCH NERVES INNERVATING LUMBAR FACET JOINT Bilateral 11/5/2019    Procedure: Block-nerve-medial branch-lumbar--Bilateral L3-4,L4-5,L5-S1;  Surgeon: Alexus Anglin Jr., MD;  Location: [facility] PAIN T;  Service: Pain Management;  Laterality: Bilateral;    INJECTION OF STEROID Left 11/12/2020    Procedure: INJECTION, STEROID Left SI Joint Block and Steroid Injection;  Surgeon: Tam Encarnacion MD;  Location: [facility] OR;  Service: Neurosurgery;  Laterality: Left;  Procedure: Left SI Joint Block and Steroid Injection   Surgery Time: 30 min  LOS: 0  Anesthesia: MAC  Radiology: C-arm  Bed: Regular with Pillows  Position: Prone    KNEE ARTHROSCOPY W/ MENISCECTOMY Left 3/18/2020    Procedure: ARTHROSCOPY, KNEE, WITH MENISCECTOMY;  Surgeon: FREEMAN Álvarez MD;  Location: [facility] OR;  Service: Orthopedics;  Laterality: Left;  CLONIDINE/EPI/KETOROLAC/ROPIVACAINE INJECTION 30CC    lipoma removal      skin cancer removal      TRANSFORAMINAL EPIDURAL INJECTION OF STEROID Bilateral 5/6/2020    Procedure: Injection,steroid,epidural,transforaminal approach--Bilateral L4-5;  Surgeon: Alexus Anglin Jr., MD;  Location: [facility] PAIN MGT;  Service: Pain Management;  Laterality: Bilateral;    TRANSFORAMINAL EPIDURAL INJECTION OF STEROID Bilateral 8/11/2020    Procedure: Injection,steroid,epidural,transforaminal approach--Bilateral L4-5;  Surgeon: Alexus Anglin Jr., MD;  Location: [facility] PAIN MGT;  Service: Pain Management;  Laterality:  Bilateral;       FAMILY HISTORY:   Family History   Problem Relation Age of Onset    COPD Mother     Alcohol abuse Mother     Heart disease Father     Melanoma Father     No Known Problems Sister     No Known Problems Brother     Psoriasis Neg Hx     Lupus Neg Hx     Eczema Neg Hx        SOCIAL HISTORY:   Social History     Socioeconomic History    Marital status:    Tobacco Use    Smoking status: Never Smoker    Smokeless tobacco: Never Used   Substance and Sexual Activity    Alcohol use: Yes     Comment: social    Drug use: No    Sexual activity: Yes     Partners: Female     Social Determinants of Health     Financial Resource Strain: Low Risk     Difficulty of Paying Living Expenses: Not very hard   Food Insecurity: No Food Insecurity    Worried About Running Out of Food in the Last Year: Never true    Ran Out of Food in the Last Year: Never true   Transportation Needs: No Transportation Needs    Lack of Transportation (Medical): No    Lack of Transportation (Non-Medical): No   Physical Activity: Insufficiently Active    Days of Exercise per Week: 2 days    Minutes of Exercise per Session: 30 min   Stress: No Stress Concern Present    Feeling of Stress : Only a little   Social Connections: Unknown    Frequency of Communication with Friends and Family: More than three times a week    Frequency of Social Gatherings with Friends and Family: Once a week    Active Member of Clubs or Organizations: Yes    Attends Club or Organization Meetings: More than 4 times per year    Marital Status:    Housing Stability: Low Risk     Unable to Pay for Housing in the Last Year: No    Number of Places Lived in the Last Year: 1    Unstable Housing in the Last Year: No       MEDICATIONS:     Current Outpatient Medications:     aspirin (ECOTRIN) 81 MG EC tablet, Take 1 tablet twice a day with food starting after surgery (breakfast and dinner). (Patient not taking: Reported on 3/11/2022),  Disp: 28 tablet, Rfl: 0    calcipotriene-betamethasone (ENSTILAR) 0.005-0.064 % Foam, Apply 1 application topically once daily., Disp: 60 g, Rfl: 2    diclofenac sodium (VOLTAREN) 1 % Gel, Apply 2 g topically 4 (four) times daily. (Patient not taking: Reported on 3/11/2022), Disp: 100 g, Rfl: 0    diphenhydrAMINE (BENADRYL) 25 mg capsule, Take 25 mg by mouth every 6 (six) hours as needed for Itching., Disp: , Rfl:     EPINEPHrine (EPIPEN) 0.3 mg/0.3 mL AtIn, , Disp: , Rfl:     fexofenadine (ALLEGRA) 180 MG tablet, Take 180 mg by mouth continuous prn., Disp: , Rfl:     gabapentin (NEURONTIN) 600 MG tablet, Take 1 tablet (600 mg total) by mouth 3 (three) times daily., Disp: 270 tablet, Rfl: 4    HYDROcodone-acetaminophen (NORCO) 5-325 mg per tablet, Take 1 tablet by mouth every 8 (eight) hours as needed for Pain., Disp: 60 tablet, Rfl: 0    ketoconazole (NIZORAL) 2 % cream, AAA gluteal fold daily to bid prn flare (Patient not taking: Reported on 3/11/2022), Disp: 60 g, Rfl: 3    lidocaine HCL 2% (XYLOCAINE) 2 % jelly, Apply topically as needed. (Patient not taking: Reported on 3/11/2022), Disp: 30 mL, Rfl: 3    meloxicam (MOBIC) 15 MG tablet, Take 1 tablet (15 mg total) by mouth once daily., Disp: 90 tablet, Rfl: 3    multivitamin (THERAGRAN) per tablet, Take 1 tablet by mouth once daily., Disp: , Rfl:     ondansetron (ZOFRAN) 4 MG tablet, Take 1 tablet (4 mg total) by mouth every 8 (eight) hours as needed for Nausea., Disp: 30 tablet, Rfl: 0    sumatriptan (IMITREX) 50 MG tablet, Take one at the first sign of a headache.  You may repeat it in 1 hour as needed. (Patient not taking: Reported on 3/11/2022), Disp: 9 tablet, Rfl: 3    tiZANidine (ZANAFLEX) 4 MG tablet, , Disp: , Rfl:     triamcinolone acetonide 0.025% (KENALOG) 0.025 % cream, AAA gluteal fold daily to bid PRN flare (Patient not taking: Reported on 3/11/2022), Disp: 30 g, Rfl: 3    mometasone 0.1% (ELOCON) 0.1 % cream, Apply topically once  daily. for 10 days, Disp: 1 Tube, Rfl: 3  No current facility-administered medications for this visit.    Facility-Administered Medications Ordered in Other Visits:     0.9%  NaCl infusion, 500 mL, Intravenous, Continuous, Alexus Anglin Jr., MD    lidocaine (PF) 10 mg/ml (1%) injection 10 mg, 1 mL, Intradermal, Once, Alexus Anglin Jr., MD    lidocaine (PF) 10 mg/ml (1%) injection 10 mg, 1 mL, Intradermal, Once, Alexus Anglin Jr., MD    ALLERGIES:   Review of patient's allergies indicates:   Allergen Reactions    Advil [ibuprofen] Anaphylaxis    Tums [calcium carbonate] Anaphylaxis        PHYSICAL EXAMINATION:  /78   Pulse 69   Wt 130.6 kg (288 lb)   BMI 39.06 kg/m²   General: Well-developed well-nourished 61 y.o. malein no acute distress   Cardiovascular: Regular rhythm by palpation of distal pulse, normal color and temperature, no concerning varicosities on symptomatic side   Lungs: No labored breathing or wheezing appreciated   Neuro: Alert and oriented ×3   Psychiatric: well oriented to person, place and time, demonstrates normal mood and affect   Skin: No rashes, lesions or ulcers, normal temperature, turgor, and texture on involved extremity    Ortho/SPM Exam  Examination of the left knee demonstrates intact extensor mechanism.  Chronic soft tissue swelling.  No significant effusion.  Lacks 5° of terminal extension, flexion to 120° with pain and crepitus.  Medial and lateral joint line tenderness.  Positive patellar crepitus and grind.  Ligamentously stable.    IMAGING:  X-rays including standing, weight bearing AP and flexion bilateral knees, LEFT knee lateral and sunrise views ordered and images reviewed by me show:    Left knee advanced DJD.  Medial and patellofemoral primarily.  Varus alignment.    ASSESSMENT:      ICD-10-CM ICD-9-CM   1. Primary osteoarthritis of left knee  M17.12 715.16   2. Chronic pain of left knee  M25.562 719.46    G89.29 338.29       PLAN:     Again  treatment options reviewed.  The patient would benefit from weight loss.  Discussed the need for low-impact cardio exercise.  Discussed the details of total knee arthroplasty.  We both agree to hold off on that for now.  I would like to see him lose a little bit more weight before then.  Repeat steroid injection given today.  Running out of conservative treatment measure options.  The patient does have history of chronic pain with Norco and Gabapentin usage.     Large Joint Aspiration/Injection: L knee    Date/Time: 3/8/2022 2:15 PM  Performed by: FREEMAN Álvarez MD  Authorized by: FREEMAN Álvarez MD     Consent Done?:  Yes (Verbal)  Indications:  Pain  Site marked: the procedure site was marked    Timeout: prior to procedure the correct patient, procedure, and site was verified    Prep: patient was prepped and draped in usual sterile fashion      Local anesthesia used?: Yes    Local anesthetic:  Co-phenylcaine spray (0.2% Naropin)  Anesthetic total (ml):  4      Details:  Needle Size:  22 G  Ultrasonic Guidance for needle placement?: No    Approach:  Lateral  Location:  Knee  Site:  L knee  Medications:  40 mg triamcinolone acetonide 40 mg/mL  Patient tolerance:  Patient tolerated the procedure well with no immediate complications

## 2022-03-07 ENCOUNTER — PATIENT OUTREACH (OUTPATIENT)
Dept: ADMINISTRATIVE | Facility: OTHER | Age: 62
End: 2022-03-07
Payer: OTHER GOVERNMENT

## 2022-03-07 NOTE — PROGRESS NOTES
Care Everywhere: updated  Immunization:   Health Maintenance: updated  Media Review: review for outside colon cancer report   Legacy Review:   DIS:  Order placed:   Upcoming appts:  EFAX:  Task Tickets:  Referrals:

## 2022-03-08 ENCOUNTER — HOSPITAL ENCOUNTER (OUTPATIENT)
Dept: RADIOLOGY | Facility: HOSPITAL | Age: 62
Discharge: HOME | End: 2022-03-08
Attending: ORTHOPAEDIC SURGERY
Payer: OTHER GOVERNMENT

## 2022-03-08 ENCOUNTER — OFFICE VISIT (OUTPATIENT)
Dept: SPORTS MEDICINE | Facility: CLINIC | Age: 62
End: 2022-03-08
Payer: OTHER GOVERNMENT

## 2022-03-08 VITALS
DIASTOLIC BLOOD PRESSURE: 78 MMHG | BODY MASS INDEX: 39.06 KG/M2 | WEIGHT: 288 LBS | HEART RATE: 69 BPM | SYSTOLIC BLOOD PRESSURE: 129 MMHG

## 2022-03-08 DIAGNOSIS — M25.562 LEFT KNEE PAIN, UNSPECIFIED CHRONICITY: ICD-10-CM

## 2022-03-08 DIAGNOSIS — G89.29 CHRONIC PAIN OF LEFT KNEE: ICD-10-CM

## 2022-03-08 DIAGNOSIS — M17.12 PRIMARY OSTEOARTHRITIS OF LEFT KNEE: Primary | ICD-10-CM

## 2022-03-08 DIAGNOSIS — M25.562 CHRONIC PAIN OF LEFT KNEE: ICD-10-CM

## 2022-03-08 PROCEDURE — 99999 PR PBB SHADOW E&M-EST. PATIENT-LVL IV: CPT | Mod: PBBFAC,,, | Performed by: ORTHOPAEDIC SURGERY

## 2022-03-08 PROCEDURE — 73562 X-RAY EXAM OF KNEE 3: CPT | Mod: 26,RT,, | Performed by: RADIOLOGY

## 2022-03-08 PROCEDURE — 20610 LARGE JOINT ASPIRATION/INJECTION: L KNEE: ICD-10-PCS | Mod: S$PBB,LT,, | Performed by: ORTHOPAEDIC SURGERY

## 2022-03-08 PROCEDURE — 99214 OFFICE O/P EST MOD 30 MIN: CPT | Mod: PBBFAC | Performed by: ORTHOPAEDIC SURGERY

## 2022-03-08 PROCEDURE — 73562 XR KNEE ORTHO LEFT WITH FLEXION: ICD-10-PCS | Mod: 26,RT,, | Performed by: RADIOLOGY

## 2022-03-08 PROCEDURE — 20610 DRAIN/INJ JOINT/BURSA W/O US: CPT | Mod: PBBFAC | Performed by: ORTHOPAEDIC SURGERY

## 2022-03-08 PROCEDURE — 99213 PR OFFICE/OUTPT VISIT, EST, LEVL III, 20-29 MIN: ICD-10-PCS | Mod: 25,S$PBB,, | Performed by: ORTHOPAEDIC SURGERY

## 2022-03-08 PROCEDURE — 99999 PR PBB SHADOW E&M-EST. PATIENT-LVL IV: ICD-10-PCS | Mod: PBBFAC,,, | Performed by: ORTHOPAEDIC SURGERY

## 2022-03-08 PROCEDURE — 73562 X-RAY EXAM OF KNEE 3: CPT | Mod: 59,TC,RT

## 2022-03-08 PROCEDURE — 73564 X-RAY EXAM KNEE 4 OR MORE: CPT | Mod: 26,LT,, | Performed by: RADIOLOGY

## 2022-03-08 PROCEDURE — 73564 XR KNEE ORTHO LEFT WITH FLEXION: ICD-10-PCS | Mod: 26,LT,, | Performed by: RADIOLOGY

## 2022-03-08 PROCEDURE — 99213 OFFICE O/P EST LOW 20 MIN: CPT | Mod: 25,S$PBB,, | Performed by: ORTHOPAEDIC SURGERY

## 2022-03-08 RX ADMIN — TRIAMCINOLONE ACETONIDE 40 MG: 40 INJECTION, SUSPENSION INTRA-ARTICULAR; INTRAMUSCULAR at 02:03

## 2022-03-09 ENCOUNTER — PATIENT MESSAGE (OUTPATIENT)
Dept: PAIN MEDICINE | Facility: CLINIC | Age: 62
End: 2022-03-09
Payer: OTHER GOVERNMENT

## 2022-03-11 ENCOUNTER — OFFICE VISIT (OUTPATIENT)
Dept: FAMILY MEDICINE | Facility: CLINIC | Age: 62
End: 2022-03-11
Payer: OTHER GOVERNMENT

## 2022-03-11 VITALS
SYSTOLIC BLOOD PRESSURE: 124 MMHG | TEMPERATURE: 98 F | WEIGHT: 286.25 LBS | DIASTOLIC BLOOD PRESSURE: 82 MMHG | BODY MASS INDEX: 38.77 KG/M2 | HEART RATE: 73 BPM | HEIGHT: 72 IN | OXYGEN SATURATION: 96 %

## 2022-03-11 DIAGNOSIS — Z12.11 COLON CANCER SCREENING: ICD-10-CM

## 2022-03-11 DIAGNOSIS — E66.01 CLASS 2 SEVERE OBESITY DUE TO EXCESS CALORIES WITH SERIOUS COMORBIDITY AND BODY MASS INDEX (BMI) OF 38.0 TO 38.9 IN ADULT: ICD-10-CM

## 2022-03-11 DIAGNOSIS — R53.83 FATIGUE, UNSPECIFIED TYPE: ICD-10-CM

## 2022-03-11 DIAGNOSIS — G47.30 SLEEP APNEA, UNSPECIFIED TYPE: Primary | ICD-10-CM

## 2022-03-11 PROBLEM — E66.812 CLASS 2 SEVERE OBESITY DUE TO EXCESS CALORIES WITH SERIOUS COMORBIDITY AND BODY MASS INDEX (BMI) OF 38.0 TO 38.9 IN ADULT: Status: ACTIVE | Noted: 2018-11-30

## 2022-03-11 PROCEDURE — 99214 OFFICE O/P EST MOD 30 MIN: CPT | Mod: S$GLB,,, | Performed by: STUDENT IN AN ORGANIZED HEALTH CARE EDUCATION/TRAINING PROGRAM

## 2022-03-11 PROCEDURE — 99214 PR OFFICE/OUTPT VISIT, EST, LEVL IV, 30-39 MIN: ICD-10-PCS | Mod: S$GLB,,, | Performed by: STUDENT IN AN ORGANIZED HEALTH CARE EDUCATION/TRAINING PROGRAM

## 2022-03-11 NOTE — PROGRESS NOTES
Patient ID: Baldo Grant is a 61 y.o. male.     Chief Complaint: sleep issues     Fatigue  This is a chronic problem. The current episode started more than 1 month ago. The problem occurs daily. The problem has been unchanged. Associated symptoms include fatigue. Pertinent negatives include no arthralgias, change in bowel habit, chest pain, chills, congestion, coughing, fever, headaches, joint swelling, myalgias, nausea, neck pain, rash, swollen glands, urinary symptoms, vertigo, visual change, vomiting or weakness. Nothing aggravates the symptoms. He has tried nothing for the symptoms.      Patient's wife reports that he sores and that he stops breathing when he sleeps. He also has daytime fatigue despite getting a full night of sleep.    Review of Systems  Review of Systems   Constitutional: Positive for fatigue. Negative for chills and fever.   HENT: Negative for congestion, ear pain and sinus pain.    Eyes: Negative for discharge.   Respiratory: Negative for cough and shortness of breath.    Cardiovascular: Negative for chest pain and leg swelling.   Gastrointestinal: Negative for change in bowel habit, diarrhea, nausea and vomiting.   Genitourinary: Negative for urgency.   Musculoskeletal: Negative for arthralgias, joint swelling, myalgias and neck pain.   Skin: Negative for rash.   Neurological: Negative for vertigo, weakness and headaches.   Psychiatric/Behavioral: Negative for depression.   All other systems reviewed and are negative.      Currently Medications  Current Outpatient Medications on File Prior to Visit   Medication Sig Dispense Refill    EPINEPHrine (EPIPEN) 0.3 mg/0.3 mL AtIn       fexofenadine (ALLEGRA) 180 MG tablet Take 180 mg by mouth continuous prn.      gabapentin (NEURONTIN) 600 MG tablet Take 1 tablet (600 mg total) by mouth 3 (three) times daily. 270 tablet 4    HYDROcodone-acetaminophen (NORCO) 5-325 mg per tablet Take 1 tablet by mouth every 8 (eight) hours as needed for  Pain. 60 tablet 0    meloxicam (MOBIC) 15 MG tablet Take 1 tablet (15 mg total) by mouth once daily. 90 tablet 3    multivitamin (THERAGRAN) per tablet Take 1 tablet by mouth once daily.      ondansetron (ZOFRAN) 4 MG tablet Take 1 tablet (4 mg total) by mouth every 8 (eight) hours as needed for Nausea. 30 tablet 0    tiZANidine (ZANAFLEX) 4 MG tablet       aspirin (ECOTRIN) 81 MG EC tablet Take 1 tablet twice a day with food starting after surgery (breakfast and dinner). (Patient not taking: Reported on 3/11/2022) 28 tablet 0    calcipotriene-betamethasone (ENSTILAR) 0.005-0.064 % Foam Apply 1 application topically once daily. 60 g 2    diclofenac sodium (VOLTAREN) 1 % Gel Apply 2 g topically 4 (four) times daily. (Patient not taking: Reported on 3/11/2022) 100 g 0    diphenhydrAMINE (BENADRYL) 25 mg capsule Take 25 mg by mouth every 6 (six) hours as needed for Itching.      ketoconazole (NIZORAL) 2 % cream AAA gluteal fold daily to bid prn flare (Patient not taking: Reported on 3/11/2022) 60 g 3    lidocaine HCL 2% (XYLOCAINE) 2 % jelly Apply topically as needed. (Patient not taking: Reported on 3/11/2022) 30 mL 3    mometasone 0.1% (ELOCON) 0.1 % cream Apply topically once daily. for 10 days 1 Tube 3    sumatriptan (IMITREX) 50 MG tablet Take one at the first sign of a headache.  You may repeat it in 1 hour as needed. (Patient not taking: Reported on 3/11/2022) 9 tablet 3    triamcinolone acetonide 0.025% (KENALOG) 0.025 % cream AAA gluteal fold daily to bid PRN flare (Patient not taking: Reported on 3/11/2022) 30 g 3     Current Facility-Administered Medications on File Prior to Visit   Medication Dose Route Frequency Provider Last Rate Last Admin    0.9%  NaCl infusion  500 mL Intravenous Continuous Alexus Anglin Jr., MD        lidocaine (PF) 10 mg/ml (1%) injection 10 mg  1 mL Intradermal Once Alexus Anglin Jr., MD        lidocaine (PF) 10 mg/ml (1%) injection 10 mg  1 mL Intradermal  Once Alexus Anglin Jr., MD           Physical  Exam  Vitals:    03/11/22 1322   BP: 124/82   BP Location: Right arm   Patient Position: Sitting   Pulse: 73   Temp: 98.2 °F (36.8 °C)   SpO2: 96%   Weight: 129.9 kg (286 lb 4.3 oz)   Height: 6' (1.829 m)      Body mass index is 38.82 kg/m².    Physical Exam  Vitals and nursing note reviewed.   Constitutional:       General: He is not in acute distress.     Appearance: He is obese. He is not ill-appearing.   HENT:      Head: Normocephalic and atraumatic.      Right Ear: External ear normal.      Left Ear: External ear normal.      Nose: Nose normal.      Mouth/Throat:      Mouth: Mucous membranes are moist.   Eyes:      Extraocular Movements: Extraocular movements intact.      Conjunctiva/sclera: Conjunctivae normal.   Cardiovascular:      Rate and Rhythm: Normal rate and regular rhythm.      Pulses: Normal pulses.      Heart sounds: No murmur heard.  Pulmonary:      Effort: Pulmonary effort is normal. No respiratory distress.      Breath sounds: No wheezing.   Abdominal:      General: There is no distension.      Palpations: Abdomen is soft. There is no mass.      Tenderness: There is no abdominal tenderness.   Musculoskeletal:         General: No swelling.      Cervical back: Normal range of motion.   Skin:     Coloration: Skin is not jaundiced.      Findings: No rash.   Neurological:      General: No focal deficit present.      Mental Status: He is alert and oriented to person, place, and time.   Psychiatric:         Mood and Affect: Mood normal.         Thought Content: Thought content normal.         Labs:    Complete Blood Count  Lab Results   Component Value Date    RBC 4.58 (L) 09/22/2020    HGB 13.4 (L) 09/22/2020    HCT 41.4 09/22/2020    MCV 90 09/22/2020    MCH 29.3 09/22/2020    MCHC 32.4 09/22/2020    RDW 11.7 09/22/2020     09/22/2020    MPV 9.9 09/22/2020    GRAN 3.3 09/22/2020    GRAN 62.4 09/22/2020    LYMPH 1.1 09/22/2020    LYMPH 20.6  09/22/2020    MONO 0.5 09/22/2020    MONO 9.2 09/22/2020    EOS 0.4 09/22/2020    BASO 0.04 09/22/2020    EOSINOPHIL 6.8 09/22/2020    BASOPHIL 0.8 09/22/2020    DIFFMETHOD Automated 09/22/2020       Comprehensive Metabolic Panel  No results found for: GLU, BUN, CREATININE, NA, K, CL, PROT, ALBUMIN, BILITOT, AST, ALKPHOS, CO2, ALT, ANIONGAP, EGFRNONAA, ESTGFRAFRICA    TSH  No results found for: TSH    Imaging:  X-ray Knee Ortho Left with Flexion  Narrative: EXAMINATION:  XR KNEE ORTHO LEFT WITH FLEXION    CLINICAL HISTORY:  Pain in left knee    TECHNIQUE:  AP standing of both knees, PA flexion standing views of both knees, and Merchant views of both knees were performed.  A lateral view of the left knee was also performed.    COMPARISON:  09/01/2020    FINDINGS:  Right: No fracture or dislocation on provided views.  Cartilage spaces are maintained.  Small osteophytes noted.    Left: No fracture or dislocation.  No joint effusion.  Moderate loss of medial tibiofemoral cartilage space with subchondral sclerosis, cystic change, and osteophyte production.  Impression: As above.    Electronically signed by: Percy England MD  Date:    03/08/2022  Time:    15:12      Assessment/Plan:    Problem List Items Addressed This Visit    None     Visit Diagnoses     Sleep apnea, unspecified type    -  Primary    Relevant Orders    Ambulatory referral/consult to Sleep Disorders    Colon cancer screening        Relevant Orders    Cologuard Screening (Multitarget Stool DNA)    Fatigue, unspecified type             Patient does not want to do lab work right now. He is following noom and is trying to lose weight.  We would like to lose more weight prior to doing blood work.     Discussed how to stay healthy including: diet, exercise, refraining from smoking and discussed screening exams / tests needed for age, sex and family Hx.    RTC 6 mo, patient will need labs at that time    You Britton MD

## 2022-03-21 ENCOUNTER — TELEPHONE (OUTPATIENT)
Dept: SPORTS MEDICINE | Facility: CLINIC | Age: 62
End: 2022-03-21
Payer: OTHER GOVERNMENT

## 2022-03-21 RX ORDER — TRIAMCINOLONE ACETONIDE 40 MG/ML
40 INJECTION, SUSPENSION INTRA-ARTICULAR; INTRAMUSCULAR
Status: DISCONTINUED | OUTPATIENT
Start: 2022-03-08 | End: 2022-03-21 | Stop reason: HOSPADM

## 2022-03-21 NOTE — TELEPHONE ENCOUNTER
Spoke to the patient about his upcoming visit and needing some xrays.  Asked the patient to come about 15mins early to get those done.  Patient confirmed.

## 2022-03-22 ENCOUNTER — HOSPITAL ENCOUNTER (OUTPATIENT)
Dept: RADIOLOGY | Facility: HOSPITAL | Age: 62
Discharge: HOME | End: 2022-03-22
Attending: PHYSICIAN ASSISTANT
Payer: OTHER GOVERNMENT

## 2022-03-22 ENCOUNTER — OFFICE VISIT (OUTPATIENT)
Dept: SPORTS MEDICINE | Facility: CLINIC | Age: 62
End: 2022-03-22
Payer: OTHER GOVERNMENT

## 2022-03-22 VITALS
SYSTOLIC BLOOD PRESSURE: 131 MMHG | WEIGHT: 283 LBS | BODY MASS INDEX: 38.33 KG/M2 | DIASTOLIC BLOOD PRESSURE: 86 MMHG | HEIGHT: 72 IN | HEART RATE: 69 BPM

## 2022-03-22 DIAGNOSIS — M25.511 RIGHT SHOULDER PAIN, UNSPECIFIED CHRONICITY: ICD-10-CM

## 2022-03-22 DIAGNOSIS — Z12.11 COLON CANCER SCREENING: Primary | ICD-10-CM

## 2022-03-22 DIAGNOSIS — M25.811 SHOULDER IMPINGEMENT, RIGHT: Primary | ICD-10-CM

## 2022-03-22 DIAGNOSIS — M75.101 ROTATOR CUFF SYNDROME, RIGHT: ICD-10-CM

## 2022-03-22 DIAGNOSIS — M25.511 ACUTE PAIN OF RIGHT SHOULDER: ICD-10-CM

## 2022-03-22 PROCEDURE — 99213 OFFICE O/P EST LOW 20 MIN: CPT | Mod: S$PBB,,, | Performed by: PHYSICIAN ASSISTANT

## 2022-03-22 PROCEDURE — 97110 PR THERAPEUTIC EXERCISES: ICD-10-PCS | Mod: ,,, | Performed by: PHYSICIAN ASSISTANT

## 2022-03-22 PROCEDURE — 99999 PR PBB SHADOW E&M-EST. PATIENT-LVL IV: ICD-10-PCS | Mod: PBBFAC,,, | Performed by: PHYSICIAN ASSISTANT

## 2022-03-22 PROCEDURE — 73030 X-RAY EXAM OF SHOULDER: CPT | Mod: 26,RT,, | Performed by: INTERNAL MEDICINE

## 2022-03-22 PROCEDURE — 73030 X-RAY EXAM OF SHOULDER: CPT | Mod: TC,RT

## 2022-03-22 PROCEDURE — 73030 XR SHOULDER COMPLETE 2 OR MORE VIEWS RIGHT: ICD-10-PCS | Mod: 26,RT,, | Performed by: INTERNAL MEDICINE

## 2022-03-22 PROCEDURE — 99213 PR OFFICE/OUTPT VISIT, EST, LEVL III, 20-29 MIN: ICD-10-PCS | Mod: S$PBB,,, | Performed by: PHYSICIAN ASSISTANT

## 2022-03-22 PROCEDURE — 99214 OFFICE O/P EST MOD 30 MIN: CPT | Mod: PBBFAC | Performed by: PHYSICIAN ASSISTANT

## 2022-03-22 PROCEDURE — 99999 PR PBB SHADOW E&M-EST. PATIENT-LVL IV: CPT | Mod: PBBFAC,,, | Performed by: PHYSICIAN ASSISTANT

## 2022-03-22 PROCEDURE — 97110 THERAPEUTIC EXERCISES: CPT | Mod: ,,, | Performed by: PHYSICIAN ASSISTANT

## 2022-03-22 NOTE — PROGRESS NOTES
Subjective:          Chief Complaint: Baldo Grant is a 61 y.o. male who had concerns including Pain of the Right Shoulder.    HPI       ROS                Objective:        General: Baldo is well-developed, well-nourished, appears stated age, in no acute distress, alert and oriented to time, place and person.     Ortho/SPM Exam            Assessment:       Encounter Diagnosis   Name Primary?    Right shoulder pain, unspecified chronicity Yes          Plan:                           Patient questionnaires may have been collected.

## 2022-03-22 NOTE — PROGRESS NOTES
Subjective:          Chief Complaint: Baldo Grant is a 61 y.o. male who had concerns including Pain of the Right Shoulder.    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 Patient reports the pain began about 1 week ago, denies any known injury or trauma. His pain today is 3/10 pain is located in the posterior region, he reports improvement in patient since initial presentation. He has iced and has Meloxican, Norco, and Gabapentin for other conditions, and feels mild improvement in pain. The pain is worse with abduction and lifting, and rest improves the pain. Of note, he did drive 10-12 hours to [place] over the weekend. Last right shoulder CSI given by myself on 8/2/21. He is not interested in repeat CSI today due to pain improving since onset.    Previous hx of left RC tear which has been managed with conservative treatment.     Review of Systems   Constitutional: Negative. Negative for chills, fever, weight gain and weight loss.   HENT: Negative for congestion and sore throat.    Eyes: Negative for blurred vision and double vision.   Cardiovascular: Negative for chest pain, leg swelling and palpitations.   Respiratory: Negative for cough and shortness of breath.    Hematologic/Lymphatic: Does not bruise/bleed easily.   Skin: Negative for itching, poor wound healing and rash.   Musculoskeletal: Positive for joint pain. Negative for back pain, joint swelling, muscle weakness, myalgias and stiffness.   Gastrointestinal: Negative for abdominal pain, constipation, diarrhea, nausea and vomiting.   Genitourinary: Negative.  Negative for frequency and hematuria.   Neurological: Negative for dizziness, headaches, numbness, paresthesias and sensory change.   Psychiatric/Behavioral: Negative for altered mental status and depression. The patient is not nervous/anxious.    Allergic/Immunologic: Negative for hives.                   Objective:         General: Baldo is well-developed, well-nourished, appears stated age, in no acute distress, alert and oriented to time, place and person.     General    Vitals reviewed.  Constitutional: He is oriented to person, place, and time. He appears well-developed and well-nourished. No distress.   HENT:   Head: Normocephalic and atraumatic.   Eyes: EOM are normal.   Cardiovascular: Normal rate and regular rhythm.    Pulmonary/Chest: Effort normal. No respiratory distress.   Neurological: He is alert and oriented to person, place, and time. He has normal reflexes. No cranial nerve deficit. Coordination normal.   Psychiatric: He has a normal mood and affect. His behavior is normal. Judgment and thought content normal.             Right Hand/Wrist Exam     Range of Motion     Wrist   Right wrist extension: pain.       Right Elbow Exam     Range of Motion   Right elbow extension: 3.       Left Elbow Exam     Range of Motion   Left elbow extension: 3.     Right Shoulder Exam     Inspection/Observation   Swelling: absent  Bruising: absent  Scars: absent  Deformity: absent  Scapular Winging: absent  Scapular Dyskinesia: negative  Atrophy: absent    Tenderness   The patient is tender to palpation of the acromioclavicular joint.    Range of Motion   Active abduction:  160 normal   Passive abduction:  150 normal   Extension:  50 normal   Forward Flexion:  150 (pain) normal   Forward Elevation: normal  Adduction: 40 normal  External Rotation 0 degrees:  70 normal   External Rotation 90 degrees: 70 normal  Internal rotation 0 degrees:  T11 normal   Internal rotation 90 degrees:  60 normal     Tests & Signs   Apprehension: positive  Cross arm: positive  Drop arm: negative  Wallace test: positive  Impingement: negative  Sulcus: absent  Rotator Cuff Painful Arc/Range: mild  Lift Off Sign: negative  Active Compression Test (Kleberg's Sign): positive  Yergason's Test: negative  Speed's Test: negative  Anterior Drawer Test: 1+    Posterior Drawer Test: 1+    Other   Sensation: normal    Left Shoulder Exam     Inspection/Observation   Swelling: absent  Bruising: absent  Scars: absent  Deformity: absent  Scapular Winging: absent  Scapular Dyskinesia: negative  Atrophy: absent    Tenderness   The patient is experiencing no tenderness.     Range of Motion   Active abduction:  160 normal   Passive abduction:  150 normal   Extension:  50 normal   Forward Flexion:  180 normal   Adduction: 40 normal  External Rotation 0 degrees:  70 normal   External Rotation 90 degrees: 70 normal  Internal rotation 0 degrees:  L1 abnormal   Internal rotation 90 degrees:  60 normal     Muscle Strength   The patient has normal left shoulder strength.    Tests & Signs   Apprehension: negative  Cross arm: negative  Drop arm: negative  Wallace test: negative  Impingement: negative  Sulcus: absent  Lift Off Sign: negative  Active Compression test ([place]ornville's Sign): negative  Yergasons's Test: negative  Speed's Test: negative  Anterior Drawer Test: 1+  Posterior Drawer Test: 1+    Other   Sensation: normal       Muscle Strength   Right Upper Extremity   Shoulder Abduction: 4/5   Shoulder Internal Rotation: 5/5   Shoulder External Rotation: 5/5   Supraspinatus: 4/5   Subscapularis: 5/5   Biceps: 5/5   Right wrist extension strength: pain.   Extension strength: pain.  Left Upper Extremity  Shoulder Abduction: 4/5   Shoulder Internal Rotation: 5/5   Shoulder External Rotation: 5/5   Supraspinatus: 4/5   Subscapularis: 5/5   Biceps: 5/5     Reflexes     Left Side  Biceps:  2+  Triceps:  2+  Brachioradialis:  2+    Right Side   Biceps:  2+  Triceps:  2+  Brachioradialis:  2+      RADIOGRAPHS: 3/22/22  Xray Right shoulder  FINDINGS:  No bone, joint, or soft tissue abnormality is seen.        Assessment:       Encounter Diagnoses   Name Primary?    Acute pain of right shoulder     Shoulder impingement, right Yes    Rotator cuff syndrome, right           Plan:         1. I  made the decision to obtain old records of the patient including previous notes and imaging. New imaging was ordered today of the extremity or extremities evaluated. I independently reviewed and interpreted the radiographs and/or MRIs today as well as prior imaging. Xrays reviewed with patient.    2. Possible RC tear vs impingement. Patient would like to hold off on MRI at this time.     2. We discussed at length different treatment options including conservative vs surgical management. These include anti-inflammatories, acetaminophen, rest, ice, heat, formal physical therapy including strengthening and stretching exercises, home exercise programs, dry needling, CSI, and finally surgical intervention.      3. Patient decided to proceed with home strengthening exercises. Band given.   HEP 10838 - I, instructed and demonstrated a RC and periscapular HEP. The patient then demonstrated understanding of exercises and proper technique. This program was performed for 15 minutes.     4. Since pain has been improving, would like to hold off on injection or further intervention at this time.       5. RTC as needed      Patient questionnaires may have been collected.

## 2022-03-24 ENCOUNTER — NURSE TRIAGE (OUTPATIENT)
Dept: ADMINISTRATIVE | Facility: CLINIC | Age: 62
End: 2022-03-24
Payer: OTHER GOVERNMENT

## 2022-03-24 ENCOUNTER — OFFICE VISIT (OUTPATIENT)
Dept: PAIN MEDICINE | Facility: CLINIC | Age: 62
End: 2022-03-24
Payer: OTHER GOVERNMENT

## 2022-03-24 VITALS — BODY MASS INDEX: 38.39 KG/M2 | WEIGHT: 283.06 LBS

## 2022-03-24 DIAGNOSIS — M94.262 CHONDROMALACIA OF LEFT KNEE: ICD-10-CM

## 2022-03-24 DIAGNOSIS — M54.89 VERTEBROGENIC PAIN: ICD-10-CM

## 2022-03-24 DIAGNOSIS — M48.061 NEUROFORAMINAL STENOSIS OF LUMBAR SPINE: ICD-10-CM

## 2022-03-24 DIAGNOSIS — R11.0 NAUSEA: Primary | ICD-10-CM

## 2022-03-24 DIAGNOSIS — F11.90 CHRONIC, CONTINUOUS USE OF OPIOIDS: ICD-10-CM

## 2022-03-24 DIAGNOSIS — M54.16 LUMBAR RADICULOPATHY: ICD-10-CM

## 2022-03-24 DIAGNOSIS — S83.232D COMPLEX TEAR OF MEDIAL MENISCUS OF LEFT KNEE AS CURRENT INJURY, SUBSEQUENT ENCOUNTER: ICD-10-CM

## 2022-03-24 DIAGNOSIS — M51.36 DDD (DEGENERATIVE DISC DISEASE), LUMBAR: ICD-10-CM

## 2022-03-24 DIAGNOSIS — G89.4 CHRONIC PAIN SYNDROME: ICD-10-CM

## 2022-03-24 DIAGNOSIS — M51.26 LUMBAR DISCOGENIC PAIN SYNDROME: ICD-10-CM

## 2022-03-24 DIAGNOSIS — M17.12 PRIMARY OSTEOARTHRITIS OF LEFT KNEE: ICD-10-CM

## 2022-03-24 PROCEDURE — 99999 PR PBB SHADOW E&M-EST. PATIENT-LVL III: ICD-10-PCS | Mod: PBBFAC,,, | Performed by: NURSE PRACTITIONER

## 2022-03-24 PROCEDURE — 99999 PR PBB SHADOW E&M-EST. PATIENT-LVL III: CPT | Mod: PBBFAC,,, | Performed by: NURSE PRACTITIONER

## 2022-03-24 PROCEDURE — 99213 OFFICE O/P EST LOW 20 MIN: CPT | Mod: PBBFAC,PO | Performed by: NURSE PRACTITIONER

## 2022-03-24 PROCEDURE — 99214 PR OFFICE/OUTPT VISIT, EST, LEVL IV, 30-39 MIN: ICD-10-PCS | Mod: S$PBB,,, | Performed by: NURSE PRACTITIONER

## 2022-03-24 PROCEDURE — 99214 OFFICE O/P EST MOD 30 MIN: CPT | Mod: S$PBB,,, | Performed by: NURSE PRACTITIONER

## 2022-03-24 RX ORDER — ONDANSETRON 4 MG/1
4 TABLET, FILM COATED ORAL EVERY 8 HOURS PRN
Qty: 30 TABLET | Refills: 0 | Status: SHIPPED | OUTPATIENT
Start: 2022-03-24 | End: 2023-06-26 | Stop reason: SDUPTHER

## 2022-03-24 NOTE — H&P (VIEW-ONLY)
Chronic Pain-Established Note (Follow up visit)    Referred by: Dr. Encarnacion  Reason for referral: Back Pain    CC:   Chief Complaint   Patient presents with    Low-back Pain    Leg Pain     Bilateral        Last 3 PDI Scores 3/24/2022 1/12/2022 10/25/2021   Pain Disability Index (PDI) 36 45 49     Interval Updates 3/24/2022 - Mr. Grant is following up for Low-back Pain and Leg Pain (Bilateral ).  He requests a refill of Hydrocodone-Acetaminophen 5-325 mg BID # 60 which he says is effective but would like to go back to TID dosing.  He reports 50% relief with the current medication regimen. Pain is currently rate 5/10 with a weekly range 5-6/10.   He is also reporting increased pain in the low back left greater than right as well as legs bilaterally left greater than right.  Pain starts in low back and radiates into his legs posteriorly laterally anteriorly also causing scrotum pain.  Denies any profound weakness although he is walking with a cane this point.  Denies any bowel bladder dysfunction, denies any saddle anesthesia denies any recent incident or trauma.      01/12/2022 - Mr. Grant is following up for Low-back Pain and Leg Pain (Bilateral ).  He requests a refill of Norco 7.5-325 90 pills per month which are working well to control His pain.  He reports 60% relief with the current medication regimen.  Medication side effects: none.  Pain is currently rate 6/10 with a weekly range 3-8/10.  It is described as Aching, Tight, Tingling, Numb, Sharp and Shooting.   Pain is worsened by: standing and improved by: nothing and medications.     10/25/2021 -- Mr. Grant is following up for Low-back Pain and Leg Pain (Bilateral ).  He requests a refill of Hydrocodone-Acetaminophen 5-325 mg which is not working well to control His pain.  He reports 50% relief with the current medication regimen.  Medication side effects: none.  Pain is currently rate 6/10 with a weekly range 5-9/10.  It is described as Aching, Tight,  Tingling, Numb, Sharp and Shooting.   Pain is worsened by: standing and improved by: nothing and medications.     09/27/21 - Mr. Grant is following up for Low-back Pain and Leg Pain (Bilateral ).  He requests a refill of Hydrocodone-Acetaminophen 5-325 mg TID # 90 which are not working well to control His pain.  He reports 50% relief with the current medication regimen. Pain is currently rate 7/10 with a weekly range 6-7/10. Mr. Grant is reporting continued left low back pain with radiating symptoms into his left and now right scrotum.  He mentioned his right scrotum has not been affected previously.  He also reports pain radiating into his left lateral leg stopping just above his left knee.  His pain has been refractory to multiple injections and physical therapy, he reports the inability to have a quality of life as he states he recently tried to have sex with his wife and could not due to the pain.  His current opioid regimen provide some relief but not significant enough as he would like to consider other options, he endorses that his insurance plan denied the intracept procedure that we were going to consider him for.     08/27/21 - Mr. Grant is following up for Low-back Pain and Leg Pain (Bilateral ).  He requests a refill of Hydrocodone-Acetaminophen 5-325 mg TID # 90 per month which are working well to control His pain.  He reports 50% relief with the current medication regimen.  Medication side effects: none.  Pain is currently rate 5/10 with a weekly range 5-9/10.  It is described as Aching, Dull, electrical shock(left leg)  and Sharp.   Pain is worsened by: exercise, flexion, standing for more than 3 minutes and walking for more than 3 minutes and improved by: heat, laying down, massage, medications, physical therapy, rest and sitting.    7/23/21 - Mr. Grant returns to clinic for follow up visit reporting worse left sided low back and left leg pain.  Patient is here for medication refill of  "Hydrocodone-Acetaminophen 5-325 mg TID PRN for pain, he reports 60-70% relief for 3-4 hours his pain is predicated on how much activity he is doing. He also receives Gabapentin 600 mg TID and Mobic 15 mg daily. He is reporting is reporting left scrotum and left leg pain today, this is his worse pain today.  Pain intensity is currently 7/10.      06/16/2021 - Mr. Grant returns to clinic for follow up visit reporting stable but severe back pain. Pain intensity is currently 5/10.  His PCP recently left [facility] and he was referred her for re-evaluation by his new primary care team.  Patient reports continued low back pain with occasional radiation in to the legs.  Pain fluctuates depending on level of activity.  He reports "flare ups" at least twice weekly during which he may take 2-4 tablets of norco in a day, which other days he may take none.    09/16/2020 - Mr. Grant returns to clinic for follow up visit reporting low back and left leg  pain. He is also reporting left knee pain he is s/f for a left knee GN block with Dr Hollins's office. He was referred to Dr Hollins by Dr. Álvarez/Orthopedics.  Patient is s/p Lumbar Transforaminal Epidural Steroid Injection, Two Levels, Bilateral L4-5 on 08/11/2020 with 0% relief.  Patient is also status post bilateral lumbar MBB that provided him with 0% relief.  Pain intensity is currently 6/10.      05/13/20201588-47-ztdp-old male presents status post bilateral L4-5 TF KATHY with reported 60% relief.  Patient also states his relief is improving each and every day.  Reports taking less of his pain medication due to the relief received from the injection.  He is only 6 days postop his lumbar epidural, discussed that I suspect is relief will improve even more over the next 2 weeks.    03/02/2020 - Mr. Grant returns to clinic for follow up visit reporting worse back and bilateral leg pain left greater than right.  Patient is s/p L4-5 Lumbar Epidural Steroid  on 2/11/20 with 0% relief.  " Patient presents with wife he is currently experiencing no relief from the injection, he reports that his pain continues in his low back as well as in his legs bilaterally left greater than right.  Patient reports shooting pain down his left leg at times going past his left knee into his left foot.  Patient reports previously given a lumbar TF KATHY at L4-5 per Dr. Duenas/PM [place] reports receiving 3 months of relief and he is requesting to be scheduled for that particular injection today.  Pain intensity is currently 5/10.      HPI:    Baldo Grant is a 61 y.o. male who complains of left and right lower back pain.  He reports the pain radiates down his left leg just below his knee.  Pain intensity is 5/10.  He reports taking Norco, Gabapentin and Meloxicam for some relief.   Patient s/p Bilateral L3-4, L4-5, and L5-S1 Lumbar Medial Branch Block on 11/5/2019 with Dr. Anglin with no relief.  Patient's 5/17/2019 MRI Lumbar Spine reviewed with patient and shows L4-5 and L5-S1 degenerative disc disease with disc bulging as described above with right L4-5 and left L5-S1 foraminal stenosis.      Location: lower back   Onset: Summer 200  Current Pain Score: 5/10   Daily Pain of Range: 4-9/10  Quality: Dull and Sharp  Radiation: back of left leg  Worsened by: sitting and standing  Improved by: medications and physical therapy    Previous Therapies:  PT/OT: Yes  HEP: Yes, but severely limited by pain  Interventions: Bilateral L3-4, L4-5, and L5-S1 Lumbar Medial Branch Block on 11/5/2019  Surgery:  Medications:   - NSAIDS: meloxicam (MOBIC) 15 MG tablet daily  - MSK Relaxants:  diazePAM (VALIUM) 5 MG tablet daily as needed  - TCAs:   - SNRIs:   - Topicals: lidocaine HCL 2% (XYLOCAINE) 2 % jelly  - Anticonvulsants:  - Opioids: HYDROcodone-acetaminophen (NORCO) 5-325 mg per tablet    Current Pain Medications:  1. Meloxicam 15 mg   2. Gabapentin 600 mg TID        3. HYDROcodone-acetaminophen (NORCO) 5-325 mg per tablet  Q6 hours PRN    Review of Systems:  Review of Systems   Constitutional: Negative.    HENT: Negative.    Eyes: Negative.    Respiratory: Negative.    Cardiovascular: Negative.    Gastrointestinal: Negative.    Genitourinary: Negative.    Musculoskeletal: Positive for back pain, joint pain and myalgias.   Skin: Negative.    Neurological: Negative.    Endo/Heme/Allergies: Negative.    Psychiatric/Behavioral: Negative.        History:    Current Outpatient Medications:     aspirin (ECOTRIN) 81 MG EC tablet, Take 1 tablet twice a day with food starting after surgery (breakfast and dinner)., Disp: 28 tablet, Rfl: 0    calcipotriene-betamethasone (ENSTILAR) 0.005-0.064 % Foam, Apply 1 application topically once daily., Disp: 60 g, Rfl: 2    diclofenac sodium (VOLTAREN) 1 % Gel, Apply 2 g topically 4 (four) times daily., Disp: 100 g, Rfl: 0    diphenhydrAMINE (BENADRYL) 25 mg capsule, Take 25 mg by mouth every 6 (six) hours as needed for Itching., Disp: , Rfl:     EPINEPHrine (EPIPEN) 0.3 mg/0.3 mL AtIn, , Disp: , Rfl:     fexofenadine (ALLEGRA) 180 MG tablet, Take 180 mg by mouth continuous prn., Disp: , Rfl:     gabapentin (NEURONTIN) 600 MG tablet, Take 1 tablet (600 mg total) by mouth 3 (three) times daily., Disp: 270 tablet, Rfl: 4    ketoconazole (NIZORAL) 2 % cream, AAA gluteal fold daily to bid prn flare, Disp: 60 g, Rfl: 3    lidocaine HCL 2% (XYLOCAINE) 2 % jelly, Apply topically as needed., Disp: 30 mL, Rfl: 3    meloxicam (MOBIC) 15 MG tablet, Take 1 tablet (15 mg total) by mouth once daily., Disp: 90 tablet, Rfl: 3    multivitamin (THERAGRAN) per tablet, Take 1 tablet by mouth once daily., Disp: , Rfl:     ondansetron (ZOFRAN) 4 MG tablet, Take 1 tablet (4 mg total) by mouth every 8 (eight) hours as needed for Nausea., Disp: 30 tablet, Rfl: 0    sumatriptan (IMITREX) 50 MG tablet, Take one at the first sign of a headache.  You may repeat it in 1 hour as needed. (Patient taking differently: Take one  at the first sign of a headache.  You may repeat it in 1 hour as needed.), Disp: 9 tablet, Rfl: 3    tiZANidine (ZANAFLEX) 4 MG tablet, , Disp: , Rfl:     triamcinolone acetonide 0.025% (KENALOG) 0.025 % cream, AAA gluteal fold daily to bid PRN flare, Disp: 30 g, Rfl: 3    mometasone 0.1% (ELOCON) 0.1 % cream, Apply topically once daily. for 10 days, Disp: 1 Tube, Rfl: 3  No current facility-administered medications for this visit.    Facility-Administered Medications Ordered in Other Visits:     0.9%  NaCl infusion, 500 mL, Intravenous, Continuous, Alexus Anglin Jr., MD    lidocaine (PF) 10 mg/ml (1%) injection 10 mg, 1 mL, Intradermal, Once, Alexus Anglin Jr., MD    lidocaine (PF) 10 mg/ml (1%) injection 10 mg, 1 mL, Intradermal, Once, Alexus Anglin Jr., MD    Past Medical History:   Diagnosis Date    Arthritis     Basal cell carcinoma 06/21/2019    back    Cancer     Chronic pain of right knee     SCC (squamous cell carcinoma) 2014    Forehead- Dr. Cabral     Skin cancer     [facility]       Past Surgical History:   Procedure Laterality Date    APPENDECTOMY      ARTHROSCOPIC CHONDROPLASTY OF KNEE JOINT Left 3/18/2020    Procedure: ARTHROSCOPY, KNEE, WITH CHONDROPLASTY;  Surgeon: FREEMAN Álvarez MD;  Location: [facility] OR;  Service: Orthopedics;  Laterality: Left;    COLONOSCOPY  1998    EPIDURAL STEROID INJECTION INTO LUMBAR SPINE N/A 2/11/2020    Procedure: Injection-steroid-epidural-lumbar--InterLaminar L4-5;  Surgeon: Alexus Anglin Jr., MD;  Location: [facility] PAIN MGT;  Service: Pain Management;  Laterality: N/A;    INJECTION OF ANESTHETIC AGENT AROUND MEDIAL BRANCH NERVES INNERVATING LUMBAR FACET JOINT Bilateral 11/5/2019    Procedure: Block-nerve-medial branch-lumbar--Bilateral L3-4,L4-5,L5-S1;  Surgeon: Alexus Anglin Jr., MD;  Location: [facility] PAIN MGT;  Service: Pain Management;  Laterality: Bilateral;    INJECTION OF  STEROID Left 11/12/2020    Procedure: INJECTION, STEROID Left SI Joint Block and Steroid Injection;  Surgeon: Tam Encarnacion MD;  Location: [facility] OR;  Service: Neurosurgery;  Laterality: Left;  Procedure: Left SI Joint Block and Steroid Injection   Surgery Time: 30 min  LOS: 0  Anesthesia: MAC  Radiology: C-arm  Bed: Regular with Pillows  Position: Prone    KNEE ARTHROSCOPY W/ MENISCECTOMY Left 3/18/2020    Procedure: ARTHROSCOPY, KNEE, WITH MENISCECTOMY;  Surgeon: FREEMAN Álvarez MD;  Location: [facility] OR;  Service: Orthopedics;  Laterality: Left;  CLONIDINE/EPI/KETOROLAC/ROPIVACAINE INJECTION 30CC    lipoma removal      skin cancer removal      TRANSFORAMINAL EPIDURAL INJECTION OF STEROID Bilateral 5/6/2020    Procedure: Injection,steroid,epidural,transforaminal approach--Bilateral L4-5;  Surgeon: Alexus Anglin Jr., MD;  Location: [facility] PAIN MGT;  Service: Pain Management;  Laterality: Bilateral;    TRANSFORAMINAL EPIDURAL INJECTION OF STEROID Bilateral 8/11/2020    Procedure: Injection,steroid,epidural,transforaminal approach--Bilateral L4-5;  Surgeon: Alexus Anglin Jr., MD;  Location: [facility] PAIN MGT;  Service: Pain Management;  Laterality: Bilateral;       Family History   Problem Relation Age of Onset    COPD Mother     Alcohol abuse Mother     Heart disease Father     Melanoma Father     No Known Problems Sister     No Known Problems Brother     Psoriasis Neg Hx     Lupus Neg Hx     Eczema Neg Hx        Social History     Socioeconomic History    Marital status:    Tobacco Use    Smoking status: Never Smoker    Smokeless tobacco: Never Used   Substance and Sexual Activity    Alcohol use: Yes     Comment: social    Drug use: No    Sexual activity: Yes     Partners: Female     Social Determinants of Health     Financial Resource Strain: Low Risk     Difficulty of Paying Living Expenses: Not very hard   Food Insecurity: No Food Insecurity    Worried About Running Out of Food in  the Last Year: Never true    Ran Out of Food in the Last Year: Never true   Transportation Needs: No Transportation Needs    Lack of Transportation (Medical): No    Lack of Transportation (Non-Medical): No   Physical Activity: Insufficiently Active    Days of Exercise per Week: 2 days    Minutes of Exercise per Session: 30 min   Stress: No Stress Concern Present    Feeling of Stress : Only a little   Social Connections: Unknown    Frequency of Communication with Friends and Family: More than three times a week    Frequency of Social Gatherings with Friends and Family: Once a week    Active Member of Clubs or Organizations: Yes    Attends Club or Organization Meetings: More than 4 times per year    Marital Status:    Housing Stability: Low Risk     Unable to Pay for Housing in the Last Year: No    Number of Places Lived in the Last Year: 1    Unstable Housing in the Last Year: No       Review of patient's allergies indicates:   Allergen Reactions    Advil [ibuprofen] Anaphylaxis    Tums [calcium carbonate] Anaphylaxis       Physical Exam:  Vitals:    03/24/22 1425   Weight: 128.4 kg (283 lb 1.1 oz)   PainSc:   5     General    Nursing note and vitals reviewed.  Constitutional: He is oriented to person, place, and time. He appears well-developed. No distress.   HENT:   Head: Normocephalic and atraumatic.   Nose: Nose normal.   Eyes: Conjunctivae and EOM are normal. Right eye exhibits no discharge. Left eye exhibits no discharge. No scleral icterus.   Neck: No JVD present. No tracheal deviation present.   Cardiovascular: Normal rate, regular rhythm and intact distal pulses.    Pulmonary/Chest: Effort normal and breath sounds normal. No respiratory distress. He exhibits no tenderness.   Abdominal: Soft. Bowel sounds are normal. He exhibits no distension. There is no abdominal tenderness. There is no rebound.   Neurological: He is alert and oriented to person, place, and time. He exhibits normal  muscle tone. Coordination normal.   Psychiatric: His behavior is normal. Thought content normal.         Back (L-Spine & T-Spine) / Neck (C-Spine) Exam     Tenderness Right paramedian tenderness of the Lower L-Spine. Left paramedian tenderness of the Lower L-Spine.     Back (L-Spine & T-Spine) Range of Motion   Lateral bend right: normal   Lateral bend left: normal   Rotation right: normal   Rotation left: normal     Spinal Sensation   Right Side Sensation  L-Spine Level: normal  Left Side Sensation  L-Spine Level: normal      Muscle Strength   Right Lower Extremity   Hip Abduction: 5/5   Hip Flexion: 5/5   Hip Extensors: 5/5  Quadriceps:  5/5   Hamstrin/5   Gastrocsoleus:  5/5   Left Lower Extremity   Hip Abduction: 4/5   Hip Flexion: 4/5   Hip Extensors: 4/5  Quadriceps:  4/5   Hamstrin/5   Gastrocsoleus:  5/5       Imaging:  MRI Lumbar Spine Without Contrast 10/15/2020   Degenerative findings:  T12-L1: No spinal canal stenosis or neural foraminal narrowing.  L1-L2: No spinal canal stenosis or neural foraminal narrowing.  L2-L3: Circumferential disc bulge and mild facet arthropathy noted.  No spinal canal stenosis or neural foraminal narrowing.  L3-L4: Circumferential disc bulge and mild facet arthropathy result in mild left neural foraminal narrowing.  L4-L5: Circumferential disc bulge and mild facet arthropathy result in moderate right, mild left neural foraminal narrowing.  L5-S1: Circumferential disc bulge with annular fissure and moderate facet arthropathy result in mild bilateral neural foraminal narrowing.  Paraspinal muscles & soft tissues: Unremarkable.     Impression:  1. Degenerative changes of the lumbar spine resulting in mild-to-moderate bilateral neural foraminal narrowing at L3-S1.      EXAMINATION:  MRI LUMBAR SPINE WITHOUT CONTRAST    CLINICAL HISTORY:  Low back painLow back pain, rapidly progressive neuro deficit;    TECHNIQUE:  Standard multiplanar noncontrast MRI sequences of the  lumbar spine.    COMPARISON:  None    FINDINGS:  The distal cord and conus reveal normal signal and morphology. The lumbar vertebra reveal normal alignment, shape and signal intensity.    T12-L1: Unremarkable    L1-2:  Unremarkable    L2-3:  Minimal annular bulge.    L3-4:  Mild disc desiccation with mild generalized annular bulge.  Mild hypertrophic ligamentum flavum and facet arthritis.    L4-5:  Mild disc desiccation with mild annular bulge.  Right foraminal annular fissure.  Mild hypertrophic ligamentum flavum and facet arthritis.  Mild to moderate left and moderate right foraminal stenosis.    L5-S1:  Mild degenerative disc disease with disc desiccation and disc bulge/osteophyte.  Mild hypertrophic ligamentum flavum and facet arthritis.  Mild right with mild to moderate left foraminal stenosis.      Impression       L4-5 and L5-S1 degenerative disc disease with disc bulging osteophyte as described above with right L4-5 and left L5-S1 foraminal stenosis.      Electronically signed by: Baldo Gayle MD  Date: 05/17/2019         Labs:  BMP  Lab Results   Component Value Date     09/22/2020    K 4.4 09/22/2020     09/22/2020    CO2 28 09/22/2020    BUN 17 09/22/2020    CREATININE 1.00 09/22/2020    CALCIUM 9.4 09/22/2020    ANIONGAP 10 09/22/2020    ESTGFRAFRICA >60.0 09/22/2020    EGFRNONAA >60.0 09/22/2020     Lab Results   Component Value Date    ALT 55 (H) 09/22/2020    AST 33 09/22/2020    ALKPHOS 37 (L) 09/22/2020    BILITOT 0.4 09/22/2020       Assessment:  Problem List Items Addressed This Visit        Neuro    Lumbar radiculopathy    Chronic pain    Lumbar discogenic pain syndrome    DDD (degenerative disc disease), lumbar      Other Visit Diagnoses     Nausea    -  Primary    Relevant Medications    ondansetron (ZOFRAN) 4 MG tablet    Primary osteoarthritis of left knee        Complex tear of medial meniscus of left knee as current injury, subsequent encounter        Chondromalacia of left  knee        Neuroforaminal stenosis of lumbar spine        Vertebrogenic pain        Chronic, continuous use of opioids              2/3/2020 - Baldo Grant is a 61 y.o. male with who complains of left and right lower back pain.  He reports the pain radiates down his left leg just below his knee.  Pain intensity is 5/10. He reports his current medication regimen of Norco, Gabapentin and Meloxicam help relieve his pain.  He reports having  Bilateral L3-4, L4-5, and L5-S1 Lumbar Medial Branch Block on 11/5/2019 with no relief.Patient's 5/17/2019 MRI Lumbar Spine reviewed with patient and shows L4-5 and L5-S1 degenerative disc disease with disc bulging  as described above with right L4-5 and left L5-S1 foraminal stenosis.  Recommended scheduling for Interlaminar KATHY  L4-5 with moderate sedation. Patient will follow in clinic following injection.     03/02/[id number]-qoop-old male presents with his wife he is status post lumbar interlaminar KATHY at L4-5 with 0% relief.  Patient reports continued low back and bilateral leg pain left greater than right he is reporting shooting pain down his left leg into his foot at times.  Patient reports the pain is disrupting his quality of life and prohibiting him from performing his ADLs.  Patient was given a left TF KATHY by Dr. Duenas/PM [facility] and received significant relief for 2-3 months. Lumbar MRI shows pathology a the L4-5 and L5-S1 patient has degenerative disc disease with disc bulging osteophyte with right L4-5 and left L5-S1 foraminal stenosis.  Based on results of previous Left  L4-5 transforaminal I discussed with patient and wife that I will now recommend  a bilateral L4-5.   Patient and wife agreed and understood.      05/13/2020- 52 a old male presents status post bilateral L4-5 TF KATHY, is currently reporting 60% relief he states his relief is improving each and every day.  Recommended patient follow up with me via my [facility] in 2 weeks to report the  effectiveness of his procedure discussed that his procedure will more than likely improve the next 2 weeks considering he is only 6 days following his injection.    09/16/2020 - 60-year-old male presents low back and left knee pain, patient is established our office he has had multiple lumbar KATHY as well as a lumbar MBB that provided him with minimal to no relief according to his records he is scheduled for a bilateral genicular block with  tomorrow he was referred to his office from orthopedics/Dr Álvarez.  Patient reports continued low back and left leg pain radiating down to his foot, this pain is secondary to L 4/ 5, and 5 /S1 degenerative disc disease with disc bulging osteophyte with the right L4-5 and left L5-S1 foraminal stenosis that has not gotten better from lumbar KATHY injections.  I recommended patient follow up with Neurosurgery further evaluate    6/16/21 - 60 yo M with primarily axial discogenic chronic low back pain presented today for re-evaluation the request of primary care team for assessment of his chronic opioid therapy.  Patient is on chronic low-dose opioid therapy which is supporting his daily function.  He has no bulging the spine related to degenerative disc disease.  Since he was last evaluated, our department deployed a new interventional procedures may help to target discogenic pain.  While not specifically noted in the most recent MRI from September 2020, there is endplate edema (Modic changes close this ease in the inferior endplate at L4, and both the superior and inferior endplate of L5.  This could be a significant cause of his pain and can be targeted through the use of the Intracept procedure which ablates the basivertebral nerve.  Given the level of functionality he is maintaining with the current regimen, we will continue with these medications while obtaining authorization for this procedure which may take anywhere between 1 and 6 months.    7/23/2021- 61-year-old male  with a history of low back and left leg pain, patient is here for medication refill we will take over his opioid from his PCP.  He is currently taking Norco 5-325 t.i.d. that provides him with roughly 80% and improved functionality that allows in reform his ADLs.  Last clinic visit with Dr. Anglin he Mr Grant discussed considering him for a intracept procedure as his most recent lumbar MRI shows endplate edema/Modic changes to the inferior endplate of L4 and the superior and inferior endplate of L5.  Until authorization has been done we will  continue to provide him with his stable low dose opioid regimen,  he and I discussed that once authorization has been completed our office will give him a call and schedule him for the intracept  Procedure.    8/27/2021- Baldo Grant is a 61 y.o. male who  has a past medical history of Arthritis, Basal cell carcinoma (06/21/2019), Cancer, Chronic pain of right knee, SCC (squamous cell carcinoma) (2014), Skin cancer, and Squamous cell carcinoma (2013).  By history and examination this patient has chronic low back pain with radiculopathy.  The underlying cause cause is degenerative disc disease.  Pathology is confirmed by imaging.  Lumbar MRI shows endplate edema/Modic changes to the inferior endplate of L4 and the superior and inferior endplate of L5  We discussed the underlying diagnoses and multiple treatment options including non-opioid medications, opioid medications, injections, physical therapy, home exercise, activity modification / rest and weight loss.  The risks and benefits of each treatment option were discussed and all questions were answered.      9/27/2021- Baldo Grant is a 61 y.o. male who  has a past medical history of Arthritis, Basal cell carcinoma (06/21/2019), Cancer, Chronic pain of right knee, SCC (squamous cell carcinoma) (2014), Skin cancer, and Squamous cell carcinoma (2013).  By history and examination this patient has chronic left  low  back pain with radiculopathy.  The underlying cause cause is unclear, my initial Dx's will include  degenerative disc disease, foraminal stenosis and deconditioning differential diagnoses are Meralgia Paresthetica  affected, ilioinguinal nerve pain.    Pathology is confirmed by imaging.  We discussed the underlying diagnoses and multiple treatment options including non-opioid medications, opioid medications, injections, physical therapy, home exercise, activity modification / rest and weight loss.  The risks and benefits of each treatment option were discussed and all questions were answered.      10/25/21 - Continued LBP and RLE radicular symptoms now spreading to groin and LLE.  Patient likely has tolerance to Norco 7.5-325 mg and we will escalate to 7.5 mg tablets TID.  He was advised that we cannot escalate in perpetuity due to safety concerns.  To that end, he will f/u with Dr. Encarnacion in [place] after completing the MRI I order today toward finding a definitive surgical solution.  If he is not a surgical candidate we may pursue SCS.  Intercept authorization is still pending.    [id number]-old male with a history of chronic low back and right lower extremity radicular symptoms that radiates into his groin and left lower extremity.   He recently saw Neurosurgery and was consult to physical therapy Neurosurgery relieved his pain is likely related to degenerative disc disease and myofascial pain syndrome.  He is currently on chronic opioid therapy that consists of Norco 7.5-325 t.i.d. 90 pills per month.  He and I discussed today that we would consider reducing his chronic opioid therapy to [place] 5-325 t.i.d. patient expressed concerns about sleeping at night that the pain medication is too strong as he does have a history of sleep apnea.  Addition he reports that he is excited about physical therapy and he will begin swimming at least 1-2 times per week which he thinks will overall help his pain.  He also  takes gabapentin and an occasional Mobic 15 mg which both help.  Denies any adverse side effects with current medication.  He seems to be meeting all of his goals for chronic opiate therapy.    03/24/2022.  61-year-old male with history of chronic low back and bilateral lower extremity pain left greater than right symptoms also radiate into his groin.  He was denied the intercept procedure for discogenic pain he other options for treatment would be a lumbar KATHY or TF KATHY.  His pain is likely related to degenerative disc disease and moderate neural foraminal stenosis at L4-5 and L5-S1.  His MRI does also show facet arthritis at this level.  In the past he has been provided multiple injections the 1 that has given him the most relief based on previous notes is the bilateral TF KATHY targeting L4-5.  Discussed I recommend we repeat this injection to acquire more relief, patient is amenable to this plan.  He also want me to send Dr. Anglin a message regarding his chronic opioid therapy which consists of Norco 5-325 b.i.d. 60 pills per month.  Ms. Grant I had a discussion about why he was reduced from t.i.d. to b.i.d. dosing discussed with him that I was unsure that I will follow-up with Dr. Anglin for confirmation on this.  For now we will refill the Norco 5-325 b.i.d..    : Reviewed and consistent with medication use as prescribed.    Treatment Plan:   Procedure:     - scheduled for lumbar TF KATHY targeting L4-5   - LESI vs TFESI remains an option   - Considering SCS trial- he and I discussed this today    - Intercept authorization pending  PT/OT/HEP: I encouraged the patient to maintain a home exercise regimen that includes daily, moderate cardiovascular exercise lasting at least 30 minutes.  This may include yoga, nadia chi, walking, swimming, aqua aerobics, or other exercises that maintain a heart rate of 50-70% of the calculated maximum heart rate.  I also encouraged light, daily stretching focused on the  target area.  Medications:    - Cont current meds. ie Mobic 15 mg PRN  and Gabapentin 600 mg TID  per PCP               - continue and refill [place] 5-325 b.i.d. p.r.n. for pain-consult Dr. Anglin as patient wants to go to t.i.d. dosing however I discouraged this optimization.   - Patient is on stable, low dose opioid therapy without aberrant behavior- refills would be appropriate for any provider              - discussed reducing his chronic opioid therapy patient was amenable to this reduction.              - One time Rx of  Zofran 4 mg for nausea due to pain    -  reviewed   - Pain contract signed 8/27/21  Labs: UDS reviewed   Imaging: No additional recommended at this time.    Greater than 25 minutes direct face to face counseling, performing PE, and educating patient today. 25 minutes was used discussing the disease process, prognosis, treatment plan, risks and benefits.      Follow Up: 8 weeks    ALAN Hale  Inter[facility] Pain [facility]          Disclaimer: This note was partly generated using dictation software which may occasionally result in transcription errors.

## 2022-03-24 NOTE — TELEPHONE ENCOUNTER
Pt seen in pain clinic this afternoon. Pain rx not received by pharmacy. No rx noted in epic for pain rx today. Informed on call MD unable to escribe controlled substances, pt states he is aware & will f/u with clinic in am.     Reason for Disposition   Caller requesting a CONTROLLED substance prescription refill (e.g., narcotics, ADHD medicines)    Protocols used: MEDICATION REFILL AND RENEWAL CALL-A-AH

## 2022-03-24 NOTE — PROGRESS NOTES
Chronic Pain-Established Note (Follow up visit)    Referred by: Dr. Encarnacion  Reason for referral: Back Pain    CC:   Chief Complaint   Patient presents with    Low-back Pain    Leg Pain     Bilateral        Last 3 PDI Scores 3/24/2022 1/12/2022 10/25/2021   Pain Disability Index (PDI) 36 45 49     Interval Updates 3/24/2022 - Mr. Grant is following up for Low-back Pain and Leg Pain (Bilateral ).  He requests a refill of Hydrocodone-Acetaminophen 5-325 mg BID # 60 which he says is effective but would like to go back to TID dosing.  He reports 50% relief with the current medication regimen. Pain is currently rate 5/10 with a weekly range 5-6/10.   He is also reporting increased pain in the low back left greater than right as well as legs bilaterally left greater than right.  Pain starts in low back and radiates into his legs posteriorly laterally anteriorly also causing scrotum pain.  Denies any profound weakness although he is walking with a cane this point.  Denies any bowel bladder dysfunction, denies any saddle anesthesia denies any recent incident or trauma.      01/12/2022 - Mr. Grant is following up for Low-back Pain and Leg Pain (Bilateral ).  He requests a refill of Norco 7.5-325 90 pills per month which are working well to control His pain.  He reports 60% relief with the current medication regimen.  Medication side effects: none.  Pain is currently rate 6/10 with a weekly range 3-8/10.  It is described as Aching, Tight, Tingling, Numb, Sharp and Shooting.   Pain is worsened by: standing and improved by: nothing and medications.     10/25/2021 -- Mr. Grant is following up for Low-back Pain and Leg Pain (Bilateral ).  He requests a refill of Hydrocodone-Acetaminophen 5-325 mg which is not working well to control His pain.  He reports 50% relief with the current medication regimen.  Medication side effects: none.  Pain is currently rate 6/10 with a weekly range 5-9/10.  It is described as Aching, Tight,  Tingling, Numb, Sharp and Shooting.   Pain is worsened by: standing and improved by: nothing and medications.     09/27/21 - Mr. Grant is following up for Low-back Pain and Leg Pain (Bilateral ).  He requests a refill of Hydrocodone-Acetaminophen 5-325 mg TID # 90 which are not working well to control His pain.  He reports 50% relief with the current medication regimen. Pain is currently rate 7/10 with a weekly range 6-7/10. Mr. Grant is reporting continued left low back pain with radiating symptoms into his left and now right scrotum.  He mentioned his right scrotum has not been affected previously.  He also reports pain radiating into his left lateral leg stopping just above his left knee.  His pain has been refractory to multiple injections and physical therapy, he reports the inability to have a quality of life as he states he recently tried to have sex with his wife and could not due to the pain.  His current opioid regimen provide some relief but not significant enough as he would like to consider other options, he endorses that his insurance plan denied the intracept procedure that we were going to consider him for.     08/27/21 - Mr. Grant is following up for Low-back Pain and Leg Pain (Bilateral ).  He requests a refill of Hydrocodone-Acetaminophen 5-325 mg TID # 90 per month which are working well to control His pain.  He reports 50% relief with the current medication regimen.  Medication side effects: none.  Pain is currently rate 5/10 with a weekly range 5-9/10.  It is described as Aching, Dull, electrical shock(left leg)  and Sharp.   Pain is worsened by: exercise, flexion, standing for more than 3 minutes and walking for more than 3 minutes and improved by: heat, laying down, massage, medications, physical therapy, rest and sitting.    7/23/21 - Mr. Grant returns to clinic for follow up visit reporting worse left sided low back and left leg pain.  Patient is here for medication refill of  "Hydrocodone-Acetaminophen 5-325 mg TID PRN for pain, he reports 60-70% relief for 3-4 hours his pain is predicated on how much activity he is doing. He also receives Gabapentin 600 mg TID and Mobic 15 mg daily. He is reporting is reporting left scrotum and left leg pain today, this is his worse pain today.  Pain intensity is currently 7/10.      06/16/2021 - Mr. Grant returns to clinic for follow up visit reporting stable but severe back pain. Pain intensity is currently 5/10.  His PCP recently left [facility] and he was referred her for re-evaluation by his new primary care team.  Patient reports continued low back pain with occasional radiation in to the legs.  Pain fluctuates depending on level of activity.  He reports "flare ups" at least twice weekly during which he may take 2-4 tablets of norco in a day, which other days he may take none.    09/16/2020 - Mr. Grant returns to clinic for follow up visit reporting low back and left leg  pain. He is also reporting left knee pain he is s/f for a left knee GN block with Dr Hollins's office. He was referred to Dr Hollins by Dr. Álvarez/Orthopedics.  Patient is s/p Lumbar Transforaminal Epidural Steroid Injection, Two Levels, Bilateral L4-5 on 08/11/2020 with 0% relief.  Patient is also status post bilateral lumbar MBB that provided him with 0% relief.  Pain intensity is currently 6/10.      05/13/[id number]-gxhg-old male presents status post bilateral L4-5 TF KATHY with reported 60% relief.  Patient also states his relief is improving each and every day.  Reports taking less of his pain medication due to the relief received from the injection.  He is only 6 days postop his lumbar epidural, discussed that I suspect is relief will improve even more over the next 2 weeks.    03/02/2020 - Mr. Grant returns to clinic for follow up visit reporting worse back and bilateral leg pain left greater than right.  Patient is s/p L4-5 Lumbar Epidural Steroid  on 2/11/20 with 0% relief.  " Patient presents with wife he is currently experiencing no relief from the injection, he reports that his pain continues in his low back as well as in his legs bilaterally left greater than right.  Patient reports shooting pain down his left leg at times going past his left knee into his left foot.  Patient reports previously given a lumbar TF KATHY at L4-5 per Dr. Duenas/PM Malott reports receiving 3 months of relief and he is requesting to be scheduled for that particular injection today.  Pain intensity is currently 5/10.      HPI:    Baldo Grant is a 61 y.o. male who complains of left and right lower back pain.  He reports the pain radiates down his left leg just below his knee.  Pain intensity is 5/10.  He reports taking Norco, Gabapentin and Meloxicam for some relief.   Patient s/p Bilateral L3-4, L4-5, and L5-S1 Lumbar Medial Branch Block on 11/5/2019 with Dr. Anglin with no relief.  Patient's 5/17/2019 MRI Lumbar Spine reviewed with patient and shows L4-5 and L5-S1 degenerative disc disease with disc bulging as described above with right L4-5 and left L5-S1 foraminal stenosis.      Location: lower back   Onset: Summer 200  Current Pain Score: 5/10   Daily Pain of Range: 4-9/10  Quality: Dull and Sharp  Radiation: back of left leg  Worsened by: sitting and standing  Improved by: medications and physical therapy    Previous Therapies:  PT/OT: Yes  HEP: Yes, but severely limited by pain  Interventions: Bilateral L3-4, L4-5, and L5-S1 Lumbar Medial Branch Block on 11/5/2019  Surgery:  Medications:   - NSAIDS: meloxicam (MOBIC) 15 MG tablet daily  - MSK Relaxants:  diazePAM (VALIUM) 5 MG tablet daily as needed  - TCAs:   - SNRIs:   - Topicals: lidocaine HCL 2% (XYLOCAINE) 2 % jelly  - Anticonvulsants:  - Opioids: HYDROcodone-acetaminophen (NORCO) 5-325 mg per tablet    Current Pain Medications:  1. Meloxicam 15 mg   2. Gabapentin 600 mg TID        3. HYDROcodone-acetaminophen (NORCO) 5-325 mg per tablet  Q6 hours PRN    Review of Systems:  Review of Systems   Constitutional: Negative.    HENT: Negative.    Eyes: Negative.    Respiratory: Negative.    Cardiovascular: Negative.    Gastrointestinal: Negative.    Genitourinary: Negative.    Musculoskeletal: Positive for back pain, joint pain and myalgias.   Skin: Negative.    Neurological: Negative.    Endo/Heme/Allergies: Negative.    Psychiatric/Behavioral: Negative.        History:    Current Outpatient Medications:     aspirin (ECOTRIN) 81 MG EC tablet, Take 1 tablet twice a day with food starting after surgery (breakfast and dinner)., Disp: 28 tablet, Rfl: 0    calcipotriene-betamethasone (ENSTILAR) 0.005-0.064 % Foam, Apply 1 application topically once daily., Disp: 60 g, Rfl: 2    diclofenac sodium (VOLTAREN) 1 % Gel, Apply 2 g topically 4 (four) times daily., Disp: 100 g, Rfl: 0    diphenhydrAMINE (BENADRYL) 25 mg capsule, Take 25 mg by mouth every 6 (six) hours as needed for Itching., Disp: , Rfl:     EPINEPHrine (EPIPEN) 0.3 mg/0.3 mL AtIn, , Disp: , Rfl:     fexofenadine (ALLEGRA) 180 MG tablet, Take 180 mg by mouth continuous prn., Disp: , Rfl:     gabapentin (NEURONTIN) 600 MG tablet, Take 1 tablet (600 mg total) by mouth 3 (three) times daily., Disp: 270 tablet, Rfl: 4    ketoconazole (NIZORAL) 2 % cream, AAA gluteal fold daily to bid prn flare, Disp: 60 g, Rfl: 3    lidocaine HCL 2% (XYLOCAINE) 2 % jelly, Apply topically as needed., Disp: 30 mL, Rfl: 3    meloxicam (MOBIC) 15 MG tablet, Take 1 tablet (15 mg total) by mouth once daily., Disp: 90 tablet, Rfl: 3    multivitamin (THERAGRAN) per tablet, Take 1 tablet by mouth once daily., Disp: , Rfl:     ondansetron (ZOFRAN) 4 MG tablet, Take 1 tablet (4 mg total) by mouth every 8 (eight) hours as needed for Nausea., Disp: 30 tablet, Rfl: 0    sumatriptan (IMITREX) 50 MG tablet, Take one at the first sign of a headache.  You may repeat it in 1 hour as needed. (Patient taking differently: Take one  at the first sign of a headache.  You may repeat it in 1 hour as needed.), Disp: 9 tablet, Rfl: 3    tiZANidine (ZANAFLEX) 4 MG tablet, , Disp: , Rfl:     triamcinolone acetonide 0.025% (KENALOG) 0.025 % cream, AAA gluteal fold daily to bid PRN flare, Disp: 30 g, Rfl: 3    mometasone 0.1% (ELOCON) 0.1 % cream, Apply topically once daily. for 10 days, Disp: 1 Tube, Rfl: 3  No current facility-administered medications for this visit.    Facility-Administered Medications Ordered in Other Visits:     0.9%  NaCl infusion, 500 mL, Intravenous, Continuous, Alexus Anglin Jr., MD    lidocaine (PF) 10 mg/ml (1%) injection 10 mg, 1 mL, Intradermal, Once, Alexus Anglin Jr., MD    lidocaine (PF) 10 mg/ml (1%) injection 10 mg, 1 mL, Intradermal, Once, Alexus Anglin Jr., MD    Past Medical History:   Diagnosis Date    Arthritis     Basal cell carcinoma 06/21/2019    back    Cancer     Chronic pain of right knee     SCC (squamous cell carcinoma) 2014    Forehead- Dr. Cabral     Skin cancer     [facility]       Past Surgical History:   Procedure Laterality Date    APPENDECTOMY      ARTHROSCOPIC CHONDROPLASTY OF KNEE JOINT Left 3/18/2020    Procedure: ARTHROSCOPY, KNEE, WITH CHONDROPLASTY;  Surgeon: FREEMAN Álvarez MD;  Location: [facility] OR;  Service: Orthopedics;  Laterality: Left;    COLONOSCOPY  1998    EPIDURAL STEROID INJECTION INTO LUMBAR SPINE N/A 2/11/2020    Procedure: Injection-steroid-epidural-lumbar--InterLaminar L4-5;  Surgeon: Alexus Anglin Jr., MD;  Location: [facility] PAIN MGT;  Service: Pain Management;  Laterality: N/A;    INJECTION OF ANESTHETIC AGENT AROUND MEDIAL BRANCH NERVES INNERVATING LUMBAR FACET JOINT Bilateral 11/5/2019    Procedure: Block-nerve-medial branch-lumbar--Bilateral L3-4,L4-5,L5-S1;  Surgeon: Alexus Anglin Jr., MD;  Location: [facility] PAIN [facility]GT;  Service: Pain Management;  Laterality: Bilateral;    INJECTION OF  STEROID Left 11/12/2020    Procedure: INJECTION, STEROID Left SI Joint Block and Steroid Injection;  Surgeon: Tam Encarnacion MD;  Location: [facility] OR;  Service: Neurosurgery;  Laterality: Left;  Procedure: Left SI Joint Block and Steroid Injection   Surgery Time: 30 min  LOS: 0  Anesthesia: MAC  Radiology: C-arm  Bed: Regular with Pillows  Position: Prone    KNEE ARTHROSCOPY W/ MENISCECTOMY Left 3/18/2020    Procedure: ARTHROSCOPY, KNEE, WITH MENISCECTOMY;  Surgeon: FREEMAN Álvarez MD;  Location: [facility] OR;  Service: Orthopedics;  Laterality: Left;  CLONIDINE/EPI/KETOROLAC/ROPIVACAINE INJECTION 30CC    lipoma removal      skin cancer removal      TRANSFORAMINAL EPIDURAL INJECTION OF STEROID Bilateral 5/6/2020    Procedure: Injection,steroid,epidural,transforaminal approach--Bilateral L4-5;  Surgeon: Alexus Anglin Jr., MD;  Location: [facility]IN [facility];  Service: Pain Management;  Laterality: Bilateral;    TRANSFORAMINAL EPIDURAL INJECTION OF STEROID Bilateral 8/11/2020    Procedure: Injection,steroid,epidural,transforaminal approach--Bilateral L4-5;  Surgeon: Alexus Anglin Jr., MD;  Location: [facility]IN [facility]GT;  Service: Pain Management;  Laterality: Bilateral;       Family History   Problem Relation Age of Onset    COPD Mother     Alcohol abuse Mother     Heart disease Father     Melanoma Father     No Known Problems Sister     No Known Problems Brother     Psoriasis Neg Hx     Lupus Neg Hx     Eczema Neg Hx        Social History     Socioeconomic History    Marital status:    Tobacco Use    Smoking status: Never Smoker    Smokeless tobacco: Never Used   Substance and Sexual Activity    Alcohol use: Yes     Comment: social    Drug use: No    Sexual activity: Yes     Partners: Female     Social Determinants of Health     Financial Resource Strain: Low Risk     Difficulty of Paying Living Expenses: Not very hard   Food Insecurity: No Food Insecurity    Worried About Running Out of Food in  the Last Year: Never true    Ran Out of Food in the Last Year: Never true   Transportation Needs: No Transportation Needs    Lack of Transportation (Medical): No    Lack of Transportation (Non-Medical): No   Physical Activity: Insufficiently Active    Days of Exercise per Week: 2 days    Minutes of Exercise per Session: 30 min   Stress: No Stress Concern Present    Feeling of Stress : Only a little   Social Connections: Unknown    Frequency of Communication with Friends and Family: More than three times a week    Frequency of Social Gatherings with Friends and Family: Once a week    Active Member of Clubs or Organizations: Yes    Attends Club or Organization Meetings: More than 4 times per year    Marital Status:    Housing Stability: Low Risk     Unable to Pay for Housing in the Last Year: No    Number of Places Lived in the Last Year: 1    Unstable Housing in the Last Year: No       Review of patient's allergies indicates:   Allergen Reactions    Advil [ibuprofen] Anaphylaxis    Tums [calcium carbonate] Anaphylaxis       Physical Exam:  Vitals:    03/24/22 1425   Weight: 128.4 kg (283 lb 1.1 oz)   PainSc:   5     General    Nursing note and vitals reviewed.  Constitutional: He is oriented to person, place, and time. He appears well-developed. No distress.   HENT:   Head: Normocephalic and atraumatic.   Nose: Nose normal.   Eyes: Conjunctivae and EOM are normal. Right eye exhibits no discharge. Left eye exhibits no discharge. No scleral icterus.   Neck: No JVD present. No tracheal deviation present.   Cardiovascular: Normal rate, regular rhythm and intact distal pulses.    Pulmonary/Chest: Effort normal and breath sounds normal. No respiratory distress. He exhibits no tenderness.   Abdominal: Soft. Bowel sounds are normal. He exhibits no distension. There is no abdominal tenderness. There is no rebound.   Neurological: He is alert and oriented to person, place, and time. He exhibits normal  muscle tone. Coordination normal.   Psychiatric: His behavior is normal. Thought content normal.         Back (L-Spine & T-Spine) / Neck (C-Spine) Exam     Tenderness Right paramedian tenderness of the Lower L-Spine. Left paramedian tenderness of the Lower L-Spine.     Back (L-Spine & T-Spine) Range of Motion   Lateral bend right: normal   Lateral bend left: normal   Rotation right: normal   Rotation left: normal     Spinal Sensation   Right Side Sensation  L-Spine Level: normal  Left Side Sensation  L-Spine Level: normal      Muscle Strength   Right Lower Extremity   Hip Abduction: 5/5   Hip Flexion: 5/5   Hip Extensors: 5/5  Quadriceps:  5/5   Hamstrin/5   Gastrocsoleus:  5/5   Left Lower Extremity   Hip Abduction: 4/5   Hip Flexion: 4/5   Hip Extensors: 4/5  Quadriceps:  4/5   Hamstrin/5   Gastrocsoleus:  5/5       Imaging:  MRI Lumbar Spine Without Contrast 10/15/2020   Degenerative findings:  T12-L1: No spinal canal stenosis or neural foraminal narrowing.  L1-L2: No spinal canal stenosis or neural foraminal narrowing.  L2-L3: Circumferential disc bulge and mild facet arthropathy noted.  No spinal canal stenosis or neural foraminal narrowing.  L3-L4: Circumferential disc bulge and mild facet arthropathy result in mild left neural foraminal narrowing.  L4-L5: Circumferential disc bulge and mild facet arthropathy result in moderate right, mild left neural foraminal narrowing.  L5-S1: Circumferential disc bulge with annular fissure and moderate facet arthropathy result in mild bilateral neural foraminal narrowing.  Paraspinal muscles & soft tissues: Unremarkable.     Impression:  1. Degenerative changes of the lumbar spine resulting in mild-to-moderate bilateral neural foraminal narrowing at L3-S1.      EXAMINATION:  MRI LUMBAR SPINE WITHOUT CONTRAST    CLINICAL HISTORY:  Low back painLow back pain, rapidly progressive neuro deficit;    TECHNIQUE:  Standard multiplanar noncontrast MRI sequences of the  lumbar spine.    COMPARISON:  None    FINDINGS:  The distal cord and conus reveal normal signal and morphology. The lumbar vertebra reveal normal alignment, shape and signal intensity.    T12-L1: Unremarkable    L1-2:  Unremarkable    L2-3:  Minimal annular bulge.    L3-4:  Mild disc desiccation with mild generalized annular bulge.  Mild hypertrophic ligamentum flavum and facet arthritis.    L4-5:  Mild disc desiccation with mild annular bulge.  Right foraminal annular fissure.  Mild hypertrophic ligamentum flavum and facet arthritis.  Mild to moderate left and moderate right foraminal stenosis.    L5-S1:  Mild degenerative disc disease with disc desiccation and disc bulge/osteophyte.  Mild hypertrophic ligamentum flavum and facet arthritis.  Mild right with mild to moderate left foraminal stenosis.      Impression       L4-5 and L5-S1 degenerative disc disease with disc bulging osteophyte as described above with right L4-5 and left L5-S1 foraminal stenosis.      Electronically signed by: Baldo Gayle MD  Date: 05/17/2019         Labs:  BMP  Lab Results   Component Value Date     09/22/2020    K 4.4 09/22/2020     09/22/2020    CO2 28 09/22/2020    BUN 17 09/22/2020    CREATININE 1.00 09/22/2020    CALCIUM 9.4 09/22/2020    ANIONGAP 10 09/22/2020    ESTGFRAFRICA >60.0 09/22/2020    EGFRNONAA >60.0 09/22/2020     Lab Results   Component Value Date    ALT 55 (H) 09/22/2020    AST 33 09/22/2020    ALKPHOS 37 (L) 09/22/2020    BILITOT 0.4 09/22/2020       Assessment:  Problem List Items Addressed This Visit        Neuro    Lumbar radiculopathy    Chronic pain    Lumbar discogenic pain syndrome    DDD (degenerative disc disease), lumbar      Other Visit Diagnoses     Nausea    -  Primary    Relevant Medications    ondansetron (ZOFRAN) 4 MG tablet    Primary osteoarthritis of left knee        Complex tear of medial meniscus of left knee as current injury, subsequent encounter        Chondromalacia of left  knee        Neuroforaminal stenosis of lumbar spine        Vertebrogenic pain        Chronic, continuous use of opioids              2/3/2020 - Baldo Grant is a 61 y.o. male with who complains of left and right lower back pain.  He reports the pain radiates down his left leg just below his knee.  Pain intensity is 5/10. He reports his current medication regimen of Norco, Gabapentin and Meloxicam help relieve his pain.  He reports having  Bilateral L3-4, L4-5, and L5-S1 Lumbar Medial Branch Block on 11/5/2019 with no relief.Patient's 5/17/2019 MRI Lumbar Spine reviewed with patient and shows L4-5 and L5-S1 degenerative disc disease with disc bulging  as described above with right L4-5 and left L5-S1 foraminal stenosis.  Recommended scheduling for Interlaminar KATHY  L4-5 with moderate sedation. Patient will follow in clinic following injection.     03/02/[id number]-mtex-old male presents with his wife he is status post lumbar interlaminar KATHY at L4-5 with 0% relief.  Patient reports continued low back and bilateral leg pain left greater than right he is reporting shooting pain down his left leg into his foot at times.  Patient reports the pain is disrupting his quality of life and prohibiting him from performing his ADLs.  Patient was given a left TF KATHY by Dr. Duenas/PM [facility] and received significant relief for 2-3 months. Lumbar MRI shows pathology a the L4-5 and L5-S1 patient has degenerative disc disease with disc bulging osteophyte with right L4-5 and left L5-S1 foraminal stenosis.  Based on results of previous Left  L4-5 transforaminal I discussed with patient and wife that I will now recommend  a bilateral L4-5.   Patient and wife agreed and understood.      05/13/2020- 52 a old male presents status post bilateral L4-5 TF KATYH, is currently reporting 60% relief he states his relief is improving each and every day.  Recommended patient follow up with me via my [facility] in 2 weeks to report the  effectiveness of his procedure discussed that his procedure will more than likely improve the next 2 weeks considering he is only 6 days following his injection.    09/16/2020 - 60-year-old male presents low back and left knee pain, patient is established our office he has had multiple lumbar KATHY as well as a lumbar MBB that provided him with minimal to no relief according to his records he is scheduled for a bilateral genicular block with  tomorrow he was referred to his office from orthopedics/Dr Álvarez.  Patient reports continued low back and left leg pain radiating down to his foot, this pain is secondary to L 4/ 5, and 5 /S1 degenerative disc disease with disc bulging osteophyte with the right L4-5 and left L5-S1 foraminal stenosis that has not gotten better from lumbar KATHY injections.  I recommended patient follow up with Neurosurgery further evaluate    6/16/21 - 62 yo M with primarily axial discogenic chronic low back pain presented today for re-evaluation the request of primary care team for assessment of his chronic opioid therapy.  Patient is on chronic low-dose opioid therapy which is supporting his daily function.  He has no bulging the spine related to degenerative disc disease.  Since he was last evaluated, our department deployed a new interventional procedures may help to target discogenic pain.  While not specifically noted in the most recent MRI from September 2020, there is endplate edema (Modic changes close this ease in the inferior endplate at L4, and both the superior and inferior endplate of L5.  This could be a significant cause of his pain and can be targeted through the use of the Intracept procedure which ablates the basivertebral nerve.  Given the level of functionality he is maintaining with the current regimen, we will continue with these medications while obtaining authorization for this procedure which may take anywhere between 1 and 6 months.    7/23/2021- 61-year-old male  with a history of low back and left leg pain, patient is here for medication refill we will take over his opioid from his PCP.  He is currently taking Norco 5-325 t.i.d. that provides him with roughly 80% and improved functionality that allows in reform his ADLs.  Last clinic visit with Dr. Anglin he Mr Grant discussed considering him for a intracept procedure as his most recent lumbar MRI shows endplate edema/Modic changes to the inferior endplate of L4 and the superior and inferior endplate of L5.  Until authorization has been done we will  continue to provide him with his stable low dose opioid regimen,  he and I discussed that once authorization has been completed our office will give him a call and schedule him for the intracept  Procedure.    8/27/2021- Baldo Grant is a 61 y.o. male who  has a past medical history of Arthritis, Basal cell carcinoma (06/21/2019), Cancer, Chronic pain of right knee, SCC (squamous cell carcinoma) (2014), Skin cancer, and Squamous cell carcinoma (2013).  By history and examination this patient has chronic low back pain with radiculopathy.  The underlying cause cause is degenerative disc disease.  Pathology is confirmed by imaging.  Lumbar MRI shows endplate edema/Modic changes to the inferior endplate of L4 and the superior and inferior endplate of L5  We discussed the underlying diagnoses and multiple treatment options including non-opioid medications, opioid medications, injections, physical therapy, home exercise, activity modification / rest and weight loss.  The risks and benefits of each treatment option were discussed and all questions were answered.      9/27/2021- Baldo Grant is a 61 y.o. male who  has a past medical history of Arthritis, Basal cell carcinoma (06/21/2019), Cancer, Chronic pain of right knee, SCC (squamous cell carcinoma) (2014), Skin cancer, and Squamous cell carcinoma (2013).  By history and examination this patient has chronic left  low  back pain with radiculopathy.  The underlying cause cause is unclear, my initial Dx's will include  degenerative disc disease, foraminal stenosis and deconditioning differential diagnoses are Meralgia Paresthetica  affected, ilioinguinal nerve pain.    Pathology is confirmed by imaging.  We discussed the underlying diagnoses and multiple treatment options including non-opioid medications, opioid medications, injections, physical therapy, home exercise, activity modification / rest and weight loss.  The risks and benefits of each treatment option were discussed and all questions were answered.      10/25/21 - Continued LBP and RLE radicular symptoms now spreading to groin and LLE.  Patient likely has tolerance to Norco 7.5-325 mg and we will escalate to 7.5 mg tablets TID.  He was advised that we cannot escalate in perpetuity due to safety concerns.  To that end, he will f/u with Dr. Encarnacion in [place] after completing the MRI I order today toward finding a definitive surgical solution.  If he is not a surgical candidate we may pursue SCS.  Intercept authorization is still pending.    01/12/[id number]-opou-old male with a history of chronic low back and right lower extremity radicular symptoms that radiates into his groin and left lower extremity.   He recently saw Neurosurgery and was consult to physical therapy Neurosurgery relieved his pain is likely related to degenerative disc disease and myofascial pain syndrome.  He is currently on chronic opioid therapy that consists of Norco 7.5-325 t.i.d. 90 pills per month.  He and I discussed today that we would consider reducing his chronic opioid therapy to [place] 5-325 t.i.d. patient expressed concerns about sleeping at night that the pain medication is too strong as he does have a history of sleep apnea.  Addition he reports that he is excited about physical therapy and he will begin swimming at least 1-2 times per week which he thinks will overall help his pain.  He also  takes gabapentin and an occasional Mobic 15 mg which both help.  Denies any adverse side effects with current medication.  He seems to be meeting all of his goals for chronic opiate therapy.    03/24/2022.  61-year-old male with history of chronic low back and bilateral lower extremity pain left greater than right symptoms also radiate into his groin.  He was denied the intercept procedure for discogenic pain he other options for treatment would be a lumbar KATHY or TF KATHY.  His pain is likely related to degenerative disc disease and moderate neural foraminal stenosis at L4-5 and L5-S1.  His MRI does also show facet arthritis at this level.  In the past he has been provided multiple injections the 1 that has given him the most relief based on previous notes is the bilateral TF KATHY targeting L4-5.  Discussed I recommend we repeat this injection to acquire more relief, patient is amenable to this plan.  He also want me to send Dr. Anglin a message regarding his chronic opioid therapy which consists of Norco 5-325 b.i.d. 60 pills per month.  Ms. Grant I had a discussion about why he was reduced from t.i.d. to b.i.d. dosing discussed with him that I was unsure that I will follow-up with Dr. Anglin for confirmation on this.  For now we will refill the Norco 5-325 b.i.d..    : Reviewed and consistent with medication use as prescribed.    Treatment Plan:   Procedure:     - scheduled for lumbar TF KATHY targeting L4-5   - LESI vs TFESI remains an option   - Considering SCS trial- he and I discussed this today    - Intercept authorization pending  PT/OT/HEP: I encouraged the patient to maintain a home exercise regimen that includes daily, moderate cardiovascular exercise lasting at least 30 minutes.  This may include yoga, nadia chi, walking, swimming, aqua aerobics, or other exercises that maintain a heart rate of 50-70% of the calculated maximum heart rate.  I also encouraged light, daily stretching focused on the  target area.  Medications:    - Cont current meds. ie Mobic 15 mg PRN  and Gabapentin 600 mg TID  per PCP               - continue and refill [place] 5-325 b.i.d. p.r.n. for pain-consult Dr. Anglin as patient wants to go to t.i.d. dosing however I discouraged this optimization.   - Patient is on stable, low dose opioid therapy without aberrant behavior- refills would be appropriate for any provider              - discussed reducing his chronic opioid therapy patient was amenable to this reduction.              - One time Rx of  Zofran 4 mg for nausea due to pain    -  reviewed   - Pain contract signed 8/27/21  Labs: UDS reviewed   Imaging: No additional recommended at this time.    Greater than 25 minutes direct face to face counseling, performing PE, and educating patient today. 25 minutes was used discussing the disease process, prognosis, treatment plan, risks and benefits.      Follow Up: 8 weeks    ALAN Hale  Interventional Pain Management          Disclaimer: This note was partly generated using dictation software which may occasionally result in transcription errors.

## 2022-03-25 ENCOUNTER — PATIENT MESSAGE (OUTPATIENT)
Dept: PAIN MEDICINE | Facility: CLINIC | Age: 62
End: 2022-03-25
Payer: OTHER GOVERNMENT

## 2022-03-25 RX ORDER — HYDROCODONE BITARTRATE AND ACETAMINOPHEN 5; 325 MG/1; MG/1
1 TABLET ORAL 2 TIMES DAILY PRN
Qty: 60 TABLET | Refills: 0 | Status: SHIPPED | OUTPATIENT
Start: 2022-05-24 | End: 2022-06-02 | Stop reason: SDUPTHER

## 2022-03-25 RX ORDER — HYDROCODONE BITARTRATE AND ACETAMINOPHEN 5; 325 MG/1; MG/1
1 TABLET ORAL 2 TIMES DAILY PRN
Qty: 60 TABLET | Refills: 0 | Status: SHIPPED | OUTPATIENT
Start: 2022-03-25 | End: 2022-04-19 | Stop reason: SDUPTHER

## 2022-03-28 ENCOUNTER — TELEPHONE (OUTPATIENT)
Dept: FAMILY MEDICINE | Facility: CLINIC | Age: 62
End: 2022-03-28
Payer: OTHER GOVERNMENT

## 2022-03-28 ENCOUNTER — TELEPHONE (OUTPATIENT)
Dept: PAIN MEDICINE | Facility: CLINIC | Age: 62
End: 2022-03-28
Payer: OTHER GOVERNMENT

## 2022-03-28 DIAGNOSIS — M54.16 LUMBAR RADICULOPATHY: Primary | ICD-10-CM

## 2022-03-28 DIAGNOSIS — M51.36 DDD (DEGENERATIVE DISC DISEASE), LUMBAR: ICD-10-CM

## 2022-03-28 NOTE — TELEPHONE ENCOUNTER
"I informed pt Dr. Orta "okay to hold asa 5 days prior to his procedure and restart the day after." pt voiced understanding.   "

## 2022-03-28 NOTE — TELEPHONE ENCOUNTER
----- Message from Belia Mott MA sent at 3/28/2022 10:05 AM CDT -----  Regarding: med clearance  Pt is scheduled to have a Bilateral TFESI with Dr. Anglin and pt needs med clearance to hold asa 5 days prior to his procedure. Please advise.    AR

## 2022-03-29 ENCOUNTER — TELEPHONE (OUTPATIENT)
Dept: PAIN MEDICINE | Facility: CLINIC | Age: 62
End: 2022-03-29
Payer: OTHER GOVERNMENT

## 2022-03-29 LAB — NONINV COLON CA DNA+OCC BLD SCRN STL QL: NEGATIVE

## 2022-03-29 NOTE — TELEPHONE ENCOUNTER
----- Message from You Britton MD sent at 3/28/2022  9:39 PM CDT -----  Regarding: RE: med clearance  Ok to hold ASA for 5 days prior to procedure  ----- Message -----  From: Belia Mott MA  Sent: 3/28/2022  10:06 AM CDT  To: You Britton MD, [facility] Staff  Subject: med clearance                                    Pt is scheduled to have a Bilateral TFESI with Dr. Anglin and pt needs med clearance to hold asa 5 days prior to his procedure. Please advise.    AR

## 2022-03-31 NOTE — DISCHARGE INSTRUCTIONS
Home Care Instructions Pain Management:    1.  DIET:    You may resume your normal diet today.    2.  BATHING:    You may shower with luke warm water.    3.  DRESSING:    You may remove your bandage today.    4.  ACTIVITY LEVEL:      You may resume your normal activities 24 hours after your procedure.    5.  MEDICATIONS:    You may resume your normal medications today.    6.  SPECIAL INSTRUCTIONS:    No heat to the injection site for 24 hours including bath or shower, heating pad, moist heat or hot tubs.    Use an ice pack to the injection site for any pain or discomfort.  Apply ice packs for 20 minute intervals as needed.    If you have received any sedatives by mouth today, you can not drive for 12 hours.    If you have received sedation through an IV, you can not drive for 24 hours.    PLEASE CALL YOUR DOCTOR FOR THE FOLLOWIN.  Redness or swelling around the injection site.  2.  Fever of 101 degrees.  3.  Drainage (pus) from the injection site.  4.  For any continuous bleeding (some dried blood over the incision is normal.)    FOR EMERGENCIES:    If any unusual problems or difficulties occur during clinic hours, call [phone] or dial [phone].    Follow up with with your physician in 2-3 weeks.

## 2022-04-04 ENCOUNTER — TELEPHONE (OUTPATIENT)
Dept: PAIN MEDICINE | Facility: CLINIC | Age: 62
End: 2022-04-04
Payer: OTHER GOVERNMENT

## 2022-04-05 ENCOUNTER — HOSPITAL ENCOUNTER (OUTPATIENT)
Facility: HOSPITAL | Age: 62
Discharge: HOME | End: 2022-04-05
Attending: PAIN MEDICINE | Admitting: PAIN MEDICINE
Payer: OTHER GOVERNMENT

## 2022-04-05 VITALS
WEIGHT: 271 LBS | HEIGHT: 72 IN | SYSTOLIC BLOOD PRESSURE: 131 MMHG | OXYGEN SATURATION: 97 % | RESPIRATION RATE: 16 BRPM | HEART RATE: 68 BPM | BODY MASS INDEX: 36.7 KG/M2 | TEMPERATURE: 98 F | DIASTOLIC BLOOD PRESSURE: 89 MMHG

## 2022-04-05 DIAGNOSIS — G89.29 CHRONIC PAIN: ICD-10-CM

## 2022-04-05 DIAGNOSIS — M54.16 LUMBAR RADICULOPATHY: ICD-10-CM

## 2022-04-05 DIAGNOSIS — M51.26 LUMBAR DISCOGENIC PAIN SYNDROME: ICD-10-CM

## 2022-04-05 DIAGNOSIS — M51.36 DDD (DEGENERATIVE DISC DISEASE), LUMBAR: Primary | ICD-10-CM

## 2022-04-05 PROCEDURE — 63600175 PHARM REV CODE 636 W HCPCS: Performed by: PAIN MEDICINE

## 2022-04-05 PROCEDURE — 64483 PR EPIDURAL INJ, ANES/STEROID, TRANSFORAMINAL, LUMB/SACR, SNGL LEVL: ICD-10-PCS | Mod: 50,,, | Performed by: PAIN MEDICINE

## 2022-04-05 PROCEDURE — 64483 NJX AA&/STRD TFRM EPI L/S 1: CPT | Mod: 50 | Performed by: PAIN MEDICINE

## 2022-04-05 PROCEDURE — 64483 NJX AA&/STRD TFRM EPI L/S 1: CPT | Mod: 50,,, | Performed by: PAIN MEDICINE

## 2022-04-05 PROCEDURE — 25000003 PHARM REV CODE 250: Performed by: PAIN MEDICINE

## 2022-04-05 PROCEDURE — 25500020 PHARM REV CODE 255: Performed by: PAIN MEDICINE

## 2022-04-05 RX ORDER — INDOMETHACIN 25 MG/1
CAPSULE ORAL
Status: DISCONTINUED | OUTPATIENT
Start: 2022-04-05 | End: 2022-04-05 | Stop reason: HOSPADM

## 2022-04-05 RX ORDER — LIDOCAINE HYDROCHLORIDE 10 MG/ML
INJECTION, SOLUTION EPIDURAL; INFILTRATION; INTRACAUDAL; PERINEURAL
Status: DISCONTINUED | OUTPATIENT
Start: 2022-04-05 | End: 2022-04-05 | Stop reason: HOSPADM

## 2022-04-05 RX ORDER — LIDOCAINE HYDROCHLORIDE 10 MG/ML
1 INJECTION, SOLUTION EPIDURAL; INFILTRATION; INTRACAUDAL; PERINEURAL ONCE
Status: DISCONTINUED | OUTPATIENT
Start: 2022-04-05 | End: 2022-04-05 | Stop reason: HOSPADM

## 2022-04-05 RX ORDER — MIDAZOLAM HYDROCHLORIDE 1 MG/ML
INJECTION INTRAMUSCULAR; INTRAVENOUS
Status: DISCONTINUED | OUTPATIENT
Start: 2022-04-05 | End: 2022-04-05 | Stop reason: HOSPADM

## 2022-04-05 RX ORDER — SODIUM CHLORIDE 9 MG/ML
500 INJECTION, SOLUTION INTRAVENOUS CONTINUOUS
Status: DISCONTINUED | OUTPATIENT
Start: 2022-04-05 | End: 2022-04-05 | Stop reason: HOSPADM

## 2022-04-05 RX ORDER — FENTANYL CITRATE 50 UG/ML
INJECTION, SOLUTION INTRAMUSCULAR; INTRAVENOUS
Status: DISCONTINUED | OUTPATIENT
Start: 2022-04-05 | End: 2022-04-05 | Stop reason: HOSPADM

## 2022-04-05 RX ORDER — DEXAMETHASONE SODIUM PHOSPHATE 4 MG/ML
INJECTION, SOLUTION INTRA-ARTICULAR; INTRALESIONAL; INTRAMUSCULAR; INTRAVENOUS; SOFT TISSUE
Status: DISCONTINUED | OUTPATIENT
Start: 2022-04-05 | End: 2022-04-05 | Stop reason: HOSPADM

## 2022-04-05 RX ORDER — LIDOCAINE HYDROCHLORIDE 10 MG/ML
INJECTION INFILTRATION; PERINEURAL
Status: DISCONTINUED | OUTPATIENT
Start: 2022-04-05 | End: 2022-04-05 | Stop reason: HOSPADM

## 2022-04-05 RX ADMIN — SODIUM CHLORIDE 500 ML: 0.9 INJECTION, SOLUTION INTRAVENOUS at 09:04

## 2022-04-05 NOTE — OP NOTE
"Procedure Note    Pre-operative Diagnosis: Lumbar Radiculopathy  Post-operative Diagnosis: Lumbar Radiculopathy  Procedure Date: 04/05/2022  Procedure:  (1) Lumbar Transforaminal Epidural Steroid Injection, Bilateral L4-5    (2) Intraoperative fluoroscopy    (3) IV Moderate Sedation (Midazolam 2 mg, Fentanyl 50 mcg)            Indications: To alleviate pain and suffering, and reduce functional impairment associated with Lumbar radiculopathy.      The patients history and physical exam were reviewed. The risks, benefits and alternatives to the procedure were discussed, and all questions were answered to the patients satisfaction. The patient agreed to proceed, and written informed consent was verified.    Procedure in Detail: Using a fenestrated drape and Chloro-prep, the skin was prepared and draped in sterile fashion. The right L4-5 neural foramen was identified by fluoroscopy in right lateral oblique angle. A portion of a mixture of Lidocaine 1% 9 mL + Sodium Bicarbonate 1 mL was used to anesthetize the skin at the skin entry point and subcutaneous tissue with 25G 1.5" in needle. Then a 22G 5" spinal needle was advanced under intermittent fluoroscopy toward the inferolateral border of the superior pedical.  A second needle was placed at the same level on the left side using an identical technique.    Fluoroscopy was then inspected in lateral views and the needles were adjusted appropriately. There was no paresthesia with needle placement. Aspiration was negative for blood and CSF. Omnipaque contrast was injected live in an AP fluoroscopic view at each level demonstrating appropriate needle position with contrast spread into the nerve root sheath and medially into the epidural space without intravascular or intrathecal spread.  Next, a 4 mL mixture of PF Lidocaine 1% (3 mL) and dexamethasone 10 mg (1 mL) was divided evenly between the two sides and injected slowly and incrementally. The patient tolerated the " procedure without complaint and was transported to the recovery room in stable condition.     Disposition: The patient tolerated the procedure well, and there were no apparent complications. Vital signs remained stable throughout the procedure. The patient was taken to the recovery area where written discharge instructions for the procedure were given.     Follow-up: RTC as scheduled      Alexus Anglin Jr, MD  Interventional Pain Medicine / Anesthesiology

## 2022-04-05 NOTE — DISCHARGE SUMMARY
[facility]  Discharge Note  Short Stay     Admit Date: 4/5/2022    Discharge Date: 4/5/2022     Attending Physician: Alexus Anglin Jr, MD    Diagnoses:  There are no hospital problems to display for this patient.    Discharged Condition: Good     Hospital Course: Patient was admitted for an outpatient interventional pain management procedure and tolerated the procedure well with no complications.     Final Diagnoses: Same as principal problem.     Disposition: Home or Self Care     Follow up/Patient Instructions:        Reconciled Medications:     Medication List      CONTINUE taking these medications    aspirin 81 MG EC tablet  Commonly known as: ECOTRIN  Take 1 tablet twice a day with food starting after surgery (breakfast and dinner).     diclofenac sodium 1 % Gel  Commonly known as: VOLTAREN  Apply 2 g topically 4 (four) times daily.     diphenhydrAMINE 25 mg capsule  Commonly known as: BENADRYL  Take 25 mg by mouth every 6 (six) hours as needed for Itching.     ENSTILAR 0.005-0.064 % Foam  Generic drug: calcipotriene-betamethasone  Apply 1 application topically once daily.     EPINEPHrine 0.3 mg/0.3 mL Atin  Commonly known as: EPIPEN     fexofenadine 180 MG tablet  Commonly known as: ALLEGRA  Take 180 mg by mouth continuous prn.     gabapentin 600 MG tablet  Commonly known as: NEURONTIN  Take 1 tablet (600 mg total) by mouth 3 (three) times daily.     * HYDROcodone-acetaminophen 5-325 mg per tablet  Commonly known as: NORCO  Take 1 tablet by mouth 2 (two) times daily as needed for Pain.     * HYDROcodone-acetaminophen 5-325 mg per tablet  Commonly known as: NORCO  Take 1 tablet by mouth 2 (two) times daily as needed for Pain.  Start taking on: May 24, 2022     ketoconazole 2 % cream  Commonly known as: NIZORAL  AAA gluteal fold daily to bid prn flare     LIDOcaine HCL 2% 2 % jelly  Commonly known as: XYLOCAINE  Apply topically as needed.     meloxicam 15 MG tablet  Commonly known as: MOBIC  Take  1 tablet (15 mg total) by mouth once daily.     mometasone 0.1% 0.1 % cream  Commonly known as: ELOCON  Apply topically once daily. for 10 days     multivitamin per tablet  Commonly known as: THERAGRAN  Take 1 tablet by mouth once daily.     ondansetron 4 MG tablet  Commonly known as: ZOFRAN  Take 1 tablet (4 mg total) by mouth every 8 (eight) hours as needed for Nausea.     sumatriptan 50 MG tablet  Commonly known as: IMITREX  Take one at the first sign of a headache.  You may repeat it in 1 hour as needed.     tiZANidine 4 MG tablet  Commonly known as: ZANAFLEX     triamcinolone acetonide 0.025% 0.025 % cream  Commonly known as: KENALOG  AAA gluteal fold daily to bid PRN flare         * This list has 2 medication(s) that are the same as other medications prescribed for you. Read the directions carefully, and ask your doctor or other care provider to review them with you.               Discharge Procedure Orders (must include Diet, Follow-up, Activity)   Diet Adult Regular     No driving until:   Order Comments: If you received sedation, no driving for 12 hrs     Remove dressing in 24 hours     Notify your health care provider if you experience any of the following:  temperature >100.4     Notify your health care provider if you experience any of the following:  severe uncontrolled pain     Notify your health care provider if you experience any of the following:  redness, tenderness, or signs of infection (pain, swelling, redness, odor or green/yellow discharge around incision site)     Notify your health care provider if you experience any of the following:  difficulty breathing or increased cough     Notify your health care provider if you experience any of the following:  severe persistent headache     Notify your health care provider if you experience any of the following:  increased confusion or weakness     Activity as tolerated       Alexus Anglin Jr, MD  Interventional Pain Medicine /  Anesthesiology

## 2022-04-13 ENCOUNTER — OFFICE VISIT (OUTPATIENT)
Dept: DERMATOLOGY | Facility: CLINIC | Age: 62
End: 2022-04-13
Payer: OTHER GOVERNMENT

## 2022-04-13 VITALS — WEIGHT: 271 LBS | HEIGHT: 72 IN | BODY MASS INDEX: 36.7 KG/M2

## 2022-04-13 DIAGNOSIS — L82.1 SEBORRHEIC KERATOSES: Primary | ICD-10-CM

## 2022-04-13 DIAGNOSIS — L40.8 INVERSE PSORIASIS: ICD-10-CM

## 2022-04-13 DIAGNOSIS — D22.9 MULTIPLE BENIGN NEVI: ICD-10-CM

## 2022-04-13 DIAGNOSIS — L90.5 SCAR: ICD-10-CM

## 2022-04-13 DIAGNOSIS — Z85.828 HISTORY OF NONMELANOMA SKIN CANCER: ICD-10-CM

## 2022-04-13 PROCEDURE — 99999 PR PBB SHADOW E&M-EST. PATIENT-LVL III: CPT | Mod: PBBFAC,,, | Performed by: DERMATOLOGY

## 2022-04-13 PROCEDURE — 99213 PR OFFICE/OUTPT VISIT, EST, LEVL III, 20-29 MIN: ICD-10-PCS | Mod: S$PBB,,, | Performed by: DERMATOLOGY

## 2022-04-13 PROCEDURE — 99213 OFFICE O/P EST LOW 20 MIN: CPT | Mod: S$PBB,,, | Performed by: DERMATOLOGY

## 2022-04-13 PROCEDURE — 99213 OFFICE O/P EST LOW 20 MIN: CPT | Mod: PBBFAC,PO | Performed by: DERMATOLOGY

## 2022-04-13 PROCEDURE — 99999 PR PBB SHADOW E&M-EST. PATIENT-LVL III: ICD-10-PCS | Mod: PBBFAC,,, | Performed by: DERMATOLOGY

## 2022-04-13 NOTE — PROGRESS NOTES
Subjective:       Patient ID:  Baldo Grant is a 61 y.o. male who presents for   Chief Complaint   Patient presents with    Spot     On right thumb    Skin Check     TBSC     LOV 8/20/21-SK, plaque psoriasis    Here today for TBSC  Has a spot on his right thumb, can be painful  Has tried OTC rx for warts    Has a hx of NMSC  Has a fhx of MM  H/o rosacea, psoriasis, seb derm  Using enstilar, uses on average 2/3 times weekly, does not need refill at this time  Face is clear    Previously under the care of Derm in [place]  H/o SCCs:   2014 Forehead- Dr. Cabral   2013 Right ear- Dr. Marianna long      Current Outpatient Medications:     aspirin (ECOTRIN) 81 MG EC tablet, Take 1 tablet twice a day with food starting after surgery (breakfast and dinner)., Disp: 28 tablet, Rfl: 0    calcipotriene-betamethasone (ENSTILAR) 0.005-0.064 % Foam, Apply 1 application topically once daily., Disp: 60 g, Rfl: 2    diphenhydrAMINE (BENADRYL) 25 mg capsule, Take 25 mg by mouth every 6 (six) hours as needed for Itching., Disp: , Rfl:     EPINEPHrine (EPIPEN) 0.3 mg/0.3 mL AtIn, , Disp: , Rfl:     fexofenadine (ALLEGRA) 180 MG tablet, Take 180 mg by mouth continuous prn., Disp: , Rfl:     gabapentin (NEURONTIN) 600 MG tablet, Take 1 tablet (600 mg total) by mouth 3 (three) times daily., Disp: 270 tablet, Rfl: 4    HYDROcodone-acetaminophen (NORCO) 5-325 mg per tablet, Take 1 tablet by mouth 2 (two) times daily as needed for Pain., Disp: 60 tablet, Rfl: 0    (START ON 5/24/2022) HYDROcodone-acetaminophen (NORCO) 5-325 mg per tablet, Take 1 tablet by mouth 2 (two) times daily as needed for Pain., Disp: 60 tablet, Rfl: 0    meloxicam (MOBIC) 15 MG tablet, Take 1 tablet (15 mg total) by mouth once daily., Disp: 90 tablet, Rfl: 3    multivitamin (THERAGRAN) per tablet, Take 1 tablet by mouth once daily., Disp: , Rfl:     ondansetron (ZOFRAN) 4 MG tablet, Take 1 tablet (4 mg total) by mouth every 8 (eight) hours as needed  for Nausea., Disp: 30 tablet, Rfl: 0    sumatriptan (IMITREX) 50 MG tablet, Take one at the first sign of a headache.  You may repeat it in 1 hour as needed. (Patient taking differently: Take one at the first sign of a headache.  You may repeat it in 1 hour as needed.), Disp: 9 tablet, Rfl: 3    tiZANidine (ZANAFLEX) 4 MG tablet, , Disp: , Rfl:     triamcinolone acetonide 0.025% (KENALOG) 0.025 % cream, AAA gluteal fold daily to bid PRN flare, Disp: 30 g, Rfl: 3    diclofenac sodium (VOLTAREN) 1 % Gel, Apply 2 g topically 4 (four) times daily. (Patient not taking: Reported on 4/13/2022), Disp: 100 g, Rfl: 0    ketoconazole (NIZORAL) 2 % cream, AAA gluteal fold daily to bid prn flare (Patient not taking: Reported on 4/13/2022), Disp: 60 g, Rfl: 3    lidocaine HCL 2% (XYLOCAINE) 2 % jelly, Apply topically as needed. (Patient not taking: Reported on 4/13/2022), Disp: 30 mL, Rfl: 3    mometasone 0.1% (ELOCON) 0.1 % cream, Apply topically once daily. for 10 days, Disp: 1 Tube, Rfl: 3        Review of Systems   Constitutional: Negative for fever, chills and fatigue.   Respiratory: Negative for cough and shortness of breath.    Skin: Positive for activity-related sunscreen use and wears hat.   Hematologic/Lymphatic: Bruises/bleeds easily.        Objective:    Physical Exam   Constitutional: He appears well-developed and well-nourished. No distress.   Neurological: He is alert and oriented to person, place, and time. He is not disoriented.   Psychiatric: He has a normal mood and affect.   Skin:   Areas Examined (abnormalities noted in diagram):   Scalp / Hair Palpated and Inspected  Head / Face Inspection Performed  Neck Inspection Performed  Chest / Axilla Inspection Performed  Abdomen Inspection Performed  Genitals / Buttocks / Groin Inspection Performed  Back Inspection Performed  RUE Inspected  LUE Inspection Performed  RLE Inspected  LLE Inspection Performed  Nails and Digits Inspection Performed                        Diagram Legend     Erythematous scaling macule/papule c/w actinic keratosis       Vascular papule c/w angioma      Pigmented verrucoid papule/plaque c/w seborrheic keratosis      Yellow umbilicated papule c/w sebaceous hyperplasia      Irregularly shaped tan macule c/w lentigo     1-2 mm smooth white papules consistent with Milia      Movable subcutaneous cyst with punctum c/w epidermal inclusion cyst      Subcutaneous movable cyst c/w pilar cyst      Firm pink to brown papule c/w dermatofibroma      Pedunculated fleshy papule(s) c/w skin tag(s)      Evenly pigmented macule c/w junctional nevus     Mildly variegated pigmented, slightly irregular-bordered macule c/w mildly atypical nevus      Flesh colored to evenly pigmented papule c/w intradermal nevus       Pink pearly papule/plaque c/w basal cell carcinoma      Erythematous hyperkeratotic cursted plaque c/w SCC      Surgical scar with no sign of skin cancer recurrence      Open and closed comedones      Inflammatory papules and pustules      Verrucoid papule consistent consistent with wart     Erythematous eczematous patches and plaques     Dystrophic onycholytic nail with subungual debris c/w onychomycosis     Umbilicated papule    Erythematous-base heme-crusted tan verrucoid plaque consistent with inflamed seborrheic keratosis     Erythematous Silvery Scaling Plaque c/w Psoriasis     See annotation      Assessment / Plan:        Seborrheic keratoses  These are benign inherited growths without a malignant potential. Reassurance given to patient. No treatment is necessary.     Multiple benign nevi  Careful dermoscopy evaluation of nevi performed with none identified as needing biopsy today  Monitor for new mole or moles that are becoming bigger, darker, irritated, or developing irregular borders.     Inverse psoriasis  Doing well with episodic enstilar, continue    History of nonmelanoma skin cancer  Scar  Area of previous NMSCs examined. Sites well healed  with no signs of recurrence.  Total body skin examination performed today including at least 12 points as noted in physical examination. No lesions suspicious for malignancy noted.    Patient instructed in importance in daily broad spectrum sun protection of at least spf 30. Mineral sunscreen ingredients preferred (Zinc +/- Titanium) and can be found OTC.   Recommend Elta MD for daily use on face and neck.  Patient encouraged to wear hat for all outdoor exposure.   Also discussed sun avoidance and use of protective clothing.    2mm volar pit R distal phalanx thumb  Pared down, tiny keratotic plug, no warty features, reassurance             Follow up in about 1 year (around 4/13/2023), or if symptoms worsen or fail to improve.

## 2022-04-17 ENCOUNTER — PATIENT MESSAGE (OUTPATIENT)
Dept: PAIN MEDICINE | Facility: CLINIC | Age: 62
End: 2022-04-17
Payer: OTHER GOVERNMENT

## 2022-04-17 RX ORDER — HYDROCODONE BITARTRATE AND ACETAMINOPHEN 5; 325 MG/1; MG/1
1 TABLET ORAL 2 TIMES DAILY PRN
Qty: 60 TABLET | Refills: 0 | Status: CANCELLED | OUTPATIENT
Start: 2022-04-17 | End: 2022-05-17

## 2022-04-18 ENCOUNTER — PATIENT MESSAGE (OUTPATIENT)
Dept: PAIN MEDICINE | Facility: CLINIC | Age: 62
End: 2022-04-18
Payer: OTHER GOVERNMENT

## 2022-04-18 ENCOUNTER — TELEPHONE (OUTPATIENT)
Dept: PAIN MEDICINE | Facility: CLINIC | Age: 62
End: 2022-04-18
Payer: OTHER GOVERNMENT

## 2022-04-18 DIAGNOSIS — M51.36 DDD (DEGENERATIVE DISC DISEASE), LUMBAR: ICD-10-CM

## 2022-04-18 DIAGNOSIS — M54.16 LUMBAR RADICULOPATHY: Primary | ICD-10-CM

## 2022-04-18 RX ORDER — HYDROCODONE BITARTRATE AND ACETAMINOPHEN 5; 325 MG/1; MG/1
1 TABLET ORAL 2 TIMES DAILY PRN
Qty: 60 TABLET | Refills: 0 | Status: CANCELLED | OUTPATIENT
Start: 2022-04-25 | End: 2022-05-25

## 2022-04-19 RX ORDER — HYDROCODONE BITARTRATE AND ACETAMINOPHEN 5; 325 MG/1; MG/1
1 TABLET ORAL EVERY 8 HOURS PRN
Qty: 30 TABLET | Refills: 0 | Status: SHIPPED | OUTPATIENT
Start: 2022-04-19 | End: 2022-04-29

## 2022-04-19 NOTE — TELEPHONE ENCOUNTER
norco refilled for 10 days as a supplement 2/2 patient's report of increased pain.    Alexus Anglin Jr, MD  Interventional Pain Medicine / Anesthesiology

## 2022-04-20 ENCOUNTER — PATIENT MESSAGE (OUTPATIENT)
Dept: PAIN MEDICINE | Facility: CLINIC | Age: 62
End: 2022-04-20
Payer: OTHER GOVERNMENT

## 2022-05-02 ENCOUNTER — PATIENT MESSAGE (OUTPATIENT)
Dept: PAIN MEDICINE | Facility: CLINIC | Age: 62
End: 2022-05-02
Payer: OTHER GOVERNMENT

## 2022-05-03 ENCOUNTER — PATIENT OUTREACH (OUTPATIENT)
Dept: ADMINISTRATIVE | Facility: OTHER | Age: 62
End: 2022-05-03
Payer: OTHER GOVERNMENT

## 2022-05-03 NOTE — PROGRESS NOTES
Health Maintenance Due   Topic Date Due    Lipid Panel  11/05/2020     Updates were requested from care everywhere.  Chart was reviewed for overdue Proactive [facility] Encounters (SUZANNA) topics (CRS, Breast Cancer Screening, Eye exam)  Health Maintenance has been updated.

## 2022-05-04 ENCOUNTER — OFFICE VISIT (OUTPATIENT)
Dept: PAIN MEDICINE | Facility: CLINIC | Age: 62
End: 2022-05-04
Payer: OTHER GOVERNMENT

## 2022-05-04 VITALS
SYSTOLIC BLOOD PRESSURE: 122 MMHG | WEIGHT: 270.94 LBS | HEART RATE: 72 BPM | DIASTOLIC BLOOD PRESSURE: 82 MMHG | BODY MASS INDEX: 36.75 KG/M2

## 2022-05-04 DIAGNOSIS — G89.4 CHRONIC PAIN SYNDROME: ICD-10-CM

## 2022-05-04 DIAGNOSIS — M51.36 DDD (DEGENERATIVE DISC DISEASE), LUMBAR: Primary | ICD-10-CM

## 2022-05-04 DIAGNOSIS — S83.232D COMPLEX TEAR OF MEDIAL MENISCUS OF LEFT KNEE AS CURRENT INJURY, SUBSEQUENT ENCOUNTER: ICD-10-CM

## 2022-05-04 DIAGNOSIS — M17.12 PRIMARY OSTEOARTHRITIS OF LEFT KNEE: ICD-10-CM

## 2022-05-04 DIAGNOSIS — M94.262 CHONDROMALACIA OF LEFT KNEE: ICD-10-CM

## 2022-05-04 DIAGNOSIS — M51.26 LUMBAR DISCOGENIC PAIN SYNDROME: ICD-10-CM

## 2022-05-04 DIAGNOSIS — M51.36 DDD (DEGENERATIVE DISC DISEASE), LUMBAR: ICD-10-CM

## 2022-05-04 DIAGNOSIS — M48.061 NEUROFORAMINAL STENOSIS OF LUMBAR SPINE: ICD-10-CM

## 2022-05-04 DIAGNOSIS — M54.89 VERTEBROGENIC PAIN: ICD-10-CM

## 2022-05-04 DIAGNOSIS — F11.90 CHRONIC, CONTINUOUS USE OF OPIOIDS: ICD-10-CM

## 2022-05-04 DIAGNOSIS — M54.16 LUMBAR RADICULOPATHY: Primary | ICD-10-CM

## 2022-05-04 PROCEDURE — 99999 PR PBB SHADOW E&M-EST. PATIENT-LVL IV: CPT | Mod: PBBFAC,,, | Performed by: NURSE PRACTITIONER

## 2022-05-04 PROCEDURE — 99999 PR PBB SHADOW E&M-EST. PATIENT-LVL IV: ICD-10-PCS | Mod: PBBFAC,,, | Performed by: NURSE PRACTITIONER

## 2022-05-04 PROCEDURE — 99214 OFFICE O/P EST MOD 30 MIN: CPT | Mod: PBBFAC,PO | Performed by: NURSE PRACTITIONER

## 2022-05-04 PROCEDURE — 99214 PR OFFICE/OUTPT VISIT, EST, LEVL IV, 30-39 MIN: ICD-10-PCS | Mod: S$PBB,,, | Performed by: NURSE PRACTITIONER

## 2022-05-04 PROCEDURE — 99214 OFFICE O/P EST MOD 30 MIN: CPT | Mod: S$PBB,,, | Performed by: NURSE PRACTITIONER

## 2022-05-04 RX ORDER — HYDROCODONE BITARTRATE AND ACETAMINOPHEN 5; 325 MG/1; MG/1
1 TABLET ORAL 3 TIMES DAILY PRN
Qty: 90 TABLET | Refills: 0 | Status: SHIPPED | OUTPATIENT
Start: 2022-06-04 | End: 2022-07-04

## 2022-05-04 RX ORDER — HYDROCODONE BITARTRATE AND ACETAMINOPHEN 5; 325 MG/1; MG/1
1 TABLET ORAL 3 TIMES DAILY PRN
Qty: 90 TABLET | Refills: 0 | Status: SHIPPED | OUTPATIENT
Start: 2022-05-04 | End: 2022-06-03

## 2022-05-04 NOTE — TELEPHONE ENCOUNTER
----- Message from Jeffy Ramos, TEE sent at 5/4/2022 12:53 PM CDT -----  Regarding: med refill  So Hayden is ok with signing off on Norco 5-325 TID# 90 so lets pend today with a 2 month panel     Thanks

## 2022-05-04 NOTE — PROGRESS NOTES
Chronic Pain-Established Note (Follow up visit)    Referred by: Dr. Encarnacion  Reason for referral: Back Pain    CC:   Chief Complaint   Patient presents with    Medication Refill    Low-back Pain       Last 3 PDI Scores 5/4/2022 3/24/2022 1/12/2022   Pain Disability Index (PDI) 47 36 45     Interval Updates 5/4/2022 - Mr. Grant is following up for Medication Refill and Low-back Pain and bilateral leg pain.  He requests a refill of Hydrocodone-Acetaminophen 5-325 mg TID # 90 pills per month is what he would prefer and  are  working well to control His pain.  He reports 50-60% relief with the current medication regimen and improved functionality. Pain is currently rate 7/10 with a weekly range 7-8/10.  Lastly, he would like to be resubmitted back into the portal for consideration of the Intracept procedure.  Lastly, his most recently lumbar Bilateral TFEIS at L4-5 provided him with 50% relief for one week and then pain returned.      - Mr. Grant is following up for Medication Refill and Low-back Pain.  He requests a refill of Hydrocodone-Acetaminophen 5-325 mg BID # 60 which he says is effective but would like to go back to TID dosing.  He reports 50% relief with the current medication regimen. Pain is currently rate 5/10 with a weekly range 5-6/10.   He is also reporting increased pain in the low back left greater than right as well as legs bilaterally left greater than right.  Pain starts in low back and radiates into his legs posteriorly laterally anteriorly also causing scrotum pain.  Denies any profound weakness although he is walking with a cane this point.  Denies any bowel bladder dysfunction, denies any saddle anesthesia denies any recent incident or trauma.      01/12/2022 - Mr. Grant is following up for Medication Refill and Low-back Pain.  He requests a refill of Norco 7.5-325 90 pills per month which are working well to control His pain.  He reports 60% relief with the current medication regimen.   Medication side effects: none.  Pain is currently rate 6/10 with a weekly range 3-8/10.  It is described as Aching, Tight, Tingling, Numb, Sharp and Shooting.   Pain is worsened by: standing and improved by: nothing and medications.     10/25/2021 -- Mr. Grant is following up for Medication Refill and Low-back Pain.  He requests a refill of Hydrocodone-Acetaminophen 5-325 mg which is not working well to control His pain.  He reports 50% relief with the current medication regimen.  Medication side effects: none.  Pain is currently rate 6/10 with a weekly range 5-9/10.  It is described as Aching, Tight, Tingling, Numb, Sharp and Shooting.   Pain is worsened by: standing and improved by: nothing and medications.     09/27/21 - Mr. Grant is following up for Medication Refill and Low-back Pain.  He requests a refill of Hydrocodone-Acetaminophen 5-325 mg TID # 90 which are not working well to control His pain.  He reports 50% relief with the current medication regimen. Pain is currently rate 7/10 with a weekly range 6-7/10. Mr. Grant is reporting continued left low back pain with radiating symptoms into his left and now right scrotum.  He mentioned his right scrotum has not been affected previously.  He also reports pain radiating into his left lateral leg stopping just above his left knee.  His pain has been refractory to multiple injections and physical therapy, he reports the inability to have a quality of life as he states he recently tried to have sex with his wife and could not due to the pain.  His current opioid regimen provide some relief but not significant enough as he would like to consider other options, he endorses that his insurance plan denied the intracept procedure that we were going to consider him for.     08/27/21 - Mr. Grant is following up for Medication Refill and Low-back Pain.  He requests a refill of Hydrocodone-Acetaminophen 5-325 mg TID # 90 per month which are working well to control His  "pain.  He reports 50% relief with the current medication regimen.  Medication side effects: none.  Pain is currently rate 5/10 with a weekly range 5-9/10.  It is described as Aching, Dull, electrical shock(left leg)  and Sharp.   Pain is worsened by: exercise, flexion, standing for more than 3 minutes and walking for more than 3 minutes and improved by: heat, laying down, massage, medications, physical therapy, rest and sitting.    7/23/21 - Mr. Grant returns to clinic for follow up visit reporting worse left sided low back and left leg pain.  Patient is here for medication refill of Hydrocodone-Acetaminophen 5-325 mg TID PRN for pain, he reports 60-70% relief for 3-4 hours his pain is predicated on how much activity he is doing. He also receives Gabapentin 600 mg TID and Mobic 15 mg daily. He is reporting is reporting left scrotum and left leg pain today, this is his worse pain today.  Pain intensity is currently 7/10.      06/16/2021 - Mr. Grant returns to clinic for follow up visit reporting stable but severe back pain. Pain intensity is currently 5/10.  His PCP recently left [facility] and he was referred her for re-evaluation by his new primary care team.  Patient reports continued low back pain with occasional radiation in to the legs.  Pain fluctuates depending on level of activity.  He reports "flare ups" at least twice weekly during which he may take 2-4 tablets of norco in a day, which other days he may take none.    09/16/2020 - Mr. Grant returns to clinic for follow up visit reporting low back and left leg  pain. He is also reporting left knee pain he is s/f for a left knee GN block with Dr Hollins's office. He was referred to Dr Hollins by Dr. Álvarez/Orthopedics.  Patient is s/p Lumbar Transforaminal Epidural Steroid Injection, Two Levels, Bilateral L4-5 on 08/11/2020 with 0% relief.  Patient is also status post bilateral lumbar MBB that provided him with 0% relief.  Pain intensity is currently 6/10.  "     05/13/[id number]-ujvd-old male presents status post bilateral L4-5 TF KATHY with reported 60% relief.  Patient also states his relief is improving each and every day.  Reports taking less of his pain medication due to the relief received from the injection.  He is only 6 days postop his lumbar epidural, discussed that I suspect is relief will improve even more over the next 2 weeks.    03/02/2020 - Mr. Grant returns to clinic for follow up visit reporting worse back and bilateral leg pain left greater than right.  Patient is s/p L4-5 Lumbar Epidural Steroid  on 2/11/20 with 0% relief.  Patient presents with wife he is currently experiencing no relief from the injection, he reports that his pain continues in his low back as well as in his legs bilaterally left greater than right.  Patient reports shooting pain down his left leg at times going past his left knee into his left foot.  Patient reports previously given a lumbar TF KATHY at L4-5 per Dr. Duenas/TAMIKO Hampton reports receiving 3 months of relief and he is requesting to be scheduled for that particular injection today.  Pain intensity is currently 5/10.      HPI:    Baldo Grant is a 62 y.o. male who complains of left and right lower back pain.  He reports the pain radiates down his left leg just below his knee.  Pain intensity is 5/10.  He reports taking Norco, Gabapentin and Meloxicam for some relief.   Patient s/p Bilateral L3-4, L4-5, and L5-S1 Lumbar Medial Branch Block on 11/5/2019 with Dr. Anglin with no relief.  Patient's 5/17/2019 MRI Lumbar Spine reviewed with patient and shows L4-5 and L5-S1 degenerative disc disease with disc bulging as described above with right L4-5 and left L5-S1 foraminal stenosis.      Location: lower back and bilateral leg pain   Onset: Summer 200  Current Pain Score: 5/10   Daily Pain of Range: 4-9/10  Quality: Dull and Sharp  Radiation: back of left leg  Worsened by: sitting and standing  Improved by: medications and  physical therapy    Previous Therapies:  PT/OT: Yes  HEP: Yes, but severely limited by pain  Interventions:   -04/05/2022 Lumbar Transforaminal Epidural Steroid Injection, Bilateral L4-5      Bilateral L3-4, L4-5, and L5-S1 Lumbar Medial Branch Block on 11/5/2019  Surgery:  Medications:   - NSAIDS: meloxicam (MOBIC) 15 MG tablet daily  - MSK Relaxants:  diazePAM (VALIUM) 5 MG tablet daily as needed  - TCAs:   - SNRIs:   - Topicals: lidocaine HCL 2% (XYLOCAINE) 2 % jelly  - Anticonvulsants:  - Opioids: HYDROcodone-acetaminophen (NORCO) 5-325 mg per tablet    Current Pain Medications:  1. Meloxicam 15 mg   2. Gabapentin 600 mg TID        3. HYDROcodone-acetaminophen (NORCO) 5-325 mg per tablet Q6 hours PRN    Review of Systems:  Review of Systems   Constitutional: Negative.    HENT: Negative.    Eyes: Negative.    Respiratory: Negative.    Cardiovascular: Negative.    Gastrointestinal: Negative.    Genitourinary: Negative.    Musculoskeletal: Positive for back pain, joint pain and myalgias.   Skin: Negative.    Neurological: Negative.    Endo/Heme/Allergies: Negative.    Psychiatric/Behavioral: Negative.        History:    Current Outpatient Medications:     aspirin (ECOTRIN) 81 MG EC tablet, Take 1 tablet twice a day with food starting after surgery (breakfast and dinner)., Disp: 28 tablet, Rfl: 0    calcipotriene-betamethasone (ENSTILAR) 0.005-0.064 % Foam, Apply 1 application topically once daily., Disp: 60 g, Rfl: 2    diclofenac sodium (VOLTAREN) 1 % Gel, Apply 2 g topically 4 (four) times daily., Disp: 100 g, Rfl: 0    diphenhydrAMINE (BENADRYL) 25 mg capsule, Take 25 mg by mouth every 6 (six) hours as needed for Itching., Disp: , Rfl:     EPINEPHrine (EPIPEN) 0.3 mg/0.3 mL AtIn, , Disp: , Rfl:     fexofenadine (ALLEGRA) 180 MG tablet, Take 180 mg by mouth continuous prn., Disp: , Rfl:     gabapentin (NEURONTIN) 600 MG tablet, Take 1 tablet (600 mg total) by mouth 3 (three) times daily., Disp: 270 tablet,  Rfl: 4    [START ON 5/24/2022] HYDROcodone-acetaminophen (NORCO) 5-325 mg per tablet, Take 1 tablet by mouth 2 (two) times daily as needed for Pain., Disp: 60 tablet, Rfl: 0    ketoconazole (NIZORAL) 2 % cream, AAA gluteal fold daily to bid prn flare, Disp: 60 g, Rfl: 3    lidocaine HCL 2% (XYLOCAINE) 2 % jelly, Apply topically as needed., Disp: 30 mL, Rfl: 3    meloxicam (MOBIC) 15 MG tablet, Take 1 tablet (15 mg total) by mouth once daily., Disp: 90 tablet, Rfl: 3    multivitamin (THERAGRAN) per tablet, Take 1 tablet by mouth once daily., Disp: , Rfl:     ondansetron (ZOFRAN) 4 MG tablet, Take 1 tablet (4 mg total) by mouth every 8 (eight) hours as needed for Nausea., Disp: 30 tablet, Rfl: 0    sumatriptan (IMITREX) 50 MG tablet, Take one at the first sign of a headache.  You may repeat it in 1 hour as needed. (Patient taking differently: Take one at the first sign of a headache.  You may repeat it in 1 hour as needed.), Disp: 9 tablet, Rfl: 3    tiZANidine (ZANAFLEX) 4 MG tablet, , Disp: , Rfl:     triamcinolone acetonide 0.025% (KENALOG) 0.025 % cream, AAA gluteal fold daily to bid PRN flare, Disp: 30 g, Rfl: 3    mometasone 0.1% (ELOCON) 0.1 % cream, Apply topically once daily. for 10 days, Disp: 1 Tube, Rfl: 3  No current facility-administered medications for this visit.    Facility-Administered Medications Ordered in Other Visits:     0.9%  NaCl infusion, 500 mL, Intravenous, Continuous, Alexus Anglin Jr., MD    lidocaine (PF) 10 mg/ml (1%) injection 10 mg, 1 mL, Intradermal, Once, Alexus Anglin Jr., MD    lidocaine (PF) 10 mg/ml (1%) injection 10 mg, 1 mL, Intradermal, Once, Alexus Anglin Jr., MD    Past Medical History:   Diagnosis Date    Arthritis     Basal cell carcinoma 06/21/2019    back    Cancer     Chronic pain of right knee     SCC (squamous cell carcinoma) 2014    Forehead- Dr. Cabral     Skin cancer     Squamous cell carcinoma 2013    Right ear- Dr. Cabral [facility] [place] [facility] [place]       Past Surgical History:   Procedure Laterality Date    APPENDECTOMY      ARTHROSCOPIC CHONDROPLASTY OF KNEE JOINT Left 3/18/2020    Procedure: ARTHROSCOPY, KNEE, WITH CHONDROPLASTY;  Surgeon: FREEMAN Álvarez MD;  Location: [facility] OR;  Service: Orthopedics;  Laterality: Left;    COLONOSCOPY  1998    EPIDURAL STEROID INJECTION INTO LUMBAR SPINE N/A 2/11/2020    Procedure: Injection-steroid-epidural-lumbar--InterLaminar L4-5;  Surgeon: Alexus Anglin Jr., MD;  Location: [facility] PAIN MGT;  Service: Pain Management;  Laterality: N/A;    INJECTION OF ANESTHETIC AGENT AROUND MEDIAL BRANCH NERVES INNERVATING LUMBAR FACET JOINT Bilateral 11/5/2019    Procedure: Block-nerve-medial branch-lumbar--Bilateral L3-4,L4-5,L5-S1;  Surgeon: Alexus Anglin Jr., MD;  Location: [facility] PAIN [facility]GT;  Service: Pain Management;  Laterality: Bilateral;    INJECTION OF STEROID Left 11/12/2020    Procedure: INJECTION, STEROID Left SI Joint Block and Steroid Injection;  Surgeon: Tam Encarnacion MD;  Location: [facility] OR;  Service: Neurosurgery;  Laterality: Left;  Procedure: Left SI Joint Block and Steroid Injection   Surgery Time: 30 min  LOS: 0  Anesthesia: MAC  Radiology: C-arm  Bed: Regular with Pillows  Position: Prone    KNEE ARTHROSCOPY W/ MENISCECTOMY Left 3/18/2020    Procedure: ARTHROSCOPY, KNEE, WITH MENISCECTOMY;  Surgeon: FREEMAN Álvarez MD;  Location: [facility] OR;  Service: Orthopedics;  Laterality: Left;  CLONIDINE/EPI/KETOROLAC/ROPIVACAINE INJECTION 30CC    lipoma removal      skin cancer removal      TRANSFORAMINAL EPIDURAL INJECTION OF STEROID Bilateral 5/6/2020    Procedure: Injection,steroid,epidural,transforaminal approach--Bilateral L4-5;  Surgeon: Alexus Anglin Jr., MD;  Location: [facility]GT;  Service: Pain Management;  Laterality: Bilateral;    TRANSFORAMINAL EPIDURAL INJECTION OF STEROID Bilateral 8/11/2020    Procedure: Injection,steroid,epidural,transforaminal approach--Bilateral L4-5;   Surgeon: Alexus Anglin Jr., MD;  [place] [facility] [place];  Service: Pain Management;  Laterality: Bilateral;    TRANSFORAMINAL EPIDURAL INJECTION OF STEROID Bilateral 4/5/2022    Procedure: Injection,steroid,epidural,transforaminal bilateral L4-5;  Surgeon: Alexus Anglin Jr., MD;  Location: [facility] [place];  Service: Pain Management;  Laterality: Bilateral;  asa        Family History   Problem Relation Age of Onset    COPD Mother     Alcohol abuse Mother     Heart disease Father     Melanoma Father     No Known Problems Sister     No Known Problems Brother     Psoriasis Neg Hx     Lupus Neg Hx     Eczema Neg Hx        Social History     Socioeconomic History    Marital status:    Tobacco Use    Smoking status: Never Smoker    Smokeless tobacco: Never Used   Substance and Sexual Activity    Alcohol use: Yes     Comment: social    Drug use: No    Sexual activity: Yes     Partners: Female     Social Determinants of Health     Financial Resource Strain: Low Risk     Difficulty of Paying Living Expenses: Not very hard   Food Insecurity: No Food Insecurity    Worried About Running Out of Food in the Last Year: Never true    Ran Out of Food in the Last Year: Never true   Transportation Needs: No Transportation Needs    Lack of Transportation (Medical): No    Lack of Transportation (Non-Medical): No   Physical Activity: Insufficiently Active    Days of Exercise per Week: 2 days    Minutes of Exercise per Session: 30 min   Stress: No Stress Concern Present    Feeling of Stress : Only a little   Social Connections: Unknown    Frequency of Communication with Friends and Family: More than three times a week    Frequency of Social Gatherings with Friends and Family: Once a week    Active Member of Clubs or Organizations: Yes    Attends Club or Organization Meetings: More than 4 times per year    Marital Status:    Housing Stability: Low Risk     Unable to Pay for Housing in the  Last Year: No    Number of Places Lived in the Last Year: 1    Unstable Housing in the Last Year: No       Review of patient's allergies indicates:   Allergen Reactions    Advil [ibuprofen] Anaphylaxis    Tums [calcium carbonate] Anaphylaxis       Physical Exam:  Vitals:    22 1120   BP: 122/82   Pulse: 72   Weight: 122.9 kg (270 lb 15.1 oz)   PainSc:   7     General    Nursing note and vitals reviewed.  Constitutional: He is oriented to person, place, and time. He appears well-developed. No distress.   HENT:   Head: Normocephalic and atraumatic.   Nose: Nose normal.   Eyes: Conjunctivae and EOM are normal. Right eye exhibits no discharge. Left eye exhibits no discharge. No scleral icterus.   Neck: No JVD present. No tracheal deviation present.   Cardiovascular: Normal rate, regular rhythm and intact distal pulses.    Pulmonary/Chest: Effort normal and breath sounds normal. No respiratory distress. He exhibits no tenderness.   Abdominal: Soft. Bowel sounds are normal. He exhibits no distension. There is no abdominal tenderness. There is no rebound.   Neurological: He is alert and oriented to person, place, and time. He exhibits normal muscle tone. Coordination normal.   Psychiatric: His behavior is normal. Thought content normal.         Back (L-Spine & T-Spine) / Neck (C-Spine) Exam     Tenderness Right paramedian tenderness of the Lower L-Spine. Left paramedian tenderness of the Lower L-Spine.     Back (L-Spine & T-Spine) Range of Motion   Lateral bend right: normal   Lateral bend left: normal   Rotation right: normal   Rotation left: normal     Spinal Sensation   Right Side Sensation  L-Spine Level: normal  Left Side Sensation  L-Spine Level: normal      Muscle Strength   Right Lower Extremity   Hip Abduction: 5/5   Hip Flexion: 5/5   Hip Extensors: 5/5  Quadriceps:  5/5   Hamstrin/5   Gastrocsoleus:  5/5   Left Lower Extremity   Hip Abduction: 4/5   Hip Flexion: 4/5   Hip Extensors:  4/5  Quadriceps:  4/5   Hamstrin/5   Gastrocsoleus:  5/5       Imaging:  MRI Lumbar Spine Without Contrast 10/15/2020   Degenerative findings:  T12-L1: No spinal canal stenosis or neural foraminal narrowing.  L1-L2: No spinal canal stenosis or neural foraminal narrowing.  L2-L3: Circumferential disc bulge and mild facet arthropathy noted.  No spinal canal stenosis or neural foraminal narrowing.  L3-L4: Circumferential disc bulge and mild facet arthropathy result in mild left neural foraminal narrowing.  L4-L5: Circumferential disc bulge and mild facet arthropathy result in moderate right, mild left neural foraminal narrowing.  L5-S1: Circumferential disc bulge with annular fissure and moderate facet arthropathy result in mild bilateral neural foraminal narrowing.  Paraspinal muscles & soft tissues: Unremarkable.     Impression:  1. Degenerative changes of the lumbar spine resulting in mild-to-moderate bilateral neural foraminal narrowing at L3-S1.      EXAMINATION:  MRI LUMBAR SPINE WITHOUT CONTRAST    CLINICAL HISTORY:  Low back painLow back pain, rapidly progressive neuro deficit;    TECHNIQUE:  Standard multiplanar noncontrast MRI sequences of the lumbar spine.    COMPARISON:  None    FINDINGS:  The distal cord and conus reveal normal signal and morphology. The lumbar vertebra reveal normal alignment, shape and signal intensity.    T12-L1: Unremarkable    L1-2:  Unremarkable    L2-3:  Minimal annular bulge.    L3-4:  Mild disc desiccation with mild generalized annular bulge.  Mild hypertrophic ligamentum flavum and facet arthritis.    L4-5:  Mild disc desiccation with mild annular bulge.  Right foraminal annular fissure.  Mild hypertrophic ligamentum flavum and facet arthritis.  Mild to moderate left and moderate right foraminal stenosis.    L5-S1:  Mild degenerative disc disease with disc desiccation and disc bulge/osteophyte.  Mild hypertrophic ligamentum flavum and facet arthritis.  Mild right with mild  to moderate left foraminal stenosis.      Impression       L4-5 and L5-S1 degenerative disc disease with disc bulging osteophyte as described above with right L4-5 and left L5-S1 foraminal stenosis.      Electronically signed by: Baldo Gayle MD  Date: 05/17/2019         Labs:  BMP  Lab Results   Component Value Date     09/22/2020    K 4.4 09/22/2020     09/22/2020    CO2 28 09/22/2020    BUN 17 09/22/2020    CREATININE 1.00 09/22/2020    CALCIUM 9.4 09/22/2020    ANIONGAP 10 09/22/2020    ESTGFRAFRICA >60.0 09/22/2020    EGFRNONAA >60.0 09/22/2020     Lab Results   Component Value Date    ALT 55 (H) 09/22/2020    AST 33 09/22/2020    ALKPHOS 37 (L) 09/22/2020    BILITOT 0.4 09/22/2020       Assessment:  Problem List Items Addressed This Visit        Neuro    Lumbar radiculopathy - Primary    Chronic pain    Lumbar discogenic pain syndrome    DDD (degenerative disc disease), lumbar      Other Visit Diagnoses     Primary osteoarthritis of left knee        Complex tear of medial meniscus of left knee as current injury, subsequent encounter        Chondromalacia of left knee        Neuroforaminal stenosis of lumbar spine        Vertebrogenic pain        Chronic, continuous use of opioids              2/3/2020 - Baldo Grant is a 62 y.o. male with who complains of left and right lower back pain.  He reports the pain radiates down his left leg just below his knee.  Pain intensity is 5/10. He reports his current medication regimen of Norco, Gabapentin and Meloxicam help relieve his pain.  He reports having  Bilateral L3-4, L4-5, and L5-S1 Lumbar Medial Branch Block on 11/5/2019 with no relief.Patient's 5/17/2019 MRI Lumbar Spine reviewed with patient and shows L4-5 and L5-S1 degenerative disc disease with disc bulging  as described above with right L4-5 and left L5-S1 foraminal stenosis.  Recommended scheduling for Interlaminar KATHY  L4-5 with moderate sedation. Patient will follow in clinic following  injection.     03/02/20201070-29-tatw-old male presents with his wife he is status post lumbar interlaminar KATHY at L4-5 with 0% relief.  Patient reports continued low back and bilateral leg pain left greater than right he is reporting shooting pain down his left leg into his foot at times.  Patient reports the pain is disrupting his quality of life and prohibiting him from performing his ADLs.  Patient was given a left TF KATHY by Dr. Duenas/[facility] and received significant relief for 2-3 months. Lumbar MRI shows pathology a the L4-5 and L5-S1 patient has degenerative disc disease with disc bulging osteophyte with right L4-5 and left L5-S1 foraminal stenosis.  Based on results of previous Left  L4-5 transforaminal I discussed with patient and wife that I will now recommend  a bilateral L4-5.   Patient and wife agreed and understood.      05/13/2020- 52 a old male presents status post bilateral L4-5 TF KATHY, is currently reporting 60% relief he states his relief is improving each and every day.  Recommended patient follow up with me via my [facility] in 2 weeks to report the effectiveness of his procedure discussed that his procedure will more than likely improve the next 2 weeks considering he is only 6 days following his injection.    09/16/2020 - 60-year-old male presents low back and left knee pain, patient is established our office he has had multiple lumbar KATHY as well as a lumbar MBB that provided him with minimal to no relief according to his records he is scheduled for a bilateral genicular block with  tomorrow he was referred to his office from orthopedics/Dr Álvarez.  Patient reports continued low back and left leg pain radiating down to his foot, this pain is secondary to L 4/ 5, and 5 /S1 degenerative disc disease with disc bulging osteophyte with the right L4-5 and left L5-S1 foraminal stenosis that has not gotten better from lumbar KATHY injections.  I recommended patient follow up with  Neurosurgery further evaluate    6/16/21 - 62 yo M with primarily axial discogenic chronic low back pain presented today for re-evaluation the request of primary care team for assessment of his chronic opioid therapy.  Patient is on chronic low-dose opioid therapy which is supporting his daily function.  He has no bulging the spine related to degenerative disc disease.  Since he was last evaluated, our department deployed a new interventional procedures may help to target discogenic pain.  While not specifically noted in the most recent MRI from September 2020, there is endplate edema (Modic changes close this ease in the inferior endplate at L4, and both the superior and inferior endplate of L5.  This could be a significant cause of his pain and can be targeted through the use of the Intracept procedure which ablates the basivertebral nerve.  Given the level of functionality he is maintaining with the current regimen, we will continue with these medications while obtaining authorization for this procedure which may take anywhere between 1 and 6 months.    7/23/2021- 61-year-old male with a history of low back and left leg pain, patient is here for medication refill we will take over his opioid from his PCP.  He is currently taking Norco 5-325 t.i.d. that provides him with roughly 80% and improved functionality that allows in reform his ADLs.  Last clinic visit with Dr. Anglin he Mr Grant discussed considering him for a intracept procedure as his most recent lumbar MRI shows endplate edema/Modic changes to the inferior endplate of L4 and the superior and inferior endplate of L5.  Until authorization has been done we will  continue to provide him with his stable low dose opioid regimen,  he and I discussed that once authorization has been completed our office will give him a call and schedule him for the intracept  Procedure.    8/27/2021- Baldo Grant is a 62 y.o. male who  has a past medical history of  Arthritis, Basal cell carcinoma (06/21/2019), Cancer, Chronic pain of right knee, SCC (squamous cell carcinoma) (2014), Skin cancer, and Squamous cell carcinoma (2013).  By history and examination this patient has chronic low back pain with radiculopathy.  The underlying cause cause is degenerative disc disease.  Pathology is confirmed by imaging.  Lumbar MRI shows endplate edema/Modic changes to the inferior endplate of L4 and the superior and inferior endplate of L5  We discussed the underlying diagnoses and multiple treatment options including non-opioid medications, opioid medications, injections, physical therapy, home exercise, activity modification / rest and weight loss.  The risks and benefits of each treatment option were discussed and all questions were answered.      9/27/2021- Baldo Grant is a 62 y.o. male who  has a past medical history of Arthritis, Basal cell carcinoma (06/21/2019), Cancer, Chronic pain of right knee, SCC (squamous cell carcinoma) (2014), Skin cancer, and Squamous cell carcinoma (2013).  By history and examination this patient has chronic left  low back pain with radiculopathy.  The underlying cause cause is unclear, my initial Dx's will include  degenerative disc disease, foraminal stenosis and deconditioning differential diagnoses are Meralgia Paresthetica  affected, ilioinguinal nerve pain.    Pathology is confirmed by imaging.  We discussed the underlying diagnoses and multiple treatment options including non-opioid medications, opioid medications, injections, physical therapy, home exercise, activity modification / rest and weight loss.  The risks and benefits of each treatment option were discussed and all questions were answered.      10/25/21 - Continued LBP and RLE radicular symptoms now spreading to groin and LLE.  Patient likely has tolerance to Norco 7.5-325 mg and we will escalate to 7.5 mg tablets TID.  He was advised that we cannot escalate in perpetuity due to  safety concerns.  To that end, he will f/u with Dr. Encarnacion in [place] after completing the MRI I order today toward finding a definitive surgical solution.  If he is not a surgical candidate we may pursue SCS.  Intercept authorization is still pending.    01/12/[id number]-jwof-old male with a history of chronic low back and right lower extremity radicular symptoms that radiates into his groin and left lower extremity.   He recently saw Neurosurgery and was consult to physical therapy Neurosurgery relieved his pain is likely related to degenerative disc disease and myofascial pain syndrome.  He is currently on chronic opioid therapy that consists of Norco 7.5-325 t.i.d. 90 pills per month.  He and I discussed today that we would consider reducing his chronic opioid therapy to [place] 5-325 t.i.d. patient expressed concerns about sleeping at night that the pain medication is too strong as he does have a history of sleep apnea.  Addition he reports that he is excited about physical therapy and he will begin swimming at least 1-2 times per week which he thinks will overall help his pain.  He also takes gabapentin and an occasional Mobic 15 mg which both help.  Denies any adverse side effects with current medication.  He seems to be meeting all of his goals for chronic opiate therapy.    03/24/2022.  61-year-old male with history of chronic low back and bilateral lower extremity pain left greater than right symptoms also radiate into his groin.  He was denied the intercept procedure for discogenic pain he other options for treatment would be a lumbar KATHY or TF KATHY.  His pain is likely related to degenerative disc disease and moderate neural foraminal stenosis at L4-5 and L5-S1.  His MRI does also show facet arthritis at this level.  In the past he has been provided multiple injections the 1 that has given him the most relief based on previous notes is the bilateral TF KATHY targeting L4-5.  Discussed I recommend we repeat this  injection to acquire more relief, patient is amenable to this plan.  He also want me to send Dr. Anglin a message regarding his chronic opioid therapy which consists of Norco 5-325 b.i.d. 60 pills per month.  Ms. Grant I had a discussion about why he was reduced from t.i.d. to b.i.d. dosing discussed with him that I was unsure that I will follow-up with Dr. Anglin for confirmation on this.  For now we will refill the [place] 5-325 b.i.d..    5/4/2022- 63 y/o male with a history of chronic low back and bilateral lower extremity pain left greater than right symptoms also radiate into his groin.  He was denied the intercept procedure for discogenic pain. He was recently provided a Bilateral Lumbar TFESI @ L4-5 that provided minimal relief. He and I discussed that I will speak with our office staff/[facility] regarding resubmitting him into the Intracept Portal and then [facility]ing [facility] as this could push approval considering he has  insurance.     : Reviewed and consistent with medication use as prescribed.    Treatment Plan:   Procedure:  None at this time. Have [facility] resubmit for Intracept procedure.    - Considering SCS trial- he and I discussed this today    - Resubmit Intracept authorization, [facility] to possibly [facility] and perform.   PT/OT/HEP: I encouraged the patient to maintain a home exercise regimen that includes daily, moderate cardiovascular exercise lasting at least 30 minutes.  This may include yoga, nadia chi, walking, swimming, aqua aerobics, or other exercises that maintain a heart rate of 50-70% of the calculated maximum heart rate.  I also encouraged light, daily stretching focused on the target area.  Medications:    - Cont current meds. ie Mobic 15 mg PRN  and Gabapentin 600 mg TID  per PCP               - continue and refill [place] 5-325 TID  P.r.n.( this dose seems to improve pain and improve functionality)    - Patient is on stable, low dose opioid therapy without  aberrant behavior- refills would be appropriate for any provider              - discussed reducing his chronic opioid therapy patient was amenable to this reduction.              - One time Rx of  Zofran 4 mg for nausea due to pain    -  reviewed   - Pain contract signed 8/27/21  Labs: UDS reviewed   Imaging: No additional recommended at this time.      Follow Up: 8 weeks    ALAN Hale  Interventional Pain Management          Disclaimer: This note was partly generated using dictation software which may occasionally result in transcription errors.

## 2022-05-04 NOTE — TELEPHONE ENCOUNTER
, UDS, and medical history reviewed.  HARPREET Ramos and I discussed the case and I agree with the plan.  I will refill the medication today.    Alexus Anglin Jr, MD  Interventional Pain Medicine / Anesthesiology

## 2022-05-09 ENCOUNTER — PATIENT MESSAGE (OUTPATIENT)
Dept: DERMATOLOGY | Facility: CLINIC | Age: 62
End: 2022-05-09
Payer: OTHER GOVERNMENT

## 2022-05-17 ENCOUNTER — TELEPHONE (OUTPATIENT)
Dept: SLEEP MEDICINE | Facility: CLINIC | Age: 62
End: 2022-05-17
Payer: OTHER GOVERNMENT

## 2022-05-17 ENCOUNTER — PATIENT MESSAGE (OUTPATIENT)
Dept: PAIN MEDICINE | Facility: CLINIC | Age: 62
End: 2022-05-17
Payer: OTHER GOVERNMENT

## 2022-05-17 NOTE — PROGRESS NOTES
"     Baldo Grant is a 62 y.o. male is here to be evaluated for a sleep disorder; referred by You Britton MD.    The patient reports excessive daytime sleepiness, excessive daytime fatigue, snoring,  witnessed breathing pauses,  gasping for air in sleep and interrupted sleep since  over a decade ago - worse lately.    The patient does not feel entirely rested upon awakening. Does not take naps except weekends     The patient  reports occasional morning headaches and reports  dry mouth on awakening.   Baldo Grant reports occasional  nasal congestion.      The patient reports difficulty falling and staying asleep.    Baldo Grant  denies symptoms concerning for parasomnia except for occasional somniloquy.  The patient  denies auxiliary symptoms of narcolepsy including sleep onset paralysis, hypnagogic hallucinations, sleep attacks and cataplexy.    Baldo Grant denied symptoms concerning for RLS; nocturnal leg movements have not been noticed.   The patient does not experience sleep related leg cramps.         + back pain from previous injury - on Norco 5 times a day; restless sleeper. Due for knee replacement. Also has a bad left shoulder.    Medications pertinent to sleep  disorders taken currently:Benadryl  Previous  medications taken  for sleep disorders:  no; NEVER tried DZRA    Sleep studies  no      Occupation:retired from [employer]; still working  Bed partner: his wife  Exercise routine:signed up for a pool in [employer]; but it's making him stiff.  Caffeine: limits to 2-3 caf beverages prior to 3 in the afternoon  ETOH: almost none (does not agree with him anymore - flush reaction"    EPWORTH SLEEPINESS SCALE TOTAL SCORE  5/17/2022   Total score 5         EPWORTH SLEEPINESS SCALE 5/17/2022   Sitting and reading 1   Watching TV 1   Sitting, inactive in a public place (e.g. a theatre or a meeting) 1   As a passenger in a car for an hour without a break 0   Lying down to rest in the afternoon " when circumstances permit 2   Sitting and talking to someone 0   Sitting quietly after a lunch without alcohol 0   In a car, while stopped for a few minutes in traffic 0   Total score 5         EPWORTH SLEEPINESS SCALE 5/17/2022   Sitting and reading 1   Watching TV 1   Sitting, inactive in a public place (e.g. a theatre or a meeting) 1   As a passenger in a car for an hour without a break 0   Lying down to rest in the afternoon when circumstances permit 2   Sitting and talking to someone 0   Sitting quietly after a lunch without alcohol 0   In a car, while stopped for a few minutes in traffic 0   Total score 5     Sleep Clinic New Patient 5/17/2022   What time do you go to bed on a week day? (Give a range) Between 10 and 11 pm   What time do you go to bed on a day off? (Give a range) 11 and 12 pm   How long does it take you to fall asleep? (Give a range) 15 - 20 minutes, longer if I'm in pain (Chronic Back and Knee injuries).   On average, how many times per night do you wake up? 3 (usually get up to go to the bathroom)   How long does it take you to fall back into sleep? (Give a range) 5 to 30 minutes (depends on how much sleep I've gotten)   What time do you wake up to start your day on a week day? (Give a range) 7:30 am   What time do you wake up to start your day on a day off? (Give a range) 8:30 am   What time do you get out of bed? (Give a range) 7:30 or 8:30   On average, how many hours do you sleep? About 8, but sometimes it's not quality sleep   On average, how many naps do you take per day? If time permits, one nap for 30 - 40 minutes.   Rate your sleep quality from 0 to 5 (0-poor, 5-great). 2   Have you experienced:  Weight gain?   How much weight have you lost or gained (in lbs.) in the last year? 20 pounds   On average, how many times per night do you go to the bathroom?  3   Have you ever had a sleep study/CPAP machine/surgery for sleep apnea? No   Have you ever had a CPAP machine for sleep apnea? No    Have you ever had surgery for sleep apnea? No       Sleep Clinic ROS  5/17/2022   Difficulty breathing through the nose?  No   Sore throat or dry mouth in the morning? Yes   Irregular or very fast heart beat?  Yes   Shortness of breath?  Yes   Acid reflux? Yes   Body aches and pains?  Yes   Morning headaches? Sometimes   Dizziness? Sometimes   Mood changes?  Sometimes   Do you exercise?  No   Do you feel like moving your legs a lot?  Sometimes       DME:         PAST MEDICAL HISTORY:    Active Ambulatory Problems     Diagnosis Date Noted    Primary osteoarthritis of both knees 09/23/2015    Chronic right-sided low back pain with right-sided sciatica 10/26/2015    Complete tear of left rotator cuff 03/07/2016    Biceps tendinitis on left 03/07/2016    Post-traumatic osteoarthritis of left knee 10/05/2018    Class 2 severe obesity due to excess calories with serious comorbidity and body mass index (BMI) of 38.0 to 38.9 in adult 11/30/2018    Lumbar radiculopathy 07/31/2019    Obesity (BMI 30-39.9) 09/11/2019    Chronic pain 11/05/2019    Sacroiliac joint dysfunction of left side 10/21/2020    SI (sacroiliac) joint dysfunction 11/12/2020    Lumbar discogenic pain syndrome 06/16/2021    DDD (degenerative disc disease), lumbar 06/16/2021    Decreased range of motion with decreased strength 01/11/2022    Impaired tolerance of activity 01/11/2022     Resolved Ambulatory Problems     Diagnosis Date Noted    Left shoulder pain 03/07/2016    Lumbar spondylosis 05/30/2019    Acute bilateral low back pain with right-sided sciatica 10/10/2019    Decreased functional mobility 10/10/2019    Decreased ROM of lumbar spine 10/10/2019    Decreased strength 10/10/2019    Primary osteoarthritis of left knee 03/18/2020    Impaired functional mobility, balance, gait, and endurance 04/07/2020    Impaired physical mobility 05/01/2020     Past Medical History:   Diagnosis Date    Arthritis     Basal cell carcinoma  06/21/2019    Cancer     Chronic pain of right knee     SCC (squamous cell carcinoma) 2014    Skin cancer     Squamous cell carcinoma 2013                PAST SURGICAL HISTORY:    Past Surgical History:   Procedure Laterality Date    APPENDECTOMY      ARTHROSCOPIC CHONDROPLASTY OF KNEE JOINT Left 3/18/2020    Procedure: ARTHROSCOPY, KNEE, WITH CHONDROPLASTY;  Surgeon: FREEMAN Álvarez MD;  Location: [facility] OR;  Service: Orthopedics;  Laterality: Left;    COLONOSCOPY  1998    EPIDURAL STEROID INJECTION INTO LUMBAR SPINE N/A 2/11/2020    Procedure: Injection-steroid-epidural-lumbar--InterLaminar L4-5;  Surgeon: Alexus Anglin Jr., MD;  Location: [facility] PAIN MGT;  Service: Pain Management;  Laterality: N/A;    INJECTION OF ANESTHETIC AGENT AROUND MEDIAL BRANCH NERVES INNERVATING LUMBAR FACET JOINT Bilateral 11/5/2019    Procedure: Block-nerve-medial branch-lumbar--Bilateral L3-4,L4-5,L5-S1;  Surgeon: Alexus Anglin Jr., MD;  Location: [facility] PAIN [facility]GT;  Service: Pain Management;  Laterality: Bilateral;    INJECTION OF STEROID Left 11/12/2020    Procedure: INJECTION, STEROID Left SI Joint Block and Steroid Injection;  Surgeon: Tam Encarnacion MD;  Location: [facility] OR;  Service: Neurosurgery;  Laterality: Left;  Procedure: Left SI Joint Block and Steroid Injection   Surgery Time: 30 min  LOS: 0  Anesthesia: MAC  Radiology: C-arm  Bed: Regular with Pillows  Position: Prone    KNEE ARTHROSCOPY W/ MENISCECTOMY Left 3/18/2020    Procedure: ARTHROSCOPY, KNEE, WITH MENISCECTOMY;  Surgeon: FREEMAN Álvarez MD;  Location: [facility] OR;  Service: Orthopedics;  Laterality: Left;  CLONIDINE/EPI/KETOROLAC/ROPIVACAINE INJECTION 30CC    lipoma removal      skin cancer removal      TRANSFORAMINAL EPIDURAL INJECTION OF STEROID Bilateral 5/6/2020    Procedure: Injection,steroid,epidural,transforaminal approach--Bilateral L4-5;  Surgeon: Alexus Anglin Jr., MD;  Location: [facility];  Service: Pain Management;  Laterality:  Bilateral;    TRANSFORAMINAL EPIDURAL INJECTION OF STEROID Bilateral 8/11/2020    Procedure: Injection,steroid,epidural,transforaminal approach--Bilateral L4-5;  Surgeon: Alexus Anglin Jr., MD;  Location: [facility] PAIN T;  Service: Pain Management;  Laterality: Bilateral;    TRANSFORAMINAL EPIDURAL INJECTION OF STEROID Bilateral 4/5/2022    Procedure: Injection,steroid,epidural,transforaminal bilateral L4-5;  Surgeon: Alexus Anglin Jr., MD;  Location: [facility] PAIN [facility]GT;  Service: Pain Management;  Laterality: Bilateral;  asa          FAMILY HISTORY:                Family History   Problem Relation Age of Onset    COPD Mother     Alcohol abuse Mother     Heart disease Father     Melanoma Father     No Known Problems Sister     No Known Problems Brother     Psoriasis Neg Hx     Lupus Neg Hx     Eczema Neg Hx        SOCIAL HISTORY:          Tobacco:   Social History     Tobacco Use   Smoking Status Never Smoker   Smokeless Tobacco Never Used       alcohol use:    Social History     Substance and Sexual Activity   Alcohol Use Yes    Comment: social                   ALLERGIES:    Review of patient's allergies indicates:   Allergen Reactions    Advil [ibuprofen] Anaphylaxis    Tums [calcium carbonate] Anaphylaxis       CURRENT MEDICATIONS:    Current Outpatient Medications   Medication Sig Dispense Refill    aspirin (ECOTRIN) 81 MG EC tablet Take 1 tablet twice a day with food starting after surgery (breakfast and dinner). 28 tablet 0    calcipotriene-betamethasone (ENSTILAR) 0.005-0.064 % Foam Apply 1 application topically once daily. 60 g 2    diclofenac sodium (VOLTAREN) 1 % Gel Apply 2 g topically 4 (four) times daily. 100 g 0    diphenhydrAMINE (BENADRYL) 25 mg capsule Take 25 mg by mouth every 6 (six) hours as needed for Itching.      EPINEPHrine (EPIPEN) 0.3 mg/0.3 mL AtIn       fexofenadine (ALLEGRA) 180 MG tablet Take 180 mg by mouth continuous prn.      gabapentin (NEURONTIN) 600 MG tablet  Take 1 tablet (600 mg total) by mouth 3 (three) times daily. 270 tablet 4    [START ON 5/24/2022] HYDROcodone-acetaminophen (NORCO) 5-325 mg per tablet Take 1 tablet by mouth 2 (two) times daily as needed for Pain. 60 tablet 0    HYDROcodone-acetaminophen (NORCO) 5-325 mg per tablet Take 1 tablet by mouth 3 (three) times daily as needed for Pain. 90 tablet 0    [START ON 6/4/2022] HYDROcodone-acetaminophen (NORCO) 5-325 mg per tablet Take 1 tablet by mouth 3 (three) times daily as needed for Pain. 90 tablet 0    ketoconazole (NIZORAL) 2 % cream AAA gluteal fold daily to bid prn flare 60 g 3    lidocaine HCL 2% (XYLOCAINE) 2 % jelly Apply topically as needed. 30 mL 3    meloxicam (MOBIC) 15 MG tablet Take 1 tablet (15 mg total) by mouth once daily. 90 tablet 3    mometasone 0.1% (ELOCON) 0.1 % cream Apply topically once daily. for 10 days 1 Tube 3    multivitamin (THERAGRAN) per tablet Take 1 tablet by mouth once daily.      ondansetron (ZOFRAN) 4 MG tablet Take 1 tablet (4 mg total) by mouth every 8 (eight) hours as needed for Nausea. 30 tablet 0    sumatriptan (IMITREX) 50 MG tablet Take one at the first sign of a headache.  You may repeat it in 1 hour as needed. (Patient taking differently: Take one at the first sign of a headache.  You may repeat it in 1 hour as needed.) 9 tablet 3    tiZANidine (ZANAFLEX) 4 MG tablet       triamcinolone acetonide 0.025% (KENALOG) 0.025 % cream AAA gluteal fold daily to bid PRN flare 30 g 3     No current facility-administered medications for this visit.     Facility-Administered Medications Ordered in Other Visits   Medication Dose Route Frequency Provider Last Rate Last Admin    0.9%  NaCl infusion  500 mL Intravenous Continuous Alexus Anglin Jr., MD        lidocaine (PF) 10 mg/ml (1%) injection 10 mg  1 mL Intradermal Once Alexus Anglin Jr., MD        lidocaine (PF) 10 mg/ml (1%) injection 10 mg  1 mL Intradermal Once Alexus Anglin Jr. MD             "            PHYSICAL EXAM:  BP (!) 146/83   Pulse 69   Ht 6' (1.829 m)   Wt 129 kg (284 lb 4.8 oz)   BMI 38.56 kg/m²   GENERAL: Normal development, well groomed.  HEENT:   HEENT:  Conjunctivae are non-erythematous; Pupils equal, round, and reactive to light; Nose is symmetrical; Nasal mucosa is pink and moist; Septum is midline; Inferior turbinates are normal; Nasal airflow is normal; Posterior pharynx is pink; Modified Mallampati:III; Posterior palate is low; Tonsils not visualized; Uvula is wide and elongated;Tongue is enlarged; Dentition is fair; No TMJ tenderness; Jaw opening and protrusion without click and without discomfort.  NECK: Supple. Neck circumference is 19 inches. No thyromegaly. No palpable nodes.     SKIN: On face and neck: No abrasions, no rashes, no lesions.  No subcutaneous nodules are palpable.  RESPIRATORY: Chest is clear to auscultation.  Normal chest expansion and non-labored breathing at rest.  CARDIOVASCULAR: Normal S1, S2.  No murmurs, gallops or rubs. No carotid bruits bilaterally.  No edema. No clubbing. No cyanosis.    NEURO: Oriented to time, place and person. Normal attention span and concentration. Gait normal.    PSYCH: Affect is full. Mood is normal  MUSCULOSKELETAL: Moves 4 extremities. Gait normal.           ASSESSMENT:    1. Sleep Apnea NEC. The patient symptomatically has  excessive daytime sleepiness, snoring,  witnessed breathing pauses, excessive daytime fatigue, gasping for air in sleep, interrupted sleep and nocturia  with exam findings of "a crowded oral airway and elevated body mass index. The patient has medical co-morbidities of obesity },  which can be worsened by FARHAT. This warrants further investigation for possible obstructive sleep apnea.              PLAN:    Diagnostic: Polysomnogram in lab. The nature of this procedure and its indication was discussed with the patient.    In lab due to the need to rule out Central Apneas (has to take Norco high dose for " chronic pain)    During our discussion today, we talked about the etiology of  FARHAT as well as the potential ramifications of untreated sleep apnea, which could include daytime sleepiness, hypertension, heart disease and/or stroke.  We discussed potential treatment options, which could include weight loss, body positioning, continuous positive airway pressure (CPAP), or referral for surgical consideration. Meanwhile, he  is urged to avoid supine sleep, weight gain and alcoholic beverages since all of these can worsen FARHAT.     The patient was given open opportunity to ask questions and/or express concerns about treatment plan. Two point patient identifier confirmed.       Precautions: The patient was advised to abstain from driving should he feel sleepy or drowsy.    Follow up: MD after the sleep study has been completed.     31-minute visit. >50% spent counseling patient and coordination of care.  The patient was  cautioned against drowsy driving.

## 2022-05-18 ENCOUNTER — OFFICE VISIT (OUTPATIENT)
Dept: SLEEP MEDICINE | Facility: CLINIC | Age: 62
End: 2022-05-18
Payer: OTHER GOVERNMENT

## 2022-05-18 VITALS
BODY MASS INDEX: 38.51 KG/M2 | WEIGHT: 284.31 LBS | DIASTOLIC BLOOD PRESSURE: 83 MMHG | HEIGHT: 72 IN | SYSTOLIC BLOOD PRESSURE: 146 MMHG | HEART RATE: 69 BPM

## 2022-05-18 DIAGNOSIS — G47.30 SLEEP APNEA, UNSPECIFIED TYPE: ICD-10-CM

## 2022-05-18 PROCEDURE — 99204 PR OFFICE/OUTPT VISIT, NEW, LEVL IV, 45-59 MIN: ICD-10-PCS | Mod: S$GLB,,, | Performed by: PSYCHIATRY & NEUROLOGY

## 2022-05-18 PROCEDURE — 99204 OFFICE O/P NEW MOD 45 MIN: CPT | Mod: S$GLB,,, | Performed by: PSYCHIATRY & NEUROLOGY

## 2022-05-18 NOTE — PATIENT INSTRUCTIONS
SLEEP LAB ([facility] or [facility]) will contact you to schedulethe sleep study. Their number is [phone] (ext [phone]). Please call them if you do not hear from them in 2 weeks from now.  The [facility] is located on 7th floor of the [facility]; [facility] is located in [facility].    SLEEP CLINIC (my assistant) will call you when the sleep study results are ready - if you have not heard from us by 2 weeks from the date of the study, please call [phone] (ext 1) or you can use [facility] to contact me.    You are advised to abstain from driving should you feel sleepy or drowsy.

## 2022-05-20 ENCOUNTER — TELEPHONE (OUTPATIENT)
Dept: SPORTS MEDICINE | Facility: CLINIC | Age: 62
End: 2022-05-20
Payer: OTHER GOVERNMENT

## 2022-05-20 NOTE — TELEPHONE ENCOUNTER
LVM The pt was called to offer sooner dates than the appt time offered. Callback number provided.         ----- Message -----   From: [facility], System Message   Sent: 5/20/2022   2:25 PM CDT   To: [facility] Clinical Staff   Subject: Appointment scheduled from Patient Portal          Appointment For: Baldo Grant ([id number])   Visit Type: MYCHART FOLLOWUP/OFFICE VISIT (2382)      6/24/2022   11:30 AM  30 mins.  Elif Nunez PA-C    [facility] SPORTS MEDICINE      Patient Comments:   I need you to help extend the life of my right knee. Gel shot, steriod, anything.  Thanks.

## 2022-05-23 ENCOUNTER — PATIENT MESSAGE (OUTPATIENT)
Dept: SPORTS MEDICINE | Facility: CLINIC | Age: 62
End: 2022-05-23
Payer: OTHER GOVERNMENT

## 2022-05-25 ENCOUNTER — TELEPHONE (OUTPATIENT)
Dept: SLEEP MEDICINE | Facility: OTHER | Age: 62
End: 2022-05-25
Payer: OTHER GOVERNMENT

## 2022-06-01 ENCOUNTER — TELEPHONE (OUTPATIENT)
Dept: SLEEP MEDICINE | Facility: OTHER | Age: 62
End: 2022-06-01
Payer: OTHER GOVERNMENT

## 2022-06-02 ENCOUNTER — PATIENT MESSAGE (OUTPATIENT)
Dept: PAIN MEDICINE | Facility: CLINIC | Age: 62
End: 2022-06-02
Payer: OTHER GOVERNMENT

## 2022-06-02 DIAGNOSIS — M54.16 LUMBAR RADICULOPATHY: Primary | ICD-10-CM

## 2022-06-02 DIAGNOSIS — M48.061 NEUROFORAMINAL STENOSIS OF LUMBAR SPINE: ICD-10-CM

## 2022-06-02 DIAGNOSIS — M51.36 DDD (DEGENERATIVE DISC DISEASE), LUMBAR: ICD-10-CM

## 2022-06-02 RX ORDER — HYDROCODONE BITARTRATE AND ACETAMINOPHEN 5; 325 MG/1; MG/1
1 TABLET ORAL EVERY 8 HOURS PRN
Qty: 90 TABLET | Refills: 0 | Status: SHIPPED | OUTPATIENT
Start: 2022-06-04 | End: 2022-07-04

## 2022-06-03 ENCOUNTER — HOSPITAL ENCOUNTER (OUTPATIENT)
Dept: SLEEP MEDICINE | Facility: OTHER | Age: 62
Discharge: HOME | End: 2022-06-03
Attending: PSYCHIATRY & NEUROLOGY
Payer: OTHER GOVERNMENT

## 2022-06-03 DIAGNOSIS — G47.33 OSA (OBSTRUCTIVE SLEEP APNEA): ICD-10-CM

## 2022-06-03 DIAGNOSIS — G47.30 SLEEP APNEA, UNSPECIFIED TYPE: ICD-10-CM

## 2022-06-03 PROCEDURE — 95811 POLYSOM 6/>YRS CPAP 4/> PARM: CPT

## 2022-06-03 PROCEDURE — 95811 PR POLYSOMNOGRAPHY W/CPAP: ICD-10-PCS | Mod: 26,,, | Performed by: PSYCHIATRY & NEUROLOGY

## 2022-06-03 PROCEDURE — 95811 POLYSOM 6/>YRS CPAP 4/> PARM: CPT | Mod: 26,,, | Performed by: PSYCHIATRY & NEUROLOGY

## 2022-06-09 ENCOUNTER — PATIENT MESSAGE (OUTPATIENT)
Dept: PAIN MEDICINE | Facility: CLINIC | Age: 62
End: 2022-06-09
Payer: OTHER GOVERNMENT

## 2022-06-10 NOTE — TELEPHONE ENCOUNTER
We will attempt to seek authorization for the intercept procedure for the low back once again.  This patient remains an excellent candidate as he is failing to respond meaningfully to opioid therapy, physical therapy, and interventional procedures such as epidural steroid injection.    Alexus Anglin Jr, MD  Interventional Pain Medicine / Anesthesiology

## 2022-06-12 NOTE — PROCEDURES
Patient Name: ELIANE [facility] #: [id number]   Sex: Female Study Date: 6/3/2022   : 1960 Clinic #: [id number]   Age: 62 Referring Physician: STEVE LOUISE MD   Height: 72.0 in     Weight: 284.0 lbs Sleep Specialist: STEVE Louise MD   B.M.I.: 38.5     Hypopnea rule: AASM1A Scoring Tech:    Diagnostic AHI: 72.4 Recording Tech NASREEN MARTINEZ   Lowest O2 sat: 85.0% Recording Location: [facility]     Sleep architecture: This was a split night study. During the diagnostic portion of the study, the patient fell asleep in 2.5 minutes. Sleep efficiency was 85.5%. Total sleep time (TST) during the diagnostic portion was 121.0 minutes. All stages of sleep were present. REM latency was 145.5 minutes. Sleep architecture was significantly  disrupted due to underlying sleep apnea.    Respiratory: Moderate to loud snoring was present. There was significant  FARHAT (obstructive sleep apnea) based on AHI (apnea hypopnea index) criteria. The overall AHI was 72.4 with an oxygen li of 85.0%.     Motor movement / Parasomnia: There were no significant limb movements of sleep noted.   The total limb movement index was 0.0 (0.0 with arousal).    Cardiac: Cardiac monitoring revealed a normal sinus rhythm  with occasional PAC's and PVC's.    CPAP titration: The patient qualified for split night. During the treatment portion of the study, CPAP (continuous  positive airway pressure) was explored from 5 to 10 cm of water using a Eson nasal Medium mask, chin strap, CFlex at 3, and heated humidification. Initial improvement was observed on 5 cm H2O controlling respiratory events in lateral sleep. Effective control of sleep disordered breathing  was seen during supine REM sleep on 9 cm H2O. Higher pressures resulted in further improvement     States that he slept very well and woke feeling rested.    IMPRESSION:  1. Severe  FARHAT (327.23) based on AHI criteria  2. Effective control of sleep disordered breathing was  achieved with CPAP, at 9-10 cm of water.    RECOMMENDATION:   1. CPAP,at 10 cm, mask of patients choice, chin strap, and heated humidification, which is essential in conjunction with PAP to prevent airway drying and irritation.   2. Alternatively, use AutoCPAP 5-16 cm H2O.

## 2022-06-14 ENCOUNTER — OFFICE VISIT (OUTPATIENT)
Dept: SLEEP MEDICINE | Facility: CLINIC | Age: 62
End: 2022-06-14
Payer: OTHER GOVERNMENT

## 2022-06-14 VITALS
BODY MASS INDEX: 37.93 KG/M2 | WEIGHT: 280 LBS | DIASTOLIC BLOOD PRESSURE: 83 MMHG | HEIGHT: 72 IN | HEART RATE: 74 BPM | SYSTOLIC BLOOD PRESSURE: 130 MMHG | RESPIRATION RATE: 20 BRPM

## 2022-06-14 DIAGNOSIS — G47.33 OSA (OBSTRUCTIVE SLEEP APNEA): Primary | ICD-10-CM

## 2022-06-14 PROCEDURE — 99214 PR OFFICE/OUTPT VISIT, EST, LEVL IV, 30-39 MIN: ICD-10-PCS | Mod: S$GLB,,, | Performed by: PSYCHIATRY & NEUROLOGY

## 2022-06-14 PROCEDURE — 99214 OFFICE O/P EST MOD 30 MIN: CPT | Mod: S$GLB,,, | Performed by: PSYCHIATRY & NEUROLOGY

## 2022-06-14 NOTE — PATIENT INSTRUCTIONS
[facility]:         I'm ordering  the machine for you through [facility]:  [phone]->1->2  They will call you within several weeks, or you can call them.  ________________________________    Aftter you get your machine:    1. Please keep in mind, that when you come to  the machine, you will be choosing the CPAP mask during that time. Please call [phone]->1->2 within 30 days if you uncomfortable with your mask (you need to do it within 30 days from getting the mask)    2. Please let me know through [facility] if you are not comfortable with the pressure settings - I can help.    Please make a follow up appointment via My chart or by calling us at [phone] once you get your machine. It is recommended to come for CPAP follow up  within 31-90 days fromt he time you get your new machine. The purpose of CPAP follow up is to ensure that your sleep apnea is well corrected and to address any possible concerns.  My appointments usually run 2 months ahead, so please schedule in advance.     PLEASE BRING YOUR MACHINE (with all supplies) WHEN YOU COME SEE ME FOR CPAP FOLLOW UP!        _____________________________________________________    Resmed 10, Resmed 11- ideal, most qauiet, smallest, great connectivity.  Resvent - simpler and much heavier, poor connectivity  Maria M - simpler and heavier, poor connectivity    [place] Dreamstation2 - n/a - a great machine    ______________________________      Travel machines: Z2 APAP  Resmed mini - you can use it with only 3 masks.

## 2022-06-14 NOTE — PROGRESS NOTES
"    EPWORTH SLEEPINESS SCALE TOTAL SCORE  6/14/2022 5/17/2022   Total score 6 5           EPWORTH SLEEPINESS SCALE TOTAL SCORE  6/14/2022 5/17/2022   Total score 6 5       Baldo Grant is a 62 y.o. male seen today for CPA follow up. Last seen on 5/18/2022  He comes to discuss results. He had a split night study - severe FARHAT ; responded really well to CPAP during titration half. Felt refreshed in the morning    He would like to start APAP.    DME: ->[facility]  SLEEP STUDIES: 6/02/22: FARHAT (obstructive sleep apnea) based on AHI (apnea hypopnea index) criteria. The overall AHI was 72.4 with an oxygen li of 85.0%.    Central apneas were npt mnoted          + back pain from previous injury - on Norco 5 times a day; restless sleeper. Due for knee replacement. Also has a bad left shoulder.    Medications pertinent to sleep  disorders taken currently:Benadryl  Previous  medications taken  for sleep disorders:  no; NEVER tried DZRA    Sleep studies: no      Occupation:retired from [employer]; still working  Bed partner: his wife  Exercise routine:signed up for a pool in [employer]; but it's making him stiff.  Caffeine: limits to 2-3 caf beverages prior to 3 in the afternoon  ETOH: almost none (does not agree with him anymore - flush reaction"    EPWORTH SLEEPINESS SCALE TOTAL SCORE  6/14/2022 5/17/2022   Total score 6 5         EPWORTH SLEEPINESS SCALE 5/17/2022   Sitting and reading 1   Watching TV 1   Sitting, inactive in a public place (e.g. a theatre or a meeting) 1   As a passenger in a car for an hour without a break 0   Lying down to rest in the afternoon when circumstances permit 2   Sitting and talking to someone 0   Sitting quietly after a lunch without alcohol 0   In a car, while stopped for a few minutes in traffic 0   Total score 5           PHYSICAL EXAM:  /83   Pulse 74   Resp 20   Ht 6' (1.829 m)   Wt 127 kg (280 lb)   BMI 37.97 kg/m²   GENERAL: Normal development, well " "groomed.            ASSESSMENT:    1. FARHAT - severe. The patient symptomatically has  excessive daytime sleepiness, snoring,  witnessed breathing pauses, excessive daytime fatigue, gasping for air in sleep, interrupted sleep and nocturia  with exam findings of "a crowded oral airway and elevated body mass index. The patient has medical co-morbidities of obesity,  which can be worsened by FARHAT. This warrants tretment          PLAN:      Treatment: prescription  for auto CPAP 6-20 cm with the mask of a patient's choice was given to the patient.    Education: During our discussion today, we talked about the etiology of obstructive sleep apnea as well as the potential ramifications of untreated sleep apnea, which could include daytime sleepiness, hypertension, heart disease and/or stroke.      We discussed potential treatment options, which could include weight loss, body positioning, continuous positive airway pressure (CPAP), or referral for surgical consideration. The patient preferred CPAP option.     Discussed purpose of PAP therapy, health benefits of CPAP, as well as the potential ramifications of untreated sleep apnea, which could include daytime sleepiness, hypertension, heart disease and/or stroke. An AHI of 15 is associated with increased risk CVD.     Regular replacement of CPAP mask, tubing and filter was recommended.    The patient was given open opportunity to ask questions and/or express concerns about treatment plan. Two point patient identifier confirmed.     Precautions: The patient was advised to abstain from driving should he feel sleepy or drowsy.    Follow up: me after 31 days  of PAP use.  31-minute visit. >50% spent counseling patient and coordination of care.                        "

## 2022-07-06 ENCOUNTER — OFFICE VISIT (OUTPATIENT)
Dept: PAIN MEDICINE | Facility: CLINIC | Age: 62
End: 2022-07-06
Payer: OTHER GOVERNMENT

## 2022-07-06 VITALS — BODY MASS INDEX: 37.97 KG/M2 | WEIGHT: 280 LBS

## 2022-07-06 DIAGNOSIS — S83.232D COMPLEX TEAR OF MEDIAL MENISCUS OF LEFT KNEE AS CURRENT INJURY, SUBSEQUENT ENCOUNTER: ICD-10-CM

## 2022-07-06 DIAGNOSIS — M48.061 NEUROFORAMINAL STENOSIS OF LUMBAR SPINE: ICD-10-CM

## 2022-07-06 DIAGNOSIS — M54.16 LUMBAR RADICULOPATHY: Primary | ICD-10-CM

## 2022-07-06 DIAGNOSIS — M51.36 DDD (DEGENERATIVE DISC DISEASE), LUMBAR: ICD-10-CM

## 2022-07-06 DIAGNOSIS — G89.4 CHRONIC PAIN SYNDROME: ICD-10-CM

## 2022-07-06 DIAGNOSIS — M54.89 VERTEBROGENIC PAIN: ICD-10-CM

## 2022-07-06 DIAGNOSIS — M51.26 LUMBAR DISCOGENIC PAIN SYNDROME: ICD-10-CM

## 2022-07-06 DIAGNOSIS — M17.12 PRIMARY OSTEOARTHRITIS OF LEFT KNEE: ICD-10-CM

## 2022-07-06 DIAGNOSIS — M94.262 CHONDROMALACIA OF LEFT KNEE: ICD-10-CM

## 2022-07-06 PROCEDURE — 99214 OFFICE O/P EST MOD 30 MIN: CPT | Mod: S$PBB,,, | Performed by: NURSE PRACTITIONER

## 2022-07-06 PROCEDURE — 99214 PR OFFICE/OUTPT VISIT, EST, LEVL IV, 30-39 MIN: ICD-10-PCS | Mod: S$PBB,,, | Performed by: NURSE PRACTITIONER

## 2022-07-06 PROCEDURE — 99213 OFFICE O/P EST LOW 20 MIN: CPT | Mod: PBBFAC,PO | Performed by: NURSE PRACTITIONER

## 2022-07-06 PROCEDURE — 99999 PR PBB SHADOW E&M-EST. PATIENT-LVL III: ICD-10-PCS | Mod: PBBFAC,,, | Performed by: NURSE PRACTITIONER

## 2022-07-06 PROCEDURE — 99999 PR PBB SHADOW E&M-EST. PATIENT-LVL III: CPT | Mod: PBBFAC,,, | Performed by: NURSE PRACTITIONER

## 2022-07-06 NOTE — PROGRESS NOTES
Chronic Pain-Established Note (Follow up visit)    Referred by: Dr. Encarnacion  Reason for referral: Back Pain    CC:   Chief Complaint   Patient presents with    Low-back Pain     Med refill     Leg Pain     Left        Last 3 PDI Scores 5/4/2022 3/24/2022 1/12/2022   Pain Disability Index (PDI) 47 36 45     Interval Updates 7/6/2022 - Mr. Grant is following up for Low-back Pain (Med refill ) and Leg Pain (Left ).  He requests a refill of Hydrocodone-Acetaminophen 5-325 mg TID  which are  working fairly well to control His pain.  He reports 50% relief with the current medication regimen.  Medication side effects: none.  Pain is currently rate 4/10 with a weekly range 4-5/10.  It is described as Aching.   Pain is worsened by: nothing in particular and improved by: rest.  Mr. Grant has informed me that he will be getting [facility] to help iwht LBP and left leg pain. Dr. Grijalva will be implanting the stimulator. He has also informed me that he will having a left TKA.        5/4/2022  - Mr. Grant is following up for Low-back Pain (Med refill ) and Leg Pain (Left ) and bilateral leg pain.  He requests a refill of Hydrocodone-Acetaminophen 5-325 mg TID # 90 pills per month is what he would prefer and  are  working well to control His pain.  He reports 50-60% relief with the current medication regimen and improved functionality. Pain is currently rate 7/10 with a weekly range 7-8/10.  Lastly, he would like to be resubmitted back into the portal for consideration of the Intracept procedure.  Lastly, his most recently lumbar Bilateral TFEIS at L4-5 provided him with 50% relief for one week and then pain returned.     3/24/2022  - Mr. Grant is following up for Low-back Pain (Med refill ) and Leg Pain (Left ).  He requests a refill of Hydrocodone-Acetaminophen 5-325 mg BID # 60 which he says is effective but would like to go back to TID dosing.  He reports 50% relief with the current  medication regimen. Pain is currently rate 5/10 with a weekly range 5-6/10.   He is also reporting increased pain in the low back left greater than right as well as legs bilaterally left greater than right.  Pain starts in low back and radiates into his legs posteriorly laterally anteriorly also causing scrotum pain.  Denies any profound weakness although he is walking with a cane this point.  Denies any bowel bladder dysfunction, denies any saddle anesthesia denies any recent incident or trauma.      01/12/2022 - Mr. Grant is following up for Low-back Pain (Med refill ) and Leg Pain (Left ).  He requests a refill of Norco 7.5-325 90 pills per month which are working well to control His pain.  He reports 60% relief with the current medication regimen.  Medication side effects: none.  Pain is currently rate 6/10 with a weekly range 3-8/10.  It is described as Aching, Tight, Tingling, Numb, Sharp and Shooting.   Pain is worsened by: standing and improved by: nothing and medications.     10/25/2021 -- Mr. Grant is following up for No chief complaint on file..  He requests a refill of Hydrocodone-Acetaminophen 5-325 mg which is not working well to control His pain.  He reports 50% relief with the current medication regimen.  Medication side effects: none.  Pain is currently rate 6/10 with a weekly range 5-9/10.  It is described as Aching, Tight, Tingling, Numb, Sharp and Shooting.   Pain is worsened by: standing and improved by: nothing and medications.     09/27/21 - Mr. Grant is following up for Low-back Pain (Med refill ) and Leg Pain (Left ).  He requests a refill of Hydrocodone-Acetaminophen 5-325 mg TID # 90 which are not working well to control His pain.  He reports 50% relief with the current medication regimen. Pain is currently rate 7/10 with a weekly range 6-7/10. Mr. Grant is reporting continued left low back pain with radiating symptoms into his left and now right scrotum.  He mentioned his right  scrotum has not been affected previously.  He also reports pain radiating into his left lateral leg stopping just above his left knee.  His pain has been refractory to multiple injections and physical therapy, he reports the inability to have a quality of life as he states he recently tried to have sex with his wife and could not due to the pain.  His current opioid regimen provide some relief but not significant enough as he would like to consider other options, he endorses that his insurance plan denied the intracept procedure that we were going to consider him for.     08/27/21 - Mr. Grant is following up for Low-back Pain (Med refill ) and Leg Pain (Left ).  He requests a refill of Hydrocodone-Acetaminophen 5-325 mg TID # 90 per month which are working well to control His pain.  He reports 50% relief with the current medication regimen.  Medication side effects: none.  Pain is currently rate 5/10 with a weekly range 5-9/10.  It is described as Aching, Dull, electrical shock(left leg)  and Sharp.   Pain is worsened by: exercise, flexion, standing for more than 3 minutes and walking for more than 3 minutes and improved by: heat, laying down, massage, medications, physical therapy, rest and sitting.    7/23/21 - Mr. Grant returns to clinic for follow up visit reporting worse left sided low back and left leg pain.  Patient is here for medication refill of Hydrocodone-Acetaminophen 5-325 mg TID PRN for pain, he reports 60-70% relief for 3-4 hours his pain is predicated on how much activity he is doing. He also receives Gabapentin 600 mg TID and Mobic 15 mg daily. He is reporting is reporting left scrotum and left leg pain today, this is his worse pain today.  Pain intensity is currently 7/10.      06/16/2021 - Mr. Grant returns to clinic for follow up visit reporting stable but severe back pain. Pain intensity is currently 5/10.  His PCP recently left [facility] and he was referred her for re-evaluation by his new  "primary care team.  Patient reports continued low back pain with occasional radiation in to the legs.  Pain fluctuates depending on level of activity.  He reports "flare ups" at least twice weekly during which he may take 2-4 tablets of norco in a day, which other days he may take none.    09/16/2020 - Mr. Grant returns to clinic for follow up visit reporting low back and left leg  pain. He is also reporting left knee pain he is s/f for a left knee GN block with Dr Hollins's office. He was referred to Dr Hollins by Dr. Álvarez/Orthopedics.  Patient is s/p Lumbar Transforaminal Epidural Steroid Injection, Two Levels, Bilateral L4-5 on 08/11/2020 with 0% relief.  Patient is also status post bilateral lumbar MBB that provided him with 0% relief.  Pain intensity is currently 6/10.      05/13/[id number]-guhz-old male presents status post bilateral L4-5 TF KATHY with reported 60% relief.  Patient also states his relief is improving each and every day.  Reports taking less of his pain medication due to the relief received from the injection.  He is only 6 days postop his lumbar epidural, discussed that I suspect is relief will improve even more over the next 2 weeks.    03/02/2020 - Mr. Grant returns to clinic for follow up visit reporting worse back and bilateral leg pain left greater than right.  Patient is s/p L4-5 Lumbar Epidural Steroid  on 2/11/20 with 0% relief.  Patient presents with wife he is currently experiencing no relief from the injection, he reports that his pain continues in his low back as well as in his legs bilaterally left greater than right.  Patient reports shooting pain down his left leg at times going past his left knee into his left foot.  Patient reports previously given a lumbar TF KATHY at L4-5 per Dr. Duenas/TAMIKO Hamptno reports receiving 3 months of relief and he is requesting to be scheduled for that particular injection today.  Pain intensity is currently 5/10.      HPI:    Baldo Grant is a 62 " y.o. male who complains of left and right lower back pain.  He reports the pain radiates down his left leg just below his knee.  Pain intensity is 5/10.  He reports taking Norco, Gabapentin and Meloxicam for some relief.   Patient s/p Bilateral L3-4, L4-5, and L5-S1 Lumbar Medial Branch Block on 11/5/2019 with Dr. Anglin with no relief.  Patient's 5/17/2019 MRI Lumbar Spine reviewed with patient and shows L4-5 and L5-S1 degenerative disc disease with disc bulging as described above with right L4-5 and left L5-S1 foraminal stenosis.      Location: lower back and bilateral leg pain   Onset: Summer 200  Current Pain Score: 5/10   Daily Pain of Range: 4-9/10  Quality: Dull and Sharp  Radiation: back of left leg  Worsened by: sitting and standing  Improved by: medications and physical therapy    Previous Therapies:  PT/OT: Yes  HEP: Yes, but severely limited by pain  Interventions:   -04/05/2022 Lumbar Transforaminal Epidural Steroid Injection, Bilateral L4-5      Bilateral L3-4, L4-5, and L5-S1 Lumbar Medial Branch Block on 11/5/2019  Surgery:  Medications:   - NSAIDS: meloxicam (MOBIC) 15 MG tablet daily  - MSK Relaxants:  diazePAM (VALIUM) 5 MG tablet daily as needed  - TCAs:   - SNRIs:   - Topicals: lidocaine HCL 2% (XYLOCAINE) 2 % jelly  - Anticonvulsants:  - Opioids: HYDROcodone-acetaminophen (NORCO) 5-325 mg per tablet    Current Pain Medications:  1. Meloxicam 15 mg   2. Gabapentin 600 mg TID        3. HYDROcodone-acetaminophen (NORCO) 5-325 mg per tablet Q6 hours PRN    Review of Systems:  Review of Systems   Constitutional: Negative.    HENT: Negative.    Eyes: Negative.    Respiratory: Negative.    Cardiovascular: Negative.    Gastrointestinal: Negative.    Genitourinary: Negative.    Musculoskeletal: Positive for back pain, joint pain and myalgias.   Skin: Negative.    Neurological: Negative.    Endo/Heme/Allergies: Negative.    Psychiatric/Behavioral: Negative.        History:    Current Outpatient  Medications:     aspirin (ECOTRIN) 81 MG EC tablet, Take 1 tablet twice a day with food starting after surgery (breakfast and dinner)., Disp: 28 tablet, Rfl: 0    calcipotriene-betamethasone (ENSTILAR) 0.005-0.064 % Foam, Apply 1 application topically once daily., Disp: 60 g, Rfl: 2    diclofenac sodium (VOLTAREN) 1 % Gel, Apply 2 g topically 4 (four) times daily., Disp: 100 g, Rfl: 0    diphenhydrAMINE (BENADRYL) 25 mg capsule, Take 25 mg by mouth every 6 (six) hours as needed for Itching., Disp: , Rfl:     EPINEPHrine (EPIPEN) 0.3 mg/0.3 mL AtIn, , Disp: , Rfl:     fexofenadine (ALLEGRA) 180 MG tablet, Take 180 mg by mouth continuous prn., Disp: , Rfl:     gabapentin (NEURONTIN) 600 MG tablet, Take 1 tablet (600 mg total) by mouth 3 (three) times daily., Disp: 270 tablet, Rfl: 4    ketoconazole (NIZORAL) 2 % cream, AAA gluteal fold daily to bid prn flare, Disp: 60 g, Rfl: 3    lidocaine HCL 2% (XYLOCAINE) 2 % jelly, Apply topically as needed., Disp: 30 mL, Rfl: 3    meloxicam (MOBIC) 15 MG tablet, Take 1 tablet (15 mg total) by mouth once daily., Disp: 90 tablet, Rfl: 3    mometasone 0.1% (ELOCON) 0.1 % cream, Apply topically once daily. for 10 days, Disp: 1 Tube, Rfl: 3    multivitamin (THERAGRAN) per tablet, Take 1 tablet by mouth once daily., Disp: , Rfl:     ondansetron (ZOFRAN) 4 MG tablet, Take 1 tablet (4 mg total) by mouth every 8 (eight) hours as needed for Nausea., Disp: 30 tablet, Rfl: 0    sumatriptan (IMITREX) 50 MG tablet, Take one at the first sign of a headache.  You may repeat it in 1 hour as needed. (Patient taking differently: Take one at the first sign of a headache.  You may repeat it in 1 hour as needed.), Disp: 9 tablet, Rfl: 3    tiZANidine (ZANAFLEX) 4 MG tablet, , Disp: , Rfl:     triamcinolone acetonide 0.025% (KENALOG) 0.025 % cream, AAA gluteal fold daily to bid PRN flare, Disp: 30 g, Rfl: 3  No current facility-administered medications for this  visit.    Facility-Administered Medications Ordered in Other Visits:     0.9%  NaCl infusion, 500 mL, Intravenous, Continuous, Alexus Anglin Jr., MD    lidocaine (PF) 10 mg/ml (1%) injection 10 mg, 1 mL, Intradermal, Once, Alexus Anglin Jr., MD    lidocaine (PF) 10 mg/ml (1%) injection 10 mg, 1 mL, Intradermal, Once, Alexus Anglin Jr., MD    Past Medical History:   Diagnosis Date    Arthritis     Basal cell carcinoma 06/21/2019    back    Cancer     Chronic pain of right knee     SCC (squamous cell carcinoma) 2014    Forehead- Dr. Cabral     Skin cancer     Squamous cell carcinoma 2013    Right ear- Dr. Marianna long       Past Surgical History:   Procedure Laterality Date    APPENDECTOMY      ARTHROSCOPIC CHONDROPLASTY OF KNEE JOINT Left 3/18/2020    Procedure: ARTHROSCOPY, KNEE, WITH CHONDROPLASTY;  Surgeon: FREEMAN Álvarez MD;  Location: [facility] OR;  Service: Orthopedics;  Laterality: Left;    COLONOSCOPY  1998    EPIDURAL STEROID INJECTION INTO LUMBAR SPINE N/A 2/11/2020    Procedure: Injection-steroid-epidural-lumbar--InterLaminar L4-5;  Surgeon: Alexus Anglin Jr., MD;  Location: [facility] PAIN MGT;  Service: Pain Management;  Laterality: N/A;    INJECTION OF ANESTHETIC AGENT AROUND MEDIAL BRANCH NERVES INNERVATING LUMBAR FACET JOINT Bilateral 11/5/2019    Procedure: Block-nerve-medial branch-lumbar--Bilateral L3-4,L4-5,L5-S1;  Surgeon: Alexus Anglin Jr., MD;  Location: [facility] PAIN [facility]GT;  Service: Pain Management;  Laterality: Bilateral;    INJECTION OF STEROID Left 11/12/2020    Procedure: INJECTION, STEROID Left SI Joint Block and Steroid Injection;  Surgeon: Tam Encarnacion MD;  Location: [facility] OR;  Service: Neurosurgery;  Laterality: Left;  Procedure: Left SI Joint Block and Steroid Injection   Surgery Time: 30 min  LOS: 0  Anesthesia: MAC  Radiology: C-arm  Bed: Regular with Pillows  Position: Prone    KNEE ARTHROSCOPY W/ MENISCECTOMY Left 3/18/2020    Procedure:  ARTHROSCOPY, KNEE, WITH MENISCECTOMY;  Surgeon: FREEMAN Álvarez MD;  Location: [facility] OR;  Service: Orthopedics;  Laterality: Left;  CLONIDINE/EPI/KETOROLAC/ROPIVACAINE INJECTION 30CC    lipoma removal      skin cancer removal      TRANSFORAMINAL EPIDURAL INJECTION OF STEROID Bilateral 5/6/2020    Procedure: Injection,steroid,epidural,transforaminal approach--Bilateral L4-5;  Surgeon: Alexus Anglin Jr., MD;  Location: [facility];  Service: Pain Management;  Laterality: Bilateral;    TRANSFORAMINAL EPIDURAL INJECTION OF STEROID Bilateral 8/11/2020    Procedure: Injection,steroid,epidural,transforaminal approach--Bilateral L4-5;  Surgeon: Alexus Anglin Jr., MD;  Location: [facility] PAIN [facility]GT;  Service: Pain Management;  Laterality: Bilateral;    TRANSFORAMINAL EPIDURAL INJECTION OF STEROID Bilateral 4/5/2022    Procedure: Injection,steroid,epidural,transforaminal bilateral L4-5;  Surgeon: Alexus Anglin Jr., MD;  Location: [facility] PAIN [facility]GT;  Service: Pain Management;  Laterality: Bilateral;  asa        Family History   Problem Relation Age of Onset    COPD Mother     Alcohol abuse Mother     Heart disease Father     Melanoma Father     No Known Problems Sister     No Known Problems Brother     Psoriasis Neg Hx     Lupus Neg Hx     Eczema Neg Hx        Social History     Socioeconomic History    Marital status:    Tobacco Use    Smoking status: Never Smoker    Smokeless tobacco: Never Used   Substance and Sexual Activity    Alcohol use: Yes     Comment: social    Drug use: No    Sexual activity: Yes     Partners: Female     Social Determinants of Health     Financial Resource Strain: Low Risk     Difficulty of Paying Living Expenses: Not very hard   Food Insecurity: No Food Insecurity    Worried About Running Out of Food in the Last Year: Never true    Ran Out of Food in the Last Year: Never true   Transportation Needs: No Transportation Needs    Lack of Transportation (Medical): No     Lack of Transportation (Non-Medical): No   Physical Activity: Insufficiently Active    Days of Exercise per Week: 2 days    Minutes of Exercise per Session: 30 min   Stress: No Stress Concern Present    Feeling of Stress : Only a little   Social Connections: Unknown    Frequency of Communication with Friends and Family: More than three times a week    Frequency of Social Gatherings with Friends and Family: Once a week    Active Member of Clubs or Organizations: Yes    Attends Club or Organization Meetings: More than 4 times per year    Marital Status:    Housing Stability: Low Risk     Unable to Pay for Housing in the Last Year: No    Number of Places Lived in the Last Year: 1    Unstable Housing in the Last Year: No       Review of patient's allergies indicates:   Allergen Reactions    Advil [ibuprofen] Anaphylaxis    Tums [calcium carbonate] Anaphylaxis       Physical Exam:  There were no vitals filed for this visit.  General    Nursing note and vitals reviewed.  Constitutional: He is oriented to person, place, and time. He appears well-developed. No distress.   HENT:   Head: Normocephalic and atraumatic.   Nose: Nose normal.   Eyes: Conjunctivae and EOM are normal. Right eye exhibits no discharge. Left eye exhibits no discharge. No scleral icterus.   Neck: No JVD present. No tracheal deviation present.   Cardiovascular: Normal rate, regular rhythm and intact distal pulses.    Pulmonary/Chest: Effort normal and breath sounds normal. No respiratory distress. He exhibits no tenderness.   Abdominal: Soft. Bowel sounds are normal. He exhibits no distension. There is no abdominal tenderness. There is no rebound.   Neurological: He is alert and oriented to person, place, and time. He exhibits normal muscle tone. Coordination normal.   Psychiatric: His behavior is normal. Thought content normal.         Back (L-Spine & T-Spine) / Neck (C-Spine) Exam     Tenderness Right paramedian tenderness of  the Lower L-Spine. Left paramedian tenderness of the Lower L-Spine.     Back (L-Spine & T-Spine) Range of Motion   Lateral bend right: normal   Lateral bend left: normal   Rotation right: normal   Rotation left: normal     Spinal Sensation   Right Side Sensation  L-Spine Level: normal  Left Side Sensation  L-Spine Level: normal      Muscle Strength   Right Lower Extremity   Hip Abduction: 5/5   Hip Flexion: 5/5   Hip Extensors: 5/5  Quadriceps:  5/5   Hamstrin/5   Gastrocsoleus:  5/5   Left Lower Extremity   Hip Abduction: 4/5   Hip Flexion: 4/5   Hip Extensors: 4/5  Quadriceps:  4/5   Hamstrin/5   Gastrocsoleus:  5/5       Imaging:  MRI Lumbar Spine Without Contrast 10/15/2020   Degenerative findings:  T12-L1: No spinal canal stenosis or neural foraminal narrowing.  L1-L2: No spinal canal stenosis or neural foraminal narrowing.  L2-L3: Circumferential disc bulge and mild facet arthropathy noted.  No spinal canal stenosis or neural foraminal narrowing.  L3-L4: Circumferential disc bulge and mild facet arthropathy result in mild left neural foraminal narrowing.  L4-L5: Circumferential disc bulge and mild facet arthropathy result in moderate right, mild left neural foraminal narrowing.  L5-S1: Circumferential disc bulge with annular fissure and moderate facet arthropathy result in mild bilateral neural foraminal narrowing.  Paraspinal muscles & soft tissues: Unremarkable.     Impression:  1. Degenerative changes of the lumbar spine resulting in mild-to-moderate bilateral neural foraminal narrowing at L3-S1.      EXAMINATION:  MRI LUMBAR SPINE WITHOUT CONTRAST    CLINICAL HISTORY:  Low back painLow back pain, rapidly progressive neuro deficit;    TECHNIQUE:  Standard multiplanar noncontrast MRI sequences of the lumbar spine.    COMPARISON:  None    FINDINGS:  The distal cord and conus reveal normal signal and morphology. The lumbar vertebra reveal normal alignment, shape and signal intensity.    T12-L1:  Unremarkable    L1-2:  Unremarkable    L2-3:  Minimal annular bulge.    L3-4:  Mild disc desiccation with mild generalized annular bulge.  Mild hypertrophic ligamentum flavum and facet arthritis.    L4-5:  Mild disc desiccation with mild annular bulge.  Right foraminal annular fissure.  Mild hypertrophic ligamentum flavum and facet arthritis.  Mild to moderate left and moderate right foraminal stenosis.    L5-S1:  Mild degenerative disc disease with disc desiccation and disc bulge/osteophyte.  Mild hypertrophic ligamentum flavum and facet arthritis.  Mild right with mild to moderate left foraminal stenosis.      Impression       L4-5 and L5-S1 degenerative disc disease with disc bulging osteophyte as described above with right L4-5 and left L5-S1 foraminal stenosis.      Electronically signed by: Baldo Gayle MD  Date: 05/17/2019         Labs:  BMP  Lab Results   Component Value Date     09/22/2020    K 4.4 09/22/2020     09/22/2020    CO2 28 09/22/2020    BUN 17 09/22/2020    CREATININE 1.00 09/22/2020    CALCIUM 9.4 09/22/2020    ANIONGAP 10 09/22/2020    ESTGFRAFRICA >60.0 09/22/2020    EGFRNONAA >60.0 09/22/2020     Lab Results   Component Value Date    ALT 55 (H) 09/22/2020    AST 33 09/22/2020    ALKPHOS 37 (L) 09/22/2020    BILITOT 0.4 09/22/2020       Assessment:  Problem List Items Addressed This Visit        Neuro    Lumbar radiculopathy - Primary    Chronic pain    Lumbar discogenic pain syndrome    DDD (degenerative disc disease), lumbar      Other Visit Diagnoses     Primary osteoarthritis of left knee        Complex tear of medial meniscus of left knee as current injury, subsequent encounter        Chondromalacia of left knee        Neuroforaminal stenosis of lumbar spine        Vertebrogenic pain              2/3/2020 - Baldo Grant is a 62 y.o. male with who complains of left and right lower back pain.  He reports the pain radiates down his left leg just below his knee.  Pain  intensity is 5/10. He reports his current medication regimen of Norco, Gabapentin and Meloxicam help relieve his pain.  He reports having  Bilateral L3-4, L4-5, and L5-S1 Lumbar Medial Branch Block on 11/5/2019 with no relief.Patient's 5/17/2019 MRI Lumbar Spine reviewed with patient and shows L4-5 and L5-S1 degenerative disc disease with disc bulging  as described above with right L4-5 and left L5-S1 foraminal stenosis.  Recommended scheduling for Interlaminar KATHY  L4-5 with moderate sedation. Patient will follow in clinic following injection.     03/02/[id number]-fnah-old male presents with his wife he is status post lumbar interlaminar KATHY at L4-5 with 0% relief.  Patient reports continued low back and bilateral leg pain left greater than right he is reporting shooting pain down his left leg into his foot at times.  Patient reports the pain is disrupting his quality of life and prohibiting him from performing his ADLs.  Patient was given a left TF KATHY by Dr. Duenas/[facility] and received significant relief for 2-3 months. Lumbar MRI shows pathology a the L4-5 and L5-S1 patient has degenerative disc disease with disc bulging osteophyte with right L4-5 and left L5-S1 foraminal stenosis.  Based on results of previous Left  L4-5 transforaminal I discussed with patient and wife that I will now recommend  a bilateral L4-5.   Patient and wife agreed and understood.      05/13/2020- 52 a old male presents status post bilateral L4-5 TF KATHY, is currently reporting 60% relief he states his relief is improving each and every day.  Recommended patient follow up with me via my [facility] in 2 weeks to report the effectiveness of his procedure discussed that his procedure will more than likely improve the next 2 weeks considering he is only 6 days following his injection.    09/16/2020 - 60-year-old male presents low back and left knee pain, patient is established our office he has had multiple lumbar KATHY as well as a  lumbar MBB that provided him with minimal to no relief according to his records he is scheduled for a bilateral genicular block with  tomorrow he was referred to his office from orthopedics/Dr Álvarez.  Patient reports continued low back and left leg pain radiating down to his foot, this pain is secondary to L 4/ 5, and 5 /S1 degenerative disc disease with disc bulging osteophyte with the right L4-5 and left L5-S1 foraminal stenosis that has not gotten better from lumbar KATHY injections.  I recommended patient follow up with Neurosurgery further evaluate    6/16/21 - 62 yo M with primarily axial discogenic chronic low back pain presented today for re-evaluation the request of primary care team for assessment of his chronic opioid therapy.  Patient is on chronic low-dose opioid therapy which is supporting his daily function.  He has no bulging the spine related to degenerative disc disease.  Since he was last evaluated, our department deployed a new interventional procedures may help to target discogenic pain.  While not specifically noted in the most recent MRI from September 2020, there is endplate edema (Modic changes close this ease in the inferior endplate at L4, and both the superior and inferior endplate of L5.  This could be a significant cause of his pain and can be targeted through the use of the Intracept procedure which ablates the basivertebral nerve.  Given the level of functionality he is maintaining with the current regimen, we will continue with these medications while obtaining authorization for this procedure which may take anywhere between 1 and 6 months.    7/23/2021- 61-year-old male with a history of low back and left leg pain, patient is here for medication refill we will take over his opioid from his PCP.  He is currently taking Norco 5-325 t.i.d. that provides him with roughly 80% and improved functionality that allows in reform his ADLs.  Last clinic visit with Dr. Anglin he Mr  Rudy discussed considering him for a intracept procedure as his most recent lumbar MRI shows endplate edema/Modic changes to the inferior endplate of L4 and the superior and inferior endplate of L5.  Until authorization has been done we will  continue to provide him with his stable low dose opioid regimen,  he and I discussed that once authorization has been completed our office will give him a call and schedule him for the intracept  Procedure.    8/27/2021- Baldo Grant is a 62 y.o. male who  has a past medical history of Arthritis, Basal cell carcinoma (06/21/2019), Cancer, Chronic pain of right knee, SCC (squamous cell carcinoma) (2014), Skin cancer, and Squamous cell carcinoma (2013).  By history and examination this patient has chronic low back pain with radiculopathy.  The underlying cause cause is degenerative disc disease.  Pathology is confirmed by imaging.  Lumbar MRI shows endplate edema/Modic changes to the inferior endplate of L4 and the superior and inferior endplate of L5  We discussed the underlying diagnoses and multiple treatment options including non-opioid medications, opioid medications, injections, physical therapy, home exercise, activity modification / rest and weight loss.  The risks and benefits of each treatment option were discussed and all questions were answered.      9/27/2021- Baldo Grant is a 62 y.o. male who  has a past medical history of Arthritis, Basal cell carcinoma (06/21/2019), Cancer, Chronic pain of right knee, SCC (squamous cell carcinoma) (2014), Skin cancer, and Squamous cell carcinoma (2013).  By history and examination this patient has chronic left  low back pain with radiculopathy.  The underlying cause cause is unclear, my initial Dx's will include  degenerative disc disease, foraminal stenosis and deconditioning differential diagnoses are Meralgia Paresthetica  affected, ilioinguinal nerve pain.    Pathology is confirmed by imaging.  We discussed the  underlying diagnoses and multiple treatment options including non-opioid medications, opioid medications, injections, physical therapy, home exercise, activity modification / rest and weight loss.  The risks and benefits of each treatment option were discussed and all questions were answered.      10/25/21 - Continued LBP and RLE radicular symptoms now spreading to groin and LLE.  Patient likely has tolerance to Norco 7.5-325 mg and we will escalate to 7.5 mg tablets TID.  He was advised that we cannot escalate in perpetuity due to safety concerns.  To that end, he will f/u with Dr. Encarnacion in NS[facility] after completing the MRI I order today toward finding a definitive surgical solution.  If he is not a surgical candidate we may pursue SCS.  Intercept authorization is still pending.    01/12/[id number]-ekjt-old male with a history of chronic low back and right lower extremity radicular symptoms that radiates into his groin and left lower extremity.   He recently saw Neurosurgery and was consult to physical therapy Neurosurgery relieved his pain is likely related to degenerative disc disease and myofascial pain syndrome.  He is currently on chronic opioid therapy that consists of Norco 7.5-325 t.i.d. 90 pills per month.  He and I discussed today that we would consider reducing his chronic opioid therapy to [place] 5-325 t.i.d. patient expressed concerns about sleeping at night that the pain medication is too strong as he does have a history of sleep apnea.  Addition he reports that he is excited about physical therapy and he will begin swimming at least 1-2 times per week which he thinks will overall help his pain.  He also takes gabapentin and an occasional Mobic 15 mg which both help.  Denies any adverse side effects with current medication.  He seems to be meeting all of his goals for chronic opiate therapy.    03/24/2022.  61-year-old male with history of chronic low back and bilateral lower extremity pain left greater than  right symptoms also radiate into his groin.  He was denied the intercept procedure for discogenic pain he other options for treatment would be a lumbar KATHY or TF KATHY.  His pain is likely related to degenerative disc disease and moderate neural foraminal stenosis at L4-5 and L5-S1.  His MRI does also show facet arthritis at this level.  In the past he has been provided multiple injections the 1 that has given him the most relief based on previous notes is the bilateral TF KATHY targeting L4-5.  Discussed I recommend we repeat this injection to acquire more relief, patient is amenable to this plan.  He also want me to send Dr. Anglin a message regarding his chronic opioid therapy which consists of Norco 5-325 b.i.d. 60 pills per month.  Ms. Grant I had a discussion about why he was reduced from t.i.d. to b.i.d. dosing discussed with him that I was unsure that I will follow-up with Dr. Anglin for confirmation on this.  For now we will refill the [place] 5-325 b.i.d..    5/4/2022- 61 y/o male with a history of chronic low back and bilateral lower extremity pain left greater than right symptoms also radiate into his groin.  He was denied the intercept procedure for discogenic pain. He was recently provided a Bilateral Lumbar TFESI @ L4-5 that provided minimal relief. He and I discussed that I will speak with our office staff/[facility] regarding resubmitting him into the Intracept Portal and then [facility] as this could push approval considering he has  insurance.     7/6/2022- 61 y/o male with a Hx of chronic low back and left radiculopathy he also has radiating pain to his groin area bilaterally.  His pain is likely related to degenerative disc disease and moderate neural foraminal stenosis at L4-5 and L5-S1.  His MRI does also show facet arthritis at this level.  He [facility] [place] [facility]     : Reviewed and consistent with medication use as  prescribed.    Treatment Plan:   Procedure:  None at this time.    - He is s/f a SCS implant with [facility]     PT/OT/HEP: I encouraged the patient to maintain a home exercise regimen that includes daily, moderate cardiovascular exercise lasting at least 30 minutes.  This may include yoga, nadia chi, walking, swimming, aqua aerobics, or other exercises that maintain a heart rate of 50-70% of the calculated maximum heart rate.  I also encouraged light, daily stretching focused on the target area.  Medications:    - Cont current meds. ie Mobic 15 mg PRN  and Gabapentin 600 mg TID  per PCP               - continue and refill Dagmar 5-325 TID # 90  P.r.n.( this dose seems to improve pain and improve functionality)               - Continue and refill Gabapentin 600 mg TID    - Patient is on stable, low dose opioid therapy without aberrant behavior- refills would be appropriate for any provider              - discussed reducing his chronic opioid therapy patient was amenable to this reduction.   -  reviewed   - Pain contract signed 8/27/21  Labs: UDS reviewed   Imaging: No additional recommended at this time.      Follow Up: 8 weeks    ALAN Hale  Interventional Pain Management          Disclaimer: This note was partly generated using dictation software which may occasionally result in transcription errors.

## 2022-07-11 ENCOUNTER — PATIENT MESSAGE (OUTPATIENT)
Dept: SPORTS MEDICINE | Facility: CLINIC | Age: 62
End: 2022-07-11
Payer: OTHER GOVERNMENT

## 2022-07-13 ENCOUNTER — PATIENT MESSAGE (OUTPATIENT)
Dept: PAIN MEDICINE | Facility: CLINIC | Age: 62
End: 2022-07-13
Payer: OTHER GOVERNMENT

## 2022-07-18 ENCOUNTER — OFFICE VISIT (OUTPATIENT)
Dept: SPORTS MEDICINE | Facility: CLINIC | Age: 62
End: 2022-07-18
Payer: OTHER GOVERNMENT

## 2022-07-18 ENCOUNTER — TELEPHONE (OUTPATIENT)
Dept: SPORTS MEDICINE | Facility: CLINIC | Age: 62
End: 2022-07-18
Payer: OTHER GOVERNMENT

## 2022-07-18 ENCOUNTER — HOSPITAL ENCOUNTER (OUTPATIENT)
Dept: RADIOLOGY | Facility: HOSPITAL | Age: 62
Discharge: HOME | End: 2022-07-18
Attending: ORTHOPAEDIC SURGERY
Payer: OTHER GOVERNMENT

## 2022-07-18 VITALS
BODY MASS INDEX: 37.79 KG/M2 | HEIGHT: 72 IN | HEART RATE: 64 BPM | SYSTOLIC BLOOD PRESSURE: 136 MMHG | WEIGHT: 279 LBS | DIASTOLIC BLOOD PRESSURE: 76 MMHG

## 2022-07-18 DIAGNOSIS — G89.29 CHRONIC PAIN OF LEFT KNEE: ICD-10-CM

## 2022-07-18 DIAGNOSIS — G89.29 CHRONIC PAIN OF LEFT KNEE: Primary | ICD-10-CM

## 2022-07-18 DIAGNOSIS — M25.562 CHRONIC PAIN OF LEFT KNEE: Primary | ICD-10-CM

## 2022-07-18 DIAGNOSIS — M25.562 CHRONIC PAIN OF LEFT KNEE: ICD-10-CM

## 2022-07-18 PROCEDURE — 77073 XR HIP TO ANKLE: ICD-10-PCS | Mod: 26,,, | Performed by: RADIOLOGY

## 2022-07-18 PROCEDURE — 99214 PR OFFICE/OUTPT VISIT, EST, LEVL IV, 30-39 MIN: ICD-10-PCS | Mod: S$PBB,,, | Performed by: ORTHOPAEDIC SURGERY

## 2022-07-18 PROCEDURE — 99213 OFFICE O/P EST LOW 20 MIN: CPT | Mod: PBBFAC | Performed by: ORTHOPAEDIC SURGERY

## 2022-07-18 PROCEDURE — 99214 OFFICE O/P EST MOD 30 MIN: CPT | Mod: S$PBB,,, | Performed by: ORTHOPAEDIC SURGERY

## 2022-07-18 PROCEDURE — 99999 PR PBB SHADOW E&M-EST. PATIENT-LVL III: ICD-10-PCS | Mod: PBBFAC,,, | Performed by: ORTHOPAEDIC SURGERY

## 2022-07-18 PROCEDURE — 99999 PR PBB SHADOW E&M-EST. PATIENT-LVL III: CPT | Mod: PBBFAC,,, | Performed by: ORTHOPAEDIC SURGERY

## 2022-07-18 PROCEDURE — 77073 BONE LENGTH STUDIES: CPT | Mod: TC

## 2022-07-18 PROCEDURE — 77073 BONE LENGTH STUDIES: CPT | Mod: 26,,, | Performed by: RADIOLOGY

## 2022-07-18 NOTE — TELEPHONE ENCOUNTER
----- Message from Rosa Cheney sent at 7/18/2022 10:32 AM CDT -----  Contact: @ [phone]  Pt is calling wants to know if he can be seen later in the day per the patient he saw later times on the portal please call and adv @ [phone]

## 2022-07-18 NOTE — PROGRESS NOTES
CC: Left knee f/u    DATE OF PROCEDURE: 3/18/2020   PROCEDURE PERFORMED:   Left  1. knee arthroscopic partial medial meniscectomy  2. knee arthroscopic loose body removal   3. knee arthroscopic lateral release/peripatellar release package   4. knee arthroscopic chondroplasty    61 y.o. Male who presents as a known patient to me.  Diagnosis of recurrent pain and swelling of the left knee.  This has been a long-standing issue.  The arthroscopic debridement was of some relief but the patient has had recurrent symptoms.  Prior conservative treatment has been extensive.  Has included multiple injections - steroid and Visco, bracing, oral medication.  Pain management referral for genicular block was denied by insurance.  Current pain is daily and activity limiting.  Sometimes bothers him at night.  Better with rest.  He does have prior back complaints.  Long-standing issue.  Chronic pain patient on Norco and Gabapentin for his low back. No radicular symptoms at this time.  Scheduled for a SCS implantation in 4 weeks. Excellent response to recent trial. No ipsilateral hip or groin pain.    Works in healthcare information technology.    BMI 38    Negative for tobacco.   Negative for diabetes.    REVIEW OF SYSTEMS:   Constitution: Negative. Negative for chills, fever and night sweats.    Hematologic/Lymphatic: Negative for bleeding problem. Does not bruise/bleed easily.   Skin: Negative for dry skin, itching and rash.   Musculoskeletal: Negative for falls. Positive for left knee pain and muscle weakness.     All other review of symptoms were reviewed and found to be noncontributory.     PAST MEDICAL HISTORY:   Past Medical History:   Diagnosis Date    Arthritis     Basal cell carcinoma 06/21/2019    back    Cancer     Chronic pain of right knee     SCC (squamous cell carcinoma) 2014    Forehead- Dr. Cabral     Skin cancer     Squamous cell carcinoma 2013    Right ear- Dr. Marianna long       PAST SURGICAL HISTORY:    Past Surgical History:   Procedure Laterality Date    APPENDECTOMY      ARTHROSCOPIC CHONDROPLASTY OF KNEE JOINT Left 3/18/2020    Procedure: ARTHROSCOPY, KNEE, WITH CHONDROPLASTY;  Surgeon: FREEMAN Álvarez MD;  Location: [facility] OR;  Service: Orthopedics;  Laterality: Left;    COLONOSCOPY  1998    EPIDURAL STEROID INJECTION INTO LUMBAR SPINE N/A 2/11/2020    Procedure: Injection-steroid-epidural-lumbar--InterLaminar L4-5;  Surgeon: Alexus Anglin Jr., MD;  Location: [facility];  Service: Pain Management;  Laterality: N/A;    INJECTION OF ANESTHETIC AGENT AROUND MEDIAL BRANCH NERVES INNERVATING LUMBAR FACET JOINT Bilateral 11/5/2019    Procedure: Block-nerve-medial branch-lumbar--Bilateral L3-4,L4-5,L5-S1;  Surgeon: Alexus Anglin Jr., MD;  Location: [facility];  Service: Pain Management;  Laterality: Bilateral;    INJECTION OF STEROID Left 11/12/2020    Procedure: INJECTION, STEROID Left SI Joint Block and Steroid Injection;  Surgeon: Tam Encarnacion MD;  Location: [facility] OR;  Service: Neurosurgery;  Laterality: Left;  Procedure: Left SI Joint Block and Steroid Injection   Surgery Time: 30 min  LOS: 0  Anesthesia: MAC  Radiology: C-arm  Bed: Regular with Pillows  Position: Prone    KNEE ARTHROSCOPY W/ MENISCECTOMY Left 3/18/2020    Procedure: ARTHROSCOPY, KNEE, WITH MENISCECTOMY;  Surgeon: FREEMAN Álvarez MD;  Location: [facility] OR;  Service: Orthopedics;  Laterality: Left;  CLONIDINE/EPI/KETOROLAC/ROPIVACAINE INJECTION 30CC    lipoma removal      skin cancer removal      TRANSFORAMINAL EPIDURAL INJECTION OF STEROID Bilateral 5/6/2020    Procedure: Injection,steroid,epidural,transforaminal approach--Bilateral L4-5;  Surgeon: Alexus Anglin Jr., MD;  Location: [facility];  Service: Pain Management;  Laterality: Bilateral;    TRANSFORAMINAL EPIDURAL INJECTION OF STEROID Bilateral 8/11/2020    Procedure: Injection,steroid,epidural,transforaminal approach--Bilateral L4-5;  Surgeon: Alexus ORTIZ  Linnette Moon MD;  Location: [facility];  Service: Pain Management;  Laterality: Bilateral;       FAMILY HISTORY:   Family History   Problem Relation Age of Onset    COPD Mother     Alcohol abuse Mother     Heart disease Father     Melanoma Father     No Known Problems Sister     No Known Problems Brother     Psoriasis Neg Hx     Lupus Neg Hx     Eczema Neg Hx        SOCIAL HISTORY:   Social History     Socioeconomic History    Marital status:    Tobacco Use    Smoking status: Never Smoker    Smokeless tobacco: Never Used   Substance and Sexual Activity    Alcohol use: Yes     Comment: social    Drug use: No    Sexual activity: Yes     Partners: Female     Social Determinants of Health     Financial Resource Strain: Low Risk     Difficulty of Paying Living Expenses: Not very hard   Food Insecurity: No Food Insecurity    Worried About Running Out of Food in the Last Year: Never true    Ran Out of Food in the Last Year: Never true   Transportation Needs: No Transportation Needs    Lack of Transportation (Medical): No    Lack of Transportation (Non-Medical): No   Physical Activity: Insufficiently Active    Days of Exercise per Week: 2 days    Minutes of Exercise per Session: 30 min   Stress: No Stress Concern Present    Feeling of Stress : Only a little   Social Connections: Unknown    Frequency of Communication with Friends and Family: More than three times a week    Frequency of Social Gatherings with Friends and Family: Once a week    Active Member of Clubs or Organizations: Yes    Attends Club or Organization Meetings: More than 4 times per year    Marital Status:    Housing Stability: Low Risk     Unable to Pay for Housing in the Last Year: No    Number of Places Lived in the Last Year: 1    Unstable Housing in the Last Year: No       MEDICATIONS:     Current Outpatient Medications:     aspirin (ECOTRIN) 81 MG EC tablet, Take 1 tablet twice a day with food starting  after surgery (breakfast and dinner). (Patient not taking: Reported on 3/11/2022), Disp: 28 tablet, Rfl: 0    calcipotriene-betamethasone (ENSTILAR) 0.005-0.064 % Foam, Apply 1 application topically once daily., Disp: 60 g, Rfl: 2    diclofenac sodium (VOLTAREN) 1 % Gel, Apply 2 g topically 4 (four) times daily. (Patient not taking: Reported on 3/11/2022), Disp: 100 g, Rfl: 0    diphenhydrAMINE (BENADRYL) 25 mg capsule, Take 25 mg by mouth every 6 (six) hours as needed for Itching., Disp: , Rfl:     EPINEPHrine (EPIPEN) 0.3 mg/0.3 mL AtIn, , Disp: , Rfl:     fexofenadine (ALLEGRA) 180 MG tablet, Take 180 mg by mouth continuous prn., Disp: , Rfl:     gabapentin (NEURONTIN) 600 MG tablet, Take 1 tablet (600 mg total) by mouth 3 (three) times daily., Disp: 270 tablet, Rfl: 4    HYDROcodone-acetaminophen (NORCO) 5-325 mg per tablet, Take 1 tablet by mouth every 8 (eight) hours as needed for Pain., Disp: 60 tablet, Rfl: 0    ketoconazole (NIZORAL) 2 % cream, AAA gluteal fold daily to bid prn flare (Patient not taking: Reported on 3/11/2022), Disp: 60 g, Rfl: 3    lidocaine HCL 2% (XYLOCAINE) 2 % jelly, Apply topically as needed. (Patient not taking: Reported on 3/11/2022), Disp: 30 mL, Rfl: 3    meloxicam (MOBIC) 15 MG tablet, Take 1 tablet (15 mg total) by mouth once daily., Disp: 90 tablet, Rfl: 3    multivitamin (THERAGRAN) per tablet, Take 1 tablet by mouth once daily., Disp: , Rfl:     ondansetron (ZOFRAN) 4 MG tablet, Take 1 tablet (4 mg total) by mouth every 8 (eight) hours as needed for Nausea., Disp: 30 tablet, Rfl: 0    sumatriptan (IMITREX) 50 MG tablet, Take one at the first sign of a headache.  You may repeat it in 1 hour as needed. (Patient not taking: Reported on 3/11/2022), Disp: 9 tablet, Rfl: 3    tiZANidine (ZANAFLEX) 4 MG tablet, , Disp: , Rfl:     triamcinolone acetonide 0.025% (KENALOG) 0.025 % cream, AAA gluteal fold daily to bid PRN flare (Patient not taking: Reported on 3/11/2022),  Disp: 30 g, Rfl: 3    mometasone 0.1% (ELOCON) 0.1 % cream, Apply topically once daily. for 10 days, Disp: 1 Tube, Rfl: 3  No current facility-administered medications for this visit.    Facility-Administered Medications Ordered in Other Visits:     0.9%  NaCl infusion, 500 mL, Intravenous, Continuous, Alexus Anglin Jr., MD    lidocaine (PF) 10 mg/ml (1%) injection 10 mg, 1 mL, Intradermal, Once, Alexus Anglin Jr., MD    lidocaine (PF) 10 mg/ml (1%) injection 10 mg, 1 mL, Intradermal, Once, Alexus Anglin Jr., MD    ALLERGIES:   Review of patient's allergies indicates:   Allergen Reactions    Advil [ibuprofen] Anaphylaxis    Tums [calcium carbonate] Anaphylaxis        PHYSICAL EXAMINATION:  /78   Pulse 69   Wt 130.6 kg (288 lb)   BMI 39.06 kg/m²   General: Well-developed well-nourished 61 y.o. malein no acute distress   Cardiovascular: Regular rhythm by palpation of distal pulse, normal color and temperature, no concerning varicosities on symptomatic side   Lungs: No labored breathing or wheezing appreciated   Neuro: Alert and oriented ×3   Psychiatric: well oriented to person, place and time, demonstrates normal mood and affect   Skin: No rashes, lesions or ulcers, normal temperature, turgor, and texture on involved extremity    Ortho/SPM Exam  Examination of the left knee again demonstrates intact extensor mechanism.  Chronic soft tissue swelling.  No significant effusion.  Lacks 5° of terminal extension, flexion to 120° with pain and crepitus.  Medial and lateral joint line tenderness.  Positive patellar crepitus and grind.  Ligamentously stable.    IMAGING:  X-rays including standing, weight bearing AP and flexion bilateral knees, LEFT knee lateral and sunrise views ordered and images reviewed by me show:    Left knee advanced DJD.  Medial and patellofemoral primarily.  Varus alignment.    ASSESSMENT:      ICD-10-CM ICD-9-CM   1. Primary osteoarthritis of left knee  M17.12 715.16   2.  Chronic pain of left knee  M25.562 719.46    G89.29 338.29     PLAN:       Findings discussed with the patient and his wife at length.  At this point he is walking on a cane with significant pain.  He would like to proceed with a knee replacement.  He has continued to have significant pain and problems despite extensive prior conservative treatment.  He would like to proceed with the knee replacement on October 12th.  I think that would give him enough time to recover from his spinal cord stimulator implant.  He will need preoperative pain management, primary care and dental clearances.  We discussed the details of total knee arthroplasty.  Patient specific factors taken into account in the [place]rdad triathlon system would be best fitting in this case in my opinion.  I have counseled him on the risks of surgery which included but not limited to continued or recurrent pain and upwards of 30% of patients, stiffness, infection, DVT/ PE, fracture, aseptic loosening, and potential need for further surgery.  Perhaps higher risk for continued or recurrent pain given his prior history.  I do expect him to do well.  All questions answered.    Plan is for left total knee arthroplasty.  [place] triathlon system.  X3 poly. Regular cement.    Informed Consent:    The details of the surgical procedure were explained, including the location of probable incisions and a description of possible hardware and/or grafts to be used. Alternatives to both operative and non-operative options with associated risks and benefits were discussed. The patient understands the likely convalescence after surgery and, in particular, the expected postop rehab and recovery course. The outlined risks and potential complications of the proposed procedure include but are not limited to: infection, poor wound healing, scarring, deformity, stiffness, swelling, continued or recurrent pain, instability, hardware or prosthetic failure if implanted, symptomatic  hardware requiring removal, dislocation, weakness, neurovascular injury, numbness, chronic regional pain disorder, tissue nonhealing/irreparability/retear, subsequent contralateral limb injury or pathology, chondral injury, arthritis, fracture, blood clot formation, inability to return to previous level of activity, anesthetic or regional block complication up to death, need for additional procedure as indicated intraoperatively, and potential need for further surgery.    The patient was also informed and understands that the risks of surgery are greater for patients with a current condition or history of heart disease, obesity, clotting disorders, recurrent infections, steroid use, current or past smoking, and factors such as sedentary lifestyle and noncompliance with medications, therapy or follow-up. The degree of the increased risk is hard to estimate with any degree of precision. If applicable, smoking cessation was discussed.     All questions were answered. The patient has verbalized understanding of these issues and wishes to proceed with the surgery as discussed.

## 2022-07-19 ENCOUNTER — PATIENT MESSAGE (OUTPATIENT)
Dept: FAMILY MEDICINE | Facility: CLINIC | Age: 62
End: 2022-07-19
Payer: OTHER GOVERNMENT

## 2022-07-19 DIAGNOSIS — M17.12 PRIMARY OSTEOARTHRITIS OF LEFT KNEE: Primary | ICD-10-CM

## 2022-07-19 DIAGNOSIS — Z01.818 PRE-OP EVALUATION: ICD-10-CM

## 2022-07-19 DIAGNOSIS — R73.03 PREDIABETES: Primary | ICD-10-CM

## 2022-07-19 DIAGNOSIS — Z13.6 SCREENING FOR CARDIOVASCULAR CONDITION: ICD-10-CM

## 2022-07-29 ENCOUNTER — OFFICE VISIT (OUTPATIENT)
Dept: NEUROSURGERY | Facility: CLINIC | Age: 62
End: 2022-07-29
Payer: OTHER GOVERNMENT

## 2022-07-29 VITALS — BODY MASS INDEX: 37.81 KG/M2 | WEIGHT: 279.13 LBS | HEIGHT: 72 IN

## 2022-07-29 DIAGNOSIS — G89.4 CHRONIC PAIN SYNDROME: ICD-10-CM

## 2022-07-29 DIAGNOSIS — M54.10 RADICULITIS: ICD-10-CM

## 2022-07-29 DIAGNOSIS — M54.51 VERTEBROGENIC LOW BACK PAIN: Primary | ICD-10-CM

## 2022-07-29 PROCEDURE — 99213 OFFICE O/P EST LOW 20 MIN: CPT | Mod: PBBFAC,PN | Performed by: NEUROLOGICAL SURGERY

## 2022-07-29 PROCEDURE — 99214 OFFICE O/P EST MOD 30 MIN: CPT | Mod: S$PBB,,, | Performed by: NEUROLOGICAL SURGERY

## 2022-07-29 PROCEDURE — 99999 PR PBB SHADOW E&M-EST. PATIENT-LVL III: ICD-10-PCS | Mod: PBBFAC,,, | Performed by: NEUROLOGICAL SURGERY

## 2022-07-29 PROCEDURE — 99214 PR OFFICE/OUTPT VISIT, EST, LEVL IV, 30-39 MIN: ICD-10-PCS | Mod: S$PBB,,, | Performed by: NEUROLOGICAL SURGERY

## 2022-07-29 PROCEDURE — 99999 PR PBB SHADOW E&M-EST. PATIENT-LVL III: CPT | Mod: PBBFAC,,, | Performed by: NEUROLOGICAL SURGERY

## 2022-07-29 NOTE — PROGRESS NOTES
NEUROSURGICAL PROGRESS NOTE    DATE OF SERVICE:  07/29/2022    ATTENDING PHYSICIAN:  Tam Encarnacion MD    SUBJECTIVE:  This is a very pleasant 61 y.o. male, who has a complex history of chronic low back pain.  Pain has been progressively becoming worse.  Initially after an injury he started to have left low back pain.  The pain radiates toward the left groin and testicle area.  More recently the pain has been traveling to the right side.  He has more back pain and leg pain.  Pain is worse with prolonged sitting.  He is unable to do physical activity due to the pain.  He is concerned that he might lose his job due to the pain.  His job requires him to travel follow impaired of time.  He has had multiple spinal injection including transforaminal epidural steroid injection and left SI joint block and steroid injection that gave him temporary pain relief.  He has not done physical therapy recently but tried in the past.  He has become less active recently.  No new onset of motor weakness, numbness or sphincter dysfunction symptoms.  He also has severe left knee pain from osteoarthritis.    INTERIM HISTORY:    He has had a left SI joint block and steroid injection without significant pain relief.  He reports severe low back pain, mainly felt on the left side.  The pain radiates down the left buttock and groin area.  Pain is worse with activity.  Occasionally he also right low back pain radiating in his right leg.  No new onset of motor weakness or numbness.  Back pain is worse with bending forward, lifting, standing.  Pain is severe interfering with his quality of life and functional status. He was referred to Dr. Anglin recommended  basivertebral nerve ablation but the procedure was not covered by his insurance.  [facility].  He had a SCS percutaneous trial with 12Bis and reported almost 100% pain relief.  He is scheduled for permanent placement of the  SCS and pulse generator in August.  He is also scheduled for left knee replacement in October.              PAST MEDICAL HISTORY:  Active Ambulatory Problems     Diagnosis Date Noted    Primary osteoarthritis of both knees 09/23/2015    Chronic right-sided low back pain with right-sided sciatica 10/26/2015    Complete tear of left rotator cuff 03/07/2016    Biceps tendinitis on left 03/07/2016    Post-traumatic osteoarthritis of left knee 10/05/2018    Class 2 severe obesity due to excess calories with serious comorbidity and body mass index (BMI) of 38.0 to 38.9 in adult 11/30/2018    Lumbar radiculopathy 07/31/2019    Obesity (BMI 30-39.9) 09/11/2019    Chronic pain 11/05/2019    Sacroiliac joint dysfunction of left side 10/21/2020    SI (sacroiliac) joint dysfunction 11/12/2020    Lumbar discogenic pain syndrome 06/16/2021    DDD (degenerative disc disease), lumbar 06/16/2021    Decreased range of motion with decreased strength 01/11/2022    Impaired tolerance of activity 01/11/2022    FARHAT (obstructive sleep apnea)      Resolved Ambulatory Problems     Diagnosis Date Noted    Left shoulder pain 03/07/2016    Lumbar spondylosis 05/30/2019    Acute bilateral low back pain with right-sided sciatica 10/10/2019    Decreased functional mobility 10/10/2019    Decreased ROM of lumbar spine 10/10/2019    Decreased strength 10/10/2019    Primary osteoarthritis of left knee 03/18/2020    Impaired functional mobility, balance, gait, and endurance 04/07/2020    Impaired physical mobility 05/01/2020     Past Medical History:   Diagnosis Date    Arthritis     Basal cell carcinoma 06/21/2019    Cancer     Chronic pain of right knee     SCC (squamous cell carcinoma) 2014    Skin cancer     Squamous cell carcinoma 2013       PAST SURGICAL HISTORY:  Past Surgical History:   Procedure Laterality Date    APPENDECTOMY      ARTHROSCOPIC CHONDROPLASTY OF KNEE JOINT Left 3/18/2020    Procedure:  ARTHROSCOPY, KNEE, WITH CHONDROPLASTY;  Surgeon: FREEMAN Álvarez MD;  Location: [facility] OR;  Service: Orthopedics;  Laterality: Left;    COLONOSCOPY  1998    EPIDURAL STEROID INJECTION INTO LUMBAR SPINE N/A 2/11/2020    Procedure: Injection-steroid-epidural-lumbar--InterLaminar L4-5;  Surgeon: Alexus Anglin Jr., MD;  Location: [facility]IN [facility]GT;  Service: Pain Management;  Laterality: N/A;    INJECTION OF ANESTHETIC AGENT AROUND MEDIAL BRANCH NERVES INNERVATING LUMBAR FACET JOINT Bilateral 11/5/2019    Procedure: Block-nerve-medial branch-lumbar--Bilateral L3-4,L4-5,L5-S1;  Surgeon: Alexus Anglin Jr., MD;  Location: [facility];  Service: Pain Management;  Laterality: Bilateral;    INJECTION OF STEROID Left 11/12/2020    Procedure: INJECTION, STEROID Left SI Joint Block and Steroid Injection;  Surgeon: Tam Encarnacion MD;  Location: [facility] OR;  Service: Neurosurgery;  Laterality: Left;  Procedure: Left SI Joint Block and Steroid Injection   Surgery Time: 30 min  LOS: 0  Anesthesia: MAC  Radiology: C-arm  Bed: Regular with Pillows  Position: Prone    KNEE ARTHROSCOPY W/ MENISCECTOMY Left 3/18/2020    Procedure: ARTHROSCOPY, KNEE, WITH MENISCECTOMY;  Surgeon: FREEMAN Álvarez MD;  Location: [facility] OR;  Service: Orthopedics;  Laterality: Left;  CLONIDINE/EPI/KETOROLAC/ROPIVACAINE INJECTION 30CC    lipoma removal      skin cancer removal      TRANSFORAMINAL EPIDURAL INJECTION OF STEROID Bilateral 5/6/2020    Procedure: Injection,steroid,epidural,transforaminal approach--Bilateral L4-5;  Surgeon: Alexus Anglin Jr., MD;  Location: [facility];  Service: Pain Management;  Laterality: Bilateral;    TRANSFORAMINAL EPIDURAL INJECTION OF STEROID Bilateral 8/11/2020    Procedure: Injection,steroid,epidural,transforaminal approach--Bilateral L4-5;  Surgeon: Alexus Anglin Jr., MD;  Location: [facility];  Service: Pain Management;  Laterality: Bilateral;    TRANSFORAMINAL EPIDURAL INJECTION OF STEROID  Bilateral 4/5/2022    Procedure: Injection,steroid,epidural,transforaminal bilateral L4-5;  Surgeon: Alexus Anglin Jr., MD;  Location: [facility];  Service: Pain Management;  Laterality: Bilateral;  asa        SOCIAL HISTORY:   Social History     Socioeconomic History    Marital status:    Tobacco Use    Smoking status: Never Smoker    Smokeless tobacco: Never Used   Substance and Sexual Activity    Alcohol use: Yes     Comment: social    Drug use: No    Sexual activity: Yes     Partners: Female     Social Determinants of Health     Financial Resource Strain: Low Risk     Difficulty of Paying Living Expenses: Not very hard   Food Insecurity: No Food Insecurity    Worried About Running Out of Food in the Last Year: Never true    Ran Out of Food in the Last Year: Never true   Transportation Needs: No Transportation Needs    Lack of Transportation (Medical): No    Lack of Transportation (Non-Medical): No   Physical Activity: Insufficiently Active    Days of Exercise per Week: 2 days    Minutes of Exercise per Session: 30 min   Stress: No Stress Concern Present    Feeling of Stress : Only a little   Social Connections: Unknown    Frequency of Communication with Friends and Family: More than three times a week    Frequency of Social Gatherings with Friends and Family: Once a week    Active Member of Clubs or Organizations: Yes    Attends Club or Organization Meetings: More than 4 times per year    Marital Status:    Housing Stability: Low Risk     Unable to Pay for Housing in the Last Year: No    Number of Places Lived in the Last Year: 1    Unstable Housing in the Last Year: No       FAMILY HISTORY:  Family History   Problem Relation Age of Onset    COPD Mother     Alcohol abuse Mother     Heart disease Father     Melanoma Father     No Known Problems Sister     No Known Problems Brother     Psoriasis Neg Hx     Lupus Neg Hx     Eczema Neg Hx        CURRENTS  MEDICATIONS:  Current Outpatient Medications on File Prior to Visit   Medication Sig Dispense Refill    aspirin (ECOTRIN) 81 MG EC tablet Take 1 tablet twice a day with food starting after surgery (breakfast and dinner). 28 tablet 0    calcipotriene-betamethasone (ENSTILAR) 0.005-0.064 % Foam Apply 1 application topically once daily. 60 g 2    diclofenac sodium (VOLTAREN) 1 % Gel Apply 2 g topically 4 (four) times daily. 100 g 0    diphenhydrAMINE (BENADRYL) 25 mg capsule Take 25 mg by mouth every 6 (six) hours as needed for Itching.      EPINEPHrine (EPIPEN) 0.3 mg/0.3 mL AtIn       fexofenadine (ALLEGRA) 180 MG tablet Take 180 mg by mouth continuous prn.      ketoconazole (NIZORAL) 2 % cream AAA gluteal fold daily to bid prn flare 60 g 3    lidocaine HCL 2% (XYLOCAINE) 2 % jelly Apply topically as needed. 30 mL 3    meloxicam (MOBIC) 15 MG tablet Take 1 tablet (15 mg total) by mouth once daily. 90 tablet 3    multivitamin (THERAGRAN) per tablet Take 1 tablet by mouth once daily.      ondansetron (ZOFRAN) 4 MG tablet Take 1 tablet (4 mg total) by mouth every 8 (eight) hours as needed for Nausea. 30 tablet 0    sumatriptan (IMITREX) 50 MG tablet Take one at the first sign of a headache.  You may repeat it in 1 hour as needed. (Patient taking differently: Take one at the first sign of a headache.  You may repeat it in 1 hour as needed.) 9 tablet 3    tiZANidine (ZANAFLEX) 4 MG tablet       triamcinolone acetonide 0.025% (KENALOG) 0.025 % cream AAA gluteal fold daily to bid PRN flare 30 g 3    gabapentin (NEURONTIN) 600 MG tablet Take 1 tablet (600 mg total) by mouth 3 (three) times daily. 270 tablet 4    mometasone 0.1% (ELOCON) 0.1 % cream Apply topically once daily. for 10 days 1 Tube 3     Current Facility-Administered Medications on File Prior to Visit   Medication Dose Route Frequency Provider Last Rate Last Admin    0.9%  NaCl infusion  500 mL Intravenous Continuous Alexus Anglin Jr., MD         lidocaine (PF) 10 mg/ml (1%) injection 10 mg  1 mL Intradermal Once Alexus Anglin Jr., MD        lidocaine (PF) 10 mg/ml (1%) injection 10 mg  1 mL Intradermal Once Alexus Anglin Jr., MD           ALLERGIES:  Review of patient's allergies indicates:   Allergen Reactions    Advil [ibuprofen] Anaphylaxis    Tums [calcium carbonate] Anaphylaxis       REVIEW OF SYSTEMS:  Review of Systems   Constitutional: Negative for diaphoresis, fever and weight loss.   Respiratory: Negative for shortness of breath.    Cardiovascular: Negative for chest pain.   Gastrointestinal: Negative for blood in stool.   Genitourinary: Negative for hematuria.   Endo/Heme/Allergies: Does not bruise/bleed easily.   All other systems reviewed and are negative.        OBJECTIVE:    PHYSICAL EXAMINATION:   There were no vitals filed for this visit.    Physical Exam:  Vitals reviewed.    Constitutional: He appears well-developed and well-nourished.     Eyes: Pupils are equal, round, and reactive to light. Conjunctivae and EOM are normal.     Cardiovascular: Normal distal pulses and no edema.     Abdominal: Soft.     Skin: Skin displays no rash on trunk and no rash on extremities. Skin displays no lesions on trunk and no lesions on extremities.     Psych/Behavior: He is alert. He is oriented to person, place, and time. He has a normal mood and affect.     Musculoskeletal:        Neck: Range of motion is full.     Neurological:        DTRs: Tricep reflexes are 2+ on the right side and 2+ on the left side. Bicep reflexes are 2+ on the right side and 2+ on the left side. Brachioradialis reflexes are 2+ on the right side and 2+ on the left side. Patellar reflexes are 2+ on the right side and 2+ on the left side. Achilles reflexes are 2+ on the right side and 2+ on the left side.       Back Exam     Tenderness   The patient is experiencing tenderness in the lumbar.    Range of Motion   Extension: abnormal   Flexion: abnormal   Lateral bend  right: normal   Lateral bend left: normal   Rotation right: normal   Rotation left: normal     Muscle Strength   Right Quadriceps:  5/5   Left Quadriceps:  5/5   Right Hamstrings:  5/5   Left Hamstrings:  5/5     Tests   Straight leg raise right: negative  Straight leg raise left: negative    Other   Toe walk: normal  Heel walk: normal            SI joint:   Palpation at the right and left SI joints not painful  ALICE test is negative bilaterally  Gaenslen test is negative bilaterally  Thigh thrust test is negative bilaterally    Neurologic Exam     Mental Status   Oriented to person, place, and time.   Speech: speech is normal   Level of consciousness: alert    Cranial Nerves   Cranial nerves II through XII intact.     CN III, IV, VI   Pupils are equal, round, and reactive to light.  Extraocular motions are normal.     Motor Exam   Muscle bulk: normal  Overall muscle tone: normal    Strength   Right deltoid: 5/5  Left deltoid: 5/5  Right biceps: 5/5  Left biceps: 5/5  Right triceps: 5/5  Left triceps: 5/5  Right wrist flexion: 5/5  Left wrist flexion: 5/5  Right wrist extension: 5/5  Left wrist extension: 5/5  Right interossei: 5/5  Left interossei: 5/5  Right iliopsoas: 5/5  Left iliopsoas: 5/5  Right quadriceps: 5/5  Left quadriceps: 5/5  Right hamstrin/5  Left hamstrin/5  Right anterior tibial: 5/5  Left anterior tibial: 5/5  Right posterior tibial: 5/5  Left posterior tibial: 5/5  Right peroneal: 5/5  Left peroneal: 5/5  Right gastroc: 5/5  Left gastroc: 5/5    Sensory Exam   Light touch normal.   Pinprick normal.     Gait, Coordination, and Reflexes     Gait  Gait: normal    Coordination   Finger to nose coordination: normal  Tandem walking coordination: normal    Reflexes   Right brachioradialis: 2+  Left brachioradialis: 2+  Right biceps: 2+  Left biceps: 2+  Right triceps: 2+  Left triceps: 2+  Right patellar: 2+  Left patellar: 2+  Right achilles: 2+  Left achilles: 2+  Right plantar: normal  Left  plantar: normal  Right Olguin: absent  Left Olguin: absent  Right ankle clonus: absent  Left ankle clonus: absent        DIAGNOSTIC DATA:  I personally interpreted the following imaging:   Lumbar spine MRI December 2021 reviewed showing right L4-5 Modic type 1 endplate changes and left L5-S1 Modic type 1 endplate changes, degenerative disc disease at L4-5 and L5-S1, right L4-5 and L5-S1 foraminal stenosis.    ASSESMENT:  This is a 62 y.o. male with     Problem List Items Addressed This Visit        Neuro    Chronic pain      Other Visit Diagnoses     Vertebrogenic low back pain    -  Primary    Radiculitis                PLAN:  I agree with the permanent spinal cord stimulator placement  I agree that he was also a good candidate for basivertebral nerve ablation  Will follow-up with me as needed  All questions answered  More than 50% of the time was spent on discussing conservative management treatments (medication, physical therapy exercises) and possible interventions (spinal injections and surgical procedures). Care coordination was discussed.      30 minutes        Tam Encarnacion MD  Cell:[phone]

## 2022-08-02 ENCOUNTER — PATIENT MESSAGE (OUTPATIENT)
Dept: SURGERY | Facility: HOSPITAL | Age: 62
End: 2022-08-02
Payer: OTHER GOVERNMENT

## 2022-08-03 ENCOUNTER — PATIENT MESSAGE (OUTPATIENT)
Dept: SPORTS MEDICINE | Facility: CLINIC | Age: 62
End: 2022-08-03
Payer: OTHER GOVERNMENT

## 2022-08-08 ENCOUNTER — TELEPHONE (OUTPATIENT)
Dept: SPORTS MEDICINE | Facility: CLINIC | Age: 62
End: 2022-08-08
Payer: OTHER GOVERNMENT

## 2022-08-08 ENCOUNTER — PATIENT MESSAGE (OUTPATIENT)
Dept: PAIN MEDICINE | Facility: CLINIC | Age: 62
End: 2022-08-08
Payer: OTHER GOVERNMENT

## 2022-08-08 DIAGNOSIS — M17.12 PRIMARY OSTEOARTHRITIS OF LEFT KNEE: Primary | ICD-10-CM

## 2022-08-08 RX ORDER — HYDROCODONE BITARTRATE AND ACETAMINOPHEN 5; 325 MG/1; MG/1
1 TABLET ORAL 3 TIMES DAILY PRN
Qty: 90 TABLET | Refills: 0 | Status: SHIPPED | OUTPATIENT
Start: 2022-08-08 | End: 2022-09-07

## 2022-08-08 NOTE — TELEPHONE ENCOUNTER
[id number] Pre-op: 10/3/22 Sx: 10/12/22   PT Location: [facility]     Start PT on 10/13/22

## 2022-08-11 ENCOUNTER — OFFICE VISIT (OUTPATIENT)
Dept: SLEEP MEDICINE | Facility: CLINIC | Age: 62
End: 2022-08-11
Payer: OTHER GOVERNMENT

## 2022-08-11 DIAGNOSIS — G47.33 OSA (OBSTRUCTIVE SLEEP APNEA): Primary | ICD-10-CM

## 2022-08-11 PROCEDURE — 99214 PR OFFICE/OUTPT VISIT, EST, LEVL IV, 30-39 MIN: ICD-10-PCS | Mod: 95,,, | Performed by: PSYCHIATRY & NEUROLOGY

## 2022-08-11 PROCEDURE — 99214 OFFICE O/P EST MOD 30 MIN: CPT | Mod: 95,,, | Performed by: PSYCHIATRY & NEUROLOGY

## 2022-08-11 NOTE — PROGRESS NOTES
"    EPWORTH SLEEPINESS SCALE TOTAL SCORE  8/8/2022 6/14/2022 5/17/2022   Total score 4 6 5       Baldo Grant is a 62 y.o. male seen today for CPAP follow up. Last seen on 6/14/2022  He got Maria M APAP from [facility] late June 2022.    The patient reports improved sleep continuity and daytime sleepiness on PAP. ESS today is 4/24.  Denies break through snoring.  Occasional  dry mouth. Noticed condensation in the hose at higher humidity settings.   No aerophagia or air hunger. No significant mask leaks and discomfort.    APAP 5 to 20; 90% was 6 cm H2O      Prescription & Device Settings  Therapy type Auto-CPAP  Machine model Maria M II Auto-CPAP  Minimum pressure 5  Maximum pressure 20  Initial pressure 5  Ramp time 45  Mask   Mask Given On 6/22/2022    Average pressure 5.2 cmH2O  Average P95 6.0 cmH2O  Average AHI 0.2  Average # apneas 0.9  Average # hypopneas 0.6  Average high leak time 00:00  Average leak 1.5 LPM      + back pain from previous injury - on Norco 5 times a day; restless sleeper. Due for knee replacement. Also has a bad left shoulder.      DME: [facility] Baldo Grant got Maria M from [facility] in late June 2022  SLEEP STUDIES:         EPWORTH SLEEPINESS SCALE TOTAL SCORE  8/8/2022 6/14/2022 5/17/2022   Total score 4 6 5           SLEEP STUDIES: 6/02/22: FARHAT (obstructive sleep apnea) based on AHI (apnea hypopnea index) criteria. The overall AHI was 72.4 with an oxygen li of 85.0%.    Central apneas were npt mnoted            Medications pertinent to sleep  disorders taken currently:Benadryl  Previous  medications taken  for sleep disorders:  no; NEVER tried DZRA    Sleep studies: no      Occupation:retired from [employer]; still working  Bed partner: his wife  Exercise routine:signed up for a pool in [employer]; but it's making him stiff.  Caffeine: limits to 2-3 caf beverages prior to 3 in the afternoon  ETOH: almost none (does not agree with him anymore - flush reaction"    EPWORTH SLEEPINESS SCALE TOTAL SCORE  " "8/8/2022 6/14/2022 5/17/2022   Total score 4 6 5         EPWORTH SLEEPINESS SCALE 5/17/2022   Sitting and reading 1   Watching TV 1   Sitting, inactive in a public place (e.g. a theatre or a meeting) 1   As a passenger in a car for an hour without a break 0   Lying down to rest in the afternoon when circumstances permit 2   Sitting and talking to someone 0   Sitting quietly after a lunch without alcohol 0   In a car, while stopped for a few minutes in traffic 0   Total score 5           PHYSICAL EXAM:  There were no vitals taken for this visit.  GENERAL: Normal development, well groomed.            ASSESSMENT:    1. FARHAT - severe. The patient symptomatically has  excessive daytime sleepiness, snoring,  witnessed breathing pauses, excessive daytime fatigue, gasping for air in sleep, interrupted sleep and nocturia  with exam findings of "a crowded oral airway and elevated body mass index. The patient has medical co-morbidities of obesity,  which can be worsened by FARHAT. This warrants tretment          PLAN:      Continue Maria M  auto CPAP 6-20 cm with the mask of a patient's choice was given to the patient.  He was recommended to use cpap hose cover or an electrically heated hose (if Maria M brand allows) to prevent condensation.    Periodic supplies replacement ws recommended    Education: During our discussion today, we talked about the etiology of obstructive sleep apnea as well as the potential ramifications of untreated sleep apnea, which could include daytime sleepiness, hypertension, heart disease and/or stroke.      We discussed potential treatment options, which could include weight loss, body positioning, continuous positive airway pressure (CPAP), or referral for surgical consideration. The patient preferred CPAP option.     Discussed purpose of PAP therapy, health benefits of CPAP, as well as the potential ramifications of untreated sleep apnea, which could include daytime sleepiness, hypertension, heart disease " and/or stroke. An AHI of 15 is associated with increased risk CVD.     Regular replacement of CPAP mask, tubing and filter was recommended.    The patient was given open opportunity to ask questions and/or express concerns about treatment plan. Two point patient identifier confirmed.     Precautions: The patient was advised to abstain from driving should he feel sleepy or drowsy.    Follow up: md/Np 12 months  31-minute visit. >50% spent counseling patient and coordination of care.

## 2022-09-02 ENCOUNTER — OFFICE VISIT (OUTPATIENT)
Dept: PAIN MEDICINE | Facility: CLINIC | Age: 62
End: 2022-09-02
Payer: OTHER GOVERNMENT

## 2022-09-02 ENCOUNTER — HOSPITAL ENCOUNTER (OUTPATIENT)
Dept: RADIOLOGY | Facility: HOSPITAL | Age: 62
Discharge: HOME | End: 2022-09-02
Attending: NURSE PRACTITIONER
Payer: OTHER GOVERNMENT

## 2022-09-02 ENCOUNTER — TELEPHONE (OUTPATIENT)
Dept: PAIN MEDICINE | Facility: CLINIC | Age: 62
End: 2022-09-02

## 2022-09-02 VITALS
BODY MASS INDEX: 37.85 KG/M2 | WEIGHT: 279.13 LBS | SYSTOLIC BLOOD PRESSURE: 123 MMHG | DIASTOLIC BLOOD PRESSURE: 85 MMHG | HEART RATE: 77 BPM

## 2022-09-02 DIAGNOSIS — M51.36 DDD (DEGENERATIVE DISC DISEASE), LUMBAR: ICD-10-CM

## 2022-09-02 DIAGNOSIS — S83.232D COMPLEX TEAR OF MEDIAL MENISCUS OF LEFT KNEE AS CURRENT INJURY, SUBSEQUENT ENCOUNTER: ICD-10-CM

## 2022-09-02 DIAGNOSIS — Z96.89 STATUS POST INSERTION OF SPINAL CORD STIMULATOR: ICD-10-CM

## 2022-09-02 DIAGNOSIS — M51.26 LUMBAR DISCOGENIC PAIN SYNDROME: ICD-10-CM

## 2022-09-02 DIAGNOSIS — M17.12 PRIMARY OSTEOARTHRITIS OF LEFT KNEE: ICD-10-CM

## 2022-09-02 DIAGNOSIS — F11.90 CHRONIC, CONTINUOUS USE OF OPIOIDS: ICD-10-CM

## 2022-09-02 DIAGNOSIS — Z96.89 STATUS POST INSERTION OF SPINAL CORD STIMULATOR: Primary | ICD-10-CM

## 2022-09-02 DIAGNOSIS — G89.4 CHRONIC PAIN SYNDROME: ICD-10-CM

## 2022-09-02 DIAGNOSIS — M54.16 LUMBAR RADICULOPATHY: ICD-10-CM

## 2022-09-02 DIAGNOSIS — M48.061 NEUROFORAMINAL STENOSIS OF LUMBAR SPINE: ICD-10-CM

## 2022-09-02 PROCEDURE — 99214 PR OFFICE/OUTPT VISIT, EST, LEVL IV, 30-39 MIN: ICD-10-PCS | Mod: S$PBB,,, | Performed by: NURSE PRACTITIONER

## 2022-09-02 PROCEDURE — 72100 X-RAY EXAM L-S SPINE 2/3 VWS: CPT | Mod: TC,FY

## 2022-09-02 PROCEDURE — 99999 PR PBB SHADOW E&M-EST. PATIENT-LVL IV: ICD-10-PCS | Mod: PBBFAC,,, | Performed by: NURSE PRACTITIONER

## 2022-09-02 PROCEDURE — 99999 PR PBB SHADOW E&M-EST. PATIENT-LVL IV: CPT | Mod: PBBFAC,,, | Performed by: NURSE PRACTITIONER

## 2022-09-02 PROCEDURE — 72100 XR LUMBAR SPINE AP AND LATERAL: ICD-10-PCS | Mod: 26,,, | Performed by: RADIOLOGY

## 2022-09-02 PROCEDURE — 99214 OFFICE O/P EST MOD 30 MIN: CPT | Mod: PBBFAC,PO | Performed by: NURSE PRACTITIONER

## 2022-09-02 PROCEDURE — 99214 OFFICE O/P EST MOD 30 MIN: CPT | Mod: S$PBB,,, | Performed by: NURSE PRACTITIONER

## 2022-09-02 PROCEDURE — 72100 X-RAY EXAM L-S SPINE 2/3 VWS: CPT | Mod: 26,,, | Performed by: RADIOLOGY

## 2022-09-02 RX ORDER — HYDROCODONE BITARTRATE AND ACETAMINOPHEN 5; 325 MG/1; MG/1
1 TABLET ORAL 2 TIMES DAILY PRN
Qty: 60 TABLET | Refills: 0 | Status: SHIPPED | OUTPATIENT
Start: 2022-09-08 | End: 2022-09-09 | Stop reason: SDUPTHER

## 2022-09-02 RX ORDER — HYDROCODONE BITARTRATE AND ACETAMINOPHEN 5; 325 MG/1; MG/1
1 TABLET ORAL 2 TIMES DAILY PRN
Qty: 60 TABLET | Refills: 0 | Status: ON HOLD | OUTPATIENT
Start: 2022-10-08 | End: 2022-10-13 | Stop reason: HOSPADM

## 2022-09-02 NOTE — TELEPHONE ENCOUNTER
Left detailed message regarding patient's thoracic and lumbar MRI specifically explaining in layman's terms be placement of his spinal cord stimulator.    Jeffy Ramos, NP-C  Interventional Pain Management

## 2022-09-02 NOTE — PROGRESS NOTES
"Chronic Pain-Established Note (Follow up visit)    Referred by: Dr. Encarnacion  Reason for referral: Back Pain    CC:   Chief Complaint   Patient presents with    Low-back Pain    Leg Pain     Left          Last 3 PDI Scores 5/4/2022 3/24/2022 1/12/2022   Pain Disability Index (PDI) 47 36 45     Interval Updates 9/2/2022 - Mr. Grant is following up for medication refill to manage chronic low back and left leg pain.  A refill of Hydrocodone-Acetaminophen 5-325 mg t.i.d. is being requested today.  He reports 50% relief with for the current medication regimen.  He reports the following medication side effects: none.  Pain is currently rate 4/10 with a weekly range 4-5/10.  He did report a recent spinal cord stimulator implant that was done [facility] 08/19 [facility].  He did mention that 4-5 days following the SCS implant he climbed in his attic to perform some work duties for his home while reaching up to grab something he felt a sharp pain, he then [facility] and his right lead appears to have migrated and is now in a " S shape"  He states that his spinal cord stimulator is addressing 1/3 of his pain he reported  increasing pain yesterday in which he had to take Norco 5-325 t.i.d. along with gabapentin.  Overall the SCS implant is helping, but he continues to work with the [employer] [facility],  he is currently using 4 programs for his SCS therapy.     7/6/2022 - Mr. Grant is following up for Low-back Pain and Leg Pain (Left ).  He requests a refill of Hydrocodone-Acetaminophen 5-325 mg TID  which are  working fairly well to control His pain.  He reports 50% relief with the current medication regimen.  Medication side effects: none.  Pain is currently rate 4/10 with a weekly range 4-5/10.  It is described as Aching.   Pain is worsened by: nothing in particular and improved by: rest.  Mr. Grant has informed me that he will be getting a [place] SCS [facility] to help " iwht LBP and left leg pain. Dr. Grijalva will be implanting the stimulator. He has also informed me that he will having a left TKA.        5/4/2022  - Mr. Grant is following up for Low-back Pain and Leg Pain (Left ) and bilateral leg pain.  He requests a refill of Hydrocodone-Acetaminophen 5-325 mg TID # 90 pills per month is what he would prefer and  are  working well to control His pain.  He reports 50-60% relief with the current medication regimen and improved functionality. Pain is currently rate 7/10 with a weekly range 7-8/10.  Lastly, he would like to be resubmitted back into the portal for consideration of the Intracept procedure.  Lastly, his most recently lumbar Bilateral TFEIS at L4-5 provided him with 50% relief for one week and then pain returned.     3/24/2022  - Mr. Grant is following up for Low-back Pain and Leg Pain (Left ).  He requests a refill of Hydrocodone-Acetaminophen 5-325 mg BID # 60 which he says is effective but would like to go back to TID dosing.  He reports 50% relief with the current medication regimen. Pain is currently rate 5/10 with a weekly range 5-6/10.   He is also reporting increased pain in the low back left greater than right as well as legs bilaterally left greater than right.  Pain starts in low back and radiates into his legs posteriorly laterally anteriorly also causing scrotum pain.  Denies any profound weakness although he is walking with a cane this point.  Denies any bowel bladder dysfunction, denies any saddle anesthesia denies any recent incident or trauma.      01/12/2022 - Mr. Grant is following up for Low-back Pain and Leg Pain (Left ).  He requests a refill of Norco 7.5-325 90 pills per month which are working well to control His pain.  He reports 60% relief with the current medication regimen.  Medication side effects: none.  Pain is currently rate 6/10 with a weekly range 3-8/10.  It is described as Aching, Tight, Tingling, Numb, Sharp and Shooting.   Pain is  worsened by: standing and improved by: nothing and medications.     10/25/2021 -- Mr. Grant is following up for Low-back Pain and Leg Pain (Left ).  He requests a refill of Hydrocodone-Acetaminophen 5-325 mg which is not working well to control His pain.  He reports 50% relief with the current medication regimen.  Medication side effects: none.  Pain is currently rate 6/10 with a weekly range 5-9/10.  It is described as Aching, Tight, Tingling, Numb, Sharp and Shooting.   Pain is worsened by: standing and improved by: nothing and medications.     09/27/21 - Mr. Grant is following up for Low-back Pain and Leg Pain (Left ).  He requests a refill of Hydrocodone-Acetaminophen 5-325 mg TID # 90 which are not working well to control His pain.  He reports 50% relief with the current medication regimen. Pain is currently rate 7/10 with a weekly range 6-7/10. Mr. Grant is reporting continued left low back pain with radiating symptoms into his left and now right scrotum.  He mentioned his right scrotum has not been affected previously.  He also reports pain radiating into his left lateral leg stopping just above his left knee.  His pain has been refractory to multiple injections and physical therapy, he reports the inability to have a quality of life as he states he recently tried to have sex with his wife and could not due to the pain.  His current opioid regimen provide some relief but not significant enough as he would like to consider other options, he endorses that his insurance plan denied the intracept procedure that we were going to consider him for.     08/27/21 - Mr. Grant is following up for Low-back Pain and Leg Pain (Left ).  He requests a refill of Hydrocodone-Acetaminophen 5-325 mg TID # 90 per month which are working well to control His pain.  He reports 50% relief with the current medication regimen.  Medication side effects: none.  Pain is currently rate 5/10 with a weekly range 5-9/10.  It is described  "as Aching, Dull, electrical shock(left leg)  and Sharp.   Pain is worsened by: exercise, flexion, standing for more than 3 minutes and walking for more than 3 minutes and improved by: heat, laying down, massage, medications, physical therapy, rest and sitting.    7/23/21 - Mr. Grant returns to clinic for follow up visit reporting worse left sided low back and left leg pain.  Patient is here for medication refill of Hydrocodone-Acetaminophen 5-325 mg TID PRN for pain, he reports 60-70% relief for 3-4 hours his pain is predicated on how much activity he is doing. He also receives Gabapentin 600 mg TID and Mobic 15 mg daily. He is reporting is reporting left scrotum and left leg pain today, this is his worse pain today.  Pain intensity is currently 7/10.      06/16/2021 - Mr. Grant returns to clinic for follow up visit reporting stable but severe back pain. Pain intensity is currently 5/10.  His PCP recently left [facility] and he was referred her for re-evaluation by his new primary care team.  Patient reports continued low back pain with occasional radiation in to the legs.  Pain fluctuates depending on level of activity.  He reports "flare ups" at least twice weekly during which he may take 2-4 tablets of norco in a day, which other days he may take none.    09/16/2020 - Mr. Grant returns to clinic for follow up visit reporting low back and left leg  pain. He is also reporting left knee pain he is s/f for a left knee GN block with Dr Hollins's office. He was referred to Dr Hollins by Dr. Álvarez/Orthopedics.  Patient is s/p Lumbar Transforaminal Epidural Steroid Injection, Two Levels, Bilateral L4-5 on 08/11/2020 with 0% relief.  Patient is also status post bilateral lumbar MBB that provided him with 0% relief.  Pain intensity is currently 6/10.      05/13/[id number]-scwn-old male presents status post bilateral L4-5 TF KATHY with reported 60% relief.  Patient also states his relief is improving each and every day.  Reports " taking less of his pain medication due to the relief received from the injection.  He is only 6 days postop his lumbar epidural, discussed that I suspect is relief will improve even more over the next 2 weeks.    03/02/2020 - Mr. Grant returns to clinic for follow up visit reporting worse back and bilateral leg pain left greater than right.  Patient is s/p L4-5 Lumbar Epidural Steroid  on 2/11/20 with 0% relief.  Patient presents with wife he is currently experiencing no relief from the injection, he reports that his pain continues in his low back as well as in his legs bilaterally left greater than right.  Patient reports shooting pain down his left leg at times going past his left knee into his left foot.  Patient reports previously given a lumbar TF KATHY at L4-5 per Dr. Duenas/TAMIKO Hampton reports receiving 3 months of relief and he is requesting to be scheduled for that particular injection today.  Pain intensity is currently 5/10.      HPI:    Baldo Grant is a 62 y.o. male who complains of left and right lower back pain.  He reports the pain radiates down his left leg just below his knee.  Pain intensity is 5/10.  He reports taking Norco, Gabapentin and Meloxicam for some relief.   Patient s/p Bilateral L3-4, L4-5, and L5-S1 Lumbar Medial Branch Block on 11/5/2019 with Dr. Anglin with no relief.  Patient's 5/17/2019 MRI Lumbar Spine reviewed with patient and shows L4-5 and L5-S1 degenerative disc disease with disc bulging as described above with right L4-5 and left L5-S1 foraminal stenosis.      Location: lower back and bilateral leg pain   Onset: Summer 200  Current Pain Score: 5/10   Daily Pain of Range: 4-9/10  Quality: Dull and Sharp  Radiation: back of left leg  Worsened by: sitting and standing  Improved by: medications and physical therapy    Previous Therapies:  PT/OT: Yes  HEP: Yes, but severely limited by pain  Interventions:   -04/05/2022 Lumbar Transforaminal Epidural Steroid Injection,  Bilateral L4-5      Bilateral L3-4, L4-5, and L5-S1 Lumbar Medial Branch Block on 11/5/2019  Surgery:  Medications:   - NSAIDS: meloxicam (MOBIC) 15 MG tablet daily  - MSK Relaxants:  diazePAM (VALIUM) 5 MG tablet daily as needed  - TCAs:   - SNRIs:   - Topicals: lidocaine HCL 2% (XYLOCAINE) 2 % jelly  - Anticonvulsants:  - Opioids: HYDROcodone-acetaminophen (NORCO) 5-325 mg per tablet    Current Pain Medications:  Meloxicam 15 mg   Gabapentin 600 mg TID        3. HYDROcodone-acetaminophen (NORCO) 5-325 mg per tablet Q6 hours PRN    Review of Systems:  Review of Systems   Constitutional: Negative.    HENT: Negative.     Eyes: Negative.    Respiratory: Negative.     Cardiovascular: Negative.    Gastrointestinal: Negative.    Genitourinary: Negative.    Musculoskeletal:  Positive for back pain, joint pain and myalgias.   Skin: Negative.    Neurological: Negative.    Endo/Heme/Allergies: Negative.    Psychiatric/Behavioral: Negative.       History:    Current Outpatient Medications:     aspirin (ECOTRIN) 81 MG EC tablet, Take 1 tablet twice a day with food starting after surgery (breakfast and dinner)., Disp: 28 tablet, Rfl: 0    calcipotriene-betamethasone (ENSTILAR) 0.005-0.064 % Foam, Apply 1 application topically once daily., Disp: 60 g, Rfl: 2    diclofenac sodium (VOLTAREN) 1 % Gel, Apply 2 g topically 4 (four) times daily., Disp: 100 g, Rfl: 0    diphenhydrAMINE (BENADRYL) 25 mg capsule, Take 25 mg by mouth every 6 (six) hours as needed for Itching., Disp: , Rfl:     EPINEPHrine (EPIPEN) 0.3 mg/0.3 mL AtIn, , Disp: , Rfl:     fexofenadine (ALLEGRA) 180 MG tablet, Take 180 mg by mouth continuous prn., Disp: , Rfl:     gabapentin (NEURONTIN) 600 MG tablet, Take 1 tablet (600 mg total) by mouth 3 (three) times daily., Disp: 270 tablet, Rfl: 4    HYDROcodone-acetaminophen (NORCO) 5-325 mg per tablet, Take 1 tablet by mouth 3 (three) times daily as needed for Pain., Disp: 90 tablet, Rfl: 0    ketoconazole (NIZORAL) 2  % cream, AAA gluteal fold daily to bid prn flare, Disp: 60 g, Rfl: 3    lidocaine HCL 2% (XYLOCAINE) 2 % jelly, Apply topically as needed., Disp: 30 mL, Rfl: 3    meloxicam (MOBIC) 15 MG tablet, Take 1 tablet (15 mg total) by mouth once daily., Disp: 90 tablet, Rfl: 3    mometasone 0.1% (ELOCON) 0.1 % cream, Apply topically once daily. for 10 days, Disp: 1 Tube, Rfl: 3    multivitamin (THERAGRAN) per tablet, Take 1 tablet by mouth once daily., Disp: , Rfl:     ondansetron (ZOFRAN) 4 MG tablet, Take 1 tablet (4 mg total) by mouth every 8 (eight) hours as needed for Nausea., Disp: 30 tablet, Rfl: 0    sumatriptan (IMITREX) 50 MG tablet, Take one at the first sign of a headache.  You may repeat it in 1 hour as needed. (Patient taking differently: Take one at the first sign of a headache.  You may repeat it in 1 hour as needed.), Disp: 9 tablet, Rfl: 3    tiZANidine (ZANAFLEX) 4 MG tablet, , Disp: , Rfl:     triamcinolone acetonide 0.025% (KENALOG) 0.025 % cream, AAA gluteal fold daily to bid PRN flare, Disp: 30 g, Rfl: 3  No current facility-administered medications for this visit.    Facility-Administered Medications Ordered in Other Visits:     0.9%  NaCl infusion, 500 mL, Intravenous, Continuous, Alexus Anglin Jr., MD    lidocaine (PF) 10 mg/ml (1%) injection 10 mg, 1 mL, Intradermal, Once, Alexus Anglin Jr., MD    lidocaine (PF) 10 mg/ml (1%) injection 10 mg, 1 mL, Intradermal, Once, Alexus Anglin Jr., MD    Past Medical History:   Diagnosis Date    Arthritis     Basal cell carcinoma 06/21/2019    back    Cancer     Chronic pain of right knee     SCC (squamous cell carcinoma) 2014    Forehead- Dr. Cabral     Skin cancer     Squamous cell carcinoma 2013    Right ear- Dr. Marianna long       Past Surgical History:   Procedure Laterality Date    APPENDECTOMY      ARTHROSCOPIC CHONDROPLASTY OF KNEE JOINT Left 3/18/2020    Procedure: ARTHROSCOPY, KNEE, WITH CHONDROPLASTY;  Surgeon: FREEMAN Álvarez MD;   Location: [facility] OR;  Service: Orthopedics;  Laterality: Left;    COLONOSCOPY  1998    EPIDURAL STEROID INJECTION INTO LUMBAR SPINE N/A 2/11/2020    Procedure: Injection-steroid-epidural-lumbar--InterLaminar L4-5;  Surgeon: Alexus Anglin Jr., MD;  Location: [facility] PAIN MGT;  Service: Pain Management;  Laterality: N/A;    INJECTION OF ANESTHETIC AGENT AROUND MEDIAL BRANCH NERVES INNERVATING LUMBAR FACET JOINT Bilateral 11/5/2019    Procedure: Block-nerve-medial branch-lumbar--Bilateral L3-4,L4-5,L5-S1;  Surgeon: Alexus Anglin Jr., MD;  Location: [facility] PAIN MGT;  Service: Pain Management;  Laterality: Bilateral;    INJECTION OF STEROID Left 11/12/2020    Procedure: INJECTION, STEROID Left SI Joint Block and Steroid Injection;  Surgeon: Tam Encarnacion MD;  Location: [facility] OR;  Service: Neurosurgery;  Laterality: Left;  Procedure: Left SI Joint Block and Steroid Injection   Surgery Time: 30 min  LOS: 0  Anesthesia: MAC  Radiology: C-arm  Bed: Regular with Pillows  Position: Prone    KNEE ARTHROSCOPY W/ MENISCECTOMY Left 3/18/2020    Procedure: ARTHROSCOPY, KNEE, WITH MENISCECTOMY;  Surgeon: FREEMAN Álvarez MD;  Location: [facility] OR;  Service: Orthopedics;  Laterality: Left;  CLONIDINE/EPI/KETOROLAC/ROPIVACAINE INJECTION 30CC    lipoma removal      skin cancer removal      TRANSFORAMINAL EPIDURAL INJECTION OF STEROID Bilateral 5/6/2020    Procedure: Injection,steroid,epidural,transforaminal approach--Bilateral L4-5;  Surgeon: Alexus Anglin Jr., MD;  Location: [facility] PAIN [facility]GT;  Service: Pain Management;  Laterality: Bilateral;    TRANSFORAMINAL EPIDURAL INJECTION OF STEROID Bilateral 8/11/2020    Procedure: Injection,steroid,epidural,transforaminal approach--Bilateral L4-5;  Surgeon: Alexus Anglin Jr., MD;  Location: [facility] PAIN [facility]GT;  Service: Pain Management;  Laterality: Bilateral;    TRANSFORAMINAL EPIDURAL INJECTION OF STEROID Bilateral 4/5/2022    Procedure: Injection,steroid,epidural,transforaminal bilateral  L4-5;  Surgeon: Alexus Anglin Jr., MD;  Location: [facility];  Service: Pain Management;  Laterality: Bilateral;  asa        Family History   Problem Relation Age of Onset    COPD Mother     Alcohol abuse Mother     Heart disease Father     Melanoma Father     No Known Problems Sister     No Known Problems Brother     Psoriasis Neg Hx     Lupus Neg Hx     Eczema Neg Hx        Social History     Socioeconomic History    Marital status:    Tobacco Use    Smoking status: Never    Smokeless tobacco: Never   Substance and Sexual Activity    Alcohol use: Yes     Comment: social    Drug use: No    Sexual activity: Yes     Partners: Female     Social Determinants of Health     Financial Resource Strain: Low Risk     Difficulty of Paying Living Expenses: Not very hard   Food Insecurity: No Food Insecurity    Worried About Running Out of Food in the Last Year: Never true    Ran Out of Food in the Last Year: Never true   Transportation Needs: No Transportation Needs    Lack of Transportation (Medical): No    Lack of Transportation (Non-Medical): No   Physical Activity: Insufficiently Active    Days of Exercise per Week: 2 days    Minutes of Exercise per Session: 30 min   Stress: No Stress Concern Present    Feeling of Stress : Only a little   Social Connections: Unknown    Frequency of Communication with Friends and Family: More than three times a week    Frequency of Social Gatherings with Friends and Family: Once a week    Active Member of Clubs or Organizations: Yes    Attends Club or Organization Meetings: More than 4 times per year    Marital Status:    Housing Stability: Low Risk     Unable to Pay for Housing in the Last Year: No    Number of Places Lived in the Last Year: 1    Unstable Housing in the Last Year: No       Review of patient's allergies indicates:   Allergen Reactions    Advil [ibuprofen] Anaphylaxis    Tums [calcium carbonate] Anaphylaxis       Physical Exam:  Vitals:    09/02/22 0851    BP: 123/85   Pulse: 77   Weight: 126.6 kg (279 lb 1.6 oz)   PainSc:   4     General    Nursing note and vitals reviewed.  Constitutional: He is oriented to person, place, and time. He appears well-developed. No distress.   HENT:   Head: Normocephalic and atraumatic.   Nose: Nose normal.   Eyes: Conjunctivae and EOM are normal. Right eye exhibits no discharge. Left eye exhibits no discharge. No scleral icterus.   Neck: No JVD present. No tracheal deviation present.   Cardiovascular:  Normal rate, regular rhythm and intact distal pulses.            Pulmonary/Chest: Effort normal and breath sounds normal. No respiratory distress. He exhibits no tenderness.   Abdominal: Soft. Bowel sounds are normal. He exhibits no distension. There is no abdominal tenderness. There is no rebound.   Neurological: He is alert and oriented to person, place, and time. He exhibits normal muscle tone. Coordination normal.   Psychiatric: His behavior is normal. Thought content normal.         Back (L-Spine & T-Spine) / Neck (C-Spine) Exam     Tenderness Right paramedian tenderness of the Lower L-Spine. Left paramedian tenderness of the Lower L-Spine.     Back (L-Spine & T-Spine) Range of Motion   Lateral bend right:  normal   Lateral bend left:  normal   Rotation right:  normal   Rotation left:  normal     Spinal Sensation   Right Side Sensation  L-Spine Level: normal  Left Side Sensation  L-Spine Level: normal      Muscle Strength   Right Lower Extremity   Hip Abduction: 5/5   Hip Flexion: 5/5   Hip Extensors: 5/5  Quadriceps:  5/5   Hamstrin/5   Gastrocsoleus:  5/5   Left Lower Extremity   Hip Abduction: 4/5   Hip Flexion: 4/5   Hip Extensors: 4/5  Quadriceps:  4/5   Hamstrin/5   Gastrocsoleus:  5/5     Imaging:  MRI Lumbar Spine Without Contrast 10/15/2020   Degenerative findings:  T12-L1: No spinal canal stenosis or neural foraminal narrowing.  L1-L2: No spinal canal stenosis or neural foraminal narrowing.  L2-L3:  Circumferential disc bulge and mild facet arthropathy noted.  No spinal canal stenosis or neural foraminal narrowing.  L3-L4: Circumferential disc bulge and mild facet arthropathy result in mild left neural foraminal narrowing.  L4-L5: Circumferential disc bulge and mild facet arthropathy result in moderate right, mild left neural foraminal narrowing.  L5-S1: Circumferential disc bulge with annular fissure and moderate facet arthropathy result in mild bilateral neural foraminal narrowing.  Paraspinal muscles & soft tissues: Unremarkable.     Impression:  1. Degenerative changes of the lumbar spine resulting in mild-to-moderate bilateral neural foraminal narrowing at L3-S1.      EXAMINATION:  MRI LUMBAR SPINE WITHOUT CONTRAST    CLINICAL HISTORY:  Low back painLow back pain, rapidly progressive neuro deficit;    TECHNIQUE:  Standard multiplanar noncontrast MRI sequences of the lumbar spine.    COMPARISON:  None    FINDINGS:  The distal cord and conus reveal normal signal and morphology. The lumbar vertebra reveal normal alignment, shape and signal intensity.    T12-L1: Unremarkable    L1-2:  Unremarkable    L2-3:  Minimal annular bulge.    L3-4:  Mild disc desiccation with mild generalized annular bulge.  Mild hypertrophic ligamentum flavum and facet arthritis.    L4-5:  Mild disc desiccation with mild annular bulge.  Right foraminal annular fissure.  Mild hypertrophic ligamentum flavum and facet arthritis.  Mild to moderate left and moderate right foraminal stenosis.    L5-S1:  Mild degenerative disc disease with disc desiccation and disc bulge/osteophyte.  Mild hypertrophic ligamentum flavum and facet arthritis.  Mild right with mild to moderate left foraminal stenosis.      Impression       L4-5 and L5-S1 degenerative disc disease with disc bulging osteophyte as described above with right L4-5 and left L5-S1 foraminal stenosis.      Electronically signed by: Baldo Gayle MD  Date: 05/17/2019          Labs:  BMP  Lab Results   Component Value Date     09/22/2020    K 4.4 09/22/2020     09/22/2020    CO2 28 09/22/2020    BUN 17 09/22/2020    CREATININE 1.00 09/22/2020    CALCIUM 9.4 09/22/2020    ANIONGAP 10 09/22/2020    ESTGFRAFRICA >60.0 09/22/2020    EGFRNONAA >60.0 09/22/2020     Lab Results   Component Value Date    ALT 55 (H) 09/22/2020    AST 33 09/22/2020    ALKPHOS 37 (L) 09/22/2020    BILITOT 0.4 09/22/2020       Assessment:  Problem List Items Addressed This Visit          Neuro    Lumbar radiculopathy    Chronic pain    Lumbar discogenic pain syndrome    DDD (degenerative disc disease), lumbar     Other Visit Diagnoses       Status post insertion of spinal cord stimulator    -  Primary    Relevant Orders    X-Ray Lumbar Spine AP And Lateral    X-Ray Thoracic Spine AP Lateral    Primary osteoarthritis of left knee        Complex tear of medial meniscus of left knee as current injury, subsequent encounter        Neuroforaminal stenosis of lumbar spine        Chronic, continuous use of opioids                2/3/2020 - Baldo Grant is a 62 y.o. male with who complains of left and right lower back pain.  He reports the pain radiates down his left leg just below his knee.  Pain intensity is 5/10. He reports his current medication regimen of Norco, Gabapentin and Meloxicam help relieve his pain.  He reports having  Bilateral L3-4, L4-5, and L5-S1 Lumbar Medial Branch Block on 11/5/2019 with no relief.Patient's 5/17/2019 MRI Lumbar Spine reviewed with patient and shows L4-5 and L5-S1 degenerative disc disease with disc bulging  as described above with right L4-5 and left L5-S1 foraminal stenosis.  Recommended scheduling for Interlaminar KATHY  L4-5 with moderate sedation. Patient will follow in clinic following injection.     03/02/[id number]-ccch-old male presents with his wife he is status post lumbar interlaminar KATHY at L4-5 with 0% relief.  Patient reports continued low back and  bilateral leg pain left greater than right he is reporting shooting pain down his left leg into his foot at times.  Patient reports the pain is disrupting his quality of life and prohibiting him from performing his ADLs.  Patient was given a left TF KATHY by Dr. Duenas/PM [facility] and received significant relief for 2-3 months. Lumbar MRI shows pathology a the L4-5 and L5-S1 patient has degenerative disc disease with disc bulging osteophyte with right L4-5 and left L5-S1 foraminal stenosis.  Based on results of previous Left  L4-5 transforaminal I discussed with patient and wife that I will now recommend  a bilateral L4-5.   Patient and wife agreed and understood.      05/13/2020- 52 a old male presents status post bilateral L4-5 TF KATHY, is currently reporting 60% relief he states his relief is improving each and every day.  Recommended patient follow up with me via my [facility] in 2 weeks to report the effectiveness of his procedure discussed that his procedure will more than likely improve the next 2 weeks considering he is only 6 days following his injection.    09/16/2020 - 60-year-old male presents low back and left knee pain, patient is established our office he has had multiple lumbar KATHY as well as a lumbar MBB that provided him with minimal to no relief according to his records he is scheduled for a bilateral genicular block with  tomorrow he was referred to his office from orthopedics/Dr Álvarez.  Patient reports continued low back and left leg pain radiating down to his foot, this pain is secondary to L 4/ 5, and 5 /S1 degenerative disc disease with disc bulging osteophyte with the right L4-5 and left L5-S1 foraminal stenosis that has not gotten better from lumbar KATHY injections.  I recommended patient follow up with Neurosurgery further evaluate    6/16/21 - 62 yo M with primarily axial discogenic chronic low back pain presented today for re-evaluation the request of primary care team for  assessment of his chronic opioid therapy.  Patient is on chronic low-dose opioid therapy which is supporting his daily function.  He has no bulging the spine related to degenerative disc disease.  Since he was last evaluated, our department deployed a new interventional procedures may help to target discogenic pain.  While not specifically noted in the most recent MRI from September 2020, there is endplate edema (Modic changes close this ease in the inferior endplate at L4, and both the superior and inferior endplate of L5.  This could be a significant cause of his pain and can be targeted through the use of the Intracept procedure which ablates the basivertebral nerve.  Given the level of functionality he is maintaining with the current regimen, we will continue with these medications while obtaining authorization for this procedure which may take anywhere between 1 and 6 months.    7/23/2021- 61-year-old male with a history of low back and left leg pain, patient is here for medication refill we will take over his opioid from his PCP.  He is currently taking Norco 5-325 t.i.d. that provides him with roughly 80% and improved functionality that allows in reform his ADLs.  Last clinic visit with Dr. Anglin he Mr Grant discussed considering him for a intracept procedure as his most recent lumbar MRI shows endplate edema/Modic changes to the inferior endplate of L4 and the superior and inferior endplate of L5.  Until authorization has been done we will  continue to provide him with his stable low dose opioid regimen,  he and I discussed that once authorization has been completed our office will give him a call and schedule him for the intracept  Procedure.    8/27/2021- Baldo Grant is a 62 y.o. male who  has a past medical history of Arthritis, Basal cell carcinoma (06/21/2019), Cancer, Chronic pain of right knee, SCC (squamous cell carcinoma) (2014), Skin cancer, and Squamous cell carcinoma (2013).  By history  and examination this patient has chronic low back pain with radiculopathy.  The underlying cause cause is degenerative disc disease.  Pathology is confirmed by imaging.  Lumbar MRI shows endplate edema/Modic changes to the inferior endplate of L4 and the superior and inferior endplate of L5  We discussed the underlying diagnoses and multiple treatment options including non-opioid medications, opioid medications, injections, physical therapy, home exercise, activity modification / rest and weight loss.  The risks and benefits of each treatment option were discussed and all questions were answered.      9/27/2021- Baldo Grant is a 62 y.o. male who  has a past medical history of Arthritis, Basal cell carcinoma (06/21/2019), Cancer, Chronic pain of right knee, SCC (squamous cell carcinoma) (2014), Skin cancer, and Squamous cell carcinoma (2013).  By history and examination this patient has chronic left  low back pain with radiculopathy.  The underlying cause cause is unclear, my initial Dx's will include  degenerative disc disease, foraminal stenosis and deconditioning differential diagnoses are Meralgia Paresthetica  affected, ilioinguinal nerve pain.    Pathology is confirmed by imaging.  We discussed the underlying diagnoses and multiple treatment options including non-opioid medications, opioid medications, injections, physical therapy, home exercise, activity modification / rest and weight loss.  The risks and benefits of each treatment option were discussed and all questions were answered.      10/25/21 - Continued LBP and RLE radicular symptoms now spreading to groin and LLE.  Patient likely has tolerance to Norco 7.5-325 mg and we will escalate to 7.5 mg tablets TID.  He was advised that we cannot escalate in perpetuity due to safety concerns.  To that end, he will f/u with Dr. Encarnacion in [facility] after completing the MRI I order today toward finding a definitive surgical solution.  If he is not a surgical  candidate we may pursue SCS.  Intercept authorization is still pending.    01/12/20222642-77-yily-old male with a history of chronic low back and right lower extremity radicular symptoms that radiates into his groin and left lower extremity.   He recently saw Neurosurgery and was consult to physical therapy Neurosurgery relieved his pain is likely related to degenerative disc disease and myofascial pain syndrome.  He is currently on chronic opioid therapy that consists of Norco 7.5-325 t.i.d. 90 pills per month.  He and I discussed today that we would consider reducing his chronic opioid therapy to [place] 5-325 t.i.d. patient expressed concerns about sleeping at night that the pain medication is too strong as he does have a history of sleep apnea.  Addition he reports that he is excited about physical therapy and he will begin swimming at least 1-2 times per week which he thinks will overall help his pain.  He also takes gabapentin and an occasional Mobic 15 mg which both help.  Denies any adverse side effects with current medication.  He seems to be meeting all of his goals for chronic opiate therapy.    03/24/2022.  61-year-old male with history of chronic low back and bilateral lower extremity pain left greater than right symptoms also radiate into his groin.  He was denied the intercept procedure for discogenic pain he other options for treatment would be a lumbar KATHY or TF KATHY.  His pain is likely related to degenerative disc disease and moderate neural foraminal stenosis at L4-5 and L5-S1.  His MRI does also show facet arthritis at this level.  In the past he has been provided multiple injections the 1 that has given him the most relief based on previous notes is the bilateral TF KATHY targeting L4-5.  Discussed I recommend we repeat this injection to acquire more relief, patient is amenable to this plan.  He also want me to send Dr. Anglin a message regarding his chronic opioid therapy which consists of Norco  5-325 b.i.d. 60 pills per month.  Ms. Grant I had a discussion about why he was reduced from t.i.d. to b.i.d. dosing discussed with him that I was unsure that I will follow-up with Dr. Anglin for confirmation on this.  For now we will refill the [place] 5-325 b.i.d..    5/4/2022- 61 y/o male with a history of chronic low back and bilateral lower extremity pain left greater than right symptoms also radiate into his groin.  He was denied the intercept procedure for discogenic pain. He was recently provided a Bilateral Lumbar TFESI @ L4-5 that provided minimal relief. He and I discussed that I will speak with our office staff/[facility] regarding resubmitting him into the Intracept Portal and then [facility] as this could push approval considering he has  insurance.     7/6/2022- 61 y/o male with a Hx of chronic low back and left radiculopathy he also has radiating pain to his groin area bilaterally.  His pain is likely related to degenerative disc disease and moderate neural foraminal stenosis at L4-5 and L5-S1.  His MRI does also show facet arthritis at this level.  He is [facility] [place] [facility]       09/02/[id number]-ozlz-old male with a history of chronic low back and left radiculopathy, his pain is related to degenerative disc disease and moderate neural foraminal stenosis at L4-5 L5-S1.  His MRI does also show facet arthropathy at multiple levels in his lower lumbar spine.  He is status post a SCS implant with Dr. Hampton on 08/19 he reported today that the right SCS lead has migrated and turned into an S shaped due to overactivity 4-5 days after the SCS implant.  He continues to work with Dr. Hampton and the [facility] to reprogrammed his stimulator to address his pain generators.  He states that 1/3 of his pain has reduced which has allowed him to reduce his chronic opioid therapy.  Is currently taking Norco 5-325 t.i.d. we discussed today that  we will reduce his chronic opioid therapy to [place] 5-325 b.i.d. 60 pills per month, recommended he also continue his gabapentin 600 mg t.i.d. as well as Zanaflex 4 mg p.r.n..  He and I discussed that we will slowly continue to wean him off his chronic opioid therapy patient was amenable to this plan.    : Reviewed and consistent with medication use as prescribed.    Treatment Plan:   Procedure:  None at this time.    - status post SCS [employer] implant [facility]    PT/OT/HEP: I encouraged the patient to maintain a home exercise regimen that includes daily, moderate cardiovascular exercise lasting at least 30 minutes.  This may include yoga, nadia chi, walking, swimming, aqua aerobics, or other exercises that maintain a heart rate of 50-70% of the calculated maximum heart rate.  I also encouraged light, daily stretching focused on the target area.  Medications:               - continue and reduce Norco 5-325 TID # 90 to Norco 5-325 BID # 60 per month  P.r.n for pain ( this dose seems to improve pain and improve functionality)               - Continue and refill Gabapentin 600 mg TID refill requested,.    - Patient is on stable, low dose opioid therapy without aberrant behavior- refills would be appropriate for any provider              - discussed reducing his chronic opioid therapy patient was amenable to this reduction.   -  reviewed   - Pain contract signed 8/27/21  Labs: UDS reviewed   Imaging:  A/P thoracic and lumbar x-rays today to review recent SCS implant      Follow Up: 8 weeks    MIGEL HaleC  Interventional Pain Management          Disclaimer: This note was partly generated using dictation software which may occasionally result in transcription errors.

## 2022-09-02 NOTE — TELEPHONE ENCOUNTER
----- Message from TEE Gallagher sent at 9/2/2022  9:36 AM CDT -----  Regarding: med refill  [place] 5-325 BID # 60 per month this is a reduction from his previous prescription Norco 5-325 t.i.d..  Dr. Anglin panel prescription 2 months due on 9/8.     ty

## 2022-09-09 ENCOUNTER — PATIENT MESSAGE (OUTPATIENT)
Dept: FAMILY MEDICINE | Facility: CLINIC | Age: 62
End: 2022-09-09
Payer: OTHER GOVERNMENT

## 2022-09-09 DIAGNOSIS — M54.31 SCIATICA OF RIGHT SIDE: ICD-10-CM

## 2022-09-11 ENCOUNTER — PATIENT MESSAGE (OUTPATIENT)
Dept: FAMILY MEDICINE | Facility: CLINIC | Age: 62
End: 2022-09-11
Payer: OTHER GOVERNMENT

## 2022-09-11 RX ORDER — GABAPENTIN 600 MG/1
600 TABLET ORAL 3 TIMES DAILY
Qty: 270 TABLET | Refills: 4 | Status: SHIPPED | OUTPATIENT
Start: 2022-09-11 | End: 2022-12-05 | Stop reason: SDUPTHER

## 2022-09-15 ENCOUNTER — PATIENT MESSAGE (OUTPATIENT)
Dept: SPORTS MEDICINE | Facility: CLINIC | Age: 62
End: 2022-09-15
Payer: OTHER GOVERNMENT

## 2022-09-16 ENCOUNTER — HOSPITAL ENCOUNTER (OUTPATIENT)
Dept: CARDIOLOGY | Facility: HOSPITAL | Age: 62
Discharge: HOME | End: 2022-09-16
Attending: STUDENT IN AN ORGANIZED HEALTH CARE EDUCATION/TRAINING PROGRAM
Payer: OTHER GOVERNMENT

## 2022-09-16 DIAGNOSIS — Z01.818 PRE-OP EVALUATION: ICD-10-CM

## 2022-09-16 PROCEDURE — 93010 ELECTROCARDIOGRAM REPORT: CPT | Mod: ,,, | Performed by: INTERNAL MEDICINE

## 2022-09-16 PROCEDURE — 93005 ELECTROCARDIOGRAM TRACING: CPT | Mod: PO

## 2022-09-16 PROCEDURE — 93010 EKG 12-LEAD: ICD-10-PCS | Mod: ,,, | Performed by: INTERNAL MEDICINE

## 2022-09-19 ENCOUNTER — EDUCATION (OUTPATIENT)
Dept: SPORTS MEDICINE | Facility: CLINIC | Age: 62
End: 2022-09-19
Payer: OTHER GOVERNMENT

## 2022-09-19 ENCOUNTER — OFFICE VISIT (OUTPATIENT)
Dept: FAMILY MEDICINE | Facility: CLINIC | Age: 62
End: 2022-09-19
Payer: OTHER GOVERNMENT

## 2022-09-19 VITALS
DIASTOLIC BLOOD PRESSURE: 82 MMHG | SYSTOLIC BLOOD PRESSURE: 122 MMHG | OXYGEN SATURATION: 98 % | TEMPERATURE: 98 F | WEIGHT: 285.38 LBS | BODY MASS INDEX: 38.65 KG/M2 | HEIGHT: 72 IN | HEART RATE: 70 BPM

## 2022-09-19 DIAGNOSIS — Z01.818 PRE-OP EVALUATION: Primary | ICD-10-CM

## 2022-09-19 DIAGNOSIS — Z13.6 SCREENING FOR CARDIOVASCULAR CONDITION: ICD-10-CM

## 2022-09-19 DIAGNOSIS — E11.9 TYPE 2 DIABETES MELLITUS WITHOUT COMPLICATION, WITHOUT LONG-TERM CURRENT USE OF INSULIN: ICD-10-CM

## 2022-09-19 DIAGNOSIS — Z12.5 PROSTATE CANCER SCREENING: ICD-10-CM

## 2022-09-19 DIAGNOSIS — R73.9 HYPERGLYCEMIA: ICD-10-CM

## 2022-09-19 PROCEDURE — 99214 OFFICE O/P EST MOD 30 MIN: CPT | Mod: S$GLB,,, | Performed by: STUDENT IN AN ORGANIZED HEALTH CARE EDUCATION/TRAINING PROGRAM

## 2022-09-19 PROCEDURE — 99214 PR OFFICE/OUTPT VISIT, EST, LEVL IV, 30-39 MIN: ICD-10-PCS | Mod: S$GLB,,, | Performed by: STUDENT IN AN ORGANIZED HEALTH CARE EDUCATION/TRAINING PROGRAM

## 2022-09-19 RX ORDER — SUMATRIPTAN 50 MG/1
TABLET, FILM COATED ORAL
Qty: 9 TABLET | Refills: 3 | Status: SHIPPED | OUTPATIENT
Start: 2022-09-19 | End: 2022-12-05 | Stop reason: SDUPTHER

## 2022-09-19 NOTE — PROGRESS NOTES
Patient ID: Baldo Grant is a 62 y.o. male.     Chief Complaint: Follow-up and Pre-op Exam    Follow-up  Pertinent negatives include no chest pain, coughing, fever, headaches, myalgias, nausea, rash, vomiting or weakness.    Patient here for pre op exam. [facility] This has provided him some relief. He is scheduled to undergo left knee replacement Oct 12 at [facility]. He denies recent chest pain and shortness of breath. He is able to climb a flight of stairs without getting chest pain or shortness of breath.     Review of Systems  Review of Systems   Constitutional:  Negative for fever.   HENT:  Negative for ear pain and sinus pain.    Eyes:  Negative for discharge.   Respiratory:  Negative for cough and shortness of breath.    Cardiovascular:  Negative for chest pain and leg swelling.   Gastrointestinal:  Negative for diarrhea, nausea and vomiting.   Genitourinary:  Negative for urgency.   Musculoskeletal:  Negative for myalgias.   Skin:  Negative for rash.   Neurological:  Negative for weakness and headaches.   Psychiatric/Behavioral:  Negative for depression.    All other systems reviewed and are negative.    Currently Medications  Current Outpatient Medications on File Prior to Visit   Medication Sig Dispense Refill    calcipotriene-betamethasone (ENSTILAR) 0.005-0.064 % Foam Apply 1 application topically once daily. 60 g 2    diclofenac sodium (VOLTAREN) 1 % Gel Apply 2 g topically 4 (four) times daily. 100 g 0    diphenhydrAMINE (BENADRYL) 25 mg capsule Take 25 mg by mouth every 6 (six) hours as needed for Itching.      EPINEPHrine (EPIPEN) 0.3 mg/0.3 mL AtIn       fexofenadine (ALLEGRA) 180 MG tablet Take 180 mg by mouth continuous prn.      gabapentin (NEURONTIN) 600 MG tablet Take 1 tablet (600 mg total) by mouth 3 (three) times daily. 270 tablet 4    [START ON 10/8/2022] HYDROcodone-acetaminophen (NORCO) 5-325 mg per tablet  Take 1 tablet by mouth 2 (two) times daily as needed for Pain. 60 tablet 0    HYDROcodone-acetaminophen (NORCO) 5-325 mg per tablet Take 1 tablet by mouth 2 (two) times daily as needed for Pain. 60 tablet 0    lidocaine HCL 2% (XYLOCAINE) 2 % jelly Apply topically as needed. 30 mL 3    meloxicam (MOBIC) 15 MG tablet Take 1 tablet (15 mg total) by mouth once daily. 90 tablet 3    multivitamin (THERAGRAN) per tablet Take 1 tablet by mouth once daily.      ondansetron (ZOFRAN) 4 MG tablet Take 1 tablet (4 mg total) by mouth every 8 (eight) hours as needed for Nausea. 30 tablet 0    tiZANidine (ZANAFLEX) 4 MG tablet       [DISCONTINUED] sumatriptan (IMITREX) 50 MG tablet Take one at the first sign of a headache.  You may repeat it in 1 hour as needed. (Patient taking differently: Take one at the first sign of a headache.  You may repeat it in 1 hour as needed.) 9 tablet 3    [DISCONTINUED] aspirin (ECOTRIN) 81 MG EC tablet Take 1 tablet twice a day with food starting after surgery (breakfast and dinner). (Patient not taking: Reported on 9/19/2022) 28 tablet 0    [DISCONTINUED] ketoconazole (NIZORAL) 2 % cream AAA gluteal fold daily to bid prn flare (Patient not taking: Reported on 9/19/2022) 60 g 3    [DISCONTINUED] mometasone 0.1% (ELOCON) 0.1 % cream Apply topically once daily. for 10 days (Patient not taking: Reported on 9/19/2022) 1 Tube 3    [DISCONTINUED] triamcinolone acetonide 0.025% (KENALOG) 0.025 % cream AAA gluteal fold daily to bid PRN flare (Patient not taking: Reported on 9/19/2022) 30 g 3     Current Facility-Administered Medications on File Prior to Visit   Medication Dose Route Frequency Provider Last Rate Last Admin    0.9%  NaCl infusion  500 mL Intravenous Continuous Alexus Anglin Jr., MD        lidocaine (PF) 10 mg/ml (1%) injection 10 mg  1 mL Intradermal Once Alexus Anglin Jr., MD        lidocaine (PF) 10 mg/ml (1%) injection 10 mg  1 mL Intradermal Once Alexus Anglin Jr., MD            Physical  Exam  Vitals:    09/19/22 1443   BP: 122/82   BP Location: Left arm   Patient Position: Sitting   Pulse: 70   Temp: 98.3 °F (36.8 °C)   TempSrc: Oral   SpO2: 98%   Weight: 129.5 kg (285 lb 6.2 oz)   Height: 6' (1.829 m)      Body mass index is 38.71 kg/m².    Physical Exam  Vitals and nursing note reviewed.   Constitutional:       General: He is not in acute distress.     Appearance: He is obese. He is not ill-appearing.   HENT:      Head: Normocephalic and atraumatic.      Right Ear: External ear normal.      Left Ear: External ear normal.      Nose: Nose normal.      Mouth/Throat:      Mouth: Mucous membranes are moist.   Eyes:      Extraocular Movements: Extraocular movements intact.      Conjunctiva/sclera: Conjunctivae normal.   Cardiovascular:      Rate and Rhythm: Normal rate and regular rhythm.      Pulses: Normal pulses.      Heart sounds: No murmur heard.  Pulmonary:      Effort: Pulmonary effort is normal. No respiratory distress.      Breath sounds: No wheezing.   Abdominal:      General: There is no distension.      Palpations: Abdomen is soft. There is no mass.      Tenderness: There is no abdominal tenderness.   Musculoskeletal:         General: No swelling.      Cervical back: Normal range of motion.   Skin:     Coloration: Skin is not jaundiced.      Findings: No rash.   Neurological:      General: No focal deficit present.      Mental Status: He is alert and oriented to person, place, and time.   Psychiatric:         Mood and Affect: Mood normal.         Thought Content: Thought content normal.       Labs:    Complete Blood Count  Lab Results   Component Value Date    RBC 4.75 09/16/2022    HGB 13.7 (L) 09/16/2022    HCT 41.8 09/16/2022    MCV 88 09/16/2022    MCH 28.8 09/16/2022    MCHC 32.8 09/16/2022    RDW 11.9 09/16/2022     09/16/2022    MPV 9.5 09/16/2022    GRAN 3.4 09/16/2022    GRAN 64.7 09/16/2022    LYMPH 1.1 09/16/2022    LYMPH 20.7 09/16/2022    MONO 0.5  09/16/2022    MONO 9.1 09/16/2022    EOS 0.3 09/16/2022    BASO 0.03 09/16/2022    EOSINOPHIL 4.7 09/16/2022    BASOPHIL 0.6 09/16/2022    DIFFMETHOD Automated 09/16/2022       Comprehensive Metabolic Panel  Lab Results   Component Value Date     (H) 09/16/2022    BUN 14 09/16/2022    CREATININE 0.96 09/16/2022     09/16/2022    K 4.5 09/16/2022     09/16/2022    PROT 7.0 09/16/2022    ALBUMIN 4.5 09/16/2022    BILITOT 0.4 09/16/2022    AST 33 09/16/2022    ALKPHOS 39 09/16/2022    CO2 29 09/16/2022    ALT 48 (H) 09/16/2022    ANIONGAP 10 09/16/2022       TSH  No results found for: TSH    Imaging:  X-Ray Lumbar Spine AP And Lateral  Narrative: EXAMINATION:  XR LUMBAR SPINE AP AND LATERAL    CLINICAL HISTORY:  Presence of other specified functional implants    TECHNIQUE:  AP, lateral and spot images were performed of the lumbar spine.    COMPARISON:  09/23/2020    FINDINGS:  Spinal stimulator device which projects at the right lower abdomen.  Leads terminate superiorly near the T9 and T10 level, respectively without evidence for discontinuity.  Lumbar vertebral body heights appear maintained with multilevel discogenic change and lower facet DJD.  No evidence for lytic or blastic lesion.  Impression: As above.    Electronically signed by: Elias Cotter  Date:    09/02/2022  Time:    12:32      Assessment/Plan:    Problem List Items Addressed This Visit    None  Visit Diagnoses       Pre-op evaluation    -  Primary    Hyperglycemia        Relevant Orders    CBC Auto Differential    Comprehensive metabolic panel    HEMOGLOBIN A1C    Screening for cardiovascular condition        Relevant Orders    LIPID PANEL    Prostate cancer screening        Relevant Orders    PSA, SCREENING    Type 2 diabetes mellitus without complication, without long-term current use of insulin                 Discussed how to stay healthy including: diet, exercise, refraining from smoking and discussed screening exams / tests  needed for age, sex and family Hx.    Ok for patient to proceed with upcoming knee replacement.     Patient will work on diet and exercise. He will repeat labs in 3 months.     You Britton MD

## 2022-09-20 ENCOUNTER — PATIENT MESSAGE (OUTPATIENT)
Dept: SPORTS MEDICINE | Facility: CLINIC | Age: 62
End: 2022-09-20
Payer: OTHER GOVERNMENT

## 2022-09-27 ENCOUNTER — PATIENT MESSAGE (OUTPATIENT)
Dept: ADMINISTRATIVE | Facility: OTHER | Age: 62
End: 2022-09-27
Payer: OTHER GOVERNMENT

## 2022-09-27 DIAGNOSIS — M79.609 PAIN IN EXTREMITY, UNSPECIFIED EXTREMITY: Primary | ICD-10-CM

## 2022-09-27 NOTE — ANESTHESIA PAT ROS NOTE
09/27/2022  Baldo Grant is a 62 y.o., male.      Pre-op Assessment          Review of Systems       Anesthesia Assessment: Preoperative EQUATION    Planned Procedure: Procedure(s) (LRB):  ARTHROPLASTY, KNEE, TOTAL (Left)  Requested Anesthesia Type:Regional  Surgeon: FREEMAN Álvarez MD  Service: Orthopedics  Known or anticipated Date of Surgery:10/12/2022    Surgeon notes: reviewed    Electronic QUestionnaire Assessment completed via nurse interview with patient.        Triage considerations:     The patient has no apparent active cardiac condition (No unstable coronary Syndrome such as severe unstable angina or recent [<1 month] myocardial infarction, decompensated CHF, severe valvular   disease or significant arrhythmia)    Previous anesthesia records:GETA and MAC  11/12/20   INJECTION, STEROID Left SI Joint Block and Steroid Injection (Left: Back)   Airway/Jaw/Neck:  Airway Findings: Mouth Opening: Normal Tongue: Normal  General Airway Assessment: Adult  Mallampati: II  TM Distance: Normal, at least 6 cm       3/18/20   ARTHROSCOPY, KNEE, WITH MENISCECTOMY (Left: Knee)   ARTHROSCOPY, KNEE, WITH CHONDROPLASTY (Left: Knee)   Airway/Jaw/Neck:  Airway Findings: Mouth Opening: Normal Tongue: Normal  General Airway Assessment: Adult  Mallampati: II  TM Distance: Normal, at least 6 cm     Method of Intubation: Video Laryngoscopy Mask Ventilation: Not Attempted Intubated: Postinduction Airway Device Size: 7.5 Placement Verified By: Capnometry Complicating Factors: None Findings Post-Intubation: Bilateral breath sounds Secured at: Lips Complications: None     Last PCP note: within 1 month , within [facility] Dr. Reine  Subspecialty notes: Ortho, Pain Medicine (TEE Hale), Sleep Medicine (Dr. Lousie), Neurosurgery (Dr. Encarnacion)    Other important co-morbidities: DM2, Obesity, FARHAT, and Lumbar DDD, Chronic  Back Pain ([place]), [facility], SCC (R Ear)       Tests already available:  Available tests,  within [facility] .   9/16/22 Lipid Panel, A1C, CMP, CBC  9/16/22 EKG  9/2/22 Lumbar XR  12/15/21 Scoliosis XR              Instructions given. (See in Nurse's note)    Optimization:  Anesthesia Preop Clinic Assessment  Indicated Will Schedule POC    Medical Opinion Indicated       Sub-specialist consult indicated:   TBCB PCP      Plan:    Testing:  PT/INR   Pre-anesthesia  visit       Visit focus: possible regional anesthesia and/or nerve block      Consultation:Patient's PCP for a statement of optimization      Patient  has previously scheduled Medical Appointment:    Navigation: Tests Scheduled.              Consults scheduled.             Results will be tracked by Preop Clinic.     9/19/22 PCP Clearance (Dr. Britton):  Ok for patient to proceed with upcoming knee replacement.

## 2022-09-28 ENCOUNTER — TELEPHONE (OUTPATIENT)
Dept: PREADMISSION TESTING | Facility: HOSPITAL | Age: 62
End: 2022-09-28
Payer: OTHER GOVERNMENT

## 2022-09-30 DIAGNOSIS — Z12.11 ENCOUNTER FOR COLORECTAL CANCER SCREENING: Primary | ICD-10-CM

## 2022-09-30 DIAGNOSIS — Z12.12 ENCOUNTER FOR COLORECTAL CANCER SCREENING: Primary | ICD-10-CM

## 2022-10-03 ENCOUNTER — HOSPITAL ENCOUNTER (OUTPATIENT)
Dept: PREADMISSION TESTING | Facility: HOSPITAL | Age: 62
Discharge: HOME | End: 2022-10-03
Attending: ORTHOPAEDIC SURGERY
Payer: OTHER GOVERNMENT

## 2022-10-03 ENCOUNTER — ANESTHESIA EVENT (OUTPATIENT)
Dept: SURGERY | Facility: HOSPITAL | Age: 62
End: 2022-10-03
Payer: OTHER GOVERNMENT

## 2022-10-03 ENCOUNTER — OFFICE VISIT (OUTPATIENT)
Dept: SPORTS MEDICINE | Facility: CLINIC | Age: 62
End: 2022-10-03
Payer: OTHER GOVERNMENT

## 2022-10-03 ENCOUNTER — PATIENT MESSAGE (OUTPATIENT)
Dept: ADMINISTRATIVE | Facility: OTHER | Age: 62
End: 2022-10-03
Payer: OTHER GOVERNMENT

## 2022-10-03 VITALS
HEIGHT: 72 IN | BODY MASS INDEX: 39.01 KG/M2 | SYSTOLIC BLOOD PRESSURE: 129 MMHG | DIASTOLIC BLOOD PRESSURE: 79 MMHG | HEART RATE: 67 BPM | TEMPERATURE: 98 F | WEIGHT: 288 LBS

## 2022-10-03 VITALS
TEMPERATURE: 98 F | SYSTOLIC BLOOD PRESSURE: 139 MMHG | OXYGEN SATURATION: 97 % | DIASTOLIC BLOOD PRESSURE: 79 MMHG | HEART RATE: 77 BPM | HEIGHT: 72 IN | BODY MASS INDEX: 39.1 KG/M2 | WEIGHT: 288.69 LBS

## 2022-10-03 DIAGNOSIS — M17.12 PRIMARY OSTEOARTHRITIS OF LEFT KNEE: Primary | ICD-10-CM

## 2022-10-03 PROCEDURE — 99215 OFFICE O/P EST HI 40 MIN: CPT | Mod: S$PBB,,, | Performed by: PHYSICIAN ASSISTANT

## 2022-10-03 PROCEDURE — 99999 PR PBB SHADOW E&M-EST. PATIENT-LVL IV: ICD-10-PCS | Mod: PBBFAC,,, | Performed by: PHYSICIAN ASSISTANT

## 2022-10-03 PROCEDURE — 99215 PR OFFICE/OUTPT VISIT, EST, LEVL V, 40-54 MIN: ICD-10-PCS | Mod: S$PBB,,, | Performed by: PHYSICIAN ASSISTANT

## 2022-10-03 PROCEDURE — 99999 PR PBB SHADOW E&M-EST. PATIENT-LVL IV: CPT | Mod: PBBFAC,,, | Performed by: PHYSICIAN ASSISTANT

## 2022-10-03 PROCEDURE — 99214 OFFICE O/P EST MOD 30 MIN: CPT | Mod: PBBFAC | Performed by: PHYSICIAN ASSISTANT

## 2022-10-03 RX ORDER — MUPIROCIN 20 MG/G
1 OINTMENT TOPICAL 2 TIMES DAILY
Status: CANCELLED | OUTPATIENT
Start: 2022-10-03 | End: 2022-10-08

## 2022-10-03 RX ORDER — CEFAZOLIN SODIUM 2 G/50ML
2 SOLUTION INTRAVENOUS
Status: CANCELLED | OUTPATIENT
Start: 2022-10-03 | End: 2022-10-04

## 2022-10-03 RX ORDER — AMOXICILLIN 250 MG
1 CAPSULE ORAL 2 TIMES DAILY
Status: CANCELLED | OUTPATIENT
Start: 2022-10-03

## 2022-10-03 RX ORDER — NALOXONE HCL 0.4 MG/ML
0.02 VIAL (ML) INJECTION
Status: CANCELLED | OUTPATIENT
Start: 2022-10-03

## 2022-10-03 RX ORDER — FENTANYL CITRATE 50 UG/ML
100 INJECTION, SOLUTION INTRAMUSCULAR; INTRAVENOUS
Status: CANCELLED | OUTPATIENT
Start: 2022-10-03 | End: 2022-10-04

## 2022-10-03 RX ORDER — PROCHLORPERAZINE EDISYLATE 5 MG/ML
5 INJECTION INTRAMUSCULAR; INTRAVENOUS EVERY 6 HOURS PRN
Status: CANCELLED | OUTPATIENT
Start: 2022-10-03

## 2022-10-03 RX ORDER — OXYCODONE HYDROCHLORIDE 5 MG/1
5-10 TABLET ORAL
Qty: 40 TABLET | Refills: 0 | Status: SHIPPED | OUTPATIENT
Start: 2022-10-03 | End: 2022-10-17 | Stop reason: SDUPTHER

## 2022-10-03 RX ORDER — ASPIRIN 81 MG/1
81 TABLET ORAL 2 TIMES DAILY
Qty: 84 TABLET | Refills: 0 | Status: SHIPPED | OUTPATIENT
Start: 2022-10-03

## 2022-10-03 RX ORDER — ONDANSETRON 4 MG/1
4 TABLET, ORALLY DISINTEGRATING ORAL 2 TIMES DAILY
Qty: 30 TABLET | Refills: 0 | Status: SHIPPED | OUTPATIENT
Start: 2022-10-03 | End: 2022-12-05 | Stop reason: SDUPTHER

## 2022-10-03 RX ORDER — OXYCODONE HYDROCHLORIDE 5 MG/1
5 TABLET ORAL
Status: CANCELLED | OUTPATIENT
Start: 2022-10-03

## 2022-10-03 RX ORDER — FAMOTIDINE 20 MG/1
20 TABLET, FILM COATED ORAL 2 TIMES DAILY
Status: CANCELLED | OUTPATIENT
Start: 2022-10-03

## 2022-10-03 RX ORDER — PREGABALIN 75 MG/1
75 CAPSULE ORAL NIGHTLY
Status: CANCELLED | OUTPATIENT
Start: 2022-10-03

## 2022-10-03 RX ORDER — ACETAMINOPHEN 500 MG
1000 TABLET ORAL
Status: CANCELLED | OUTPATIENT
Start: 2022-10-03

## 2022-10-03 RX ORDER — BISACODYL 10 MG
10 SUPPOSITORY, RECTAL RECTAL EVERY 12 HOURS PRN
Status: CANCELLED | OUTPATIENT
Start: 2022-10-03

## 2022-10-03 RX ORDER — ONDANSETRON 2 MG/ML
4 INJECTION INTRAMUSCULAR; INTRAVENOUS EVERY 8 HOURS PRN
Status: CANCELLED | OUTPATIENT
Start: 2022-10-03

## 2022-10-03 RX ORDER — LIDOCAINE HYDROCHLORIDE 10 MG/ML
1 INJECTION, SOLUTION EPIDURAL; INFILTRATION; INTRACAUDAL; PERINEURAL
Status: CANCELLED | OUTPATIENT
Start: 2022-10-03

## 2022-10-03 RX ORDER — MIDAZOLAM HYDROCHLORIDE 1 MG/ML
4 INJECTION INTRAMUSCULAR; INTRAVENOUS
Status: CANCELLED | OUTPATIENT
Start: 2022-10-03 | End: 2022-10-04

## 2022-10-03 RX ORDER — SODIUM CHLORIDE 0.9 % (FLUSH) 0.9 %
10 SYRINGE (ML) INJECTION
Status: CANCELLED | OUTPATIENT
Start: 2022-10-03

## 2022-10-03 RX ORDER — MUPIROCIN 20 MG/G
1 OINTMENT TOPICAL
Status: CANCELLED | OUTPATIENT
Start: 2022-10-03

## 2022-10-03 RX ORDER — MORPHINE SULFATE 2 MG/ML
2 INJECTION, SOLUTION INTRAMUSCULAR; INTRAVENOUS
Status: CANCELLED | OUTPATIENT
Start: 2022-10-03

## 2022-10-03 RX ORDER — OXYCODONE HYDROCHLORIDE 5 MG/1
10 TABLET ORAL
Status: CANCELLED | OUTPATIENT
Start: 2022-10-03

## 2022-10-03 RX ORDER — METHOCARBAMOL 750 MG/1
750 TABLET, FILM COATED ORAL 3 TIMES DAILY
Status: CANCELLED | OUTPATIENT
Start: 2022-10-03

## 2022-10-03 RX ORDER — TALC
6 POWDER (GRAM) TOPICAL NIGHTLY PRN
Status: CANCELLED | OUTPATIENT
Start: 2022-10-03

## 2022-10-03 RX ORDER — POLYETHYLENE GLYCOL 3350 17 G/17G
17 POWDER, FOR SOLUTION ORAL DAILY
Status: CANCELLED | OUTPATIENT
Start: 2022-10-03

## 2022-10-03 RX ORDER — PREGABALIN 75 MG/1
75 CAPSULE ORAL
Status: CANCELLED | OUTPATIENT
Start: 2022-10-03

## 2022-10-03 RX ORDER — SODIUM CHLORIDE 9 MG/ML
INJECTION, SOLUTION INTRAVENOUS CONTINUOUS
Status: CANCELLED | OUTPATIENT
Start: 2022-10-03 | End: 2022-10-04

## 2022-10-03 RX ORDER — SODIUM CHLORIDE 9 MG/ML
INJECTION, SOLUTION INTRAVENOUS
Status: CANCELLED | OUTPATIENT
Start: 2022-10-03

## 2022-10-03 RX ORDER — ASPIRIN 81 MG/1
81 TABLET ORAL 2 TIMES DAILY
Status: CANCELLED | OUTPATIENT
Start: 2022-10-03

## 2022-10-03 RX ORDER — FENTANYL CITRATE 50 UG/ML
25 INJECTION, SOLUTION INTRAMUSCULAR; INTRAVENOUS EVERY 5 MIN PRN
Status: CANCELLED | OUTPATIENT
Start: 2022-10-03

## 2022-10-03 RX ORDER — ACETAMINOPHEN 500 MG
1000 TABLET ORAL EVERY 6 HOURS
Status: CANCELLED | OUTPATIENT
Start: 2022-10-03

## 2022-10-03 NOTE — H&P (VIEW-ONLY)
"Baldo Grant  is here for a completion of his perioperative paperwork. he  Is scheduled to undergo Left total knee arthroplasty-Mehrdad Triathalon on 10/12/22.  He is a healthy individual and does need clearance for this procedure.     He will need preoperative pain management, primary care and dental clearances.      Pre-admit appt today on 10/3/22.    Dr. Cardona DDS, stated that " No contraindications found for knee surgery."    Dr. Britton, PCP, stated that "Ok for patient to proceed with upcoming knee replacement."    P[facility] management [facility].     Jeffy Ramos NP, [facility], stated that it was okay for our clinic to prescribe post-op narcotics for 2 weeks post-op then defer back to their clinic for chronic pain control. (Via Epic message)    Risks, indications and benefits of the surgical procedure were discussed with the patient. All questions with regard to surgery, rehab, expected return to functional activities, activities of daily living and recreational endeavors were answered to his satisfaction.    Discussed COVID-19 with the patient, they are aware of our current policies and procedures, were given the option of delaying surgery, and they elect to proceed.    Patient was informed and understands the risks of surgery are greater for patients with a current condition or hx of heart disease, obesity, clotting disorders, recurrent infections, steroid use, current or past smoking, and factors such as sedentary lifestyle and noncompliance with medications, therapy or f/u. The degree of the increased risk is hard to estimate w/ any degree of precision.    Once no other questions were asked, a brief history and physical exam was then performed.    PAST MEDICAL HISTORY:   Past Medical History:   Diagnosis Date    Arthritis     Basal cell carcinoma 06/21/2019    back    Cancer     Chronic pain of " right knee     SCC (squamous cell carcinoma) 2014    Forehead- Dr. Cabral     Skin cancer     Squamous cell carcinoma 2013    Right ear- Dr. Marianna long     PAST SURGICAL HISTORY:   Past Surgical History:   Procedure Laterality Date    APPENDECTOMY      ARTHROSCOPIC CHONDROPLASTY OF KNEE JOINT Left 03/18/2020    Procedure: ARTHROSCOPY, KNEE, WITH CHONDROPLASTY;  Surgeon: FREEMAN Álvarez MD;  Location: [facility] OR;  Service: Orthopedics;  Laterality: Left;    COLONOSCOPY  1998    EPIDURAL STEROID INJECTION INTO LUMBAR SPINE N/A 02/11/2020    Procedure: Injection-steroid-epidural-lumbar--InterLaminar L4-5;  Surgeon: Alexus Anglin Jr., MD;  Location: [facility]GT;  Service: Pain Management;  Laterality: N/A;    INJECTION OF ANESTHETIC AGENT AROUND MEDIAL BRANCH NERVES INNERVATING LUMBAR FACET JOINT Bilateral 11/05/2019    Procedure: Block-nerve-medial branch-lumbar--Bilateral L3-4,L4-5,L5-S1;  Surgeon: Alexus Anglin Jr., MD;  Location: [facility];  Service: Pain Management;  Laterality: Bilateral;    INJECTION OF STEROID Left 11/12/2020    Procedure: INJECTION, STEROID Left SI Joint Block and Steroid Injection;  Surgeon: Tam Encarnacion MD;  Location: [facility] OR;  Service: Neurosurgery;  Laterality: Left;  Procedure: Left SI Joint Block and Steroid Injection   Surgery Time: 30 min  LOS: 0  Anesthesia: MAC  Radiology: C-arm  Bed: Regular with Pillows  Position: Prone    KNEE ARTHROSCOPY W/ MENISCECTOMY Left 03/18/2020    Procedure: ARTHROSCOPY, KNEE, WITH MENISCECTOMY;  Surgeon: FREEMAN Álvarez MD;  Location: [facility] OR;  Service: Orthopedics;  Laterality: Left;  CLONIDINE/EPI/KETOROLAC/ROPIVACAINE INJECTION 30CC    lipoma removal      skin cancer removal      TRANSFORAMINAL EPIDURAL INJECTION OF STEROID Bilateral 05/06/2020    Procedure: Injection,steroid,epidural,transforaminal approach--Bilateral L4-5;  Surgeon: Alexus Anglin Jr., MD;  Location: [facility];  Service: Pain Management;  Laterality:  Bilateral;    TRANSFORAMINAL EPIDURAL INJECTION OF STEROID Bilateral 08/11/2020    Procedure: Injection,steroid,epidural,transforaminal approach--Bilateral L4-5;  Surgeon: Alexus Anglin Jr., MD;  Location: [facility] PA[facility];  Service: Pain Management;  Laterality: Bilateral;    TRANSFORAMINAL EPIDURAL INJECTION OF STEROID Bilateral 04/05/2022    Procedure: Injection,steroid,epidural,transforaminal bilateral L4-5;  Surgeon: Alexus Anglin Jr., MD;  Location: [facility] PA[facility];  Service: Pain Management;  Laterality: Bilateral;  asa      FAMILY HISTORY:   Family History   Problem Relation Age of Onset    COPD Mother     Alcohol abuse Mother     Arthritis Mother     Heart disease Father     Melanoma Father     No Known Problems Sister     No Known Problems Brother     Psoriasis Neg Hx     Lupus Neg Hx     Eczema Neg Hx      SOCIAL HISTORY:   Social History     Socioeconomic History    Marital status:    Tobacco Use    Smoking status: Never    Smokeless tobacco: Never   Substance and Sexual Activity    Alcohol use: Yes     Alcohol/week: 2.0 standard drinks     Types: 1 Glasses of wine, 1 Cans of beer per week     Comment: social    Drug use: No    Sexual activity: Not Currently     Partners: Female     Birth control/protection: None     Comment: My wife had a hysterectomy years ago     Social Determinants of Health     Financial Resource Strain: Low Risk     Difficulty of Paying Living Expenses: Not hard at all   Food Insecurity: No Food Insecurity    Worried About Running Out of Food in the Last Year: Never true    Ran Out of Food in the Last Year: Never true   Transportation Needs: No Transportation Needs    Lack of Transportation (Medical): No    Lack of Transportation (Non-Medical): No   Physical Activity: Inactive    Days of Exercise per Week: 0 days    Minutes of Exercise per Session: 0 min   Stress: No Stress Concern Present    Feeling of Stress : Only a little   Social Connections: Unknown    Frequency of  Communication with Friends and Family: More than three times a week    Frequency of Social Gatherings with Friends and Family: Once a week    Active Member of Clubs or Organizations: Yes    Attends Club or Organization Meetings: More than 4 times per year    Marital Status:    Housing Stability: Low Risk     Unable to Pay for Housing in the Last Year: No    Number of Places Lived in the Last Year: 1    Unstable Housing in the Last Year: No       MEDICATIONS:   Current Outpatient Medications:     calcipotriene-betamethasone (ENSTILAR) 0.005-0.064 % Foam, Apply 1 application topically once daily., Disp: 60 g, Rfl: 2    diclofenac sodium (VOLTAREN) 1 % Gel, Apply 2 g topically 4 (four) times daily., Disp: 100 g, Rfl: 0    diphenhydrAMINE (BENADRYL) 25 mg capsule, Take 25 mg by mouth every 6 (six) hours as needed for Itching., Disp: , Rfl:     EPINEPHrine (EPIPEN) 0.3 mg/0.3 mL AtIn, , Disp: , Rfl:     fexofenadine (ALLEGRA) 180 MG tablet, Take 180 mg by mouth continuous prn., Disp: , Rfl:     gabapentin (NEURONTIN) 600 MG tablet, Take 1 tablet (600 mg total) by mouth 3 (three) times daily., Disp: 270 tablet, Rfl: 4    [START ON 10/8/2022] HYDROcodone-acetaminophen (NORCO) 5-325 mg per tablet, Take 1 tablet by mouth 2 (two) times daily as needed for Pain., Disp: 60 tablet, Rfl: 0    HYDROcodone-acetaminophen (NORCO) 5-325 mg per tablet, Take 1 tablet by mouth 2 (two) times daily as needed for Pain., Disp: 60 tablet, Rfl: 0    lidocaine HCL 2% (XYLOCAINE) 2 % jelly, Apply topically as needed., Disp: 30 mL, Rfl: 3    meloxicam (MOBIC) 15 MG tablet, Take 1 tablet (15 mg total) by mouth once daily., Disp: 90 tablet, Rfl: 3    multivitamin (THERAGRAN) per tablet, Take 1 tablet by mouth once daily., Disp: , Rfl:     ondansetron (ZOFRAN) 4 MG tablet, Take 1 tablet (4 mg total) by mouth every 8 (eight) hours as needed for Nausea., Disp: 30 tablet, Rfl: 0    sumatriptan (IMITREX) 50 MG tablet, Take one at the first sign  of a headache.  You may repeat it in 1 hour as needed., Disp: 9 tablet, Rfl: 3    tiZANidine (ZANAFLEX) 4 MG tablet, , Disp: , Rfl:   No current facility-administered medications for this visit.    Facility-Administered Medications Ordered in Other Visits:     0.9%  NaCl infusion, 500 mL, Intravenous, Continuous, Alexus Anglin Jr., MD    lidocaine (PF) 10 mg/ml (1%) injection 10 mg, 1 mL, Intradermal, Once, Alexus Anglin Jr., MD    lidocaine (PF) 10 mg/ml (1%) injection 10 mg, 1 mL, Intradermal, Once, Alexus Anglin Jr., MD  ALLERGIES:   Review of patient's allergies indicates:   Allergen Reactions    Advil [ibuprofen] Anaphylaxis    Tums [calcium carbonate] Anaphylaxis       Review of Systems   Constitution: Negative. Negative for chills, fever and night sweats.   HENT: Negative for congestion and headaches.    Eyes: Negative for blurred vision, left vision loss and right vision loss.   Cardiovascular: Negative for chest pain and syncope.   Respiratory: Negative for cough and shortness of breath.    Endocrine: Negative for polydipsia, polyphagia and polyuria.   Hematologic/Lymphatic: Negative for bleeding problem. Does not bruise/bleed easily.   Skin: Negative for dry skin, itching and rash.   Musculoskeletal: Negative for falls and muscle weakness.   Gastrointestinal: Negative for abdominal pain and bowel incontinence.   Genitourinary: Negative for bladder incontinence and nocturia.   Neurological: Negative for disturbances in coordination, loss of balance and seizures.   Psychiatric/Behavioral: Negative for depression. The patient does not have insomnia.    Allergic/Immunologic: Negative for hives and persistent infections.     PHYSICAL EXAM:  GEN: A&Ox3, WD WN NAD  HEENT: WNL  CHEST: CTAB, no W/R/R  HEART: RRR, no M/R/G   ABD: Soft, NT ND, BS x4 QUADS  MS: Refer to previous note for detailed MS exam  NEURO: CN II-XII intact       The surgical consent was then reviewed with the patient, who agreed with  all the contents of the consent form and it was signed.     PHYSICAL THERAPY:  He was also instructed regarding physical therapy and will begin on POD#1-3. [facility]    POST OP CARE: Instructions were reviewed including care of the wound and dressing after surgery and when he can shower.     PAIN MANAGEMENT: Baldo Grant was instructed regarding the Polar ice unit that will be in place after surgery and his postoperative pain medications.     MEDICATION:  Roxicodone 5 mg 1-2 q 4 hours PRN for pain  Zofran 4 mg q 8 hours PRN for nausea and vomiting.  Aspirin 81mg BID x 4 weeks for DVT prophylaxis starting on the evening after surgery. (Cleared to take ASA by allergy department despite Advil allergy)    Patient chronically takes Norco 5mg BID for back pain, prescribed by pain management, Dr. Roque and Jeffy Ramos, [facility].    Post op meds to be delivered bedside prior to discharge. Deliver to family if patient is in surgery at 5pm.     Patient was instructed to purchase and take Colace to counter possible GI side effects of taking opiates.     DVT prophylaxis was discussed with the patient today including risk factors for developing DVTs and history of DVTs. The patient was asked if any specific recommendations were given from the doctor/s that did pre-operative surgical clearance.     Patient was also told to buy over the counter Prilosec medication and take it once daily for GI protection as long as they are taking NSAIDs or Aspirin.     The patient was told that narcotic pain medications may make them drowsy and instructions were given to not sign legal documents, drive or operate heavy machinery, cars, or equipment while under the influence of narcotic medications.     Dr. Álvarez was not present in clinic today. However, he has not further questions for  or myself.     As there were no other questions to be asked, he was  given my business card along with Dr. Álvarez's business card if he has any questions or concerns prior to surgery or in the postop period.

## 2022-10-03 NOTE — H&P
"Baldo Grant  is here for a completion of his perioperative paperwork. he  Is scheduled to undergo Left total knee arthroplasty-Mehrdad Triathalon on 10/12/22.  He is a healthy individual and does need clearance for this procedure.     He will need preoperative pain management, primary care and dental clearances.      Pre-admit appt today on 10/3/22.    Dr. Cardona DDS, stated that " No contraindications found for knee surgery."    Dr. Britton, PCP, stated that "Ok for patient to proceed with upcoming knee replacement."    Pending pain management [facility].     Jeffy Ramos NP, [facility], stated that it was okay for our clinic to prescribe post-op narcotics for 2 weeks post-op then defer back to their clinic for chronic pain control. (Via Epic message)    Risks, indications and benefits of the surgical procedure were discussed with the patient. All questions with regard to surgery, rehab, expected return to functional activities, activities of daily living and recreational endeavors were answered to his satisfaction.    Discussed COVID-19 with the patient, they are aware of our current policies and procedures, were given the option of delaying surgery, and they elect to proceed.    Patient was informed and understands the risks of surgery are greater for patients with a current condition or hx of heart disease, obesity, clotting disorders, recurrent infections, steroid use, current or past smoking, and factors such as sedentary lifestyle and noncompliance with medications, therapy or f/u. The degree of the increased risk is hard to estimate w/ any degree of precision.    Once no other questions were asked, a brief history and physical exam was then performed.    PAST MEDICAL HISTORY:   Past Medical History:   Diagnosis Date    Arthritis     Basal cell carcinoma 06/21/2019    back    Cancer     Chronic pain of " right knee     SCC (squamous cell carcinoma) 2014    Forehead- Dr. Cabral     Skin cancer     Squamous cell carcinoma 2013    Right ear- Dr. Marianna long     PAST SURGICAL HISTORY:   Past Surgical History:   Procedure Laterality Date    APPENDECTOMY      ARTHROSCOPIC CHONDROPLASTY OF KNEE JOINT Left 03/18/2020    Procedure: ARTHROSCOPY, KNEE, WITH CHONDROPLASTY;  Surgeon: FREEMAN Álvarez MD;  Location: [facility] OR;  Service: Orthopedics;  Laterality: Left;    COLONOSCOPY  1998    EPIDURAL STEROID INJECTION INTO LUMBAR SPINE N/A 02/11/2020    Procedure: Injection-steroid-epidural-lumbar--InterLaminar L4-5;  Surgeon: Alexus Anglin Jr., MD;  Location: [facility]IN [facility]GT;  Service: Pain Management;  Laterality: N/A;    INJECTION OF ANESTHETIC AGENT AROUND MEDIAL BRANCH NERVES INNERVATING LUMBAR FACET JOINT Bilateral 11/05/2019    Procedure: Block-nerve-medial branch-lumbar--Bilateral L3-4,L4-5,L5-S1;  Surgeon: Alexus Anglin Jr., MD;  Location: [facility]GT;  Service: Pain Management;  Laterality: Bilateral;    INJECTION OF STEROID Left 11/12/2020    Procedure: INJECTION, STEROID Left SI Joint Block and Steroid Injection;  Surgeon: Tam Encarnacion MD;  Location: [facility] OR;  Service: Neurosurgery;  Laterality: Left;  Procedure: Left SI Joint Block and Steroid Injection   Surgery Time: 30 min  LOS: 0  Anesthesia: MAC  Radiology: C-arm  Bed: Regular with Pillows  Position: Prone    KNEE ARTHROSCOPY W/ MENISCECTOMY Left 03/18/2020    Procedure: ARTHROSCOPY, KNEE, WITH MENISCECTOMY;  Surgeon: FREEMAN Álvarez MD;  Location: [facility] OR;  Service: Orthopedics;  Laterality: Left;  CLONIDINE/EPI/KETOROLAC/ROPIVACAINE INJECTION 30CC    lipoma removal      skin cancer removal      TRANSFORAMINAL EPIDURAL INJECTION OF STEROID Bilateral 05/06/2020    Procedure: Injection,steroid,epidural,transforaminal approach--Bilateral L4-5;  Surgeon: Alexus Anglin Jr., MD;  Location: [facility];  Service: Pain Management;  Laterality:  Bilateral;    TRANSFORAMINAL EPIDURAL INJECTION OF STEROID Bilateral 08/11/2020    Procedure: Injection,steroid,epidural,transforaminal approach--Bilateral L4-5;  Surgeon: Alexus Anglin Jr., MD;  Location: [facility] [place];  Service: Pain Management;  Laterality: Bilateral;    TRANSFORAMINAL EPIDURAL INJECTION OF STEROID Bilateral 04/05/2022    Procedure: Injection,steroid,epidural,transforaminal bilateral L4-5;  Surgeon: Alexus Anglin Jr., MD;  Location: [facility] [place];  Service: Pain Management;  Laterality: Bilateral;  asa      FAMILY HISTORY:   Family History   Problem Relation Age of Onset    COPD Mother     Alcohol abuse Mother     Arthritis Mother     Heart disease Father     Melanoma Father     No Known Problems Sister     No Known Problems Brother     Psoriasis Neg Hx     Lupus Neg Hx     Eczema Neg Hx      SOCIAL HISTORY:   Social History     Socioeconomic History    Marital status:    Tobacco Use    Smoking status: Never    Smokeless tobacco: Never   Substance and Sexual Activity    Alcohol use: Yes     Alcohol/week: 2.0 standard drinks     Types: 1 Glasses of wine, 1 Cans of beer per week     Comment: social    Drug use: No    Sexual activity: Not Currently     Partners: Female     Birth control/protection: None     Comment: My wife had a hysterectomy years ago     Social Determinants of Health     Financial Resource Strain: Low Risk     Difficulty of Paying Living Expenses: Not hard at all   Food Insecurity: No Food Insecurity    Worried About Running Out of Food in the Last Year: Never true    Ran Out of Food in the Last Year: Never true   Transportation Needs: No Transportation Needs    Lack of Transportation (Medical): No    Lack of Transportation (Non-Medical): No   Physical Activity: Inactive    Days of Exercise per Week: 0 days    Minutes of Exercise per Session: 0 min   Stress: No Stress Concern Present    Feeling of Stress : Only a little   Social Connections: Unknown    Frequency of  Communication with Friends and Family: More than three times a week    Frequency of Social Gatherings with Friends and Family: Once a week    Active Member of Clubs or Organizations: Yes    Attends Club or Organization Meetings: More than 4 times per year    Marital Status:    Housing Stability: Low Risk     Unable to Pay for Housing in the Last Year: No    Number of Places Lived in the Last Year: 1    Unstable Housing in the Last Year: No       MEDICATIONS:   Current Outpatient Medications:     calcipotriene-betamethasone (ENSTILAR) 0.005-0.064 % Foam, Apply 1 application topically once daily., Disp: 60 g, Rfl: 2    diclofenac sodium (VOLTAREN) 1 % Gel, Apply 2 g topically 4 (four) times daily., Disp: 100 g, Rfl: 0    diphenhydrAMINE (BENADRYL) 25 mg capsule, Take 25 mg by mouth every 6 (six) hours as needed for Itching., Disp: , Rfl:     EPINEPHrine (EPIPEN) 0.3 mg/0.3 mL AtIn, , Disp: , Rfl:     fexofenadine (ALLEGRA) 180 MG tablet, Take 180 mg by mouth continuous prn., Disp: , Rfl:     gabapentin (NEURONTIN) 600 MG tablet, Take 1 tablet (600 mg total) by mouth 3 (three) times daily., Disp: 270 tablet, Rfl: 4    [START ON 10/8/2022] HYDROcodone-acetaminophen (NORCO) 5-325 mg per tablet, Take 1 tablet by mouth 2 (two) times daily as needed for Pain., Disp: 60 tablet, Rfl: 0    HYDROcodone-acetaminophen (NORCO) 5-325 mg per tablet, Take 1 tablet by mouth 2 (two) times daily as needed for Pain., Disp: 60 tablet, Rfl: 0    lidocaine HCL 2% (XYLOCAINE) 2 % jelly, Apply topically as needed., Disp: 30 mL, Rfl: 3    meloxicam (MOBIC) 15 MG tablet, Take 1 tablet (15 mg total) by mouth once daily., Disp: 90 tablet, Rfl: 3    multivitamin (THERAGRAN) per tablet, Take 1 tablet by mouth once daily., Disp: , Rfl:     ondansetron (ZOFRAN) 4 MG tablet, Take 1 tablet (4 mg total) by mouth every 8 (eight) hours as needed for Nausea., Disp: 30 tablet, Rfl: 0    sumatriptan (IMITREX) 50 MG tablet, Take one at the first sign  of a headache.  You may repeat it in 1 hour as needed., Disp: 9 tablet, Rfl: 3    tiZANidine (ZANAFLEX) 4 MG tablet, , Disp: , Rfl:   No current facility-administered medications for this visit.    Facility-Administered Medications Ordered in Other Visits:     0.9%  NaCl infusion, 500 mL, Intravenous, Continuous, Alexus Anglin Jr., MD    lidocaine (PF) 10 mg/ml (1%) injection 10 mg, 1 mL, Intradermal, Once, Alexus Anglin Jr., MD    lidocaine (PF) 10 mg/ml (1%) injection 10 mg, 1 mL, Intradermal, Once, Alexus Anglin Jr., MD  ALLERGIES:   Review of patient's allergies indicates:   Allergen Reactions    Advil [ibuprofen] Anaphylaxis    Tums [calcium carbonate] Anaphylaxis       Review of Systems   Constitution: Negative. Negative for chills, fever and night sweats.   HENT: Negative for congestion and headaches.    Eyes: Negative for blurred vision, left vision loss and right vision loss.   Cardiovascular: Negative for chest pain and syncope.   Respiratory: Negative for cough and shortness of breath.    Endocrine: Negative for polydipsia, polyphagia and polyuria.   Hematologic/Lymphatic: Negative for bleeding problem. Does not bruise/bleed easily.   Skin: Negative for dry skin, itching and rash.   Musculoskeletal: Negative for falls and muscle weakness.   Gastrointestinal: Negative for abdominal pain and bowel incontinence.   Genitourinary: Negative for bladder incontinence and nocturia.   Neurological: Negative for disturbances in coordination, loss of balance and seizures.   Psychiatric/Behavioral: Negative for depression. The patient does not have insomnia.    Allergic/Immunologic: Negative for hives and persistent infections.     PHYSICAL EXAM:  GEN: A&Ox3, WD WN NAD  HEENT: WNL  CHEST: CTAB, no W/R/R  HEART: RRR, no M/R/G   ABD: Soft, NT ND, BS x4 QUADS  MS: Refer to previous note for detailed MS exam  NEURO: CN II-XII intact       The surgical consent was then reviewed with the patient, who agreed with  all the contents of the consent form and it was signed.     PHYSICAL THERAPY:  He was also instructed regarding physical therapy and will begin on POD#1-3. [facility]    POST OP CARE: Instructions were reviewed including care of the wound and dressing after surgery and when he can shower.     PAIN MANAGEMENT: Baldo Grant was instructed regarding the Polar ice unit that will be in place after surgery and his postoperative pain medications.     MEDICATION:  Roxicodone 5 mg 1-2 q 4 hours PRN for pain  Zofran 4 mg q 8 hours PRN for nausea and vomiting.  Aspirin 81mg BID x 4 weeks for DVT prophylaxis starting on the evening after surgery. (Cleared to take ASA by allergy department despite Advil allergy)    Patient chronically takes Norco 5mg BID for back pain, prescribed by pain management, Dr. Roque and Jeffy Ramos, [facility].    Post op meds to be delivered bedside prior to discharge. Deliver to family if patient is in surgery at 5pm.     Patient was instructed to purchase and take Colace to counter possible GI side effects of taking opiates.     DVT prophylaxis was discussed with the patient today including risk factors for developing DVTs and history of DVTs. The patient was asked if any specific recommendations were given from the doctor/s that did pre-operative surgical clearance.     Patient was also told to buy over the counter Prilosec medication and take it once daily for GI protection as long as they are taking NSAIDs or Aspirin.     The patient was told that narcotic pain medications may make them drowsy and instructions were given to not sign legal documents, drive or operate heavy machinery, cars, or equipment while under the influence of narcotic medications.     Dr. Álvarez was not present in clinic today. However, he has not further questions for  or myself.     As there were no other questions to be asked, he was  given my business card along with Dr. Álvarez's business card if he has any questions or concerns prior to surgery or in the postop period.

## 2022-10-03 NOTE — ANESTHESIA PREPROCEDURE EVALUATION
[facility]  Anesthesia Pre-Operative Evaluation        Patient Name: Baldo Grant  YOB: 1960  MRN: [id number]    SUBJECTIVE:     Pre-operative Evaluation for Procedure(s) (LRB):  ARTHROPLASTY, KNEE, TOTAL (Left)     10/03/2022    Baldo Grant is a 62 y.o. male with a PMHx significant for FARHAT (CPAP set range 5-15) Arthritis (Left knee), Chronic lowe back pain, chronic knee pain, skin cancer. Patient recently had a nerve stimulator [facility] on the right side [facility] Patient states that he received Versed gtt and experienced discomfort. He states he would prefer general anesthesia and is amenable to intubation if it allows for increased comfort during the procedure. States that he does not want to hear or feel anything intraop. Patient presenting for pre-operative evaluation for Left Total Knee Arthroplasty.    He now presents for the above procedure(s).    Pertinent Cardiac Studies:  TTE [facility]   No results found for this or any previous visit.   No results found for this or any previous visit.         Previous Airway: None documented.    Patient Active Problem List   Diagnosis    Primary osteoarthritis of both knees    Chronic right-sided low back pain with right-sided sciatica    Complete tear of left rotator cuff    Biceps tendinitis on left    Post-traumatic osteoarthritis of left knee    Class 2 severe obesity due to excess calories with serious comorbidity and body mass index (BMI) of 38.0 to 38.9 in adult    Lumbar radiculopathy    Obesity (BMI 30-39.9)    Chronic pain    Sacroiliac joint dysfunction of left side    SI (sacroiliac) joint dysfunction    Lumbar discogenic pain syndrome    DDD (degenerative disc disease), lumbar    Decreased range of motion with decreased strength    Impaired tolerance of activity    FARHAT (obstructive sleep apnea)       Review of patient's allergies indicates:   Allergen Reactions    Advil  [ibuprofen] Anaphylaxis    Tums [calcium carbonate] Anaphylaxis       Current Outpatient Medications   Medication Instructions    calcipotriene-betamethasone (ENSTILAR) 0.005-0.064 % Foam 1 application, Topical (Top), Daily    diclofenac sodium (VOLTAREN) 2 g, Topical (Top), 4 times daily    diphenhydrAMINE (BENADRYL) 25 mg, Oral, Every 6 hours PRN    EPINEPHrine (EPIPEN) 0.3 mg/0.3 mL AtIn No dose, route, or frequency recorded.    fexofenadine (ALLEGRA) 180 mg, Oral, Continuous PRN    gabapentin (NEURONTIN) 600 mg, Oral, 3 times daily    [START ON 10/8/2022] HYDROcodone-acetaminophen (NORCO) 5-325 mg per tablet 1 tablet, Oral, 2 times daily PRN    HYDROcodone-acetaminophen (NORCO) 5-325 mg per tablet 1 tablet, Oral, 2 times daily PRN    lidocaine HCL 2% (XYLOCAINE) 2 % jelly Topical (Top), As needed (PRN)    meloxicam (MOBIC) 15 mg, Oral, Daily    multivitamin (THERAGRAN) per tablet 1 tablet, Oral, Daily    ondansetron (ZOFRAN) 4 mg, Oral, Every 8 hours PRN    sumatriptan (IMITREX) 50 MG tablet Take one at the first sign of a headache.  You may repeat it in 1 hour as needed.    tiZANidine (ZANAFLEX) 4 MG tablet No dose, route, or frequency recorded.       Past Surgical History:   Procedure Laterality Date    APPENDECTOMY      ARTHROSCOPIC CHONDROPLASTY OF KNEE JOINT Left 03/18/2020    Procedure: ARTHROSCOPY, KNEE, WITH CHONDROPLASTY;  Surgeon: FREEMAN Álvarez MD;  Location: [facility] OR;  Service: Orthopedics;  Laterality: Left;    COLONOSCOPY  1998    EPIDURAL STEROID INJECTION INTO LUMBAR SPINE N/A 02/11/2020    Procedure: Injection-steroid-epidural-lumbar--InterLaminar L4-5;  Surgeon: Alexus Anglin Jr., MD;  Location: [facility] PAIN MGT;  Service: Pain Management;  Laterality: N/A;    INJECTION OF ANESTHETIC AGENT AROUND MEDIAL BRANCH NERVES INNERVATING LUMBAR FACET JOINT Bilateral 11/05/2019    Procedure: Block-nerve-medial branch-lumbar--Bilateral L3-4,L4-5,L5-S1;  Surgeon: Alexus Anglin  MD Red;  Location: [facility];  Service: Pain Management;  Laterality: Bilateral;    INJECTION OF STEROID Left 11/12/2020    Procedure: INJECTION, STEROID Left SI Joint Block and Steroid Injection;  Surgeon: Tam Encarnacion MD;  Location: [facility];  Service: Neurosurgery;  Laterality: Left;  Procedure: Left SI Joint Block and Steroid Injection   Surgery Time: 30 min  LOS: 0  Anesthesia: MAC  Radiology: C-arm  Bed: Regular with Pillows  Position: Prone    KNEE ARTHROSCOPY W/ MENISCECTOMY Left 03/18/2020    Procedure: ARTHROSCOPY, KNEE, WITH MENISCECTOMY;  Surgeon: FREEMAN Álvarez MD;  Location: [facility];  Service: Orthopedics;  Laterality: Left;  CLONIDINE/EPI/KETOROLAC/ROPIVACAINE INJECTION 30CC    lipoma removal      skin cancer removal      TRANSFORAMINAL EPIDURAL INJECTION OF STEROID Bilateral 05/06/2020    Procedure: Injection,steroid,epidural,transforaminal approach--Bilateral L4-5;  Surgeon: Alexus Anglin Jr., MD;  Location: [facility];  Service: Pain Management;  Laterality: Bilateral;    TRANSFORAMINAL EPIDURAL INJECTION OF STEROID Bilateral 08/11/2020    Procedure: Injection,steroid,epidural,transforaminal approach--Bilateral L4-5;  Surgeon: Alexus Anglin Jr., MD;  Location: [facility];  Service: Pain Management;  Laterality: Bilateral;    TRANSFORAMINAL EPIDURAL INJECTION OF STEROID Bilateral 04/05/2022    Procedure: Injection,steroid,epidural,transforaminal bilateral L4-5;  Surgeon: Alexus Anglin Jr., MD;  Location: [facility];  Service: Pain Management;  Laterality: Bilateral;  asa        Social History     Substance and Sexual Activity   Drug Use No     Alcohol Use: Not At Risk    Frequency of Alcohol Consumption: 2-4 times a month    Average Number of Drinks: 1 or 2    Frequency of Binge Drinking: Never     Tobacco Use: Low Risk     Smoking Tobacco Use: Never    Smokeless Tobacco Use: Never    Passive Exposure: Not on file       OBJECTIVE:     Vital Signs Range  (Last 24H):  Temp:  [36.6 °C (97.9 °F)]   Pulse:  [77]   BP: (139)/(79)   SpO2:  [97 %]       Significant Labs    Heme Profile  Lab Results   Component Value Date    WBC 5.27 09/16/2022    HGB 13.7 (L) 09/16/2022    HCT 41.8 09/16/2022     09/16/2022       Coagulation Studies  No results found for: LABPROT, INR, APTT    BMP  Lab Results   Component Value Date     09/16/2022    K 4.5 09/16/2022     09/16/2022    CO2 29 09/16/2022    BUN 14 09/16/2022    CREATININE 0.96 09/16/2022       Liver Function Tests  Lab Results   Component Value Date    AST 33 09/16/2022    ALT 48 (H) 09/16/2022    ALKPHOS 39 09/16/2022    BILITOT 0.4 09/16/2022    PROT 7.0 09/16/2022    ALBUMIN 4.5 09/16/2022       Lipid Profile  Lab Results   Component Value Date    CHOL 174 09/16/2022    HDL 39 (L) 09/16/2022    TRIG 191 (H) 09/16/2022       Endocrine Profile  Lab Results   Component Value Date    HGBA1C 6.8 (H) 09/16/2022    TSH 2.610 09/22/2020       Diagnostic Studies    Cardiac Studies    EKG:   Results for orders placed or performed during the hospital encounter of 09/16/22   SCHEDULED EKG 12-LEAD (to Muse)    Collection Time: 09/16/22  9:03 AM    Narrative    Test Reason : Z01.818,    Vent. Rate : 061 BPM     Atrial Rate : 061 BPM     P-R Int : 180 ms          QRS Dur : 094 ms      QT Int : 422 ms       P-R-T Axes : 040 056 047 degrees     QTc Int : 424 ms    Normal sinus rhythm  Normal ECG    When compared with ECG of 11-MAY-2020 14:47,  No significant change was found  Confirmed by Carlos Stockton MD ([id number]) on 9/19/2022 9:49:30 PM    Referred By: SUSANA CASTRO           Confirmed By:Carlos Stockton MD       TTE  No results found for this or any previous visit.      AMBROSIO:  No results found for this or any previous visit.        ASSESSMENT/PLAN:           Pre-op Assessment    I have reviewed the Patient Summary Reports.     I have reviewed the Nursing Notes. I have reviewed the NPO Status.   I have reviewed the  Medications.     Review of Systems  Anesthesia Hx:  Denies Hx of Anesthetic complications Experienced awareness on 8[facility] Patient states he would prefer General Anesthesia with this procedure for increased comfort. He agrees to the use of ETT.    Social:  Non-Smoker    Hematology/Oncology:  Hematology Normal   Oncology Normal     EENT/Dental:EENT/Dental Normal   Cardiovascular:   Denies MI.  Denies CAD.   Dysrhythmias (Arrhythmia when [place]. Self resolved in a year.)  Patient feels he can achieve >4 METs but is limited by pain with mobility. Led a previously active lifestyle.   Pulmonary:   Sleep Apnea, CPAP CPAP set rang 5-15   Renal/:  Renal/ Normal     Hepatic/GI:   GERD    Musculoskeletal:   Arthritis     Neurological:   Denies TIA. Denies CVA. Denies Seizures.    Endocrine:   Diabetes (Recently diagnosed with A1C of 6.8), type 2  Obesity / BMI > 30  Psych:   denies anxiety denies depression          Physical Exam  General: Cooperative, Alert and Oriented    Airway:  Mallampati: III / II  Mouth Opening: Normal  TM Distance: Normal  Tongue: Normal  Neck ROM: Normal ROM    Dental:  Intact        Anesthesia Plan  Type of Anesthesia, risks & benefits discussed:    Anesthesia Type: Gen ETT, Regional, CSE, Spinal, Epidural  Intra-op Monitoring Plan: Standard ASA Monitors  Post Op Pain Control Plan: multimodal analgesia and IV/PO Opioids PRN  Induction:  IV  Airway Plan: Direct and Video, Post-Induction  Informed Consent: Informed consent signed with the Patient and all parties understand the risks and agree with anesthesia plan.  All questions answered.   ASA Score: 2  Day of Surgery Review of History & Physical: H&P Update referred to the surgeon/provider.    Ready For Surgery From Anesthesia Perspective.     .

## 2022-10-03 NOTE — PROGRESS NOTES
Baldo Grant is a 62 y.o. year old here today for pre surgery optimization visit  in preparation for a Left total knee arthroplasty to be performed by Dr. Álvarez on 10/12/22.  he was last seen and treated in the clinic on 7/18/2022. he will be medically optimized by the pre op center. There has been no significant change in medical status since last visit. No fever, chills, malaise, or unexplained weight change.      Allergies, Medications, past medical and surgical history reviewed.    Focused examination performed.    Patient declined to see surgeon today. All questions answered. Patient encouraged to call with questions. Contact information given.     Pre, jon, and post operative procedures and expectations discussed. Goals of successful surgery reviewed and include manageable pain levels, surgical site free of infection, medication management, and ambulation with PT/OT assistance. Healthy weight management discussed with patient and caregiver who were receptive to eduction of healthy diet and activity. No other necessary lifestyle changes identified. Educated patient about signs and symptoms of infection, medication management, anticoagulation therapy, risk of tobacco and alcohol use, and self-care to promote healing. Surgical guide given for future reference. Hibiclens given to patient with instructions. All questions were answered.     Baldo Grant verbalized an understanding to the education and goals. Patient has displayed readiness to engage in care and is ready to proceed with surgery.  Patient reports his wife, Lilibeth, is able and ready to provide assistance at home after discharge.    Surgical and blood consents signed.    Baldo Grant will contact us if there are any questions, concerns, or changes in medical status prior to surgery.       Joint class: completed    COVID-19 test date: vaccinated     Patient has discussed discharge planning with surgeon. Patient will be discharged to home  following surgery.   patient will be scheduled with [facility] [place]    50 minutes of time was spent on patient education, review of records, templating, H&P, , appointment scheduling and optimizing patient for surgery.

## 2022-10-08 ENCOUNTER — PATIENT MESSAGE (OUTPATIENT)
Dept: SPORTS MEDICINE | Facility: CLINIC | Age: 62
End: 2022-10-08
Payer: OTHER GOVERNMENT

## 2022-10-12 ENCOUNTER — ANESTHESIA (OUTPATIENT)
Dept: SURGERY | Facility: HOSPITAL | Age: 62
End: 2022-10-12
Payer: OTHER GOVERNMENT

## 2022-10-12 ENCOUNTER — HOSPITAL ENCOUNTER (OUTPATIENT)
Facility: HOSPITAL | Age: 62
Discharge: HOME | End: 2022-10-13
Attending: ORTHOPAEDIC SURGERY | Admitting: ORTHOPAEDIC SURGERY
Payer: OTHER GOVERNMENT

## 2022-10-12 DIAGNOSIS — M17.12 PRIMARY OSTEOARTHRITIS OF LEFT KNEE: Primary | ICD-10-CM

## 2022-10-12 DIAGNOSIS — M51.36 DDD (DEGENERATIVE DISC DISEASE), LUMBAR: ICD-10-CM

## 2022-10-12 DIAGNOSIS — M53.3 SACROILIAC JOINT DYSFUNCTION OF LEFT SIDE: ICD-10-CM

## 2022-10-12 DIAGNOSIS — M54.41 CHRONIC RIGHT-SIDED LOW BACK PAIN WITH RIGHT-SIDED SCIATICA: ICD-10-CM

## 2022-10-12 DIAGNOSIS — M51.26 LUMBAR DISCOGENIC PAIN SYNDROME: ICD-10-CM

## 2022-10-12 DIAGNOSIS — M75.22 BICEPS TENDINITIS ON LEFT: ICD-10-CM

## 2022-10-12 DIAGNOSIS — R53.1 DECREASED RANGE OF MOTION WITH DECREASED STRENGTH: ICD-10-CM

## 2022-10-12 DIAGNOSIS — G89.29 CHRONIC RIGHT-SIDED LOW BACK PAIN WITH RIGHT-SIDED SCIATICA: ICD-10-CM

## 2022-10-12 DIAGNOSIS — G89.4 CHRONIC PAIN SYNDROME: ICD-10-CM

## 2022-10-12 DIAGNOSIS — E66.01 CLASS 2 SEVERE OBESITY DUE TO EXCESS CALORIES WITH SERIOUS COMORBIDITY AND BODY MASS INDEX (BMI) OF 38.0 TO 38.9 IN ADULT: ICD-10-CM

## 2022-10-12 DIAGNOSIS — M53.3 SI (SACROILIAC) JOINT DYSFUNCTION: ICD-10-CM

## 2022-10-12 DIAGNOSIS — E66.9 OBESITY (BMI 30-39.9): ICD-10-CM

## 2022-10-12 DIAGNOSIS — R68.89 IMPAIRED TOLERANCE OF ACTIVITY: ICD-10-CM

## 2022-10-12 DIAGNOSIS — M17.32 POST-TRAUMATIC OSTEOARTHRITIS OF LEFT KNEE: ICD-10-CM

## 2022-10-12 DIAGNOSIS — M25.60 DECREASED RANGE OF MOTION WITH DECREASED STRENGTH: ICD-10-CM

## 2022-10-12 DIAGNOSIS — M54.16 LUMBAR RADICULOPATHY: ICD-10-CM

## 2022-10-12 DIAGNOSIS — G47.33 OSA (OBSTRUCTIVE SLEEP APNEA): ICD-10-CM

## 2022-10-12 DIAGNOSIS — M17.0 PRIMARY OSTEOARTHRITIS OF BOTH KNEES: ICD-10-CM

## 2022-10-12 PROCEDURE — 71000015 HC POSTOP RECOV 1ST HR: Performed by: ORTHOPAEDIC SURGERY

## 2022-10-12 PROCEDURE — 27447 PR TOTAL KNEE ARTHROPLASTY: ICD-10-PCS | Mod: 82,LT,, | Performed by: ORTHOPAEDIC SURGERY

## 2022-10-12 PROCEDURE — 94761 N-INVAS EAR/PLS OXIMETRY MLT: CPT

## 2022-10-12 PROCEDURE — D9220A PRA ANESTHESIA: ICD-10-PCS | Mod: ANES,,, | Performed by: ANESTHESIOLOGY

## 2022-10-12 PROCEDURE — 71000039 HC RECOVERY, EACH ADD'L HOUR: Performed by: ORTHOPAEDIC SURGERY

## 2022-10-12 PROCEDURE — 25000003 PHARM REV CODE 250: Performed by: PHYSICIAN ASSISTANT

## 2022-10-12 PROCEDURE — 27200664 HC NERVE BLOCK COMPLETE KIT: Performed by: ANESTHESIOLOGY

## 2022-10-12 PROCEDURE — 76942 ECHO GUIDE FOR BIOPSY: CPT | Performed by: STUDENT IN AN ORGANIZED HEALTH CARE EDUCATION/TRAINING PROGRAM

## 2022-10-12 PROCEDURE — 27201423 OPTIME MED/SURG SUP & DEVICES STERILE SUPPLY: Performed by: ORTHOPAEDIC SURGERY

## 2022-10-12 PROCEDURE — 27000190 HC CPAP FULL FACE MASK W/VALVE

## 2022-10-12 PROCEDURE — 64999 PR BLOCK, IPACK: ICD-10-PCS | Mod: ,,, | Performed by: ANESTHESIOLOGY

## 2022-10-12 PROCEDURE — D9220A PRA ANESTHESIA: Mod: CRNA,,, | Performed by: NURSE ANESTHETIST, CERTIFIED REGISTERED

## 2022-10-12 PROCEDURE — 99900035 HC TECH TIME PER 15 MIN (STAT)

## 2022-10-12 PROCEDURE — 71000033 HC RECOVERY, INTIAL HOUR: Performed by: ORTHOPAEDIC SURGERY

## 2022-10-12 PROCEDURE — C1713 ANCHOR/SCREW BN/BN,TIS/BN: HCPCS | Performed by: ORTHOPAEDIC SURGERY

## 2022-10-12 PROCEDURE — 25000003 PHARM REV CODE 250: Performed by: NURSE ANESTHETIST, CERTIFIED REGISTERED

## 2022-10-12 PROCEDURE — 27447 TOTAL KNEE ARTHROPLASTY: CPT | Mod: LT,,, | Performed by: ORTHOPAEDIC SURGERY

## 2022-10-12 PROCEDURE — 36000711: Performed by: ORTHOPAEDIC SURGERY

## 2022-10-12 PROCEDURE — 27447 TOTAL KNEE ARTHROPLASTY: CPT | Mod: 82,LT,, | Performed by: ORTHOPAEDIC SURGERY

## 2022-10-12 PROCEDURE — 97535 SELF CARE MNGMENT TRAINING: CPT

## 2022-10-12 PROCEDURE — 63600175 PHARM REV CODE 636 W HCPCS: Performed by: ANESTHESIOLOGY

## 2022-10-12 PROCEDURE — 27447 PR TOTAL KNEE ARTHROPLASTY: ICD-10-PCS | Mod: LT,,, | Performed by: ORTHOPAEDIC SURGERY

## 2022-10-12 PROCEDURE — 36000710: Performed by: ORTHOPAEDIC SURGERY

## 2022-10-12 PROCEDURE — 63600175 PHARM REV CODE 636 W HCPCS: Performed by: NURSE ANESTHETIST, CERTIFIED REGISTERED

## 2022-10-12 PROCEDURE — 64448 PR NERVE BLOCK INJ, ANES/STEROID, FEMORAL, CONT INFUSION, INCL IMAG GUIDANCE: ICD-10-PCS | Mod: 59,LT,, | Performed by: ANESTHESIOLOGY

## 2022-10-12 PROCEDURE — 97165 OT EVAL LOW COMPLEX 30 MIN: CPT

## 2022-10-12 PROCEDURE — 01402 ANES OPN/ARTH TOT KNE ARTHRP: CPT | Performed by: ORTHOPAEDIC SURGERY

## 2022-10-12 PROCEDURE — 97530 THERAPEUTIC ACTIVITIES: CPT

## 2022-10-12 PROCEDURE — 27200665 HC NERVE BLOCK NEEDLE/ CATHETER: Performed by: ANESTHESIOLOGY

## 2022-10-12 PROCEDURE — 63600175 PHARM REV CODE 636 W HCPCS: Performed by: PHYSICIAN ASSISTANT

## 2022-10-12 PROCEDURE — 25000003 PHARM REV CODE 250: Performed by: ANESTHESIOLOGY

## 2022-10-12 PROCEDURE — 76942 PR U/S GUIDANCE FOR NEEDLE GUIDANCE: ICD-10-PCS | Mod: 26,,, | Performed by: ANESTHESIOLOGY

## 2022-10-12 PROCEDURE — 27800516 HC TRAY, EPIDURAL COMBO: Performed by: ANESTHESIOLOGY

## 2022-10-12 PROCEDURE — C1776 JOINT DEVICE (IMPLANTABLE): HCPCS | Performed by: ORTHOPAEDIC SURGERY

## 2022-10-12 PROCEDURE — 63600175 PHARM REV CODE 636 W HCPCS: Performed by: ORTHOPAEDIC SURGERY

## 2022-10-12 PROCEDURE — 64999 UNLISTED PX NERVOUS SYSTEM: CPT | Mod: ,,, | Performed by: ANESTHESIOLOGY

## 2022-10-12 PROCEDURE — 94660 CPAP INITIATION&MGMT: CPT

## 2022-10-12 PROCEDURE — 27100025 HC TUBING, SET FLUID WARMER: Performed by: ANESTHESIOLOGY

## 2022-10-12 PROCEDURE — D9220A PRA ANESTHESIA: ICD-10-PCS | Mod: CRNA,,, | Performed by: NURSE ANESTHETIST, CERTIFIED REGISTERED

## 2022-10-12 PROCEDURE — 37000009 HC ANESTHESIA EA ADD 15 MINS: Performed by: ORTHOPAEDIC SURGERY

## 2022-10-12 PROCEDURE — 97116 GAIT TRAINING THERAPY: CPT

## 2022-10-12 PROCEDURE — 63600175 PHARM REV CODE 636 W HCPCS: Performed by: STUDENT IN AN ORGANIZED HEALTH CARE EDUCATION/TRAINING PROGRAM

## 2022-10-12 PROCEDURE — 64448 NJX AA&/STRD FEM NRV NFS IMG: CPT | Mod: 59,LT,, | Performed by: ANESTHESIOLOGY

## 2022-10-12 PROCEDURE — D9220A PRA ANESTHESIA: Mod: ANES,,, | Performed by: ANESTHESIOLOGY

## 2022-10-12 PROCEDURE — 97161 PT EVAL LOW COMPLEX 20 MIN: CPT

## 2022-10-12 PROCEDURE — 37000008 HC ANESTHESIA 1ST 15 MINUTES: Performed by: ORTHOPAEDIC SURGERY

## 2022-10-12 PROCEDURE — 76942 ECHO GUIDE FOR BIOPSY: CPT | Mod: 26,,, | Performed by: ANESTHESIOLOGY

## 2022-10-12 PROCEDURE — C1751 CATH, INF, PER/CENT/MIDLINE: HCPCS | Performed by: ANESTHESIOLOGY

## 2022-10-12 DEVICE — CEMENT BONE SIMPLEX HV RADPQ: Type: IMPLANTABLE DEVICE | Site: KNEE | Status: FUNCTIONAL

## 2022-10-12 DEVICE — BASEPLATE TIB CEM PRIM SZ 6: Type: IMPLANTABLE DEVICE | Site: KNEE | Status: FUNCTIONAL

## 2022-10-12 DEVICE — TIBIAL POST STAB SZ 6 9X3 POLY: Type: IMPLANTABLE DEVICE | Site: KNEE | Status: FUNCTIONAL

## 2022-10-12 DEVICE — PATELLA TRIATHLON 36X10 SYMTRC: Type: IMPLANTABLE DEVICE | Site: KNEE | Status: FUNCTIONAL

## 2022-10-12 DEVICE — COMP FEM POST STAB CEM SZ 7 LT: Type: IMPLANTABLE DEVICE | Site: KNEE | Status: FUNCTIONAL

## 2022-10-12 RX ORDER — VANCOMYCIN HYDROCHLORIDE 1 G/20ML
INJECTION, POWDER, LYOPHILIZED, FOR SOLUTION INTRAVENOUS
Status: DISCONTINUED | OUTPATIENT
Start: 2022-10-12 | End: 2022-10-12 | Stop reason: HOSPADM

## 2022-10-12 RX ORDER — MUPIROCIN 20 MG/G
1 OINTMENT TOPICAL 2 TIMES DAILY
Status: DISCONTINUED | OUTPATIENT
Start: 2022-10-12 | End: 2022-10-13 | Stop reason: HOSPADM

## 2022-10-12 RX ORDER — METHOCARBAMOL 500 MG/1
1000 TABLET, FILM COATED ORAL ONCE
Status: COMPLETED | OUTPATIENT
Start: 2022-10-12 | End: 2022-10-12

## 2022-10-12 RX ORDER — FENTANYL CITRATE 50 UG/ML
100 INJECTION, SOLUTION INTRAMUSCULAR; INTRAVENOUS
Status: DISCONTINUED | OUTPATIENT
Start: 2022-10-12 | End: 2022-10-12 | Stop reason: HOSPADM

## 2022-10-12 RX ORDER — CEFAZOLIN SODIUM 2 G/50ML
2 SOLUTION INTRAVENOUS
Status: COMPLETED | OUTPATIENT
Start: 2022-10-12 | End: 2022-10-13

## 2022-10-12 RX ORDER — ROPIVACAINE HYDROCHLORIDE 2 MG/ML
INJECTION, SOLUTION EPIDURAL; INFILTRATION; PERINEURAL CONTINUOUS
Status: DISCONTINUED | OUTPATIENT
Start: 2022-10-12 | End: 2022-10-13 | Stop reason: HOSPADM

## 2022-10-12 RX ORDER — SODIUM CHLORIDE 9 MG/ML
INJECTION, SOLUTION INTRAVENOUS CONTINUOUS
Status: ACTIVE | OUTPATIENT
Start: 2022-10-12 | End: 2022-10-13

## 2022-10-12 RX ORDER — ONDANSETRON 2 MG/ML
INJECTION INTRAMUSCULAR; INTRAVENOUS
Status: DISCONTINUED | OUTPATIENT
Start: 2022-10-12 | End: 2022-10-12

## 2022-10-12 RX ORDER — LABETALOL HYDROCHLORIDE 5 MG/ML
INJECTION, SOLUTION INTRAVENOUS
Status: DISCONTINUED | OUTPATIENT
Start: 2022-10-12 | End: 2022-10-12

## 2022-10-12 RX ORDER — DEXAMETHASONE SODIUM PHOSPHATE 4 MG/ML
INJECTION, SOLUTION INTRA-ARTICULAR; INTRALESIONAL; INTRAMUSCULAR; INTRAVENOUS; SOFT TISSUE
Status: DISCONTINUED | OUTPATIENT
Start: 2022-10-12 | End: 2022-10-12

## 2022-10-12 RX ORDER — LIDOCAINE HYDROCHLORIDE 10 MG/ML
1 INJECTION, SOLUTION EPIDURAL; INFILTRATION; INTRACAUDAL; PERINEURAL
Status: DISCONTINUED | OUTPATIENT
Start: 2022-10-12 | End: 2022-10-12 | Stop reason: HOSPADM

## 2022-10-12 RX ORDER — NALOXONE HCL 0.4 MG/ML
0.02 VIAL (ML) INJECTION
Status: DISCONTINUED | OUTPATIENT
Start: 2022-10-12 | End: 2022-10-13 | Stop reason: HOSPADM

## 2022-10-12 RX ORDER — OXYCODONE HYDROCHLORIDE 10 MG/1
10 TABLET ORAL
Status: DISCONTINUED | OUTPATIENT
Start: 2022-10-12 | End: 2022-10-13 | Stop reason: HOSPADM

## 2022-10-12 RX ORDER — TALC
6 POWDER (GRAM) TOPICAL NIGHTLY PRN
Status: DISCONTINUED | OUTPATIENT
Start: 2022-10-12 | End: 2022-10-12 | Stop reason: HOSPADM

## 2022-10-12 RX ORDER — CARBOXYMETHYLCELLULOSE SODIUM 5 MG/ML
SOLUTION/ DROPS OPHTHALMIC
Status: DISCONTINUED | OUTPATIENT
Start: 2022-10-12 | End: 2022-10-12

## 2022-10-12 RX ORDER — MIDAZOLAM HYDROCHLORIDE 1 MG/ML
4 INJECTION INTRAMUSCULAR; INTRAVENOUS
Status: DISCONTINUED | OUTPATIENT
Start: 2022-10-12 | End: 2022-10-12 | Stop reason: HOSPADM

## 2022-10-12 RX ORDER — ROPIVACAINE HYDROCHLORIDE 5 MG/ML
INJECTION, SOLUTION EPIDURAL; INFILTRATION; PERINEURAL
Status: COMPLETED | OUTPATIENT
Start: 2022-10-12 | End: 2022-10-12

## 2022-10-12 RX ORDER — FAMOTIDINE 20 MG/1
20 TABLET, FILM COATED ORAL 2 TIMES DAILY
Status: DISCONTINUED | OUTPATIENT
Start: 2022-10-12 | End: 2022-10-13 | Stop reason: HOSPADM

## 2022-10-12 RX ORDER — ACETAMINOPHEN 500 MG
1000 TABLET ORAL EVERY 6 HOURS
Status: DISCONTINUED | OUTPATIENT
Start: 2022-10-12 | End: 2022-10-13 | Stop reason: HOSPADM

## 2022-10-12 RX ORDER — ONDANSETRON 2 MG/ML
4 INJECTION INTRAMUSCULAR; INTRAVENOUS EVERY 8 HOURS PRN
Status: DISCONTINUED | OUTPATIENT
Start: 2022-10-12 | End: 2022-10-13 | Stop reason: HOSPADM

## 2022-10-12 RX ORDER — FENTANYL CITRATE 50 UG/ML
25 INJECTION, SOLUTION INTRAMUSCULAR; INTRAVENOUS EVERY 5 MIN PRN
Status: DISCONTINUED | OUTPATIENT
Start: 2022-10-12 | End: 2022-10-12 | Stop reason: HOSPADM

## 2022-10-12 RX ORDER — SODIUM CHLORIDE 0.9 % (FLUSH) 0.9 %
10 SYRINGE (ML) INJECTION
Status: DISCONTINUED | OUTPATIENT
Start: 2022-10-12 | End: 2022-10-12 | Stop reason: HOSPADM

## 2022-10-12 RX ORDER — ACETAMINOPHEN 500 MG
1000 TABLET ORAL
Status: COMPLETED | OUTPATIENT
Start: 2022-10-12 | End: 2022-10-12

## 2022-10-12 RX ORDER — PREGABALIN 75 MG/1
75 CAPSULE ORAL NIGHTLY
Status: DISCONTINUED | OUTPATIENT
Start: 2022-10-12 | End: 2022-10-12

## 2022-10-12 RX ORDER — PROCHLORPERAZINE EDISYLATE 5 MG/ML
5 INJECTION INTRAMUSCULAR; INTRAVENOUS EVERY 6 HOURS PRN
Status: DISCONTINUED | OUTPATIENT
Start: 2022-10-12 | End: 2022-10-13 | Stop reason: HOSPADM

## 2022-10-12 RX ORDER — MIDAZOLAM HYDROCHLORIDE 1 MG/ML
INJECTION INTRAMUSCULAR; INTRAVENOUS
Status: DISCONTINUED | OUTPATIENT
Start: 2022-10-12 | End: 2022-10-12

## 2022-10-12 RX ORDER — KETAMINE HCL IN 0.9 % NACL 50 MG/5 ML
SYRINGE (ML) INTRAVENOUS
Status: DISCONTINUED | OUTPATIENT
Start: 2022-10-12 | End: 2022-10-12

## 2022-10-12 RX ORDER — ROPIVACAINE HYDROCHLORIDE 2 MG/ML
INJECTION, SOLUTION EPIDURAL; INFILTRATION; PERINEURAL CONTINUOUS
Status: DISCONTINUED | OUTPATIENT
Start: 2022-10-12 | End: 2022-10-12

## 2022-10-12 RX ORDER — METHOCARBAMOL 750 MG/1
750 TABLET, FILM COATED ORAL 3 TIMES DAILY
Status: DISCONTINUED | OUTPATIENT
Start: 2022-10-12 | End: 2022-10-13 | Stop reason: HOSPADM

## 2022-10-12 RX ORDER — BISACODYL 10 MG
10 SUPPOSITORY, RECTAL RECTAL EVERY 12 HOURS PRN
Status: DISCONTINUED | OUTPATIENT
Start: 2022-10-12 | End: 2022-10-13 | Stop reason: HOSPADM

## 2022-10-12 RX ORDER — GABAPENTIN 300 MG/1
600 CAPSULE ORAL 3 TIMES DAILY
Status: DISCONTINUED | OUTPATIENT
Start: 2022-10-12 | End: 2022-10-13 | Stop reason: HOSPADM

## 2022-10-12 RX ORDER — MORPHINE SULFATE 2 MG/ML
2 INJECTION, SOLUTION INTRAMUSCULAR; INTRAVENOUS
Status: DISCONTINUED | OUTPATIENT
Start: 2022-10-12 | End: 2022-10-13 | Stop reason: HOSPADM

## 2022-10-12 RX ORDER — OXYCODONE HYDROCHLORIDE 5 MG/1
5 TABLET ORAL
Status: DISCONTINUED | OUTPATIENT
Start: 2022-10-12 | End: 2022-10-13 | Stop reason: HOSPADM

## 2022-10-12 RX ORDER — TRANEXAMIC ACID 100 MG/ML
1000 INJECTION, SOLUTION INTRAVENOUS
Status: DISCONTINUED | OUTPATIENT
Start: 2022-10-12 | End: 2022-10-12 | Stop reason: HOSPADM

## 2022-10-12 RX ORDER — TRANEXAMIC ACID 100 MG/ML
INJECTION, SOLUTION INTRAVENOUS
Status: DISCONTINUED | OUTPATIENT
Start: 2022-10-12 | End: 2022-10-12

## 2022-10-12 RX ORDER — NICARDIPINE HYDROCHLORIDE 0.2 MG/ML
INJECTION INTRAVENOUS CONTINUOUS PRN
Status: DISCONTINUED | OUTPATIENT
Start: 2022-10-12 | End: 2022-10-12

## 2022-10-12 RX ORDER — LIDOCAINE HYDROCHLORIDE AND EPINEPHRINE 15; 5 MG/ML; UG/ML
INJECTION, SOLUTION EPIDURAL
Status: DISCONTINUED | OUTPATIENT
Start: 2022-10-12 | End: 2022-10-12

## 2022-10-12 RX ORDER — ASPIRIN 81 MG/1
81 TABLET ORAL 2 TIMES DAILY
Status: DISCONTINUED | OUTPATIENT
Start: 2022-10-12 | End: 2022-10-13 | Stop reason: HOSPADM

## 2022-10-12 RX ORDER — DIPHENHYDRAMINE HYDROCHLORIDE 50 MG/ML
INJECTION INTRAMUSCULAR; INTRAVENOUS
Status: DISCONTINUED | OUTPATIENT
Start: 2022-10-12 | End: 2022-10-12

## 2022-10-12 RX ORDER — DEXMEDETOMIDINE HYDROCHLORIDE 100 UG/ML
INJECTION, SOLUTION INTRAVENOUS
Status: DISCONTINUED | OUTPATIENT
Start: 2022-10-12 | End: 2022-10-12

## 2022-10-12 RX ORDER — CEFAZOLIN SODIUM 1 G/3ML
INJECTION, POWDER, FOR SOLUTION INTRAMUSCULAR; INTRAVENOUS
Status: DISCONTINUED | OUTPATIENT
Start: 2022-10-12 | End: 2022-10-12

## 2022-10-12 RX ORDER — POLYETHYLENE GLYCOL 3350 17 G/17G
17 POWDER, FOR SOLUTION ORAL DAILY
Status: DISCONTINUED | OUTPATIENT
Start: 2022-10-12 | End: 2022-10-13 | Stop reason: HOSPADM

## 2022-10-12 RX ORDER — PROPOFOL 10 MG/ML
VIAL (ML) INTRAVENOUS CONTINUOUS PRN
Status: DISCONTINUED | OUTPATIENT
Start: 2022-10-12 | End: 2022-10-12

## 2022-10-12 RX ORDER — SODIUM CHLORIDE 9 MG/ML
INJECTION, SOLUTION INTRAVENOUS
Status: COMPLETED | OUTPATIENT
Start: 2022-10-12 | End: 2022-10-12

## 2022-10-12 RX ORDER — FAMOTIDINE 10 MG/ML
INJECTION INTRAVENOUS
Status: DISCONTINUED | OUTPATIENT
Start: 2022-10-12 | End: 2022-10-12

## 2022-10-12 RX ORDER — PREGABALIN 75 MG/1
75 CAPSULE ORAL
Status: DISCONTINUED | OUTPATIENT
Start: 2022-10-12 | End: 2022-10-12 | Stop reason: HOSPADM

## 2022-10-12 RX ORDER — NICARDIPINE HYDROCHLORIDE 2.5 MG/ML
INJECTION INTRAVENOUS
Status: DISCONTINUED | OUTPATIENT
Start: 2022-10-12 | End: 2022-10-12

## 2022-10-12 RX ORDER — AMOXICILLIN 250 MG
1 CAPSULE ORAL 2 TIMES DAILY
Status: DISCONTINUED | OUTPATIENT
Start: 2022-10-12 | End: 2022-10-13 | Stop reason: HOSPADM

## 2022-10-12 RX ORDER — MUPIROCIN 20 MG/G
1 OINTMENT TOPICAL
Status: COMPLETED | OUTPATIENT
Start: 2022-10-12 | End: 2022-10-12

## 2022-10-12 RX ADMIN — LABETALOL HYDROCHLORIDE 5 MG: 5 INJECTION, SOLUTION INTRAVENOUS at 07:10

## 2022-10-12 RX ADMIN — DIPHENHYDRAMINE HYDROCHLORIDE 12.5 MG: 50 INJECTION, SOLUTION INTRAMUSCULAR; INTRAVENOUS at 07:10

## 2022-10-12 RX ADMIN — TRANEXAMIC ACID 1000 MG: 100 INJECTION, SOLUTION INTRAVENOUS at 07:10

## 2022-10-12 RX ADMIN — OXYCODONE 5 MG: 5 TABLET ORAL at 08:10

## 2022-10-12 RX ADMIN — ONDANSETRON 4 MG: 2 INJECTION INTRAMUSCULAR; INTRAVENOUS at 07:10

## 2022-10-12 RX ADMIN — ROPIVACAINE HYDROCHLORIDE 10 ML: 5 INJECTION EPIDURAL; INFILTRATION; PERINEURAL at 06:10

## 2022-10-12 RX ADMIN — METHOCARBAMOL 750 MG: 750 TABLET ORAL at 08:10

## 2022-10-12 RX ADMIN — FENTANYL CITRATE 100 MCG: 0.05 INJECTION, SOLUTION INTRAMUSCULAR; INTRAVENOUS at 06:10

## 2022-10-12 RX ADMIN — ACETAMINOPHEN 1000 MG: 500 TABLET ORAL at 11:10

## 2022-10-12 RX ADMIN — CEFAZOLIN 2 G: 330 INJECTION, POWDER, FOR SOLUTION INTRAMUSCULAR; INTRAVENOUS at 07:10

## 2022-10-12 RX ADMIN — SODIUM CHLORIDE, SODIUM GLUCONATE, SODIUM ACETATE, POTASSIUM CHLORIDE, MAGNESIUM CHLORIDE, SODIUM PHOSPHATE, DIBASIC, AND POTASSIUM PHOSPHATE: .53; .5; .37; .037; .03; .012; .00082 INJECTION, SOLUTION INTRAVENOUS at 09:10

## 2022-10-12 RX ADMIN — MUPIROCIN 1 G: 20 OINTMENT TOPICAL at 06:10

## 2022-10-12 RX ADMIN — SODIUM CHLORIDE: 0.9 INJECTION, SOLUTION INTRAVENOUS at 10:10

## 2022-10-12 RX ADMIN — OXYCODONE 5 MG: 5 TABLET ORAL at 10:10

## 2022-10-12 RX ADMIN — METHOCARBAMOL 1000 MG: 500 TABLET ORAL at 10:10

## 2022-10-12 RX ADMIN — FAMOTIDINE 20 MG: 20 TABLET, FILM COATED ORAL at 08:10

## 2022-10-12 RX ADMIN — ROPIVACAINE HYDROCHLORIDE 10 ML: 5 INJECTION, SOLUTION EPIDURAL; INFILTRATION; PERINEURAL at 06:10

## 2022-10-12 RX ADMIN — SODIUM CHLORIDE: 0.9 INJECTION, SOLUTION INTRAVENOUS at 11:10

## 2022-10-12 RX ADMIN — OXYCODONE HYDROCHLORIDE 10 MG: 10 TABLET ORAL at 11:10

## 2022-10-12 RX ADMIN — MORPHINE SULFATE 2 MG: 2 INJECTION, SOLUTION INTRAMUSCULAR; INTRAVENOUS at 12:10

## 2022-10-12 RX ADMIN — MIDAZOLAM HYDROCHLORIDE 2 MG: 1 INJECTION, SOLUTION INTRAMUSCULAR; INTRAVENOUS at 07:10

## 2022-10-12 RX ADMIN — PROPOFOL 75 MCG/KG/MIN: 10 INJECTION, EMULSION INTRAVENOUS at 07:10

## 2022-10-12 RX ADMIN — OXYCODONE 5 MG: 5 TABLET ORAL at 05:10

## 2022-10-12 RX ADMIN — DEXAMETHASONE SODIUM PHOSPHATE 8 MG: 4 INJECTION, SOLUTION INTRAMUSCULAR; INTRAVENOUS at 07:10

## 2022-10-12 RX ADMIN — SODIUM CHLORIDE, SODIUM GLUCONATE, SODIUM ACETATE, POTASSIUM CHLORIDE, MAGNESIUM CHLORIDE, SODIUM PHOSPHATE, DIBASIC, AND POTASSIUM PHOSPHATE: .53; .5; .37; .037; .03; .012; .00082 INJECTION, SOLUTION INTRAVENOUS at 07:10

## 2022-10-12 RX ADMIN — ACETAMINOPHEN 1000 MG: 500 TABLET ORAL at 12:10

## 2022-10-12 RX ADMIN — METHOCARBAMOL 750 MG: 750 TABLET ORAL at 03:10

## 2022-10-12 RX ADMIN — CARBOXYMETHYLCELLULOSE SODIUM 2 DROP: 5 SOLUTION/ DROPS OPHTHALMIC at 07:10

## 2022-10-12 RX ADMIN — MUPIROCIN 1 G: 20 OINTMENT TOPICAL at 08:10

## 2022-10-12 RX ADMIN — TRANEXAMIC ACID 1000 MG: 100 INJECTION, SOLUTION INTRAVENOUS at 09:10

## 2022-10-12 RX ADMIN — FAMOTIDINE 20 MG: 10 INJECTION, SOLUTION INTRAVENOUS at 07:10

## 2022-10-12 RX ADMIN — NICARDIPINE HYDROCHLORIDE 0.4 MG: 25 INJECTION INTRAVENOUS at 07:10

## 2022-10-12 RX ADMIN — CEFAZOLIN SODIUM 2 G: 2 SOLUTION INTRAVENOUS at 08:10

## 2022-10-12 RX ADMIN — Medication: at 10:10

## 2022-10-12 RX ADMIN — SODIUM CHLORIDE: 0.9 INJECTION, SOLUTION INTRAVENOUS at 06:10

## 2022-10-12 RX ADMIN — ASPIRIN 81 MG: 81 TABLET, COATED ORAL at 08:10

## 2022-10-12 RX ADMIN — LIDOCAINE HYDROCHLORIDE,EPINEPHRINE BITARTRATE 4 ML: 15; .005 INJECTION, SOLUTION EPIDURAL; INFILTRATION; INTRACAUDAL; PERINEURAL at 08:10

## 2022-10-12 RX ADMIN — ACETAMINOPHEN 1000 MG: 500 TABLET ORAL at 06:10

## 2022-10-12 RX ADMIN — SENNOSIDES AND DOCUSATE SODIUM 1 TABLET: 50; 8.6 TABLET ORAL at 08:10

## 2022-10-12 RX ADMIN — CEFAZOLIN SODIUM 2 G: 2 SOLUTION INTRAVENOUS at 03:10

## 2022-10-12 RX ADMIN — DEXMEDETOMIDINE HYDROCHLORIDE 25 MCG: 100 INJECTION, SOLUTION INTRAVENOUS at 07:10

## 2022-10-12 RX ADMIN — Medication 30 MG: at 07:10

## 2022-10-12 RX ADMIN — OXYCODONE HYDROCHLORIDE 10 MG: 10 TABLET ORAL at 01:10

## 2022-10-12 RX ADMIN — NICARDIPINE HYDROCHLORIDE 2.5 MG/HR: 0.2 INJECTION INTRAVENOUS at 07:10

## 2022-10-12 RX ADMIN — SODIUM CHLORIDE: 9 INJECTION, SOLUTION INTRAVENOUS at 07:10

## 2022-10-12 RX ADMIN — POLYETHYLENE GLYCOL 3350 17 G: 17 POWDER, FOR SOLUTION ORAL at 01:10

## 2022-10-12 RX ADMIN — GABAPENTIN 600 MG: 300 CAPSULE ORAL at 08:10

## 2022-10-12 RX ADMIN — VANCOMYCIN HYDROCHLORIDE 1500 MG: 1.5 INJECTION, POWDER, LYOPHILIZED, FOR SOLUTION INTRAVENOUS at 06:10

## 2022-10-12 RX ADMIN — MIDAZOLAM 2 MG: 1 INJECTION INTRAMUSCULAR; INTRAVENOUS at 06:10

## 2022-10-12 RX ADMIN — SENNOSIDES AND DOCUSATE SODIUM 1 TABLET: 50; 8.6 TABLET ORAL at 01:10

## 2022-10-12 NOTE — NURSING TRANSFER
Nursing Transfer Note      10/12/2022     Reason patient is being transferred: per md order    Transfer To: 301    Transfer via bed    Transfer with O2, Nimbus and ETCo2    Transported by rn    Medicines sent: ropivacaine via nimbus    Any special needs or follow-up needed: n/a    Chart send with patient: Yes    Notified: spouse

## 2022-10-12 NOTE — OP NOTE
[facility]   OPERATIVE REPORT   ORTHOPAEDIC SURGERY   PROVIDER: DR. ARNIE KAUFMAN    PATIENT INFORMATION   Baldo Grant 62 y.o. male 1960   MRN: [id number]   LOCATION: [facility]     DATE OF PROCEDURE: 10/12/2022     PREOPERATIVE DIAGNOSES:   Primary osteoarthritis of left knee     POSTOPERATIVE DIAGNOSES:   Primary osteoarthritis of left knee     PROCEDURES PERFORMED:   Left total knee arthroplasty (CPT 19951)    SURGEON:  ARNIE Kaufman MD    ASSISTANTS:  Marcello Aggarwal MD - Fellow - First Assist  SEFERINO Davenport    First Assistant Duties: Due to the complexity of the case and the need for significant intra-operative decision making, first assistant duties were medically necessary. The chronicity of the disease process and the level of deformity dictated that first assistant duties be undertaken. Assistance was provided for joint exposure, manipulation, and retractor placement. There was no qualified resident available for the procedure.     ANESTHESIA: Spinal with adductor catheter    ESTIMATED BLOOD LOSS: 200 mL  IMPLANTS:   Implant Name Type Inv. Item Serial No.  Lot No. LRB No. Used Action   PIN FIX TEMP 1/8X2.5IN LF STER - [id number]  PIN FIX TEMP 1/8X2.5IN LF STER  [employer]. [id number] Left 1 Implanted and Explanted   CEMENT BONE SIMPLEX HV RADPQ - [id number]  CEMENT BONE SIMPLEX HV RADPQ  [employer]. [id number] Left 2 Implanted   PATELLA TRIATHLON 36X10 SYMTRC - [id number]  PATELLA TRIATHLON 36X10 SYMTRC  [employer]. [id number] Left 1 Implanted   COMP FEM POST STAB JESUS SZ 7 LT - [id number]  COMP FEM POST STAB JESUS SZ 7 LT  [employer]. [id number] Left 1 Implanted   BASEPLATE TIB JESUS PRIM SZ 6 - [id number]  BASEPLATE TIB JESUS PRIM SZ 6  [employer]. [id number] Left 1 Implanted   TIBIAL POST STAB SZ 6 9X3 POLY - [id number]  TIBIAL POST STAB SZ 6 9X3 POLY  [employer]. [id number] Left 1 Implanted      SPECIMENS:  None.    COMPLICATIONS: None.     INTRAOPERATIVE COUNTS: Correct.     PROPHYLACTIC IV ANTIBIOTICS: Given per [facility] Protocol.    INDICATIONS FOR PROCEDURE:  Baldo Grant is a 62 y.o. year-old with a longstanding history of left knee pain.  He has failed extensive conservative care to this point.  Preoperative imaging revealed degenerative joint disease and treatment options were discussed.  He elected to proceed with knee replacement.    Risks and complications were discussed including, but not limited to risks of anesthetic complications, infections, wound healing complications, aseptic loosening, instability, DVT, pulmonary embolism and death among others and he elected to proceed.    COMPONENTS USED:  The Enomaly triathlon system with size femur 7, tibia 6, 9  mm X3 polyethylene, 36 mm patella.    DESCRIPTION OF PROCEDURE:  The patient was taken to the Operating Room where anesthesia was administered by the Anesthesia Department. He was then placed in the supine position and all superficial neurovascular structures were well padded.  The left lower extremity was then sterilely prepped and draped in the normal fashion.    Under tourniquet control, a 20 cm longitudinal incision was made over the anterior aspect of the knee.  Subcutaneous tissue was sharply dissected down to the deep fascia, which was incised along the line of the incision.  A standard medial parapatellar arthrotomy was made.  The proximal medial tibial plateau was then subperiosteally exposed protecting the medial collateral ligament.  A portion of the infrapatellar fat pad was excised.  The patella was everted and the knee was flexed to 90 degrees.    The extramedullary tibial alignment guide was then placed and the proximal tibial osteotomy was made approximately 4 mm distal to the most shallow surface of the tibial plateau.  This was done perpendicular to the axis of the tibia with approximately 3 degrees posterior slope.    A drill was then  used to gain access into the intramedullary canal of the distal femur. The distal femoral cutting guide was placed and the 10 mm distal femoral cut was made in 5 degrees of valgus.  Femur was sized to a size 7.  The size 7 cutting guide was applied and the anterior femoral condyle, posterior condyle, and chamfer cuts were made. There was no notching anteriorly.  At this time, the cutting guide was removed and the cruciate ligaments, osteophytes, menisci and any debris was removed from the joint space.  Flexion and extension gaps were checked and were found to be equal at 9 mm. The notch cutting guide for the posterior stabilized housing was placed and the notch cuts were then made.    We then turned our attention back towards the proximal tibia.  This was sized to a size 6, placed in neutral rotation, and the keel was punched in the standard fashion.  Trial sizes of the 7 femur, 6 tibia, and 9 mm polyethylene liner were then placed.    The patellar cutting guide was then used to remove the dorsal 10 mm of the patellar surface to a final thickness of 15 mm.  This was sized to a size 36. Drill holes were placed and patellar trial was placed.    At this time, the knee was placed through range of motion.  Excellent patellofemoral tracking and stability were noted throughout.  Full extension was easily obtained and intraoperative range of motion was from approximately 0 to 125 degrees.    Trial components were removed and the bony surfaces were thoroughly irrigated in a pulse lavage fashion and dried.  Two batches of cement were mixed and the tibial, femoral and patellar components were cemented into position, impacted and held in place as the cement polymerized.  Any excess cement was removed using [place] elevators.  The 11 mm polyethylene was then locked into position.    The wound was once again thoroughly irrigated.  The tourniquet was deflated and any significant bleeding was stopped using electrocautery.  Tranexamic  acid was given intravenously pre incision and at the time of component cementation for hemostasis. The wound was irrigated with dilute betadine wash followed by normal saline via pulse lavage prior to closure.     The quadriceps tendon and parapatellar retinaculum were then closed with interrupted figure-of-eight sutures of #1 Vicryl.  Subcutaneous tissue was closed with interrupted inverted stitches #3-0 Vicryl.  Skin was approximated using staples.  Sterile dressing was applied and he was returned to the Postanesthesia Care Unit in stable condition.    As the attending surgeon I was physically present for the key/critical portions of the procedure.

## 2022-10-12 NOTE — PLAN OF CARE
Patient tolerated PT session well. Patient ambulated 120ft and 12ft x2 to bathroom and back to bedside chair with RW and contact guard assistance. No LOB or SOB noted. Patient has an OPPT appointment on 10/13/2022.       Problem: Physical Therapy  Goal: Physical Therapy Goal  Description: Goals to be met by: 10/14/2022    Patient will increase functional independence with mobility by performin. Supine to sit with supervision  2. Sit to stand transfer with Supervision  3. Gait x300 feet with Supervision using Rolling Walker  4. Ascend/Descend 1 curb step with Stand by assistance using Rolling Walker  5. Lower extremity exercise program x30 reps per handout, with supervision        Outcome: Ongoing, Progressing

## 2022-10-12 NOTE — ANESTHESIA PROCEDURE NOTES
Left IPACK single shot    Patient location during procedure: pre-op   Block not for primary anesthetic.  Reason for block: at surgeon's request and post-op pain management   Post-op Pain Location: Left knee pain   Start time: 10/12/2022 6:42 AM  Timeout: 10/12/2022 6:41 AM   End time: 10/12/2022 7:05 AM    Staffing  Authorizing Provider: Aaliyah Hobson MD  Performing Provider: Irvin Elizalde MD    Preanesthetic Checklist  Completed: patient identified, IV checked, site marked, risks and benefits discussed, surgical consent, monitors and equipment checked, pre-op evaluation and timeout performed  Peripheral Block  Patient position: supine  Prep: ChloraPrep  Patient monitoring: heart rate, cardiac monitor, continuous pulse ox, continuous capnometry and frequent blood pressure checks  Block type: I PACK  Laterality: left  Injection technique: single shot  Needle  Needle type: Stimuplex   Needle gauge: 21 G  Needle length: 4 in  Needle localization: anatomical landmarks and ultrasound guidance   -ultrasound image captured on disc.  Assessment  Injection assessment: negative aspiration, negative parasthesia and local visualized surrounding nerve  Paresthesia pain: none  Heart rate change: no  Slow fractionated injection: yes  Pain Tolerance: comfortable throughout block and no complaints  Medications:    Medications: ropivacaine (NAROPIN) injection 0.5% - Perineural   10 mL - 10/12/2022 6:55:00 AM    Additional Notes  VSS.  [facility] RN monitoring vitals throughout procedure.  Patient tolerated procedure well.

## 2022-10-12 NOTE — ADDENDUM NOTE
Addendum  created 10/12/22 1530 by Storm Reynolds MD    New alternative orders accepted, Order Reconciliation Section accessed, Order list changed, Pharmacy for encounter modified

## 2022-10-12 NOTE — TRANSFER OF CARE
Anesthesia Transfer of Care Note    Patient: Baldo Grant    Procedure(s) Performed: Procedure(s) (LRB):  ARTHROPLASTY, KNEE, TOTAL (Left)    Patient location: PACU    Anesthesia Type: CSE, regional and general    Transport from OR: Transported from OR on room air with adequate spontaneous ventilation. Transported from OR on 6-10 L/min O2 by face mask with adequate spontaneous ventilation    Post pain: adequate analgesia    Post assessment: no apparent anesthetic complications and tolerated procedure well    Post vital signs: stable    Level of consciousness: awake    Nausea/Vomiting: no nausea/vomiting    Complications: none    Transfer of care protocol was followed      Last vitals:   Visit Vitals  /63 (BP Location: Right arm, Patient Position: Lying)   Pulse 86   Temp 36.8 °C (98.2 °F) (Temporal)   Resp 16   Ht 6' (1.829 m)   Wt 127 kg (280 lb)   SpO2 (!) 91%   BMI 37.97 kg/m²

## 2022-10-12 NOTE — PLAN OF CARE
Vital signs stable. Afebrile. Alert, oriented and following commands. Pain controlled with PRN meds and nimbus. Dressing remains CDI. Ice packs in place. Denies nausea. Tolerating PO intake. Nimbus and IVF per MD order. Wife at bedside and updated regarding POC

## 2022-10-12 NOTE — OP NOTE
Certification of Assistant at Surgery       Surgery Date: 10/12/2022     Participating Surgeons:  Surgeon(s) and Role:     * W Og Álvarez MD - Primary   Marcello Aggarwal MD - First Assistant    Procedures:  Procedure(s) (LRB):  ARTHROPLASTY, KNEE, TOTAL (Left)    Assistant Surgeon's Certification of Necessity:  I understand that section 1842 (b) (6) (d) of the Social Security Act generally prohibits Medicare Part B reasonable charge payment for the services of assistants at surgery in teaching hospitals when qualified residents are available to furnish such services. I certify that the services for which payment is claimed were medically necessary, and that no qualified resident was available to perform the services. I further understand that these services are subject to post-payment review by the Medicare carrier.      Marcello Aggarwal MD    10/12/2022  3:41 PM

## 2022-10-12 NOTE — PLAN OF CARE
Problem: Occupational Therapy  Goal: Occupational Therapy Goal  Description: Goals to be met by: 10/14/22     Patient will increase functional independence with ADLs by performing:    UE Dressing with Modified [place].  LE Dressing with Supervision.  Grooming while standing at sink with Modified [place].  Toileting from toilet with Modified [place] for hygiene and clothing management.   Toilet transfer to toilet with Modified [place].    Outcome: Ongoing, Progressing    OT evaluation with goals established. Patient completed bed mobility at contact guard assistance. He completed toilet task in standing (unable to void) along with grooming standing in standing at contact guard assistance.

## 2022-10-12 NOTE — PLAN OF CARE
Problem: Adult Inpatient Plan of Care  Goal: Plan of Care Review  Outcome: Ongoing, Progressing  Flowsheets (Taken 10/12/2022 1339)  Plan of Care Reviewed With: patient     Problem: Adult Inpatient Plan of Care  Goal: Patient-Specific Goal (Individualized)  Outcome: Ongoing, Progressing  Flowsheets (Taken 10/12/2022 1339)  Anxieties, Fears or Concerns: General  Individualized Care Needs: Keep pt and family up to date  Patient-Specific Goals (Include Timeframe): Post op pain control - Nimbus pump teaching.     Problem: Pain (Knee Arthroplasty)  Goal: Acceptable Pain Control  Intervention: Prevent or Manage Pain  Flowsheets (Taken 10/12/2022 1339)  Pain Management Interventions:   cold applied   care clustered      Problem: Fall Injury Risk  Goal: Absence of Fall and Fall-Related Injury  Intervention: Identify and Manage Contributors  Flowsheets (Taken 10/12/2022 1339)  Self-Care Promotion: BADL personal objects within reach         PT received AAO x 4. Bed locked and in lowest position. Oriented to room and callbell.  Fall precautions maintained. Non skid socks on while out of bed. Pt instructed to call for assistance, skin integrity maintained,  ice maintained . No other complaints or concerns, will continue to follow careplan. Callbell placed within reach and use encouraged.

## 2022-10-12 NOTE — PT/OT/SLP EVAL
Physical Therapy Evaluation and Treatment     Patient Name:  Baldo Grant   MRN:  [id number]    Recommendations:     Discharge Recommendations:  outpatient PT   Discharge Equipment Recommendations: walker, rolling   Barriers to discharge: None    Assessment:     Baldo Grant is a 62 y.o. male admitted with a medical diagnosis of s/p L TKA. He presents with the following impairments/functional limitations:  weakness, impaired functional mobility, gait instability, impaired balance, decreased lower extremity function, pain, decreased ROM, impaired skin, orthopedic precautions. Patient tolerated PT session well. Patient ambulated 120ft and 12ft x2 to bathroom and back to bedside chair with RW and contact guard assistance. No LOB or SOB noted. Patient has an OPPT appointment on 10/13/2022.     Rehab Prognosis: Good; patient would benefit from acute skilled PT services to address these deficits and reach maximum level of function.    Recent Surgery: Procedure(s) (LRB):  ARTHROPLASTY, KNEE, TOTAL (Left) Day of Surgery    Plan:     During this hospitalization, patient to be seen daily to address the identified rehab impairments via gait training, therapeutic activities, therapeutic exercises and progress toward the following goals:    Plan of Care Expires:  10/14/22    Subjective     Chief Complaint: Left knee pain.   Patient/Family Comments/goals: To walk without pain.   Pain/Comfort:  Pain Rating 1: 6/10  Location - Side 1: Left  Location 1: knee  Pain Addressed 1: Pre-medicate for activity, Reposition, Distraction    Patients cultural, spiritual, Rastafari conflicts given the current situation:  n/a    Living Environment:  Patient lives in a 2SH with 2 consecutive steps with no handrails to enter. Prior to admission, patients level of function was independent for functional mobility. Equipment at home: shower chair (rails for the toilet). Upon discharge, patient will have assistance from wife and  family.    Objective:     Communicated with RN prior to session.  Patient found up in chair with cryotherapy, FCD, SCD, peripheral IV, perineural catheter (Nimbus) upon PT entry to room. Wife present during session.     General Precautions: Standard, fall   Orthopedic Precautions:LLE weight bearing as tolerated   Braces: N/A  Respiratory Status: Room air    Exams:  Cognitive Exam:  Patient is oriented to Person, Place, Time, and Situation  Gross Motor Coordination:  WFL  Sensation:    -       Intact  RLE ROM: WFL  RLE Strength: WFL  LLE ROM: WFL except limited at knee due to pain  LLE Strength: appears WFL but did not formally assess due to pain and recent surgery    Functional Mobility:  Transfers:     Sit to Stand:  contact guard assistance with rolling walker x2 from bedside chair   Toilet Transfer (bedside commode over toilet): contact guard assistance with  rolling walker    Gait: Patient ambulated 120ft and 12ft x2 to bathroom with Rolling Walker and contact guard assistance  using 3-point gait. Patient demonstrated decreased crisitn and decreased step length during gait due to pain, decreased ROM, and decreased strength.  Balance: Patient stood at the sink to wash his hands with contact guard assistance and rolling walker.     Therapeutic Activities and Exercises:  Patient and wife educated in:  -PT role and POC  -safety with transfers including hand placement  -gait sequencing and RW management  -OOB activity to maximize recovery including ambulating with nursing staff assistance and RW while in the hospital       AM-PAC 6 CLICK MOBILITY  Total Score:17     Patient left up in chair with all lines intact, call button in reach, and RN and wife present.    GOALS:   Multidisciplinary Problems       Physical Therapy Goals          Problem: Physical Therapy    Goal Priority Disciplines Outcome Goal Variances Interventions   Physical Therapy Goal     PT, PT/OT Ongoing, Progressing     Description: Goals to be met  by: 10/14/2022    Patient will increase functional independence with mobility by performin. Supine to sit with supervision  2. Sit to stand transfer with Supervision  3. Gait x300 feet with Supervision using Rolling Walker  4. Ascend/Descend 1 curb step with Stand by assistance using Rolling Walker  5. Lower extremity exercise program x30 reps per handout, with supervision                             History:     Past Medical History:   Diagnosis Date    Arthritis     Basal cell carcinoma 2019    back    Cancer     Chronic pain of right knee     SCC (squamous cell carcinoma) 2014    Forehead- Dr. Cabral     Skin cancer     Squamous cell carcinoma 2013    Right ear- Dr. Marianna long       Past Surgical History:   Procedure Laterality Date    APPENDECTOMY      ARTHROSCOPIC CHONDROPLASTY OF KNEE JOINT Left 2020    Procedure: ARTHROSCOPY, KNEE, WITH CHONDROPLASTY;  Surgeon: FREEMAN Álvarez MD;  Location: [facility] OR;  Service: Orthopedics;  Laterality: Left;    COLONOSCOPY      EPIDURAL STEROID INJECTION INTO LUMBAR SPINE N/A 2020    Procedure: Injection-steroid-epidural-lumbar--InterLaminar L4-5;  Surgeon: Alexus Anglin Jr., MD;  Location: [facility] PAIN T;  Service: Pain Management;  Laterality: N/A;    INJECTION OF ANESTHETIC AGENT AROUND MEDIAL BRANCH NERVES INNERVATING LUMBAR FACET JOINT Bilateral 2019    Procedure: Block-nerve-medial branch-lumbar--Bilateral L3-4,L4-5,L5-S1;  Surgeon: Alexus Anglin Jr., MD;  Location: [facility] PAIN MGT;  Service: Pain Management;  Laterality: Bilateral;    INJECTION OF STEROID Left 2020    Procedure: INJECTION, STEROID Left SI Joint Block and Steroid Injection;  Surgeon: Tam Encarnacion MD;  Location: [facility] OR;  Service: Neurosurgery;  Laterality: Left;  Procedure: Left SI Joint Block and Steroid Injection   Surgery Time: 30 min  LOS: 0  Anesthesia: MAC  Radiology: C-arm  Bed: Regular with Pillows  Position: Prone    KNEE ARTHROSCOPY W/  MENISCECTOMY Left 03/18/2020    Procedure: ARTHROSCOPY, KNEE, WITH MENISCECTOMY;  Surgeon: FREEMAN Álvarez MD;  Location: [facility];  Service: Orthopedics;  Laterality: Left;  CLONIDINE/EPI/KETOROLAC/ROPIVACAINE INJECTION 30CC    lipoma removal      skin cancer removal      TRANSFORAMINAL EPIDURAL INJECTION OF STEROID Bilateral 05/06/2020    Procedure: Injection,steroid,epidural,transforaminal approach--Bilateral L4-5;  Surgeon: Alexus Anglin Jr., MD;  Location: [facility] PAIN T;  Service: Pain Management;  Laterality: Bilateral;    TRANSFORAMINAL EPIDURAL INJECTION OF STEROID Bilateral 08/11/2020    Procedure: Injection,steroid,epidural,transforaminal approach--Bilateral L4-5;  Surgeon: Alexus Anglin Jr., MD;  Location: [facility] PAIN [facility];  Service: Pain Management;  Laterality: Bilateral;    TRANSFORAMINAL EPIDURAL INJECTION OF STEROID Bilateral 04/05/2022    Procedure: Injection,steroid,epidural,transforaminal bilateral L4-5;  Surgeon: Alexus Anglin Jr., MD;  Location: [facility] PAIN T;  Service: Pain Management;  Laterality: Bilateral;  asa        Time Tracking:     PT Received On: 10/12/22  PT Start Time: 1324     PT Stop Time: 1357  PT Total Time (min): 33 min     Billable Minutes: Evaluation 10, Gait Training 13, and Therapeutic Activity 10      10/12/2022

## 2022-10-12 NOTE — ANESTHESIA PROCEDURE NOTES
CSE    Patient location during procedure: OR  Start time: 10/12/2022 7:14 AM  Timeout: 10/12/2022 7:10 AM  End time: 10/12/2022 7:18 AM      Staffing  Authorizing Provider: Aaliyah Hobson MD  Performing Provider: Aaliyah Hobson MD    Preanesthetic Checklist  Completed: patient identified, IV checked, site marked, risks and benefits discussed, surgical consent, monitors and equipment checked, pre-op evaluation and timeout performed  CSE  Patient position: sitting  Prep: ChloraPrep  Patient monitoring: heart rate, cardiac monitor, continuous pulse ox, continuous capnometry and frequent blood pressure checks  Approach: midline  Spinal Needle  Needle type: Anais   Needle gauge: 25 G  Needle length: 5 in  Epidural Needle  Injection technique: LUIGI air  Needle type: Tuohy   Needle gauge: 17 G  Needle length: 3.5 in  Needle insertion depth: 9 cm  Location: L4-5  Needle localization: anatomical landmarks   Catheter  Catheter type: springwound  Catheter size: 19 G  Catheter at skin depth: 12 cm  Additional Documentation: negative aspiration for CSF and negative aspiration for heme  Assessment  Sensory level: T8   Dermatomal levels determined by pinch or prick  Intrathecal Medications:   administered: primary anesthetic mcg of    Medications:    Medications: Intrathecal - mepivacaine (CARBOCAINE) injection 15 mg/mL (1.5%) - Intrathecal   4 mL - 10/12/2022 7:16:00 AM

## 2022-10-12 NOTE — PT/OT/SLP EVAL
"Occupational Therapy   Evaluation    Name: Baldo Grant  MRN: [id number]  Admitting Diagnosis:  <principal problem not specified> Day of Surgery    Recommendations:     Discharge Recommendations: home  Discharge Equipment Recommendations:  walker, rolling  Barriers to discharge:  None    Assessment:     Baldo Grant is a 62 y.o. male with a medical diagnosis of <principal problem not specified>.  He presents s/p left TKA. Patient completed bed mobility at contact guard assistance. He completed toilet task in standing (unable to void) along with grooming standing in standing at contact guard assistance. Patient would benefit from skilled OT needs s/p left TKA to increase independence with ADLs and ADL transfers. Performance deficits affecting function: weakness, impaired functional mobility, gait instability, impaired balance, impaired self care skills, decreased lower extremity function, decreased ROM.      Rehab Prognosis: Good; patient would benefit from acute skilled OT services to address these deficits and reach maximum level of function.       Plan:     Patient to be seen daily to address the above listed problems via self-care/home management, therapeutic activities, therapeutic exercises  Plan of Care Expires: 10/14/22  Plan of Care Reviewed with: patient, spouse    Subjective     Chief Complaint: "pain in knee"   Patient/Family Comments/goals: "decreased pain, walk"     Occupational Profile:  Living Environment: Patient lives with their spouse in 2 level home with 2 steps to enter and has a master on first floor with walk in shower   Previous level of function: independent with ADLs   Roles and Routines: Healthcare IT   Equipment Used at Home:  shower chair, other (see comments) (toilet rail)  Assistance upon Discharge: wife     Pain/Comfort:  Pain Rating 1: 6/10  Location - Side 1: Left  Location - Orientation 1: generalized  Location 1: knee  Pain Addressed 1: Pre-medicate for activity, Reposition, " Distraction    Patients cultural, spiritual, Yarsanism conflicts given the current situation: no    Objective:     Communicated with: Nursing prior to session.  Patient found supine with cryotherapy, FCD, SCD, peripheral IV, perineural catheter (Nimbus) upon OT entry to room.    General Precautions: Standard, fall   Orthopedic Precautions:LLE weight bearing as tolerated   Braces: N/A     Occupational Performance:    Bed Mobility:    Patient completed Scooting/Bridging with contact guard assistance  Patient completed Supine to Sit with contact guard assistance    Functional Mobility/Transfers:  Patient completed Sit <> Stand Transfer with contact guard assistance  with  rolling walker   Patient completed chair transfer with step transfer with contact guard assistance with rolling walker   Functional Mobility: patient ambulated to/from bathroom with use of rolling walker at contact guard assistance     Activities of Daily Living:  Grooming: contact guard assistance standing at sink to wash hands   Upper Body Dressing: minimum assistance sitting edge of bed to don gown for back, IV in   Toileting: contact guard assistance completed in standing with rolling walker rolled over toilet, unable to void     Cognitive/Visual Perceptual:  Cognitive/Psychosocial Skills:     -       Oriented to: Person, Place, and Time   -       Follows Commands/attention:Follows multistep  commands    Physical Exam:  Upper Extremity Range of Motion:     -       Right Upper Extremity: WFL  -       Left Upper Extremity: WFL  Upper Extremity Strength:    -       Right Upper Extremity: WFL  -       Left Upper Extremity: WFL    AMPAC 6 Click ADL:  AMPAC Total Score: 19    Treatment & Education:  Patient educated on hand placement for transfers    Patient educated on rolling walker placement for ADLs  Patient educated on role OT and plan of care s/p surgery at [facility], white board updated as needed       Patient left up in chair with  all lines intact, call button in reach, RN notified, and wife present    GOALS:   Multidisciplinary Problems       Occupational Therapy Goals          Problem: Occupational Therapy    Goal Priority Disciplines Outcome Interventions   Occupational Therapy Goal     OT, PT/OT Ongoing, Progressing    Description: Goals to be met by: 10/14/22     Patient will increase functional independence with ADLs by performing:    UE Dressing with Modified [place].  LE Dressing with Supervision.  Grooming while standing at sink with Modified [place].  Toileting from toilet with Modified [place] for hygiene and clothing management.   Toilet transfer to toilet with Modified [place].                         History:     Past Medical History:   Diagnosis Date    Arthritis     Basal cell carcinoma 06/21/2019    back    Cancer     Chronic pain of right knee     SCC (squamous cell carcinoma) 2014    Forehead- Dr. Cabral     Skin cancer     Squamous cell carcinoma 2013    Right ear- Dr. Marianna long         Past Surgical History:   Procedure Laterality Date    APPENDECTOMY      ARTHROSCOPIC CHONDROPLASTY OF KNEE JOINT Left 03/18/2020    Procedure: ARTHROSCOPY, KNEE, WITH CHONDROPLASTY;  Surgeon: FREEMAN Álvarez MD;  Location: [facility];  Service: Orthopedics;  Laterality: Left;    COLONOSCOPY  1998    EPIDURAL STEROID INJECTION INTO LUMBAR SPINE N/A 02/11/2020    Procedure: Injection-steroid-epidural-lumbar--InterLaminar L4-5;  Surgeon: Alexus Anglin Jr., MD;  [facility];  Service: Pain Management;  Laterality: N/A;    INJECTION OF ANESTHETIC AGENT AROUND MEDIAL BRANCH NERVES INNERVATING LUMBAR FACET JOINT Bilateral 11/05/2019    Procedure: Block-nerve-medial branch-lumbar--Bilateral L3-4,L4-5,L5-S1;  Surgeon: Alexus Anglin Jr., MD;  Location: [facility];  Service: Pain Management;  Laterality: Bilateral;    INJECTION OF STEROID Left 11/12/2020    Procedure: INJECTION, STEROID Left SI Joint  Block and Steroid Injection;  Surgeon: Tam Encarnacion MD;  Location: [facility] OR;  Service: Neurosurgery;  Laterality: Left;  Procedure: Left SI Joint Block and Steroid Injection   Surgery Time: 30 min  LOS: 0  Anesthesia: MAC  Radiology: C-arm  Bed: Regular with Pillows  Position: Prone    KNEE ARTHROSCOPY W/ MENISCECTOMY Left 03/18/2020    Procedure: ARTHROSCOPY, KNEE, WITH MENISCECTOMY;  Surgeon: FREEMAN Álvarez MD;  Location: [facility] OR;  Service: Orthopedics;  Laterality: Left;  CLONIDINE/EPI/KETOROLAC/ROPIVACAINE INJECTION 30CC    lipoma removal      skin cancer removal      TRANSFORAMINAL EPIDURAL INJECTION OF STEROID Bilateral 05/06/2020    Procedure: Injection,steroid,epidural,transforaminal approach--Bilateral L4-5;  Surgeon: Alexus Anglin Jr., MD;  Location: [facility];  Service: Pain Management;  Laterality: Bilateral;    TRANSFORAMINAL EPIDURAL INJECTION OF STEROID Bilateral 08/11/2020    Procedure: Injection,steroid,epidural,transforaminal approach--Bilateral L4-5;  Surgeon: Alexus Anglin Jr., MD;  Location: [facility];  Service: Pain Management;  Laterality: Bilateral;    TRANSFORAMINAL EPIDURAL INJECTION OF STEROID Bilateral 04/05/2022    Procedure: Injection,steroid,epidural,transforaminal bilateral L4-5;  Surgeon: Alexus Anglin Jr., MD;  Location: [facility];  Service: Pain Management;  Laterality: Bilateral;  asa        Time Tracking:     OT Date of Treatment: 10/12/22  OT Start Time: 1145  OT Stop Time: 1213  OT Total Time (min): 28 min    Billable Minutes:Evaluation 10  Self Care/Home Management 18    Laurence Ernst, OT  10/12/2022

## 2022-10-12 NOTE — ANESTHESIA PROCEDURE NOTES
Left adductor canal catheter    Patient location during procedure: pre-op   Block not for primary anesthetic.  Reason for block: at surgeon's request and post-op pain management   Post-op Pain Location: Left knee pain   Start time: 10/12/2022 6:42 AM  Timeout: 10/12/2022 6:41 AM   End time: 10/12/2022 7:05 AM    Staffing  Authorizing Provider: Aaliyah Hobson MD  Performing Provider: Irvin Elizalde MD    Preanesthetic Checklist  Completed: patient identified, IV checked, site marked, risks and benefits discussed, surgical consent, monitors and equipment checked, pre-op evaluation and timeout performed  Peripheral Block  Patient position: supine  Prep: ChloraPrep and site prepped and draped  Patient monitoring: heart rate, cardiac monitor, continuous pulse ox, continuous capnometry and frequent blood pressure checks  Block type: adductor canal  Laterality: left  Injection technique: continuous  Needle  Needle type: Tuohy   Needle gauge: 17 G  Needle length: 3.5 in  Needle localization: anatomical landmarks and ultrasound guidance  Catheter type: spring wound  Catheter size: 19 G  Test dose: lidocaine 1.5% with Epi 1-to-200,000 and negative   -ultrasound image captured on disc.  Assessment  Injection assessment: negative aspiration, negative parasthesia and local visualized surrounding nerve  Paresthesia pain: none  Heart rate change: no  Slow fractionated injection: yes  Pain Tolerance: comfortable throughout block and no complaints  Medications:    Medications: ropivacaine (NAROPIN) injection 0.5% - Perineural   10 mL - 10/12/2022 6:50:00 AM    Additional Notes  VSS.  [facility] RN monitoring vitals throughout procedure.  Patient tolerated procedure well.

## 2022-10-12 NOTE — ANESTHESIA POSTPROCEDURE EVALUATION
Anesthesia Post Evaluation    Patient: Baldo Grant    Procedure(s) Performed: Procedure(s) (LRB):  ARTHROPLASTY, KNEE, TOTAL (Left)    Final Anesthesia Type: CSE      Patient location during evaluation: PACU  Patient participation: Yes- Able to Participate  Level of consciousness: awake and alert  Post-procedure vital signs: reviewed and stable  Pain management: adequate  Airway patency: patent    PONV status at discharge: No PONV  Anesthetic complications: no      Cardiovascular status: blood pressure returned to baseline  Respiratory status: unassisted  Hydration status: euvolemic  Follow-up not needed.          Vitals Value Taken Time   /63 10/12/22 1116   Temp 36.8 °C (98.2 °F) 10/12/22 1115   Pulse 75 10/12/22 1125   Resp 18 10/12/22 1125   SpO2 94 % 10/12/22 1125   Vitals shown include unvalidated device data.      Event Time   Out of Recovery 11:15:00         Pain/Marcelino Score: Pain Rating Prior to Med Admin: 7 (10/12/2022  1:56 PM)  Pain Rating Post Med Admin: 0 (10/12/2022 11:15 AM)  Marcelino Score: 9 (10/12/2022 11:15 AM)

## 2022-10-13 VITALS
WEIGHT: 280 LBS | HEART RATE: 73 BPM | HEIGHT: 72 IN | TEMPERATURE: 98 F | RESPIRATION RATE: 18 BRPM | DIASTOLIC BLOOD PRESSURE: 65 MMHG | SYSTOLIC BLOOD PRESSURE: 128 MMHG | OXYGEN SATURATION: 95 % | BODY MASS INDEX: 37.93 KG/M2

## 2022-10-13 PROCEDURE — 97110 THERAPEUTIC EXERCISES: CPT

## 2022-10-13 PROCEDURE — 25000003 PHARM REV CODE 250: Performed by: PHYSICIAN ASSISTANT

## 2022-10-13 PROCEDURE — 63600175 PHARM REV CODE 636 W HCPCS: Performed by: PHYSICIAN ASSISTANT

## 2022-10-13 PROCEDURE — 99900035 HC TECH TIME PER 15 MIN (STAT)

## 2022-10-13 PROCEDURE — 97116 GAIT TRAINING THERAPY: CPT

## 2022-10-13 PROCEDURE — 25000003 PHARM REV CODE 250: Performed by: ANESTHESIOLOGY

## 2022-10-13 PROCEDURE — 94761 N-INVAS EAR/PLS OXIMETRY MLT: CPT

## 2022-10-13 PROCEDURE — 97535 SELF CARE MNGMENT TRAINING: CPT

## 2022-10-13 RX ORDER — METHOCARBAMOL 500 MG/1
500 TABLET, FILM COATED ORAL 4 TIMES DAILY
Qty: 40 TABLET | Refills: 0 | Status: SHIPPED | OUTPATIENT
Start: 2022-10-13 | End: 2022-10-23

## 2022-10-13 RX ADMIN — METHOCARBAMOL 750 MG: 750 TABLET ORAL at 09:10

## 2022-10-13 RX ADMIN — OXYCODONE HYDROCHLORIDE 10 MG: 10 TABLET ORAL at 09:10

## 2022-10-13 RX ADMIN — SENNOSIDES AND DOCUSATE SODIUM 1 TABLET: 50; 8.6 TABLET ORAL at 09:10

## 2022-10-13 RX ADMIN — MUPIROCIN 1 G: 20 OINTMENT TOPICAL at 09:10

## 2022-10-13 RX ADMIN — ACETAMINOPHEN 1000 MG: 500 TABLET ORAL at 06:10

## 2022-10-13 RX ADMIN — OXYCODONE HYDROCHLORIDE 10 MG: 10 TABLET ORAL at 02:10

## 2022-10-13 RX ADMIN — OXYCODONE HYDROCHLORIDE 10 MG: 10 TABLET ORAL at 06:10

## 2022-10-13 RX ADMIN — CEFAZOLIN SODIUM 2 G: 2 SOLUTION INTRAVENOUS at 02:10

## 2022-10-13 RX ADMIN — FAMOTIDINE 20 MG: 20 TABLET, FILM COATED ORAL at 09:10

## 2022-10-13 RX ADMIN — ASPIRIN 81 MG: 81 TABLET, COATED ORAL at 09:10

## 2022-10-13 RX ADMIN — POLYETHYLENE GLYCOL 3350 17 G: 17 POWDER, FOR SOLUTION ORAL at 09:10

## 2022-10-13 RX ADMIN — GABAPENTIN 600 MG: 300 CAPSULE ORAL at 09:10

## 2022-10-13 NOTE — PLAN OF CARE
Pt discharged from unit.  Pt aaox4, vss, no s/s of distress noted.  Dressing to left knee remains cdi.  IV removed from left hand w/ catheter intact.  Discharge instructions given to pt and he verbalized understanding.  Home meds and f/u appts reviewed as well.  [place] given to pt.  Meds delivered to bedside prior to discharge.  Pt left unit via w/c and was accompanied to front of hospital to meet ride.  All personal belongings sent with pt.

## 2022-10-13 NOTE — PROGRESS NOTES
Assessment/Plan:  Status Post left Total Knee Arthroplasty. Doing well postoperatively.     Continues current post-op course     LOS: 0 days     Subjective:  Post-Operative Day: 1 Status Post left Total Knee Arthroplasty  Systemic or Specific Complaints:No Complaints      Objective:  Vital signs in last 24 hours:  Temp:  [97.3 °F (36.3 °C)-98.2 °F (36.8 °C)] 97.9 °F (36.6 °C)  Pulse:  [63-86] 63  Resp:  [11-21] 18  SpO2:  [91 %-100 %] 98 %  BP: (109-140)/(56-84) 130/78    General: alert, appears stated age, and cooperative   Wound: Dressing c/d/i   Motion:  DF/PF intact    DP 2+     Data Review  CBC:   Lab Results   Component Value Date    WBC 5.27 09/16/2022    RBC 4.75 09/16/2022    HGB 13.7 (L) 09/16/2022    HCT 41.8 09/16/2022     09/16/2022

## 2022-10-13 NOTE — DISCHARGE SUMMARY
"[facility])  Orthopedics  Discharge Summary      Patient Name: Baldo Grant  MRN: [id number]  Admission Date: 10/12/2022  Hospital Length of Stay: 0 days  Discharge Date and Time:  10/13/2022   Attending Physician: FREEMAN Álvarez MD   Discharging Provider: Marcello Aggarwal MD  Primary Care Provider: You Britton MD    HPI: Baldo Grant is a 62 y.o. year-old with a longstanding history of left knee pain.  He has failed extensive conservative care to this point.  Preoperative imaging revealed degenerative joint disease and treatment options were discussed.  He elected to proceed with knee replacement.    Procedure(s) (LRB):  ARTHROPLASTY, KNEE, TOTAL (Left)      Hospital Course: Admitted following surgery as above. Doing well overall. Discharged in stable condition.    Consults (From admission, onward)          Status Ordering Provider     Inpatient consult to Respiratory Care  Once        Provider:  (Not yet assigned)    Acknowledged NATHANIEL OCAMPO     Inpatient consult to Respiratory Care  Once        Provider:  (Not yet assigned)    Acknowledged NATHANIEL OCAMPO     Inpatient consult to Social Work  Once        Provider:  (Not yet assigned)    Acknowledged NATHANIEL OCAMPO     Inpatient consult to Pain Management  Once        Provider:  (Not yet assigned)    Acknowledged NATHANIEL OCAMPO     Inpatient consult to Respiratory Care  Once        Provider:  (Not yet assigned)    Acknowledged NATHANIEL OCAMPO            Significant Diagnostic Studies: Labs: BMP: No results for input(s): GLU, NA, K, CL, CO2, BUN, CREATININE, CALCIUM, MG in the last 48 hours.    Pending Diagnostic Studies:       None          There are no hospital problems to display for this patient.     Discharged Condition: good    Disposition: Home or Self Care    Follow Up:    Patient Instructions:      WALKER FOR HOME USE     Order Specific Question Answer Comments   Type of Walker: Adult (5'4"-6'6")    With " wheels? Yes    Height: 6' (1.829 m)    Weight: 127 kg (280 lb)    Length of need (1-99 months): 99    Does patient have medical equipment at home? shower chair toilet rail / toilet rail   Does patient have medical equipment at home? other (see comments)    Please check all that apply: Patient's condition impairs ambulation.    Please check all that apply: Patient is unable to safely ambulate without equipment.      COMMODE FOR HOME USE     Order Specific Question Answer Comments   Type: Standard    Height: 6' (1.829 m)    Weight: 127 kg (280 lb)    Does patient have medical equipment at home? shower chair toilet rail / toilet rail   Does patient have medical equipment at home? other (see comments)    Length of need (1-99 months): 99      Activity as tolerated     Sponge bath only until clinic visit     Keep surgical extremity elevated     Lifting restrictions   Order Comments: No strenuous exercise or lifting of > 10 lbs     Weight bearing as tolerated     No driving, operating heavy equipment or signing legal documents while taking pain medication     Leave dressing on - Keep it clean, dry, and intact until clinic visit   Order Comments: Do not remove surgical dressing for 2 weeks post-op. This will be done only by MD at initial post-op visit. If dressing is completely saturated, replace with identical dressing - silver-impregnated hydrocolloid dressing.     Do not get dressings wet. Do not shower.     If dressing continues to be saturated or there are signs of infection, please call [facility] [phone] for further instructions and to make appt to be seen.     Call MD for:  temperature >100.4     Call MD for:  persistent nausea and vomiting     Call MD for:  severe uncontrolled pain     Call MD for:  difficulty breathing, headache or visual disturbances     Call MD for:  redness, tenderness, or signs of infection (pain, swelling, redness, odor or green/yellow discharge around incision site)     Call MD for:   hives     Call MD for:  persistent dizziness or light-headedness     Call MD for:  extreme fatigue     Medications:  Reconciled Home Medications:      Medication List        CONTINUE taking these medications      aspirin 81 MG EC tablet  Commonly known as: ECOTRIN  Take 1 tablet (81 mg total) by mouth 2 (two) times daily.     diclofenac sodium 1 % Gel  Commonly known as: VOLTAREN  Apply 2 g topically 4 (four) times daily.     diphenhydrAMINE 25 mg capsule  Commonly known as: BENADRYL  Take 25 mg by mouth every 6 (six) hours as needed for Itching.     ENSTILAR 0.005-0.064 % Foam  Generic drug: calcipotriene-betamethasone  Apply 1 application topically once daily.     EPINEPHrine 0.3 mg/0.3 mL Atin  Commonly known as: EPIPEN     fexofenadine 180 MG tablet  Commonly known as: ALLEGRA  Take 180 mg by mouth continuous prn.     gabapentin 600 MG tablet  Commonly known as: NEURONTIN  Take 1 tablet (600 mg total) by mouth 3 (three) times daily.     LIDOcaine HCL 2% 2 % jelly  Commonly known as: XYLOCAINE  Apply topically as needed.     meloxicam 15 MG tablet  Commonly known as: MOBIC  Take 1 tablet (15 mg total) by mouth once daily.     multivitamin per tablet  Commonly known as: THERAGRAN  Take 1 tablet by mouth once daily.     ondansetron 4 MG tablet  Commonly known as: ZOFRAN  Take 1 tablet (4 mg total) by mouth every 8 (eight) hours as needed for Nausea.     ondansetron 4 MG Tbdl  Commonly known as: ZOFRAN-ODT  Dissolve 1 tablet (4 mg total) by mouth 2 (two) times daily.     oxyCODONE 5 MG immediate release tablet  Commonly known as: ROXICODONE  Take 1-2 tablets (5-10 mg total) by mouth every 4 to 6 hours as needed for Pain.     sumatriptan 50 MG tablet  Commonly known as: IMITREX  Take one at the first sign of a headache.  You may repeat it in 1 hour as needed.     tiZANidine 4 MG tablet  Commonly known as: ZANAFLEX            STOP taking these medications      HYDROcodone-acetaminophen 5-325 mg per tablet  Commonly  known as: SAADIA Aggarwal MD  Orthopedics  [facility]

## 2022-10-13 NOTE — PLAN OF CARE
Problem: Adjustment to Surgery (Knee Arthroplasty)  Goal: Optimal Coping  Intervention: Support Psychosocial Response to Surgery and Mobility Changes  Flowsheets (Taken 10/13/2022 1016)  Supportive Measures: positive reinforcement provided     Problem: Bleeding (Knee Arthroplasty)  Goal: Absence of Bleeding  Intervention: Monitor and Manage Bleeding  Flowsheets (Taken 10/13/2022 1016)  Bleeding Management: dressing monitored     Problem: Infection (Knee Arthroplasty)  Goal: Absence of Infection Signs and Symptoms  Intervention: Prevent or Manage Infection  Flowsheets (Taken 10/13/2022 1016)  Infection Management: aseptic technique maintained     Problem: Pain (Knee Arthroplasty)  Goal: Acceptable Pain Control  Intervention: Prevent or Manage Pain  Flowsheets (Taken 10/13/2022 1016)  Pain Management Interventions:   care clustered   cold applied   pain management plan reviewed with patient/caregiver   pain pump in use     Problem: Fall Injury Risk  Goal: Absence of Fall and Fall-Related Injury  Intervention: Promote Injury-Free Environment  Flowsheets (Taken 10/13/2022 1016)  Safety Promotion/Fall Prevention:   assistive device/personal item within reach   Fall Risk reviewed with patient/family   lighting adjusted   medications reviewed   in recliner, wheels locked   instructed to call staff for mobility

## 2022-10-13 NOTE — PROGRESS NOTES
Dr. Aggarwal notified that pt is requesting robaxin for home.  MD stated that he will send RX to pt's pharmacy.  Pt made aware.

## 2022-10-13 NOTE — PT/OT/SLP PROGRESS
"Physical Therapy Treatment and Discharge     Patient Name:  Baldo Grant   MRN:  [id number]    Recommendations:     Discharge Recommendations:  outpatient PT   Discharge Equipment Recommendations: walker, rolling   Barriers to discharge: None    Assessment:     Baldo Grant is a 62 y.o. male admitted with a medical diagnosis of s/p L TKA. He presents with the following impairments/functional limitations:  weakness, impaired functional mobility, gait instability, impaired balance, decreased lower extremity function, pain, decreased ROM, impaired skin, orthopedic precautions.Patient tolerated PT session well. Patient ambulated 150ft with RW and supervision. No LOB or SOB noted. Patient ascended/descended 3 6" curb steps with RW and contact guard assistance. Patient performed L LE therex x10 reps. Patient has an OPPT appointment on 10/14/2022. Patient ready to discharge home from PT standpoint.     Rehab Prognosis: Good; patient would benefit from acute skilled PT services to address these deficits and reach maximum level of function.    Recent Surgery: Procedure(s) (LRB):  ARTHROPLASTY, KNEE, TOTAL (Left) 1 Day Post-Op    Plan:     During this hospitalization, patient to be seen daily to address the identified rehab impairments via gait training, therapeutic activities, therapeutic exercises and progress toward the following goals:    Plan of Care Expires:  10/14/22    Subjective     Chief Complaint: Left knee pain.   Patient/Family Comments/goals: To walk without pain.   Pain/Comfort:  Pain Rating 1:  (yes but did not rate with number)  Location - Side 1: Left  Location 1: knee  Pain Addressed 1: Pre-medicate for activity, Distraction, Reposition      Objective:     Communicated with RN prior to session.  Patient found up in chair with cryotherapy, perineural catheter (Nimbus) upon PT entry to room. Wife present in room.     General Precautions: Standard, fall   Orthopedic Precautions:LLE weight bearing as " "tolerated   Braces: N/A  Respiratory Status: Room air     Functional Mobility:  Mat Mobility:     Supine to Sit: supervision  Sit to Supine: supervision  Transfers:     Sit to Stand:  supervision with rolling walker x1 from bedside chair and x1 from mat   Gait: Patient ambulated 150ft with Rolling Walker and supervision using 3-point gait. Patient demonstrated decreased cristin and decreased step length during gait due to pain, decreased ROM, and decreased strength.  Stairs:  Patient ascended/descended 6" curb step with RW and contact guard assistance. Patient ascended/descended 6" curb step with counter top support and wife hand held assistance. Patient ascended/descended 6" curb steps with RW sideways, counter top support, and contact guard assistance.    AM-PAC 6 CLICK MOBILITY  Turning over in bed (including adjusting bedclothes, sheets and blankets)?: 4  Sitting down on and standing up from a chair with arms (e.g., wheelchair, bedside commode, etc.): 4  Moving from lying on back to sitting on the side of the bed?: 4  Moving to and from a bed to a chair (including a wheelchair)?: 4  Need to walk in hospital room?: 4  Climbing 3-5 steps with a railing?: 3  Basic Mobility Total Score: 23       Therapeutic Activities and Exercises:  Patient educated in and performed L LE exercises x10 reps for ankle pumps, quad set, glute set, SAQ over bolster, heel slides, hip abd/add, SLR, and LAQ.    Patient and wife educated in:  -PT role and POC  -safety with transfers including hand placement  -gait sequencing and RW management  -OOB activity to maximize recovery including ambulating at home to prevent DVT   -van transfer  -curb training  -HEP for therex at home with handout provided       Patient left up in chair with all lines intact, call button in reach, RN notified, and wife present.    GOALS:   Multidisciplinary Problems       Physical Therapy Goals          Problem: Physical Therapy    Goal Priority Disciplines Outcome " Goal Variances Interventions   Physical Therapy Goal     PT, PT/OT Ongoing, Progressing     Description: Goals to be met by: 10/14/2022    Patient will increase functional independence with mobility by performin. Supine to sit with supervision  2. Sit to stand transfer with Supervision  3. Gait x300 feet with Supervision using Rolling Walker  4. Ascend/Descend 1 curb step with Stand by assistance using Rolling Walker  5. Lower extremity exercise program x30 reps per handout, with supervision                             Time Tracking:     PT Received On: 10/13/22  PT Start Time: 0940     PT Stop Time: 1013  PT Total Time (min): 33 min     Billable Minutes: Gait Training 20 and Therapeutic Exercise 13    Treatment Type: Treatment  PT/PTA: PT     PTA Visit Number: 0     10/13/2022

## 2022-10-13 NOTE — PROGRESS NOTES
Acute Pain Service and Perioperative Surgical Home Progress Note    HPI  Baldo Grant is a 62 y.o., male,  POD 1 L TKA, DM 2    Interval history      Surgery:  Procedure(s) (LRB):  ARTHROPLASTY, KNEE, TOTAL (Left)    Post Op Day #: 1    Catheter type: Perineural Adductor Canal    Infusion type: Ropivacaine 0.2%, with a 10cc automatic bolus every 3 hours, combined with a 5 cc patient controlled bolus available [id number]    Problem List:  There are no hospital problems to display for this patient.      Subjective:       General appearance of alert, oriented, no complaints   Pain with rest: 2    Faces   Pain with movement: 2    Faces   Side Effects    1. Pruritis No    2. Nausea No    3. Motor Blockade No, 0=Ability to raise lower extremities off bed    4. Sedation No, 1=awake and alert    Schedule Medications:    acetaminophen  1,000 mg Oral Q6H    aspirin  81 mg Oral BID    famotidine  20 mg Oral BID    gabapentin  600 mg Oral TID    methocarbamoL  750 mg Oral TID    mupirocin  1 g Nasal BID    polyethylene glycol  17 g Oral Daily    senna-docusate 8.6-50 mg  1 tablet Oral BID        Continuous Infusions:   sodium chloride 0.9% 150 mL/hr at 10/12/22 2343    ropivacaine (PF) 2 mg/ml (0.2%)          PRN Medications:  bisacodyL, morphine, naloxone, ondansetron, oxyCODONE, oxyCODONE, prochlorperazine       Antibiotics:  Antibiotics (From admission, onward)      Start     Stop Route Frequency Ordered    10/12/22 1300  mupirocin 2 % ointment 1 g         10/17 0859 Nasl 2 times daily 10/12/22 1259               Objective:     Catheter site clean, dry, intact          Vital Signs (Most Recent):  Temp: 97.9 °F (36.6 °C) (10/13/22 0428)  Pulse: 63 (10/13/22 0428)  Resp: 18 (10/13/22 0428)  BP: 130/78 (10/13/22 0428)  SpO2: 98 % (10/13/22 0428) Vital Signs Range (Last 24H):  Temp:  [97.3 °F (36.3 °C)-98.2 °F (36.8 °C)]   Pulse:  [63-86]   Resp:  [11-21]   BP: (109-140)/(56-84)   SpO2:  [91 %-100 %]          I & O (Last  24H):  Intake/Output Summary (Last 24 hours) at 10/13/2022 0550  Last data filed at 10/13/2022 0441  Gross per 24 hour   Intake 4000 ml   Output 4800 ml   Net -800 ml       Physical Exam:    GA: Alert, comfortable, no acute distress.   Pulmonary: Clear to auscultation A/P/L. No wheezing, crackles, or rhonchi.  Cardiac: RRR S1 & S2 w/o rubs/murmurs/gallops.   Abdominal:Bowel sounds present. No tenderness to palpation or distension. No appreciable hepatosplenomegaly.   Skin: No jaundice, rashes, or visible lesions.         Laboratory:  CBC: No results for input(s): WBC, RBC, HGB, HCT, PLT, MCV, MCH, MCHC in the last 72 hours.    BMP: No results for input(s): NA, K, CO2, CL, BUN, CREATININE, GLU, MG, PHOS, CALCIUM in the last 72 hours.    No results for input(s): PT, INR, PROTIME, APTT in the last 72 hours.      Anticoagulant dose 10/12/22 at 2037.    Assessment:         Pain control adequate    Plan:     1) Pain:    Adductor canal perineural catheter in place and infusing. Good level. Multimodal pain regimen with acetaminophen, Celebrex, Lyrica, and prn oxycodone given  Will continue to monitor. Plan to discharge with Nimbus pain pump on POD #1.   2) DM- continue home meds   3) DVT prophylaxis- ASA   4) FEN/GI: Tolerating liquids, advance diet as tolerated. + flatus. +urinating.   5) Dispo: Pt working well with PT/OT. Case management and SW for placement. Plan for discharge POD #1.        Evaluator Adeline Rain PA-C      I have seen the patient and reviewed the PA's assessment and plan. I have personally interviewed and examined the patient at bedside and agree with the findings.                       Crow Dasilva MD  Staff Anesthesiologist   Perioperative Surgical Home and Acute Pain Service

## 2022-10-13 NOTE — PLAN OF CARE
Problem: Adult Inpatient Plan of Care  Goal: Plan of Care Review  Outcome: Ongoing, Progressing  Goal: Patient-Specific Goal (Individualized)  Outcome: Ongoing, Progressing  Goal: Absence of Hospital-Acquired Illness or Injury  Outcome: Ongoing, Progressing  Goal: Optimal Comfort and Wellbeing  Outcome: Ongoing, Progressing  Goal: Readiness for Transition of Care  Outcome: Ongoing, Progressing     Problem: Neurovascular Compromise (Knee Arthroplasty)  Goal: Intact Neurovascular Status  Outcome: Ongoing, Progressing     Problem: Pain (Knee Arthroplasty)  Goal: Acceptable Pain Control  Outcome: Ongoing, Progressing     Problem: Fall Injury Risk  Goal: Absence of Fall and Fall-Related Injury  Outcome: Ongoing, Progressing    Pt remained free from falls or injuries this shift. Pt independent in repositioning. No skin breakdown noticed. Pt rested well through the night.  Wife at bedside. Ambulating and voiding well. Medicated prn pain q3hrs. VSS

## 2022-10-13 NOTE — PT/OT/SLP PROGRESS
"Occupational Therapy   Treatment/Discharge    Name: Baldo Grant  MRN: [id number]  Admitting Diagnosis:  <principal problem not specified>  1 Day Post-Op    Recommendations:     Discharge Recommendations: home  Discharge Equipment Recommendations:  walker, rolling  Barriers to discharge:  None    Assessment:     Baldo Grant is a 62 y.o. male with a medical diagnosis of <principal problem not specified>. Patient is s/p left TKA. He completed lower body dressing at supervision and further ADLs at modified independence. Patient is appropriate for discharge home. Performance deficits affecting function are weakness, impaired functional mobility, gait instability, impaired balance, impaired self care skills, decreased lower extremity function, decreased ROM.     Rehab Prognosis:  Good; patient would benefit from acute skilled OT services to address these deficits and reach maximum level of function.       Plan:     DC from OT    Subjective     "I am doing well today"     Pain/Comfort:  Pain Rating 1: 5/10  Location - Side 1: Left  Location 1: knee  Pain Addressed 1: Pre-medicate for activity, Reposition, Distraction    Objective:     Communicated with: RN prior to session.  Patient found supine with cryotherapy, FCD, SCD, perineural catheter (Nimbus) upon OT entry to room.    General Precautions: Standard, fall   Orthopedic Precautions:LLE weight bearing as tolerated   Braces: N/A  Respiratory Status: Room air     Occupational Performance:     Bed Mobility:    Patient completed Scooting/Bridging with modified independence  Patient completed Supine to Sit with modified independence     Functional Mobility/Transfers:  Patient completed Sit <> Stand Transfer with modified independence  with  rolling walker   Patient completed chair transfer with modified independence with rolling walker   Functional Mobility: patient ambulated to/from bathroom with use of rolling walker     Activities of Daily Living:  Grooming: " modified independence standing at sink to wash hands, brush teeth   Upper Body Dressing: modified independence sitting in chair to don overhead shirt   Lower Body Dressing: supervision sitting in chair to don underwear/pants, doff socks and don shoes   Toileting: modified independence completed in standing with rolling walker rolled over toilet       [facility] 6 Click ADL: 23    Treatment & Education:  -Patient educated to dress surgical side first and further dressing techniques s/p surgery   -Patient educated on hand placement for transfers   -Patient educated on rolling walker placement for ADLs  -Patient educated on proper foot wear s/p surgery   -Patient educated on car transfers s/p surgery  -Patient educated on role OT and plan of care s/p surgery at [facility], white board updated as needed     Patient left up in chair with call button in reach, RN notified, wife present, and ice donned and Nimbus intact     GOALS:   Multidisciplinary Problems       Occupational Therapy Goals          Problem: Occupational Therapy    Goal Priority Disciplines Outcome Interventions   Occupational Therapy Goal     OT, PT/OT Ongoing, Progressing    Description: Goals to be met by: 10/14/22     Patient will increase functional independence with ADLs by performing:    UE Dressing with Modified [place].  LE Dressing with Supervision.  Grooming while standing at sink with Modified [place].  Toileting from toilet with Modified [place] for hygiene and clothing management.   Toilet transfer to toilet with Modified [place].                         Time Tracking:     OT Date of Treatment: 10/13/22  OT Start Time: 0847  OT Stop Time: 0912  OT Total Time (min): 25 min    Billable Minutes:Self Care/Home Management 25               10/13/2022

## 2022-10-13 NOTE — NURSING
[facility] Pump teaching done w/ patient & patient's spouse, Lilibeth.  Instructed to remove on day 5,  Oct 17th at 12 noon or when pump alarms infusion complete. Demonstrated and explained how to remove catheter.Pt and pt's spouse verbalized understanding.  All questions answered. [facility] Pump contract signed, home care instxn pamphlet, home care supplies provided to patient, placed in discharge folder. Encouraged to contact anesthesia using # on brochure with any questions/concerns re: pain, side effects.. Instructed to call [facility] for any pump related issues. Informed that pt/fmly will receive follow up calls.

## 2022-10-14 ENCOUNTER — OFFICE VISIT (OUTPATIENT)
Dept: SPORTS MEDICINE | Facility: CLINIC | Age: 62
End: 2022-10-14
Payer: OTHER GOVERNMENT

## 2022-10-14 ENCOUNTER — PATIENT MESSAGE (OUTPATIENT)
Dept: SPORTS MEDICINE | Facility: CLINIC | Age: 62
End: 2022-10-14

## 2022-10-14 DIAGNOSIS — Z09 SURGERY FOLLOW-UP EXAMINATION: ICD-10-CM

## 2022-10-14 DIAGNOSIS — Z96.652 STATUS POST TOTAL LEFT KNEE REPLACEMENT: Primary | ICD-10-CM

## 2022-10-14 PROBLEM — G89.29 CHRONIC PAIN: Status: RESOLVED | Noted: 2019-11-05 | Resolved: 2022-10-14

## 2022-10-14 PROBLEM — M54.16 LUMBAR RADICULOPATHY: Status: RESOLVED | Noted: 2019-07-31 | Resolved: 2022-10-14

## 2022-10-14 PROBLEM — R68.89 IMPAIRED TOLERANCE OF ACTIVITY: Status: RESOLVED | Noted: 2022-01-11 | Resolved: 2022-10-14

## 2022-10-14 PROCEDURE — 99024 POSTOP FOLLOW-UP VISIT: CPT | Mod: 95,,, | Performed by: PHYSICIAN ASSISTANT

## 2022-10-14 PROCEDURE — 99024 PR POST-OP FOLLOW-UP VISIT: ICD-10-PCS | Mod: 95,,, | Performed by: PHYSICIAN ASSISTANT

## 2022-10-14 RX ORDER — MELOXICAM 15 MG/1
15 TABLET ORAL DAILY
Qty: 30 TABLET | Refills: 2 | Status: SHIPPED | OUTPATIENT
Start: 2022-10-14 | End: 2022-11-11 | Stop reason: SDUPTHER

## 2022-10-14 NOTE — PROGRESS NOTES
I called the patient today regarding his surgery with Dr. ARNIE Álvarez. The patient had a left TKA on 11/12/2022.    Pain Scale: 5 / 10    Any issues with Fever: No.    Any issues with medications (specifically DVT prophylaxis): Yes:     Any issues with bowel movements:  Passing barrett: Yes:                                                                  Urination: Yes:                                                                  Constipation: No.    Completing at home exercises: Yes:     Any concerns regarding their dressing/bandage:  No.    Patient confirmed first OP-PT appointment: today    Any other concerns: No.        The patient was informed that if they have any urgent issues with their bandage, medications or any other health concerns regarding their surgery to call the 24/7 Orthopedic Post-op Hot Line at [phone]. The patient was reminded that if they have any chest pain or shortness of breath to call 911 or go to the ER.    The patient verbalized understanding and does not have any other questions

## 2022-10-17 ENCOUNTER — PATIENT MESSAGE (OUTPATIENT)
Dept: SPORTS MEDICINE | Facility: CLINIC | Age: 62
End: 2022-10-17
Payer: OTHER GOVERNMENT

## 2022-10-17 DIAGNOSIS — M17.12 PRIMARY OSTEOARTHRITIS OF LEFT KNEE: ICD-10-CM

## 2022-10-17 RX ORDER — OXYCODONE HYDROCHLORIDE 5 MG/1
5-10 TABLET ORAL
Qty: 40 TABLET | Refills: 0 | Status: SHIPPED | OUTPATIENT
Start: 2022-10-17 | End: 2022-10-18 | Stop reason: RX

## 2022-10-17 NOTE — ADDENDUM NOTE
Addendum  created 10/17/22 1456 by Irvin Elizalde MD    Clinical Note Signed, Intraprocedure Event edited

## 2022-10-17 NOTE — PROGRESS NOTES
Ibrahima called patient regarding his refill request. He is requesting a refill on his roxicodone because he will run out of medication tomorrow. Will refill one more time then defer back  to pain management  He also reports a rash to his low back that is not itchy. Recommended over the counter cortisone cream.

## 2022-10-17 NOTE — ANESTHESIA POST-OP PAIN MANAGEMENT
10/17/2022    I called Baldo Grant in regards to the PNC and Nimbus pump. No erythema or swelling at the insertion site. He noted that the catheter was removed yesterday without any problems.  I encouraged him to call if he had any questions or concerns.    Irvin Elizalde MD   Fellow  Regional Anesthesiology and Acute Pain Medicine  [facility]

## 2022-10-18 ENCOUNTER — PATIENT MESSAGE (OUTPATIENT)
Dept: SPORTS MEDICINE | Facility: CLINIC | Age: 62
End: 2022-10-18
Payer: OTHER GOVERNMENT

## 2022-10-18 DIAGNOSIS — M17.12 PRIMARY OSTEOARTHRITIS OF LEFT KNEE: ICD-10-CM

## 2022-10-18 RX ORDER — OXYCODONE HYDROCHLORIDE 5 MG/1
5-10 TABLET ORAL
Qty: 40 TABLET | Refills: 0 | Status: SHIPPED | OUTPATIENT
Start: 2022-10-18

## 2022-10-18 NOTE — PROGRESS NOTES
Patient called saying the medication refill was unavailable at the pharmacy it was sent to yesterday. Will resend to other pharmacy and cancel previous.

## 2022-10-19 ENCOUNTER — PATIENT MESSAGE (OUTPATIENT)
Dept: ADMINISTRATIVE | Facility: OTHER | Age: 62
End: 2022-10-19
Payer: OTHER GOVERNMENT

## 2022-10-20 ENCOUNTER — PATIENT MESSAGE (OUTPATIENT)
Dept: SPORTS MEDICINE | Facility: CLINIC | Age: 62
End: 2022-10-20
Payer: OTHER GOVERNMENT

## 2022-10-20 ENCOUNTER — PATIENT MESSAGE (OUTPATIENT)
Dept: PAIN MEDICINE | Facility: CLINIC | Age: 62
End: 2022-10-20
Payer: OTHER GOVERNMENT

## 2022-10-20 ENCOUNTER — PATIENT MESSAGE (OUTPATIENT)
Dept: ADMINISTRATIVE | Facility: OTHER | Age: 62
End: 2022-10-20
Payer: OTHER GOVERNMENT

## 2022-10-21 ENCOUNTER — OFFICE VISIT (OUTPATIENT)
Dept: PAIN MEDICINE | Facility: CLINIC | Age: 62
End: 2022-10-21
Payer: OTHER GOVERNMENT

## 2022-10-21 ENCOUNTER — TELEPHONE (OUTPATIENT)
Dept: SPORTS MEDICINE | Facility: CLINIC | Age: 62
End: 2022-10-21
Payer: OTHER GOVERNMENT

## 2022-10-21 VITALS
SYSTOLIC BLOOD PRESSURE: 131 MMHG | HEART RATE: 77 BPM | BODY MASS INDEX: 37.93 KG/M2 | DIASTOLIC BLOOD PRESSURE: 86 MMHG | HEIGHT: 72 IN | WEIGHT: 280 LBS

## 2022-10-21 DIAGNOSIS — M51.36 DDD (DEGENERATIVE DISC DISEASE), LUMBAR: ICD-10-CM

## 2022-10-21 DIAGNOSIS — F11.90 CHRONIC, CONTINUOUS USE OF OPIOIDS: ICD-10-CM

## 2022-10-21 DIAGNOSIS — G89.4 CHRONIC PAIN SYNDROME: Primary | ICD-10-CM

## 2022-10-21 DIAGNOSIS — M48.061 NEUROFORAMINAL STENOSIS OF LUMBAR SPINE: ICD-10-CM

## 2022-10-21 DIAGNOSIS — M94.262 CHONDROMALACIA OF LEFT KNEE: ICD-10-CM

## 2022-10-21 DIAGNOSIS — M17.12 PRIMARY OSTEOARTHRITIS OF LEFT KNEE: ICD-10-CM

## 2022-10-21 DIAGNOSIS — S83.232D COMPLEX TEAR OF MEDIAL MENISCUS OF LEFT KNEE AS CURRENT INJURY, SUBSEQUENT ENCOUNTER: ICD-10-CM

## 2022-10-21 DIAGNOSIS — M54.89 VERTEBROGENIC PAIN: ICD-10-CM

## 2022-10-21 DIAGNOSIS — Z96.652 S/P TOTAL KNEE REPLACEMENT, LEFT: Primary | ICD-10-CM

## 2022-10-21 DIAGNOSIS — Z96.652 TOTAL KNEE REPLACEMENT STATUS, LEFT: ICD-10-CM

## 2022-10-21 PROCEDURE — 99999 PR PBB SHADOW E&M-EST. PATIENT-LVL IV: CPT | Mod: PBBFAC,,, | Performed by: NURSE PRACTITIONER

## 2022-10-21 PROCEDURE — 99999 PR PBB SHADOW E&M-EST. PATIENT-LVL IV: ICD-10-PCS | Mod: PBBFAC,,, | Performed by: NURSE PRACTITIONER

## 2022-10-21 PROCEDURE — 99214 PR OFFICE/OUTPT VISIT, EST, LEVL IV, 30-39 MIN: ICD-10-PCS | Mod: S$PBB,,, | Performed by: NURSE PRACTITIONER

## 2022-10-21 PROCEDURE — 99214 OFFICE O/P EST MOD 30 MIN: CPT | Mod: PBBFAC,PO | Performed by: NURSE PRACTITIONER

## 2022-10-21 PROCEDURE — 99214 OFFICE O/P EST MOD 30 MIN: CPT | Mod: S$PBB,,, | Performed by: NURSE PRACTITIONER

## 2022-10-21 RX ORDER — OXYCODONE HYDROCHLORIDE 5 MG/1
5-10 TABLET ORAL EVERY 6 HOURS PRN
Qty: 40 TABLET | Refills: 0 | Status: SHIPPED | OUTPATIENT
Start: 2022-10-21

## 2022-10-21 NOTE — PROGRESS NOTES
Spoke with pain management, Jeffy Ramos, regarding his pain medication. We will continue to refill his roxicodone until he is 2 weeks post op. He is still taking roxicodone 10mg every 6 hours prn pain. Medication refill sent.

## 2022-10-21 NOTE — PROGRESS NOTES
"Chronic Pain-Established Note (Follow up visit)    Referred by: Dr. Encarnacion  Reason for referral: Back Pain    CC:   Chief Complaint   Patient presents with    Back Pain    Lumbar Spine Pain (L-Spine)         Last 3 PDI Scores 9/2/2022 5/4/2022 3/24/2022   Pain Disability Index (PDI) 41 47 36     Interval Updates   10/21/4737-10-fmvw-old male that presents today with his wife, he presents in a wheelchair he is status post a left TKA on 11/12/2022 with Dr. Álvarez.  He presents today with continued left knee pain following his surgery, he is requesting a refill of oxycodone 5 mg.  He has received x2 prescriptions of oxycodone 5 mg tablets 1 that was filled on 10/03 20-40 pills the other prescription was filled on 10/18/2022 5 mg of oxycodone 40 pills for total of 80 pills over the course of 15 days.        9/2/2022 - Mr. Grant is following up for medication refill to manage chronic low back and left leg pain.  A refill of Hydrocodone-Acetaminophen 5-325 mg t.i.d. is being requested today.  He reports 50% relief with for the current medication regimen.  He reports the following medication side effects: none.  Pain is currently rate 4/10 with a weekly range 4-5/10.  He did report a recent spinal cord stimulator implant that was done [facility] 08[facility]19 [facility].  He did mention that 4-5 days following the SCS implant he climbed in his attic to perform some work duties for his home while reaching up to grab something he felt a sharp pain, he then [facility] and his right lead appears to have migrated and is now in a " S shape"  He states that his spinal cord stimulator is addressing 1/3 of his pain he reported  increasing pain yesterday in which he had to take Norco 5-325 t.i.d. along with gabapentin.  Overall the SCS implant is helping, but he continues to work with the [facility],  he is currently using 4 programs for his SCS therapy.     7/6/2022 - Mr. Grant is following up " for Back Pain and Lumbar Spine Pain (L-Spine).  He requests a refill of Hydrocodone-Acetaminophen 5-325 mg TID  which are  working fairly well to control His pain.  He reports 50% relief with the current medication regimen.  Medication side effects: none.  Pain is currently rate 4/10 with a weekly range 4-5/10.  It is described as Aching.   Pain is worsened by: nothing in particular and improved by: rest.  Mr. Grant has informed me that he will be getting a [employer] SCS [facility] to help iwht LBP and left leg pain. Dr. Grijalva will be implanting the stimulator. He has also informed me that he will having a left TKA.        5/4/2022  - Mr. Grant is following up for Back Pain and Lumbar Spine Pain (L-Spine) and bilateral leg pain.  He requests a refill of Hydrocodone-Acetaminophen 5-325 mg TID # 90 pills per month is what he would prefer and  are  working well to control His pain.  He reports 50-60% relief with the current medication regimen and improved functionality. Pain is currently rate 7/10 with a weekly range 7-8/10.  Lastly, he would like to be resubmitted back into the portal for consideration of the Intracept procedure.  Lastly, his most recently lumbar Bilateral TFEIS at L4-5 provided him with 50% relief for one week and then pain returned.     3/24/2022  - Mr. Grant is following up for Back Pain and Lumbar Spine Pain (L-Spine).  He requests a refill of Hydrocodone-Acetaminophen 5-325 mg BID # 60 which he says is effective but would like to go back to TID dosing.  He reports 50% relief with the current medication regimen. Pain is currently rate 5/10 with a weekly range 5-6/10.   He is also reporting increased pain in the low back left greater than right as well as legs bilaterally left greater than right.  Pain starts in low back and radiates into his legs posteriorly laterally anteriorly also causing scrotum pain.  Denies any profound weakness although he is walking with a cane this  point.  Denies any bowel bladder dysfunction, denies any saddle anesthesia denies any recent incident or trauma.      01/12/2022 - Mr. Grant is following up for Back Pain and Lumbar Spine Pain (L-Spine).  He requests a refill of Norco 7.5-325 90 pills per month which are working well to control His pain.  He reports 60% relief with the current medication regimen.  Medication side effects: none.  Pain is currently rate 6/10 with a weekly range 3-8/10.  It is described as Aching, Tight, Tingling, Numb, Sharp and Shooting.   Pain is worsened by: standing and improved by: nothing and medications.     10/25/2021 -- Mr. Grant is following up for Back Pain and Lumbar Spine Pain (L-Spine).  He requests a refill of Hydrocodone-Acetaminophen 5-325 mg which is not working well to control His pain.  He reports 50% relief with the current medication regimen.  Medication side effects: none.  Pain is currently rate 6/10 with a weekly range 5-9/10.  It is described as Aching, Tight, Tingling, Numb, Sharp and Shooting.   Pain is worsened by: standing and improved by: nothing and medications.     09/27/21 - Mr. Grant is following up for Back Pain and Lumbar Spine Pain (L-Spine).  He requests a refill of Hydrocodone-Acetaminophen 5-325 mg TID # 90 which are not working well to control His pain.  He reports 50% relief with the current medication regimen. Pain is currently rate 7/10 with a weekly range 6-7/10. Mr. Grant is reporting continued left low back pain with radiating symptoms into his left and now right scrotum.  He mentioned his right scrotum has not been affected previously.  He also reports pain radiating into his left lateral leg stopping just above his left knee.  His pain has been refractory to multiple injections and physical therapy, he reports the inability to have a quality of life as he states he recently tried to have sex with his wife and could not due to the pain.  His current opioid regimen provide some  "relief but not significant enough as he would like to consider other options, he endorses that his insurance plan denied the intracept procedure that we were going to consider him for.     08/27/21 - Mr. Grant is following up for Back Pain and Lumbar Spine Pain (L-Spine).  He requests a refill of Hydrocodone-Acetaminophen 5-325 mg TID # 90 per month which are working well to control His pain.  He reports 50% relief with the current medication regimen.  Medication side effects: none.  Pain is currently rate 5/10 with a weekly range 5-9/10.  It is described as Aching, Dull, electrical shock(left leg)  and Sharp.   Pain is worsened by: exercise, flexion, standing for more than 3 minutes and walking for more than 3 minutes and improved by: heat, laying down, massage, medications, physical therapy, rest and sitting.    7/23/21 - Mr. Grant returns to clinic for follow up visit reporting worse left sided low back and left leg pain.  Patient is here for medication refill of Hydrocodone-Acetaminophen 5-325 mg TID PRN for pain, he reports 60-70% relief for 3-4 hours his pain is predicated on how much activity he is doing. He also receives Gabapentin 600 mg TID and Mobic 15 mg daily. He is reporting is reporting left scrotum and left leg pain today, this is his worse pain today.  Pain intensity is currently 7/10.      06/16/2021 - Mr. Grant returns to clinic for follow up visit reporting stable but severe back pain. Pain intensity is currently 5/10.  His PCP recently left [facility] and he was referred her for re-evaluation by his new primary care team.  Patient reports continued low back pain with occasional radiation in to the legs.  Pain fluctuates depending on level of activity.  He reports "flare ups" at least twice weekly during which he may take 2-4 tablets of norco in a day, which other days he may take none.    09/16/2020 - Mr. Grant returns to clinic for follow up visit reporting low back and left leg  pain. He is " also reporting left knee pain he is s/f for a left knee GN block with Dr Hollins's office. He was referred to Dr Hollins by Dr. Álvarez/Orthopedics.  Patient is s/p Lumbar Transforaminal Epidural Steroid Injection, Two Levels, Bilateral L4-5 on 08/11/2020 with 0% relief.  Patient is also status post bilateral lumbar MBB that provided him with 0% relief.  Pain intensity is currently 6/10.      05/13/20201338-83-ddit-old male presents status post bilateral L4-5 TF KATHY with reported 60% relief.  Patient also states his relief is improving each and every day.  Reports taking less of his pain medication due to the relief received from the injection.  He is only 6 days postop his lumbar epidural, discussed that I suspect is relief will improve even more over the next 2 weeks.    03/02/2020 - Mr. Grant returns to clinic for follow up visit reporting worse back and bilateral leg pain left greater than right.  Patient is s/p L4-5 Lumbar Epidural Steroid  on 2/11/20 with 0% relief.  Patient presents with wife he is currently experiencing no relief from the injection, he reports that his pain continues in his low back as well as in his legs bilaterally left greater than right.  Patient reports shooting pain down his left leg at times going past his left knee into his left foot.  Patient reports previously given a lumbar TF KATHY at L4-5 per Dr. Duenas/TAMIKO Hampton reports receiving 3 months of relief and he is requesting to be scheduled for that particular injection today.  Pain intensity is currently 5/10.      HPI:    Baldo Grant is a 62 y.o. male who complains of left and right lower back pain.  He reports the pain radiates down his left leg just below his knee.  Pain intensity is 5/10.  He reports taking Norco, Gabapentin and Meloxicam for some relief.   Patient s/p Bilateral L3-4, L4-5, and L5-S1 Lumbar Medial Branch Block on 11/5/2019 with Dr. Anglin with no relief.  Patient's 5/17/2019 MRI Lumbar Spine reviewed with  patient and shows L4-5 and L5-S1 degenerative disc disease with disc bulging as described above with right L4-5 and left L5-S1 foraminal stenosis.      Location: lower back and bilateral leg pain   Onset: Summer 200  Current Pain Score: 5/10   Daily Pain of Range: 4-9/10  Quality: Dull and Sharp  Radiation: back of left leg  Worsened by: sitting and standing  Improved by: medications and physical therapy    Previous Therapies:  PT/OT: Yes  HEP: Yes, but severely limited by pain  Interventions:   -04/05/2022 Lumbar Transforaminal Epidural Steroid Injection, Bilateral L4-5      Bilateral L3-4, L4-5, and L5-S1 Lumbar Medial Branch Block on 11/5/2019  Surgery:  Medications:   - NSAIDS: meloxicam (MOBIC) 15 MG tablet daily  - MSK Relaxants:  diazePAM (VALIUM) 5 MG tablet daily as needed  - TCAs:   - SNRIs:   - Topicals: lidocaine HCL 2% (XYLOCAINE) 2 % jelly  - Anticonvulsants:  - Opioids: HYDROcodone-acetaminophen (NORCO) 5-325 mg per tablet    Current Pain Medications:  Meloxicam 15 mg   Gabapentin 600 mg TID        3. HYDROcodone-acetaminophen (NORCO) 5-325 mg per tablet Q6 hours PRN    Review of Systems:  Review of Systems   Constitutional: Negative.    HENT: Negative.     Eyes: Negative.    Respiratory: Negative.     Cardiovascular: Negative.    Gastrointestinal: Negative.    Genitourinary: Negative.    Musculoskeletal:  Positive for back pain, joint pain and myalgias.   Skin: Negative.    Neurological: Negative.    Endo/Heme/Allergies: Negative.    Psychiatric/Behavioral: Negative.       History:    Current Outpatient Medications:     aspirin (ECOTRIN) 81 MG EC tablet, Take 1 tablet (81 mg total) by mouth 2 (two) times daily., Disp: 84 tablet, Rfl: 0    calcipotriene-betamethasone (ENSTILAR) 0.005-0.064 % Foam, Apply 1 application topically once daily., Disp: 60 g, Rfl: 2    diclofenac sodium (VOLTAREN) 1 % Gel, Apply 2 g topically 4 (four) times daily., Disp: 100 g, Rfl: 0    diphenhydrAMINE (BENADRYL) 25 mg  capsule, Take 25 mg by mouth every 6 (six) hours as needed for Itching., Disp: , Rfl:     EPINEPHrine (EPIPEN) 0.3 mg/0.3 mL AtIn, , Disp: , Rfl:     fexofenadine (ALLEGRA) 180 MG tablet, Take 180 mg by mouth continuous prn., Disp: , Rfl:     gabapentin (NEURONTIN) 600 MG tablet, Take 1 tablet (600 mg total) by mouth 3 (three) times daily., Disp: 270 tablet, Rfl: 4    lidocaine HCL 2% (XYLOCAINE) 2 % jelly, Apply topically as needed., Disp: 30 mL, Rfl: 3    meloxicam (MOBIC) 15 MG tablet, Take 1 tablet (15 mg total) by mouth once daily., Disp: 90 tablet, Rfl: 3    meloxicam (MOBIC) 15 MG tablet, Take 1 tablet (15 mg total) by mouth once daily., Disp: 30 tablet, Rfl: 2    methocarbamoL (ROBAXIN) 500 MG Tab, Take 1 tablet (500 mg total) by mouth 4 (four) times daily. for 10 days, Disp: 40 tablet, Rfl: 0    multivitamin (THERAGRAN) per tablet, Take 1 tablet by mouth once daily., Disp: , Rfl:     ondansetron (ZOFRAN) 4 MG tablet, Take 1 tablet (4 mg total) by mouth every 8 (eight) hours as needed for Nausea., Disp: 30 tablet, Rfl: 0    ondansetron (ZOFRAN-ODT) 4 MG TbDL, Dissolve 1 tablet (4 mg total) by mouth 2 (two) times daily., Disp: 30 tablet, Rfl: 0    oxyCODONE (ROXICODONE) 5 MG immediate release tablet, Take 1-2 tablets (5-10 mg total) by mouth every 4 to 6 hours as needed for Pain., Disp: 40 tablet, Rfl: 0    sumatriptan (IMITREX) 50 MG tablet, Take one at the first sign of a headache.  You may repeat it in 1 hour as needed., Disp: 9 tablet, Rfl: 3    tiZANidine (ZANAFLEX) 4 MG tablet, , Disp: , Rfl:   No current facility-administered medications for this visit.    Facility-Administered Medications Ordered in Other Visits:     0.9%  NaCl infusion, 500 mL, Intravenous, Continuous, Alvyin Anglin Jr., MD    lidocaine (PF) 10 mg/ml (1%) injection 10 mg, 1 mL, Intradermal, Once, Alexus Anglin Jr., MD    lidocaine (PF) 10 mg/ml (1%) injection 10 mg, 1 mL, Intradermal, Once, Alexus Anglin Jr., MD    Past  Medical History:   Diagnosis Date    Arthritis     Basal cell carcinoma 06/21/2019    back    Cancer     Chronic pain of right knee     SCC (squamous cell carcinoma) 2014    Forehead- Dr. Cabral     Skin cancer     Squamous cell carcinoma 2013    Right ear- Dr. Marianna long       Past Surgical History:   Procedure Laterality Date    APPENDECTOMY      ARTHROSCOPIC CHONDROPLASTY OF KNEE JOINT Left 03/18/2020    Procedure: ARTHROSCOPY, KNEE, WITH CHONDROPLASTY;  Surgeon: FREEMAN Álvarez MD;  Location: [facility] OR;  Service: Orthopedics;  Laterality: Left;    COLONOSCOPY  1998    EPIDURAL STEROID INJECTION INTO LUMBAR SPINE N/A 02/11/2020    Procedure: Injection-steroid-epidural-lumbar--InterLaminar L4-5;  Surgeon: Alexus Anglin Jr., MD;  Location: [facility] PAIN T;  Service: Pain Management;  Laterality: N/A;    INJECTION OF ANESTHETIC AGENT AROUND MEDIAL BRANCH NERVES INNERVATING LUMBAR FACET JOINT Bilateral 11/05/2019    Procedure: Block-nerve-medial branch-lumbar--Bilateral L3-4,L4-5,L5-S1;  Surgeon: Alexus Anglin Jr., MD;  Location: [facility] PAIN MGT;  Service: Pain Management;  Laterality: Bilateral;    INJECTION OF STEROID Left 11/12/2020    Procedure: INJECTION, STEROID Left SI Joint Block and Steroid Injection;  Surgeon: Tam Encarnacion MD;  Location: [facility] OR;  Service: Neurosurgery;  Laterality: Left;  Procedure: Left SI Joint Block and Steroid Injection   Surgery Time: 30 min  LOS: 0  Anesthesia: MAC  Radiology: C-arm  Bed: Regular with Pillows  Position: Prone    KNEE ARTHROSCOPY W/ MENISCECTOMY Left 03/18/2020    Procedure: ARTHROSCOPY, KNEE, WITH MENISCECTOMY;  Surgeon: FREEMAN Álvarez MD;  Location: [facility] OR;  Service: Orthopedics;  Laterality: Left;  CLONIDINE/EPI/KETOROLAC/ROPIVACAINE INJECTION 30CC    lipoma removal      skin cancer removal      TOTAL KNEE ARTHROPLASTY Left 10/12/2022    Procedure: ARTHROPLASTY, KNEE, TOTAL;  Surgeon: FREEMAN Álvarez MD;  Location: [facility];  Service:  Orthopedics;  Laterality: Left;  regional with catheter- spinal & adductor  pericapsular injection: 0.2% ropivacaine    TRANSFORAMINAL EPIDURAL INJECTION OF STEROID Bilateral 05/06/2020    Procedure: Injection,steroid,epidural,transforaminal approach--Bilateral L4-5;  Surgeon: Alexus Anglin Jr., MD;  Location: [facility] PAIN MGT;  Service: Pain Management;  Laterality: Bilateral;    TRANSFORAMINAL EPIDURAL INJECTION OF STEROID Bilateral 08/11/2020    Procedure: Injection,steroid,epidural,transforaminal approach--Bilateral L4-5;  Surgeon: Alexus Anglin Jr., MD;  Location: [facility] PAIN MGT;  Service: Pain Management;  Laterality: Bilateral;    TRANSFORAMINAL EPIDURAL INJECTION OF STEROID Bilateral 04/05/2022    Procedure: Injection,steroid,epidural,transforaminal bilateral L4-5;  Surgeon: Alexus Anglin Jr., MD;  Location: [facility] PAIN MGT;  Service: Pain Management;  Laterality: Bilateral;  asa        Family History   Problem Relation Age of Onset    COPD Mother     Alcohol abuse Mother     Arthritis Mother     Heart disease Father     Melanoma Father     No Known Problems Sister     No Known Problems Brother     Psoriasis Neg Hx     Lupus Neg Hx     Eczema Neg Hx        Social History     Socioeconomic History    Marital status:    Tobacco Use    Smoking status: Never    Smokeless tobacco: Never   Substance and Sexual Activity    Alcohol use: Yes     Alcohol/week: 2.0 standard drinks     Types: 1 Glasses of wine, 1 Cans of beer per week     Comment: social    Drug use: No    Sexual activity: Not Currently     Partners: Female     Birth control/protection: None     Comment: My wife had a hysterectomy years ago     Social Determinants of Health     Financial Resource Strain: Low Risk     Difficulty of Paying Living Expenses: Not hard at all   Food Insecurity: No Food Insecurity    Worried About Running Out of Food in the Last Year: Never true    Ran Out of Food in the Last Year: Never true   Transportation  Needs: No Transportation Needs    Lack of Transportation (Medical): No    Lack of Transportation (Non-Medical): No   Physical Activity: Inactive    Days of Exercise per Week: 0 days    Minutes of Exercise per Session: 0 min   Stress: Stress Concern Present    Feeling of Stress : To some extent   Social Connections: Unknown    Frequency of Communication with Friends and Family: More than three times a week    Frequency of Social Gatherings with Friends and Family: Once a week    Active Member of Clubs or Organizations: Yes    Attends Club or Organization Meetings: More than 4 times per year    Marital Status:    Housing Stability: Low Risk     Unable to Pay for Housing in the Last Year: No    Number of Places Lived in the Last Year: 1    Unstable Housing in the Last Year: No       Review of patient's allergies indicates:   Allergen Reactions    Advil [ibuprofen] Anaphylaxis    Tums [calcium carbonate] Anaphylaxis       Physical Exam:  Vitals:    10/21/22 1309   BP: 131/86   Pulse: 77   Weight: 127 kg (280 lb)   Height: 6' (1.829 m)   PainSc: 10-Worst pain ever     General    Nursing note and vitals reviewed.  Constitutional: He is oriented to person, place, and time. He appears well-developed. No distress.   HENT:   Head: Normocephalic and atraumatic.   Nose: Nose normal.   Eyes: Conjunctivae and EOM are normal. Right eye exhibits no discharge. Left eye exhibits no discharge. No scleral icterus.   Neck: No JVD present. No tracheal deviation present.   Cardiovascular:  Normal rate, regular rhythm and intact distal pulses.            Pulmonary/Chest: Effort normal and breath sounds normal. No respiratory distress. He exhibits no tenderness.   Abdominal: Soft. Bowel sounds are normal. He exhibits no distension. There is no abdominal tenderness. There is no rebound.   Neurological: He is alert and oriented to person, place, and time. He exhibits normal muscle tone. Coordination normal.   Psychiatric: His behavior  is normal. Thought content normal.         Back (L-Spine & T-Spine) / Neck (C-Spine) Exam     Tenderness Right paramedian tenderness of the Lower L-Spine. Left paramedian tenderness of the Lower L-Spine.     Back (L-Spine & T-Spine) Range of Motion   Lateral bend right:  normal   Lateral bend left:  normal   Rotation right:  normal   Rotation left:  normal     Spinal Sensation   Right Side Sensation  L-Spine Level: normal  Left Side Sensation  L-Spine Level: normal      Muscle Strength   Right Lower Extremity   Hip Abduction: 5/5   Hip Flexion: 5/5   Hip Extensors: 5/5  Quadriceps:  5/5   Hamstrin/5   Gastrocsoleus:  5/5   Left Lower Extremity   Hip Abduction: 4/5   Hip Flexion: 4/5   Hip Extensors: 4/5  Quadriceps:  4/5   Hamstrin/5   Gastrocsoleus:  5/5     Imaging:  MRI Lumbar Spine Without Contrast 10/15/2020   Degenerative findings:  T12-L1: No spinal canal stenosis or neural foraminal narrowing.  L1-L2: No spinal canal stenosis or neural foraminal narrowing.  L2-L3: Circumferential disc bulge and mild facet arthropathy noted.  No spinal canal stenosis or neural foraminal narrowing.  L3-L4: Circumferential disc bulge and mild facet arthropathy result in mild left neural foraminal narrowing.  L4-L5: Circumferential disc bulge and mild facet arthropathy result in moderate right, mild left neural foraminal narrowing.  L5-S1: Circumferential disc bulge with annular fissure and moderate facet arthropathy result in mild bilateral neural foraminal narrowing.  Paraspinal muscles & soft tissues: Unremarkable.     Impression:  1. Degenerative changes of the lumbar spine resulting in mild-to-moderate bilateral neural foraminal narrowing at L3-S1.      EXAMINATION:  MRI LUMBAR SPINE WITHOUT CONTRAST    CLINICAL HISTORY:  Low back painLow back pain, rapidly progressive neuro deficit;    TECHNIQUE:  Standard multiplanar noncontrast MRI sequences of the lumbar spine.    COMPARISON:  None    FINDINGS:  The distal  cord and conus reveal normal signal and morphology. The lumbar vertebra reveal normal alignment, shape and signal intensity.    T12-L1: Unremarkable    L1-2:  Unremarkable    L2-3:  Minimal annular bulge.    L3-4:  Mild disc desiccation with mild generalized annular bulge.  Mild hypertrophic ligamentum flavum and facet arthritis.    L4-5:  Mild disc desiccation with mild annular bulge.  Right foraminal annular fissure.  Mild hypertrophic ligamentum flavum and facet arthritis.  Mild to moderate left and moderate right foraminal stenosis.    L5-S1:  Mild degenerative disc disease with disc desiccation and disc bulge/osteophyte.  Mild hypertrophic ligamentum flavum and facet arthritis.  Mild right with mild to moderate left foraminal stenosis.      Impression       L4-5 and L5-S1 degenerative disc disease with disc bulging osteophyte as described above with right L4-5 and left L5-S1 foraminal stenosis.      Electronically signed by: Baldo Gayle MD  Date: 05/17/2019         Labs:  BMP  Lab Results   Component Value Date     09/16/2022    K 4.5 09/16/2022     09/16/2022    CO2 29 09/16/2022    BUN 14 09/16/2022    CREATININE 0.96 09/16/2022    CALCIUM 9.0 09/16/2022    ANIONGAP 10 09/16/2022    ESTGFRAFRICA >60.0 09/22/2020    EGFRNONAA >60.0 09/22/2020     Lab Results   Component Value Date    ALT 48 (H) 09/16/2022    AST 33 09/16/2022    ALKPHOS 39 09/16/2022    BILITOT 0.4 09/16/2022       Assessment:  Problem List Items Addressed This Visit          Neuro    DDD (degenerative disc disease), lumbar     Other Visit Diagnoses       Chronic pain syndrome    -  Primary    Total knee replacement status, left        Neuroforaminal stenosis of lumbar spine        Complex tear of medial meniscus of left knee as current injury, subsequent encounter        Chronic, continuous use of opioids        Primary osteoarthritis of left knee        Chondromalacia of left knee        Vertebrogenic pain                 2/3/2020 - Baldo Grant is a 62 y.o. male with who complains of left and right lower back pain.  He reports the pain radiates down his left leg just below his knee.  Pain intensity is 5/10. He reports his current medication regimen of Norco, Gabapentin and Meloxicam help relieve his pain.  He reports having  Bilateral L3-4, L4-5, and L5-S1 Lumbar Medial Branch Block on 11/5/2019 with no relief.Patient's 5/17/2019 MRI Lumbar Spine reviewed with patient and shows L4-5 and L5-S1 degenerative disc disease with disc bulging  as described above with right L4-5 and left L5-S1 foraminal stenosis.  Recommended scheduling for Interlaminar KATHY  L4-5 with moderate sedation. Patient will follow in clinic following injection.     03/02/[id number]-emuf-old male presents with his wife he is status post lumbar interlaminar KATHY at L4-5 with 0% relief.  Patient reports continued low back and bilateral leg pain left greater than right he is reporting shooting pain down his left leg into his foot at times.  Patient reports the pain is disrupting his quality of life and prohibiting him from performing his ADLs.  Patient was given a left TF KATHY by Dr. Duenas/PM [facility] and received significant relief for 2-3 months. Lumbar MRI shows pathology a the L4-5 and L5-S1 patient has degenerative disc disease with disc bulging osteophyte with right L4-5 and left L5-S1 foraminal stenosis.  Based on results of previous Left  L4-5 transforaminal I discussed with patient and wife that I will now recommend  a bilateral L4-5.   Patient and wife agreed and understood.      05/13/2020- 52 a old male presents status post bilateral L4-5 TF KATHY, is currently reporting 60% relief he states his relief is improving each and every day.  Recommended patient follow up with me via my [facility] in 2 weeks to report the effectiveness of his procedure discussed that his procedure will more than likely improve the next 2 weeks considering he is only 6  days following his injection.    09/16/2020 - 60-year-old male presents low back and left knee pain, patient is established our office he has had multiple lumbar KATHY as well as a lumbar MBB that provided him with minimal to no relief according to his records he is scheduled for a bilateral genicular block with  tomorrow he was referred to his office from orthopedics/Dr Álvarez.  Patient reports continued low back and left leg pain radiating down to his foot, this pain is secondary to L 4/ 5, and 5 /S1 degenerative disc disease with disc bulging osteophyte with the right L4-5 and left L5-S1 foraminal stenosis that has not gotten better from lumbar KATHY injections.  I recommended patient follow up with Neurosurgery further evaluate    6/16/21 - 60 yo M with primarily axial discogenic chronic low back pain presented today for re-evaluation the request of primary care team for assessment of his chronic opioid therapy.  Patient is on chronic low-dose opioid therapy which is supporting his daily function.  He has no bulging the spine related to degenerative disc disease.  Since he was last evaluated, our department deployed a new interventional procedures may help to target discogenic pain.  While not specifically noted in the most recent MRI from September 2020, there is endplate edema (Modic changes close this ease in the inferior endplate at L4, and both the superior and inferior endplate of L5.  This could be a significant cause of his pain and can be targeted through the use of the Intracept procedure which ablates the basivertebral nerve.  Given the level of functionality he is maintaining with the current regimen, we will continue with these medications while obtaining authorization for this procedure which may take anywhere between 1 and 6 months.    7/23/2021- 61-year-old male with a history of low back and left leg pain, patient is here for medication refill we will take over his opioid from his PCP.  He  is currently taking Norco 5-325 t.i.d. that provides him with roughly 80% and improved functionality that allows in reform his ADLs.  Last clinic visit with Dr. Anglin he Mr Grant discussed considering him for a intracept procedure as his most recent lumbar MRI shows endplate edema/Modic changes to the inferior endplate of L4 and the superior and inferior endplate of L5.  Until authorization has been done we will  continue to provide him with his stable low dose opioid regimen,  he and I discussed that once authorization has been completed our office will give him a call and schedule him for the intracept  Procedure.    8/27/2021- Baldo Grant is a 62 y.o. male who  has a past medical history of Arthritis, Basal cell carcinoma (06/21/2019), Cancer, Chronic pain of right knee, SCC (squamous cell carcinoma) (2014), Skin cancer, and Squamous cell carcinoma (2013).  By history and examination this patient has chronic low back pain with radiculopathy.  The underlying cause cause is degenerative disc disease.  Pathology is confirmed by imaging.  Lumbar MRI shows endplate edema/Modic changes to the inferior endplate of L4 and the superior and inferior endplate of L5  We discussed the underlying diagnoses and multiple treatment options including non-opioid medications, opioid medications, injections, physical therapy, home exercise, activity modification / rest and weight loss.  The risks and benefits of each treatment option were discussed and all questions were answered.      9/27/2021- Baldo Grant is a 62 y.o. male who  has a past medical history of Arthritis, Basal cell carcinoma (06/21/2019), Cancer, Chronic pain of right knee, SCC (squamous cell carcinoma) (2014), Skin cancer, and Squamous cell carcinoma (2013).  By history and examination this patient has chronic left  low back pain with radiculopathy.  The underlying cause cause is unclear, my initial Dx's will include  degenerative disc disease,  foraminal stenosis and deconditioning differential diagnoses are Meralgia Paresthetica  affected, ilioinguinal nerve pain.    Pathology is confirmed by imaging.  We discussed the underlying diagnoses and multiple treatment options including non-opioid medications, opioid medications, injections, physical therapy, home exercise, activity modification / rest and weight loss.  The risks and benefits of each treatment option were discussed and all questions were answered.      10/25/21 - Continued LBP and RLE radicular symptoms now spreading to groin and LLE.  Patient likely has tolerance to Norco 7.5-325 mg and we will escalate to 7.5 mg tablets TID.  He was advised that we cannot escalate in perpetuity due to safety concerns.  To that end, he will f/u with Dr. Encarnacion in [place] after completing the MRI I order today toward finding a definitive surgical solution.  If he is not a surgical candidate we may pursue SCS.  Intercept authorization is still pending.    01/12/20222713-05-ujwk-old male with a history of chronic low back and right lower extremity radicular symptoms that radiates into his groin and left lower extremity.   He recently saw Neurosurgery and was consult to physical therapy Neurosurgery relieved his pain is likely related to degenerative disc disease and myofascial pain syndrome.  He is currently on chronic opioid therapy that consists of Norco 7.5-325 t.i.d. 90 pills per month.  He and I discussed today that we would consider reducing his chronic opioid therapy to [place] 5-325 t.i.d. patient expressed concerns about sleeping at night that the pain medication is too strong as he does have a history of sleep apnea.  Addition he reports that he is excited about physical therapy and he will begin swimming at least 1-2 times per week which he thinks will overall help his pain.  He also takes gabapentin and an occasional Mobic 15 mg which both help.  Denies any adverse side effects with current medication.  He seems  to be meeting all of his goals for chronic opiate therapy.    03/24/2022.  61-year-old male with history of chronic low back and bilateral lower extremity pain left greater than right symptoms also radiate into his groin.  He was denied the intercept procedure for discogenic pain he other options for treatment would be a lumbar KATHY or TF KATHY.  His pain is likely related to degenerative disc disease and moderate neural foraminal stenosis at L4-5 and L5-S1.  His MRI does also show facet arthritis at this level.  In the past he has been provided multiple injections the 1 that has given him the most relief based on previous notes is the bilateral TF KATHY targeting L4-5.  Discussed I recommend we repeat this injection to acquire more relief, patient is amenable to this plan.  He also want me to send Dr. Anglin a message regarding his chronic opioid therapy which consists of Norco 5-325 b.i.d. 60 pills per month.  Ms. Grant I had a discussion about why he was reduced from t.i.d. to b.i.d. dosing discussed with him that I was unsure that I will follow-up with Dr. Anglin for confirmation on this.  For now we will refill the [place] 5-325 b.i.d..    5/4/2022- 63 y/o male with a history of chronic low back and bilateral lower extremity pain left greater than right symptoms also radiate into his groin.  He was denied the intercept procedure for discogenic pain. He was recently provided a Bilateral Lumbar TFESI @ L4-5 that provided minimal relief. He and I discussed that I will speak with our office staff/[facility] regarding resubmitting him into the Intracept Portal and then [facility] as this could push approval considering he has  insurance.     7/6/2022- 63 y/o male with a Hx of chronic low back and left radiculopathy he also has radiating pain to his groin area bilaterally.  His pain is likely related to degenerative disc disease and moderate neural foraminal stenosis at L4-5 and L5-S1.   His MRI does also show facet arthritis at this level.  He is scheduled [facility] a [place] [facility]       09/02/20229916-42-rbue-old male with a history of chronic low back and left radiculopathy, his pain is related to degenerative disc disease and moderate neural foraminal stenosis at L4-5 L5-S1.  His MRI does also show facet arthropathy at multiple levels in his lower lumbar spine.  He is status post a SCS implant with Dr. Hampton on 08/19 he reported today that the right SCS lead has migrated and turned into an S shaped due to overactivity 4-5 days after the SCS implant.  He continues to work with Dr. Hampton and the [facility] to reprogrammed his stimulator to address his pain generators.  He states that 1/3 of his pain has reduced which has allowed him to reduce his chronic opioid therapy.  Is currently taking Norco 5-325 t.i.d. we discussed today that we will reduce his chronic opioid therapy to [place] 5-325 b.i.d. 60 pills per month, recommended he also continue his gabapentin 600 mg t.i.d. as well as Zanaflex 4 mg p.r.n..  He and I discussed that we will slowly continue to wean him off his chronic opioid therapy patient was amenable to this plan.    10/21/3111-10-lzao-old male with history of chronic low back pain and left radiculopathy has received a SCS implant with Dr. Hampton on 08/19 that based on history and exam does appear to be controlling his low back and left leg radiculopathy.  He did recently have a left total knee replacement on 10/12/2022 the visit today was requesting a refill of oxycodone 5 mg tablets, I recommended against refilling this medication as review of his LA  he has received a total of 80 pills from 10/03/2022 to 10/18/2022 per his orthopedist.  He continues to have chronic opioid therapy from our office that consists of Norco 5-325 b.i.d. which we are attempting to wean him down since his spinal cord stimulator therapy has been effective in treating his low  back and left leg pain.      : Reviewed and consistent with medication use as prescribed.    Treatment Plan:   Procedure:  None at this time.    - status post SCS [employer] implant on 08/19 Dr. Memo LORENZANA    PT/OT/HEP: I encouraged the patient to maintain a home exercise regimen that includes daily, moderate cardiovascular exercise lasting at least 30 minutes.  This may include yoga, nadia chi, walking, swimming, aqua aerobics, or other exercises that maintain a heart rate of 50-70% of the calculated maximum heart rate.  I also encouraged light, daily stretching focused on the target area.  Medications:               - continue and reduce Norco 5-325 TID # 90 to Norco 5-325 BID # 60 per month no refill today.  P.r.n for pain ( this dose seems to improve pain and improve functionality)               - Continue Gabapentin 600 mg TID refill requested,.    - Patient is on stable, low dose opioid therapy without aberrant behavior- refills would be appropriate for any provider              - discussed reducing his chronic opioid therapy patient was amenable to this reduction.   -  reviewed   - Pain contract signed 8/27/21  Labs: UDS reviewed   Imaging:  Reviewed      Follow Up: 8 weeks    ALAN Hale  Interventional Pain Management          Disclaimer: This note was partly generated using dictation software which may occasionally result in transcription errors.    Following our clinic visit I reached out to Orthopedics/ HARPREET Nunez and she will contact the patient and provide him with a refill of Oxycodone 5 mg per their  protocol following left TKA on 10/12/22.

## 2022-10-21 NOTE — TELEPHONE ENCOUNTER
Spoke to pt about Rx request and the amt and frequency of oxy that he is taking per day. 2 x every 6 hours. Elif Nunez PA-C sent in the order to requested pharmacy.

## 2022-10-24 ENCOUNTER — PATIENT MESSAGE (OUTPATIENT)
Dept: ADMINISTRATIVE | Facility: OTHER | Age: 62
End: 2022-10-24
Payer: OTHER GOVERNMENT

## 2022-10-26 ENCOUNTER — HOSPITAL ENCOUNTER (OUTPATIENT)
Dept: RADIOLOGY | Facility: HOSPITAL | Age: 62
Discharge: HOME | End: 2022-10-26
Attending: PHYSICIAN ASSISTANT
Payer: OTHER GOVERNMENT

## 2022-10-26 ENCOUNTER — OFFICE VISIT (OUTPATIENT)
Dept: SPORTS MEDICINE | Facility: CLINIC | Age: 62
End: 2022-10-26
Payer: OTHER GOVERNMENT

## 2022-10-26 VITALS
HEIGHT: 72 IN | WEIGHT: 275 LBS | DIASTOLIC BLOOD PRESSURE: 88 MMHG | SYSTOLIC BLOOD PRESSURE: 145 MMHG | BODY MASS INDEX: 37.25 KG/M2 | HEART RATE: 88 BPM

## 2022-10-26 DIAGNOSIS — Z96.652 S/P TOTAL KNEE REPLACEMENT, LEFT: ICD-10-CM

## 2022-10-26 DIAGNOSIS — M25.562 ACUTE PAIN OF LEFT KNEE: ICD-10-CM

## 2022-10-26 DIAGNOSIS — Z96.652 S/P TOTAL KNEE REPLACEMENT, LEFT: Primary | ICD-10-CM

## 2022-10-26 PROCEDURE — 73564 XR KNEE ORTHO LEFT WITH FLEXION: ICD-10-PCS | Mod: 26,LT,, | Performed by: RADIOLOGY

## 2022-10-26 PROCEDURE — 99214 OFFICE O/P EST MOD 30 MIN: CPT | Mod: PBBFAC | Performed by: PHYSICIAN ASSISTANT

## 2022-10-26 PROCEDURE — 99024 PR POST-OP FOLLOW-UP VISIT: ICD-10-PCS | Mod: ,,, | Performed by: PHYSICIAN ASSISTANT

## 2022-10-26 PROCEDURE — 73562 X-RAY EXAM OF KNEE 3: CPT | Mod: 59,TC,RT

## 2022-10-26 PROCEDURE — 73564 X-RAY EXAM KNEE 4 OR MORE: CPT | Mod: 26,LT,, | Performed by: RADIOLOGY

## 2022-10-26 PROCEDURE — 73562 XR KNEE ORTHO LEFT WITH FLEXION: ICD-10-PCS | Mod: 26,RT,, | Performed by: RADIOLOGY

## 2022-10-26 PROCEDURE — 99024 POSTOP FOLLOW-UP VISIT: CPT | Mod: ,,, | Performed by: PHYSICIAN ASSISTANT

## 2022-10-26 PROCEDURE — 99999 PR PBB SHADOW E&M-EST. PATIENT-LVL IV: CPT | Mod: PBBFAC,,, | Performed by: PHYSICIAN ASSISTANT

## 2022-10-26 PROCEDURE — 99999 PR PBB SHADOW E&M-EST. PATIENT-LVL IV: ICD-10-PCS | Mod: PBBFAC,,, | Performed by: PHYSICIAN ASSISTANT

## 2022-10-26 PROCEDURE — 73562 X-RAY EXAM OF KNEE 3: CPT | Mod: 26,RT,, | Performed by: RADIOLOGY

## 2022-10-26 NOTE — PROGRESS NOTES
S:Baldo Grant presents for 2 weeks post-operative evaluation.     Surgery Date: 10/12/2022      Participating Surgeons:  Surgeon(s) and Role:     * FREEMAN Álvarez MD - Primary   Marcello Aggarwal MD - First Assistant     Procedures:  Procedure(s) (LRB):  ARTHROPLASTY, KNEE, TOTAL (Left)    Baldo Grant reports to be doing very well 2wk s/p the above mentioned procedure. Denies fevers, chills, night sweats, chest pain, difficulty breathing, calf pain or tenderness. Going to PT 2xWeek at the [facility] location. Seeing good progress daily. Pre-surgery pain has resolved, and pain today is 4/10. He is still taking roxicodone 10mg every 6 hours prn pain and weaning off. Once he finishes his current prescription, he will be returning to pain management for chronic pain control for his low back, where he takes norco 5mg BID. Very pleased thus far. Ambulating with a walker.    O: The incisions is healing well.  No signs of infection.  Staples were removed. No significant pain or unusual tenderness. ROM is 5-95 degrees. Steri strips placed over proximal incision.    RADIOGRAPHS: 10/26/22  Bilateral knees:  FINDINGS:  Left knee:     Reconfirmed findings of left total knee procedure, stable and satisfactory alignment and position of tibial and femoral hardware.  Interval removal of previously noted skin staples and resolution of postsurgical soft tissue changes.  No acute fractures.  No suprapatellar bursal effusion.  Mild prepatellar soft tissue swelling.     Right knee:     No fracture.  Question stable minimal narrowing medial compartment, preserved lateral and patellofemoral compartments with minimal periarticular spurring.  No prepatellar soft tissue swelling.    A/P: Plan to follow the rehab plan as previously outlined. RTC in 4 weeks with Dr. Álvarez. We will have pain management take over pain control at this time since he is now 2 weeks post-op.Wean off walker as tolerated.

## 2022-10-29 ENCOUNTER — PATIENT MESSAGE (OUTPATIENT)
Dept: SPORTS MEDICINE | Facility: CLINIC | Age: 62
End: 2022-10-29
Payer: OTHER GOVERNMENT

## 2022-10-31 ENCOUNTER — PATIENT MESSAGE (OUTPATIENT)
Dept: SPORTS MEDICINE | Facility: CLINIC | Age: 62
End: 2022-10-31
Payer: OTHER GOVERNMENT

## 2022-11-01 ENCOUNTER — PATIENT MESSAGE (OUTPATIENT)
Dept: SLEEP MEDICINE | Facility: CLINIC | Age: 62
End: 2022-11-01
Payer: OTHER GOVERNMENT

## 2022-11-01 DIAGNOSIS — Z96.652 S/P TOTAL KNEE REPLACEMENT, LEFT: Primary | ICD-10-CM

## 2022-11-01 RX ORDER — METHOCARBAMOL 500 MG/1
500 TABLET, FILM COATED ORAL 3 TIMES DAILY
Qty: 40 TABLET | Refills: 0 | Status: SHIPPED | OUTPATIENT
Start: 2022-11-01 | End: 2022-11-14

## 2022-11-07 ENCOUNTER — PATIENT MESSAGE (OUTPATIENT)
Dept: ADMINISTRATIVE | Facility: OTHER | Age: 62
End: 2022-11-07
Payer: OTHER GOVERNMENT

## 2022-11-08 ENCOUNTER — PATIENT MESSAGE (OUTPATIENT)
Dept: PAIN MEDICINE | Facility: CLINIC | Age: 62
End: 2022-11-08
Payer: OTHER GOVERNMENT

## 2022-11-09 ENCOUNTER — OFFICE VISIT (OUTPATIENT)
Dept: PAIN MEDICINE | Facility: CLINIC | Age: 62
End: 2022-11-09
Payer: OTHER GOVERNMENT

## 2022-11-09 VITALS
HEART RATE: 80 BPM | WEIGHT: 274.94 LBS | DIASTOLIC BLOOD PRESSURE: 86 MMHG | BODY MASS INDEX: 37.24 KG/M2 | HEIGHT: 72 IN | SYSTOLIC BLOOD PRESSURE: 135 MMHG

## 2022-11-09 DIAGNOSIS — M51.36 DDD (DEGENERATIVE DISC DISEASE), LUMBAR: ICD-10-CM

## 2022-11-09 DIAGNOSIS — Z96.652 TOTAL KNEE REPLACEMENT STATUS, LEFT: ICD-10-CM

## 2022-11-09 DIAGNOSIS — M54.9 DORSALGIA, UNSPECIFIED: Primary | ICD-10-CM

## 2022-11-09 DIAGNOSIS — M51.26 LUMBAR DISCOGENIC PAIN SYNDROME: ICD-10-CM

## 2022-11-09 DIAGNOSIS — Z96.89 STATUS POST INSERTION OF SPINAL CORD STIMULATOR: ICD-10-CM

## 2022-11-09 PROCEDURE — 99999 PR PBB SHADOW E&M-EST. PATIENT-LVL V: CPT | Mod: PBBFAC,,, | Performed by: NURSE PRACTITIONER

## 2022-11-09 PROCEDURE — 99999 PR PBB SHADOW E&M-EST. PATIENT-LVL V: ICD-10-PCS | Mod: PBBFAC,,, | Performed by: NURSE PRACTITIONER

## 2022-11-09 PROCEDURE — 99214 PR OFFICE/OUTPT VISIT, EST, LEVL IV, 30-39 MIN: ICD-10-PCS | Mod: S$PBB,,, | Performed by: NURSE PRACTITIONER

## 2022-11-09 PROCEDURE — 99214 OFFICE O/P EST MOD 30 MIN: CPT | Mod: S$PBB,,, | Performed by: NURSE PRACTITIONER

## 2022-11-09 PROCEDURE — 99215 OFFICE O/P EST HI 40 MIN: CPT | Mod: PBBFAC,PO | Performed by: NURSE PRACTITIONER

## 2022-11-09 NOTE — TELEPHONE ENCOUNTER
Hey can you please message dr. Blake I don't have the authority to prescribe this. He may need to come see him in clinic. Lastly, seems like [facility] just [facility]     Thanks

## 2022-11-09 NOTE — PROGRESS NOTES
Chronic Pain-Established Note (Follow up visit)    Referred by: Dr. Encarnacion  Reason for referral: Back Pain    CC:   Chief Complaint   Patient presents with    Back Pain         Last 3 PDI Scores 11/9/2022 9/2/2022 5/4/2022   Pain Disability Index (PDI) 59 41 47     Interval Updates   11/09/2022-  62-year-old male presents today with his wife complaints of severe left-sided low back pain that began on Monday he denies any radiating symptoms into his left leg.  Reports that [facility] and was provided a left SI joint injection that provided relief for 1 day but then his pain returned.  He also reports pain radiating into his left testicle, pain is constant pain described as sharp stabbing pain is aggravated with standing walking or performing any type of ADLs he currently has a [employer] SCS implant which does help his left radicular leg pain however does not provide much relief for his low back pain.   He is requesting refills of his previous pain medication and  asking for an increase as his pain has gotten worse since Monday.       10/21/9390-89-owde-old male that presents today with his wife, he presents in a wheelchair he is status post a left TKA on 11/12/2022 with Dr. Álvarez.  He presents today with continued left knee pain following his surgery, he is requesting a refill of oxycodone 5 mg.  He has received x2 prescriptions of oxycodone 5 mg tablets 1 that was filled on 10/03 20-40 pills the other prescription was filled on 10/18/2022 5 mg of oxycodone 40 pills for total of 80 pills over the course of 15 days.        9/2/2022 - Mr. Grant is following up for medication refill to manage chronic low back and left leg pain.  A refill of Hydrocodone-Acetaminophen 5-325 mg t.i.d. is being requested today.  He reports 50% relief with for the current medication regimen.  He reports the following medication side effects: none.  Pain is currently rate 4/10 with a weekly range 4-5/10.  He did report a  "recent spinal cord stimulator implant that was done on 08/19 [facility].  He did mention that 4-5 days following the SCS implant he climbed in his attic to perform some work duties for his home while reaching up to grab something he felt a sharp pain, he then [facility] and his right lead appears to have migrated and is now in a " S shape"  He states that his spinal cord stimulator is addressing 1/3 of his pain he reported  increasing pain yesterday in which he had to take Norco 5-325 t.i.d. along with gabapentin.  Overall the SCS implant is helping, but he continues to work with the [facility],  he is currently using 4 programs for his SCS therapy.     7/6/2022 - Mr. Grant is following up for Back Pain.  He requests a refill of Hydrocodone-Acetaminophen 5-325 mg TID  which are  working fairly well to control His pain.  He reports 50% relief with the current medication regimen.  Medication side effects: none.  Pain is currently rate 4/10 with a weekly range 4-5/10.  It is described as Aching.   Pain is worsened by: nothing in particular and improved by: rest.  Mr. Grant has informed me that he will be getting a Stimatix GI [facility]CS [facility] to help iwht LBP and left leg pain. Dr. Grijalva will be implanting the stimulator. He has also informed me that he will having a left TKA.        5/4/2022  - Mr. Grant is following up for Back Pain and bilateral leg pain.  He requests a refill of Hydrocodone-Acetaminophen 5-325 mg TID # 90 pills per month is what he would prefer and  are  working well to control His pain.  He reports 50-60% relief with the current medication regimen and improved functionality. Pain is currently rate 7/10 with a weekly range 7-8/10.  Lastly, he would like to be resubmitted back into the portal for consideration of the Intracept procedure.  Lastly, his most recently lumbar Bilateral TFEIS at L4-5 provided him with 50% relief for one " week and then pain returned.     3/24/2022  - Mr. Grant is following up for Back Pain.  He requests a refill of Hydrocodone-Acetaminophen 5-325 mg BID # 60 which he says is effective but would like to go back to TID dosing.  He reports 50% relief with the current medication regimen. Pain is currently rate 5/10 with a weekly range 5-6/10.   He is also reporting increased pain in the low back left greater than right as well as legs bilaterally left greater than right.  Pain starts in low back and radiates into his legs posteriorly laterally anteriorly also causing scrotum pain.  Denies any profound weakness although he is walking with a cane this point.  Denies any bowel bladder dysfunction, denies any saddle anesthesia denies any recent incident or trauma.      01/12/2022 - Mr. Grant is following up for Back Pain.  He requests a refill of Norco 7.5-325 90 pills per month which are working well to control His pain.  He reports 60% relief with the current medication regimen.  Medication side effects: none.  Pain is currently rate 6/10 with a weekly range 3-8/10.  It is described as Aching, Tight, Tingling, Numb, Sharp and Shooting.   Pain is worsened by: standing and improved by: nothing and medications.     10/25/2021 -- Mr. Grant is following up for Back Pain.  He requests a refill of Hydrocodone-Acetaminophen 5-325 mg which is not working well to control His pain.  He reports 50% relief with the current medication regimen.  Medication side effects: none.  Pain is currently rate 6/10 with a weekly range 5-9/10.  It is described as Aching, Tight, Tingling, Numb, Sharp and Shooting.   Pain is worsened by: standing and improved by: nothing and medications.     09/27/21 - Mr. Grant is following up for Back Pain.  He requests a refill of Hydrocodone-Acetaminophen 5-325 mg TID # 90 which are not working well to control His pain.  He reports 50% relief with the current medication regimen. Pain is currently rate 7/10  with a weekly range 6-7/10. Mr. Grant is reporting continued left low back pain with radiating symptoms into his left and now right scrotum.  He mentioned his right scrotum has not been affected previously.  He also reports pain radiating into his left lateral leg stopping just above his left knee.  His pain has been refractory to multiple injections and physical therapy, he reports the inability to have a quality of life as he states he recently tried to have sex with his wife and could not due to the pain.  His current opioid regimen provide some relief but not significant enough as he would like to consider other options, he endorses that his insurance plan denied the intracept procedure that we were going to consider him for.     08/27/21 - Mr. Grant is following up for Back Pain.  He requests a refill of Hydrocodone-Acetaminophen 5-325 mg TID # 90 per month which are working well to control His pain.  He reports 50% relief with the current medication regimen.  Medication side effects: none.  Pain is currently rate 5/10 with a weekly range 5-9/10.  It is described as Aching, Dull, electrical shock(left leg)  and Sharp.   Pain is worsened by: exercise, flexion, standing for more than 3 minutes and walking for more than 3 minutes and improved by: heat, laying down, massage, medications, physical therapy, rest and sitting.    7/23/21 - Mr. Grant returns to clinic for follow up visit reporting worse left sided low back and left leg pain.  Patient is here for medication refill of Hydrocodone-Acetaminophen 5-325 mg TID PRN for pain, he reports 60-70% relief for 3-4 hours his pain is predicated on how much activity he is doing. He also receives Gabapentin 600 mg TID and Mobic 15 mg daily. He is reporting is reporting left scrotum and left leg pain today, this is his worse pain today.  Pain intensity is currently 7/10.      06/16/2021 - Mr. Grant returns to clinic for follow up visit reporting stable but severe  "back pain. Pain intensity is currently 5/10.  His PCP recently left [facility] and he was referred her for re-evaluation by his new primary care team.  Patient reports continued low back pain with occasional radiation in to the legs.  Pain fluctuates depending on level of activity.  He reports "flare ups" at least twice weekly during which he may take 2-4 tablets of norco in a day, which other days he may take none.    09/16/2020 - Mr. Grant returns to clinic for follow up visit reporting low back and left leg  pain. He is also reporting left knee pain he is s/f for a left knee GN block with Dr Hollins's office. He was referred to Dr Hollins by Dr. Álvarez/Orthopedics.  Patient is s/p Lumbar Transforaminal Epidural Steroid Injection, Two Levels, Bilateral L4-5 on 08/11/2020 with 0% relief.  Patient is also status post bilateral lumbar MBB that provided him with 0% relief.  Pain intensity is currently 6/10.      05/13/[id number]-mqdu-old male presents status post bilateral L4-5 TF KATHY with reported 60% relief.  Patient also states his relief is improving each and every day.  Reports taking less of his pain medication due to the relief received from the injection.  He is only 6 days postop his lumbar epidural, discussed that I suspect is relief will improve even more over the next 2 weeks.    03/02/2020 - Mr. Grant returns to clinic for follow up visit reporting worse back and bilateral leg pain left greater than right.  Patient is s/p L4-5 Lumbar Epidural Steroid  on 2/11/20 with 0% relief.  Patient presents with wife he is currently experiencing no relief from the injection, he reports that his pain continues in his low back as well as in his legs bilaterally left greater than right.  Patient reports shooting pain down his left leg at times going past his left knee into his left foot.  Patient reports previously given a lumbar TF KATHY at L4-5 per Dr. Duenas/[facility] Hampton reports receiving 3 months of relief and he is " requesting to be scheduled for that particular injection today.  Pain intensity is currently 5/10.      HPI:    Baldo Grant is a 62 y.o. male who complains of left and right lower back pain.  He reports the pain radiates down his left leg just below his knee.  Pain intensity is 5/10.  He reports taking Norco, Gabapentin and Meloxicam for some relief.   Patient s/p Bilateral L3-4, L4-5, and L5-S1 Lumbar Medial Branch Block on 11/5/2019 with Dr. Anglin with no relief.  Patient's 5/17/2019 MRI Lumbar Spine reviewed with patient and shows L4-5 and L5-S1 degenerative disc disease with disc bulging as described above with right L4-5 and left L5-S1 foraminal stenosis.      Location: lower back and bilateral leg pain   Onset: Summer 200  Current Pain Score: 5/10   Daily Pain of Range: 4-9/10  Quality: Dull and Sharp  Radiation: back of left leg  Worsened by: sitting and standing  Improved by: medications and physical therapy    Previous Therapies:  PT/OT: Yes  HEP: Yes, but severely limited by pain  Interventions:   -04/05/2022 Lumbar Transforaminal Epidural Steroid Injection, Bilateral L4-5      Bilateral L3-4, L4-5, and L5-S1 Lumbar Medial Branch Block on 11/5/2019  Surgery:  Medications:   - NSAIDS: meloxicam (MOBIC) 15 MG tablet daily  - MSK Relaxants:  diazePAM (VALIUM) 5 MG tablet daily as needed  - TCAs:   - SNRIs:   - Topicals: lidocaine HCL 2% (XYLOCAINE) 2 % jelly  - Anticonvulsants:  - Opioids: HYDROcodone-acetaminophen (NORCO) 5-325 mg per tablet    Current Pain Medications:  Meloxicam 15 mg   Gabapentin 600 mg TID        3. HYDROcodone-acetaminophen (NORCO) 5-325 mg per tablet Q6 hours PRN    Review of Systems:  Review of Systems   Constitutional: Negative.    HENT: Negative.     Eyes: Negative.    Respiratory: Negative.     Cardiovascular: Negative.    Gastrointestinal: Negative.    Genitourinary: Negative.    Musculoskeletal:  Positive for back pain, joint pain and myalgias.   Skin: Negative.     Neurological: Negative.    Endo/Heme/Allergies: Negative.    Psychiatric/Behavioral: Negative.       History:    Current Outpatient Medications:     aspirin (ECOTRIN) 81 MG EC tablet, Take 1 tablet (81 mg total) by mouth 2 (two) times daily., Disp: 84 tablet, Rfl: 0    calcipotriene-betamethasone (ENSTILAR) 0.005-0.064 % Foam, Apply 1 application topically once daily., Disp: 60 g, Rfl: 2    diclofenac sodium (VOLTAREN) 1 % Gel, Apply 2 g topically 4 (four) times daily., Disp: 100 g, Rfl: 0    diphenhydrAMINE (BENADRYL) 25 mg capsule, Take 25 mg by mouth every 6 (six) hours as needed for Itching., Disp: , Rfl:     EPINEPHrine (EPIPEN) 0.3 mg/0.3 mL AtIn, , Disp: , Rfl:     fexofenadine (ALLEGRA) 180 MG tablet, Take 180 mg by mouth continuous prn., Disp: , Rfl:     gabapentin (NEURONTIN) 600 MG tablet, Take 1 tablet (600 mg total) by mouth 3 (three) times daily., Disp: 270 tablet, Rfl: 4    lidocaine HCL 2% (XYLOCAINE) 2 % jelly, Apply topically as needed., Disp: 30 mL, Rfl: 3    meloxicam (MOBIC) 15 MG tablet, Take 1 tablet (15 mg total) by mouth once daily., Disp: 90 tablet, Rfl: 3    meloxicam (MOBIC) 15 MG tablet, Take 1 tablet (15 mg total) by mouth once daily., Disp: 30 tablet, Rfl: 2    methocarbamoL (ROBAXIN) 500 MG Tab, Take 1 tablet (500 mg total) by mouth 3 (three) times daily. for 13 days, Disp: 40 tablet, Rfl: 0    multivitamin (THERAGRAN) per tablet, Take 1 tablet by mouth once daily., Disp: , Rfl:     ondansetron (ZOFRAN) 4 MG tablet, Take 1 tablet (4 mg total) by mouth every 8 (eight) hours as needed for Nausea., Disp: 30 tablet, Rfl: 0    ondansetron (ZOFRAN-ODT) 4 MG TbDL, Dissolve 1 tablet (4 mg total) by mouth 2 (two) times daily., Disp: 30 tablet, Rfl: 0    oxyCODONE (ROXICODONE) 5 MG immediate release tablet, Take 1-2 tablets (5-10 mg total) by mouth every 4 to 6 hours as needed for Pain., Disp: 40 tablet, Rfl: 0    oxyCODONE (ROXICODONE) 5 MG immediate release tablet, Take 1-2 tablets (5-10 mg  total) by mouth every 6 (six) hours as needed for Pain., Disp: 40 tablet, Rfl: 0    sumatriptan (IMITREX) 50 MG tablet, Take one at the first sign of a headache.  You may repeat it in 1 hour as needed., Disp: 9 tablet, Rfl: 3    tiZANidine (ZANAFLEX) 4 MG tablet, , Disp: , Rfl:   No current facility-administered medications for this visit.    Facility-Administered Medications Ordered in Other Visits:     0.9%  NaCl infusion, 500 mL, Intravenous, Continuous, Alexus Anglin Jr., MD    lidocaine (PF) 10 mg/ml (1%) injection 10 mg, 1 mL, Intradermal, Once, Alexus Anglin Jr., MD    lidocaine (PF) 10 mg/ml (1%) injection 10 mg, 1 mL, Intradermal, Once, Alexus Anglin Jr., MD    Past Medical History:   Diagnosis Date    Arthritis     Basal cell carcinoma 06/21/2019    back    Cancer     Chronic pain of right knee     SCC (squamous cell carcinoma) 2014    Forehead- Dr. Cabral     Skin cancer     Squamous cell carcinoma 2013    Right ear- Dr. Cabral [facility]       Past Surgical History:   Procedure Laterality Date    APPENDECTOMY      ARTHROSCOPIC CHONDROPLASTY OF KNEE JOINT Left 03/18/2020    Procedure: ARTHROSCOPY, KNEE, WITH CHONDROPLASTY;  Surgeon: FREEMAN Álvarez MD;  Location: [facility] OR;  Service: Orthopedics;  Laterality: Left;    COLONOSCOPY  1998    EPIDURAL STEROID INJECTION INTO LUMBAR SPINE N/A 02/11/2020    Procedure: Injection-steroid-epidural-lumbar--InterLaminar L4-5;  Surgeon: Alexus Anglin Jr., MD;  Location: [facility] PAIN MGT;  Service: Pain Management;  Laterality: N/A;    INJECTION OF ANESTHETIC AGENT AROUND MEDIAL BRANCH NERVES INNERVATING LUMBAR FACET JOINT Bilateral 11/05/2019    Procedure: Block-nerve-medial branch-lumbar--Bilateral L3-4,L4-5,L5-S1;  Surgeon: Alexus Anglin Jr., MD;  Location: [facility] PAIN MGT;  Service: Pain Management;  Laterality: Bilateral;    INJECTION OF STEROID Left 11/12/2020    Procedure: INJECTION, STEROID Left SI Joint Block and Steroid Injection;  Surgeon:  Tam Encarnacion MD;  Location: [facility] OR;  Service: Neurosurgery;  Laterality: Left;  Procedure: Left SI Joint Block and Steroid Injection   Surgery Time: 30 min  LOS: 0  Anesthesia: MAC  Radiology: C-arm  Bed: Regular with Pillows  Position: Prone    KNEE ARTHROSCOPY W/ MENISCECTOMY Left 03/18/2020    Procedure: ARTHROSCOPY, KNEE, WITH MENISCECTOMY;  Surgeon: FREEMAN Álvarez MD;  Location: [facility] OR;  Service: Orthopedics;  Laterality: Left;  CLONIDINE/EPI/KETOROLAC/ROPIVACAINE INJECTION 30CC    lipoma removal      skin cancer removal      TOTAL KNEE ARTHROPLASTY Left 10/12/2022    Procedure: ARTHROPLASTY, KNEE, TOTAL;  Surgeon: FREEMAN Álvarez MD;  Location: [facility] OR;  Service: Orthopedics;  Laterality: Left;  regional with catheter- spinal & adductor  pericapsular injection: 0.2% ropivacaine    TRANSFORAMINAL EPIDURAL INJECTION OF STEROID Bilateral 05/06/2020    Procedure: Injection,steroid,epidural,transforaminal approach--Bilateral L4-5;  Surgeon: Alexus Anglin Jr., MD;  Location: [facility] PAIN MGT;  Service: Pain Management;  Laterality: Bilateral;    TRANSFORAMINAL EPIDURAL INJECTION OF STEROID Bilateral 08/11/2020    Procedure: Injection,steroid,epidural,transforaminal approach--Bilateral L4-5;  Surgeon: Alexus Anglin Jr., MD;  Location: [facility] PAIN MGT;  Service: Pain Management;  Laterality: Bilateral;    TRANSFORAMINAL EPIDURAL INJECTION OF STEROID Bilateral 04/05/2022    Procedure: Injection,steroid,epidural,transforaminal bilateral L4-5;  Surgeon: Alexus Anglin Jr., MD;  Location: [facility] PAIN MGT;  Service: Pain Management;  Laterality: Bilateral;  asa        Family History   Problem Relation Age of Onset    COPD Mother     Alcohol abuse Mother     Arthritis Mother     Heart disease Father     Melanoma Father     No Known Problems Sister     No Known Problems Brother     Psoriasis Neg Hx     Lupus Neg Hx     Eczema Neg Hx        Social History     Socioeconomic History    Marital status:     Tobacco Use    Smoking status: Never    Smokeless tobacco: Never   Substance and Sexual Activity    Alcohol use: Yes     Alcohol/week: 2.0 standard drinks     Types: 1 Glasses of wine, 1 Cans of beer per week     Comment: social    Drug use: No    Sexual activity: Not Currently     Partners: Female     Birth control/protection: None     Comment: My wife had a hysterectomy years ago     Social Determinants of Health     Financial Resource Strain: Low Risk     Difficulty of Paying Living Expenses: Not hard at all   Food Insecurity: No Food Insecurity    Worried About Running Out of Food in the Last Year: Never true    Ran Out of Food in the Last Year: Never true   Transportation Needs: No Transportation Needs    Lack of Transportation (Medical): No    Lack of Transportation (Non-Medical): No   Physical Activity: Inactive    Days of Exercise per Week: 0 days    Minutes of Exercise per Session: 0 min   Stress: Stress Concern Present    Feeling of Stress : To some extent   Social Connections: Unknown    Frequency of Communication with Friends and Family: More than three times a week    Frequency of Social Gatherings with Friends and Family: Once a week    Active Member of Clubs or Organizations: Yes    Attends Club or Organization Meetings: More than 4 times per year    Marital Status:    Housing Stability: Low Risk     Unable to Pay for Housing in the Last Year: No    Number of Places Lived in the Last Year: 1    Unstable Housing in the Last Year: No       Review of patient's allergies indicates:   Allergen Reactions    Advil [ibuprofen] Anaphylaxis    Tums [calcium carbonate] Anaphylaxis       Physical Exam:  Vitals:    11/09/22 1353   BP: 135/86   Pulse: 80   Weight: 124.7 kg (274 lb 14.6 oz)   Height: 6' (1.829 m)   PainSc:   8     General    Nursing note and vitals reviewed.  Constitutional: He is oriented to person, place, and time. He appears well-developed. No distress.   HENT:   Head: Normocephalic and  atraumatic.   Nose: Nose normal.   Eyes: Conjunctivae and EOM are normal. Right eye exhibits no discharge. Left eye exhibits no discharge. No scleral icterus.   Neck: No JVD present. No tracheal deviation present.   Cardiovascular:  Normal rate, regular rhythm and intact distal pulses.            Pulmonary/Chest: Effort normal and breath sounds normal. No respiratory distress. He exhibits no tenderness.   Abdominal: Soft. Bowel sounds are normal. He exhibits no distension. There is no abdominal tenderness. There is no rebound.   Neurological: He is alert and oriented to person, place, and time. He exhibits normal muscle tone. Coordination normal.   Psychiatric: His behavior is normal. Thought content normal.         Back (L-Spine & T-Spine) / Neck (C-Spine) Exam     Tenderness Right paramedian tenderness of the Lower L-Spine. Left paramedian tenderness of the Lower L-Spine.     Back (L-Spine & T-Spine) Range of Motion   Lateral bend right:  normal   Lateral bend left:  normal   Rotation right:  normal   Rotation left:  normal     Spinal Sensation   Right Side Sensation  L-Spine Level: normal  Left Side Sensation  L-Spine Level: normal      Muscle Strength   Right Lower Extremity   Hip Abduction: 5/5   Hip Flexion: 5/5   Hip Extensors: 5/5  Quadriceps:  5/5   Hamstrin/5   Gastrocsoleus:  5/5   Left Lower Extremity   Hip Abduction: 4/5   Hip Flexion: 4/5   Hip Extensors: 4/5  Quadriceps:  4/5   Hamstrin/5   Gastrocsoleus:  5/5     Imaging:  MRI Lumbar Spine Without Contrast 10/15/2020   Degenerative findings:  T12-L1: No spinal canal stenosis or neural foraminal narrowing.  L1-L2: No spinal canal stenosis or neural foraminal narrowing.  L2-L3: Circumferential disc bulge and mild facet arthropathy noted.  No spinal canal stenosis or neural foraminal narrowing.  L3-L4: Circumferential disc bulge and mild facet arthropathy result in mild left neural foraminal narrowing.  L4-L5: Circumferential disc bulge and  mild facet arthropathy result in moderate right, mild left neural foraminal narrowing.  L5-S1: Circumferential disc bulge with annular fissure and moderate facet arthropathy result in mild bilateral neural foraminal narrowing.  Paraspinal muscles & soft tissues: Unremarkable.     Impression:  1. Degenerative changes of the lumbar spine resulting in mild-to-moderate bilateral neural foraminal narrowing at L3-S1.      EXAMINATION:  MRI LUMBAR SPINE WITHOUT CONTRAST    CLINICAL HISTORY:  Low back painLow back pain, rapidly progressive neuro deficit;    TECHNIQUE:  Standard multiplanar noncontrast MRI sequences of the lumbar spine.    COMPARISON:  None    FINDINGS:  The distal cord and conus reveal normal signal and morphology. The lumbar vertebra reveal normal alignment, shape and signal intensity.    T12-L1: Unremarkable    L1-2:  Unremarkable    L2-3:  Minimal annular bulge.    L3-4:  Mild disc desiccation with mild generalized annular bulge.  Mild hypertrophic ligamentum flavum and facet arthritis.    L4-5:  Mild disc desiccation with mild annular bulge.  Right foraminal annular fissure.  Mild hypertrophic ligamentum flavum and facet arthritis.  Mild to moderate left and moderate right foraminal stenosis.    L5-S1:  Mild degenerative disc disease with disc desiccation and disc bulge/osteophyte.  Mild hypertrophic ligamentum flavum and facet arthritis.  Mild right with mild to moderate left foraminal stenosis.      Impression       L4-5 and L5-S1 degenerative disc disease with disc bulging osteophyte as described above with right L4-5 and left L5-S1 foraminal stenosis.      Electronically signed by: Baldo Gayle MD  Date: 05/17/2019         Labs:  BMP  Lab Results   Component Value Date     09/16/2022    K 4.5 09/16/2022     09/16/2022    CO2 29 09/16/2022    BUN 14 09/16/2022    CREATININE 0.96 09/16/2022    CALCIUM 9.0 09/16/2022    ANIONGAP 10 09/16/2022    ESTGFRAFRICA >60.0 09/22/2020     EGFRNONAA >60.0 09/22/2020     Lab Results   Component Value Date    ALT 48 (H) 09/16/2022    AST 33 09/16/2022    ALKPHOS 39 09/16/2022    BILITOT 0.4 09/16/2022       Assessment:  Problem List Items Addressed This Visit          Neuro    Lumbar discogenic pain syndrome    Relevant Orders    MRI Lumbar Spine Without Contrast    DDD (degenerative disc disease), lumbar    Relevant Orders    MRI Lumbar Spine Without Contrast     Other Visit Diagnoses       Dorsalgia, unspecified    -  Primary    Relevant Orders    MRI Lumbar Spine Without Contrast    Status post insertion of spinal cord stimulator        Total knee replacement status, left                2/3/2020 - Baldo Grant is a 62 y.o. male with who complains of left and right lower back pain.  He reports the pain radiates down his left leg just below his knee.  Pain intensity is 5/10. He reports his current medication regimen of Norco, Gabapentin and Meloxicam help relieve his pain.  He reports having  Bilateral L3-4, L4-5, and L5-S1 Lumbar Medial Branch Block on 11/5/2019 with no relief.Patient's 5/17/2019 MRI Lumbar Spine reviewed with patient and shows L4-5 and L5-S1 degenerative disc disease with disc bulging  as described above with right L4-5 and left L5-S1 foraminal stenosis.  Recommended scheduling for Interlaminar KATHY  L4-5 with moderate sedation. Patient will follow in clinic following injection.     03/02/[id number]-sboo-old male presents with his wife he is status post lumbar interlaminar KATHY at L4-5 with 0% relief.  Patient reports continued low back and bilateral leg pain left greater than right he is reporting shooting pain down his left leg into his foot at times.  Patient reports the pain is disrupting his quality of life and prohibiting him from performing his ADLs.  Patient was given a left TF KATHY by Dr. Duenas/PM [facility] and received significant relief for 2-3 months. Lumbar MRI shows pathology a the L4-5 and L5-S1 patient has  degenerative disc disease with disc bulging osteophyte with right L4-5 and left L5-S1 foraminal stenosis.  Based on results of previous Left  L4-5 transforaminal I discussed with patient and wife that I will now recommend  a bilateral L4-5.   Patient and wife agreed and understood.      05/13/2020- 52 a old male presents status post bilateral L4-5 TF KATHY, is currently reporting 60% relief he states his relief is improving each and every day.  Recommended patient follow up with me via my [facility] in 2 weeks to report the effectiveness of his procedure discussed that his procedure will more than likely improve the next 2 weeks considering he is only 6 days following his injection.    09/16/2020 - 60-year-old male presents low back and left knee pain, patient is established our office he has had multiple lumbar KATHY as well as a lumbar MBB that provided him with minimal to no relief according to his records he is scheduled for a bilateral genicular block with  tomorrow he was referred to his office from orthopedics/Dr Álvarez.  Patient reports continued low back and left leg pain radiating down to his foot, this pain is secondary to L 4/ 5, and 5 /S1 degenerative disc disease with disc bulging osteophyte with the right L4-5 and left L5-S1 foraminal stenosis that has not gotten better from lumbar KATHY injections.  I recommended patient follow up with Neurosurgery further evaluate    6/16/21 - 62 yo M with primarily axial discogenic chronic low back pain presented today for re-evaluation the request of primary care team for assessment of his chronic opioid therapy.  Patient is on chronic low-dose opioid therapy which is supporting his daily function.  He has no bulging the spine related to degenerative disc disease.  Since he was last evaluated, our department deployed a new interventional procedures may help to target discogenic pain.  While not specifically noted in the most recent MRI from September 2020, there is  endplate edema (Modic changes close this ease in the inferior endplate at L4, and both the superior and inferior endplate of L5.  This could be a significant cause of his pain and can be targeted through the use of the Intracept procedure which ablates the basivertebral nerve.  Given the level of functionality he is maintaining with the current regimen, we will continue with these medications while obtaining authorization for this procedure which may take anywhere between 1 and 6 months.    7/23/2021- 61-year-old male with a history of low back and left leg pain, patient is here for medication refill we will take over his opioid from his PCP.  He is currently taking Norco 5-325 t.i.d. that provides him with roughly 80% and improved functionality that allows in reform his ADLs.  Last clinic visit with Dr. Anglin he Mr Grant discussed considering him for a intracept procedure as his most recent lumbar MRI shows endplate edema/Modic changes to the inferior endplate of L4 and the superior and inferior endplate of L5.  Until authorization has been done we will  continue to provide him with his stable low dose opioid regimen,  he and I discussed that once authorization has been completed our office will give him a call and schedule him for the intracept  Procedure.    8/27/2021- Baldo Grant is a 62 y.o. male who  has a past medical history of Arthritis, Basal cell carcinoma (06/21/2019), Cancer, Chronic pain of right knee, SCC (squamous cell carcinoma) (2014), Skin cancer, and Squamous cell carcinoma (2013).  By history and examination this patient has chronic low back pain with radiculopathy.  The underlying cause cause is degenerative disc disease.  Pathology is confirmed by imaging.  Lumbar MRI shows endplate edema/Modic changes to the inferior endplate of L4 and the superior and inferior endplate of L5  We discussed the underlying diagnoses and multiple treatment options including non-opioid medications,  opioid medications, injections, physical therapy, home exercise, activity modification / rest and weight loss.  The risks and benefits of each treatment option were discussed and all questions were answered.      9/27/2021- Baldo Grant is a 62 y.o. male who  has a past medical history of Arthritis, Basal cell carcinoma (06/21/2019), Cancer, Chronic pain of right knee, SCC (squamous cell carcinoma) (2014), Skin cancer, and Squamous cell carcinoma (2013).  By history and examination this patient has chronic left  low back pain with radiculopathy.  The underlying cause cause is unclear, my initial Dx's will include  degenerative disc disease, foraminal stenosis and deconditioning differential diagnoses are Meralgia Paresthetica  affected, ilioinguinal nerve pain.    Pathology is confirmed by imaging.  We discussed the underlying diagnoses and multiple treatment options including non-opioid medications, opioid medications, injections, physical therapy, home exercise, activity modification / rest and weight loss.  The risks and benefits of each treatment option were discussed and all questions were answered.      10/25/21 - Continued LBP and RLE radicular symptoms now spreading to groin and LLE.  Patient likely has tolerance to Norco 7.5-325 mg and we will escalate to 7.5 mg tablets TID.  He was advised that we cannot escalate in perpetuity due to safety concerns.  To that end, he will f/u with Dr. Encarnacion in [facility] after completing the MRI I order today toward finding a definitive surgical solution.  If he is not a surgical candidate we may pursue SCS.  Intercept authorization is still pending.    01/12/20221500-82-fhof-old male with a history of chronic low back and right lower extremity radicular symptoms that radiates into his groin and left lower extremity.   He recently saw Neurosurgery and was consult to physical therapy Neurosurgery relieved his pain is likely related to degenerative disc disease and myofascial pain  syndrome.  He is currently on chronic opioid therapy that consists of Norco 7.5-325 t.i.d. 90 pills per month.  He and I discussed today that we would consider reducing his chronic opioid therapy to [place] 5-325 t.i.d. patient expressed concerns about sleeping at night that the pain medication is too strong as he does have a history of sleep apnea.  Addition he reports that he is excited about physical therapy and he will begin swimming at least 1-2 times per week which he thinks will overall help his pain.  He also takes gabapentin and an occasional Mobic 15 mg which both help.  Denies any adverse side effects with current medication.  He seems to be meeting all of his goals for chronic opiate therapy.    03/24/2022.  61-year-old male with history of chronic low back and bilateral lower extremity pain left greater than right symptoms also radiate into his groin.  He was denied the intercept procedure for discogenic pain he other options for treatment would be a lumbar KATHY or TF KATHY.  His pain is likely related to degenerative disc disease and moderate neural foraminal stenosis at L4-5 and L5-S1.  His MRI does also show facet arthritis at this level.  In the past he has been provided multiple injections the 1 that has given him the most relief based on previous notes is the bilateral TF KATHY targeting L4-5.  Discussed I recommend we repeat this injection to acquire more relief, patient is amenable to this plan.  He also want me to send Dr. Anglin a message regarding his chronic opioid therapy which consists of Norco 5-325 b.i.d. 60 pills per month.  Ms. Grant I had a discussion about why he was reduced from t.i.d. to b.i.d. dosing discussed with him that I was unsure that I will follow-up with Dr. Anglin for confirmation on this.  For now we will refill the Nov[place] 5-325 b.i.d..    5/4/2022- 63 y/o male with a history of chronic low back and bilateral lower extremity pain left greater than right symptoms also  radiate into his groin.  He was denied the intercept procedure for discogenic pain. He was recently provided a Bilateral Lumbar TFESI @ L4-5 that provided minimal relief. He and I discussed that I will speak with our office staff/[facility] regarding resubmitting him into the Intracept Portal and then [facility] as this could push approval considering he has  insurance.     7/6/2022- 63 y/o male with a Hx of chronic low back and left radiculopathy he also has radiating pain to his groin area bilaterally.  His pain is likely related to degenerative disc disease and moderate neural foraminal stenosis at L4-5 and L5-S1.  His MRI does also show facet arthritis at this level.  He is scheduled [facility] a [place] [facility]       09/02/[id number]-prbi-old male with a history of chronic low back and left radiculopathy, his pain is related to degenerative disc disease and moderate neural foraminal stenosis at L4-5 L5-S1.  His MRI does also show facet arthropathy at multiple levels in his lower lumbar spine.  He is status post a SCS implant with Dr. Hampton on 08/19 he reported today that the right SCS lead has migrated and turned into an S shaped due to overactivity 4-5 days after the SCS implant.  He continues to work with Dr. Hampton and the [facility] to reprogrammed his stimulator to address his pain generators.  He states that 1/3 of his pain has reduced which has allowed him to reduce his chronic opioid therapy.  Is currently taking Norco 5-325 t.i.d. we discussed today that we will reduce his chronic opioid therapy to [place] 5-325 b.i.d. 60 pills per month, recommended he also continue his gabapentin 600 mg t.i.d. as well as Zanaflex 4 mg p.r.n..  He and I discussed that we will slowly continue to wean him off his chronic opioid therapy patient was amenable to this plan.    10/21/7703-89-pzzl-old male with history of chronic low back pain and left radiculopathy has  received a SCS implant with Dr. Hampton on 08/19 that based on history and exam does appear to be controlling his low back and left leg radiculopathy.  He did recently have a left total knee replacement on 10/12/2022 the visit today was requesting a refill of oxycodone 5 mg tablets, I recommended against refilling this medication as review of his LA  he has received a total of 80 pills from 10/03/2022 to 10/18/2022 per his orthopedist.  He continues to have chronic opioid therapy from our office that consists of Norco 5-325 b.i.d. which we are attempting to wean him down since his spinal cord stimulator therapy has been effective in treating his low back and left leg pain.    11/9/2022-63 y/o male that presented with acute /subacute left sided low back pain with radiating pain into his left testicle. He was recently provided a left SI joint injection x3-4 days [facility] that provided no sustainable relief as of yet. See previous hx above, see plan agreed upon below.       : Reviewed and consistent with medication use as prescribed.    Treatment Plan:   Procedure:  None at this time.    - status post SCS [employer] implant on 08/19 Dr. Memo LORENZANA helps with left radiculopathy    PT/OT/HEP: I encouraged the patient to maintain a home exercise regimen that includes daily, moderate cardiovascular exercise lasting at least 30 minutes.  This may include yoga, nadia chi, walking, swimming, aqua aerobics, or other exercises that maintain a heart rate of 50-70% of the calculated maximum heart rate.  I also encouraged light, daily stretching focused on the target area.  Medications:               - increase Norco 5-325 BID to Norco 7.5-325 BID # 60 for 30 days and then reduce to previous dosage( 5-325 BID)   P.r.n for acute pain.               - Continue Gabapentin 600 mg TID refill requested,.    -  reviewed   - Pain contract signed 8/27/21  Labs: UDS reviewed   Imaging:  Update Lumbar MRI today to  rule out any new pathology     Greater than 25 minutes direct face to face counseling, performing PE, and educating patient today. 25 minutes was used discussing the disease process, prognosis, treatment plan, risks and benefits.        Follow Up: 4 weeks to discuss lumbar MRI and maliks    ALAN Hale  Interventional Pain Management        Disclaimer: This note was partly generated using dictation software which may occasionally result in transcription errors.

## 2022-11-11 ENCOUNTER — PATIENT MESSAGE (OUTPATIENT)
Dept: PAIN MEDICINE | Facility: CLINIC | Age: 62
End: 2022-11-11
Payer: OTHER GOVERNMENT

## 2022-11-11 ENCOUNTER — PATIENT MESSAGE (OUTPATIENT)
Dept: FAMILY MEDICINE | Facility: CLINIC | Age: 62
End: 2022-11-11
Payer: OTHER GOVERNMENT

## 2022-11-11 DIAGNOSIS — N50.82 SCROTAL PAIN: Primary | ICD-10-CM

## 2022-11-13 ENCOUNTER — PATIENT MESSAGE (OUTPATIENT)
Dept: FAMILY MEDICINE | Facility: CLINIC | Age: 62
End: 2022-11-13
Payer: OTHER GOVERNMENT

## 2022-11-14 ENCOUNTER — HOSPITAL ENCOUNTER (OUTPATIENT)
Dept: RADIOLOGY | Facility: HOSPITAL | Age: 62
Discharge: HOME | End: 2022-11-14
Attending: STUDENT IN AN ORGANIZED HEALTH CARE EDUCATION/TRAINING PROGRAM
Payer: OTHER GOVERNMENT

## 2022-11-14 DIAGNOSIS — N50.82 SCROTAL PAIN: ICD-10-CM

## 2022-11-14 PROCEDURE — 76870 US EXAM SCROTUM: CPT | Mod: TC,PO

## 2022-11-14 RX ORDER — HYDROCODONE BITARTRATE AND ACETAMINOPHEN 7.5; 325 MG/1; MG/1
1 TABLET ORAL 2 TIMES DAILY PRN
Qty: 60 TABLET | Refills: 0 | Status: SHIPPED | OUTPATIENT
Start: 2022-11-14 | End: 2022-12-05

## 2022-11-27 ENCOUNTER — PATIENT MESSAGE (OUTPATIENT)
Dept: FAMILY MEDICINE | Facility: CLINIC | Age: 62
End: 2022-11-27
Payer: OTHER GOVERNMENT

## 2022-11-27 DIAGNOSIS — M51.26 LUMBAR DISCOGENIC PAIN SYNDROME: Primary | ICD-10-CM

## 2022-12-01 ENCOUNTER — OFFICE VISIT (OUTPATIENT)
Dept: DERMATOLOGY | Facility: CLINIC | Age: 62
End: 2022-12-01
Payer: OTHER GOVERNMENT

## 2022-12-01 VITALS — WEIGHT: 274 LBS | HEIGHT: 72 IN | BODY MASS INDEX: 37.11 KG/M2

## 2022-12-01 DIAGNOSIS — L82.1 SEBORRHEIC KERATOSES: ICD-10-CM

## 2022-12-01 DIAGNOSIS — L40.8 INVERSE PSORIASIS: ICD-10-CM

## 2022-12-01 DIAGNOSIS — Z85.828 HISTORY OF NONMELANOMA SKIN CANCER: ICD-10-CM

## 2022-12-01 DIAGNOSIS — D22.9 MULTIPLE BENIGN NEVI: ICD-10-CM

## 2022-12-01 DIAGNOSIS — L40.9 PSORIASIS: Primary | ICD-10-CM

## 2022-12-01 DIAGNOSIS — L90.5 SCAR: ICD-10-CM

## 2022-12-01 DIAGNOSIS — D48.5 NEOPLASM OF UNCERTAIN BEHAVIOR OF SKIN: ICD-10-CM

## 2022-12-01 PROCEDURE — 88305 TISSUE EXAM BY PATHOLOGIST: CPT | Performed by: PATHOLOGY

## 2022-12-01 PROCEDURE — 88305 TISSUE EXAM BY PATHOLOGIST: ICD-10-PCS | Mod: 26,,, | Performed by: PATHOLOGY

## 2022-12-01 PROCEDURE — 99214 OFFICE O/P EST MOD 30 MIN: CPT | Mod: PBBFAC,PO | Performed by: DERMATOLOGY

## 2022-12-01 PROCEDURE — 99999 PR PBB SHADOW E&M-EST. PATIENT-LVL IV: ICD-10-PCS | Mod: PBBFAC,,, | Performed by: DERMATOLOGY

## 2022-12-01 PROCEDURE — 99214 PR OFFICE/OUTPT VISIT, EST, LEVL IV, 30-39 MIN: ICD-10-PCS | Mod: 25,S$PBB,, | Performed by: DERMATOLOGY

## 2022-12-01 PROCEDURE — 99999 PR PBB SHADOW E&M-EST. PATIENT-LVL IV: CPT | Mod: PBBFAC,,, | Performed by: DERMATOLOGY

## 2022-12-01 PROCEDURE — 99214 OFFICE O/P EST MOD 30 MIN: CPT | Mod: 25,S$PBB,, | Performed by: DERMATOLOGY

## 2022-12-01 PROCEDURE — 88342 IMHCHEM/IMCYTCHM 1ST ANTB: CPT | Performed by: PATHOLOGY

## 2022-12-01 PROCEDURE — 88342 IMHCHEM/IMCYTCHM 1ST ANTB: CPT | Mod: 26,,, | Performed by: PATHOLOGY

## 2022-12-01 PROCEDURE — 88342 CHG IMMUNOCYTOCHEMISTRY: ICD-10-PCS | Mod: 26,,, | Performed by: PATHOLOGY

## 2022-12-01 PROCEDURE — 88305 TISSUE EXAM BY PATHOLOGIST: CPT | Mod: 26,,, | Performed by: PATHOLOGY

## 2022-12-01 PROCEDURE — 11102 TANGNTL BX SKIN SINGLE LES: CPT | Mod: S$PBB,,, | Performed by: DERMATOLOGY

## 2022-12-01 PROCEDURE — 11102 PR TANGENTIAL BIOPSY, SKIN, SINGLE LESION: ICD-10-PCS | Mod: S$PBB,,, | Performed by: DERMATOLOGY

## 2022-12-01 PROCEDURE — 11102 TANGNTL BX SKIN SINGLE LES: CPT | Mod: PBBFAC,PO | Performed by: DERMATOLOGY

## 2022-12-01 RX ORDER — ROFLUMILAST 3 MG/G
1 CREAM TOPICAL DAILY
Qty: 60 G | Refills: 5 | Status: SHIPPED | OUTPATIENT
Start: 2022-12-01 | End: 2022-12-01

## 2022-12-01 RX ORDER — ROFLUMILAST 3 MG/G
1 CREAM TOPICAL DAILY
Qty: 60 G | Refills: 5 | Status: SHIPPED | OUTPATIENT
Start: 2022-12-01

## 2022-12-01 NOTE — PATIENT INSTRUCTIONS
Shave Biopsy Wound Care    Your doctor has performed a shave biopsy today.  A band aid and vaseline ointment has been placed over the site.  This should remain in place for 24 hours.  It is recommended that you keep the area dry for the first 24 hours.  After 24 hours, you may remove the band aid and wash the area with warm soap and water and apply Vaseline jelly.  Many patients prefer to use Neosporin or Bacitracin ointment.  This is acceptable; however, know that you can develop an allergy to this medication even if you have used it safely for years.  It is important to keep the area moist.  Letting it dry out and get air slows healing time, and will worsen the scar.  Band aid is optional after first 24 hours.      If you notice increasing redness, tenderness, pain, or yellow drainage at the biopsy site, please notify your doctor.  These are signs of an infection.    If your biopsy site is bleeding, apply firm pressure for 15 minutes straight.  Repeat for another 15 minutes, if it is still bleeding.   If the surgical site continues to bleed, then please contact your doctor.       [facility]  [facility]  [address] [place] [address], [address] [id number]  Dept: [phone]

## 2022-12-01 NOTE — PROGRESS NOTES
Subjective:       Patient ID:  Baldo Grant is a 62 y.o. male who presents for   Chief Complaint   Patient presents with    Skin Check     tbsc     LOV- 4/13/22- sk, nevi, inverse psoriasis,    Here today for a TBSC  Would like to talk about removal of skin tags    Previously under the care of Derm in [place]  H/o SCCs:   2014    Forehead- Dr. Cabral   2013    Right ear- Dr. Marianna long  Has fhx of MM- father    Current Outpatient Medications:   ·  calcipotriene-betamethasone (ENSTILAR) 0.005-0.064 % Foam, Apply 1 application topically once daily., Disp: 60 g, Rfl: 2  ·  diclofenac sodium (VOLTAREN) 1 % Gel, Apply 2 g topically 4 (four) times daily., Disp: 100 g, Rfl: 0  ·  diphenhydrAMINE (BENADRYL) 25 mg capsule, Take 25 mg by mouth every 6 (six) hours as needed for Itching., Disp: , Rfl:   ·  EPINEPHrine (EPIPEN) 0.3 mg/0.3 mL AtIn, , Disp: , Rfl:   ·  fexofenadine (ALLEGRA) 180 MG tablet, Take 180 mg by mouth continuous prn., Disp: , Rfl:   ·  gabapentin (NEURONTIN) 600 MG tablet, Take 1 tablet (600 mg total) by mouth 3 (three) times daily., Disp: 270 tablet, Rfl: 4  ·  HYDROcodone-acetaminophen (NORCO) 7.5-325 mg per tablet, Take 1 tablet by mouth 2 (two) times daily as needed for Pain., Disp: 60 tablet, Rfl: 0  ·  lidocaine HCL 2% (XYLOCAINE) 2 % jelly, Apply topically as needed., Disp: 30 mL, Rfl: 3  ·  meloxicam (MOBIC) 15 MG tablet, Take 1 tablet (15 mg total) by mouth once daily., Disp: 90 tablet, Rfl: 3  ·  meloxicam (MOBIC) 15 MG tablet, Take 1 tablet (15 mg total) by mouth once daily., Disp: 30 tablet, Rfl: 2  ·  multivitamin (THERAGRAN) per tablet, Take 1 tablet by mouth once daily., Disp: , Rfl:   ·  ondansetron (ZOFRAN) 4 MG tablet, Take 1 tablet (4 mg total) by mouth every 8 (eight) hours as needed for Nausea., Disp: 30 tablet, Rfl: 0  ·  ondansetron (ZOFRAN-ODT) 4 MG TbDL, Dissolve 1 tablet (4 mg total) by mouth 2 (two) times daily., Disp: 30 tablet, Rfl: 0  ·  oxyCODONE (ROXICODONE) 5 MG  immediate release tablet, Take 1-2 tablets (5-10 mg total) by mouth every 4 to 6 hours as needed for Pain., Disp: 40 tablet, Rfl: 0  ·  oxyCODONE (ROXICODONE) 5 MG immediate release tablet, Take 1-2 tablets (5-10 mg total) by mouth every 6 (six) hours as needed for Pain., Disp: 40 tablet, Rfl: 0  ·  sumatriptan (IMITREX) 50 MG tablet, Take one at the first sign of a headache.  You may repeat it in 1 hour as needed., Disp: 9 tablet, Rfl: 3  ·  tiZANidine (ZANAFLEX) 4 MG tablet, , Disp: , Rfl:   ·  aspirin (ECOTRIN) 81 MG EC tablet, Take 1 tablet (81 mg total) by mouth 2 (two) times daily., Disp: 84 tablet, Rfl: 0      Review of Systems   Constitutional:  Negative for fever, chills and fatigue.   Respiratory:  Negative for cough and shortness of breath.    Skin:  Positive for activity-related sunscreen use and wears hat. Negative for itching and rash.   Hematologic/Lymphatic: Bruises/bleeds easily.      Objective:    Physical Exam   Constitutional: He appears well-developed and well-nourished. No distress.   Neurological: He is alert and oriented to person, place, and time. He is not disoriented.   Psychiatric: He has a normal mood and affect.   Skin:   Areas Examined (abnormalities noted in diagram):   Scalp / Hair Palpated and Inspected  Head / Face Inspection Performed  Neck Inspection Performed  Chest / Axilla Inspection Performed  Abdomen Inspection Performed  Genitals / Buttocks / Groin Inspection Performed  Back Inspection Performed  RUE Inspected  LUE Inspection Performed  RLE Inspected  LLE Inspection Performed  Nails and Digits Inspection Performed                     Diagram Legend     Erythematous scaling macule/papule c/w actinic keratosis       Vascular papule c/w angioma      Pigmented verrucoid papule/plaque c/w seborrheic keratosis      Yellow umbilicated papule c/w sebaceous hyperplasia      Irregularly shaped tan macule c/w lentigo     1-2 mm smooth white papules consistent with Milia      Movable  subcutaneous cyst with punctum c/w epidermal inclusion cyst      Subcutaneous movable cyst c/w pilar cyst      Firm pink to brown papule c/w dermatofibroma      Pedunculated fleshy papule(s) c/w skin tag(s)      Evenly pigmented macule c/w junctional nevus     Mildly variegated pigmented, slightly irregular-bordered macule c/w mildly atypical nevus      Flesh colored to evenly pigmented papule c/w intradermal nevus       Pink pearly papule/plaque c/w basal cell carcinoma      Erythematous hyperkeratotic cursted plaque c/w SCC      Surgical scar with no sign of skin cancer recurrence      Open and closed comedones      Inflammatory papules and pustules      Verrucoid papule consistent consistent with wart     Erythematous eczematous patches and plaques     Dystrophic onycholytic nail with subungual debris c/w onychomycosis     Umbilicated papule    Erythematous-base heme-crusted tan verrucoid plaque consistent with inflamed seborrheic keratosis     Erythematous Silvery Scaling Plaque c/w Psoriasis     See annotation      Assessment / Plan:      Pathology Orders:       Normal Orders This Visit    Specimen to Pathology, Dermatology     Comments:    Number of Specimens:->1  ------------------------->-------------------------  Spec 1 Procedure:->Biopsy  Spec 1 Clinical Impression:->condyloma vs other  Spec 1 Source:->left scrotum    Questions:    Procedure Type: Dermatology and skin neoplasms    Number of Specimens: 1    ------------------------: -------------------------    Spec 1 Procedure: Biopsy    Spec 1 Clinical Impression: condyloma vs other    Spec 1 Source: left scrotum    Release to patient:           Psoriasis  -     Discontinue: roflumilast (ZORYVE) 0.3 % Crea; Apply 1 application topically once daily.  Dispense: 60 g; Refill: 5  -     roflumilast (ZORYVE) 0.3 % Crea; Apply 1 application topically once daily.  Dispense: 60 g; Refill: 5    Inverse psoriasis  -     Discontinue: roflumilast (ZORYVE) 0.3 % Crea;  Apply 1 application topically once daily.  Dispense: 60 g; Refill: 5  -     roflumilast (ZORYVE) 0.3 % Crea; Apply 1 application topically once daily.  Dispense: 60 g; Refill: 5    Continue enstillar if unable to cover roflumilast  Prefer non steroidal for folds  BSA is low (2%) but include difficult areas (fold)    Seborrheic keratoses  These are benign inherited growths without a malignant potential. Reassurance given to patient. No treatment is necessary.     Neoplasm of uncertain behavior of skin  Shave biopsy procedure note:    Shave biopsy performed after verbal consent including risk of infection, scar, recurrence, need for additional treatment of site. Area prepped with alcohol, anesthetized with approximately 1.0cc of 1% lidocaine with epinephrine. Lesional tissue shaved with razor blade. Hemostasis achieved with application of aluminum chloride followed by hyfrecation. No complications. Dressing applied. Wound care explained.    Multiple benign nevi  Careful dermoscopy evaluation of nevi performed with none identified as needing biopsy today  Monitor for new mole or moles that are becoming bigger, darker, irritated, or developing irregular borders.     History of nonmelanoma skin cancer  Scar  Area of previous SCC x 2 examined. Sites well healed with no signs of recurrence.    Total body skin examination performed today including at least 12 points as noted in physical examination. 1 suspicious lesion noted.    Patient instructed in importance in daily broad spectrum sun protection of at least spf 30. Mineral sunscreen ingredients preferred (Zinc +/- Titanium) and can be found OTC.   Recommend Elta MD for daily use on face and neck.  Patient encouraged to wear hat for all outdoor exposure.   Also discussed sun avoidance and use of protective clothing.           Follow up in about 6 months (around 6/1/2023), or if symptoms worsen or fail to improve.

## 2022-12-04 ENCOUNTER — PATIENT MESSAGE (OUTPATIENT)
Dept: SLEEP MEDICINE | Facility: CLINIC | Age: 62
End: 2022-12-04
Payer: OTHER GOVERNMENT

## 2022-12-05 ENCOUNTER — TELEPHONE (OUTPATIENT)
Dept: SLEEP MEDICINE | Facility: CLINIC | Age: 62
End: 2022-12-05
Payer: OTHER GOVERNMENT

## 2022-12-05 ENCOUNTER — OFFICE VISIT (OUTPATIENT)
Dept: FAMILY MEDICINE | Facility: CLINIC | Age: 62
End: 2022-12-05
Payer: OTHER GOVERNMENT

## 2022-12-05 VITALS
DIASTOLIC BLOOD PRESSURE: 86 MMHG | SYSTOLIC BLOOD PRESSURE: 122 MMHG | WEIGHT: 270.63 LBS | BODY MASS INDEX: 36.66 KG/M2 | TEMPERATURE: 98 F | HEIGHT: 72 IN | HEART RATE: 86 BPM | OXYGEN SATURATION: 94 %

## 2022-12-05 DIAGNOSIS — G47.33 OSA (OBSTRUCTIVE SLEEP APNEA): Primary | ICD-10-CM

## 2022-12-05 DIAGNOSIS — Z96.652 STATUS POST TOTAL LEFT KNEE REPLACEMENT: ICD-10-CM

## 2022-12-05 DIAGNOSIS — E66.01 CLASS 2 SEVERE OBESITY DUE TO EXCESS CALORIES WITH SERIOUS COMORBIDITY AND BODY MASS INDEX (BMI) OF 36.0 TO 36.9 IN ADULT: ICD-10-CM

## 2022-12-05 DIAGNOSIS — M17.12 PRIMARY OSTEOARTHRITIS OF LEFT KNEE: ICD-10-CM

## 2022-12-05 DIAGNOSIS — M51.26 LUMBAR DISCOGENIC PAIN SYNDROME: ICD-10-CM

## 2022-12-05 DIAGNOSIS — M54.31 SCIATICA OF RIGHT SIDE: ICD-10-CM

## 2022-12-05 DIAGNOSIS — E11.9 TYPE 2 DIABETES MELLITUS WITHOUT COMPLICATION, WITHOUT LONG-TERM CURRENT USE OF INSULIN: Primary | ICD-10-CM

## 2022-12-05 PROCEDURE — 99214 OFFICE O/P EST MOD 30 MIN: CPT | Mod: S$GLB,,, | Performed by: STUDENT IN AN ORGANIZED HEALTH CARE EDUCATION/TRAINING PROGRAM

## 2022-12-05 PROCEDURE — 99214 PR OFFICE/OUTPT VISIT, EST, LEVL IV, 30-39 MIN: ICD-10-PCS | Mod: S$GLB,,, | Performed by: STUDENT IN AN ORGANIZED HEALTH CARE EDUCATION/TRAINING PROGRAM

## 2022-12-05 RX ORDER — SUMATRIPTAN 50 MG/1
TABLET, FILM COATED ORAL
Qty: 9 TABLET | Refills: 3 | Status: SHIPPED | OUTPATIENT
Start: 2022-12-05 | End: 2023-12-11 | Stop reason: SDUPTHER

## 2022-12-05 RX ORDER — HYDROCODONE BITARTRATE AND ACETAMINOPHEN 7.5; 325 MG/1; MG/1
1 TABLET ORAL 2 TIMES DAILY PRN
Qty: 60 TABLET | Refills: 0 | Status: SHIPPED | OUTPATIENT
Start: 2022-12-05 | End: 2023-01-04

## 2022-12-05 RX ORDER — MELOXICAM 15 MG/1
15 TABLET ORAL DAILY
Qty: 30 TABLET | Refills: 2 | Status: SHIPPED | OUTPATIENT
Start: 2022-12-05 | End: 2023-01-22 | Stop reason: SDUPTHER

## 2022-12-05 RX ORDER — GABAPENTIN 600 MG/1
600 TABLET ORAL 3 TIMES DAILY
Qty: 270 TABLET | Refills: 4 | Status: SHIPPED | OUTPATIENT
Start: 2022-12-05 | End: 2024-01-30 | Stop reason: SDUPTHER

## 2022-12-05 RX ORDER — ONDANSETRON 4 MG/1
4 TABLET, ORALLY DISINTEGRATING ORAL 2 TIMES DAILY
Qty: 30 TABLET | Refills: 0 | Status: SHIPPED | OUTPATIENT
Start: 2022-12-05 | End: 2023-01-10

## 2022-12-05 RX ORDER — HYDROCODONE BITARTRATE AND ACETAMINOPHEN 7.5; 325 MG/1; MG/1
1 TABLET ORAL 2 TIMES DAILY PRN
Qty: 60 TABLET | Refills: 0 | Status: SHIPPED | OUTPATIENT
Start: 2022-12-05 | End: 2022-12-05 | Stop reason: SDUPTHER

## 2022-12-05 RX ORDER — TIRZEPATIDE 2.5 MG/.5ML
2.5 INJECTION, SOLUTION SUBCUTANEOUS
Qty: 4 PEN | Refills: 1 | Status: SHIPPED | OUTPATIENT
Start: 2022-12-05 | End: 2023-01-22 | Stop reason: SDUPTHER

## 2022-12-05 NOTE — TELEPHONE ENCOUNTER
Will reorder CPAP supplies    Nasal mask and heated hose if Maria M is capable of having a  heated hose

## 2022-12-06 ENCOUNTER — HOSPITAL ENCOUNTER (OUTPATIENT)
Dept: RADIOLOGY | Facility: HOSPITAL | Age: 62
Discharge: HOME | End: 2022-12-06
Attending: PHYSICIAN ASSISTANT
Payer: OTHER GOVERNMENT

## 2022-12-06 ENCOUNTER — OFFICE VISIT (OUTPATIENT)
Dept: SPORTS MEDICINE | Facility: CLINIC | Age: 62
End: 2022-12-06
Payer: OTHER GOVERNMENT

## 2022-12-06 VITALS
HEART RATE: 81 BPM | HEIGHT: 72 IN | DIASTOLIC BLOOD PRESSURE: 84 MMHG | BODY MASS INDEX: 36.57 KG/M2 | SYSTOLIC BLOOD PRESSURE: 130 MMHG | WEIGHT: 270 LBS

## 2022-12-06 DIAGNOSIS — M25.562 LEFT KNEE PAIN, UNSPECIFIED CHRONICITY: ICD-10-CM

## 2022-12-06 DIAGNOSIS — Z96.652 S/P TOTAL KNEE REPLACEMENT, LEFT: Primary | ICD-10-CM

## 2022-12-06 PROCEDURE — 73564 XR KNEE ORTHO LEFT WITH FLEXION: ICD-10-PCS | Mod: 26,LT,, | Performed by: RADIOLOGY

## 2022-12-06 PROCEDURE — 99024 POSTOP FOLLOW-UP VISIT: CPT | Mod: ,,, | Performed by: PHYSICIAN ASSISTANT

## 2022-12-06 PROCEDURE — 73562 X-RAY EXAM OF KNEE 3: CPT | Mod: TC,RT

## 2022-12-06 PROCEDURE — 73562 X-RAY EXAM OF KNEE 3: CPT | Mod: 26,RT,, | Performed by: RADIOLOGY

## 2022-12-06 PROCEDURE — 99024 PR POST-OP FOLLOW-UP VISIT: ICD-10-PCS | Mod: ,,, | Performed by: PHYSICIAN ASSISTANT

## 2022-12-06 PROCEDURE — 99215 OFFICE O/P EST HI 40 MIN: CPT | Mod: PBBFAC | Performed by: PHYSICIAN ASSISTANT

## 2022-12-06 PROCEDURE — 99999 PR PBB SHADOW E&M-EST. PATIENT-LVL V: ICD-10-PCS | Mod: PBBFAC,,, | Performed by: PHYSICIAN ASSISTANT

## 2022-12-06 PROCEDURE — 73564 X-RAY EXAM KNEE 4 OR MORE: CPT | Mod: 26,LT,, | Performed by: RADIOLOGY

## 2022-12-06 PROCEDURE — 73562 XR KNEE ORTHO LEFT WITH FLEXION: ICD-10-PCS | Mod: 26,RT,, | Performed by: RADIOLOGY

## 2022-12-06 PROCEDURE — 99999 PR PBB SHADOW E&M-EST. PATIENT-LVL V: CPT | Mod: PBBFAC,,, | Performed by: PHYSICIAN ASSISTANT

## 2022-12-06 NOTE — PROGRESS NOTES
S:Baldo Grant presents for 8 weeks post-operative evaluation.     Surgery Date: 10/12/2022      Participating Surgeons:  Surgeon(s) and Role:     * FREEMAN Álvarez MD - Primary   Marcello Aggarwal MD - First Assistant     Procedures:  Procedure(s) (LRB):  ARTHROPLASTY, KNEE, TOTAL (Left)    Baldo Grant reports to be doing very well 8wk s/p the above mentioned procedure. Denies fevers, chills, night sweats, chest pain, difficulty breathing, calf pain or tenderness.  Recently completed PT at the [facility] location prior to going on a trip to [place] a few weeks ago. He is interested in going back to continue post op recovery. Seeing good progress daily. Off of post op pain control, back to taking norco 5mg BID as prescribed by his pain management doctor. Ambulating without assistive devices. Doing great overall. Completed six weeks of post op ASA BID.    O: The incision is well healed. No significant pain or unusual tenderness. ROM is 3-115 degrees.    RADIOGRAPHS: 12/6/22     Right knee:     Post op total knee arthroplasty prosthesis seen on imaging well aligned without complications.    A/P: Plan to follow the rehab plan as previously outlined. RTC in 6 weeks with Dr. Álvarez.  -Patient is planning on traveling for work within the next few weeks. Discussed taking  BID for DVT prophylaxis. Additionally discussed wearing LISSETH hose for travel and doing heel pumps on the flight. Patient in agreement with this plan.

## 2022-12-07 ENCOUNTER — PATIENT MESSAGE (OUTPATIENT)
Dept: FAMILY MEDICINE | Facility: CLINIC | Age: 62
End: 2022-12-07
Payer: OTHER GOVERNMENT

## 2022-12-07 DIAGNOSIS — E11.9 TYPE 2 DIABETES MELLITUS WITHOUT COMPLICATION: ICD-10-CM

## 2022-12-09 ENCOUNTER — PATIENT MESSAGE (OUTPATIENT)
Dept: FAMILY MEDICINE | Facility: CLINIC | Age: 62
End: 2022-12-09
Payer: OTHER GOVERNMENT

## 2022-12-09 NOTE — TELEPHONE ENCOUNTER
PA for Mounjaro has been approved.    JOSEY DEL RIO Key: [id number] - PA Case ID: [id number] help? Call us at [phone]  Outcome  Approvedtoday  CaseId:[id number];Status:Approved;Review Type:Prior Auth;Coverage Start Date:11/09/2022;Coverage End Date:12/31/2099;  Drug  Mounjaro 2.5MG/0.5ML pen-injectors  Form  Ronald Flores PA Form (2017 NCPDP)

## 2022-12-13 LAB
FINAL PATHOLOGIC DIAGNOSIS: NORMAL
GROSS: NORMAL
Lab: NORMAL
MICROSCOPIC EXAM: NORMAL

## 2022-12-14 ENCOUNTER — TELEPHONE (OUTPATIENT)
Dept: DERMATOLOGY | Facility: CLINIC | Age: 62
End: 2022-12-14
Payer: OTHER GOVERNMENT

## 2022-12-14 NOTE — TELEPHONE ENCOUNTER
Attempted to contact patient, no answer, [facility] for a return call.     ----- Message from Anastasia Monroe MD sent at 12/14/2022  9:35 AM CST -----  This lesion is benign and no further intervention is warranted. Please call us if you have any question.  Thank you!   Dr Monroe    1. Skin, left scrotum, shave biopsy:   - SEBORRHEIC KERATOSIS.

## 2022-12-16 ENCOUNTER — HOSPITAL ENCOUNTER (OUTPATIENT)
Dept: RADIOLOGY | Facility: HOSPITAL | Age: 62
Discharge: HOME | End: 2022-12-16
Attending: NURSE PRACTITIONER
Payer: OTHER GOVERNMENT

## 2022-12-16 DIAGNOSIS — M51.36 DDD (DEGENERATIVE DISC DISEASE), LUMBAR: ICD-10-CM

## 2022-12-16 DIAGNOSIS — M54.9 DORSALGIA, UNSPECIFIED: ICD-10-CM

## 2022-12-16 DIAGNOSIS — M51.26 LUMBAR DISCOGENIC PAIN SYNDROME: ICD-10-CM

## 2022-12-16 PROCEDURE — 72148 MRI LUMBAR SPINE W/O DYE: CPT | Mod: TC

## 2022-12-16 PROCEDURE — 72148 MRI LUMBAR SPINE W/O DYE: CPT | Mod: 26,,, | Performed by: RADIOLOGY

## 2022-12-16 PROCEDURE — 72148 MRI LUMBAR SPINE WITHOUT CONTRAST: ICD-10-PCS | Mod: 26,,, | Performed by: RADIOLOGY

## 2022-12-17 NOTE — PROGRESS NOTES
Patient ID: Baldo Grant is a 62 y.o. male.     Chief Complaint: Back Pain    Back Pain  This is a chronic problem. The current episode started more than 1 year ago. The problem occurs constantly. The problem has been rapidly worsening since onset. The pain is present in the sacro-iliac. The quality of the pain is described as aching, shooting and stabbing. The pain radiates to the left foot, left knee and left thigh. The pain is at a severity of 9/10. The pain is severe. The pain is Worse during the day. The symptoms are aggravated by standing, stress and twisting. Stiffness is present In the morning. Associated symptoms include leg pain, numbness and pelvic pain. Pertinent negatives include no abdominal pain, bladder incontinence, bowel incontinence, chest pain, dysuria, fever, headaches, paresis, perianal numbness, tingling, weakness or weight loss. Risk factors include lack of exercise, obesity and sedentary lifestyle. The treatment provided mild relief.      Patient here accompanied by his wife. His current pain management doctor is leaving [facility]. He would like a referral to a new pain management doctor.    Review of Systems  Review of Systems   Constitutional:  Negative for fever and weight loss.   HENT:  Negative for ear pain and sinus pain.    Eyes:  Negative for discharge.   Respiratory:  Negative for cough and shortness of breath.    Cardiovascular:  Negative for chest pain and leg swelling.   Gastrointestinal:  Negative for abdominal pain, bowel incontinence, diarrhea, nausea and vomiting.   Genitourinary:  Positive for pelvic pain. Negative for bladder incontinence, dysuria, hematuria and urgency.   Musculoskeletal:  Positive for back pain. Negative for myalgias.   Skin:  Negative for rash.   Neurological:  Positive for numbness. Negative for tingling, weakness and headaches.   Psychiatric/Behavioral:  Negative for depression.    All other systems reviewed and are negative.    Currently  Medications  Current Outpatient Medications on File Prior to Visit   Medication Sig Dispense Refill    calcipotriene-betamethasone (ENSTILAR) 0.005-0.064 % Foam Apply 1 application topically once daily. 60 g 2    diclofenac sodium (VOLTAREN) 1 % Gel Apply 2 g topically 4 (four) times daily. 100 g 0    diphenhydrAMINE (BENADRYL) 25 mg capsule Take 25 mg by mouth every 6 (six) hours as needed for Itching.      EPINEPHrine (EPIPEN) 0.3 mg/0.3 mL AtIn       fexofenadine (ALLEGRA) 180 MG tablet Take 180 mg by mouth continuous prn.      lidocaine HCL 2% (XYLOCAINE) 2 % jelly Apply topically as needed. 30 mL 3    roflumilast (ZORYVE) 0.3 % Crea Apply 1 application topically once daily. 60 g 5    tiZANidine (ZANAFLEX) 4 MG tablet       aspirin (ECOTRIN) 81 MG EC tablet Take 1 tablet (81 mg total) by mouth 2 (two) times daily. 84 tablet 0    meloxicam (MOBIC) 15 MG tablet Take 1 tablet (15 mg total) by mouth once daily. 90 tablet 3    multivitamin (THERAGRAN) per tablet Take 1 tablet by mouth once daily.      ondansetron (ZOFRAN) 4 MG tablet Take 1 tablet (4 mg total) by mouth every 8 (eight) hours as needed for Nausea. 30 tablet 0    oxyCODONE (ROXICODONE) 5 MG immediate release tablet Take 1-2 tablets (5-10 mg total) by mouth every 4 to 6 hours as needed for Pain. (Patient not taking: Reported on 12/6/2022) 40 tablet 0    oxyCODONE (ROXICODONE) 5 MG immediate release tablet Take 1-2 tablets (5-10 mg total) by mouth every 6 (six) hours as needed for Pain. (Patient not taking: Reported on 12/6/2022) 40 tablet 0     Current Facility-Administered Medications on File Prior to Visit   Medication Dose Route Frequency Provider Last Rate Last Admin    0.9%  NaCl infusion  500 mL Intravenous Continuous Alexus Anglin Jr., MD        lidocaine (PF) 10 mg/ml (1%) injection 10 mg  1 mL Intradermal Once Alexus Anglin Jr., MD        lidocaine (PF) 10 mg/ml (1%) injection 10 mg  1 mL Intradermal Once Alexus Anglin Jr., MD            Physical  Exam  Vitals:    12/05/22 1418   BP: 122/86   BP Location: Left arm   Patient Position: Sitting   Pulse: 86   Temp: 98.3 °F (36.8 °C)   SpO2: (!) 94%   Weight: 122.7 kg (270 lb 9.8 oz)   Height: 6' (1.829 m)      Body mass index is 36.7 kg/m².    Physical Exam  Vitals and nursing note reviewed.   Constitutional:       General: He is not in acute distress.     Appearance: He is obese. He is not ill-appearing.   HENT:      Head: Normocephalic and atraumatic.      Right Ear: External ear normal.      Left Ear: External ear normal.      Nose: Nose normal.      Mouth/Throat:      Mouth: Mucous membranes are moist.   Eyes:      Extraocular Movements: Extraocular movements intact.      Conjunctiva/sclera: Conjunctivae normal.   Cardiovascular:      Rate and Rhythm: Normal rate and regular rhythm.      Pulses: Normal pulses.      Heart sounds: No murmur heard.  Pulmonary:      Effort: Pulmonary effort is normal. No respiratory distress.      Breath sounds: No wheezing.   Abdominal:      General: There is no distension.      Palpations: Abdomen is soft. There is no mass.      Tenderness: There is no abdominal tenderness.   Musculoskeletal:         General: No swelling.      Cervical back: Normal range of motion.   Skin:     Coloration: Skin is not jaundiced.      Findings: No rash.   Neurological:      General: No focal deficit present.      Mental Status: He is alert and oriented to person, place, and time.   Psychiatric:         Mood and Affect: Mood normal.         Thought Content: Thought content normal.       Labs:    Complete Blood Count  Lab Results   Component Value Date    RBC 4.75 09/16/2022    HGB 13.7 (L) 09/16/2022    HCT 41.8 09/16/2022    MCV 88 09/16/2022    MCH 28.8 09/16/2022    MCHC 32.8 09/16/2022    RDW 11.9 09/16/2022     09/16/2022    MPV 9.5 09/16/2022    GRAN 3.4 09/16/2022    GRAN 64.7 09/16/2022    LYMPH 1.1 09/16/2022    LYMPH 20.7 09/16/2022    MONO 0.5 09/16/2022    MONO  9.1 09/16/2022    EOS 0.3 09/16/2022    BASO 0.03 09/16/2022    EOSINOPHIL 4.7 09/16/2022    BASOPHIL 0.6 09/16/2022    DIFFMETHOD Automated 09/16/2022       Comprehensive Metabolic Panel  Lab Results   Component Value Date     (H) 09/16/2022    BUN 14 09/16/2022    CREATININE 0.96 09/16/2022     09/16/2022    K 4.5 09/16/2022     09/16/2022    PROT 7.0 09/16/2022    ALBUMIN 4.5 09/16/2022    BILITOT 0.4 09/16/2022    AST 33 09/16/2022    ALKPHOS 39 09/16/2022    CO2 29 09/16/2022    ALT 48 (H) 09/16/2022    ANIONGAP 10 09/16/2022       TSH  No results found for: TSH    Imaging:  MRI Lumbar Spine Without Contrast  Narrative: EXAMINATION:  MRI LUMBAR SPINE WITHOUT CONTRAST    CLINICAL HISTORY:  Low back pain, symptoms persist with > 6wks conservative treatment; Dorsalgia, unspecified    TECHNIQUE:  Multiplanar, multisequence MR images were acquired from the thoracolumbar junction to the sacrum without the administration of contrast.    COMPARISON:  Lumbar radiographs 09/02/2022, MRI lumbar spine 12/01/2021    FINDINGS:  Alignment: Normal.    Vertebrae: L4-L5 degenerative endplate changes with mild marrow edema at the L5 superior endplate, similar when compared to the previous MRI.  No evidence of fracture.  No suspicious marrow replacement.    Discs: Degenerative disc desiccation from L3-L4 through L5-S1 with mild associated height loss at L4-L5.  Small annular fissures at L4-L5 and L5-S1.    Cord: Normal.  Conus terminates at L1-L2.  Spinal cord stimulator leads enter the canal at L1.    Degenerative findings:    T12-L1: No spinal canal stenosis or neural foraminal narrowing.    L1-L2: No spinal canal stenosis or neural foraminal narrowing.    L2-L3: Right foraminal zone broad-based protrusion.  No spinal canal stenosis.  Mild right neural foraminal narrowing.    L3-L4: Left foraminal zone broad-based disc bulge.  Bilateral facet arthropathy.  No spinal canal stenosis.  Mild left neural foraminal  narrowing.    L4-L5: Circumferential disc bulge with a superimposed right foraminal zone protrusion.  Bilateral facet arthropathy and bilateral ligamentum flavum buckling.  No spinal canal stenosis.  Mild-to-moderate right and mild left neural foraminal narrowing.    L5-S1: Circumferential disc bulge and facet arthropathy.  No spinal canal stenosis.  Mild right and moderate left neural foraminal narrowing    Paraspinal muscles & soft tissues: Spinal cord stimulator generator in the right posterior abdominal wall with leads entering the canal at the T12-L1 level and extending cranially beyond the field of view.  Impression: Multilevel degenerative changes of the lumbar spine contributing to mild-to-moderate neural foraminal narrowing from L2-L3 through L5-S1, similar when compared to MRI of 12/01/2021.    Spinal cord stimulator.    Electronically signed by resident: Aliyah Balbuena  Date:    12/16/2022  Time:    14:48    Electronically signed by: Shaji Lynne MD  Date:    12/16/2022  Time:    15:25      Assessment/Plan:    1. Type 2 diabetes mellitus without complication, without long-term current use of insulin  -     tirzepatide (MOUNJARO) 2.5 mg/0.5 mL PnIj; Inject 2.5 mg into the skin every 7 days.  Dispense: 4 pen; Refill: 1    2. Sciatica of right side  -     HYDROcodone-acetaminophen (NORCO) 7.5-325 mg per tablet; Take 1 tablet by mouth 2 (two) times daily as needed for Pain.  Dispense: 60 tablet; Refill: 0  -     gabapentin (NEURONTIN) 600 MG tablet; Take 1 tablet (600 mg total) by mouth 3 (three) times daily.  Dispense: 270 tablet; Refill: 4    3. Primary osteoarthritis of left knee  -     ondansetron (ZOFRAN-ODT) 4 MG TbDL; Dissolve 1 tablet (4 mg total) by mouth 2 (two) times daily.  Dispense: 30 tablet; Refill: 0    4. Status post total left knee replacement  -     meloxicam (MOBIC) 15 MG tablet; Take 1 tablet (15 mg total) by mouth once daily.  Dispense: 30 tablet; Refill: 2    5. Lumbar discogenic  pain syndrome  -     HYDROcodone-acetaminophen (NORCO) 7.5-325 mg per tablet; Take 1 tablet by mouth 2 (two) times daily as needed for Pain.  Dispense: 60 tablet; Refill: 0  -     meloxicam (MOBIC) 15 MG tablet; Take 1 tablet (15 mg total) by mouth once daily.  Dispense: 30 tablet; Refill: 2    6. Class 2 severe obesity due to excess calories with serious comorbidity and body mass index (BMI) of 36.0 to 36.9 in adult    Other orders  -     Discontinue: HYDROcodone-acetaminophen (NORCO) 7.5-325 mg per tablet; Take 1 tablet by mouth 2 (two) times daily as needed for Pain.  Dispense: 60 tablet; Refill: 0  -     sumatriptan (IMITREX) 50 MG tablet; Take one at the first sign of a headache.  You may repeat it in 1 hour as needed.  Dispense: 9 tablet; Refill: 3         Discussed how to stay healthy including: diet, exercise, refraining from smoking and discussed screening exams / tests needed for age, sex and family Hx.      You Britton MD    Answers submitted by the patient for this visit:  Back Pain Questionnaire (Submitted on 12/2/2022)  Chief Complaint: Back pain  genital pain: Yes

## 2022-12-29 ENCOUNTER — OFFICE VISIT (OUTPATIENT)
Dept: SLEEP MEDICINE | Facility: CLINIC | Age: 62
End: 2022-12-29
Payer: OTHER GOVERNMENT

## 2022-12-29 DIAGNOSIS — G47.33 OSA (OBSTRUCTIVE SLEEP APNEA): Primary | ICD-10-CM

## 2022-12-29 DIAGNOSIS — G47.30 SLEEP APNEA, UNSPECIFIED TYPE: ICD-10-CM

## 2022-12-29 PROCEDURE — 99213 OFFICE O/P EST LOW 20 MIN: CPT | Mod: 95,,, | Performed by: PSYCHIATRY & NEUROLOGY

## 2022-12-29 PROCEDURE — 99213 PR OFFICE/OUTPT VISIT, EST, LEVL III, 20-29 MIN: ICD-10-PCS | Mod: 95,,, | Performed by: PSYCHIATRY & NEUROLOGY

## 2022-12-29 NOTE — PROGRESS NOTES
The patient location is: home  The chief complaint leading to consultation is: sleep disorder  Visit type: audiovisual  Each patient to whom he or she provides medical services by telemedicine is:  (1) informed of the relationship between the physician and patient and the respective role of any other health care provider with respect to management of the patient; and (2) notified that he or she may decline to receive medical services by telemedicine and may withdraw from such care at any time.  20 minutes of total time spent on the encounter, which includes face to face time and non-face to face time preparing to see the patient (eg, review of tests), Obtaining and/or reviewing separately obtained history, Documenting clinical information in the electronic or other health record, Independently interpreting results (not separately reported) and communicating results to the patient/family/caregiver, or Care coordination (not separately reported).       EPWORTH SLEEPINESS SCALE TOTAL SCORE  12/29/2022 8/8/2022 6/14/2022 5/17/2022   Total score 7 4 6 5       Baldo MEZA Rudy is a 62 y.o. male seen today for CPAP   follow up. Last seen on 8/11/2022.    Follow up is needed to get supplies for CPAP.    Started Moujourno - a weekly injection for borderline DM and in attempts to loose weight.    The patient reports improved sleep continuity and daytime sleepiness on PAP. ESS today is 8/24.  Denies break through snoring.  No  dry mouth.  No aerophagia or air hunger. Reports occasional mask leaks and discomfort. Using a Wisp Large  mask AND A REGULAR HOSE    APAP 5-20 MARIA M - interrogated the machine by instructing the patient how to navigate the menu  Ramp 45with ramp 5 -> will decrease to 20  1 mo  90% 6  AHI - 0.6  Leak -13           DME: lou got machine in 6/22      Sl  DME: LOU Baldo MEZA Rudy got Maria M from [facility] in late June 2022  SLEEP STUDIES:         EPWORTH SLEEPINESS SCALE TOTAL SCORE  12/29/2022 8/8/2022  "6/14/2022 5/17/2022   Total score 7 4 6 5           SLEEP STUDIES: 6/02/22: FARHAT (obstructive sleep apnea) based on AHI (apnea hypopnea index) criteria. The overall AHI was 72.4 with an oxygen li of 85.0%.    Central apneas were npt mnoted            Medications pertinent to sleep  disorders taken currently:Benadryl  Previous  medications taken  for sleep disorders:  no; NEVER tried DZRA        Occupation:retired from [employer]; still working  Bed partner: his wife  Exercise routine:signed up for a pool in [place]; but it's making him stiff.  Caffeine: limits to 2-3 caf beverages prior to 3 in the afternoon  ETOH: almost none (does not agree with him anymore - flush reaction"      PHYSICAL EXAM:  There were no vitals taken for this visit.  GENERAL: Normal development, well groomed.            ASSESSMENT:    1. FARHAT - severe. The patient symptomatically has  excessive daytime sleepiness, snoring,  witnessed breathing pauses, excessive daytime fatigue, gasping for air in sleep, interrupted sleep and nocturia  with exam findings of "a crowded oral airway and elevated body mass index. The patient has medical co-morbidities of obesity,  which can be worsened by FARHAT. This warrants treatment. Benefiting from CPAP use in terms of sleep continuity and daytime sleepiness. Compliant.          PLAN:      Continue Maria M  auto CPAP 6-20 cm with the mask of a patient's choice was given to the patient.  He was recommended to use cpap hose cover or an electrically heated hose (if Maria M brand allows) to prevent condensation.    Periodic supplies replacement was recommended    Education: During our discussion today, we talked about the etiology of obstructive sleep apnea as well as the potential ramifications of untreated sleep apnea, which could include daytime sleepiness, hypertension, heart disease and/or stroke.      We discussed potential treatment options, which could include weight loss, body positioning, continuous positive " airway pressure (CPAP), or referral for surgical consideration. The patient preferred CPAP option.     Discussed purpose of PAP therapy, health benefits of CPAP, as well as the potential ramifications of untreated sleep apnea, which could include daytime sleepiness, hypertension, heart disease and/or stroke. An AHI of 15 is associated with increased risk CVD.     Regular replacement of CPAP mask, tubing and filter was recommended.    The patient was given open opportunity to ask questions and/or express concerns about treatment plan. Two point patient identifier confirmed.     Precautions: The patient was advised to abstain from driving should he feel sleepy or drowsy.    Follow up: md/Np 12 months  20 minute visit. >50% spent counseling patient and coordination of care.

## 2022-12-29 NOTE — PATIENT INSTRUCTIONS
Fleece CPAP hose cover    And may may be CPAP mask comfort cover to avoid the mask on the mask susan on your face;  Try to replace your mask cushions more often.

## 2022-12-29 NOTE — PLAN OF CARE
[facility])  Discharge Final Note    Primary Care Provider: You Britton MD    Expected Discharge Date: 10/13/2022    Final Discharge Note (most recent)       Final Note - 10/13/22 1119          Final Note    Assessment Type Final Discharge Note     Anticipated Discharge Disposition Home or Self Care     Hospital Resources/Appts/Education Provided Provided patient/caregiver with written discharge plan information;Appointments scheduled by Navigator/Coordinator

## 2023-01-08 ENCOUNTER — PATIENT MESSAGE (OUTPATIENT)
Dept: ADMINISTRATIVE | Facility: OTHER | Age: 63
End: 2023-01-08
Payer: OTHER GOVERNMENT

## 2023-01-10 ENCOUNTER — PATIENT MESSAGE (OUTPATIENT)
Dept: FAMILY MEDICINE | Facility: CLINIC | Age: 63
End: 2023-01-10
Payer: OTHER GOVERNMENT

## 2023-01-10 ENCOUNTER — PATIENT MESSAGE (OUTPATIENT)
Dept: ADMINISTRATIVE | Facility: OTHER | Age: 63
End: 2023-01-10
Payer: OTHER GOVERNMENT

## 2023-01-10 RX ORDER — ONDANSETRON 8 MG/1
8 TABLET, ORALLY DISINTEGRATING ORAL 3 TIMES DAILY PRN
Qty: 45 TABLET | Refills: 1 | Status: SHIPPED | OUTPATIENT
Start: 2023-01-10 | End: 2023-12-10 | Stop reason: SDUPTHER

## 2023-01-10 RX ORDER — INSULIN PUMP SYRINGE, 3 ML
EACH MISCELLANEOUS
Qty: 1 EACH | Refills: 0 | Status: SHIPPED | OUTPATIENT
Start: 2023-01-10

## 2023-01-18 ENCOUNTER — TELEPHONE (OUTPATIENT)
Dept: SPORTS MEDICINE | Facility: CLINIC | Age: 63
End: 2023-01-18
Payer: OTHER GOVERNMENT

## 2023-01-22 DIAGNOSIS — E11.9 TYPE 2 DIABETES MELLITUS WITHOUT COMPLICATION, WITHOUT LONG-TERM CURRENT USE OF INSULIN: ICD-10-CM

## 2023-01-22 DIAGNOSIS — M51.26 LUMBAR DISCOGENIC PAIN SYNDROME: ICD-10-CM

## 2023-01-22 DIAGNOSIS — Z96.652 STATUS POST TOTAL LEFT KNEE REPLACEMENT: ICD-10-CM

## 2023-01-22 NOTE — TELEPHONE ENCOUNTER
No new care gaps identified.  [facility] Embedded Care Gaps. Reference number: [id number]. 1/22/2023   8:04:03 AM CST

## 2023-01-23 RX ORDER — TIRZEPATIDE 2.5 MG/.5ML
2.5 INJECTION, SOLUTION SUBCUTANEOUS
Qty: 4 PEN | Refills: 1 | Status: SHIPPED | OUTPATIENT
Start: 2023-01-23 | End: 2023-02-26 | Stop reason: SDUPTHER

## 2023-01-23 RX ORDER — MELOXICAM 15 MG/1
15 TABLET ORAL DAILY
Qty: 30 TABLET | Refills: 2 | Status: SHIPPED | OUTPATIENT
Start: 2023-01-23

## 2023-01-26 ENCOUNTER — HOSPITAL ENCOUNTER (OUTPATIENT)
Dept: RADIOLOGY | Facility: HOSPITAL | Age: 63
Discharge: HOME | End: 2023-01-26
Attending: ORTHOPAEDIC SURGERY
Payer: OTHER GOVERNMENT

## 2023-01-26 ENCOUNTER — OFFICE VISIT (OUTPATIENT)
Dept: SPORTS MEDICINE | Facility: CLINIC | Age: 63
End: 2023-01-26
Payer: OTHER GOVERNMENT

## 2023-01-26 VITALS
WEIGHT: 270 LBS | BODY MASS INDEX: 36.57 KG/M2 | HEART RATE: 83 BPM | DIASTOLIC BLOOD PRESSURE: 89 MMHG | SYSTOLIC BLOOD PRESSURE: 136 MMHG | HEIGHT: 72 IN

## 2023-01-26 DIAGNOSIS — M25.562 LEFT KNEE PAIN, UNSPECIFIED CHRONICITY: ICD-10-CM

## 2023-01-26 DIAGNOSIS — M62.81 QUADRICEPS WEAKNESS: Primary | ICD-10-CM

## 2023-01-26 PROCEDURE — 99213 OFFICE O/P EST LOW 20 MIN: CPT | Mod: S$PBB,,, | Performed by: ORTHOPAEDIC SURGERY

## 2023-01-26 PROCEDURE — 99213 PR OFFICE/OUTPT VISIT, EST, LEVL III, 20-29 MIN: ICD-10-PCS | Mod: S$PBB,,, | Performed by: ORTHOPAEDIC SURGERY

## 2023-01-26 PROCEDURE — 99999 PR PBB SHADOW E&M-EST. PATIENT-LVL IV: CPT | Mod: PBBFAC,,, | Performed by: ORTHOPAEDIC SURGERY

## 2023-01-26 PROCEDURE — 73564 X-RAY EXAM KNEE 4 OR MORE: CPT | Mod: 26,LT,, | Performed by: RADIOLOGY

## 2023-01-26 PROCEDURE — 73564 XR KNEE ORTHO LEFT WITH FLEXION: ICD-10-PCS | Mod: 26,LT,, | Performed by: RADIOLOGY

## 2023-01-26 PROCEDURE — 73562 X-RAY EXAM OF KNEE 3: CPT | Mod: 26,RT,, | Performed by: RADIOLOGY

## 2023-01-26 PROCEDURE — 73564 X-RAY EXAM KNEE 4 OR MORE: CPT | Mod: TC,LT

## 2023-01-26 PROCEDURE — 99214 OFFICE O/P EST MOD 30 MIN: CPT | Mod: PBBFAC | Performed by: ORTHOPAEDIC SURGERY

## 2023-01-26 PROCEDURE — 73562 XR KNEE ORTHO LEFT WITH FLEXION: ICD-10-PCS | Mod: 26,RT,, | Performed by: RADIOLOGY

## 2023-01-26 PROCEDURE — 99999 PR PBB SHADOW E&M-EST. PATIENT-LVL IV: ICD-10-PCS | Mod: PBBFAC,,, | Performed by: ORTHOPAEDIC SURGERY

## 2023-01-30 NOTE — PROGRESS NOTES
CC: LEFT knee f/u    DATE OF PROCEDURE: 10/12/2022   PROCEDURES PERFORMED:   Left total knee arthroplasty (CPT 49956)    Bill returns to see me just over 3 months out from surgery.  States he is doing quite well.  Very pleased with his recovery to this point.  Denies any significant pain in the knee.  Getting back to baseline activity.  No new complaints.    Pain Score:   2    REVIEW OF SYSTEMS:   Constitution: Negative. Negative for chills, fever and night sweats.    Hematologic/Lymphatic: Negative for bleeding problem. Does not bruise/bleed easily.   Skin: Negative for dry skin, itching and rash.   Musculoskeletal: Negative for falls. Neg for left knee pain and  muscle weakness.     PAST MEDICAL HISTORY:   Past Medical History:   Diagnosis Date    Arthritis     Basal cell carcinoma 06/21/2019    back    Cancer     Chronic pain of right knee     SCC (squamous cell carcinoma) 2014    Forehead- Dr. Cabral     Skin cancer     Squamous cell carcinoma 2013    Right ear- Dr. Marianna long     PAST SURGICAL HISTORY:   Past Surgical History:   Procedure Laterality Date    APPENDECTOMY      ARTHROSCOPIC CHONDROPLASTY OF KNEE JOINT Left 03/18/2020    Procedure: ARTHROSCOPY, KNEE, WITH CHONDROPLASTY;  Surgeon: FREEMAN Álvarez MD;  Location: [facility] OR;  Service: Orthopedics;  Laterality: Left;    COLONOSCOPY  1998    EPIDURAL STEROID INJECTION INTO LUMBAR SPINE N/A 02/11/2020    Procedure: Injection-steroid-epidural-lumbar--InterLaminar L4-5;  Surgeon: Alexus Anglin Jr., MD;  Location: [facility] PAIN MGT;  Service: Pain Management;  Laterality: N/A;    INJECTION OF ANESTHETIC AGENT AROUND MEDIAL BRANCH NERVES INNERVATING LUMBAR FACET JOINT Bilateral 11/05/2019    Procedure: Block-nerve-medial branch-lumbar--Bilateral L3-4,L4-5,L5-S1;  Surgeon: Alexus Anglin Jr., MD;  Location: [facility] PAIN [facility]GT;  Service: Pain Management;  Laterality: Bilateral;    INJECTION OF STEROID Left 11/12/2020    Procedure: INJECTION, STEROID Left  SI Joint Block and Steroid Injection;  Surgeon: Tam Encarnacion MD;  Location: [facility] OR;  Service: Neurosurgery;  Laterality: Left;  Procedure: Left SI Joint Block and Steroid Injection   Surgery Time: 30 min  LOS: 0  Anesthesia: MAC  Radiology: C-arm  Bed: Regular with Pillows  Position: Prone    KNEE ARTHROSCOPY W/ MENISCECTOMY Left 03/18/2020    Procedure: ARTHROSCOPY, KNEE, WITH MENISCECTOMY;  Surgeon: FREEMAN Álvarez MD;  Location: [facility] OR;  Service: Orthopedics;  Laterality: Left;  CLONIDINE/EPI/KETOROLAC/ROPIVACAINE INJECTION 30CC    lipoma removal      skin cancer removal      TOTAL KNEE ARTHROPLASTY Left 10/12/2022    Procedure: ARTHROPLASTY, KNEE, TOTAL;  Surgeon: FREEMAN Álvarez MD;  Location: [facility] OR;  Service: Orthopedics;  Laterality: Left;  regional with catheter- spinal & adductor  pericapsular injection: 0.2% ropivacaine    TRANSFORAMINAL EPIDURAL INJECTION OF STEROID Bilateral 05/06/2020    Procedure: Injection,steroid,epidural,transforaminal approach--Bilateral L4-5;  Surgeon: Alexus Anglin Jr., MD;  Location: [facility] PAIN MGT;  Service: Pain Management;  Laterality: Bilateral;    TRANSFORAMINAL EPIDURAL INJECTION OF STEROID Bilateral 08/11/2020    Procedure: Injection,steroid,epidural,transforaminal approach--Bilateral L4-5;  Surgeon: Alexus Anglin Jr., MD;  Location: [facility] PAIN MGT;  Service: Pain Management;  Laterality: Bilateral;    TRANSFORAMINAL EPIDURAL INJECTION OF STEROID Bilateral 04/05/2022    Procedure: Injection,steroid,epidural,transforaminal bilateral L4-5;  Surgeon: Alexus Anglin Jr., MD;  Location: [facility] PAIN MGT;  Service: Pain Management;  Laterality: Bilateral;  asa      FAMILY HISTORY:   Family History   Problem Relation Age of Onset    COPD Mother     Alcohol abuse Mother     Arthritis Mother     Heart disease Father     Melanoma Father     No Known Problems Sister     No Known Problems Brother     Psoriasis Neg Hx     Lupus Neg Hx     Eczema Neg Hx      SOCIAL  HISTORY:   Social History     Socioeconomic History    Marital status:    Tobacco Use    Smoking status: Never    Smokeless tobacco: Never   Substance and Sexual Activity    Alcohol use: Yes     Alcohol/week: 2.0 standard drinks     Types: 1 Glasses of wine, 1 Cans of beer per week     Comment: social    Drug use: No    Sexual activity: Not Currently     Partners: Female     Birth control/protection: None     Comment: My wife had a hysterectomy years ago     Social Determinants of Health     Financial Resource Strain: Low Risk     Difficulty of Paying Living Expenses: Not hard at all   Food Insecurity: No Food Insecurity    Worried About Running Out of Food in the Last Year: Never true    Ran Out of Food in the Last Year: Never true   Transportation Needs: No Transportation Needs    Lack of Transportation (Medical): No    Lack of Transportation (Non-Medical): No   Physical Activity: Inactive    Days of Exercise per Week: 0 days    Minutes of Exercise per Session: 0 min   Stress: No Stress Concern Present    Feeling of Stress : Only a little   Social Connections: Unknown    Frequency of Communication with Friends and Family: Three times a week    Frequency of Social Gatherings with Friends and Family: Once a week    Active Member of Clubs or Organizations: Yes    Attends Club or Organization Meetings: More than 4 times per year    Marital Status:    Housing Stability: Low Risk     Unable to Pay for Housing in the Last Year: No    Number of Places Lived in the Last Year: 1    Unstable Housing in the Last Year: No     MEDICATIONS:     Current Outpatient Medications:     blood sugar diagnostic (BLOOD GLUCOSE TEST) Strp, Strips for one to 2 times a day testing   dispense brand covered by insurance and to match meter brand, Disp: 60 each, Rfl: 3    blood-glucose meter kit, Dispense meter  brand covered by insurance, Disp: 1 each, Rfl: 0    calcipotriene-betamethasone (ENSTILAR) 0.005-0.064 % Foam, Apply 1  application topically once daily., Disp: 60 g, Rfl: 2    diabetic supplies, miscellan. Misc, Lancets for 1-2 times a day testing    dispense brand covered by insurance t, Disp: 60 each, Rfl: 3    diclofenac sodium (VOLTAREN) 1 % Gel, Apply 2 g topically 4 (four) times daily., Disp: 100 g, Rfl: 0    diphenhydrAMINE (BENADRYL) 25 mg capsule, Take 25 mg by mouth every 6 (six) hours as needed for Itching., Disp: , Rfl:     EPINEPHrine (EPIPEN) 0.3 mg/0.3 mL AtIn, , Disp: , Rfl:     fexofenadine (ALLEGRA) 180 MG tablet, Take 180 mg by mouth continuous prn., Disp: , Rfl:     gabapentin (NEURONTIN) 600 MG tablet, Take 1 tablet (600 mg total) by mouth 3 (three) times daily., Disp: 270 tablet, Rfl: 4    lidocaine HCL 2% (XYLOCAINE) 2 % jelly, Apply topically as needed., Disp: 30 mL, Rfl: 3    meloxicam (MOBIC) 15 MG tablet, Take 1 tablet (15 mg total) by mouth once daily., Disp: 90 tablet, Rfl: 3    meloxicam (MOBIC) 15 MG tablet, Take 1 tablet (15 mg total) by mouth once daily., Disp: 30 tablet, Rfl: 2    multivitamin (THERAGRAN) per tablet, Take 1 tablet by mouth once daily., Disp: , Rfl:     ondansetron (ZOFRAN) 4 MG tablet, Take 1 tablet (4 mg total) by mouth every 8 (eight) hours as needed for Nausea., Disp: 30 tablet, Rfl: 0    ondansetron (ZOFRAN-ODT) 8 MG TbDL, Take 1 tablet (8 mg total) by mouth 3 (three) times daily as needed (nausea)., Disp: 45 tablet, Rfl: 1    oxyCODONE (ROXICODONE) 5 MG immediate release tablet, Take 1-2 tablets (5-10 mg total) by mouth every 4 to 6 hours as needed for Pain., Disp: 40 tablet, Rfl: 0    oxyCODONE (ROXICODONE) 5 MG immediate release tablet, Take 1-2 tablets (5-10 mg total) by mouth every 6 (six) hours as needed for Pain., Disp: 40 tablet, Rfl: 0    roflumilast (ZORYVE) 0.3 % Crea, Apply 1 application topically once daily., Disp: 60 g, Rfl: 5    sumatriptan (IMITREX) 50 MG tablet, Take one at the first sign of a headache.  You may repeat it in 1 hour as needed., Disp: 9 tablet,  Rfl: 3    tirzepatide (MOUNJARO) 2.5 mg/0.5 mL PnIj, Inject 2.5 mg into the skin every 7 days., Disp: 4 pen, Rfl: 1    tiZANidine (ZANAFLEX) 4 MG tablet, , Disp: , Rfl:     aspirin (ECOTRIN) 81 MG EC tablet, Take 1 tablet (81 mg total) by mouth 2 (two) times daily., Disp: 84 tablet, Rfl: 0  No current facility-administered medications for this visit.    Facility-Administered Medications Ordered in Other Visits:     0.9%  NaCl infusion, 500 mL, Intravenous, Continuous, Alexus Anglin Jr., MD    lidocaine (PF) 10 mg/ml (1%) injection 10 mg, 1 mL, Intradermal, Once, Alexus Anglin Jr., MD    lidocaine (PF) 10 mg/ml (1%) injection 10 mg, 1 mL, Intradermal, Once, Alexus Anglin Jr., MD    ALLERGIES:   Review of patient's allergies indicates:   Allergen Reactions    Advil [ibuprofen] Anaphylaxis    Tums [calcium carbonate] Anaphylaxis      PHYSICAL EXAMINATION:  /89   Pulse 83   Ht 6' (1.829 m)   Wt 122.5 kg (270 lb)   BMI 36.62 kg/m²   General: Well-developed well-nourished 62 y.o. malein no acute distress   Cardiovascular: Regular rhythm by palpation of distal pulse, normal color and temperature, no concerning varicosities on symptomatic side   Lungs: No labored breathing or wheezing appreciated   Neuro: Alert and oriented ×3   Psychiatric: well oriented to person, place and time, demonstrates normal mood and affect   Skin: No rashes, lesions or ulcers, normal temperature, turgor, and texture on involved extremity    Ortho/SPM Exam  Exam of the left knee shows a well-healed incision midline.  No significant swelling.  No effusion.  Near full active extension, active assisted flexion to 125°.  Central patellar tracking.  No mid flexion instability.  Ligamentously stable.  No tenderness to palpation.  Quad activates well.  Mild weakness.    IMAGING:    X-rays including standing, weight bearing AP and flexion bilateral knees, LEFT knee lateral and sunrise views ordered and images reviewed by me show:     Stable total knee prosthesis.  No signs of loosening or compromise otherwise.      ASSESSMENT:      ICD-10-CM ICD-9-CM   1. Quadriceps weakness  M62.81 728.87       PLAN:     Clinically doing quite well.  Discussed ongoing rehab plan for quad and functional strengthening.  May transition to a home program at his discretion.  Discussed activities to avoid.  Discussed dental prophylaxis.  Return to clinic in 3 months for repeat x-rays and 6 month check.      Procedures

## 2023-02-26 DIAGNOSIS — E11.9 TYPE 2 DIABETES MELLITUS WITHOUT COMPLICATION, WITHOUT LONG-TERM CURRENT USE OF INSULIN: ICD-10-CM

## 2023-02-26 RX ORDER — TIRZEPATIDE 2.5 MG/.5ML
2.5 INJECTION, SOLUTION SUBCUTANEOUS
Qty: 4 PEN | Refills: 1 | Status: SHIPPED | OUTPATIENT
Start: 2023-02-26 | End: 2023-05-02 | Stop reason: DRUGHIGH

## 2023-02-26 NOTE — TELEPHONE ENCOUNTER
No new care gaps identified.  [facility] Embedded Care Gaps. Reference number: [id number]. 2/26/2023   10:19:25 AM CST

## 2023-04-11 ENCOUNTER — PATIENT MESSAGE (OUTPATIENT)
Dept: ADMINISTRATIVE | Facility: HOSPITAL | Age: 63
End: 2023-04-11
Payer: OTHER GOVERNMENT

## 2023-04-17 PROBLEM — R29.898 DECREASED STRENGTH OF TRUNK AND BACK: Status: ACTIVE | Noted: 2023-04-17

## 2023-04-17 PROBLEM — R26.89 IMPAIRED GAIT AND MOBILITY: Status: RESOLVED | Noted: 2019-10-10 | Resolved: 2023-04-13

## 2023-04-17 PROBLEM — M25.60 DECREASED RANGE OF MOTION WITH DECREASED STRENGTH: Status: RESOLVED | Noted: 2022-01-11 | Resolved: 2023-04-13

## 2023-04-17 PROBLEM — R53.1 DECREASED RANGE OF MOTION WITH DECREASED STRENGTH: Status: RESOLVED | Noted: 2022-01-11 | Resolved: 2023-04-13

## 2023-04-20 DIAGNOSIS — M51.26 LUMBAR DISCOGENIC PAIN SYNDROME: ICD-10-CM

## 2023-04-20 DIAGNOSIS — Z96.652 STATUS POST TOTAL LEFT KNEE REPLACEMENT: ICD-10-CM

## 2023-04-21 RX ORDER — MELOXICAM 15 MG/1
15 TABLET ORAL DAILY
Qty: 30 TABLET | Refills: 2 | Status: SHIPPED | OUTPATIENT
Start: 2023-04-21 | End: 2023-06-25 | Stop reason: SDUPTHER

## 2023-05-01 ENCOUNTER — PATIENT MESSAGE (OUTPATIENT)
Dept: FAMILY MEDICINE | Facility: CLINIC | Age: 63
End: 2023-05-01
Payer: OTHER GOVERNMENT

## 2023-05-01 DIAGNOSIS — E11.9 TYPE 2 DIABETES MELLITUS WITHOUT COMPLICATION, WITHOUT LONG-TERM CURRENT USE OF INSULIN: ICD-10-CM

## 2023-05-01 DIAGNOSIS — E66.01 CLASS 2 SEVERE OBESITY DUE TO EXCESS CALORIES WITH SERIOUS COMORBIDITY AND BODY MASS INDEX (BMI) OF 38.0 TO 38.9 IN ADULT: Primary | ICD-10-CM

## 2023-05-01 RX ORDER — TIRZEPATIDE 2.5 MG/.5ML
INJECTION, SOLUTION SUBCUTANEOUS
Refills: 0 | OUTPATIENT
Start: 2023-05-01

## 2023-05-01 NOTE — TELEPHONE ENCOUNTER
No care due was identified.  [facility] Embedded Care Due Messages. Reference number: [id number].   5/01/2023 4:10:18 PM CDT

## 2023-05-01 NOTE — TELEPHONE ENCOUNTER
Refill Decision Note   Baldo Grant  is requesting a refill authorization.  Brief Assessment and Rationale for Refill:  Quick Discontinue     Medication Therapy Plan: Pharmacy is requesting new scripts for the following medications without required information, (sig/ frequency/qty/etc)     Medication Reconciliation Completed: No   Comments:     No Care Gaps recommended.     Note composed:4:28 PM 05/01/2023

## 2023-05-02 RX ORDER — TIRZEPATIDE 5 MG/.5ML
5 INJECTION, SOLUTION SUBCUTANEOUS
Qty: 4 PEN | Refills: 3 | Status: SHIPPED | OUTPATIENT
Start: 2023-05-02 | End: 2023-05-05 | Stop reason: SDUPTHER

## 2023-05-05 ENCOUNTER — TELEPHONE (OUTPATIENT)
Dept: FAMILY MEDICINE | Facility: CLINIC | Age: 63
End: 2023-05-05
Payer: OTHER GOVERNMENT

## 2023-05-05 DIAGNOSIS — E11.9 TYPE 2 DIABETES MELLITUS WITHOUT COMPLICATION, WITHOUT LONG-TERM CURRENT USE OF INSULIN: ICD-10-CM

## 2023-05-05 RX ORDER — TIRZEPATIDE 5 MG/.5ML
5 INJECTION, SOLUTION SUBCUTANEOUS
Qty: 12 PEN | Refills: 3 | Status: SHIPPED | OUTPATIENT
Start: 2023-05-05 | End: 2023-07-18 | Stop reason: SDUPTHER

## 2023-05-05 NOTE — TELEPHONE ENCOUNTER
----- Message from Fracisco Whitehead sent at 5/5/2023  9:12 AM CDT -----  Contact: pt  .Type:  Needs Medical Advice    Who Called: pt    Pharmacy name and phone #:  [facility] - [address]   Phone: [phone]  Fax:  [phone]  Would the patient rather a call back or a response via [facility]?  Call back  Best Call Back Number: [phone]   Additional Information:  OTONIELmarques is calling for his tirzepatide (MOUNJARO) 5 mg/0.5 mL PnIj to be transferred to the above pharmacy  much cheaper.     Pharmacy updated

## 2023-05-08 ENCOUNTER — OFFICE VISIT (OUTPATIENT)
Dept: SPORTS MEDICINE | Facility: CLINIC | Age: 63
End: 2023-05-08
Payer: OTHER GOVERNMENT

## 2023-05-08 ENCOUNTER — HOSPITAL ENCOUNTER (OUTPATIENT)
Dept: RADIOLOGY | Facility: HOSPITAL | Age: 63
Discharge: HOME | End: 2023-05-08
Attending: PHYSICIAN ASSISTANT
Payer: OTHER GOVERNMENT

## 2023-05-08 VITALS
SYSTOLIC BLOOD PRESSURE: 127 MMHG | BODY MASS INDEX: 38.47 KG/M2 | HEART RATE: 69 BPM | HEIGHT: 72 IN | DIASTOLIC BLOOD PRESSURE: 81 MMHG | WEIGHT: 284 LBS

## 2023-05-08 DIAGNOSIS — M25.562 LEFT KNEE PAIN, UNSPECIFIED CHRONICITY: ICD-10-CM

## 2023-05-08 DIAGNOSIS — M25.562 ACUTE PAIN OF LEFT KNEE: Primary | ICD-10-CM

## 2023-05-08 DIAGNOSIS — S83.412A SPRAIN OF MEDIAL COLLATERAL LIGAMENT OF LEFT KNEE, INITIAL ENCOUNTER: ICD-10-CM

## 2023-05-08 DIAGNOSIS — Z96.652 S/P TOTAL KNEE REPLACEMENT, LEFT: ICD-10-CM

## 2023-05-08 PROCEDURE — 99999 PR PBB SHADOW E&M-EST. PATIENT-LVL V: CPT | Mod: PBBFAC,,, | Performed by: PHYSICIAN ASSISTANT

## 2023-05-08 PROCEDURE — 73564 X-RAY EXAM KNEE 4 OR MORE: CPT | Mod: 26,50,, | Performed by: RADIOLOGY

## 2023-05-08 PROCEDURE — 99215 OFFICE O/P EST HI 40 MIN: CPT | Mod: PBBFAC | Performed by: PHYSICIAN ASSISTANT

## 2023-05-08 PROCEDURE — 99213 PR OFFICE/OUTPT VISIT, EST, LEVL III, 20-29 MIN: ICD-10-PCS | Mod: S$PBB,,, | Performed by: PHYSICIAN ASSISTANT

## 2023-05-08 PROCEDURE — 73564 X-RAY EXAM KNEE 4 OR MORE: CPT | Mod: TC,50

## 2023-05-08 PROCEDURE — 99213 OFFICE O/P EST LOW 20 MIN: CPT | Mod: S$PBB,,, | Performed by: PHYSICIAN ASSISTANT

## 2023-05-08 PROCEDURE — 99999 PR PBB SHADOW E&M-EST. PATIENT-LVL V: ICD-10-PCS | Mod: PBBFAC,,, | Performed by: PHYSICIAN ASSISTANT

## 2023-05-08 PROCEDURE — 73564 XR KNEE ORTHO BILAT WITH FLEXION: ICD-10-PCS | Mod: 26,50,, | Performed by: RADIOLOGY

## 2023-05-08 NOTE — PROGRESS NOTES
CC: LEFT knee f/u    DATE OF PROCEDURE: 10/12/2022   PROCEDURES PERFORMED:   Left total knee arthroplasty (CPT 98247)    Bill returns to clinic with a new pain in his left knee x 2 days. He states that he has been completing formal PT for his back and introduced some new exercises on 5/1/23 that involved his left knee. 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 He also reports pain with pivoting along the medial aspect of the left knee. The pain lasts for approximately 2 minutes and then dissipates. He takes Mobic 15mg once daily.    Pain Score:   7    REVIEW OF SYSTEMS:   Constitution: Negative. Negative for chills, fever and night sweats.    Hematologic/Lymphatic: Negative for bleeding problem. Does not bruise/bleed easily.   Skin: Negative for dry skin, itching and rash.   Musculoskeletal: Negative for falls. Neg for left knee pain and  muscle weakness.     PAST MEDICAL HISTORY:   Past Medical History:   Diagnosis Date    Arthritis     Basal cell carcinoma 06/21/2019    back    Cancer     Chronic pain of right knee     SCC (squamous cell carcinoma) 2014    Forehead- Dr. Cabral     Skin cancer     Squamous cell carcinoma 2013    Right ear- Dr. Marianna long     PAST SURGICAL HISTORY:   Past Surgical History:   Procedure Laterality Date    APPENDECTOMY      ARTHROSCOPIC CHONDROPLASTY OF KNEE JOINT Left 03/18/2020    Procedure: ARTHROSCOPY, KNEE, WITH CHONDROPLASTY;  Surgeon: FREEMAN Álvarez MD;  Location: [facility] OR;  Service: Orthopedics;  Laterality: Left;    COLONOSCOPY  1998    EPIDURAL STEROID INJECTION INTO LUMBAR SPINE N/A 02/11/2020    Procedure: Injection-steroid-epidural-lumbar--InterLaminar L4-5;  Surgeon: Alexus Anglin Jr., MD;  Location: [facility] PAIN T;  Service: Pain Management;  Laterality: N/A;    INJECTION OF ANESTHETIC AGENT AROUND MEDIAL BRANCH NERVES INNERVATING LUMBAR FACET JOINT Bilateral 11/05/2019    Procedure:  Block-nerve-medial branch-lumbar--Bilateral L3-4,L4-5,L5-S1;  Surgeon: Alexus Anglin Jr., MD;  Location: [facility] PAIN MGT;  Service: Pain Management;  Laterality: Bilateral;    INJECTION OF STEROID Left 11/12/2020    Procedure: INJECTION, STEROID Left SI Joint Block and Steroid Injection;  Surgeon: Tam Encarnacion MD;  Location: [facility] OR;  Service: Neurosurgery;  Laterality: Left;  Procedure: Left SI Joint Block and Steroid Injection   Surgery Time: 30 min  LOS: 0  Anesthesia: MAC  Radiology: C-arm  Bed: Regular with Pillows  Position: Prone    KNEE ARTHROSCOPY W/ MENISCECTOMY Left 03/18/2020    Procedure: ARTHROSCOPY, KNEE, WITH MENISCECTOMY;  Surgeon: FREEMAN Álvarez MD;  Location: [facility] OR;  Service: Orthopedics;  Laterality: Left;  CLONIDINE/EPI/KETOROLAC/ROPIVACAINE INJECTION 30CC    lipoma removal      skin cancer removal      TOTAL KNEE ARTHROPLASTY Left 10/12/2022    Procedure: ARTHROPLASTY, KNEE, TOTAL;  Surgeon: FREEMAN Álvarez MD;  Location: [facility] OR;  Service: Orthopedics;  Laterality: Left;  regional with catheter- spinal & adductor  pericapsular injection: 0.2% ropivacaine    TRANSFORAMINAL EPIDURAL INJECTION OF STEROID Bilateral 05/06/2020    Procedure: Injection,steroid,epidural,transforaminal approach--Bilateral L4-5;  Surgeon: Alexus Anglin Jr., MD;  Location: [facility] PAIN MGT;  Service: Pain Management;  Laterality: Bilateral;    TRANSFORAMINAL EPIDURAL INJECTION OF STEROID Bilateral 08/11/2020    Procedure: Injection,steroid,epidural,transforaminal approach--Bilateral L4-5;  Surgeon: Alexus Anglin Jr., MD;  Location: [facility] PAIN MGT;  Service: Pain Management;  Laterality: Bilateral;    TRANSFORAMINAL EPIDURAL INJECTION OF STEROID Bilateral 04/05/2022    Procedure: Injection,steroid,epidural,transforaminal bilateral L4-5;  Surgeon: Alexus Anglin Jr., MD;  Location: [facility] PAIN MGT;  Service: Pain Management;  Laterality: Bilateral;  asa      FAMILY HISTORY:   Family History   Problem  Relation Age of Onset    COPD Mother     Alcohol abuse Mother     Arthritis Mother     Heart disease Father     Melanoma Father     No Known Problems Sister     No Known Problems Brother     Psoriasis Neg Hx     Lupus Neg Hx     Eczema Neg Hx      SOCIAL HISTORY:   Social History     Socioeconomic History    Marital status:    Tobacco Use    Smoking status: Never    Smokeless tobacco: Never   Substance and Sexual Activity    Alcohol use: Yes     Alcohol/week: 2.0 standard drinks     Types: 1 Glasses of wine, 1 Cans of beer per week     Comment: social    Drug use: No    Sexual activity: Not Currently     Partners: Female     Birth control/protection: None     Comment: My wife had a hysterectomy years ago     Social Determinants of Health     Financial Resource Strain: Low Risk     Difficulty of Paying Living Expenses: Not hard at all   Food Insecurity: No Food Insecurity    Worried About Running Out of Food in the Last Year: Never true    Ran Out of Food in the Last Year: Never true   Transportation Needs: No Transportation Needs    Lack of Transportation (Medical): No    Lack of Transportation (Non-Medical): No   Physical Activity: Inactive    Days of Exercise per Week: 0 days    Minutes of Exercise per Session: 0 min   Stress: No Stress Concern Present    Feeling of Stress : Only a little   Social Connections: Unknown    Frequency of Communication with Friends and Family: Three times a week    Frequency of Social Gatherings with Friends and Family: Once a week    Active Member of Clubs or Organizations: Yes    Attends Club or Organization Meetings: More than 4 times per year    Marital Status:    Housing Stability: Low Risk     Unable to Pay for Housing in the Last Year: No    Number of Places Lived in the Last Year: 1    Unstable Housing in the Last Year: No     MEDICATIONS:     Current Outpatient Medications:     blood sugar diagnostic (BLOOD GLUCOSE TEST) Strp, Strips for one to 2 times a day  testing   dispense brand covered by insurance and to match meter brand, Disp: 60 each, Rfl: 3    blood-glucose meter kit, Dispense meter  brand covered by insurance, Disp: 1 each, Rfl: 0    calcipotriene-betamethasone (ENSTILAR) 0.005-0.064 % Foam, Apply 1 application topically once daily., Disp: 60 g, Rfl: 2    diabetic supplies, miscellan. Misc, Lancets for 1-2 times a day testing    dispense brand covered by insurance t, Disp: 60 each, Rfl: 3    diclofenac sodium (VOLTAREN) 1 % Gel, Apply 2 g topically 4 (four) times daily., Disp: 100 g, Rfl: 0    diphenhydrAMINE (BENADRYL) 25 mg capsule, Take 25 mg by mouth every 6 (six) hours as needed for Itching., Disp: , Rfl:     EPINEPHrine (EPIPEN) 0.3 mg/0.3 mL AtIn, , Disp: , Rfl:     fexofenadine (ALLEGRA) 180 MG tablet, Take 180 mg by mouth continuous prn., Disp: , Rfl:     gabapentin (NEURONTIN) 600 MG tablet, Take 1 tablet (600 mg total) by mouth 3 (three) times daily., Disp: 270 tablet, Rfl: 4    lidocaine HCL 2% (XYLOCAINE) 2 % jelly, Apply topically as needed., Disp: 30 mL, Rfl: 3    meloxicam (MOBIC) 15 MG tablet, Take 1 tablet (15 mg total) by mouth once daily., Disp: 90 tablet, Rfl: 3    meloxicam (MOBIC) 15 MG tablet, Take 1 tablet (15 mg total) by mouth once daily., Disp: 30 tablet, Rfl: 2    meloxicam (MOBIC) 15 MG tablet, Take 1 tablet (15 mg total) by mouth once daily., Disp: 30 tablet, Rfl: 2    multivitamin (THERAGRAN) per tablet, Take 1 tablet by mouth once daily., Disp: , Rfl:     ondansetron (ZOFRAN) 4 MG tablet, Take 1 tablet (4 mg total) by mouth every 8 (eight) hours as needed for Nausea., Disp: 30 tablet, Rfl: 0    ondansetron (ZOFRAN-ODT) 8 MG TbDL, Take 1 tablet (8 mg total) by mouth 3 (three) times daily as needed (nausea)., Disp: 45 tablet, Rfl: 1    roflumilast (ZORYVE) 0.3 % Crea, Apply 1 application topically once daily., Disp: 60 g, Rfl: 5    sumatriptan (IMITREX) 50 MG tablet, Take one at the first sign of a headache.  You may repeat it  in 1 hour as needed., Disp: 9 tablet, Rfl: 3    tirzepatide (MOUNJARO) 5 mg/0.5 mL PnIj, Inject 5 mg into the skin every 7 days., Disp: 12 pen, Rfl: 3    tiZANidine (ZANAFLEX) 4 MG tablet, , Disp: , Rfl:     aspirin (ECOTRIN) 81 MG EC tablet, Take 1 tablet (81 mg total) by mouth 2 (two) times daily., Disp: 84 tablet, Rfl: 0    oxyCODONE (ROXICODONE) 5 MG immediate release tablet, Take 1-2 tablets (5-10 mg total) by mouth every 4 to 6 hours as needed for Pain. (Patient not taking: Reported on 5/8/2023), Disp: 40 tablet, Rfl: 0    oxyCODONE (ROXICODONE) 5 MG immediate release tablet, Take 1-2 tablets (5-10 mg total) by mouth every 6 (six) hours as needed for Pain. (Patient not taking: Reported on 5/8/2023), Disp: 40 tablet, Rfl: 0  No current facility-administered medications for this visit.    Facility-Administered Medications Ordered in Other Visits:     0.9%  NaCl infusion, 500 mL, Intravenous, Continuous, Alexus Anglin Jr., MD    lidocaine (PF) 10 mg/ml (1%) injection 10 mg, 1 mL, Intradermal, Once, Alexus Anglin Jr., MD    lidocaine (PF) 10 mg/ml (1%) injection 10 mg, 1 mL, Intradermal, Once, Alexus Anglin Jr., MD    ALLERGIES:   Review of patient's allergies indicates:   Allergen Reactions    Advil [ibuprofen] Anaphylaxis    Tums [calcium carbonate] Anaphylaxis      PHYSICAL EXAMINATION:  /81   Pulse 69   Ht 6' (1.829 m)   Wt 128.8 kg (284 lb)   BMI 38.52 kg/m²   General: Well-developed well-nourished 63 y.o. malein no acute distress   Cardiovascular: Regular rhythm by palpation of distal pulse, normal color and temperature, no concerning varicosities on symptomatic side   Lungs: No labored breathing or wheezing appreciated   Neuro: Alert and oriented ×3   Psychiatric: well oriented to person, place and time, demonstrates normal mood and affect   Skin: No rashes, lesions or ulcers, normal temperature, turgor, and texture on involved extremity    General    Vitals reviewed.  Constitutional:  He is oriented to person, place, and time. He appears well-developed and well-nourished. No distress.   Cardiovascular:  Normal rate and regular rhythm.            Pulmonary/Chest: Effort normal. No respiratory distress.   Neurological: He is alert and oriented to person, place, and time.   Psychiatric: He has a normal mood and affect. His behavior is normal. Thought content normal.           Exam of the left knee shows a well-healed incision midline.  Mild effusion.  Near full active extension, active assisted flexion to 125°.  Central patellar tracking.  No mid flexion instability.  Ligamentously stable.  Tenderness to palpation along MCL and pain with valgus load. No laxity with valgus load. Quad activates well.     IMAGING:    X-rays including standing, weight bearing AP and flexion bilateral knees, LEFT knee lateral and sunrise views ordered and images reviewed by me show:    Stable total knee prosthesis.  No signs of loosening or compromise otherwise.      ASSESSMENT:      ICD-10-CM ICD-9-CM   1. Acute pain of left knee  M25.562 719.46   2. S/P total knee replacement, left  Z96.652 V43.65   3. Sprain of medial collateral ligament of left knee, initial encounter  S83.412A 844.1       PLAN:     He has been doing well post op TKA until 2 days ago. He likely overdid it with his increased activity causing a mild effusion. He likely stressed his MCL as well. Patient's pain resolved with visco skin brace. Continue Mobic 15mg once daily. Ice 20-30 min on / 20-30 off and elevate knee above heart. Will f/u with patient via [facility] portal in 2 weeks.     27275Elizabeth Powers, performed a custom orthotic / brace adjustment, fitting and training with the patient. The patient demonstrated understanding and proper care. This was performed for 15 minutes. Visco skin    I made the decision to obtain old records of the patient including previous notes and imaging. New imaging was ordered today of the extremity or extremities  evaluated. I independently reviewed and interpreted the radiographs and/or MRIs today as well as prior imaging. Reviewed radiographs. Implants are stable.

## 2023-06-14 ENCOUNTER — PATIENT MESSAGE (OUTPATIENT)
Dept: ADMINISTRATIVE | Facility: HOSPITAL | Age: 63
End: 2023-06-14
Payer: OTHER GOVERNMENT

## 2023-06-21 ENCOUNTER — PATIENT MESSAGE (OUTPATIENT)
Dept: ADMINISTRATIVE | Facility: HOSPITAL | Age: 63
End: 2023-06-21
Payer: OTHER GOVERNMENT

## 2023-06-25 DIAGNOSIS — Z96.652 STATUS POST TOTAL LEFT KNEE REPLACEMENT: ICD-10-CM

## 2023-06-25 DIAGNOSIS — M51.26 LUMBAR DISCOGENIC PAIN SYNDROME: ICD-10-CM

## 2023-06-26 DIAGNOSIS — R11.0 NAUSEA: ICD-10-CM

## 2023-06-26 RX ORDER — MELOXICAM 15 MG/1
15 TABLET ORAL DAILY
Qty: 30 TABLET | Refills: 2 | Status: SHIPPED | OUTPATIENT
Start: 2023-06-26 | End: 2023-10-23 | Stop reason: SDUPTHER

## 2023-06-27 RX ORDER — ONDANSETRON 4 MG/1
4 TABLET, FILM COATED ORAL
Qty: 10 TABLET | Refills: 0 | Status: SHIPPED | OUTPATIENT
Start: 2023-06-27 | End: 2024-01-08

## 2023-07-06 ENCOUNTER — PATIENT OUTREACH (OUTPATIENT)
Dept: ADMINISTRATIVE | Facility: HOSPITAL | Age: 63
End: 2023-07-06
Payer: OTHER GOVERNMENT

## 2023-07-18 DIAGNOSIS — E11.9 TYPE 2 DIABETES MELLITUS WITHOUT COMPLICATION, WITHOUT LONG-TERM CURRENT USE OF INSULIN: ICD-10-CM

## 2023-07-18 RX ORDER — TIRZEPATIDE 5 MG/.5ML
5 INJECTION, SOLUTION SUBCUTANEOUS
Qty: 12 PEN | Refills: 3 | Status: SHIPPED | OUTPATIENT
Start: 2023-07-18 | End: 2023-10-29 | Stop reason: SDUPTHER

## 2023-07-18 NOTE — TELEPHONE ENCOUNTER
No care due was identified.  [facility] Embedded Care Due Messages. Reference number: [id number].   7/18/2023 10:00:33 AM CDT

## 2023-08-14 ENCOUNTER — PATIENT MESSAGE (OUTPATIENT)
Dept: ADMINISTRATIVE | Facility: HOSPITAL | Age: 63
End: 2023-08-14
Payer: OTHER GOVERNMENT

## 2023-08-14 ENCOUNTER — PATIENT OUTREACH (OUTPATIENT)
Dept: ADMINISTRATIVE | Facility: HOSPITAL | Age: 63
End: 2023-08-14
Payer: OTHER GOVERNMENT

## 2023-08-21 ENCOUNTER — PATIENT OUTREACH (OUTPATIENT)
Dept: ADMINISTRATIVE | Facility: HOSPITAL | Age: 63
End: 2023-08-21
Payer: OTHER GOVERNMENT

## 2023-08-21 ENCOUNTER — TELEPHONE (OUTPATIENT)
Dept: FAMILY MEDICINE | Facility: CLINIC | Age: 63
End: 2023-08-21
Payer: OTHER GOVERNMENT

## 2023-08-21 NOTE — TELEPHONE ENCOUNTER
----- Message from Fracisco Whitehead sent at 8/21/2023 12:50 PM CDT -----  Contact: Pt  .Type:  Needs Medical Advice    Who Called: pt    Would the patient rather a call back or a response via [facility]?  Call back  Best Call Back Number: [phone]   Additional Information: Pt. Is returning a call back to the nurse regarding scheduling his appt. And lab work.

## 2023-08-21 NOTE — TELEPHONE ENCOUNTER
Unable to reach pt. Left detailed v/m regarding scheduling labs and eye exam with Kelsey.     Message has been sent to Kelsey regarding contacting pt.

## 2023-09-13 ENCOUNTER — PATIENT MESSAGE (OUTPATIENT)
Dept: ADMINISTRATIVE | Facility: HOSPITAL | Age: 63
End: 2023-09-13
Payer: OTHER GOVERNMENT

## 2023-10-13 ENCOUNTER — PATIENT MESSAGE (OUTPATIENT)
Dept: ADMINISTRATIVE | Facility: OTHER | Age: 63
End: 2023-10-13
Payer: OTHER GOVERNMENT

## 2023-10-13 ENCOUNTER — PATIENT MESSAGE (OUTPATIENT)
Dept: SPORTS MEDICINE | Facility: CLINIC | Age: 63
End: 2023-10-13
Payer: OTHER GOVERNMENT

## 2023-10-23 DIAGNOSIS — M51.26 LUMBAR DISCOGENIC PAIN SYNDROME: ICD-10-CM

## 2023-10-23 DIAGNOSIS — Z96.652 STATUS POST TOTAL LEFT KNEE REPLACEMENT: ICD-10-CM

## 2023-10-24 ENCOUNTER — OFFICE VISIT (OUTPATIENT)
Dept: FAMILY MEDICINE | Facility: CLINIC | Age: 63
End: 2023-10-24
Payer: OTHER GOVERNMENT

## 2023-10-24 ENCOUNTER — TELEPHONE (OUTPATIENT)
Dept: FAMILY MEDICINE | Facility: CLINIC | Age: 63
End: 2023-10-24

## 2023-10-24 ENCOUNTER — PATIENT MESSAGE (OUTPATIENT)
Dept: FAMILY MEDICINE | Facility: CLINIC | Age: 63
End: 2023-10-24

## 2023-10-24 VITALS
WEIGHT: 268.44 LBS | TEMPERATURE: 99 F | BODY MASS INDEX: 36.36 KG/M2 | HEIGHT: 72 IN | DIASTOLIC BLOOD PRESSURE: 76 MMHG | OXYGEN SATURATION: 97 % | SYSTOLIC BLOOD PRESSURE: 102 MMHG | HEART RATE: 84 BPM

## 2023-10-24 DIAGNOSIS — Z13.6 SCREENING FOR CARDIOVASCULAR CONDITION: ICD-10-CM

## 2023-10-24 DIAGNOSIS — M25.512 CHRONIC LEFT SHOULDER PAIN: ICD-10-CM

## 2023-10-24 DIAGNOSIS — M51.26 LUMBAR DISCOGENIC PAIN SYNDROME: ICD-10-CM

## 2023-10-24 DIAGNOSIS — Z12.5 PROSTATE CANCER SCREENING: ICD-10-CM

## 2023-10-24 DIAGNOSIS — E11.9 TYPE 2 DIABETES MELLITUS WITHOUT COMPLICATION, WITHOUT LONG-TERM CURRENT USE OF INSULIN: Primary | ICD-10-CM

## 2023-10-24 DIAGNOSIS — G89.29 CHRONIC LEFT SHOULDER PAIN: ICD-10-CM

## 2023-10-24 DIAGNOSIS — Z23 NEED FOR INFLUENZA VACCINATION: ICD-10-CM

## 2023-10-24 PROCEDURE — 90686 FLU VACCINE (QUAD) GREATER THAN OR EQUAL TO 3YO PRESERVATIVE FREE IM: ICD-10-PCS | Mod: S$GLB,,, | Performed by: STUDENT IN AN ORGANIZED HEALTH CARE EDUCATION/TRAINING PROGRAM

## 2023-10-24 PROCEDURE — 90686 IIV4 VACC NO PRSV 0.5 ML IM: CPT | Mod: S$GLB,,, | Performed by: STUDENT IN AN ORGANIZED HEALTH CARE EDUCATION/TRAINING PROGRAM

## 2023-10-24 PROCEDURE — 90471 IMMUNIZATION ADMIN: CPT | Mod: S$GLB,,, | Performed by: STUDENT IN AN ORGANIZED HEALTH CARE EDUCATION/TRAINING PROGRAM

## 2023-10-24 PROCEDURE — 90471 FLU VACCINE (QUAD) GREATER THAN OR EQUAL TO 3YO PRESERVATIVE FREE IM: ICD-10-PCS | Mod: S$GLB,,, | Performed by: STUDENT IN AN ORGANIZED HEALTH CARE EDUCATION/TRAINING PROGRAM

## 2023-10-24 PROCEDURE — 99214 PR OFFICE/OUTPT VISIT, EST, LEVL IV, 30-39 MIN: ICD-10-PCS | Mod: 25,S$GLB,, | Performed by: STUDENT IN AN ORGANIZED HEALTH CARE EDUCATION/TRAINING PROGRAM

## 2023-10-24 PROCEDURE — 99214 OFFICE O/P EST MOD 30 MIN: CPT | Mod: 25,S$GLB,, | Performed by: STUDENT IN AN ORGANIZED HEALTH CARE EDUCATION/TRAINING PROGRAM

## 2023-10-24 RX ORDER — MELOXICAM 15 MG/1
15 TABLET ORAL DAILY
Qty: 90 TABLET | Refills: 3 | Status: SHIPPED | OUTPATIENT
Start: 2023-10-24 | End: 2024-01-08

## 2023-10-24 RX ORDER — METHYLPREDNISOLONE 4 MG/1
TABLET ORAL
Qty: 21 EACH | Refills: 0 | Status: SHIPPED | OUTPATIENT
Start: 2023-10-24 | End: 2023-11-14

## 2023-10-24 RX ORDER — MELOXICAM 15 MG/1
15 TABLET ORAL DAILY
Qty: 30 TABLET | Refills: 2 | Status: SHIPPED | OUTPATIENT
Start: 2023-10-24

## 2023-10-24 NOTE — TELEPHONE ENCOUNTER
Care Due:                  Date            Visit Type   Department     Provider  --------------------------------------------------------------------------------                                MYCHART                              FOLLOWUP/OF  [facility] FAMILY  Last Visit: 12-      FICE VISIT   MEDICINE       You Britton  Next Visit: None Scheduled  None         None Found                                                            Last  Test          Frequency    Reason                     Performed    Due Date  --------------------------------------------------------------------------------    Office Visit  12 months..  meloxicam................  12- 11-    CBC.........  12 months..  meloxicam................  09- 09-    CMP.........  12 months..  meloxicam................  09- 09-    HBA1C.......  6 months...  tirzepatide..............  09-   03-    Health [facility] Embedded Care Due Messages. Reference number: [id number].   10/23/2023 10:31:24 PM CDT

## 2023-10-25 NOTE — PROGRESS NOTES
Patient ID: Baldo Grant is a 63 y.o. male.     Chief Complaint: Back Pain    Back Pain  This is a chronic problem. The current episode started in the past 7 days. The problem occurs constantly. The problem has been waxing and waning since onset. The pain is present in the lumbar spine. The quality of the pain is described as aching, shooting and stabbing. The pain does not radiate. The pain is at a severity of 6/10. The pain is moderate. The pain is The same all the time. The symptoms are aggravated by twisting. Stiffness is present All day. Associated symptoms include pelvic pain. Pertinent negatives include no abdominal pain, bladder incontinence, bowel incontinence, chest pain, dysuria, fever, headaches, leg pain, numbness, paresis, paresthesias, perianal numbness, tingling, weakness or weight loss. Risk factors include lack of exercise, obesity and sedentary lifestyle. He has tried heat and analgesics for the symptoms. The treatment provided mild relief.          Review of Systems  Review of Systems   Constitutional:  Negative for fever and weight loss.   HENT:  Negative for ear pain and sinus pain.    Eyes:  Negative for discharge.   Respiratory:  Negative for cough and shortness of breath.    Cardiovascular:  Negative for chest pain and leg swelling.   Gastrointestinal:  Negative for abdominal pain, bowel incontinence, diarrhea, nausea and vomiting.   Genitourinary:  Positive for pelvic pain. Negative for bladder incontinence, dysuria, hematuria and urgency.   Musculoskeletal:  Positive for back pain. Negative for myalgias.   Skin:  Negative for rash.   Neurological:  Negative for tingling, weakness, numbness, headaches and paresthesias.   Psychiatric/Behavioral:  Negative for depression.    All other systems reviewed and are negative.      Currently Medications  Current Outpatient Medications on File Prior to Visit   Medication Sig Dispense Refill    blood sugar diagnostic (BLOOD GLUCOSE TEST) Strp  Strips for one to 2 times a day testing   dispense brand covered by insurance and to match meter brand 60 each 3    blood-glucose meter kit Dispense meter  brand covered by insurance 1 each 0    calcipotriene-betamethasone (ENSTILAR) 0.005-0.064 % Foam Apply 1 application topically once daily. 60 g 2    diabetic supplies, miscellan. Misc Lancets for 1-2 times a day testing    dispense brand covered by insurance t 60 each 3    diclofenac sodium (VOLTAREN) 1 % Gel Apply 2 g topically 4 (four) times daily. 100 g 0    diphenhydrAMINE (BENADRYL) 25 mg capsule Take 25 mg by mouth every 6 (six) hours as needed for Itching.      EPINEPHrine (EPIPEN) 0.3 mg/0.3 mL AtIn       fexofenadine (ALLEGRA) 180 MG tablet Take 180 mg by mouth continuous prn.      gabapentin (NEURONTIN) 600 MG tablet Take 1 tablet (600 mg total) by mouth 3 (three) times daily. 270 tablet 4    lidocaine HCL 2% (XYLOCAINE) 2 % jelly Apply topically as needed. 30 mL 3    meloxicam (MOBIC) 15 MG tablet Take 1 tablet (15 mg total) by mouth once daily. 30 tablet 2    meloxicam (MOBIC) 15 MG tablet Take 1 tablet (15 mg total) by mouth once daily. 30 tablet 2    multivitamin (THERAGRAN) per tablet Take 1 tablet by mouth once daily.      ondansetron (ZOFRAN) 4 MG tablet Take 1 tablet (4 mg total) by mouth as needed for Nausea. 10 tablet 0    ondansetron (ZOFRAN-ODT) 8 MG TbDL Take 1 tablet (8 mg total) by mouth 3 (three) times daily as needed (nausea). 45 tablet 1    roflumilast (ZORYVE) 0.3 % Crea Apply 1 application topically once daily. 60 g 5    sumatriptan (IMITREX) 50 MG tablet Take one at the first sign of a headache.  You may repeat it in 1 hour as needed. 9 tablet 3    tirzepatide (MOUNJARO) 5 mg/0.5 mL PnIj Inject 5 mg into the skin every 7 days. 12 pen 3    tiZANidine (ZANAFLEX) 4 MG tablet       [DISCONTINUED] meloxicam (MOBIC) 15 MG tablet Take 1 tablet (15 mg total) by mouth once daily. 90 tablet 3    aspirin (ECOTRIN) 81 MG EC tablet Take 1 tablet  (81 mg total) by mouth 2 (two) times daily. 84 tablet 0    oxyCODONE (ROXICODONE) 5 MG immediate release tablet Take 1-2 tablets (5-10 mg total) by mouth every 4 to 6 hours as needed for Pain. (Patient not taking: Reported on 5/8/2023) 40 tablet 0    oxyCODONE (ROXICODONE) 5 MG immediate release tablet Take 1-2 tablets (5-10 mg total) by mouth every 6 (six) hours as needed for Pain. (Patient not taking: Reported on 5/8/2023) 40 tablet 0     Current Facility-Administered Medications on File Prior to Visit   Medication Dose Route Frequency Provider Last Rate Last Admin    0.9%  NaCl infusion  500 mL Intravenous Continuous Alexus Anglin Jr., MD        lidocaine (PF) 10 mg/ml (1%) injection 10 mg  1 mL Intradermal Once Alexus Anglin Jr., MD        lidocaine (PF) 10 mg/ml (1%) injection 10 mg  1 mL Intradermal Once Alexus Anglin Jr., MD           Physical  Exam  Vitals:    10/24/23 0935   BP: 102/76   BP Location: Left arm   Patient Position: Sitting   Pulse: 84   Temp: 98.7 °F (37.1 °C)   SpO2: 97%   Weight: 121.7 kg (268 lb 6.6 oz)   Height: 6' (1.829 m)      Body mass index is 36.4 kg/m².  Wt Readings from Last 3 Encounters:   10/24/23 121.7 kg (268 lb 6.6 oz)   05/08/23 128.8 kg (284 lb)   01/26/23 122.5 kg (270 lb)       Physical Exam  Vitals and nursing note reviewed.   Constitutional:       General: He is not in acute distress.     Appearance: He is not ill-appearing.   HENT:      Head: Normocephalic and atraumatic.      Right Ear: External ear normal.      Left Ear: External ear normal.      Nose: Nose normal.      Mouth/Throat:      Mouth: Mucous membranes are moist.   Eyes:      Extraocular Movements: Extraocular movements intact.      Conjunctiva/sclera: Conjunctivae normal.   Cardiovascular:      Rate and Rhythm: Normal rate and regular rhythm.      Pulses: Normal pulses.      Heart sounds: No murmur heard.  Pulmonary:      Effort: Pulmonary effort is normal. No respiratory distress.       "Breath sounds: No wheezing.   Abdominal:      General: There is no distension.      Palpations: Abdomen is soft. There is no mass.      Tenderness: There is no abdominal tenderness.   Musculoskeletal:         General: No swelling.      Cervical back: Normal range of motion.   Skin:     Coloration: Skin is not jaundiced.      Findings: No rash.   Neurological:      General: No focal deficit present.      Mental Status: He is alert and oriented to person, place, and time.   Psychiatric:         Mood and Affect: Mood normal.         Thought Content: Thought content normal.         Labs:    Complete Blood Count  Lab Results   Component Value Date    RBC 4.75 09/16/2022    HGB 13.7 (L) 09/16/2022    HCT 41.8 09/16/2022    MCV 88 09/16/2022    MCH 28.8 09/16/2022    MCHC 32.8 09/16/2022    RDW 11.9 09/16/2022     09/16/2022    MPV 9.5 09/16/2022    GRAN 3.4 09/16/2022    GRAN 64.7 09/16/2022    LYMPH 1.1 09/16/2022    LYMPH 20.7 09/16/2022    MONO 0.5 09/16/2022    MONO 9.1 09/16/2022    EOS 0.3 09/16/2022    BASO 0.03 09/16/2022    EOSINOPHIL 4.7 09/16/2022    BASOPHIL 0.6 09/16/2022    DIFFMETHOD Automated 09/16/2022       Comprehensive Metabolic Panel  Lab Results   Component Value Date     (H) 10/24/2023    BUN 16 10/24/2023    CREATININE 0.92 10/24/2023     10/24/2023    K 4.6 10/24/2023     10/24/2023    PROT 7.5 10/24/2023    ALBUMIN 4.6 10/24/2023    BILITOT 0.5 10/24/2023    AST 29 10/24/2023    ALKPHOS 38 10/24/2023    CO2 29 10/24/2023    ALT 44 10/24/2023    ANIONGAP 9 10/24/2023       TSH  No results found for: "TSH"    Imaging:  X-ray Knee Ortho Bilateral with Flexion  Narrative: EXAMINATION:  XR KNEE ORTHO BILAT WITH FLEXION    CLINICAL HISTORY:  Pain in left knee    TECHNIQUE:  AP standing of both knees, PA flexion standing views of both knees, and Merchant views of both knees were performed.  Lateral views of both knees were also performed.    COMPARISON:  January 26, " 2023    FINDINGS:  Left knee:    Reconfirmed findings of left total knee procedure, stable and satisfactory alignment and position of tibial and femoral hardware.  No acute or periprosthetic fracture.  No suprapatellar bursal effusion.  Mild prepatellar soft tissue swelling.  Left knee findings do not appear significantly changed to earlier exam.    Right knee:    No fracture.  No obvious narrowing of the tibiofemoral or patellofemoral articulations.  Minimal spurring about the medial compartment, tibial spines, and posterior patella.  No suprapatellar bursal effusion or prepatellar soft tissue swelling.  Right knee findings do not appear significantly changed to earlier exam.  Impression: As above    Electronically signed by: Ethan Morin  Date:    05/08/2023  Time:    16:10      Assessment/Plan:    1. Type 2 diabetes mellitus without complication, without long-term current use of insulin  -     Ambulatory referral/consult to Optometry; Future; Expected date: 10/31/2023  -     Cancel: CBC Auto Differential; Future  -     Cancel: HEMOGLOBIN A1C; Future; Expected date: 10/24/2023  -     Comprehensive metabolic panel; Future; Expected date: 10/24/2023    2. Screening for cardiovascular condition  -     LIPID PANEL; Future; Expected date: 10/24/2023    3. Prostate cancer screening  -     PSA, SCREENING; Future; Expected date: 10/24/2023    4. Lumbar discogenic pain syndrome  -     Ambulatory referral/consult to Neurosurgery; Future; Expected date: 10/31/2023  -     methylPREDNISolone (MEDROL DOSEPACK) 4 mg tablet; use as directed  Dispense: 21 each; Refill: 0    5. Chronic left shoulder pain  -     meloxicam (MOBIC) 15 MG tablet; Take 1 tablet (15 mg total) by mouth once daily.  Dispense: 90 tablet; Refill: 3    6. Need for influenza vaccination  -     Influenza - Quadrivalent *Preferred* (6 months+) (PF)         Discussed how to stay healthy including: diet, exercise, refraining from smoking and discussed screening  exams / tests needed for age, sex and family Hx.    Patient would like a second opinion regarding chronic pain    You Britton MD      Answers submitted by the patient for this visit:  Back Pain Questionnaire (Submitted on 10/23/2023)  Chief Complaint: Back pain  genital pain: Yes

## 2023-10-29 DIAGNOSIS — E11.9 TYPE 2 DIABETES MELLITUS WITHOUT COMPLICATION, WITHOUT LONG-TERM CURRENT USE OF INSULIN: ICD-10-CM

## 2023-10-29 NOTE — TELEPHONE ENCOUNTER
No care due was identified.  Health [facility] Embedded Care Due Messages. Reference number: [id number].   10/29/2023 10:10:33 AM CDT

## 2023-10-31 RX ORDER — TIRZEPATIDE 5 MG/.5ML
5 INJECTION, SOLUTION SUBCUTANEOUS
Qty: 12 PEN | Refills: 3 | Status: SHIPPED | OUTPATIENT
Start: 2023-10-31

## 2023-11-07 ENCOUNTER — PATIENT MESSAGE (OUTPATIENT)
Dept: FAMILY MEDICINE | Facility: CLINIC | Age: 63
End: 2023-11-07
Payer: OTHER GOVERNMENT

## 2023-11-20 ENCOUNTER — TELEPHONE (OUTPATIENT)
Dept: FAMILY MEDICINE | Facility: CLINIC | Age: 63
End: 2023-11-20
Payer: OTHER GOVERNMENT

## 2023-11-20 NOTE — TELEPHONE ENCOUNTER
----- Message from Brittany Pete sent at 11/20/2023  1:11 PM CST -----  Type:  Needs Medical Advice    Who Called: pt  Would the patient rather a call back or a response via [facility]? call  Best Call Back Number: faxed to  Dr. Salmon  Additional Information: would like to get pt's MRIs, Xrays and imaging or any imaging pt has done faxed over in regards to pt's surgery

## 2023-11-22 DIAGNOSIS — E11.9 TYPE 2 DIABETES MELLITUS WITHOUT COMPLICATION: ICD-10-CM

## 2023-11-29 DIAGNOSIS — M54.16 RADICULOPATHY, LUMBAR REGION: Primary | ICD-10-CM

## 2023-12-10 ENCOUNTER — PATIENT MESSAGE (OUTPATIENT)
Dept: FAMILY MEDICINE | Facility: CLINIC | Age: 63
End: 2023-12-10
Payer: OTHER GOVERNMENT

## 2023-12-10 RX ORDER — SUMATRIPTAN 50 MG/1
TABLET, FILM COATED ORAL
Qty: 9 TABLET | Refills: 3 | Status: CANCELLED | OUTPATIENT
Start: 2023-12-10

## 2023-12-11 RX ORDER — ONDANSETRON 8 MG/1
8 TABLET, ORALLY DISINTEGRATING ORAL 3 TIMES DAILY PRN
Qty: 45 TABLET | Refills: 1 | Status: SHIPPED | OUTPATIENT
Start: 2023-12-11 | End: 2024-03-17

## 2023-12-11 RX ORDER — SUMATRIPTAN 50 MG/1
TABLET, FILM COATED ORAL
Qty: 15 TABLET | Refills: 3 | Status: SHIPPED | OUTPATIENT
Start: 2023-12-11 | End: 2024-02-01 | Stop reason: SDUPTHER

## 2023-12-11 NOTE — TELEPHONE ENCOUNTER
No care due was identified.  [facility] Embedded Care Due Messages. Reference number: [id number].   12/10/2023 9:39:24 PM CST

## 2023-12-13 ENCOUNTER — PATIENT MESSAGE (OUTPATIENT)
Dept: ADMINISTRATIVE | Facility: HOSPITAL | Age: 63
End: 2023-12-13
Payer: OTHER GOVERNMENT

## 2023-12-18 ENCOUNTER — PATIENT MESSAGE (OUTPATIENT)
Dept: DERMATOLOGY | Facility: CLINIC | Age: 63
End: 2023-12-18
Payer: OTHER GOVERNMENT

## 2024-01-08 DIAGNOSIS — L40.9 PSORIASIS: ICD-10-CM

## 2024-01-08 RX ORDER — CALCIPOTRIENE AND BETAMETHASONE DIPROPIONATE 50; .5 UG/G; MG/G
1 AEROSOL, FOAM TOPICAL DAILY
Qty: 60 G | Refills: 2 | Status: SHIPPED | OUTPATIENT
Start: 2024-01-08 | End: 2024-01-08 | Stop reason: SDUPTHER

## 2024-01-08 RX ORDER — CALCIPOTRIENE AND BETAMETHASONE DIPROPIONATE 50; .5 UG/G; MG/G
1 AEROSOL, FOAM TOPICAL DAILY
Qty: 60 G | Refills: 5 | Status: SHIPPED | OUTPATIENT
Start: 2024-01-08 | End: 2024-01-08 | Stop reason: SDUPTHER

## 2024-01-08 RX ORDER — CALCIPOTRIENE AND BETAMETHASONE DIPROPIONATE 50; .5 UG/G; MG/G
1 AEROSOL, FOAM TOPICAL DAILY
Qty: 60 G | Refills: 5 | Status: SHIPPED | OUTPATIENT
Start: 2024-01-08

## 2024-01-08 NOTE — TELEPHONE ENCOUNTER
Rx sent to [facility] pharmacy at [facility] and printed rx available as well.

## 2024-01-30 DIAGNOSIS — M54.31 SCIATICA OF RIGHT SIDE: ICD-10-CM

## 2024-01-30 NOTE — TELEPHONE ENCOUNTER
Care Due:                  Date            Visit Type   Department     Provider  --------------------------------------------------------------------------------                                MYCHART                              FOLLOWUP/OF  [facility] FAMILY  Last Visit: 10-      FICE VISIT   MEDICINE       You Britton  Next Visit: None Scheduled  None         None Found                                                            Last  Test          Frequency    Reason                     Performed    Due Date  --------------------------------------------------------------------------------    CBC.........  12 months..  meloxicam................  09- 09-    HBA1C.......  6 months...  tirzepatide..............  09-   03-    Health [facility] Embedded Care Due Messages. Reference number: [id number].   1/30/2024 4:35:28 PM CST

## 2024-01-31 RX ORDER — GABAPENTIN 600 MG/1
600 TABLET ORAL 3 TIMES DAILY
Qty: 270 TABLET | Refills: 4 | Status: SHIPPED | OUTPATIENT
Start: 2024-01-31 | End: 2025-04-25

## 2024-02-01 RX ORDER — SUMATRIPTAN 50 MG/1
TABLET, FILM COATED ORAL
Qty: 15 TABLET | Refills: 3 | Status: SHIPPED | OUTPATIENT
Start: 2024-02-01

## 2024-02-01 NOTE — TELEPHONE ENCOUNTER
No care due was identified.  [facility] Embedded Care Due Messages. Reference number: [id number].   2/01/2024 8:53:20 AM CST

## 2024-02-06 ENCOUNTER — HOSPITAL ENCOUNTER (OUTPATIENT)
Dept: RADIOLOGY | Facility: HOSPITAL | Age: 64
Discharge: HOME | End: 2024-02-06
Payer: OTHER GOVERNMENT

## 2024-02-06 DIAGNOSIS — M54.16 RADICULOPATHY, LUMBAR REGION: ICD-10-CM

## 2024-02-06 PROCEDURE — 72148 MRI LUMBAR SPINE W/O DYE: CPT | Mod: 26,,, | Performed by: RADIOLOGY

## 2024-02-06 PROCEDURE — 72148 MRI LUMBAR SPINE W/O DYE: CPT | Mod: TC

## 2024-02-24 ENCOUNTER — PATIENT MESSAGE (OUTPATIENT)
Dept: DERMATOLOGY | Facility: CLINIC | Age: 64
End: 2024-02-24
Payer: OTHER GOVERNMENT

## 2024-02-26 ENCOUNTER — TELEPHONE (OUTPATIENT)
Dept: DERMATOLOGY | Facility: CLINIC | Age: 64
End: 2024-02-26
Payer: OTHER GOVERNMENT

## 2024-02-26 NOTE — TELEPHONE ENCOUNTER
Writer contacted [facility] to complete the PA per pt's request for his Enstilar foam and was told that it was denied on 1/8/24.  Dr. Monroe notified of this and to provide an alternative medication,.

## 2024-02-29 ENCOUNTER — TELEPHONE (OUTPATIENT)
Dept: OPTOMETRY | Facility: CLINIC | Age: 64
End: 2024-02-29
Payer: OTHER GOVERNMENT

## 2024-03-07 ENCOUNTER — TELEPHONE (OUTPATIENT)
Dept: OPTOMETRY | Facility: CLINIC | Age: 64
End: 2024-03-07
Payer: OTHER GOVERNMENT

## 2024-03-07 ENCOUNTER — PATIENT OUTREACH (OUTPATIENT)
Dept: ADMINISTRATIVE | Facility: HOSPITAL | Age: 64
End: 2024-03-07
Payer: OTHER GOVERNMENT

## 2024-03-07 DIAGNOSIS — E11.9 TYPE 2 DIABETES MELLITUS WITHOUT COMPLICATION, WITHOUT LONG-TERM CURRENT USE OF INSULIN: Primary | ICD-10-CM

## 2024-03-07 NOTE — PROGRESS NOTES
Population Health Chart Review & Patient Outreach Details      Additional [facility] Health Notes:               Updates Requested / Reviewed:      Updated Care Coordination Note, Care Everywhere, , Care Team Updated, Removed  or Duplicate Orders, and Immunizations Reconciliation Completed or Queried: [facility] Topics Overdue:      [facility] Score: 2     Eye Exam  Foot Exam    RSV Vaccine                  Health Maintenance Topic(s) Outreach Outcomes & Actions Taken:    Lab(s) - Outreach Outcomes & Actions Taken  : Overdue Lab(s) Scheduled

## 2024-03-08 ENCOUNTER — OFFICE VISIT (OUTPATIENT)
Dept: OPTOMETRY | Facility: CLINIC | Age: 64
End: 2024-03-08
Payer: OTHER GOVERNMENT

## 2024-03-08 DIAGNOSIS — H02.883 MEIBOMIAN GLAND DYSFUNCTION (MGD) OF BOTH EYES: ICD-10-CM

## 2024-03-08 DIAGNOSIS — H02.886 MEIBOMIAN GLAND DYSFUNCTION (MGD) OF BOTH EYES: ICD-10-CM

## 2024-03-08 DIAGNOSIS — E11.9 TYPE 2 DIABETES MELLITUS WITHOUT RETINOPATHY: Primary | ICD-10-CM

## 2024-03-08 DIAGNOSIS — H52.4 PRESBYOPIA OF BOTH EYES: ICD-10-CM

## 2024-03-08 PROCEDURE — 99213 OFFICE O/P EST LOW 20 MIN: CPT | Mod: PBBFAC,PN | Performed by: OPTOMETRIST

## 2024-03-08 PROCEDURE — 92004 COMPRE OPH EXAM NEW PT 1/>: CPT | Mod: S$PBB,,, | Performed by: OPTOMETRIST

## 2024-03-08 PROCEDURE — 92015 DETERMINE REFRACTIVE STATE: CPT | Mod: ,,, | Performed by: OPTOMETRIST

## 2024-03-08 PROCEDURE — 99999 PR PBB SHADOW E&M-EST. PATIENT-LVL III: CPT | Mod: PBBFAC,,, | Performed by: OPTOMETRIST

## 2024-03-08 NOTE — PROGRESS NOTES
HPI     Diabetic Eye Exam            Comments: DM chk          Comments    Pt states his va is doing well for the distance and near for the most   part. Only wears readers for nearwork +1.50. Patient reports occasional   watering OU, no relief tried. Patient wears eye mask while sleeping with   CPAP.    No gtts    No h/o any eye problems or eye surgeries  No Family h/o any eye problems           Last edited by Dayana Figueroa, OD on 3/8/2024  3:42 PM.            Assessment /Plan     For exam results, see Encounter Report.    Type 2 diabetes mellitus without retinopathy    Meibomian gland dysfunction (MGD) of both eyes    Presbyopia of both eyes      Ed pt on all exam findings  Patient to start warm compresses, lid scrubs and AT's for MGD. Monitor.  Patient to continue to use OTC readers for near work.  No signs of diabetic eye disease. Continue with DM treatment and followups with PCP. Monitor.    RTC in 1 year or sooner prn.

## 2024-03-13 ENCOUNTER — OFFICE VISIT (OUTPATIENT)
Dept: FAMILY MEDICINE | Facility: CLINIC | Age: 64
End: 2024-03-13
Payer: OTHER GOVERNMENT

## 2024-03-13 VITALS
TEMPERATURE: 99 F | DIASTOLIC BLOOD PRESSURE: 72 MMHG | SYSTOLIC BLOOD PRESSURE: 124 MMHG | OXYGEN SATURATION: 96 % | HEART RATE: 74 BPM | BODY MASS INDEX: 37.32 KG/M2 | HEIGHT: 72 IN | WEIGHT: 275.56 LBS

## 2024-03-13 DIAGNOSIS — J32.9 SINUSITIS, UNSPECIFIED CHRONICITY, UNSPECIFIED LOCATION: Primary | ICD-10-CM

## 2024-03-13 PROCEDURE — 99214 OFFICE O/P EST MOD 30 MIN: CPT | Mod: S$GLB,,, | Performed by: FAMILY MEDICINE

## 2024-03-13 RX ORDER — AMOXICILLIN AND CLAVULANATE POTASSIUM 875; 125 MG/1; MG/1
1 TABLET, FILM COATED ORAL 2 TIMES DAILY
Qty: 20 TABLET | Refills: 0 | Status: SHIPPED | OUTPATIENT
Start: 2024-03-13 | End: 2024-03-23

## 2024-03-13 NOTE — PROGRESS NOTES
Subjective:      Patient ID: Baldo Grant is a 63 y.o. male.    Chief Complaint: URI      Vitals:    03/13/24 1132   BP: 124/72   Pulse: 74   Temp: 98.7 °F (37.1 °C)   TempSrc: Oral   SpO2: 96%   Weight: 125 kg (275 lb 9.2 oz)   Height: 6' (1.829 m)        HPI   Sick for 2 days; sinus, drip      Problem List  Patient Active Problem List   Diagnosis    Primary osteoarthritis of both knees    Chronic right-sided low back pain with right-sided sciatica    Complete tear of left rotator cuff    Biceps tendinitis on left    Post-traumatic osteoarthritis of left knee    Class 2 severe obesity due to excess calories with serious comorbidity and body mass index (BMI) of 38.0 to 38.9 in adult    Obesity (BMI 30-39.9)    Sacroiliac joint dysfunction of left side    SI (sacroiliac) joint dysfunction    Lumbar discogenic pain syndrome    DDD (degenerative disc disease), lumbar    Impaired tolerance of activity    FARHAT (obstructive sleep apnea)    Decreased strength of trunk and back        ALLERGIES:   Review of patient's allergies indicates:   Allergen Reactions    Advil [ibuprofen] Anaphylaxis    Tums [calcium carbonate] Anaphylaxis       MEDS:   Current Outpatient Medications:     aspirin (ECOTRIN) 81 MG EC tablet, Take 1 tablet (81 mg total) by mouth 2 (two) times daily., Disp: 84 tablet, Rfl: 0    blood sugar diagnostic (BLOOD GLUCOSE TEST) Strp, Strips for one to 2 times a day testing   dispense brand covered by insurance and to match meter brand, Disp: 60 each, Rfl: 3    blood-glucose meter kit, Dispense meter  brand covered by insurance, Disp: 1 each, Rfl: 0    diabetic supplies, miscellan. Misc, Lancets for 1-2 times a day testing    dispense brand covered by insurance t, Disp: 60 each, Rfl: 3    EPINEPHrine (EPIPEN) 0.3 mg/0.3 mL AtIn, , Disp: , Rfl:     gabapentin (NEURONTIN) 600 MG tablet, Take 1 tablet (600 mg total) by mouth 3 (three) times daily., Disp: 270 tablet, Rfl: 4    lidocaine HCL 2% (XYLOCAINE) 2 %  jelly, Apply topically as needed., Disp: 30 mL, Rfl: 3    meloxicam (MOBIC) 15 MG tablet, Take 1 tablet (15 mg total) by mouth once daily., Disp: 30 tablet, Rfl: 2    meloxicam (MOBIC) 15 MG tablet, Take 1 tablet (15 mg total) by mouth once daily., Disp: 30 tablet, Rfl: 2    oxyCODONE (ROXICODONE) 5 MG immediate release tablet, Take 1-2 tablets (5-10 mg total) by mouth every 4 to 6 hours as needed for Pain., Disp: 40 tablet, Rfl: 0    oxyCODONE (ROXICODONE) 5 MG immediate release tablet, Take 1-2 tablets (5-10 mg total) by mouth every 6 (six) hours as needed for Pain., Disp: 40 tablet, Rfl: 0    roflumilast (ZORYVE) 0.3 % Crea, Apply 1 application topically once daily., Disp: 60 g, Rfl: 5    sumatriptan (IMITREX) 50 MG tablet, Take one at the first sign of a headache.  You may repeat it in 1 hour as needed., Disp: 15 tablet, Rfl: 3    tirzepatide (MOUNJARO) 5 mg/0.5 mL PnIj, Inject 5 mg into the skin every 7 days., Disp: 12 pen , Rfl: 3    amoxicillin-clavulanate 875-125mg (AUGMENTIN) 875-125 mg per tablet, Take 1 tablet by mouth 2 (two) times daily. for 10 days, Disp: 20 tablet, Rfl: 0    calcipotriene-betamethasone (ENSTILAR) 0.005-0.064 % Foam, Apply 1 application  topically once daily. (Patient not taking: Reported on 3/13/2024), Disp: 60 g, Rfl: 5    diclofenac sodium (VOLTAREN) 1 % Gel, Apply 2 g topically 4 (four) times daily., Disp: 100 g, Rfl: 0    diphenhydrAMINE (BENADRYL) 25 mg capsule, Take 25 mg by mouth every 6 (six) hours as needed for Itching., Disp: , Rfl:     fexofenadine (ALLEGRA) 180 MG tablet, Take 180 mg by mouth continuous prn., Disp: , Rfl:     multivitamin (THERAGRAN) per tablet, Take 1 tablet by mouth once daily., Disp: , Rfl:     ondansetron (ZOFRAN-ODT) 8 MG TbDL, Take 1 tablet (8 mg total) by mouth 3 (three) times daily as needed (nausea)., Disp: 45 tablet, Rfl: 1    tiZANidine (ZANAFLEX) 4 MG tablet, , Disp: , Rfl:   No current facility-administered medications for this  visit.    Facility-Administered Medications Ordered in Other Visits:     0.9%  NaCl infusion, 500 mL, Intravenous, Continuous, Alexus Anglin Jr., MD    lidocaine (PF) 10 mg/ml (1%) injection 10 mg, 1 mL, Intradermal, Once, Alexus Anglin Jr., MD    lidocaine (PF) 10 mg/ml (1%) injection 10 mg, 1 mL, Intradermal, Once, Alexus Anglin Jr., MD      History:  Current Providers as of 3/13/2024  PCP: You Britton MD  Care Team Provider: Jeremy Perez LPN  Care Team Provider: Serena Hernandez MA  Encounter Provider: Sujit Angela MD, starting on Wed Mar 13, 2024 12:00 AM  Referring Provider: not found, starting on Wed Mar 13, 2024 12:00 AM  Consulting Physician: Sujit Angela MD, starting on Wed Mar 13, 2024 11:28 AM (Active)   Past Medical History:   Diagnosis Date    Arthritis     Basal cell carcinoma 06/21/2019    back    Cancer     Chronic pain of right knee     SCC (squamous cell carcinoma) 2014    Forehead- Dr. Cabral     Skin cancer     Squamous cell carcinoma 2013    Right ear- Dr. Cabral [facility]     Past Surgical History:   Procedure Laterality Date    ADENOIDECTOMY  1983    APPENDECTOMY      ARTHROSCOPIC CHONDROPLASTY OF KNEE JOINT Left 03/18/2020    Procedure: ARTHROSCOPY, KNEE, WITH CHONDROPLASTY;  Surgeon: FREEMAN Álvarez MD;  Location: [facility] OR;  Service: Orthopedics;  Laterality: Left;    COLONOSCOPY  1998    EPIDURAL STEROID INJECTION INTO LUMBAR SPINE N/A 02/11/2020    Procedure: Injection-steroid-epidural-lumbar--InterLaminar L4-5;  Surgeon: Alexus Anglin Jr., MD;  Location: [facility] PAIN MGT;  Service: Pain Management;  Laterality: N/A;    INJECTION OF ANESTHETIC AGENT AROUND MEDIAL BRANCH NERVES INNERVATING LUMBAR FACET JOINT Bilateral 11/05/2019    Procedure: Block-nerve-medial branch-lumbar--Bilateral L3-4,L4-5,L5-S1;  Surgeon: Alexus Anglin Jr., MD;  Location: [facility] PAIN MGT;  Service: Pain Management;  Laterality: Bilateral;    INJECTION OF STEROID Left 11/12/2020     Procedure: INJECTION, STEROID Left SI Joint Block and Steroid Injection;  Surgeon: Tam Encarnacion MD;  Location: [facility] OR;  Service: Neurosurgery;  Laterality: Left;  Procedure: Left SI Joint Block and Steroid Injection   Surgery Time: 30 min  LOS: 0  Anesthesia: MAC  Radiology: C-arm  Bed: Regular with Pillows  Position: Prone    KNEE ARTHROSCOPY W/ MENISCECTOMY Left 03/18/2020    Procedure: ARTHROSCOPY, KNEE, WITH MENISCECTOMY;  Surgeon: FREEMAN Álvarez MD;  Location: [facility] OR;  Service: Orthopedics;  Laterality: Left;  CLONIDINE/EPI/KETOROLAC/ROPIVACAINE INJECTION 30CC    lipoma removal      skin cancer removal      TOTAL KNEE ARTHROPLASTY Left 10/12/2022    Procedure: ARTHROPLASTY, KNEE, TOTAL;  Surgeon: FREEMAN Álvarez MD;  Location: [facility] OR;  Service: Orthopedics;  Laterality: Left;  regional with catheter- spinal & adductor  pericapsular injection: 0.2% ropivacaine    TRANSFORAMINAL EPIDURAL INJECTION OF STEROID Bilateral 05/06/2020    Procedure: Injection,steroid,epidural,transforaminal approach--Bilateral L4-5;  Surgeon: Alexus Anglin Jr., MD;  Location: [facility] PAIN MGT;  Service: Pain Management;  Laterality: Bilateral;    TRANSFORAMINAL EPIDURAL INJECTION OF STEROID Bilateral 08/11/2020    Procedure: Injection,steroid,epidural,transforaminal approach--Bilateral L4-5;  Surgeon: Alexus Anglin Jr., MD;  Location: [facility] PAIN MGT;  Service: Pain Management;  Laterality: Bilateral;    TRANSFORAMINAL EPIDURAL INJECTION OF STEROID Bilateral 04/05/2022    Procedure: Injection,steroid,epidural,transforaminal bilateral L4-5;  Surgeon: Alexus Anglin Jr., MD;  Location: [facility] PAIN MGT;  Service: Pain Management;  Laterality: Bilateral;  asa      Social History     Tobacco Use    Smoking status: Never     Passive exposure: Never    Smokeless tobacco: Never   Substance Use Topics    Alcohol use: Yes     Alcohol/week: 2.0 standard drinks of alcohol     Types: 1 Glasses of wine, 1 Cans of beer per week      Comment: social    Drug use: No         Review of Systems   HENT:  Positive for congestion, ear pain, sore throat and trouble swallowing. Negative for drooling and ear discharge.    Respiratory:  Positive for cough. Negative for shortness of breath and stridor.    Gastrointestinal:  Negative for abdominal pain, diarrhea and vomiting.   Musculoskeletal:  Negative for neck pain.   Neurological:  Positive for headaches.   All other systems reviewed and are negative.    Objective:     Physical Exam  Vitals and nursing note reviewed.   Constitutional:       General: He is not in acute distress.     Appearance: He is well-developed. He is obese. He is not ill-appearing, toxic-appearing or diaphoretic.   HENT:      Head: Normocephalic and atraumatic.      Right Ear: External ear normal.      Left Ear: External ear normal.      Mouth/Throat:      Pharynx: No oropharyngeal exudate.   Eyes:      General: No scleral icterus.        Right eye: No discharge.         Left eye: No discharge.      Conjunctiva/sclera: Conjunctivae normal.      Pupils: Pupils are equal, round, and reactive to light.   Neck:      Thyroid: No thyromegaly.      Vascular: No JVD.      Trachea: No tracheal deviation.   Cardiovascular:      Rate and Rhythm: Normal rate and regular rhythm.      Heart sounds: No murmur heard.     No friction rub. No gallop.   Pulmonary:      Effort: Pulmonary effort is normal. No respiratory distress.      Breath sounds: Normal breath sounds. No stridor. No wheezing or rales.   Chest:      Chest wall: No tenderness.   Abdominal:      General: There is no distension.      Palpations: Abdomen is soft. There is no mass.      Tenderness: There is no abdominal tenderness. There is no guarding or rebound.   Musculoskeletal:         General: No tenderness. Normal range of motion.      Cervical back: Normal range of motion and neck supple.   Lymphadenopathy:      Cervical: No cervical adenopathy.   Skin:     General: Skin is warm  and dry.      Coloration: Skin is not pale.      Findings: No erythema or rash.   Neurological:      General: No focal deficit present.      Mental Status: He is alert and oriented to person, place, and time. Mental status is at baseline.      Cranial Nerves: No cranial nerve deficit.      Motor: No abnormal muscle tone.      Coordination: Coordination normal.      Deep Tendon Reflexes: Reflexes are normal and symmetric. Reflexes normal.   Psychiatric:         Mood and Affect: Mood normal.         Behavior: Behavior normal.         Thought Content: Thought content normal.         Judgment: Judgment normal.             Assessment:     1. Sinusitis, unspecified chronicity, unspecified location      Plan:        Medication List            Accurate as of March 13, 2024 11:59 PM. If you have any questions, ask your nurse or doctor.                START taking these medications      amoxicillin-clavulanate 875-125mg 875-125 mg per tablet  Commonly known as: AUGMENTIN  Take 1 tablet by mouth 2 (two) times daily. for 10 days  Started by: Sujit Angela MD            CONTINUE taking these medications      aspirin 81 MG EC tablet  Commonly known as: ECOTRIN  Take 1 tablet (81 mg total) by mouth 2 (two) times daily.     blood sugar diagnostic Strp  Commonly known as: BLOOD GLUCOSE TEST  Strips for one to 2 times a day testing   dispense brand covered by insurance and to match meter brand     blood-glucose meter kit  Dispense meter  brand covered by insurance     diabetic supplies, [employer]. Misc  Lancets for 1-2 times a day testing    dispense brand covered by insurance t     diclofenac sodium 1 % Gel  Commonly known as: VOLTAREN  Apply 2 g topically 4 (four) times daily.     diphenhydrAMINE 25 mg capsule  Commonly known as: BENADRYL     ENSTILAR 0.005-0.064 % Foam  Generic drug: calcipotriene-betamethasone  Apply 1 application  topically once daily.     EPINEPHrine 0.3 mg/0.3 mL Atin  Commonly known as: EPIPEN      fexofenadine 180 MG tablet  Commonly known as: ALLEGRA     gabapentin 600 MG tablet  Commonly known as: NEURONTIN  Take 1 tablet (600 mg total) by mouth 3 (three) times daily.     LIDOcaine HCL 2% 2 % jelly  Commonly known as: XYLOCAINE  Apply topically as needed.     * meloxicam 15 MG tablet  Commonly known as: MOBIC  Take 1 tablet (15 mg total) by mouth once daily.     * meloxicam 15 MG tablet  Commonly known as: MOBIC  Take 1 tablet (15 mg total) by mouth once daily.     MOUNJARO 5 mg/0.5 mL Pnij  Generic drug: tirzepatide  Inject 5 mg into the skin every 7 days.     multivitamin per tablet  Commonly known as: THERAGRAN     ondansetron 8 MG Tbdl  Commonly known as: ZOFRAN-ODT  Take 1 tablet (8 mg total) by mouth 3 (three) times daily as needed (nausea).     * oxyCODONE 5 MG immediate release tablet  Commonly known as: ROXICODONE  Take 1-2 tablets (5-10 mg total) by mouth every 4 to 6 hours as needed for Pain.     * oxyCODONE 5 MG immediate release tablet  Commonly known as: ROXICODONE  Take 1-2 tablets (5-10 mg total) by mouth every 6 (six) hours as needed for Pain.     sumatriptan 50 MG tablet  Commonly known as: IMITREX  Take one at the first sign of a headache.  You may repeat it in 1 hour as needed.     tiZANidine 4 MG tablet  Commonly known as: ZANAFLEX     ZORYVE 0.3 % Crea  Generic drug: roflumilast  Apply 1 application topically once daily.           * This list has 4 medication(s) that are the same as other medications prescribed for you. Read the directions carefully, and ask your doctor or other care provider to review them with you.                   Where to Get Your Medications        These medications were sent to [id number] - [place] - [address] AT [facility] [address]  [address] [address]      Phone: [phone]   amoxicillin-clavulanate 875-125mg 875-125 mg per tablet       Sinusitis, unspecified chronicity, unspecified  location    Other orders  -     amoxicillin-clavulanate 875-125mg (AUGMENTIN) 875-125 mg per tablet; Take 1 tablet by mouth 2 (two) times daily. for 10 days  Dispense: 20 tablet; Refill: 0          Answers submitted by the patient for this visit:  Sore Throat Questionnaire (Submitted on 3/13/2024)  Chief Complaint: Sore throat  Chronicity: new  Onset: in the past 7 days  Progression since onset: waxing and waning  Pain worse on: neither  Fever: no fever  Fever duration: less than 1 day  Pain - numeric: 4/10  hoarse voice: Yes  plugged ear sensation: Yes  swollen glands: No  strep: No  mono: No  Treatments tried: acetaminophen, gargles  Improvement on treatment: mild  Pain severity: moderate

## 2024-03-16 ENCOUNTER — PATIENT MESSAGE (OUTPATIENT)
Dept: DERMATOLOGY | Facility: CLINIC | Age: 64
End: 2024-03-16
Payer: OTHER GOVERNMENT

## 2024-04-26 ENCOUNTER — OFFICE VISIT (OUTPATIENT)
Dept: DERMATOLOGY | Facility: CLINIC | Age: 64
End: 2024-04-26
Payer: OTHER GOVERNMENT

## 2024-04-26 VITALS — WEIGHT: 275.56 LBS | HEIGHT: 72 IN | BODY MASS INDEX: 37.32 KG/M2

## 2024-04-26 DIAGNOSIS — L40.8 INVERSE PSORIASIS: ICD-10-CM

## 2024-04-26 DIAGNOSIS — L40.0 PLAQUE PSORIASIS: ICD-10-CM

## 2024-04-26 DIAGNOSIS — D48.5 NEOPLASM OF UNCERTAIN BEHAVIOR OF SKIN: ICD-10-CM

## 2024-04-26 DIAGNOSIS — L98.9 DISEASE OF SKIN AND SUBCUTANEOUS TISSUE: Primary | ICD-10-CM

## 2024-04-26 PROCEDURE — 88305 TISSUE EXAM BY PATHOLOGIST: CPT | Mod: 59 | Performed by: PATHOLOGY

## 2024-04-26 PROCEDURE — 88312 SPECIAL STAINS GROUP 1: CPT | Performed by: PATHOLOGY

## 2024-04-26 PROCEDURE — 11104 PUNCH BX SKIN SINGLE LESION: CPT | Mod: AQ,S$GLB,, | Performed by: DERMATOLOGY

## 2024-04-26 PROCEDURE — 11103 TANGNTL BX SKIN EA SEP/ADDL: CPT | Mod: AQ,S$GLB,, | Performed by: DERMATOLOGY

## 2024-04-26 PROCEDURE — 88312 SPECIAL STAINS GROUP 1: CPT | Mod: 26,,, | Performed by: PATHOLOGY

## 2024-04-26 PROCEDURE — 99214 OFFICE O/P EST MOD 30 MIN: CPT | Mod: 25,AQ,S$GLB, | Performed by: DERMATOLOGY

## 2024-04-26 PROCEDURE — 88305 TISSUE EXAM BY PATHOLOGIST: CPT | Mod: 26,,, | Performed by: PATHOLOGY

## 2024-04-26 RX ORDER — ROFLUMILAST 3 MG/G
1 CREAM TOPICAL DAILY
Qty: 60 G | Refills: 5 | Status: SHIPPED | OUTPATIENT
Start: 2024-04-26

## 2024-04-26 NOTE — PROGRESS NOTES
Subjective:      Patient ID:  Baldo Grant is a 64 y.o. male who presents for   Chief Complaint   Patient presents with    Skin Check     TBSE    Eczema     Throughout body     LOV 12/1/22 - Psoriasis, SK, NUB, nevi     Diagnosis   1. Skin, left scrotum, shave biopsy:  - SEBORRHEIC KERATOSIS.    Patient here today for a TBSC  Would like to talk about removal of skin tags    Also would like to discuss options for eczema  Pt having trouble with insurance currently    Previously under the care of Derm in [place]  H/o SCCs:   2014    Forehead- Dr. Cabral   2013    Right ear- Dr. Marianna long  Has fhx of MM- father    Current Outpatient Medications:   ·  calcipotriene-betamethasone (ENSTILAR) 0.005-0.064 % Foam, Apply 1 application topically once daily., Disp: 60 g, Rfl: 2  ·  diclofenac sodium (VOLTAREN) 1 % Gel, Apply 2 g topically 4 (four) times daily., Disp: 100 g, Rfl: 0  ·  diphenhydrAMINE (BENADRYL) 25 mg capsule, Take 25 mg by mouth every 6 (six) hours as needed for Itching., Disp: , Rfl:   ·  EPINEPHrine (EPIPEN) 0.3 mg/0.3 mL AtIn, , Disp: , Rfl:   ·  fexofenadine (ALLEGRA) 180 MG tablet, Take 180 mg by mouth continuous prn., Disp: , Rfl:   ·  gabapentin (NEURONTIN) 600 MG tablet, Take 1 tablet (600 mg total) by mouth 3 (three) times daily., Disp: 270 tablet, Rfl: 4  ·  HYDROcodone-acetaminophen (NORCO) 7.5-325 mg per tablet, Take 1 tablet by mouth 2 (two) times daily as needed for Pain., Disp: 60 tablet, Rfl: 0  ·  lidocaine HCL 2% (XYLOCAINE) 2 % jelly, Apply topically as needed., Disp: 30 mL, Rfl: 3  ·  meloxicam (MOBIC) 15 MG tablet, Take 1 tablet (15 mg total) by mouth once daily., Disp: 90 tablet, Rfl: 3  ·  meloxicam (MOBIC) 15 MG tablet, Take 1 tablet (15 mg total) by mouth once daily., Disp: 30 tablet, Rfl: 2  ·  multivitamin (THERAGRAN) per tablet, Take 1 tablet by mouth once daily., Disp: , Rfl:   ·  ondansetron (ZOFRAN) 4 MG tablet, Take 1 tablet (4 mg total) by mouth every 8 (eight) hours  as needed for Nausea., Disp: 30 tablet, Rfl: 0  ·  ondansetron (ZOFRAN-ODT) 4 MG TbDL, Dissolve 1 tablet (4 mg total) by mouth 2 (two) times daily., Disp: 30 tablet, Rfl: 0  ·  oxyCODONE (ROXICODONE) 5 MG immediate release tablet, Take 1-2 tablets (5-10 mg total) by mouth every 4 to 6 hours as needed for Pain., Disp: 40 tablet, Rfl: 0  ·  oxyCODONE (ROXICODONE) 5 MG immediate release tablet, Take 1-2 tablets (5-10 mg total) by mouth every 6 (six) hours as needed for Pain., Disp: 40 tablet, Rfl: 0  ·  sumatriptan (IMITREX) 50 MG tablet, Take one at the first sign of a headache.  You may repeat it in 1 hour as needed., Disp: 9 tablet, Rfl: 3  ·  tiZANidine (ZANAFLEX) 4 MG tablet, , Disp: , Rfl:   ·  aspirin (ECOTRIN) 81 MG EC tablet, Take 1 tablet (81 mg total) by mouth 2 (two) times daily., Disp: 84 tablet, Rfl: 0        Review of Systems   Constitutional:  Negative for fever, chills and fatigue.   Respiratory:  Negative for cough and shortness of breath.    Skin:  Positive for activity-related sunscreen use and wears hat. Negative for itching and rash.   Hematologic/Lymphatic: Bruises/bleeds easily.       Objective:   Physical Exam   Constitutional: He appears well-developed and well-nourished. No distress.   Neurological: He is alert and oriented to person, place, and time. He is not disoriented.   Psychiatric: He has a normal mood and affect.   Skin:   Areas Examined (abnormalities noted in diagram):   Scalp / Hair Palpated and Inspected  Head / Face Inspection Performed  Neck Inspection Performed  Chest / Axilla Inspection Performed  Abdomen Inspection Performed  Genitals / Buttocks / Groin Inspection Performed  Back Inspection Performed  RUE Inspected  LUE Inspection Performed  RLE Inspected  LLE Inspection Performed  Nails and Digits Inspection Performed                     Diagram Legend     Erythematous scaling macule/papule c/w actinic keratosis       Vascular papule c/w angioma      Pigmented verrucoid  papule/plaque c/w seborrheic keratosis      Yellow umbilicated papule c/w sebaceous hyperplasia      Irregularly shaped tan macule c/w lentigo     1-2 mm smooth white papules consistent with Milia      Movable subcutaneous cyst with punctum c/w epidermal inclusion cyst      Subcutaneous movable cyst c/w pilar cyst      Firm pink to brown papule c/w dermatofibroma      Pedunculated fleshy papule(s) c/w skin tag(s)      Evenly pigmented macule c/w junctional nevus     Mildly variegated pigmented, slightly irregular-bordered macule c/w mildly atypical nevus      Flesh colored to evenly pigmented papule c/w intradermal nevus       Pink pearly papule/plaque c/w basal cell carcinoma      Erythematous hyperkeratotic cursted plaque c/w SCC      Surgical scar with no sign of skin cancer recurrence      Open and closed comedones      Inflammatory papules and pustules      Verrucoid papule consistent consistent with wart     Erythematous eczematous patches and plaques     Dystrophic onycholytic nail with subungual debris c/w onychomycosis     Umbilicated papule    Erythematous-base heme-crusted tan verrucoid plaque consistent with inflamed seborrheic keratosis     Erythematous Silvery Scaling Plaque c/w Psoriasis     See annotation                    Assessment / Plan:      Pathology Orders:       Normal Orders This Visit    Specimen to Pathology, Dermatology     Comments:    Number of Specimens:->2  ------------------------->-------------------------  Spec 1 Procedure:->Biopsy  Spec 1 Clinical Impression:->BCC vs other  Spec 1 Source:->R scalp  ------------------------->-------------------------  Spec 2 Procedure:->Biopsy  Spec 2 Clinical Impression:->psoriasis vs other  Spec 2 Source:->R upper am    Questions:    Procedure Type: Dermatology and skin neoplasms    Number of Specimens: 2    ------------------------: -------------------------    Spec 1 Procedure: Biopsy    Spec 1 Clinical Impression: BCC vs other    Spec 1  Source: R scalp    ------------------------: -------------------------    Spec 2 Procedure: Biopsy    Spec 2 Clinical Impression: psoriasis vs other    Spec 2 Source: R upper am    Release to patient:           Disease of skin and subcutaneous tissue  -     Specimen to Pathology, Dermatology    Punch biopsy procedure note:- R upper arm  Punch biopsy performed after verbal consent obtained. Area marked and prepped with alcohol. Approximately 1cc of 1% lidocaine with epinephrine injected. 4 mm disposable punch used to remove lesion. Hemostasis obtained and biopsy site closed with 1 - 2 Prolene sutures. Wound care instructions reviewed with patient and handout given.    Neoplasm of uncertain behavior of skin  -     Specimen to Pathology, Dermatology  Shave biopsy procedure note:- R scalp    Shave biopsy performed after verbal consent including risk of infection, scar, recurrence, need for additional treatment of site. Area prepped with alcohol, anesthetized with approximately 1.0cc of 1% lidocaine with epinephrine. Lesional tissue shaved with razor blade. Hemostasis achieved with application of aluminum chloride followed by hyfrecation. No complications. Dressing applied. Wound care explained.    Plaque psoriasis  -     roflumilast (ZORYVE) 0.3 % Crea; Apply 1 application  topically once daily.  Dispense: 60 g; Refill: 5    Inverse psoriasis  -     roflumilast (ZORYVE) 0.3 % Crea; Apply 1 application  topically once daily.  Dispense: 60 g; Refill: 5  Bx today to confirm psoriasis dominant  I do not recommend trying to obtain enstilar, zoryve is a newer safer mor efficacious medication and safe to use in all areas including folds/genitalia/ears  Able to cover in the past, underutilized               Follow up in about 6 months (around 10/26/2024), or if symptoms worsen or fail to improve.

## 2024-04-26 NOTE — PATIENT INSTRUCTIONS
Punch Biopsy Wound Care    Your doctor has performed a punch biopsy today.  A band aid and antibiotic ointment has been placed over the site.  This should remain in place for 24 hours.  It is recommended that you keep the area dry for the first 24 hours.  After 24 hours, you may remove the band aid and wash the area with warm soap and water and apply Vaseline jelly.  Many patients prefer to use Neosporin or Bacitracin ointment.  This is acceptable; however know that you can develop an allergy to this medication even if you have used it safely for years.  It is important to keep the area moist.  Letting it dry out and get air slows healing time, will worsen the scar, and make it more difficult to remove the stitches if they were placed.  Band aid is optional after first 24 hours.      If you notice increasing redness, tenderness, pain, or yellow drainage at the biopsy or surgical site, please notify your doctor.  These are signs of an infection.    If your biopsy/surgical site is bleeding, apply firm pressure for 15 minutes straight.  Repeat for another 15 minutes, if it is still bleeding.   If the surgical site continues to bleed, then please contact your doctor.     [facility] - DERMATOLOGY  [address], [address]  [place] [address]  Dept: [phone]  Dept Fax: [phone]  Shave Biopsy Wound Care    Your doctor has performed a shave biopsy today.  A band aid and vaseline ointment has been placed over the site.  This should remain in place for 24 hours.  It is recommended that you keep the area dry for the first 24 hours.  After 24 hours, you may remove the band aid and wash the area with warm soap and water and apply Vaseline jelly.  Many patients prefer to use Neosporin or Bacitracin ointment.  This is acceptable; however, know that you can develop an allergy to this medication even if you have used it safely for years.  It is important to keep the area moist.  Letting  it dry out and get air slows healing time, and will worsen the scar.  Band aid is optional after first 24 hours.      If you notice increasing redness, tenderness, pain, or yellow drainage at the biopsy site, please notify your doctor.  These are signs of an infection.    If your biopsy site is bleeding, apply firm pressure for 15 minutes straight.  Repeat for another 15 minutes, if it is still bleeding.   If the surgical site continues to bleed, then please contact your doctor.       [facility] - DERMATOLOGY  [address], [address]  [place] [address]  Dept: [phone]  Dept Fax: [phone]

## 2024-05-02 LAB
FINAL PATHOLOGIC DIAGNOSIS: NORMAL
GROSS: NORMAL
Lab: NORMAL
MICROSCOPIC EXAM: NORMAL

## 2024-05-03 ENCOUNTER — TELEPHONE (OUTPATIENT)
Dept: DERMATOLOGY | Facility: CLINIC | Age: 64
End: 2024-05-03
Payer: OTHER GOVERNMENT

## 2024-05-03 DIAGNOSIS — C44.41 BASAL CELL CARCINOMA (BCC) OF SCALP: Primary | ICD-10-CM

## 2024-05-03 DIAGNOSIS — C44.41 BASAL CELL CARCINOMA OF SCALP: Primary | ICD-10-CM

## 2024-05-03 NOTE — TELEPHONE ENCOUNTER
Call to patient to schedule Mohs procedure. Asked patient the following questions:    Do you take any blood thinners: no  (If yes, patient informed to continue taking unless advised by a provider to stop)    Do you have a heart valve replacement: no  Have you had a joint replacement in the past 2 years: yes  Do you take antibiotics prior to seeing the dentist: yes  Do you have a history of HIV or HepB or HepC: no    If yes to any of these questions, patient informed Dr. Patel will send a prescription for antibiotics to pickup at their pharmacy prior to their procedure.  Patient instructed to take antibiotics 30 min prior to procedure appointment.

## 2024-05-06 RX ORDER — CEPHALEXIN 500 MG/1
2000 CAPSULE ORAL ONCE
Qty: 4 CAPSULE | Refills: 0 | Status: SHIPPED | OUTPATIENT
Start: 2024-05-06 | End: 2024-05-06

## 2024-05-20 ENCOUNTER — PROCEDURE VISIT (OUTPATIENT)
Dept: DERMATOLOGY | Facility: CLINIC | Age: 64
End: 2024-05-20
Payer: OTHER GOVERNMENT

## 2024-05-20 VITALS
HEART RATE: 73 BPM | SYSTOLIC BLOOD PRESSURE: 118 MMHG | DIASTOLIC BLOOD PRESSURE: 78 MMHG | WEIGHT: 275.56 LBS | HEIGHT: 72 IN | BODY MASS INDEX: 37.32 KG/M2

## 2024-05-20 DIAGNOSIS — C44.41 BASAL CELL CARCINOMA OF SCALP: ICD-10-CM

## 2024-05-20 DIAGNOSIS — C44.41 BASAL CELL CARCINOMA (BCC) OF SCALP: Primary | ICD-10-CM

## 2024-05-20 PROCEDURE — 13121 CMPLX RPR S/A/L 2.6-7.5 CM: CPT | Mod: S$PBB,51,, | Performed by: DERMATOLOGY

## 2024-05-20 PROCEDURE — 17311 MOHS 1 STAGE H/N/HF/G: CPT | Mod: PBBFAC,PN | Performed by: DERMATOLOGY

## 2024-05-20 PROCEDURE — 13121 CMPLX RPR S/A/L 2.6-7.5 CM: CPT | Mod: PBBFAC,51,PN | Performed by: DERMATOLOGY

## 2024-05-20 PROCEDURE — 17311 MOHS 1 STAGE H/N/HF/G: CPT | Mod: S$PBB,,, | Performed by: DERMATOLOGY

## 2024-05-20 NOTE — PROGRESS NOTES
MOHS MICROGRAPHIC SURGERY OPERATIVE NOTE  Name:  Baldo Grant  Date: 2024  Patient : 1960  Attending Surgeon: Demarco Patel MD  Assistants: Viviana Gunderson - Surgical Technician  Anesthetic Agent: 1% lidocaine with 1:100,000 epinephrine  Clinical Diagnosis: basal cell carcinoma nodular and superficial  Operation: Mohs Micrographic Surgery  Location: right scalp  Indications: Location in non-mask areas of face where maximum conservation of tumor-free tissue is needed.  Surgical Preparation: povidone-iodine     Description of Operation:  The nature and purpose of the procedure, associated risks and alternative treatments were explained to the patient in detail. All patient questions were answered completely. An informed operative consent and photography permit were obtained. The tumor location was then identified and marked with agreement by the patient of the correct location. The patient was positioned, prepped, and draped in the usual sterile manner. Local anesthesia was obtained with 2 cc(s) of 1% lidocaine with 1:100,000 epinephrine. The lesion pre-operatively measures 0.6 by 0.5 cm.     1ST STAGE:  A 2+ mm rim of normal appearing skin was marked circumferentially around the lesion after scraping with a curette to define the margin. The area thus outlined was excised at a 45 degree angle. Hemostasis was obtained with electrodesiccation. The specimen was oriented, mapped, and subdivided into at least two sections. Sections were then chromacoded and submitted for horizontal frozen sections. The patient tolerated the procedure well and there were no complications. Upon microscopic examination of the processed horizontal frozen sections of this stage:  No residual tumor was noted.  Tumor type noted on stage 1: No tumor seen.     SUMMARY:  The tumor was extirpated in 1 Mohs Stages resulting in a final defect measuring 1.1 by 1.0 cm².   The final defect extended deep to subcutaneous fat  The  patient tolerated the procedure well and no complications were noted.     PHOTOS:          Demarco Patel MD  Complex Linear Closure Operative Note  Name: Baldo Grant  YOB: 1960  Date: 5/20/2024  Attending Surgeon: Demarco Patel MD FAAD  Assistants:  Viviana Gunderson - Surgical Technician  Clinical Diagnosis: A 1.1 by 1.0 cm defect secondary to Mohs Micrographic Surgery  Location: right scalp  Operation: Complex linear closure  Surgical Preparation: broad scrub with povidone-iodine    Description of Operation:  The nature and purpose of the procedure, associated risks and alternative treatments were explained to the patient in detail. All patient questions were answered completely. In order to minimize tension on the closure and avoid compromising the anatomic contour of the cosmetic region and maximize functional capacity, a complex linear closure was performed. An informed operative consent and photography permit were obtained. The patient was positioned, prepped, and draped in the usual sterile manner. Local anesthesia was obtained with 4 cc(s) of 1% lidocaine with 1:100,000 epinephrine.    The defect edges were debeveled with a scalpel blade. The circular defect was widely undermined in the deep subcutaneous plane at least 100% of the defect diameter to allow for maximum tissue movement. Hemostasis was achieved with spot electrodessication. The defect was closed first utilizing multiple 4-0 Monocryl interrupted buried subcutaneous sutures. This resulted in excellent apposition of the dermis and epidermis, however two redundant areas of tissue were created on opposite sides of the defect. Utilizing a #15 scalpel blade, two Burows triangles were excised to ensure a flat scar. Hemostasis was obtained with spot electrodessication. The skin edges throughout further fastened superficially with 5-0 Prolene. The final length of the repair was 3.2 cm. The patient tolerated the procedure  well and there were no complications.    Polysporin, non-adherent gauze and a pressure dressing were applied to wound after gentle cleansing with saline.    Patient instructed in and provided with instructions for post-op care, will RTC in 10 days for suture removal    Post op meds: None    Photos:      MD MARTY Lovelace

## 2024-05-24 ENCOUNTER — PATIENT MESSAGE (OUTPATIENT)
Dept: FAMILY MEDICINE | Facility: CLINIC | Age: 64
End: 2024-05-24
Payer: OTHER GOVERNMENT

## 2024-05-28 ENCOUNTER — OFFICE VISIT (OUTPATIENT)
Dept: FAMILY MEDICINE | Facility: CLINIC | Age: 64
End: 2024-05-28
Payer: OTHER GOVERNMENT

## 2024-05-28 VITALS
HEIGHT: 72 IN | DIASTOLIC BLOOD PRESSURE: 66 MMHG | HEART RATE: 83 BPM | BODY MASS INDEX: 37.75 KG/M2 | SYSTOLIC BLOOD PRESSURE: 110 MMHG | TEMPERATURE: 99 F | WEIGHT: 278.69 LBS | OXYGEN SATURATION: 96 %

## 2024-05-28 DIAGNOSIS — M51.26 LUMBAR DISCOGENIC PAIN SYNDROME: ICD-10-CM

## 2024-05-28 DIAGNOSIS — E66.01 CLASS 2 SEVERE OBESITY DUE TO EXCESS CALORIES WITH SERIOUS COMORBIDITY AND BODY MASS INDEX (BMI) OF 37.0 TO 37.9 IN ADULT: ICD-10-CM

## 2024-05-28 DIAGNOSIS — M51.36 DDD (DEGENERATIVE DISC DISEASE), LUMBAR: ICD-10-CM

## 2024-05-28 DIAGNOSIS — Z01.818 PRE-OP EVALUATION: Primary | ICD-10-CM

## 2024-05-28 DIAGNOSIS — G47.33 OSA (OBSTRUCTIVE SLEEP APNEA): ICD-10-CM

## 2024-05-28 PROCEDURE — 99214 OFFICE O/P EST MOD 30 MIN: CPT | Mod: S$GLB,,, | Performed by: STUDENT IN AN ORGANIZED HEALTH CARE EDUCATION/TRAINING PROGRAM

## 2024-05-29 ENCOUNTER — HOSPITAL ENCOUNTER (OUTPATIENT)
Dept: RADIOLOGY | Facility: HOSPITAL | Age: 64
Discharge: HOME | End: 2024-05-29
Attending: STUDENT IN AN ORGANIZED HEALTH CARE EDUCATION/TRAINING PROGRAM
Payer: OTHER GOVERNMENT

## 2024-05-29 ENCOUNTER — HOSPITAL ENCOUNTER (OUTPATIENT)
Dept: CARDIOLOGY | Facility: HOSPITAL | Age: 64
Discharge: HOME | End: 2024-05-29
Attending: STUDENT IN AN ORGANIZED HEALTH CARE EDUCATION/TRAINING PROGRAM
Payer: OTHER GOVERNMENT

## 2024-05-29 DIAGNOSIS — Z01.818 PRE-OP EVALUATION: ICD-10-CM

## 2024-05-29 LAB
OHS QRS DURATION: 92 MS
OHS QTC CALCULATION: 422 MS

## 2024-05-29 PROCEDURE — 71046 X-RAY EXAM CHEST 2 VIEWS: CPT | Mod: TC,FY,PN

## 2024-05-29 PROCEDURE — 71046 X-RAY EXAM CHEST 2 VIEWS: CPT | Mod: 26,,, | Performed by: RADIOLOGY

## 2024-05-29 PROCEDURE — 93010 ELECTROCARDIOGRAM REPORT: CPT | Mod: ,,, | Performed by: INTERNAL MEDICINE

## 2024-05-29 PROCEDURE — 93005 ELECTROCARDIOGRAM TRACING: CPT | Mod: PN

## 2024-05-30 ENCOUNTER — PATIENT MESSAGE (OUTPATIENT)
Dept: FAMILY MEDICINE | Facility: CLINIC | Age: 64
End: 2024-05-30
Payer: OTHER GOVERNMENT

## 2024-05-30 ENCOUNTER — CLINICAL SUPPORT (OUTPATIENT)
Dept: DERMATOLOGY | Facility: CLINIC | Age: 64
End: 2024-05-30
Payer: OTHER GOVERNMENT

## 2024-05-30 DIAGNOSIS — Z48.02 VISIT FOR SUTURE REMOVAL: Primary | ICD-10-CM

## 2024-05-30 PROCEDURE — 99999 PR PBB SHADOW E&M-EST. PATIENT-LVL I: CPT | Mod: PBBFAC,,,

## 2024-05-30 PROCEDURE — 99211 OFF/OP EST MAY X REQ PHY/QHP: CPT | Mod: PBBFAC,PN

## 2024-05-30 NOTE — PROGRESS NOTES
Mohs Surgery Suture Removal Note   Patient Name: Baldo Grant  Patient : 1960  Date of Service: 2024    CC: Pt. Presents for removal of sutures on the scalp today  Subjective: patient reports wound healing as expected     Objective: Wound well healed, sutures clean/dry/intact. No evidence of any complications noted.     Visit For Suture Removal:  - Suture removal today  - Steri strips/mastisol were not applied  - Patient counseled on silicone gel/silicone sheeting and their use in the management of post-operative scars  - Handout on silicone gel/silicone gel sheeting was provided  - Patient to follow up with their referring provider in 3 months for further skin evaluation        Mayda Shelley

## 2024-06-02 NOTE — PROGRESS NOTES
Patient ID: Baldo Grant is a 64 y.o. male.     Chief Complaint: Pre-op Exam    HPI   Patient here for preop exam. He is having spinal fusion [facility]. He denies recent chest pain and shortness of breath. He is able to climb a flight of stairs without chest pain and shortness of breath. He is taking all medications as prescribed.     Review of Systems  Review of Systems   Constitutional:  Negative for fever.   HENT:  Negative for ear pain and sinus pain.    Eyes:  Negative for discharge.   Respiratory:  Negative for cough and shortness of breath.    Cardiovascular:  Negative for chest pain and leg swelling.   Gastrointestinal:  Negative for diarrhea, nausea and vomiting.   Genitourinary:  Negative for urgency.   Musculoskeletal:  Negative for myalgias.   Skin:  Negative for rash.   Neurological:  Negative for weakness and headaches.   Psychiatric/Behavioral:  Negative for depression.    All other systems reviewed and are negative.      Currently Medications  Current Outpatient Medications on File Prior to Visit   Medication Sig Dispense Refill    aspirin (ECOTRIN) 81 MG EC tablet Take 1 tablet (81 mg total) by mouth 2 (two) times daily. 84 tablet 0    blood sugar diagnostic (BLOOD GLUCOSE TEST) Strp Strips for one to 2 times a day testing   dispense brand covered by insurance and to match meter brand 60 each 3    blood-glucose meter kit Dispense meter  brand covered by insurance 1 each 0    diabetic supplies, miscellan. Misc Lancets for 1-2 times a day testing    dispense brand covered by insurance t 60 each 3    EPINEPHrine (EPIPEN) 0.3 mg/0.3 mL AtIn       gabapentin (NEURONTIN) 600 MG tablet Take 1 tablet (600 mg total) by mouth 3 (three) times daily. 270 tablet 4    lidocaine HCL 2% (XYLOCAINE) 2 % jelly Apply topically as needed. 30 mL 3    meloxicam (MOBIC) 15 MG tablet Take 1 tablet (15 mg total) by mouth once daily. 30 tablet 2    meloxicam (MOBIC) 15 MG tablet Take 1 tablet (15 mg  total) by mouth once daily. 30 tablet 2    oxyCODONE (ROXICODONE) 5 MG immediate release tablet Take 1-2 tablets (5-10 mg total) by mouth every 4 to 6 hours as needed for Pain. 40 tablet 0    oxyCODONE (ROXICODONE) 5 MG immediate release tablet Take 1-2 tablets (5-10 mg total) by mouth every 6 (six) hours as needed for Pain. 40 tablet 0    roflumilast (ZORYVE) 0.3 % Crea Apply 1 application  topically once daily. 60 g 5    sumatriptan (IMITREX) 50 MG tablet Take one at the first sign of a headache.  You may repeat it in 1 hour as needed. 15 tablet 3    tirzepatide (MOUNJARO) 5 mg/0.5 mL PnIj Inject 5 mg into the skin every 7 days. 12 pen 3     Current Facility-Administered Medications on File Prior to Visit   Medication Dose Route Frequency Provider Last Rate Last Admin    0.9%  NaCl infusion  500 mL Intravenous Continuous Alexus Anglin Jr., MD        lidocaine (PF) 10 mg/ml (1%) injection 10 mg  1 mL Intradermal Once Alexus Anglin Jr., MD        lidocaine (PF) 10 mg/ml (1%) injection 10 mg  1 mL Intradermal Once Alexus Anglin Jr., MD           Physical  Exam  Vitals:    05/28/24 1312   BP: 110/66   BP Location: Right arm   Patient Position: Sitting   Pulse: 83   Temp: 98.5 °F (36.9 °C)   SpO2: 96%   Weight: 126.4 kg (278 lb 10.6 oz)   Height: 6' (1.829 m)      Body mass index is 37.79 kg/m².  Wt Readings from Last 3 Encounters:   05/28/24 126.4 kg (278 lb 10.6 oz)   05/20/24 125 kg (275 lb 9.2 oz)   04/26/24 125 kg (275 lb 9.2 oz)       Physical Exam  Vitals and nursing note reviewed.   Constitutional:       General: He is not in acute distress.     Appearance: He is not ill-appearing.   HENT:      Head: Normocephalic and atraumatic.      Right Ear: External ear normal.      Left Ear: External ear normal.      Nose: Nose normal.      Mouth/Throat:      Mouth: Mucous membranes are moist.   Eyes:      Extraocular Movements: Extraocular movements intact.      Conjunctiva/sclera: Conjunctivae normal.  "  Cardiovascular:      Rate and Rhythm: Normal rate and regular rhythm.      Pulses: Normal pulses.      Heart sounds: No murmur heard.  Pulmonary:      Effort: Pulmonary effort is normal. No respiratory distress.      Breath sounds: No wheezing.   Abdominal:      General: There is no distension.      Palpations: Abdomen is soft. There is no mass.      Tenderness: There is no abdominal tenderness.   Musculoskeletal:         General: No swelling.      Cervical back: Normal range of motion.   Skin:     Coloration: Skin is not jaundiced.      Findings: No rash.   Neurological:      General: No focal deficit present.      Mental Status: He is alert and oriented to person, place, and time.   Psychiatric:         Mood and Affect: Mood normal.         Thought Content: Thought content normal.         Labs:    Complete Blood Count  Lab Results   Component Value Date    RBC 4.55 (L) 05/29/2024    HGB 13.3 (L) 05/29/2024    HCT 40.5 05/29/2024    MCV 89 05/29/2024    MCH 29.2 05/29/2024    MCHC 32.8 05/29/2024    RDW 12.1 05/29/2024     05/29/2024    MPV 9.6 05/29/2024    GRAN 2.9 05/29/2024    GRAN 61.4 05/29/2024    LYMPH 1.1 05/29/2024    LYMPH 21.9 05/29/2024    MONO 0.6 05/29/2024    MONO 11.5 05/29/2024    EOS 0.2 05/29/2024    BASO 0.03 05/29/2024    EOSINOPHIL 4.2 05/29/2024    BASOPHIL 0.6 05/29/2024    DIFFMETHOD Automated 05/29/2024       Comprehensive Metabolic Panel  Lab Results   Component Value Date     (H) 05/29/2024    BUN 18 05/29/2024    CREATININE 1.00 05/29/2024     05/29/2024    K 4.5 05/29/2024     05/29/2024    PROT 7.1 05/29/2024    ALBUMIN 4.5 05/29/2024    BILITOT 0.6 05/29/2024    AST 35 05/29/2024    ALKPHOS 39 05/29/2024    CO2 24 05/29/2024    ALT 41 05/29/2024    ANIONGAP 10 05/29/2024       TSH  No results found for: "TSH"    Imaging:  X-Ray Chest PA And Lateral  Narrative: EXAMINATION:  XR CHEST PA AND LATERAL    CLINICAL HISTORY:  Encounter for other preprocedural " examination    COMPARISON:  None    FINDINGS:  Cardiac silhouette is borderline enlarged..   The lungs demonstrate no evidence of active disease.  No evidence of pleural effusion or pneumothorax.  Bones appear intact.   Mild atherosclerotic disease.  Impression: No acute process seen.    Electronically signed by: Juan Manuel Chapman MD  Date:    05/29/2024  Time:    09:09      Assessment/Plan:    1. Pre-op evaluation  -     Cancel: X-Ray Chest 1 View; Future; Expected date: 05/28/2024  -     SCHEDULED EKG 12-LEAD (to Muse); Future  -     CBC Auto Differential; Future  -     Comprehensive metabolic panel; Future; Expected date: 05/28/2024  -     HEMOGLOBIN A1C; Future; Expected date: 05/28/2024  -     X-Ray Chest PA And Lateral; Future; Expected date: 05/28/2024  -     Urinalysis, Reflex to Urine Culture Urine, Clean Catch; Future; Expected date: 05/28/2024  -     APTT; Future; Expected date: 05/28/2024  -     Protime-INR; Future; Expected date: 05/28/2024    2. FARHAT (obstructive sleep apnea)    3. Class 2 severe obesity due to excess calories with serious comorbidity and body mass index (BMI) of 37.0 to 37.9 in adult    4. DDD (degenerative disc disease), lumbar    5. Lumbar discogenic pain syndrome       This individual is at intermediate risk for cardiovascular event during the moderate risk surgery. The patient does have the ability to perform > 4 MET levels of activity. He may proceed with surgery with no additional cardiac testing or procedures.       You Britton MD

## 2024-06-19 ENCOUNTER — PATIENT MESSAGE (OUTPATIENT)
Dept: SPORTS MEDICINE | Facility: CLINIC | Age: 64
End: 2024-06-19
Payer: OTHER GOVERNMENT

## 2024-07-19 ENCOUNTER — PATIENT MESSAGE (OUTPATIENT)
Dept: DERMATOLOGY | Facility: CLINIC | Age: 64
End: 2024-07-19
Payer: OTHER GOVERNMENT

## 2024-07-22 ENCOUNTER — TELEPHONE (OUTPATIENT)
Dept: DERMATOLOGY | Facility: CLINIC | Age: 64
End: 2024-07-22
Payer: OTHER GOVERNMENT

## 2024-07-22 NOTE — TELEPHONE ENCOUNTER
Patient called back and said that he could not make it today, I offered him an appointment for another day this week, he could not make those, he just had back surgery, I told them if it a suture at the top of skin just take a pair of scissors and wipe with alcohol and also the wound, if they are not comfortable with that maybe Dr. Monroe could see him but they said she is to far. I told them to try and cut it and if not to call back and we can get him in next week.

## 2024-07-26 PROBLEM — R29.898 DECREASED STRENGTH OF TRUNK AND BACK: Status: RESOLVED | Noted: 2023-04-17 | Resolved: 2024-07-26

## 2024-07-26 PROBLEM — R68.89 IMPAIRED TOLERANCE OF ACTIVITY: Status: RESOLVED | Noted: 2022-01-11 | Resolved: 2024-07-26

## 2024-07-26 PROBLEM — R29.898 IMPAIRED STRENGTH OF LOWER EXTREMITY: Status: ACTIVE | Noted: 2024-07-26

## 2024-07-26 PROBLEM — M25.652 IMPAIRED RANGE OF MOTION OF BOTH HIPS: Status: ACTIVE | Noted: 2024-07-26

## 2024-07-26 PROBLEM — M25.651 IMPAIRED RANGE OF MOTION OF BOTH HIPS: Status: ACTIVE | Noted: 2024-07-26

## 2024-07-26 PROBLEM — Z78.9 IMPAIRED ACTIVITIES OF DAILY LIVING: Status: ACTIVE | Noted: 2024-07-26

## 2024-08-05 ENCOUNTER — PATIENT MESSAGE (OUTPATIENT)
Dept: FAMILY MEDICINE | Facility: CLINIC | Age: 64
End: 2024-08-05
Payer: OTHER GOVERNMENT

## 2024-08-05 DIAGNOSIS — N50.819 PAIN IN TESTICLE, UNSPECIFIED LATERALITY: Primary | ICD-10-CM

## 2024-08-07 ENCOUNTER — PATIENT MESSAGE (OUTPATIENT)
Dept: SPORTS MEDICINE | Facility: CLINIC | Age: 64
End: 2024-08-07
Payer: OTHER GOVERNMENT

## 2024-08-08 ENCOUNTER — OFFICE VISIT (OUTPATIENT)
Dept: SPORTS MEDICINE | Facility: CLINIC | Age: 64
End: 2024-08-08
Payer: OTHER GOVERNMENT

## 2024-08-08 ENCOUNTER — HOSPITAL ENCOUNTER (OUTPATIENT)
Dept: RADIOLOGY | Facility: HOSPITAL | Age: 64
Discharge: HOME | End: 2024-08-08
Attending: PHYSICIAN ASSISTANT
Payer: OTHER GOVERNMENT

## 2024-08-08 VITALS
RESPIRATION RATE: 18 BRPM | HEIGHT: 72 IN | HEART RATE: 73 BPM | WEIGHT: 278.69 LBS | BODY MASS INDEX: 37.75 KG/M2 | DIASTOLIC BLOOD PRESSURE: 84 MMHG | SYSTOLIC BLOOD PRESSURE: 120 MMHG

## 2024-08-08 DIAGNOSIS — M25.562 ACUTE PAIN OF BOTH KNEES: ICD-10-CM

## 2024-08-08 DIAGNOSIS — M25.561 ACUTE PAIN OF BOTH KNEES: ICD-10-CM

## 2024-08-08 DIAGNOSIS — Z96.652 S/P TOTAL KNEE REPLACEMENT, LEFT: Primary | ICD-10-CM

## 2024-08-08 PROCEDURE — 99214 OFFICE O/P EST MOD 30 MIN: CPT | Mod: S$PBB,,, | Performed by: PHYSICIAN ASSISTANT

## 2024-08-08 PROCEDURE — 99214 OFFICE O/P EST MOD 30 MIN: CPT | Mod: PBBFAC,25 | Performed by: PHYSICIAN ASSISTANT

## 2024-08-08 PROCEDURE — 73564 X-RAY EXAM KNEE 4 OR MORE: CPT | Mod: TC,50

## 2024-08-08 PROCEDURE — 99999 PR PBB SHADOW E&M-EST. PATIENT-LVL IV: CPT | Mod: PBBFAC,,, | Performed by: PHYSICIAN ASSISTANT

## 2024-08-08 PROCEDURE — 73564 X-RAY EXAM KNEE 4 OR MORE: CPT | Mod: 26,50,, | Performed by: RADIOLOGY

## 2024-08-09 ENCOUNTER — PATIENT MESSAGE (OUTPATIENT)
Dept: FAMILY MEDICINE | Facility: CLINIC | Age: 64
End: 2024-08-09
Payer: OTHER GOVERNMENT

## 2024-08-09 DIAGNOSIS — R11.0 NAUSEA: Primary | ICD-10-CM

## 2024-08-12 ENCOUNTER — OFFICE VISIT (OUTPATIENT)
Dept: UROLOGY | Facility: CLINIC | Age: 64
End: 2024-08-12
Payer: OTHER GOVERNMENT

## 2024-08-12 VITALS
BODY MASS INDEX: 36.62 KG/M2 | WEIGHT: 270.38 LBS | HEIGHT: 72 IN | SYSTOLIC BLOOD PRESSURE: 121 MMHG | DIASTOLIC BLOOD PRESSURE: 86 MMHG | HEART RATE: 73 BPM

## 2024-08-12 DIAGNOSIS — N50.819 PAIN IN TESTICLE, UNSPECIFIED LATERALITY: ICD-10-CM

## 2024-08-12 DIAGNOSIS — R39.9 LOWER URINARY TRACT SYMPTOMS: ICD-10-CM

## 2024-08-12 DIAGNOSIS — Z12.5 PROSTATE CANCER SCREENING: Primary | ICD-10-CM

## 2024-08-12 DIAGNOSIS — N43.3 RIGHT HYDROCELE: ICD-10-CM

## 2024-08-12 DIAGNOSIS — N39.8 VOIDING DYSFUNCTION: ICD-10-CM

## 2024-08-12 PROCEDURE — 99214 OFFICE O/P EST MOD 30 MIN: CPT | Mod: PBBFAC,PO | Performed by: UROLOGY

## 2024-08-12 PROCEDURE — 99204 OFFICE O/P NEW MOD 45 MIN: CPT | Mod: S$PBB,,, | Performed by: UROLOGY

## 2024-08-12 PROCEDURE — 99999 PR PBB SHADOW E&M-EST. PATIENT-LVL IV: CPT | Mod: PBBFAC,,, | Performed by: UROLOGY

## 2024-08-12 RX ORDER — ONDANSETRON 4 MG/1
4 TABLET, ORALLY DISINTEGRATING ORAL EVERY 8 HOURS PRN
Qty: 30 TABLET | Refills: 3 | Status: SHIPPED | OUTPATIENT
Start: 2024-08-12

## 2024-08-12 NOTE — PROGRESS NOTES
[facility] - Urology   Clinic Note    SUBJECTIVE:     Chief Complaint   Patient presents with    Testicle Pain       Referral from: You Britton MD.    History of Present Illness:  Baldo Grant is a 64 y.o. male who presents to clinic for bilateral orchalgia.    Patient presents with complaints of left testicular pain for several years.  Describes pain as dull and achy but occasionally sharp.  The pain waxes and wanes in intensity and is intermittent.  Denies any issues with erections.  Denies any high-risk sexual activity.  Denies any dysuria or significantly bothersome urinary complaints.  He does report some lower urinary tract symptoms however it does not bother him all that much, mostly incomplete emptying and a weakening of his stream.  He also sometimes double voids.  His most recent PSA was within normal limits.  He has a long history of back pain and degenerative disc disease and recently underwent neurologic back surgery in June of 2024 and states that his symptoms were worse before and about the same now.  He also has a moderate-sized right hydrocele and previously had a scrotal ultrasound confirming this.    Patient endorses no additional complaints at this time.    Past Medical History:   Diagnosis Date    Arthritis     Basal cell carcinoma 06/21/2019    back    Cancer     Chronic pain of right knee     SCC (squamous cell carcinoma) 2014    Forehead- Dr. Cabral     Skin cancer     Squamous cell carcinoma 2013    Right ear- Dr. Marianna long       Past Surgical History:   Procedure Laterality Date    ADENOIDECTOMY  1983    APPENDECTOMY      ARTHROSCOPIC CHONDROPLASTY OF KNEE JOINT Left 03/18/2020    Procedure: ARTHROSCOPY, KNEE, WITH CHONDROPLASTY;  Surgeon: FREEMAN Álvarez MD;  Location: [facility];  Service: Orthopedics;  Laterality: Left;    COLONOSCOPY  1998    EPIDURAL STEROID INJECTION INTO LUMBAR SPINE N/A 02/11/2020    Procedure: Injection-steroid-epidural-lumbar--InterLaminar L4-5;   Surgeon: Alexus Anglin Jr., MD;  Location: [facility];  Service: Pain Management;  Laterality: N/A;    INJECTION OF ANESTHETIC AGENT AROUND MEDIAL BRANCH NERVES INNERVATING LUMBAR FACET JOINT Bilateral 11/05/2019    Procedure: Block-nerve-medial branch-lumbar--Bilateral L3-4,L4-5,L5-S1;  Surgeon: Alexus Anglin Jr., MD;  Location: [facility];  Service: Pain Management;  Laterality: Bilateral;    INJECTION OF STEROID Left 11/12/2020    Procedure: INJECTION, STEROID Left SI Joint Block and Steroid Injection;  Surgeon: Tam Encarnacion MD;  Location: [facility] OR;  Service: Neurosurgery;  Laterality: Left;  Procedure: Left SI Joint Block and Steroid Injection   Surgery Time: 30 min  LOS: 0  Anesthesia: MAC  Radiology: C-arm  Bed: Regular with Pillows  Position: Prone    KNEE ARTHROSCOPY W/ MENISCECTOMY Left 03/18/2020    Procedure: ARTHROSCOPY, KNEE, WITH MENISCECTOMY;  Surgeon: FREEMAN Álvarez MD;  Location: [facility] OR;  Service: Orthopedics;  Laterality: Left;  CLONIDINE/EPI/KETOROLAC/ROPIVACAINE INJECTION 30CC    lipoma removal      skin cancer removal      TOTAL KNEE ARTHROPLASTY Left 10/12/2022    Procedure: ARTHROPLASTY, KNEE, TOTAL;  Surgeon: FREEMAN Álvarez MD;  Location: [facility] OR;  Service: Orthopedics;  Laterality: Left;  regional with catheter- spinal & adductor  pericapsular injection: 0.2% ropivacaine    TRANSFORAMINAL EPIDURAL INJECTION OF STEROID Bilateral 05/06/2020    Procedure: Injection,steroid,epidural,transforaminal approach--Bilateral L4-5;  Surgeon: Alexus Anglin Jr., MD;  Location: [facility];  Service: Pain Management;  Laterality: Bilateral;    TRANSFORAMINAL EPIDURAL INJECTION OF STEROID Bilateral 08/11/2020    Procedure: Injection,steroid,epidural,transforaminal approach--Bilateral L4-5;  Surgeon: Alexus Anglin Jr., MD;  Location: [facility];  Service: Pain Management;  Laterality: Bilateral;    TRANSFORAMINAL EPIDURAL INJECTION OF STEROID Bilateral 04/05/2022     Procedure: Injection,steroid,epidural,transforaminal bilateral L4-5;  Surgeon: Alexus Anglin Jr., MD;  Location: [facility];  Service: Pain Management;  Laterality: Bilateral;  asa        Family History   Problem Relation Name Age of Onset    COPD Mother Cecile     Alcohol abuse Mother Cecile     Arthritis Mother Cecile     Heart disease Father age 73     Melanoma Father age 73     No Known Problems Sister      No Known Problems Brother      Psoriasis Neg Hx      Lupus Neg Hx      Eczema Neg Hx         Social History     Tobacco Use    Smoking status: Never     Passive exposure: Never    Smokeless tobacco: Never   Substance Use Topics    Alcohol use: Yes     Alcohol/week: 2.0 standard drinks of alcohol     Types: 1 Glasses of wine, 1 Cans of beer per week     Comment: social    Drug use: No       Current Outpatient Medications on File Prior to Visit   Medication Sig Dispense Refill    aspirin (ECOTRIN) 81 MG EC tablet Take 1 tablet (81 mg total) by mouth 2 (two) times daily. 84 tablet 0    blood sugar diagnostic (BLOOD GLUCOSE TEST) Strp Strips for one to 2 times a day testing   dispense brand covered by insurance and to match meter brand 60 each 3    blood-glucose meter kit Dispense meter  brand covered by insurance 1 each 0    diabetic supplies, miscellan. Misc Lancets for 1-2 times a day testing    dispense brand covered by insurance t 60 each 3    EPINEPHrine (EPIPEN) 0.3 mg/0.3 mL AtIn       gabapentin (NEURONTIN) 600 MG tablet Take 1 tablet (600 mg total) by mouth 3 (three) times daily. 270 tablet 4    lidocaine HCL 2% (XYLOCAINE) 2 % jelly Apply topically as needed. 30 mL 3    meloxicam (MOBIC) 15 MG tablet Take 1 tablet (15 mg total) by mouth once daily. 30 tablet 2    meloxicam (MOBIC) 15 MG tablet Take 1 tablet (15 mg total) by mouth once daily. 30 tablet 2    oxyCODONE (ROXICODONE) 5 MG immediate release tablet Take 1-2 tablets (5-10 mg total) by mouth every 4 to 6 hours as needed for Pain. 40  tablet 0    oxyCODONE (ROXICODONE) 5 MG immediate release tablet Take 1-2 tablets (5-10 mg total) by mouth every 6 (six) hours as needed for Pain. 40 tablet 0    roflumilast (ZORYVE) 0.3 % Crea Apply 1 application  topically once daily. 60 g 5    sumatriptan (IMITREX) 50 MG tablet Take one at the first sign of a headache.  You may repeat it in 1 hour as needed. 15 tablet 3    tirzepatide (MOUNJARO) 5 mg/0.5 mL PnIj Inject 5 mg into the skin every 7 days. 12 pen 3     Current Facility-Administered Medications on File Prior to Visit   Medication Dose Route Frequency Provider Last Rate Last Admin    0.9%  NaCl infusion  500 mL Intravenous Continuous Alexus Anglin Jr., MD        lidocaine (PF) 10 mg/ml (1%) injection 10 mg  1 mL Intradermal Once Alexus Anglin Jr., MD        lidocaine (PF) 10 mg/ml (1%) injection 10 mg  1 mL Intradermal Once Alexus Anglin Jr., MD           Review of patient's allergies indicates:   Allergen Reactions    Advil [ibuprofen] Anaphylaxis    Tums [calcium carbonate] Anaphylaxis       Review of Systems:  A review of 10+ systems was conducted with pertinent positive and negative findings documented in HPI with all other systems reviewed and negative.    OBJECTIVE:     Estimated body mass index is 36.67 kg/m² as calculated from the following:    Height as of this encounter: 6' (1.829 m).    Weight as of this encounter: 122.7 kg (270 lb 6.3 oz).    Vital Signs (Most Recent)  Vitals:    08/12/24 1026   BP: 121/86   Pulse: 73       Physical Exam:  GENERAL: patient sitting comfortably  HEENT: normocephalic  NECK: supple, no JVD  PULM: normal chest rise, no increased WOB  HEART: non-diaphoretic  ABDO: soft, nondistended, nontender  BACK: no CVA tenderness bilaterally  SKIN: warm, dry, well perfused  EXT: no bruising or edema  NEURO: grossly normal with no focal deficits  PSYCH: appropriate mood and affect    Genitourinary Exam:  phallus with orthotopic meatus without lesions.   Bilaterally descended testes in normal position and lie without appreciable masses.    Patient does have a moderate-size right hydrocele.  No obvious tenderness to palpation.  Scrotum without erythema, lesions, or masses.  No appreciable inguinal hernias.    Lab Results   Component Value Date    BUN 18 05/29/2024    CREATININE 1.00 05/29/2024    WBC 4.79 05/29/2024    HGB 13.3 (L) 05/29/2024    HCT 40.5 05/29/2024     05/29/2024    AST 35 05/29/2024    ALT 41 05/29/2024    ALKPHOS 39 05/29/2024    ALBUMIN 4.5 05/29/2024    HGBA1C 5.6 05/29/2024    INR 1.0 05/29/2024        Imaging:  I have personally reviewed all relevant imaging studies.    No results found. However, due to the size of the patient record, not all encounters were searched. Please check Results Review for a complete set of results.  No results found. However, due to the size of the patient record, not all encounters were searched. Please check Results Review for a complete set of results.  X-ray Knee Ortho Bilateral with Flexion  Narrative: EXAMINATION:  XR KNEE ORTHO BILAT WITH FLEXION    CLINICAL HISTORY:  Pain in right knee    TECHNIQUE:  AP standing of both knees, PA flexion standing views of both knees, and Merchant views of both knees were performed.  Lateral views of both knees were also performed.    COMPARISON:  05/08/2023    FINDINGS:  Skeletal structures are intact without acute fracture or dislocation.  Right knee again shows some degenerative joint disease with small spurs at a few positions and the mild narrowing of the patellofemoral and medial tibiofemoral joint spaces.  No significant joint effusion detected.  Left knee again shows postoperative findings of total knee arthroplasty with prostheses in stable position and alignment.  Some soft tissue calcifications are near the patella.  No significant joint effusion detected.  Impression: No acute abnormality.  Right knee DJD.  Left TKA.    Electronically signed by: Fer  MD Darrick  Date:    08/08/2024  Time:    09:34       PSA:  Lab Results   Component Value Date    PSA 0.68 10/24/2023    PSADIAG 0.56 09/22/2020       Testosterone:  Lab Results   Component Value Date    TESTOSTERONE 349 09/22/2020    TESTOSTERONE 47.3 09/22/2020        ASSESSMENT     1. Prostate cancer screening    2. Pain in testicle, unspecified laterality    3. Lower urinary tract symptoms        PLAN:     We had a long discussion regarding etiology including torsion, trauma, infection, hernia repair, epididymal cyst, and that most is idiopathic.   We discussed management options, including anti-inflammatories (25%-90% efficacy), PFPT, cord blocks, and simple orchiectomy (~75% efficacy).  Also discussed options for right hydrocele including observation, aspiration and sclerotherapy, and hydrocelectomy.  Patient wishes to observe  Will consider his options for orchalgia. Interested in cord blocks and orchiectomy, will consider his options.  Offered trial of tamsulosin vs UDS for voiding dysfunction. Wants to observe  PSA annually with PCP    Jus Falcon MD  Urology  [facility]    Disclaimer: This note has been generated using voice-recognition software. There may be typographical errors that have been missed during proof-reading.

## 2024-10-23 ENCOUNTER — PATIENT MESSAGE (OUTPATIENT)
Dept: FAMILY MEDICINE | Facility: CLINIC | Age: 64
End: 2024-10-23
Payer: OTHER GOVERNMENT

## 2024-10-25 ENCOUNTER — IMMUNIZATION (OUTPATIENT)
Dept: FAMILY MEDICINE | Facility: CLINIC | Age: 64
End: 2024-10-25
Payer: OTHER GOVERNMENT

## 2024-10-25 DIAGNOSIS — Z23 NEED FOR VACCINATION: Primary | ICD-10-CM

## 2024-10-30 DIAGNOSIS — E11.9 TYPE 2 DIABETES MELLITUS WITHOUT COMPLICATION: ICD-10-CM

## 2024-12-04 DIAGNOSIS — E11.9 TYPE 2 DIABETES MELLITUS WITHOUT COMPLICATION: ICD-10-CM

## 2025-02-10 ENCOUNTER — PATIENT MESSAGE (OUTPATIENT)
Dept: FAMILY MEDICINE | Facility: CLINIC | Age: 65
End: 2025-02-10
Payer: OTHER GOVERNMENT

## 2025-02-10 DIAGNOSIS — E11.9 TYPE 2 DIABETES MELLITUS WITHOUT COMPLICATION, WITHOUT LONG-TERM CURRENT USE OF INSULIN: ICD-10-CM

## 2025-02-10 RX ORDER — TIRZEPATIDE 5 MG/.5ML
5 INJECTION, SOLUTION SUBCUTANEOUS
Qty: 12 PEN | Refills: 3 | Status: SHIPPED | OUTPATIENT
Start: 2025-02-10

## 2025-02-10 NOTE — TELEPHONE ENCOUNTER
Care Due:                  Date            Visit Type   Department     Provider  --------------------------------------------------------------------------------                                EP -                              PRIMARY      [facility] FAMILY  Last Visit: 05-      CARE ([facility])   MEDICINE       You Britton  Next Visit: None Scheduled  None         None Found                                                            Last  Test          Frequency    Reason                     Performed    Due Date  --------------------------------------------------------------------------------    HBA1C.......  6 months...  tirzepatide..............  05- 11-    [facility] Embedded Care Due Messages. Reference number: [id number].   2/10/2025 8:26:01 AM CST

## 2025-02-20 ENCOUNTER — PATIENT MESSAGE (OUTPATIENT)
Dept: FAMILY MEDICINE | Facility: CLINIC | Age: 65
End: 2025-02-20

## 2025-02-20 ENCOUNTER — OFFICE VISIT (OUTPATIENT)
Dept: FAMILY MEDICINE | Facility: CLINIC | Age: 65
End: 2025-02-20
Payer: OTHER GOVERNMENT

## 2025-02-20 VITALS
HEIGHT: 72 IN | DIASTOLIC BLOOD PRESSURE: 86 MMHG | BODY MASS INDEX: 38.94 KG/M2 | OXYGEN SATURATION: 97 % | SYSTOLIC BLOOD PRESSURE: 122 MMHG | HEART RATE: 80 BPM | WEIGHT: 287.5 LBS | TEMPERATURE: 99 F

## 2025-02-20 DIAGNOSIS — E66.812 CLASS 2 SEVERE OBESITY DUE TO EXCESS CALORIES WITH SERIOUS COMORBIDITY AND BODY MASS INDEX (BMI) OF 37.0 TO 37.9 IN ADULT: ICD-10-CM

## 2025-02-20 DIAGNOSIS — Z12.5 PROSTATE CANCER SCREENING: ICD-10-CM

## 2025-02-20 DIAGNOSIS — G47.33 OSA (OBSTRUCTIVE SLEEP APNEA): ICD-10-CM

## 2025-02-20 DIAGNOSIS — E11.9 TYPE 2 DIABETES MELLITUS WITHOUT COMPLICATION, WITHOUT LONG-TERM CURRENT USE OF INSULIN: ICD-10-CM

## 2025-02-20 DIAGNOSIS — Z00.00 WELLNESS EXAMINATION: Primary | ICD-10-CM

## 2025-02-20 DIAGNOSIS — Z13.6 SCREENING FOR CARDIOVASCULAR CONDITION: ICD-10-CM

## 2025-02-20 DIAGNOSIS — E66.01 CLASS 2 SEVERE OBESITY DUE TO EXCESS CALORIES WITH SERIOUS COMORBIDITY AND BODY MASS INDEX (BMI) OF 37.0 TO 37.9 IN ADULT: ICD-10-CM

## 2025-02-20 NOTE — PROGRESS NOTES
Patient ID: Baldo Grant is a 64 y.o. male.     Chief Complaint: Annual Exam    HPI  Patient here for annual wellness exam. He continues to have some back pain. He is now 8 months out from back surgery. He is following with pain management. He recently accepted a job in [employer]. He has been traveling back and forth between there and [place]. He plans to establish full time medical care there once he buys a house. He is currently staying in an apartment.     Review of Systems  Review of Systems   Constitutional:  Negative for fever.   HENT:  Negative for ear pain and sinus pain.    Eyes:  Negative for discharge.   Respiratory:  Negative for cough and shortness of breath.    Cardiovascular:  Negative for chest pain and leg swelling.   Gastrointestinal:  Negative for diarrhea, nausea and vomiting.   Genitourinary:  Negative for urgency.   Musculoskeletal:  Negative for myalgias.   Skin:  Negative for rash.   Neurological:  Negative for weakness and headaches.   Psychiatric/Behavioral:  Negative for depression.    All other systems reviewed and are negative.      Currently Medications  Medications Ordered Prior to Encounter[1]    Physical  Exam  Vitals:    02/20/25 1118   BP: 122/86   BP Location: Right arm   Patient Position: Sitting   Pulse: 80   Temp: 98.5 °F (36.9 °C)   SpO2: 97%   Weight: 130.4 kg (287 lb 7.7 oz)   Height: 6' (1.829 m)      Body mass index is 38.99 kg/m².  Wt Readings from Last 3 Encounters:   02/20/25 130.4 kg (287 lb 7.7 oz)   08/12/24 122.7 kg (270 lb 6.3 oz)   08/08/24 126.4 kg (278 lb 10.6 oz)       Physical Exam  Vitals and nursing note reviewed.   Constitutional:       General: He is not in acute distress.     Appearance: He is obese. He is not ill-appearing.   HENT:      Head: Normocephalic and atraumatic.      Right Ear: External ear normal.      Left Ear: External ear normal.      Nose: Nose normal.      Mouth/Throat:      Mouth: Mucous membranes are moist.   Eyes:       "Extraocular Movements: Extraocular movements intact.      Conjunctiva/sclera: Conjunctivae normal.   Cardiovascular:      Rate and Rhythm: Normal rate and regular rhythm.      Pulses: Normal pulses.      Heart sounds: No murmur heard.  Pulmonary:      Effort: Pulmonary effort is normal. No respiratory distress.      Breath sounds: No wheezing.   Abdominal:      General: There is no distension.      Palpations: Abdomen is soft. There is no mass.      Tenderness: There is no abdominal tenderness.   Musculoskeletal:         General: No swelling.      Cervical back: Normal range of motion.   Skin:     Coloration: Skin is not jaundiced.      Findings: No rash.   Neurological:      General: No focal deficit present.      Mental Status: He is alert and oriented to person, place, and time.   Psychiatric:         Mood and Affect: Mood normal.         Thought Content: Thought content normal.         Labs:    Complete Blood Count  Lab Results   Component Value Date    RBC 4.55 (L) 05/29/2024    HGB 13.3 (L) 05/29/2024    HCT 40.5 05/29/2024    MCV 89 05/29/2024    MCH 29.2 05/29/2024    MCHC 32.8 05/29/2024    RDW 12.1 05/29/2024     05/29/2024    MPV 9.6 05/29/2024    GRAN 2.9 05/29/2024    GRAN 61.4 05/29/2024    LYMPH 1.1 05/29/2024    LYMPH 21.9 05/29/2024    MONO 0.6 05/29/2024    MONO 11.5 05/29/2024    EOS 0.2 05/29/2024    BASO 0.03 05/29/2024    EOSINOPHIL 4.2 05/29/2024    BASOPHIL 0.6 05/29/2024    DIFFMETHOD Automated 05/29/2024       Comprehensive Metabolic Panel  No results found for: "GLU", "BUN", "CREATININE", "NA", "K", "CL", "PROT", "ALBUMIN", "BILITOT", "AST", "ALKPHOS", "CO2", "ALT", "ANIONGAP", "EGFRNONAA", "ESTGFRAFRICA"    TSH  No results found for: "TSH"    Imaging:  X-ray Knee Ortho Bilateral with Flexion  Narrative: EXAMINATION:  XR KNEE ORTHO BILAT WITH FLEXION    CLINICAL HISTORY:  Pain in right knee    TECHNIQUE:  AP standing of both knees, PA flexion standing views of both knees, and " Merchant views of both knees were performed.  Lateral views of both knees were also performed.    COMPARISON:  05/08/2023    FINDINGS:  Skeletal structures are intact without acute fracture or dislocation.  Right knee again shows some degenerative joint disease with small spurs at a few positions and the mild narrowing of the patellofemoral and medial tibiofemoral joint spaces.  No significant joint effusion detected.  Left knee again shows postoperative findings of total knee arthroplasty with prostheses in stable position and alignment.  Some soft tissue calcifications are near the patella.  No significant joint effusion detected.  Impression: No acute abnormality.  Right knee DJD.  Left TKA.    Electronically signed by: Fer Hollingsworth MD  Date:    08/08/2024  Time:    09:34      Assessment/Plan:    1. Wellness examination    2. FARHAT (obstructive sleep apnea)  -     TSH; Future; Expected date: 02/20/2025    3. Type 2 diabetes mellitus without complication, without long-term current use of insulin  -     CBC Auto Differential; Future  -     Comprehensive Metabolic Panel; Future; Expected date: 02/20/2025  -     Hemoglobin A1C; Future; Expected date: 02/20/2025  -     Microalbumin/Creatinine Ratio, Urine; Future; Expected date: 02/20/2025    4. Class 2 severe obesity due to excess calories with serious comorbidity and body mass index (BMI) of 37.0 to 37.9 in adult    5. Prostate cancer screening  -     PSA, Screening; Future; Expected date: 02/20/2026    6. Screening for cardiovascular condition  -     Lipid Panel; Future; Expected date: 02/20/2025         Discussed how to stay healthy including: diet, exercise, refraining from smoking and discussed screening exams / tests needed for age, sex and family Hx.        You Britton MD           [1]   Current Outpatient Medications on File Prior to Visit   Medication Sig Dispense Refill    aspirin (ECOTRIN) 81 MG EC tablet Take 1 tablet (81 mg total) by mouth 2 (two)  times daily. 84 tablet 0    blood sugar diagnostic (BLOOD GLUCOSE TEST) Strp Strips for one to 2 times a day testing   dispense brand covered by insurance and to match meter brand 60 each 3    blood-glucose meter kit Dispense meter  brand covered by insurance 1 each 0    diabetic supplies, miscellan. Misc Lancets for 1-2 times a day testing    dispense brand covered by insurance t 60 each 3    EPINEPHrine (EPIPEN) 0.3 mg/0.3 mL AtIn       gabapentin (NEURONTIN) 600 MG tablet Take 1 tablet (600 mg total) by mouth 3 (three) times daily. 270 tablet 4    lidocaine HCL 2% (XYLOCAINE) 2 % jelly Apply topically as needed. 30 mL 3    ondansetron (ZOFRAN-ODT) 4 MG TbDL Take 1 tablet (4 mg total) by mouth every 8 (eight) hours as needed (nausea). 30 tablet 3    oxyCODONE (ROXICODONE) 5 MG immediate release tablet Take 1-2 tablets (5-10 mg total) by mouth every 4 to 6 hours as needed for Pain. 40 tablet 0    oxyCODONE (ROXICODONE) 5 MG immediate release tablet Take 1-2 tablets (5-10 mg total) by mouth every 6 (six) hours as needed for Pain. 40 tablet 0    roflumilast (ZORYVE) 0.3 % Crea Apply 1 application  topically once daily. 60 g 5    sumatriptan (IMITREX) 50 MG tablet Take one at the first sign of a headache.  You may repeat it in 1 hour as needed. 15 tablet 3    tirzepatide (MOUNJARO) 5 mg/0.5 mL PnIj Inject 5 mg into the skin every 7 days. 12 Pen 3    [DISCONTINUED] meloxicam (MOBIC) 15 MG tablet Take 1 tablet (15 mg total) by mouth once daily. 30 tablet 2    [DISCONTINUED] meloxicam (MOBIC) 15 MG tablet Take 1 tablet (15 mg total) by mouth once daily. 30 tablet 2     Current Facility-Administered Medications on File Prior to Visit   Medication Dose Route Frequency Provider Last Rate Last Admin    0.9%  NaCl infusion  500 mL Intravenous Continuous Alexus Anglin Jr., MD        lidocaine (PF) 10 mg/ml (1%) injection 10 mg  1 mL Intradermal Once Alexus Anglin Jr., MD        lidocaine (PF) 10 mg/ml (1%) injection  10 mg  1 mL Intradermal Once Alexus Anglin Jr., MD

## 2025-03-05 DIAGNOSIS — L40.8 INVERSE PSORIASIS: ICD-10-CM

## 2025-03-05 DIAGNOSIS — L40.0 PLAQUE PSORIASIS: ICD-10-CM

## 2025-03-05 RX ORDER — ROFLUMILAST 3 MG/G
1 CREAM TOPICAL DAILY
Qty: 60 G | Refills: 5 | Status: SHIPPED | OUTPATIENT
Start: 2025-03-05

## 2025-03-27 ENCOUNTER — PATIENT MESSAGE (OUTPATIENT)
Dept: FAMILY MEDICINE | Facility: CLINIC | Age: 65
End: 2025-03-27
Payer: OTHER GOVERNMENT

## 2025-03-27 DIAGNOSIS — M17.0 PRIMARY OSTEOARTHRITIS OF BOTH KNEES: Primary | ICD-10-CM

## 2025-03-27 RX ORDER — MELOXICAM 15 MG/1
15 TABLET ORAL DAILY
Qty: 90 TABLET | Refills: 3 | Status: SHIPPED | OUTPATIENT
Start: 2025-03-27

## 2025-04-29 ENCOUNTER — PATIENT MESSAGE (OUTPATIENT)
Dept: ADMINISTRATIVE | Facility: HOSPITAL | Age: 65
End: 2025-04-29
Payer: OTHER GOVERNMENT

## 2025-04-30 DIAGNOSIS — Z12.11 SCREENING FOR COLON CANCER: ICD-10-CM

## 2025-05-26 ENCOUNTER — HOSPITAL ENCOUNTER (EMERGENCY)
Facility: HOSPITAL | Age: 65
Discharge: HOME | End: 2025-05-26
Attending: EMERGENCY MEDICINE
Payer: OTHER GOVERNMENT

## 2025-05-26 VITALS
RESPIRATION RATE: 18 BRPM | OXYGEN SATURATION: 96 % | SYSTOLIC BLOOD PRESSURE: 149 MMHG | BODY MASS INDEX: 38.6 KG/M2 | HEIGHT: 72 IN | WEIGHT: 285 LBS | HEART RATE: 68 BPM | TEMPERATURE: 98 F | DIASTOLIC BLOOD PRESSURE: 85 MMHG

## 2025-05-26 DIAGNOSIS — S16.1XXA ACUTE STRAIN OF NECK MUSCLE, INITIAL ENCOUNTER: Primary | ICD-10-CM

## 2025-05-26 PROCEDURE — 99284 EMERGENCY DEPT VISIT MOD MDM: CPT | Mod: 25

## 2025-05-26 PROCEDURE — 63600175 PHARM REV CODE 636 W HCPCS: Performed by: EMERGENCY MEDICINE

## 2025-05-26 PROCEDURE — 25000003 PHARM REV CODE 250: Performed by: EMERGENCY MEDICINE

## 2025-05-26 PROCEDURE — 96372 THER/PROPH/DIAG INJ SC/IM: CPT | Performed by: EMERGENCY MEDICINE

## 2025-05-26 RX ORDER — HYDROMORPHONE HYDROCHLORIDE 2 MG/ML
2 INJECTION, SOLUTION INTRAMUSCULAR; INTRAVENOUS; SUBCUTANEOUS
Refills: 0 | Status: COMPLETED | OUTPATIENT
Start: 2025-05-26 | End: 2025-05-26

## 2025-05-26 RX ORDER — KETOROLAC TROMETHAMINE 10 MG/1
10 TABLET, FILM COATED ORAL EVERY 6 HOURS PRN
Qty: 12 TABLET | Refills: 0 | Status: SHIPPED | OUTPATIENT
Start: 2025-05-26

## 2025-05-26 RX ORDER — METHOCARBAMOL 500 MG/1
1000 TABLET, FILM COATED ORAL 4 TIMES DAILY PRN
Qty: 30 TABLET | Refills: 0 | Status: SHIPPED | OUTPATIENT
Start: 2025-05-26

## 2025-05-26 RX ORDER — HYDROCODONE BITARTRATE AND ACETAMINOPHEN 7.5; 325 MG/15ML; MG/15ML
SOLUTION ORAL 4 TIMES DAILY PRN
COMMUNITY

## 2025-05-26 RX ORDER — ORPHENADRINE CITRATE 30 MG/ML
30 INJECTION INTRAMUSCULAR; INTRAVENOUS
Status: COMPLETED | OUTPATIENT
Start: 2025-05-26 | End: 2025-05-26

## 2025-05-26 RX ORDER — LIDOCAINE 50 MG/G
PATCH TOPICAL
Qty: 5 PATCH | Refills: 0 | Status: SHIPPED | OUTPATIENT
Start: 2025-05-26

## 2025-05-26 RX ORDER — KETOROLAC TROMETHAMINE 30 MG/ML
30 INJECTION, SOLUTION INTRAMUSCULAR; INTRAVENOUS
Status: COMPLETED | OUTPATIENT
Start: 2025-05-26 | End: 2025-05-26

## 2025-05-26 RX ORDER — LIDOCAINE 50 MG/G
1 PATCH TOPICAL
Status: DISCONTINUED | OUTPATIENT
Start: 2025-05-26 | End: 2025-05-26 | Stop reason: HOSPADM

## 2025-05-26 RX ADMIN — LIDOCAINE 1 PATCH: 50 PATCH CUTANEOUS at 04:05

## 2025-05-26 RX ADMIN — HYDROMORPHONE HYDROCHLORIDE 2 MG: 2 INJECTION INTRAMUSCULAR; INTRAVENOUS; SUBCUTANEOUS at 07:05

## 2025-05-26 RX ADMIN — KETOROLAC TROMETHAMINE 30 MG: 30 INJECTION, SOLUTION INTRAMUSCULAR; INTRAVENOUS at 04:05

## 2025-05-26 RX ADMIN — ORPHENADRINE CITRATE 30 MG: 60 INJECTION INTRAMUSCULAR; INTRAVENOUS at 04:05

## 2025-05-26 NOTE — FIRST PROVIDER EVALUATION
Medical screening examination initiated.  I have conducted a focused provider triage encounter, findings are as follows:    Brief history of present illness:  neck pain left side of neck when stretching down     There were no vitals filed for this visit.    Pertinent physical exam:  well appearing male, no distress, appropriately conversational     Brief workup plan:  treat neck spasms    Preliminary workup initiated; this workup will be continued and followed by the physician or advanced practice provider that is assigned to the patient when roomed.  
No

## 2025-05-26 NOTE — ED PROVIDER NOTES
Emergency Department Provider Note    Baldo Grant   65 y.o. male   [id number]      5/26/2025       History     This history was obtained from the patient without limitations.  He was driven to the ED by his wife who is at the bedside.      He is a 65-year-old with the below past medical history who presents with severe left-sided neck pain that began this morning after reaching behind to wipe.  He has severe discomfort with attempted neck extension and leftward rotation.  He takes Norco 7.5 q.i.d. for chronic back pain.  He has taken that medication twice today without relief of his neck pain.  He denies weakness and numbness to his extremities.  He denies chest pain and shortness of breath.  He denies nausea and vomiting.  He denies headache and dizziness.         Past Medical History:   Diagnosis Date    Arthritis     Basal cell carcinoma 06/21/2019    back    Cancer     Chronic pain of right knee     SCC (squamous cell carcinoma) 2014    Forehead- Dr. Cabral     Skin cancer     Squamous cell carcinoma 2013    Right ear- Dr. Marianna TARIQ [facility]      Past Surgical History:   Procedure Laterality Date    ADENOIDECTOMY  1983    APPENDECTOMY      ARTHROSCOPIC CHONDROPLASTY OF KNEE JOINT Left 03/18/2020    Procedure: ARTHROSCOPY, KNEE, WITH CHONDROPLASTY;  Surgeon: FREEMAN Álvarez MD;  Location: [facility] OR;  Service: Orthopedics;  Laterality: Left;    COLONOSCOPY  1998    EPIDURAL STEROID INJECTION INTO LUMBAR SPINE N/A 02/11/2020    Procedure: Injection-steroid-epidural-lumbar--InterLaminar L4-5;  Surgeon: Alexus Anglin Jr., MD;  Location: [facility] PAIN [place];  Service: Pain Management;  Laterality: N/A;    INJECTION OF ANESTHETIC AGENT AROUND MEDIAL BRANCH NERVES INNERVATING LUMBAR FACET JOINT Bilateral 11/05/2019    Procedure: Block-nerve-medial branch-lumbar--Bilateral L3-4,L4-5,L5-S1;  Surgeon: Alexus Anglin Jr., MD;  Location: [facility] [place];  Service: Pain Management;  Laterality: Bilateral;    INJECTION OF  STEROID Left 11/12/2020    Procedure: INJECTION, STEROID Left SI Joint Block and Steroid Injection;  Surgeon: Tam Encarnacion MD;  Location: [facility] OR;  Service: Neurosurgery;  Laterality: Left;  Procedure: Left SI Joint Block and Steroid Injection   Surgery Time: 30 min  LOS: 0  Anesthesia: MAC  Radiology: C-arm  Bed: Regular with Pillows  Position: Prone    KNEE ARTHROSCOPY W/ MENISCECTOMY Left 03/18/2020    Procedure: ARTHROSCOPY, KNEE, WITH MENISCECTOMY;  Surgeon: FREEMAN Álvarez MD;  Location: [facility] OR;  Service: Orthopedics;  Laterality: Left;  CLONIDINE/EPI/KETOROLAC/ROPIVACAINE INJECTION 30CC    lipoma removal      skin cancer removal      TOTAL KNEE ARTHROPLASTY Left 10/12/2022    Procedure: ARTHROPLASTY, KNEE, TOTAL;  Surgeon: FREEMAN Álvarez MD;  Location: [facility] OR;  Service: Orthopedics;  Laterality: Left;  regional with catheter- spinal & adductor  pericapsular injection: 0.2% ropivacaine    TRANSFORAMINAL EPIDURAL INJECTION OF STEROID Bilateral 05/06/2020    Procedure: Injection,steroid,epidural,transforaminal approach--Bilateral L4-5;  Surgeon: Alexus Anglin Jr., MD;  Location: [facility]IN [place];  Service: Pain Management;  Laterality: Bilateral;    TRANSFORAMINAL EPIDURAL INJECTION OF STEROID Bilateral 08/11/2020    Procedure: Injection,steroid,epidural,transforaminal approach--Bilateral L4-5;  Surgeon: Alexus Anglin Jr., MD;  Location: [facility] [place];  Service: Pain Management;  Laterality: Bilateral;    TRANSFORAMINAL EPIDURAL INJECTION OF STEROID Bilateral 04/05/2022    Procedure: Injection,steroid,epidural,transforaminal bilateral L4-5;  Surgeon: Alexus Anglin Jr., MD;  Location: [facility] [place];  Service: Pain Management;  Laterality: Bilateral;  asa       Family History   Problem Relation Name Age of Onset    COPD Mother Cecile     Alcohol abuse Mother Cecile     Arthritis Mother Cecile     Heart disease Father age 73     Melanoma Father age 73     No Known Problems Sister      No  Known Problems Brother      Psoriasis Neg Hx      Lupus Neg Hx      Eczema Neg Hx        Social History     Socioeconomic History    Marital status:    Tobacco Use    Smoking status: Never     Passive exposure: Never    Smokeless tobacco: Never   Substance and Sexual Activity    Alcohol use: Yes     Alcohol/week: 2.0 standard drinks of alcohol     Types: 1 Glasses of wine, 1 Cans of beer per week     Comment: social    Drug use: No    Sexual activity: Not Currently     Partners: Female     Birth control/protection: None     Comment: My wife had a hysterectomy years ago     Social Drivers of Health     Financial Resource Strain: Low Risk  (3/13/2024)    Overall Financial Resource Strain (CARDIA)     Difficulty of Paying Living Expenses: Not hard at all   Food Insecurity: No Food Insecurity (3/13/2024)    Hunger Vital Sign     Worried About Running Out of Food in the Last Year: Never true     Ran Out of Food in the Last Year: Never true   Transportation Needs: No Transportation Needs (3/13/2024)    PRAPARE - Transportation     Lack of Transportation (Medical): No     Lack of Transportation (Non-Medical): No   Physical Activity: Inactive (3/13/2024)    Exercise Vital Sign     Days of Exercise per Week: 0 days     Minutes of Exercise per Session: 0 min   Stress: Stress Concern Present (3/13/2024)    [employer] - Occupational Stress Questionnaire     Feeling of Stress : To some extent   Housing Stability: Low Risk  (3/13/2024)    Housing Stability Vital Sign     Unable to Pay for Housing in the Last Year: No     Number of Places Lived in the Last Year: 1     Unstable Housing in the Last Year: No      Review of patient's allergies indicates:   Allergen Reactions    Advil [ibuprofen] Anaphylaxis    Tums [calcium carbonate] Anaphylaxis           Physical Examination     Initial Vitals [05/26/25 1503]   BP Pulse Resp Temp SpO2   (!) 184/87 75 18 98.5 °F (36.9 °C) 96 %      MAP       --            Physical Exam    Nursing note and vitals reviewed.  Constitutional: He is not diaphoretic. No distress.   Neck:   The patient is holding his head rotated to the right and flexed.  He has severe discomfort that causes him to scream aloud with attempted passive extension or leftward rotation.   Pulmonary/Chest: No respiratory distress.   Musculoskeletal:      Cervical back: Rigidity present. No erythema. Muscular tenderness (Left-sided) present. No spinous process tenderness. Decreased range of motion.     Neurological: He is alert and oriented to person, place, and time. GCS score is 15. GCS eye subscore is 4. GCS verbal subscore is 5. GCS motor subscore is 6.   5/5 strength and normal touch sensation throughout the upper extremities.   Skin: Skin is warm and dry. No pallor.            Labs     None     Imaging     Imaging Results    None           ED Course     The patient received the following medications:  Medications   LIDOcaine 5 % patch 1 patch (1 patch Transdermal Patch Applied 5/26/25 1652)   ketorolac injection 30 mg (30 mg Intramuscular Given 5/26/25 1655)   orphenadrine injection 30 mg (30 mg Intramuscular Given 5/26/25 1657)   HYDROmorphone (PF) injection 2 mg (2 mg Intramuscular Given 5/26/25 1909)                 Medical Decision Making                 Medical Decision Making  There was no indication for emergent lab or imaging studies given the patient's mechanism of injury.  He was treated with a combination of parenteral and oral medications and had gradual improvement.  He was beginning to move his neck without excruciating pain at the time of discharge.  He was prescribed courses of oral ketorolac and methocarbamol and Lidoderm patches.  He will continue to take his prescribed narcotic as well.  Close PCP follow-up was advised.  Standard return precautions were given.    Risk  Prescription drug management.              Diagnoses       ICD-10-CM ICD-9-CM   1. Acute strain of neck muscle,  initial encounter  S16.1XXA 847.0         Dispostion      ED Disposition Condition    Discharge Stable            ED Prescriptions       Medication Sig Dispense Start Date End Date Auth. Provider    LIDOcaine (LIDODERM) 5 % Apply one patch daily to the affected area and remove after 12 hours. 5 patch 5/26/2025 -- Juan Carlos Barcenas III, MD    methocarbamoL (ROBAXIN) 500 MG Tab Take 2 tablets (1,000 mg total) by mouth 4 (four) times daily as needed (pain). 30 tablet 5/26/2025 -- Juan Carlos Barcenas III, MD    ketorolac (TORADOL) 10 mg tablet Take 1 tablet (10 mg total) by mouth every 6 (six) hours as needed for Pain. 12 tablet 5/26/2025 -- Juan Carlos Barcenas III, MD            Follow-up Information       Follow up With Specialties Details Why Contact Info    You Britton MD Internal Medicine  Schedule a close in-person or virtual ER follow-up visit with your primary care provider. [address] [address] [phone]      ER   Return to the ER if your condition worsens or you have any other concerns that you feel need immediate attention.               Juan Carlos Barcenas III, MD  05/26/25 2031

## 2025-05-26 NOTE — ED NOTES
Patient with neck pain, states severe pain to left neck, yelling with pain. Norco  x2  today for pain

## 2025-05-26 NOTE — ED NOTES
Patient identifiers verified and correct for  Mr Grant   C/C:  Left neck pain SEE NN  APPEARANCE: awake and alert in NAD. PAIN  10/10 Yelling  SKIN: warm, dry and intact. No breakdown or bruising.  MUSCULOSKELETAL: Patient moving all extremities spontaneously, no obvious swelling or deformities noted. Ambulates independently.  RESPIRATORY: Denies shortness of breath.Respirations unlabored.   CARDIAC: Denies CP, 2+ distal pulses; no peripheral edema  ABDOMEN: S/ND/NT, Denies nausea  : voids spontaneously, denies difficulty  Neurologic: AAO x 4; follows commands equal strength in all extremities; denies numbness/tingling. Denies dizziness  Denies new wekaness, reports severe pain with minimal mvmt

## 2025-05-27 NOTE — DISCHARGE INSTRUCTIONS
We know that you have many choices and are honored that you chose us. We hope that we met or exceeded your expectations and goals for this visit and will keep the [facility] family in mind for your future needs and those of your family and friends.     - Dr. Barcenas

## 2025-05-30 ENCOUNTER — OFFICE VISIT (OUTPATIENT)
Dept: FAMILY MEDICINE | Facility: CLINIC | Age: 65
End: 2025-05-30
Payer: OTHER GOVERNMENT

## 2025-05-30 ENCOUNTER — RESULTS FOLLOW-UP (OUTPATIENT)
Dept: FAMILY MEDICINE | Facility: CLINIC | Age: 65
End: 2025-05-30

## 2025-05-30 DIAGNOSIS — M54.31 SCIATICA OF RIGHT SIDE: ICD-10-CM

## 2025-05-30 DIAGNOSIS — M62.838 MUSCLE SPASM: Primary | ICD-10-CM

## 2025-05-30 DIAGNOSIS — S16.1XXD STRAIN OF NECK MUSCLE, SUBSEQUENT ENCOUNTER: ICD-10-CM

## 2025-05-30 RX ORDER — GABAPENTIN 600 MG/1
600 TABLET ORAL 2 TIMES DAILY
Qty: 180 TABLET | Refills: 3 | Status: SHIPPED | OUTPATIENT
Start: 2025-05-30

## 2025-05-30 NOTE — PROGRESS NOTES
Primary Care Virtual Visit    The patient location is: [place], [place]  The chief complaint leading to consultation is:  neck pain, refill meds  Visit type: Virtual visit with synchronous audio and video  Total time spent with patient:  9 minutes  Each patient to whom he or she provides medical services by telemedicine is:  (1) informed of the relationship between the physician and patient and the respective role of any other health care provider with respect to management of the patient; and (2) notified that he or she may decline to receive medical services by telemedicine and may withdraw from such care at any time.     Patient ID: Baldo Grant is a 65 y.o. male.       HPI   Patient went to the ER with neck pain on 5/26. He was diagnosed with muscle strain. He was prescribed toradol and robaxin. He continues to take all regularly scheduled pain medications. He is having improvement in pain. He is also to move his neck more. He denies recent fevers. He denies syncope.     Review of Systems  Review of Systems   Constitutional:  Negative for fever.   HENT:  Negative for ear pain, hearing loss and sinus pain.    Eyes:  Negative for discharge.   Respiratory:  Negative for cough, shortness of breath and wheezing.    Cardiovascular:  Negative for chest pain, palpitations and leg swelling.   Gastrointestinal:  Negative for blood in stool, constipation, diarrhea, nausea and vomiting.   Genitourinary:  Negative for hematuria and urgency.   Musculoskeletal:  Positive for neck pain. Negative for myalgias.   Skin:  Negative for rash.   Neurological:  Negative for weakness and headaches.   Endo/Heme/Allergies:  Negative for polydipsia.   Psychiatric/Behavioral:  Negative for depression.    All other systems reviewed and are negative.      Currently Medications  Medications Ordered Prior to Encounter[1]    Physical  Exam  There were no vitals filed for this visit.   Physical Exam  Constitutional:       Appearance: Normal  appearance.   HENT:      Head: Normocephalic and atraumatic.   Eyes:      Extraocular Movements: Extraocular movements intact.   Musculoskeletal:      Cervical back: Decreased range of motion.   Skin:     Coloration: Skin is not pale.   Neurological:      Mental Status: He is alert and oriented to person, place, and time.   Psychiatric:         Mood and Affect: Mood normal.         Labs:    Complete Blood Count  Lab Results   Component Value Date    RBC 4.74 02/24/2025    HGB 13.6 (L) 02/24/2025    HCT 41.2 02/24/2025    MCV 87 02/24/2025    MCH 28.7 02/24/2025    MCHC 33.0 02/24/2025    RDW 11.9 02/24/2025     02/24/2025    MPV 9.3 02/24/2025    GRAN 3.4 02/24/2025    GRAN 60.6 02/24/2025    LYMPH 1.2 02/24/2025    LYMPH 21.5 02/24/2025    MONO 0.7 02/24/2025    MONO 11.8 02/24/2025    EOS 0.3 02/24/2025    BASO 0.04 02/24/2025    EOSINOPHIL 5.4 02/24/2025    BASOPHIL 0.7 02/24/2025    DIFFMETHOD Automated 02/24/2025       Comprehensive Metabolic Panel  Lab Results   Component Value Date     (H) 02/24/2025    BUN 16 02/24/2025    CREATININE 1.1 02/24/2025     02/24/2025    K 4.0 02/24/2025     02/24/2025    PROT 7.4 02/24/2025    ALBUMIN 4.2 02/24/2025    BILITOT 0.3 02/24/2025    AST 29 02/24/2025    ALKPHOS 45 02/24/2025    CO2 23 02/24/2025    ALT 35 02/24/2025    ANIONGAP 10 02/24/2025       TSH  Lab Results   Component Value Date    TSH 2.344 02/24/2025       Imaging:  X-ray Knee Ortho Bilateral with Flexion  Narrative: EXAMINATION:  XR KNEE ORTHO BILAT WITH FLEXION    CLINICAL HISTORY:  Pain in right knee    TECHNIQUE:  AP standing of both knees, PA flexion standing views of both knees, and Merchant views of both knees were performed.  Lateral views of both knees were also performed.    COMPARISON:  05/08/2023    FINDINGS:  Skeletal structures are intact without acute fracture or dislocation.  Right knee again shows some degenerative joint disease with small spurs at a few positions  and the mild narrowing of the patellofemoral and medial tibiofemoral joint spaces.  No significant joint effusion detected.  Left knee again shows postoperative findings of total knee arthroplasty with prostheses in stable position and alignment.  Some soft tissue calcifications are near the patella.  No significant joint effusion detected.  Impression: No acute abnormality.  Right knee DJD.  Left TKA.    Electronically signed by: Fer Hollingsworth MD  Date:    08/08/2024  Time:    09:34      Assessment/Plan:    1. Muscle spasm    2. Strain of neck muscle, subsequent encounter    3. Sciatica of right side  -     gabapentin (NEURONTIN) 600 MG tablet; Take 1 tablet (600 mg total) by mouth 2 (two) times daily.  Dispense: 180 tablet; Refill: 3      Neck muscle strain healing normally. Apply heat. Gabapentin refilled.       You Britton MD      Answers submitted by the patient for this visit:  Review of Systems Questionnaire (Submitted on 5/30/2025)  activity change: Yes  unexpected weight change: No  rhinorrhea: No  trouble swallowing: No  visual disturbance: No  chest tightness: No  polyuria: No  difficulty urinating: No  joint swelling: No  arthralgias: No  confusion: No  dysphoric mood: No         [1]   Current Outpatient Medications on File Prior to Visit   Medication Sig Dispense Refill    aspirin (ECOTRIN) 81 MG EC tablet Take 1 tablet (81 mg total) by mouth 2 (two) times daily. 84 tablet 0    blood sugar diagnostic (BLOOD GLUCOSE TEST) Strp Strips for one to 2 times a day testing   dispense brand covered by insurance and to match meter brand 60 each 3    blood-glucose meter kit Dispense meter  brand covered by insurance 1 each 0    diabetic supplies, miscellan. Misc Lancets for 1-2 times a day testing    dispense brand covered by insurance t 60 each 3    EPINEPHrine (EPIPEN) 0.3 mg/0.3 mL AtIn       hydrocodone-acetaminophen (HYCET) solution 7.5-325 mg/15mL Take by mouth 4 (four) times daily as needed for Pain.       ketorolac (TORADOL) 10 mg tablet Take 1 tablet (10 mg total) by mouth every 6 (six) hours as needed for Pain. 12 tablet 0    LIDOcaine (LIDODERM) 5 % Apply one patch daily to the affected area and remove after 12 hours. 5 patch 0    lidocaine HCL 2% (XYLOCAINE) 2 % jelly Apply topically as needed. 30 mL 3    meloxicam (MOBIC) 15 MG tablet Take 1 tablet (15 mg total) by mouth once daily. 90 tablet 3    methocarbamoL (ROBAXIN) 500 MG Tab Take 2 tablets (1,000 mg total) by mouth 4 (four) times daily as needed (pain). 30 tablet 0    ondansetron (ZOFRAN-ODT) 4 MG TbDL Take 1 tablet (4 mg total) by mouth every 8 (eight) hours as needed (nausea). 30 tablet 3    oxyCODONE (ROXICODONE) 5 MG immediate release tablet Take 1-2 tablets (5-10 mg total) by mouth every 4 to 6 hours as needed for Pain. 40 tablet 0    oxyCODONE (ROXICODONE) 5 MG immediate release tablet Take 1-2 tablets (5-10 mg total) by mouth every 6 (six) hours as needed for Pain. 40 tablet 0    roflumilast (ZORYVE) 0.3 % Crea Apply 1 application  topically once daily. 60 g 5    sumatriptan (IMITREX) 50 MG tablet Take one at the first sign of a headache.  You may repeat it in 1 hour as needed. 15 tablet 3    tirzepatide (MOUNJARO) 5 mg/0.5 mL PnIj Inject 5 mg into the skin every 7 days. 12 Pen 3    [DISCONTINUED] gabapentin (NEURONTIN) 600 MG tablet Take 1 tablet (600 mg total) by mouth 3 (three) times daily. 270 tablet 4     Current Facility-Administered Medications on File Prior to Visit   Medication Dose Route Frequency Provider Last Rate Last Admin    0.9%  NaCl infusion  500 mL Intravenous Continuous Alexus Anglin Jr., MD        lidocaine (PF) 10 mg/ml (1%) injection 10 mg  1 mL Intradermal Once Alexus Anglin Jr., MD        lidocaine (PF) 10 mg/ml (1%) injection 10 mg  1 mL Intradermal Once Alexus Anglin Jr., MD

## 2025-06-09 ENCOUNTER — OFFICE VISIT (OUTPATIENT)
Dept: FAMILY MEDICINE | Facility: CLINIC | Age: 65
End: 2025-06-09
Payer: OTHER GOVERNMENT

## 2025-06-09 VITALS
BODY MASS INDEX: 39.57 KG/M2 | HEIGHT: 72 IN | SYSTOLIC BLOOD PRESSURE: 130 MMHG | DIASTOLIC BLOOD PRESSURE: 76 MMHG | HEART RATE: 74 BPM | WEIGHT: 292.13 LBS | OXYGEN SATURATION: 98 % | TEMPERATURE: 98 F

## 2025-06-09 DIAGNOSIS — E66.9 OBESITY (BMI 30-39.9): ICD-10-CM

## 2025-06-09 DIAGNOSIS — E11.9 TYPE 2 DIABETES MELLITUS WITHOUT COMPLICATION, WITHOUT LONG-TERM CURRENT USE OF INSULIN: Primary | ICD-10-CM

## 2025-06-09 DIAGNOSIS — Z12.11 COLON CANCER SCREENING: ICD-10-CM

## 2025-06-09 DIAGNOSIS — M77.12 LEFT LATERAL EPICONDYLITIS: ICD-10-CM

## 2025-06-09 DIAGNOSIS — S46.012D TRAUMATIC COMPLETE TEAR OF LEFT ROTATOR CUFF, SUBSEQUENT ENCOUNTER: ICD-10-CM

## 2025-06-09 PROCEDURE — 99214 OFFICE O/P EST MOD 30 MIN: CPT | Mod: S$GLB,,, | Performed by: PHYSICIAN ASSISTANT

## 2025-06-09 RX ORDER — EPINEPHRINE 0.3 MG/.3ML
1 INJECTION SUBCUTANEOUS ONCE
Qty: 2 EACH | Refills: 0 | Status: CANCELLED | OUTPATIENT
Start: 2025-06-09 | End: 2025-06-09

## 2025-06-09 NOTE — PROGRESS NOTES
Patient ID: Baldo Grant is a 65 y.o. male.     Chief Complaint: Elbow Pain and Shoulder Pain    Mr. Grant is a 65 year old male with history of chronic neck pain, left rotator cuff tear who presents today with left elbow pain.    He reports left elbow pain for one month, worsening in the last two weeks. The pain affects the muscle and tendon around the elbow and is severe enough to cause nighttime awakening. When severe, his left arm from neck down becomes almost unusable. He has difficulty with daily activities such as handling a coffee cup, requiring him to use his right arm instead. Two weeks ago, he injured his neck while reaching back with his left arm, prompting an ER visit where he received Toradol injection, muscle relaxants, and Dilaudid injection with minimal relief from the first two medications.    He has a chronic left rotator cuff tear from martial arts training that remains symptomatic with soreness after prolonged use. He has undergone triple fusion back surgery. He has borderline diabetic A1C level and dry eyes.    He takes Norco 3mg 4 times daily, Gabapentin multiple times daily, and Meloxicam 150mg daily in the morning.         Review of Systems  Review of Systems   Constitutional:  Negative for fever.   HENT:  Negative for ear pain and sinus pain.    Eyes:  Negative for discharge.   Respiratory:  Negative for cough and wheezing.    Cardiovascular:  Negative for chest pain and leg swelling.   Gastrointestinal:  Negative for diarrhea, nausea and vomiting.   Genitourinary:  Negative for urgency.   Musculoskeletal:  Positive for joint pain. Negative for myalgias.   Skin:  Negative for rash.   Neurological:  Negative for weakness and headaches.   Psychiatric/Behavioral:  Negative for depression.        Currently Medications  Medications Ordered Prior to Encounter[1]    Physical  Exam  Vitals:    06/09/25 0923   BP: 130/76   BP Location: Left arm   Patient Position: Sitting   Pulse: 74   Temp:  98.1 °F (36.7 °C)   SpO2: 98%   Weight: 132.5 kg (292 lb 1.8 oz)   Height: 6' (1.829 m)      Body mass index is 39.62 kg/m².  Wt Readings from Last 3 Encounters:   06/09/25 132.5 kg (292 lb 1.8 oz)   05/26/25 129.3 kg (285 lb)   02/20/25 130.4 kg (287 lb 7.7 oz)       Physical Exam  Vitals and nursing note reviewed.   Constitutional:       General: He is not in acute distress.     Appearance: He is morbidly obese. He is not ill-appearing.   HENT:      Head: Normocephalic and atraumatic.      Right Ear: External ear normal.      Left Ear: External ear normal.      Nose: Nose normal.      Mouth/Throat:      Mouth: Mucous membranes are moist.   Eyes:      Extraocular Movements: Extraocular movements intact.      Conjunctiva/sclera: Conjunctivae normal.   Cardiovascular:      Rate and Rhythm: Normal rate and regular rhythm.      Pulses: Normal pulses.           Dorsalis pedis pulses are 2+ on the right side and 2+ on the left side.        Posterior tibial pulses are 2+ on the right side and 2+ on the left side.      Heart sounds: No murmur heard.  Pulmonary:      Effort: Pulmonary effort is normal. No respiratory distress.      Breath sounds: No wheezing.   Abdominal:      General: There is no distension.      Palpations: Abdomen is soft. There is no mass.      Tenderness: There is no abdominal tenderness.   Musculoskeletal:         General: No swelling.      Left elbow: Tenderness present in lateral epicondyle.      Cervical back: Normal range of motion.      Right foot: Normal range of motion. No deformity or bunion.      Left foot: Normal range of motion. No deformity or bunion.   Feet:      Right foot:      Protective Sensation: 10 sites tested.  10 sites sensed.      Skin integrity: No ulcer, blister or skin breakdown.      Toenail Condition: Right toenails are normal.      Left foot:      Protective Sensation: 10 sites tested.  10 sites sensed.      Skin integrity: No ulcer, blister or skin breakdown.      Toenail  Condition: Left toenails are normal.   Skin:     Coloration: Skin is not jaundiced.      Findings: No rash.   Neurological:      General: No focal deficit present.      Mental Status: He is alert and oriented to person, place, and time.   Psychiatric:         Mood and Affect: Mood normal.         Thought Content: Thought content normal.         Labs:    Complete Blood Count  Lab Results   Component Value Date    RBC 4.74 02/24/2025    HGB 13.6 (L) 02/24/2025    HCT 41.2 02/24/2025    MCV 87 02/24/2025    MCH 28.7 02/24/2025    MCHC 33.0 02/24/2025    RDW 11.9 02/24/2025     02/24/2025    MPV 9.3 02/24/2025    GRAN 3.4 02/24/2025    GRAN 60.6 02/24/2025    LYMPH 1.2 02/24/2025    LYMPH 21.5 02/24/2025    MONO 0.7 02/24/2025    MONO 11.8 02/24/2025    EOS 0.3 02/24/2025    BASO 0.04 02/24/2025    EOSINOPHIL 5.4 02/24/2025    BASOPHIL 0.7 02/24/2025    DIFFMETHOD Automated 02/24/2025       Comprehensive Metabolic Panel  Lab Results   Component Value Date     (H) 02/24/2025    BUN 16 02/24/2025    CREATININE 1.1 02/24/2025     02/24/2025    K 4.0 02/24/2025     02/24/2025    PROT 7.4 02/24/2025    ALBUMIN 4.2 02/24/2025    BILITOT 0.3 02/24/2025    AST 29 02/24/2025    ALKPHOS 45 02/24/2025    CO2 23 02/24/2025    ALT 35 02/24/2025    ANIONGAP 10 02/24/2025       TSH  Lab Results   Component Value Date    TSH 2.344 02/24/2025       Imaging:  X-ray Knee Ortho Bilateral with Flexion  Narrative: EXAMINATION:  XR KNEE ORTHO BILAT WITH FLEXION    CLINICAL HISTORY:  Pain in right knee    TECHNIQUE:  AP standing of both knees, PA flexion standing views of both knees, and Merchant views of both knees were performed.  Lateral views of both knees were also performed.    COMPARISON:  05/08/2023    FINDINGS:  Skeletal structures are intact without acute fracture or dislocation.  Right knee again shows some degenerative joint disease with small spurs at a few positions and the mild narrowing of the  patellofemoral and medial tibiofemoral joint spaces.  No significant joint effusion detected.  Left knee again shows postoperative findings of total knee arthroplasty with prostheses in stable position and alignment.  Some soft tissue calcifications are near the patella.  No significant joint effusion detected.  Impression: No acute abnormality.  Right knee DJD.  Left TKA.    Electronically signed by: Fer Hollingsworth MD  Date:    08/08/2024  Time:    09:34      Assessment/Plan:    1. Type 2 diabetes mellitus without complication, without long-term current use of insulin  -     Ambulatory referral/consult to Ophthalmology; Future; Expected date: 06/16/2025    2. Traumatic complete tear of left rotator cuff, subsequent encounter  -     EMG W/ ULTRASOUND AND NERVE CONDUCTION TEST 1 Extremity; Future    3. Left lateral epicondylitis  -     Ambulatory referral/consult to Orthopedics; Future; Expected date: 06/16/2025  -     EMG W/ ULTRASOUND AND NERVE CONDUCTION TEST 1 Extremity; Future  -     X-Ray Cervical Spine AP And Lateral; Future; Expected date: 06/09/2025  -     X-Ray Elbow Complete Left; Future; Expected date: 06/09/2025    4. Colon cancer screening  -     Ambulatory referral/consult to Endo Procedure ; Future; Expected date: 06/11/2025    5. Obesity (BMI 30-39.9)  Assessment & Plan:  - Body mass index is 39.62 kg/m².  - Recommendation for healthy diet and increasing exercise as tolerated with goal of 150min/week. Recommend weight loss.           Assessment & Plan    G56.22 Lesion of ulnar nerve, left upper limb  M75.102 Unspecified rotator cuff tear or rupture of left shoulder, not specified as traumatic  R73.03 Prediabetes  H04.123 Dry eye syndrome of bilateral lacrimal glands  Z79.891 Long term (current) use of opiate analgesic  Z79.1 Long term (current) use of non-steroidal anti-inflammatories (NSAID)    ## LEFT ULNAR NERVE LESION:  - Mr. Grant reports left elbow soreness for a month, worsening  recently with pain radiating from shoulder to elbow.  - Pain disrupts sleep and daily activities.  - Physical exam revealed pain upon palpation of neck and elbow, suggesting nerve impingement, possibly cubital tunnel syndrome affecting ulnar nerves.  - Recommend tennis elbow band for compression to alleviate nerve pressure.  - Ordered nerve conduction study within [facility] system and referred patient to an orthopedist in [place].  - Mr. Grant instructed to access referral order through patient portal.  - Continue Gabapentin.    ## LEFT ROTATOR CUFF TEAR:  - Mr. Grant has chronic left rotator cuff injury with soreness after use, worsening following a neck strain 2 weeks ago.  - Physical exam revealed pain upon palpation of neck and shoulder, suggesting possible nerve impingement.  - This condition will be further evaluated through the same nerve conduction studies ordered for the ulnar nerve lesion.  - Mr. Grant to perform stretching exercises (specific exercises to be provided).    ## PREDIABETES:  - Mr. Grant reports borderline diabetic A1C numbers and believes weight loss of 10 lbs and improved diet could prevent diabetes progression.  - Discussed importance of monitoring cornea and retina due to diabetes risk.  - Referred to ophthalmologist for evaluation.    ## DRY EYE SYNDROME:  - Mr. Grant reports dry eyes.  - Referred to ophthalmologist for further evaluation, which can be addressed during the same visit for prediabetes-related eye exam.    ## LONG-TERM USE OF OPIATE ANALGESIC:  - Mr. Grant currently taking Norco 4 times daily, totaling 3 milligrams.  - Current regimen deemed appropriate and will continue.  - Discussed potential addition of muscle relaxer or adjustment of Gabapentin dosage if pain persists.    ## LONG-TERM USE OF NSAIDS:  - Mr. Grant currently taking 15 mg Meloxicam in the morning.  - Will continue current regimen.  - Muscle relaxer not expected to provide additional benefit.  -   Rudy to perform stretching exercises as mentioned under rotator cuff plan.          Discussed how to stay healthy including: diet, exercise, refraining from smoking and discussed screening exams / tests needed for age, sex and family Hx.    This note was generated with the assistance of ambient listening technology. Verbal consent was obtained by the patient and accompanying visitor(s) for the recording of patient appointment to facilitate this note. I attest to having reviewed and edited the generated note for accuracy, though some syntax or spelling errors may persist. Please contact the author of this note for any clarification.       RTC every 6-12 months with PCP, or PRN      Fide Kern PA-C             [1]   Current Outpatient Medications on File Prior to Visit   Medication Sig Dispense Refill    aspirin (ECOTRIN) 81 MG EC tablet Take 1 tablet (81 mg total) by mouth 2 (two) times daily. 84 tablet 0    EPINEPHrine (EPIPEN) 0.3 mg/0.3 mL AtIn       gabapentin (NEURONTIN) 600 MG tablet Take 1 tablet (600 mg total) by mouth 2 (two) times daily. 180 tablet 3    hydrocodone-acetaminophen (HYCET) solution 7.5-325 mg/15mL Take by mouth 4 (four) times daily as needed for Pain.      meloxicam (MOBIC) 15 MG tablet Take 1 tablet (15 mg total) by mouth once daily. 90 tablet 3    ondansetron (ZOFRAN-ODT) 4 MG TbDL Take 1 tablet (4 mg total) by mouth every 8 (eight) hours as needed (nausea). 30 tablet 3    sumatriptan (IMITREX) 50 MG tablet Take one at the first sign of a headache.  You may repeat it in 1 hour as needed. 15 tablet 3    tirzepatide (MOUNJARO) 5 mg/0.5 mL PnIj Inject 5 mg into the skin every 7 days. 12 Pen 3    [DISCONTINUED] blood sugar diagnostic (BLOOD GLUCOSE TEST) Strp Strips for one to 2 times a day testing   dispense brand covered by insurance and to match meter brand (Patient not taking: Reported on 6/9/2025) 60 each 3    [DISCONTINUED] blood-glucose meter kit Dispense meter  brand covered by  insurance (Patient not taking: Reported on 6/9/2025) 1 each 0    [DISCONTINUED] diabetic supplies, miscellan. Misc Lancets for 1-2 times a day testing    dispense brand covered by insurance t 60 each 3    [DISCONTINUED] ketorolac (TORADOL) 10 mg tablet Take 1 tablet (10 mg total) by mouth every 6 (six) hours as needed for Pain. (Patient not taking: Reported on 6/9/2025) 12 tablet 0    [DISCONTINUED] LIDOcaine (LIDODERM) 5 % Apply one patch daily to the affected area and remove after 12 hours. (Patient not taking: Reported on 6/9/2025) 5 patch 0    [DISCONTINUED] lidocaine HCL 2% (XYLOCAINE) 2 % jelly Apply topically as needed. (Patient not taking: Reported on 6/9/2025) 30 mL 3    [DISCONTINUED] methocarbamoL (ROBAXIN) 500 MG Tab Take 2 tablets (1,000 mg total) by mouth 4 (four) times daily as needed (pain). (Patient not taking: Reported on 6/9/2025) 30 tablet 0    [DISCONTINUED] oxyCODONE (ROXICODONE) 5 MG immediate release tablet Take 1-2 tablets (5-10 mg total) by mouth every 4 to 6 hours as needed for Pain. (Patient not taking: Reported on 6/9/2025) 40 tablet 0    [DISCONTINUED] oxyCODONE (ROXICODONE) 5 MG immediate release tablet Take 1-2 tablets (5-10 mg total) by mouth every 6 (six) hours as needed for Pain. (Patient not taking: Reported on 6/9/2025) 40 tablet 0    [DISCONTINUED] roflumilast (ZORYVE) 0.3 % Crea Apply 1 application  topically once daily. (Patient not taking: Reported on 6/9/2025) 60 g 5     Current Facility-Administered Medications on File Prior to Visit   Medication Dose Route Frequency Provider Last Rate Last Admin    0.9%  NaCl infusion  500 mL Intravenous Continuous Alexus Anglin Jr., MD        lidocaine (PF) 10 mg/ml (1%) injection 10 mg  1 mL Intradermal Once Alexus Anglin Jr., MD        lidocaine (PF) 10 mg/ml (1%) injection 10 mg  1 mL Intradermal Once Alexus Anglin Jr., MD

## 2025-06-10 ENCOUNTER — RESULTS FOLLOW-UP (OUTPATIENT)
Dept: FAMILY MEDICINE | Facility: CLINIC | Age: 65
End: 2025-06-10

## 2025-06-10 NOTE — ASSESSMENT & PLAN NOTE
- Body mass index is 39.62 kg/m².  - Recommendation for healthy diet and increasing exercise as tolerated with goal of 150min/week. Recommend weight loss.

## 2025-06-17 ENCOUNTER — PATIENT MESSAGE (OUTPATIENT)
Dept: FAMILY MEDICINE | Facility: CLINIC | Age: 65
End: 2025-06-17
Payer: OTHER GOVERNMENT

## 2025-06-17 DIAGNOSIS — E11.9 TYPE 2 DIABETES MELLITUS WITHOUT COMPLICATION, WITHOUT LONG-TERM CURRENT USE OF INSULIN: Primary | ICD-10-CM

## 2025-06-20 NOTE — TELEPHONE ENCOUNTER
Care Due:                  Date            Visit Type   Department     Provider  --------------------------------------------------------------------------------                                ESTABLISHED                              PATIENT -    [facility] FAMILY  Last Visit: 05-      [facility]      MEDICINE       You Britton  Next Visit: None Scheduled  None         None Found                                                            Last  Test          Frequency    Reason                     Performed    Due Date  --------------------------------------------------------------------------------    HBA1C.......  6 months...  tirzepatide..............  02- 08-    [facility] Embedded Care Due Messages. Reference number: [id number].   6/20/2025 9:55:46 AM CDT

## 2025-06-22 RX ORDER — SUMATRIPTAN SUCCINATE 50 MG/1
TABLET ORAL
Qty: 15 TABLET | Refills: 3 | Status: SHIPPED | OUTPATIENT
Start: 2025-06-22

## 2025-07-01 DIAGNOSIS — M54.16 RADICULOPATHY, LUMBAR REGION: Primary | ICD-10-CM

## 2025-07-02 DIAGNOSIS — M54.31 SCIATICA OF RIGHT SIDE: ICD-10-CM

## 2025-07-02 RX ORDER — GABAPENTIN 600 MG/1
600 TABLET ORAL 2 TIMES DAILY
Qty: 180 TABLET | Refills: 3 | Status: SHIPPED | OUTPATIENT
Start: 2025-07-02

## 2025-07-02 NOTE — TELEPHONE ENCOUNTER
No care due was identified.  [facility] Embedded Care Due Messages. Reference number: [id number].   7/02/2025 12:45:58 PM CDT

## 2025-07-03 ENCOUNTER — PATIENT MESSAGE (OUTPATIENT)
Dept: FAMILY MEDICINE | Facility: CLINIC | Age: 65
End: 2025-07-03
Payer: OTHER GOVERNMENT

## 2025-07-16 ENCOUNTER — PATIENT MESSAGE (OUTPATIENT)
Dept: FAMILY MEDICINE | Facility: CLINIC | Age: 65
End: 2025-07-16
Payer: OTHER GOVERNMENT

## 2025-07-16 DIAGNOSIS — E11.9 TYPE 2 DIABETES MELLITUS WITHOUT COMPLICATION, WITHOUT LONG-TERM CURRENT USE OF INSULIN: Primary | ICD-10-CM

## 2025-07-21 ENCOUNTER — HOSPITAL ENCOUNTER (OUTPATIENT)
Dept: RADIOLOGY | Facility: HOSPITAL | Age: 65
Discharge: HOME | End: 2025-07-21
Attending: PHYSICIAN ASSISTANT
Payer: OTHER GOVERNMENT

## 2025-07-21 ENCOUNTER — TELEPHONE (OUTPATIENT)
Dept: FAMILY MEDICINE | Facility: CLINIC | Age: 65
End: 2025-07-21
Payer: OTHER GOVERNMENT

## 2025-07-21 ENCOUNTER — HOSPITAL ENCOUNTER (OUTPATIENT)
Dept: RADIOLOGY | Facility: HOSPITAL | Age: 65
Discharge: HOME | End: 2025-07-21
Payer: OTHER GOVERNMENT

## 2025-07-21 DIAGNOSIS — M54.16 RADICULOPATHY, LUMBAR REGION: ICD-10-CM

## 2025-07-21 DIAGNOSIS — M77.12 LEFT LATERAL EPICONDYLITIS: ICD-10-CM

## 2025-07-21 PROCEDURE — 73080 X-RAY EXAM OF ELBOW: CPT | Mod: 26,LT,, | Performed by: RADIOLOGY

## 2025-07-21 PROCEDURE — 72040 X-RAY EXAM NECK SPINE 2-3 VW: CPT | Mod: TC

## 2025-07-21 PROCEDURE — 72040 X-RAY EXAM NECK SPINE 2-3 VW: CPT | Mod: 26,,, | Performed by: RADIOLOGY

## 2025-07-21 PROCEDURE — 72141 MRI NECK SPINE W/O DYE: CPT | Mod: 26,,, | Performed by: RADIOLOGY

## 2025-07-21 PROCEDURE — 72141 MRI NECK SPINE W/O DYE: CPT | Mod: TC

## 2025-07-21 PROCEDURE — 73080 X-RAY EXAM OF ELBOW: CPT | Mod: TC,LT

## 2025-07-21 NOTE — TELEPHONE ENCOUNTER
----- Message from Fide Kern PA-C sent at 7/21/2025  1:55 PM CDT -----  Please call patient and inform them that imaging is normal  ----- Message -----  From: Interface, Rad Results In  Sent: 7/21/2025  12:33 PM CDT  To: Fide Kern PA-C

## 2025-07-22 ENCOUNTER — PROCEDURE VISIT (OUTPATIENT)
Facility: CLINIC | Age: 65
End: 2025-07-22
Payer: OTHER GOVERNMENT

## 2025-07-22 DIAGNOSIS — M77.12 LEFT LATERAL EPICONDYLITIS: ICD-10-CM

## 2025-07-22 DIAGNOSIS — S16.1XXS STRAIN OF NECK MUSCLE, SEQUELA: Primary | ICD-10-CM

## 2025-07-22 DIAGNOSIS — M79.2 NEURALGIA AND NEURITIS: ICD-10-CM

## 2025-07-22 DIAGNOSIS — S46.012D TRAUMATIC COMPLETE TEAR OF LEFT ROTATOR CUFF, SUBSEQUENT ENCOUNTER: ICD-10-CM

## 2025-07-22 PROCEDURE — 95886 MUSC TEST DONE W/N TEST COMP: CPT | Mod: 26,S$PBB,, | Performed by: PSYCHIATRY & NEUROLOGY

## 2025-07-22 PROCEDURE — 95913 NRV CNDJ TEST 13/> STUDIES: CPT | Mod: 26,S$PBB,, | Performed by: PSYCHIATRY & NEUROLOGY

## 2025-07-22 PROCEDURE — 99499 UNLISTED E&M SERVICE: CPT | Mod: S$PBB,,, | Performed by: PSYCHIATRY & NEUROLOGY

## 2025-07-22 PROCEDURE — 95886 MUSC TEST DONE W/N TEST COMP: CPT | Mod: PBBFAC | Performed by: PSYCHIATRY & NEUROLOGY

## 2025-07-22 PROCEDURE — 95913 NRV CNDJ TEST 13/> STUDIES: CPT | Mod: PBBFAC | Performed by: PSYCHIATRY & NEUROLOGY

## 2025-07-22 NOTE — PROCEDURES
Department of Neurology  Phone No: [phone], Fax: [phone]    Neurography & Electromyography Report        Full Name: Baldo Grant Gender: Male  Patient ID: [id number] YOB: 1960      Visit Date: 7/22/2025 9:13 AM  Age: 65 Years  Examining Physician: Kari Rosales MD, FAAN   Referring Physician: Erlin Elizalde PA-C  Technician: SILVIA Cortes   Assisting Technician: MAGGIE Campos   Height: 6 feet 0 inch  Weight: 292 lbs        Baldo Grant [id number] 7/22/2025 9:13 AM     1 of 1  Reason for Referral:  Left arm pain    History and Examination:    65-yr-old RH man with approximately one month of neck and left upper extremity painful symptoms which arose abruptly after extending his left arm and turning his neck.  No falls, injuries, or trauma associated with the new symptoms.  History is significant for lumbar spine surgery.  MRI C-spine indicates mid-level degenerative changes.  Exam shows normal reflexes and strength.  No atrophy noted.      Baldo Grant [id number] 7/22/2025 9:13 AM     1 of 1  Summary:     Nerve conduction studies were performed on the bilateral motor and sensory median, ulnar, and radial nerves.    The right median sensory and motor nerves showed prolonged latencies at the wrist and palm.    The right median motor nerve distal latency was at the upper limit of normal.  There was a significant difference between the left and right median nerves.  The left median sensory nerve showed slight prolongation of peak latency (transcarpal studies); however, the right median sensory nerve was significantly prolonged in comparison to the left.  Ulnar and radial nerve testing were normal.  EMG was performed on select muscles of the left upper extremity and cervical paraspinals.  Normal insertional and recruitment patterns were observed.  There was no evidence of neurogenic or myopathic potentials in the muscles examined.      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 Site Segments Onset Lat Peak Lat Onset Xavier Temp. Amp Distance      ms ms m/s °C µV mm   R Median - Dig II (Antidromic)      Wrist Index Wrist - Index 3.28 4.43 39.6 30.8 21.5 130      Ref.  Ref. <=3.30 <=4.00   >=7.0    L Median - Dig II (Antidromic)      Wrist Index Wrist - Index 3.07 3.80 42.3 30.1 28.5 130      Ref.  Ref. <=3.30 <=4.00   >=7.0    R Ulnar - Dig V (Antidromic)      Wrist Dig V Wrist - Dig V 2.45 3.28 44.9 31.3 21.1 110      Ref.  Ref. <=3.10 <=4.00   >=5.0    L Ulnar - Dig V (Antidromic)      Wrist Dig V Wrist - Dig V 2.71 3.39 48.0 30.6 18.0 130      Ref.  Ref. <=3.10 <=4.00   >=5.0    R Radial - Superficial (Antidromic)      Forearm Wrist Forearm - Wrist 1.88 2.71 53.3 31.6 23.8 100      Ref.  Ref. <=2.20 <=2.80   >=7.0    L Radial - Superficial (Antidromic)      Forearm Wrist Forearm - Wrist 1.82 2.76 54.9 31 24.4 100      Ref.  Ref. <=2.20 <=2.80   >=7.0    R Median, Ulnar - Transcarpal comparison      Median Palm Wrist Median Palm - Wrist 2.50 3.33 28.0 31.6 34.7 70      Ulnar Palm Wrist Ulnar Palm - Wrist 1.82 2.34 38.4 31.6 16.0 70     Median Palm - Ulnar Palm         L Median, Ulnar - Transcarpal comparison      Median Palm Wrist Median Palm - Wrist 1.98 2.55 35.4 31 59.7 70      Ulnar Palm Wrist Ulnar Palm - Wrist 1.46 2.19 48.0 31.1 22.3 70     Median Palm - Ulnar Palm             Motor NCS      Nerve / Sites Muscle Segments Latency Ref. Velocity Ref. Amplitude Ref. Temp. Dur. Distance      ms ms m/s m/s mV mV °C ms mm   R Median - APB      Wrist APB Wrist - APB 4.67 <=4.70   5.3 >=3.8 31.6 4.1 70      Elbow APB Elbow - Wrist 9.73  47 >=47 3.2  31.6 4.6 240   L Median - APB      Wrist APB Wrist - APB 4.06 <=4.70    5.4 >=3.8 31.2 5.0 70      Elbow APB Elbow - Wrist 9.23  47 >=47 4.4  31.2 4.6 240   R Ulnar - ADM      Wrist ADM Wrist - ADM 2.88 <=3.70   8.8 >=7.9 31.7 5.2 70      B.Elbow ADM B.Elbow - Wrist 6.92  54 >=52 7.9  31.7 6.0 220      A.Elbow ADM A.Elbow - B.Elbow 8.60  59 >=43 7.7  31.7 6.0 100     A.Elbow - Wrist       31.7     L Ulnar - ADM      Wrist ADM Wrist - ADM 3.54 <=3.70   8.1 >=7.9 31.9 3.5 70      B.Elbow ADM B.Elbow - Wrist 7.77  52 >=52 7.2  32 3.8 220      A.Elbow ADM A.Elbow - B.Elbow 9.75  51 >=43 6.3  32.1 3.9 100     A.Elbow - Wrist       32.1     R Radial - EIP      Forearm EIP Forearm - EIP 2.98    7.5  32.2 6.8 100      Elbow EIP Elbow - Forearm 4.50  79  5.4  32 8.0 120      Spiral Gr EIP Spiral Gr - Elbow 5.73  89  4.1  32.2 7.2 110   L Radial - EIP      Forearm EIP Forearm - EIP 2.00    5.7  31.7 40.3 120      Elbow EIP Elbow - Forearm 3.35  81  4.2  31.7 41.2 110      Spiral Gr EIP Spiral Gr - Elbow 4.63  79  3.8  31.6 10.2 100       F  Wave      Nerve F Latency Ref. M Latency F - M Lat    ms ms ms ms   R Median - APB 31.0 <=34.2 4.3 26.7   R Ulnar - ADM 30.1 <=33.8 3.3 26.8   L Median - APB 31.4 <=34.2 4.6 26.8   L Ulnar - ADM 31.4 <=33.8 3.7 27.7       EMG Summary Table     Spontaneous Recruitment MUAP   Muscle Nerve Roots IA Fib PSW Fasc Other Pattern Amp Dur. PPP   L. Cervical paraspinals Spinal C4-C8 N None None None N N N N N   L. Deltoid Axillary C5-C6 N None None None N N N N N   L. Biceps brachii Musculocutaneous C5-C6 N None None None N N N N N   L. Brachioradialis Radial C5-C6 N None None None N N N N N   L. Biceps femoris (short head) Sciatic (peroneal division) L5-S2 N None None None N N N N N

## 2025-07-23 ENCOUNTER — OFFICE VISIT (OUTPATIENT)
Dept: FAMILY MEDICINE | Facility: CLINIC | Age: 65
End: 2025-07-23
Payer: OTHER GOVERNMENT

## 2025-07-23 VITALS
SYSTOLIC BLOOD PRESSURE: 100 MMHG | HEIGHT: 72 IN | HEART RATE: 83 BPM | BODY MASS INDEX: 37.85 KG/M2 | OXYGEN SATURATION: 98 % | DIASTOLIC BLOOD PRESSURE: 64 MMHG | WEIGHT: 279.44 LBS | TEMPERATURE: 99 F

## 2025-07-23 DIAGNOSIS — E66.01 CLASS 2 SEVERE OBESITY DUE TO EXCESS CALORIES WITH SERIOUS COMORBIDITY AND BODY MASS INDEX (BMI) OF 37.0 TO 37.9 IN ADULT: ICD-10-CM

## 2025-07-23 DIAGNOSIS — E66.812 CLASS 2 SEVERE OBESITY DUE TO EXCESS CALORIES WITH SERIOUS COMORBIDITY AND BODY MASS INDEX (BMI) OF 37.0 TO 37.9 IN ADULT: ICD-10-CM

## 2025-07-23 DIAGNOSIS — M54.12 CERVICAL RADICULOPATHY: Primary | ICD-10-CM

## 2025-07-23 DIAGNOSIS — M54.31 SCIATICA OF RIGHT SIDE: ICD-10-CM

## 2025-07-23 PROCEDURE — 99214 OFFICE O/P EST MOD 30 MIN: CPT | Mod: S$GLB,,, | Performed by: STUDENT IN AN ORGANIZED HEALTH CARE EDUCATION/TRAINING PROGRAM

## 2025-07-23 RX ORDER — METHOCARBAMOL 750 MG/1
750 TABLET, FILM COATED ORAL 3 TIMES DAILY PRN
Qty: 120 TABLET | Refills: 1 | Status: SHIPPED | OUTPATIENT
Start: 2025-07-23

## 2025-07-23 RX ORDER — GABAPENTIN 600 MG/1
600 TABLET ORAL 3 TIMES DAILY
Qty: 270 TABLET | Refills: 3 | Status: SHIPPED | OUTPATIENT
Start: 2025-07-23

## 2025-07-24 NOTE — PROGRESS NOTES
" Patient ID: Baldo Grant is a 65 y.o. male.     Chief Complaint: f/u on xray, mri and emg results; Arm Pain; Shoulder Pain; and Elbow Pain    Arm Pain   Pertinent negatives include no chest pain.   Shoulder Pain   Pertinent negatives include no fever or headaches.   Elbow Pain  Pertinent negatives include no chest pain, coughing, fever, headaches, myalgias, nausea, rash, vomiting or weakness.     History of Present Illness    Baldo presents today for follow up of neck and arm pain.    He reports severe ongoing neck and arm pain with significant functional limitations affecting shoulder, upper arm, and lower arm. He has restricted neck range of motion, only able to turn partially before experiencing pain, which impacts driving. He describes his sciatica as a "piano string" running from back through buttocks and down leg. He previously visited the emergency room for severe neck pain, receiving Dilaudid and Toradol for management.    He performs stretching exercises 6-7 times daily, holding each stretch for 30 seconds, which provides immediate but limited relief. He also performs nerve flossing and nerve gliding exercises. He expresses interest in trigger point injection and occupational therapy for pain management.    Current medications include Gabapentin 3 times daily and Norco. He previously self-adjusted Gabapentin from twice daily to 4 times daily to match Norco frequency. He denies adverse reactions to current medications.    EMG with needle studies in arm, wrist, neck, and shoulder confirmed carpal tunnel syndrome in right wrist. MRI revealed mild stenosis with minimal narrowing.    History of back surgery with good tolerance to methocarbamol 750mg during that time.           Review of Systems  Review of Systems   Constitutional:  Negative for fever.   HENT:  Negative for ear pain and sinus pain.    Eyes:  Negative for discharge.   Respiratory:  Negative for cough and shortness of breath.  "   Cardiovascular:  Negative for chest pain and leg swelling.   Gastrointestinal:  Negative for diarrhea, nausea and vomiting.   Genitourinary:  Negative for urgency.   Musculoskeletal:  Negative for myalgias.   Skin:  Negative for rash.   Neurological:  Negative for weakness and headaches.   Psychiatric/Behavioral:  Negative for depression.    All other systems reviewed and are negative.      Currently Medications  Medications Ordered Prior to Encounter[1]    Physical  Exam  Vitals:    07/23/25 1133   BP: 100/64   BP Location: Right arm   Patient Position: Sitting   Pulse: 83   Temp: 98.5 °F (36.9 °C)   TempSrc: Oral   SpO2: 98%   Weight: 126.8 kg (279 lb 6.9 oz)   Height: 6' (1.829 m)      Body mass index is 37.9 kg/m².  Wt Readings from Last 3 Encounters:   07/23/25 126.8 kg (279 lb 6.9 oz)   06/09/25 132.5 kg (292 lb 1.8 oz)   05/26/25 129.3 kg (285 lb)       Physical Exam  Vitals and nursing note reviewed.   Constitutional:       General: He is not in acute distress.     Appearance: He is obese. He is not ill-appearing.   HENT:      Head: Normocephalic and atraumatic.      Right Ear: External ear normal.      Left Ear: External ear normal.      Nose: Nose normal.      Mouth/Throat:      Mouth: Mucous membranes are moist.   Eyes:      Extraocular Movements: Extraocular movements intact.      Conjunctiva/sclera: Conjunctivae normal.   Cardiovascular:      Rate and Rhythm: Normal rate and regular rhythm.      Pulses: Normal pulses.      Heart sounds: No murmur heard.  Pulmonary:      Effort: Pulmonary effort is normal. No respiratory distress.      Breath sounds: No wheezing.   Abdominal:      General: There is no distension.      Palpations: Abdomen is soft. There is no mass.      Tenderness: There is no abdominal tenderness.   Musculoskeletal:         General: No swelling.      Cervical back: Normal range of motion.   Skin:     Coloration: Skin is not jaundiced.      Findings: No rash.   Neurological:       General: No focal deficit present.      Mental Status: He is alert and oriented to person, place, and time.   Psychiatric:         Mood and Affect: Mood normal.         Thought Content: Thought content normal.         Labs:    Complete Blood Count  Lab Results   Component Value Date    RBC 4.74 02/24/2025    HGB 13.6 (L) 02/24/2025    HCT 41.2 02/24/2025    MCV 87 02/24/2025    MCH 28.7 02/24/2025    MCHC 33.0 02/24/2025    RDW 11.9 02/24/2025     02/24/2025    MPV 9.3 02/24/2025    GRAN 3.4 02/24/2025    GRAN 60.6 02/24/2025    LYMPH 1.2 02/24/2025    LYMPH 21.5 02/24/2025    MONO 0.7 02/24/2025    MONO 11.8 02/24/2025    EOS 0.3 02/24/2025    BASO 0.04 02/24/2025    EOSINOPHIL 5.4 02/24/2025    BASOPHIL 0.7 02/24/2025    DIFFMETHOD Automated 02/24/2025       Comprehensive Metabolic Panel  Lab Results   Component Value Date     (H) 02/24/2025    BUN 16 02/24/2025    CREATININE 1.1 02/24/2025     02/24/2025    K 4.0 02/24/2025     02/24/2025    PROT 7.4 02/24/2025    ALBUMIN 4.2 02/24/2025    BILITOT 0.3 02/24/2025    AST 29 02/24/2025    ALKPHOS 45 02/24/2025    CO2 23 02/24/2025    ALT 35 02/24/2025    ANIONGAP 10 02/24/2025       TSH  Lab Results   Component Value Date    TSH 2.344 02/24/2025       Imaging:  MRI Cervical Spine Without Contrast  Narrative: EXAMINATION:  MRI CERVICAL SPINE WITHOUT CONTRAST    CLINICAL HISTORY:  Radiculopathy lumbar region; Radiculopathy, lumbar region    TECHNIQUE:  Multiplanar, multisequence MR images of the cervical spine were performed without contrast.    COMPARISON:  Cervical spine radiograph 07/21/2025    FINDINGS:  C1-C2: Dens is intact.  Pre dens space is maintained.    Alignment: Normal.    Vertebrae: No fracture.  Diffusely hypointense marrow signal, likely representing reconversion.    Discs: Mild disc desiccation at multiple levels.  Disc heights are largely maintained.    Cord: Normal caliber and signal.    Skull base and craniocervical  junction: Normal.    Paraspinal muscles & soft tissues: Unremarkable.    Degenerative findings:    C2-C3: No spinal canal stenosis or neural foraminal narrowing.    C3-C4: Posterior disc osteophyte complex and uncovertebral spurring resulting in mild right neural foraminal narrowing.  No spinal canal stenosis.    C4-C5: Small posterior disc osteophyte complex.  No spinal canal stenosis or neural foraminal narrowing.    C5-C6: Small posterior disc osteophyte complex and right greater than left uncovertebral spurring resulting in mild right neural foraminal narrowing.  No spinal canal stenosis.    C6-C7: Posterior disc osteophyte complex and uncovertebral spurring result in mild spinal canal stenosis.  Mild bilateral neural foraminal narrowing.    C7-T1: No spinal canal stenosis or neural foraminal narrowing.  Impression: Degenerative change in the cervical spine noting minimal central canal stenosis at C6-C7 and mild neural foraminal narrowing at C5-C7.    Electronically signed by resident: Kelvin Rodriguez  Date:    07/21/2025  Time:    14:11    Electronically signed by: Simone Valencia MD  Date:    07/21/2025  Time:    14:36  X-Ray Elbow Complete Left  EXAMINATION:  XR ELBOW COMPLETE 3 VIEW LEFT    CLINICAL HISTORY:  Lateral epicondylitis, left elbow    FINDINGS:  Elbow complete three views left.    No fracture dislocation bone destruction or joint effusion seen.  No acute process seen.    Electronically signed by: Kaveh Maurice MD  Date:    07/21/2025  Time:    12:32  X-Ray Cervical Spine AP And Lateral  Narrative: EXAMINATION:  XR CERVICAL SPINE AP LATERAL    CLINICAL HISTORY:  Lateral epicondylitis, left elbow    FINDINGS:  Cervical spine two views:    Odontoid, prevertebral soft tissues, and posterior elements are intact.  No fracture dislocation bone destruction seen.  No trauma seen.  No acute process seen.  Impression: No acute process seen.    Electronically signed by: Kaveh Maurice  MD  Date:    07/21/2025  Time:    12:31      Assessment/Plan:  Assessment & Plan    Considered trigger point injections for neck and arm pain relief, to be discussed with pain management doctor.  Noted weight loss progress (292 lbs to 280 lbs).    ## CERVICALGIA AND CERVICAL SPINAL STENOSIS:  - Monitored the patient's severe back pain, especially when touching the spine, and neck pain that started after pulling the neck.  - Evaluated MRI results showing mild narrowing and stenosis in the cervical region.  - Assessed the condition and discussed potential treatments including occupational therapy and muscle relaxers.  - Prescribed methocarbamol 750 mg 3 times daily as needed for short-term muscle relaxation.  - Increased gabapentin to 3 times daily for pain management.  - Instructed the patient to continue current stretching exercises.  - Referred to occupational therapy for neck issues and will print referral papers along with MRI results for the patient to bring to pain management physician appointment.    ## RIGHT SHOULDER PAIN:  - Monitored the patient's right shoulder pain as part of the overall pain experience.  - Evaluated EMG results indicating issues in the right arm, including carpal tunnel syndrome.  - Assessed and discussed potential trigger point injections for pain management.  - Prescribed methocarbamol 750 mg 3 times daily as needed for short-term use.  - Instructed the patient to continue current stretching exercises.  - Planned to discuss trigger point injections with the pain management physician.    ## LEFT SHOULDER PAIN:  - Monitored the patient's left shoulder pain as part of the overall pain experience.  - Evaluated EMG results indicating issues in the left arm.  - Assessed and discussed potential trigger point injections for pain management.  - Instructed the patient to continue current stretching exercises.  - Planned to discuss trigger point injections with the pain management  physician.    ## FOREARM PAIN:  - Monitored the patient's forearm pain as part of the overall pain experience.  - Evaluated EMG results indicating issues in the arm, including carpal tunnel syndrome.  - Assessed and discussed potential trigger point injections for pain management.  - Prescribed methocarbamol 750 mg 3 times daily as needed for short-term use.  - Instructed the patient to continue current stretching exercises.  - Referred to occupational therapy for arm issues and will print referral papers for the patient.  - Planned to discuss trigger point injections with the pain management physician.    ## RIGHT CARPAL TUNNEL SYNDROME:  - Monitored the patient's pain in the right wrist and forearm.  - Evaluated EMG results confirming carpal tunnel syndrome in the right wrist.  - Assessed and discussed potential trigger point injections for pain management.  - Increased gabapentin to 3 times daily to address nerve pain.  - Instructed the patient to continue current nerve flossing exercises.  - Will print EMG results for the patient to bring to pain management physician appointment.  - Planned to discuss trigger point injections with the pain management physician.    ## LEFT-SIDED LUMBAGO WITH SCIATICA:  - Monitored the patient's sciatic nerve pain that feels like a piano string running from   the back, buttock, down the leg.  - Assessed and discussed potential trigger point injections for pain management.  - Increased gabapentin to 3 times daily to address nerve pain.  - Instructed the patient to continue current stretching exercises.  - Planned to discuss trigger point injections with the pain management physician.         1. Cervical radiculopathy  -     methocarbamoL (ROBAXIN) 750 MG Tab; Take 1 tablet (750 mg total) by mouth 3 (three) times daily as needed (pain).  Dispense: 120 tablet; Refill: 1  -     gabapentin (NEURONTIN) 600 MG tablet; Take 1 tablet (600 mg total) by mouth 3 (three) times daily.   Dispense: 270 tablet; Refill: 3  -     Ambulatory Referral/Consult to Physical Therapy    2. Sciatica of right side  -     gabapentin (NEURONTIN) 600 MG tablet; Take 1 tablet (600 mg total) by mouth 3 (three) times daily.  Dispense: 270 tablet; Refill: 3    3. Class 2 severe obesity due to excess calories with serious comorbidity and body mass index (BMI) of 37.0 to 37.9 in adult         Discussed how to stay healthy including: diet, exercise, refraining from smoking and discussed screening exams / tests needed for age, sex and family Hx.        You Britton MD    This note was generated with the assistance of ambient listening technology. Verbal consent was obtained by the patient and accompanying visitor(s) for the recording of patient appointment to facilitate this note. I attest to having reviewed and edited the generated note for accuracy, though some syntax or spelling errors may persist. Please contact the author of this note for any clarification.           [1]   Current Outpatient Medications on File Prior to Visit   Medication Sig Dispense Refill    aspirin (ECOTRIN) 81 MG EC tablet Take 1 tablet (81 mg total) by mouth 2 (two) times daily. 84 tablet 0    hydrocodone-acetaminophen (HYCET) solution 7.5-325 mg/15mL Take by mouth 4 (four) times daily as needed for Pain.      meloxicam (MOBIC) 15 MG tablet Take 1 tablet (15 mg total) by mouth once daily. 90 tablet 3    ondansetron (ZOFRAN-ODT) 4 MG TbDL Take 1 tablet (4 mg total) by mouth every 8 (eight) hours as needed (nausea). 30 tablet 3    sumatriptan (IMITREX) 50 MG tablet Take one at the first sign of a headache.  You may repeat it in 1 hour as needed. 15 tablet 3    tirzepatide 10 mg/0.5 mL PnIj Inject 10 mg into the skin every 7 days. 4 Pen 5    EPINEPHrine (EPIPEN) 0.3 mg/0.3 mL AtIn Inject 0.3 mLs (0.3 mg total) into the muscle once. for 1 dose (Patient not taking: Reported on 7/23/2025) 0.3 mL 0     Current Facility-Administered Medications on File  Prior to Visit   Medication Dose Route Frequency Provider Last Rate Last Admin    0.9%  NaCl infusion  500 mL Intravenous Continuous Alexus Anglin Jr., MD        lidocaine (PF) 10 mg/ml (1%) injection 10 mg  1 mL Intradermal Once Alexus Anglin Jr., MD        lidocaine (PF) 10 mg/ml (1%) injection 10 mg  1 mL Intradermal Once Alexus Anglin Jr., MD

## 2025-07-28 ENCOUNTER — PATIENT MESSAGE (OUTPATIENT)
Dept: FAMILY MEDICINE | Facility: CLINIC | Age: 65
End: 2025-07-28
Payer: OTHER GOVERNMENT

## 2025-07-28 DIAGNOSIS — E11.9 TYPE 2 DIABETES MELLITUS WITHOUT COMPLICATION, WITHOUT LONG-TERM CURRENT USE OF INSULIN: ICD-10-CM

## 2025-09-04 RX ORDER — EPINEPHRINE 0.3 MG/.3ML
1 INJECTION SUBCUTANEOUS ONCE
Qty: 0.3 ML | Refills: 0 | Status: SHIPPED | OUTPATIENT
Start: 2025-09-04 | End: 2025-09-04

## (undated) DEVICE — TOURNIQUET SB QC DP 34X4IN

## (undated) DEVICE — GLOVE BIOGEL ECLIPSE SZ 7

## (undated) DEVICE — ALCOHOL 70% ISOP W/GREEN 16OZ

## (undated) DEVICE — NDL SPINAL SPINOCAN 22GX3.5

## (undated) DEVICE — SEE MEDLINE ITEM 157169

## (undated) DEVICE — PUMP COLD THERAPY

## (undated) DEVICE — CONTAINER SPECIMEN STRL 4OZ

## (undated) DEVICE — PAD ABD 8X10 STERILE

## (undated) DEVICE — SUT VICRYL 3-0 27 SH

## (undated) DEVICE — BLADE SAW STERN .076MM 6.1MM L

## (undated) DEVICE — CLOSURE SKIN STERI STRIP 1/2X4

## (undated) DEVICE — WRAP KNEE ACCU THERM GEL PACK

## (undated) DEVICE — GAUZE SPONGE 4X4 12PLY

## (undated) DEVICE — BANDAGE ACE ELASTIC 6"

## (undated) DEVICE — GLOVE BIOGEL SKINSENSE PI 8.0

## (undated) DEVICE — PIN FIX TEMP 1/8X2.5IN LF STER
Type: IMPLANTABLE DEVICE | Site: KNEE | Status: NON-FUNCTIONAL
Removed: 2022-10-12

## (undated) DEVICE — BOWL CEMENT

## (undated) DEVICE — TOWEL OR NONABSORB ADH 17X26

## (undated) DEVICE — BANDAGE ADHESIVE

## (undated) DEVICE — WRAP PROTECTIVE LEG POS STRL

## (undated) DEVICE — SOL IRR NACL .9% 3000ML

## (undated) DEVICE — SOL 9P NACL IRR PIC IL

## (undated) DEVICE — DRESSING AQUACEL AG ADV 3.5X12

## (undated) DEVICE — NDL HYPO REG 25G X 1 1/2

## (undated) DEVICE — GOWN B1 X-LG X-LONG

## (undated) DEVICE — SUT 0 VICRYL / CT-1

## (undated) DEVICE — KIT IRR SUCTION HND PIECE

## (undated) DEVICE — TUBE SET INFLOW/OUTFLOW

## (undated) DEVICE — DRESSING LEUKOPLAST FLEX 1X3IN

## (undated) DEVICE — DRAPE STERI-DRAPE 1000 17X11IN

## (undated) DEVICE — NDL 25G X 5/8 REG BEVEL

## (undated) DEVICE — APPLICATOR CHLORAPREP ORN 26ML

## (undated) DEVICE — UNDERGLOVES BIOGEL PI SIZE 8.5

## (undated) DEVICE — PAD CAST SPECIALIST STRL 6

## (undated) DEVICE — STAPLER SKIN REGULAR

## (undated) DEVICE — PAD COLD THERAPY KNEE WRAP ON

## (undated) DEVICE — ELECTRODE REM PLYHSV RETURN 9

## (undated) DEVICE — BLADE SHAVER LANZA 4.2X13CM

## (undated) DEVICE — BANDAGE ACE DOUBLE STER 6IN

## (undated) DEVICE — GLOVE BIOGEL SKINSENSE PI 7.0

## (undated) DEVICE — SKINMARKER W/RULER DEVON

## (undated) DEVICE — SUT 3-0 ETHILON 18 FS-1

## (undated) DEVICE — SYR DISP LL 5CC

## (undated) DEVICE — MASK FLYTE HOOD PEEL AWAY

## (undated) DEVICE — SEE MEDLINE ITEM 157116

## (undated) DEVICE — DRAPE STERI INSTRUMENT 1018

## (undated) DEVICE — SEE MEDLINE ITEM 157131

## (undated) DEVICE — BLADE SAGITTAL 18 X 1.27 X 90M

## (undated) DEVICE — PROBE ARTHSCP EDGE ENERGY 50

## (undated) DEVICE — SYR 10CC LUER LOCK

## (undated) DEVICE — ADHESIVE DERMABOND ADVANCED

## (undated) DEVICE — COVER OVERHEAD SURG LT BLUE

## (undated) DEVICE — BANDAGE ESMARK 6X12

## (undated) DEVICE — DRESSING XEROFORM FOIL PK 1X8

## (undated) DEVICE — SUT VICRYL BR 1 GEN 27 CT-1

## (undated) DEVICE — DRAPE T EXTRM SURG 121X128X90

## (undated) DEVICE — BETADINE OPTHALMIC SOL 5% 30ML

## (undated) DEVICE — Device

## (undated) DEVICE — GLOVE BIOGEL PI MICRO INDIC 7

## (undated) DEVICE — BOWL STERILE LARGE 32OZ

## (undated) DEVICE — SEE MEDLINE ITEM 152622

## (undated) DEVICE — KIT TOTAL KNEE TKOFG OMC